# Patient Record
Sex: FEMALE | Race: WHITE | Employment: OTHER | URBAN - METROPOLITAN AREA
[De-identification: names, ages, dates, MRNs, and addresses within clinical notes are randomized per-mention and may not be internally consistent; named-entity substitution may affect disease eponyms.]

---

## 2018-01-17 PROBLEM — K57.20 COLONIC DIVERTICULAR ABSCESS: Status: ACTIVE | Noted: 2018-01-17

## 2018-01-17 PROBLEM — K57.80 DIVERTICULITIS OF INTESTINE WITH ABSCESS WITHOUT BLEEDING: Status: ACTIVE | Noted: 2018-01-17

## 2018-01-31 PROBLEM — O22.30 DVT (DEEP VEIN THROMBOSIS) IN PREGNANCY: Status: ACTIVE | Noted: 2018-01-31

## 2018-03-08 PROBLEM — O22.30 DVT (DEEP VEIN THROMBOSIS) IN PREGNANCY: Status: RESOLVED | Noted: 2018-01-31 | Resolved: 2018-03-08

## 2018-03-08 PROBLEM — I82.622 ACUTE DEEP VEIN THROMBOSIS (DVT) OF LEFT UPPER EXTREMITY (HCC): Status: ACTIVE | Noted: 2018-03-08

## 2018-05-11 ENCOUNTER — TELEPHONE (OUTPATIENT)
Dept: SURGERY | Age: 58
End: 2018-05-11

## 2019-04-17 ENCOUNTER — HOSPITAL ENCOUNTER (INPATIENT)
Age: 59
LOS: 1 days | Discharge: HOME | DRG: 190 | End: 2019-04-18
Attending: EMERGENCY MEDICINE | Admitting: HOSPITALIST
Payer: COMMERCIAL

## 2019-04-17 ENCOUNTER — APPOINTMENT (OUTPATIENT)
Dept: CT IMAGING | Age: 59
DRG: 190 | End: 2019-04-17
Payer: COMMERCIAL

## 2019-04-17 DIAGNOSIS — R06.02 SHORTNESS OF BREATH: ICD-10-CM

## 2019-04-17 DIAGNOSIS — J42 CHRONIC BRONCHITIS, UNSPECIFIED CHRONIC BRONCHITIS TYPE (HCC): ICD-10-CM

## 2019-04-17 DIAGNOSIS — R05.9 COUGH: ICD-10-CM

## 2019-04-17 PROBLEM — J44.1 ACUTE EXACERBATION OF CHRONIC OBSTRUCTIVE PULMONARY DISEASE (COPD) (HCC): Status: ACTIVE | Noted: 2019-04-17

## 2019-04-17 PROBLEM — J44.1 COPD EXACERBATION (HCC): Status: ACTIVE | Noted: 2019-04-17

## 2019-04-17 LAB
ALBUMIN SERPL-MCNC: 3.9 G/DL (ref 3.5–5.2)
ALP BLD-CCNC: 93 U/L (ref 35–104)
ALT SERPL-CCNC: 13 U/L (ref 0–32)
ANION GAP SERPL CALCULATED.3IONS-SCNC: 12 MMOL/L (ref 7–16)
AST SERPL-CCNC: 16 U/L (ref 0–31)
BILIRUB SERPL-MCNC: 0.4 MG/DL (ref 0–1.2)
BUN BLDV-MCNC: 8 MG/DL (ref 6–20)
CALCIUM SERPL-MCNC: 9.5 MG/DL (ref 8.6–10.2)
CHLORIDE BLD-SCNC: 100 MMOL/L (ref 98–107)
CO2: 31 MMOL/L (ref 22–29)
CREAT SERPL-MCNC: 0.6 MG/DL (ref 0.5–1)
GFR AFRICAN AMERICAN: >60
GFR NON-AFRICAN AMERICAN: >60 ML/MIN/1.73
GLUCOSE BLD-MCNC: 99 MG/DL (ref 74–99)
HCT VFR BLD CALC: 45.5 % (ref 34–48)
HEMOGLOBIN: 15.4 G/DL (ref 11.5–15.5)
LACTIC ACID: 1.6 MMOL/L (ref 0.5–2.2)
MCH RBC QN AUTO: 31.4 PG (ref 26–35)
MCHC RBC AUTO-ENTMCNC: 33.8 % (ref 32–34.5)
MCV RBC AUTO: 92.7 FL (ref 80–99.9)
PDW BLD-RTO: 12.9 FL (ref 11.5–15)
PLATELET # BLD: 258 E9/L (ref 130–450)
PMV BLD AUTO: 9.2 FL (ref 7–12)
POTASSIUM SERPL-SCNC: 3.4 MMOL/L (ref 3.5–5)
PRO-BNP: 157 PG/ML (ref 0–125)
RBC # BLD: 4.91 E12/L (ref 3.5–5.5)
SODIUM BLD-SCNC: 143 MMOL/L (ref 132–146)
TOTAL PROTEIN: 7 G/DL (ref 6.4–8.3)
TROPONIN: <0.01 NG/ML (ref 0–0.03)
WBC # BLD: 8.9 E9/L (ref 4.5–11.5)

## 2019-04-17 PROCEDURE — 36415 COLL VENOUS BLD VENIPUNCTURE: CPT

## 2019-04-17 PROCEDURE — 85027 COMPLETE CBC AUTOMATED: CPT

## 2019-04-17 PROCEDURE — 80053 COMPREHEN METABOLIC PANEL: CPT

## 2019-04-17 PROCEDURE — 84145 PROCALCITONIN (PCT): CPT

## 2019-04-17 PROCEDURE — 83880 ASSAY OF NATRIURETIC PEPTIDE: CPT

## 2019-04-17 PROCEDURE — 6360000004 HC RX CONTRAST MEDICATION: Performed by: RADIOLOGY

## 2019-04-17 PROCEDURE — 83605 ASSAY OF LACTIC ACID: CPT

## 2019-04-17 PROCEDURE — 1200000000 HC SEMI PRIVATE

## 2019-04-17 PROCEDURE — 6370000000 HC RX 637 (ALT 250 FOR IP): Performed by: EMERGENCY MEDICINE

## 2019-04-17 PROCEDURE — 99285 EMERGENCY DEPT VISIT HI MDM: CPT

## 2019-04-17 PROCEDURE — 93005 ELECTROCARDIOGRAM TRACING: CPT | Performed by: EMERGENCY MEDICINE

## 2019-04-17 PROCEDURE — 84484 ASSAY OF TROPONIN QUANT: CPT

## 2019-04-17 PROCEDURE — 71275 CT ANGIOGRAPHY CHEST: CPT

## 2019-04-17 PROCEDURE — 94640 AIRWAY INHALATION TREATMENT: CPT

## 2019-04-17 RX ORDER — SODIUM CHLORIDE 0.9 % (FLUSH) 0.9 %
10 SYRINGE (ML) INJECTION PRN
Status: CANCELLED | OUTPATIENT
Start: 2019-04-17

## 2019-04-17 RX ORDER — IPRATROPIUM BROMIDE AND ALBUTEROL SULFATE 2.5; .5 MG/3ML; MG/3ML
3 SOLUTION RESPIRATORY (INHALATION) ONCE
Status: COMPLETED | OUTPATIENT
Start: 2019-04-17 | End: 2019-04-17

## 2019-04-17 RX ORDER — PSEUDOEPHEDRINE HCL 30 MG
100 TABLET ORAL DAILY
Status: CANCELLED | OUTPATIENT
Start: 2019-04-18

## 2019-04-17 RX ORDER — IPRATROPIUM BROMIDE AND ALBUTEROL SULFATE 2.5; .5 MG/3ML; MG/3ML
1 SOLUTION RESPIRATORY (INHALATION)
Status: CANCELLED | OUTPATIENT
Start: 2019-04-18

## 2019-04-17 RX ORDER — METHYLPREDNISOLONE SODIUM SUCCINATE 125 MG/2ML
80 INJECTION, POWDER, LYOPHILIZED, FOR SOLUTION INTRAMUSCULAR; INTRAVENOUS EVERY 8 HOURS
Status: CANCELLED | OUTPATIENT
Start: 2019-04-17

## 2019-04-17 RX ORDER — ONDANSETRON 2 MG/ML
4 INJECTION INTRAMUSCULAR; INTRAVENOUS EVERY 6 HOURS PRN
Status: CANCELLED | OUTPATIENT
Start: 2019-04-17

## 2019-04-17 RX ORDER — SODIUM CHLORIDE 0.9 % (FLUSH) 0.9 %
10 SYRINGE (ML) INJECTION EVERY 12 HOURS SCHEDULED
Status: CANCELLED | OUTPATIENT
Start: 2019-04-17

## 2019-04-17 RX ORDER — NICOTINE 21 MG/24HR
1 PATCH, TRANSDERMAL 24 HOURS TRANSDERMAL DAILY
Status: CANCELLED | OUTPATIENT
Start: 2019-04-18

## 2019-04-17 RX ORDER — ACETAMINOPHEN 325 MG/1
650 TABLET ORAL EVERY 4 HOURS PRN
Status: CANCELLED | OUTPATIENT
Start: 2019-04-17

## 2019-04-17 RX ADMIN — IPRATROPIUM BROMIDE AND ALBUTEROL SULFATE 3 AMPULE: .5; 3 SOLUTION RESPIRATORY (INHALATION) at 20:18

## 2019-04-17 RX ADMIN — IOPAMIDOL 80 ML: 755 INJECTION, SOLUTION INTRAVENOUS at 22:24

## 2019-04-17 ASSESSMENT — ENCOUNTER SYMPTOMS
ABDOMINAL PAIN: 0
CHEST TIGHTNESS: 1
SHORTNESS OF BREATH: 1

## 2019-04-17 NOTE — ED NOTES
Bed: H4  Expected date:   Expected time:   Means of arrival:   Comments:     Lucy Sarmiento RN  04/17/19 1958

## 2019-04-18 VITALS
OXYGEN SATURATION: 95 % | WEIGHT: 146 LBS | HEIGHT: 64 IN | RESPIRATION RATE: 18 BRPM | HEART RATE: 90 BPM | DIASTOLIC BLOOD PRESSURE: 80 MMHG | BODY MASS INDEX: 24.92 KG/M2 | SYSTOLIC BLOOD PRESSURE: 138 MMHG | TEMPERATURE: 97.9 F

## 2019-04-18 PROBLEM — E87.6 HYPOKALEMIA: Status: ACTIVE | Noted: 2019-04-18

## 2019-04-18 PROBLEM — J96.01 ACUTE RESPIRATORY FAILURE WITH HYPOXIA (HCC): Status: ACTIVE | Noted: 2019-04-18

## 2019-04-18 LAB
ANION GAP SERPL CALCULATED.3IONS-SCNC: 12 MMOL/L (ref 7–16)
BASOPHILS ABSOLUTE: 0.03 E9/L (ref 0–0.2)
BASOPHILS RELATIVE PERCENT: 0.3 % (ref 0–2)
BUN BLDV-MCNC: 8 MG/DL (ref 6–20)
CALCIUM SERPL-MCNC: 9.2 MG/DL (ref 8.6–10.2)
CHLORIDE BLD-SCNC: 102 MMOL/L (ref 98–107)
CO2: 27 MMOL/L (ref 22–29)
CREAT SERPL-MCNC: 0.5 MG/DL (ref 0.5–1)
EKG ATRIAL RATE: 80 BPM
EKG P AXIS: 81 DEGREES
EKG P-R INTERVAL: 156 MS
EKG Q-T INTERVAL: 416 MS
EKG QRS DURATION: 80 MS
EKG QTC CALCULATION (BAZETT): 479 MS
EKG R AXIS: 82 DEGREES
EKG T AXIS: 70 DEGREES
EKG VENTRICULAR RATE: 80 BPM
EOSINOPHILS ABSOLUTE: 0.01 E9/L (ref 0.05–0.5)
EOSINOPHILS RELATIVE PERCENT: 0.1 % (ref 0–6)
FILM ARRAY ADENOVIRUS: NORMAL
FILM ARRAY BORDETELLA PERTUSSIS: NORMAL
FILM ARRAY CHLAMYDOPHILIA PNEUMONIAE: NORMAL
FILM ARRAY CORONAVIRUS 229E: NORMAL
FILM ARRAY CORONAVIRUS HKU1: NORMAL
FILM ARRAY CORONAVIRUS NL63: NORMAL
FILM ARRAY CORONAVIRUS OC43: NORMAL
FILM ARRAY INFLUENZA A VIRUS 09H1: NORMAL
FILM ARRAY INFLUENZA A VIRUS H1: NORMAL
FILM ARRAY INFLUENZA A VIRUS H3: NORMAL
FILM ARRAY INFLUENZA A VIRUS: NORMAL
FILM ARRAY INFLUENZA B: NORMAL
FILM ARRAY METAPNEUMOVIRUS: NORMAL
FILM ARRAY MYCOPLASMA PNEUMONIAE: NORMAL
FILM ARRAY PARAINFLUENZA VIRUS 1: NORMAL
FILM ARRAY PARAINFLUENZA VIRUS 2: NORMAL
FILM ARRAY PARAINFLUENZA VIRUS 3: NORMAL
FILM ARRAY PARAINFLUENZA VIRUS 4: NORMAL
FILM ARRAY RESPIRATORY SYNCITIAL VIRUS: NORMAL
FILM ARRAY RHINOVIRUS/ENTEROVIRUS: NORMAL
GFR AFRICAN AMERICAN: >60
GFR NON-AFRICAN AMERICAN: >60 ML/MIN/1.73
GLUCOSE BLD-MCNC: 122 MG/DL (ref 74–99)
HCT VFR BLD CALC: 45.1 % (ref 34–48)
HEMOGLOBIN: 15.3 G/DL (ref 11.5–15.5)
IMMATURE GRANULOCYTES #: 0.03 E9/L
IMMATURE GRANULOCYTES %: 0.3 % (ref 0–5)
LYMPHOCYTES ABSOLUTE: 0.91 E9/L (ref 1.5–4)
LYMPHOCYTES RELATIVE PERCENT: 9.1 % (ref 20–42)
MCH RBC QN AUTO: 31.7 PG (ref 26–35)
MCHC RBC AUTO-ENTMCNC: 33.9 % (ref 32–34.5)
MCV RBC AUTO: 93.6 FL (ref 80–99.9)
MONOCYTES ABSOLUTE: 0.08 E9/L (ref 0.1–0.95)
MONOCYTES RELATIVE PERCENT: 0.8 % (ref 2–12)
NEUTROPHILS ABSOLUTE: 8.99 E9/L (ref 1.8–7.3)
NEUTROPHILS RELATIVE PERCENT: 89.4 % (ref 43–80)
PDW BLD-RTO: 12.9 FL (ref 11.5–15)
PLATELET # BLD: 248 E9/L (ref 130–450)
PMV BLD AUTO: 9 FL (ref 7–12)
POTASSIUM REFLEX MAGNESIUM: 4.4 MMOL/L (ref 3.5–5)
PROCALCITONIN: 0.02 NG/ML (ref 0–0.08)
RBC # BLD: 4.82 E12/L (ref 3.5–5.5)
SODIUM BLD-SCNC: 141 MMOL/L (ref 132–146)
TROPONIN: <0.01 NG/ML (ref 0–0.03)
TROPONIN: <0.01 NG/ML (ref 0–0.03)
WBC # BLD: 10.1 E9/L (ref 4.5–11.5)

## 2019-04-18 PROCEDURE — 87633 RESP VIRUS 12-25 TARGETS: CPT

## 2019-04-18 PROCEDURE — 94667 MNPJ CHEST WALL 1ST: CPT

## 2019-04-18 PROCEDURE — 84484 ASSAY OF TROPONIN QUANT: CPT

## 2019-04-18 PROCEDURE — 2580000003 HC RX 258: Performed by: NURSE PRACTITIONER

## 2019-04-18 PROCEDURE — 99239 HOSP IP/OBS DSCHRG MGMT >30: CPT | Performed by: HOSPITALIST

## 2019-04-18 PROCEDURE — 6370000000 HC RX 637 (ALT 250 FOR IP): Performed by: NURSE PRACTITIONER

## 2019-04-18 PROCEDURE — 87486 CHLMYD PNEUM DNA AMP PROBE: CPT

## 2019-04-18 PROCEDURE — 6360000002 HC RX W HCPCS: Performed by: NURSE PRACTITIONER

## 2019-04-18 PROCEDURE — 80048 BASIC METABOLIC PNL TOTAL CA: CPT

## 2019-04-18 PROCEDURE — 94668 MNPJ CHEST WALL SBSQ: CPT

## 2019-04-18 PROCEDURE — 85025 COMPLETE CBC W/AUTO DIFF WBC: CPT

## 2019-04-18 PROCEDURE — 87798 DETECT AGENT NOS DNA AMP: CPT

## 2019-04-18 PROCEDURE — 36415 COLL VENOUS BLD VENIPUNCTURE: CPT

## 2019-04-18 PROCEDURE — 87581 M.PNEUMON DNA AMP PROBE: CPT

## 2019-04-18 PROCEDURE — 94640 AIRWAY INHALATION TREATMENT: CPT

## 2019-04-18 PROCEDURE — 99223 1ST HOSP IP/OBS HIGH 75: CPT | Performed by: NURSE PRACTITIONER

## 2019-04-18 PROCEDURE — 2700000000 HC OXYGEN THERAPY PER DAY

## 2019-04-18 RX ORDER — ALBUTEROL SULFATE 2.5 MG/3ML
2.5 SOLUTION RESPIRATORY (INHALATION) EVERY 4 HOURS PRN
Qty: 120 EACH | Refills: 1 | Status: ON HOLD | OUTPATIENT
Start: 2019-04-18 | End: 2019-12-03 | Stop reason: SDUPTHER

## 2019-04-18 RX ORDER — SODIUM CHLORIDE 0.9 % (FLUSH) 0.9 %
10 SYRINGE (ML) INJECTION PRN
Status: DISCONTINUED | OUTPATIENT
Start: 2019-04-18 | End: 2019-04-18 | Stop reason: HOSPADM

## 2019-04-18 RX ORDER — ALBUTEROL SULFATE 2.5 MG/3ML
2.5 SOLUTION RESPIRATORY (INHALATION)
Status: DISCONTINUED | OUTPATIENT
Start: 2019-04-18 | End: 2019-04-18 | Stop reason: HOSPADM

## 2019-04-18 RX ORDER — METHYLPREDNISOLONE SODIUM SUCCINATE 40 MG/ML
40 INJECTION, POWDER, LYOPHILIZED, FOR SOLUTION INTRAMUSCULAR; INTRAVENOUS EVERY 8 HOURS
Status: DISCONTINUED | OUTPATIENT
Start: 2019-04-18 | End: 2019-04-18 | Stop reason: HOSPADM

## 2019-04-18 RX ORDER — IPRATROPIUM BROMIDE AND ALBUTEROL SULFATE 2.5; .5 MG/3ML; MG/3ML
1 SOLUTION RESPIRATORY (INHALATION)
Status: DISCONTINUED | OUTPATIENT
Start: 2019-04-18 | End: 2019-04-18 | Stop reason: HOSPADM

## 2019-04-18 RX ORDER — PREDNISONE 10 MG/1
40 TABLET ORAL DAILY
Qty: 28 TABLET | Refills: 0 | Status: SHIPPED | OUTPATIENT
Start: 2019-04-18 | End: 2019-04-25

## 2019-04-18 RX ORDER — SODIUM CHLORIDE 0.9 % (FLUSH) 0.9 %
10 SYRINGE (ML) INJECTION EVERY 12 HOURS SCHEDULED
Status: DISCONTINUED | OUTPATIENT
Start: 2019-04-18 | End: 2019-04-18 | Stop reason: HOSPADM

## 2019-04-18 RX ORDER — POTASSIUM CHLORIDE 7.45 MG/ML
10 INJECTION INTRAVENOUS PRN
Status: DISCONTINUED | OUTPATIENT
Start: 2019-04-18 | End: 2019-04-18 | Stop reason: HOSPADM

## 2019-04-18 RX ORDER — NICOTINE 21 MG/24HR
1 PATCH, TRANSDERMAL 24 HOURS TRANSDERMAL DAILY
Status: DISCONTINUED | OUTPATIENT
Start: 2019-04-18 | End: 2019-04-18 | Stop reason: HOSPADM

## 2019-04-18 RX ORDER — ONDANSETRON 2 MG/ML
4 INJECTION INTRAMUSCULAR; INTRAVENOUS EVERY 8 HOURS PRN
Status: DISCONTINUED | OUTPATIENT
Start: 2019-04-18 | End: 2019-04-18 | Stop reason: HOSPADM

## 2019-04-18 RX ORDER — NICOTINE 21 MG/24HR
1 PATCH, TRANSDERMAL 24 HOURS TRANSDERMAL DAILY
Qty: 30 PATCH | Refills: 0 | Status: SHIPPED | OUTPATIENT
Start: 2019-04-18 | End: 2021-09-21 | Stop reason: ALTCHOICE

## 2019-04-18 RX ORDER — POTASSIUM CHLORIDE 20 MEQ/1
40 TABLET, EXTENDED RELEASE ORAL PRN
Status: DISCONTINUED | OUTPATIENT
Start: 2019-04-18 | End: 2019-04-18 | Stop reason: HOSPADM

## 2019-04-18 RX ORDER — MAGNESIUM SULFATE 1 G/100ML
1 INJECTION INTRAVENOUS PRN
Status: DISCONTINUED | OUTPATIENT
Start: 2019-04-18 | End: 2019-04-18 | Stop reason: HOSPADM

## 2019-04-18 RX ADMIN — POTASSIUM CHLORIDE 40 MEQ: 20 TABLET, EXTENDED RELEASE ORAL at 01:49

## 2019-04-18 RX ADMIN — IPRATROPIUM BROMIDE AND ALBUTEROL SULFATE 1 AMPULE: .5; 3 SOLUTION RESPIRATORY (INHALATION) at 05:00

## 2019-04-18 RX ADMIN — Medication 10 ML: at 09:47

## 2019-04-18 RX ADMIN — METHYLPREDNISOLONE SODIUM SUCCINATE 40 MG: 40 INJECTION, POWDER, FOR SOLUTION INTRAMUSCULAR; INTRAVENOUS at 09:45

## 2019-04-18 RX ADMIN — METHYLPREDNISOLONE SODIUM SUCCINATE 40 MG: 40 INJECTION, POWDER, FOR SOLUTION INTRAMUSCULAR; INTRAVENOUS at 17:16

## 2019-04-18 RX ADMIN — IPRATROPIUM BROMIDE AND ALBUTEROL SULFATE 1 AMPULE: .5; 3 SOLUTION RESPIRATORY (INHALATION) at 10:45

## 2019-04-18 RX ADMIN — IPRATROPIUM BROMIDE AND ALBUTEROL SULFATE 1 AMPULE: .5; 3 SOLUTION RESPIRATORY (INHALATION) at 16:31

## 2019-04-18 RX ADMIN — METHYLPREDNISOLONE SODIUM SUCCINATE 40 MG: 40 INJECTION, POWDER, FOR SOLUTION INTRAMUSCULAR; INTRAVENOUS at 01:49

## 2019-04-18 ASSESSMENT — PAIN SCALES - GENERAL
PAINLEVEL_OUTOF10: 0
PAINLEVEL_OUTOF10: 0

## 2019-04-18 NOTE — H&P
[facility] Hospitalist Group History and Physical      CHIEF COMPLAINT:  Shortness of breath    History of Present Illness:  62-year-old female initially presented to the emergency department for evaluation of shortness of breath for the last 2 or 3 days over and above the mild chronic daily shortness of breath she has from her COPD. She noted a change in her activity tolerance as well as she is now dyspneic on exertion which is not typical for her. His associated cough with some sputum production but not significantly different from her baseline cough and sputum production. Patient states that the last 24 hours she is noted that she could not even do simple things that walk across the house without giving herself breathing treatments due to the hypoxia but she admits she was not taking complete treatments when should do this and only taking puffs off to alleviate some of her symptoms. Admits to some subjective fevers but no measured fevers, chills, mild decrease in appetite. She was drinking fluids normally. No sinus pain or pressure, admits to sinus and nasal congestion and postnasal drip. No sore throat. No headache, neck pain or stiffness. Has some mild pleuritic chest discomfort that is worsened with cough and deep breathing but denies hemoptysis. No recent surgeries or hospitalizations. No prolonged immobilization or limb immobilization prior history of DVT times one provoked by surgery in March 2018 treated with 3 months of anticoagulation and then discontinued with no further blood clotting disorders. No history of hypercoagulable state. No weight gain, fluid retention or edema in the lower extremity, orthopnea or paroxysmal nocturnal dyspnea. Admits to dyspnea on exertion as mentioned. Patient denies any abdominal pains, nausea vomiting, bowel pattern changes or bloody stools. Denies any urinary or vaginal complaints. Patient was evaluated in the ER initially noted to be hypoxic at rest at 90%.  She is not typically oxygen dependent. She was treated with nebulizers and had diagnostic testing that showed some exertional dyspnea and hypoxia with saturations dropping into the mid to high 80% range on room air. She had a benign laboratory evaluation showing no leukocytosis, renal insufficiency or significant electrolyte derangement. She was noted to be mildly hypokalemic. EKG showed no findings of ischemia, infarction, or right-sided heart strain. Given the patient's prior history of provoked DVT, the patient underwent CT angiography which showed no PE but did show some emphysematous findings. Patient was still requiring supplemental nasal cannula oxygen despite treatment. Decision was made to admit the patient for further care and management to the hospital service. Blood gases were not obtained with the patient's serum CO2 was noted to be mildly elevated as well suggesting mild hypercapnia. Informant(s) for H&P:patient, ER physician, EMR    REVIEW OF SYSTEMS:  Pertinent items are noted in HPI. A 12 point review of systems was conducted and aside from that mentioned within HPI, all systems were reviewed and unremarkable. PMH:  Past Medical History:   Diagnosis Date    Abscess     colon    COPD (chronic obstructive pulmonary disease) ([facility] [address]     Diverticulitis     Provoked DVT 3/2018     3 months Eliquis for DVT due to PICC line    Seasonal allergic rhinitis     Tobacco use disorder      : 1 ppd since age 25       Surgical History:  History reviewed. No pertinent surgical history. Medications Prior to Admission:    Prior to Admission medications    Medication Sig Start Date End Date Taking?  Authorizing Provider   apixaban (ELIQUIS) 2.5 MG TABS tablet Take 1 tablet by mouth 2 times daily Lot #: [id number]  Exp: 12/2018 3/5/18   Austin Mays MD   apixaban (ELIQUIS) 5 MG TABS tablet Take 1 tablet by mouth 2 times daily Lot #: [id number]  Exp: 4/2020 3/5/18   Austin Mays MD   azithromycin (ZITHROMAX) 250 MG tablet 500 mg on day 1 then 250 mg po daily for 4 more days 2/21/18   Austin Mays MD   Umeclidinium Bromide 62.5 MCG/INH AEPB Inhale 1 Inhaler into the lungs daily 2/21/18   Austin Mays MD   albuterol sulfate HFA (PROAIR HFA) 108 (90 Base) MCG/ACT inhaler Inhale 2 puffs into the lungs every 6 hours as needed for Wheezing 2/21/18   Austin Mays MD   docusate sodium (COLACE, DULCOLAX) 100 MG CAPS Take 100 mg by mouth 2 times daily as needed for Constipation  Patient taking differently: Take 100 mg by mouth daily  1/22/18   Gerald Harry MD   nicotine (NICODERM CQ) 14 MG/24HR Place 1 patch onto the skin daily 1/22/18   Gerald Harry MD       Allergies:    Penicillin v    Social History:    reports that she has been smoking cigarettes. She has a 20.50 pack-year smoking history. She has never used smokeless tobacco. She reports that she drinks alcohol. She reports that she has current or past drug history. Drug: Marijuana. Family History:   family history includes Breast Cancer in her none; Colon Cancer in her none; Coronary Art Dis in her father; Diabetes in her unknown relative; Hypertension in her father; Other in her daughter and father; Stroke in her mother. PHYSICAL EXAM:  Vitals:  BP (!) 151/92   Pulse 83   Temp 97.8 °F (36.6 °C) (Oral)   Resp 25   Ht 5' 4\" (1.626 m)   Wt 145 lb (65.8 kg)   SpO2 97%   BMI 24.89 kg/m²   General Appearance: alert and oriented to person, place and time, non-toxic, in no acute distress  Skin: warm and dry, no rash or erythema  Head: normocephalic and atraumatic  Eyes: pupils equal, round, and reactive to light, extraocular eye movements intact, conjunctivae normal. Sclera without icterus.   ENT: External ear structures unremarkable in appearance, nose without deformity, nasal mucosa and turbinates normal without polyps  Neck: supple and non-tender without mass, no thyromegaly or thyroid nodules, no cervical lymphadenopathy  Pulmonary/Chest: Air entry is diminished throughout. Expiratory wheezing is noted without rales or rhonchi. Chest nontender and without rash  Cardiovascular: normal rate, regular rhythm, normal S1 and S2, no murmurs, rubs, clicks, or gallops, distal pulses intact, no carotid bruits  Abdomen: soft, non-tender, non-distended, normal bowel sounds, no masses or organomegaly  Extremities: No calf tenderness or edema, no foot or ankle edema. No cording. No tenderness along the deep venous tract. No clinical findings to suggest DVT. Musculoskeletal: normal range of motion, no joint swelling, deformity or tenderness  Neurologic: reflexes normal and symmetric, no cranial nerve deficit, gait, coordination and speech normal      LABS:  Recent Labs     04/17/19 2110      K 3.4*      CO2 31*   BUN 8   CREATININE 0.6   GLUCOSE 99   CALCIUM 9.5       Recent Labs     04/17/19 2110   WBC 8.9   RBC 4.91   HGB 15.4   HCT 45.5   MCV 92.7   MCH 31.4   MCHC 33.8   RDW 12.9      MPV 9.2       No results for input(s): POCGLU in the last 72 hours.     CBC with Differential:    Lab Results   Component Value Date    WBC 8.9 04/17/2019    RBC 4.91 04/17/2019    HGB 15.4 04/17/2019    HCT 45.5 04/17/2019     04/17/2019    MCV 92.7 04/17/2019    MCH 31.4 04/17/2019    MCHC 33.8 04/17/2019    RDW 12.9 04/17/2019    LYMPHOPCT 26.7 01/31/2018    MONOPCT 5.7 01/31/2018    BASOPCT 0.5 01/31/2018    MONOSABS 0.59 01/31/2018    LYMPHSABS 2.77 01/31/2018    EOSABS 0.13 01/31/2018    BASOSABS 0.05 01/31/2018     BMP:    Lab Results   Component Value Date     04/17/2019    K 3.4 04/17/2019     04/17/2019    CO2 31 04/17/2019    BUN 8 04/17/2019    LABALBU 3.9 04/17/2019    CREATININE 0.6 04/17/2019    CALCIUM 9.5 04/17/2019    GFRAA >60 04/17/2019    LABGLOM >60 04/17/2019    GLUCOSE 99 04/17/2019     Magnesium:    Lab Results   Component Value Date    MG 2.3 01/18/2018     Troponin:    Lab Results   Component Value Date    TROPONINI <0.01 04/17/2019       Radiology: Cta Chest W Contrast    Result Date: 4/17/2019  Reading Location: 200 Indication: Shortness of breath. Comparison: None available. Technique: Multidetector CT angiography of the chest was preformed after the administration of intravenous contrast (80 of Isovue-370). Coronal, sagittal, and MIP reformatted images were obtained. Automated dose exposure control was used for this exam. FINDINGS: Pulmonary Arteries: There is adequate opacification of the pulmonary arteries to the subsegmental  level. No pulmonary artery filling defect is identified. The main pulmonary artery is normal in caliber. Airways: The central airways are patent. There is no bronchiectasis. Lungs: There are moderate changes of centrilobular emphysema. There is no focal consolidation or mass. Pleura: There is no pleural effusion or pneumothorax. There is no pleural thickening. Heart and Pericardium: The heart is normal in size. There is no large pericardial effusion. Vessels: The thoracic aorta is normal in caliber and enhancement. The great vessel origins are patent. Mediastinum and Noa: There is no mediastinal or hilar lymphadenopathy. Chest Wall/Bones: Unremarkable. Upper Abdomen: Visualized portion of the upper abdomen demonstrates hypodense lesions within the liver, most compatible with cysts. .     1. No evidence of pulmonary embolism to the subsegmental level. 2. Moderate changes of centrilobular emphysema. EKG: Interpreted by myself with attending physician unless otherwise noted. Time:  21:21:23    Interpretation:  Rhythm: NSR  Rate: 80  Intervals: QTC is mildly prolonged otherwise within defined limits  Axis: normal  ST/T waves: No acute ST or T-wave abnormalities to suggest infarction or ischemia  R wave progression: normal  Comparison: stable as compared to patient's most recent EKG.     ASSESSMENT:      Principal Problem:    Acute exacerbation of chronic obstructive pulmonary disease (COPD) [facility])  Active Problems:    Tobacco use disorder    Acute respiratory failure with hypoxia (HCC)    Hypokalemia  Resolved Problems:    * No resolved hospital problems. *      PLAN:    1. Acute exacerbation of COPD with hypoxic respiratory failure-admit to telemetry. CT interim showed COPD with no pulmonary emboli or acute infectious process. Scheduled DuoNeb with as needed albuterol. Respiratory film array and a pro-calcitonin pending. We will withhold antibiotic therapy as the patient is without significant leukocytosis, fever, change in sputum production or viscosity. Scheduled Solu-Medrol 40 mg every 8 hours ×3 doses, then defer taper to the daytime rounding hospitalist. Supplemental nasal cannula oxygen with plan to taper to room air as the patient is not chronically oxygen dependent. Flutter valve for mobilization secretions. Continue to monitor with telemetry and cycle troponins every 6 hours until 3 sets have been completed and negative. 2. Hypokalemia-replaced, continue potassium and magnesium replacement protocols. Continued telemetry monitoring. 3. Tobacco use disorder- tobacco cessation is advised, education should be provided during inpatient stay. Nicotine patch commensurate with patient's half-pack per day smoking. 4. Code Status: FULL   5.  DVT prophylaxis: Lovenox 40mg subcutaneous injection daily      Electronically signed by SKYE Caballero CNP on 4/18/2019 at 12:11 AM

## 2019-04-18 NOTE — PLAN OF CARE
Problem: Falls - Risk of:  Goal: Will remain free from falls  Description  Will remain free from falls  Outcome: Met This Shift  Goal: Absence of physical injury  Description  Absence of physical injury  Outcome: Met This Shift     Problem:  Activity Intolerance:  Goal: Ability to tolerate increased activity will improve  Description  Ability to tolerate increased activity will improve  Outcome: Met This Shift     Problem: Airway Clearance - Ineffective:  Goal: Ability to maintain a clear airway will improve  Description  Ability to maintain a clear airway will improve  Outcome: Met This Shift     Problem: Breathing Pattern - Ineffective:  Goal: Ability to achieve and maintain a regular respiratory rate will improve  Description  Ability to achieve and maintain a regular respiratory rate will improve  Outcome: Met This Shift     Problem: Gas Exchange - Impaired:  Goal: Levels of oxygenation will improve  Description  Levels of oxygenation will improve  Outcome: Not Met This Shift

## 2019-04-18 NOTE — CARE COORDINATION
Ss note:4/18/2019. 3:29 PM Met with pt she resides with her dtr. Pt does not have home oxygen. Pt qualifies, script obtained for oxygen and nebulizer, initial referral made to [facility] [phone], orders faxed to [phone]. Will need PHYSICIAN NOTE stating pt requires oxygen and nebulizer at home to complete the referral. Pt relays she will have a ride home when 02 is arranged.  EPI Still

## 2019-04-18 NOTE — PROGRESS NOTES
[facility] Quality Flow/Interdisciplinary Rounds Progress Note        Quality Flow Rounds held on April 18, 2019    Disciplines Attending:  Bedside Nurse, ,  and Nursing Unit Leadership    Shaila Peralta was admitted on 4/17/2019  7:58 PM    Anticipated Discharge Date:  Expected Discharge Date: 04/20/19    Disposition:    Ernie Score:  Ernie Scale Score: 21    Readmission Risk              Risk of Unplanned Readmission:        10           Discussed patient goal for the day, patient clinical progression, and barriers to discharge.   The following Goal(s) of the Day/Commitment(s) have been identified:  Activity progression, IV SM, 4L NC, will try to qualify,       Genevieve Jacobsen  April 18, 2019

## 2019-04-18 NOTE — DISCHARGE SUMMARY
as: PROAIR HFA  Replaced by:  albuterol (2.5 MG/3ML) 0.083% nebulizer solution     apixaban 2.5 MG Tabs tablet  Commonly known as:  ELIQUIS     apixaban 5 MG Tabs tablet  Commonly known as:  ELIQUIS     azithromycin 250 MG tablet  Commonly known as:  ZITHROMAX     docusate 100 MG Caps  Commonly known as:  Pearla Bail           Where to Get Your Medications      These medications were sent to [facility] [place], [place] - [address] [place] [address] [phone] [facility] [phone]  [address] [place] [id number]    Phone:  [phone]   · albuterol (2.5 MG/3ML) 0.083% nebulizer solution  · mometasone-formoterol 200-5 MCG/ACT inhaler  · nicotine 14 MG/24HR  · predniSONE 10 MG tablet  · Umeclidinium Bromide 62.5 MCG/INH Aepb           Discharge Exam:  General Appearance: alert and oriented to person, place and time  Skin: warm and dry, no rash or erythema  Head: normocephalic and atraumatic  Eyes: pupils equal, round, and reactive to light,   Neck: neck supple and non tender   Pulmonary/Chest: clear to auscultation bilaterally  Cardiovascular: normal rate, normal S1 and S2,   Abdomen: soft, non-tender, non-distended, normal bowel sounds,   Extremities: no edema  Neurologic: Non focal     Vitals:    Vitals:    04/17/19 2350 04/18/19 0025 04/18/19 0100 04/18/19 0745   BP: (!) 151/92 138/86 (!) 152/85 138/80   Pulse: 83 85 76 90   Resp: 25 21 20 18   Temp: 97.8 °F (36.6 °C) 98 °F (36.7 °C) 97.9 °F (36.6 °C) 97.9 °F (36.6 °C)   TempSrc: Oral Oral Oral Oral   SpO2: 97% 96% 93% 95%   Weight:   146 lb (66.2 kg)    Height:   5' 4\" (1.626 m)        I/O last 3 completed shifts: In: 310 [P.O.:310]  Out: 0   No intake/output data recorded.       LABS:  Recent Labs     04/17/19 2110 04/18/19  0649    141   K 3.4* 4.4    102   CO2 31* 27   BUN 8 8   CREATININE 0.6 0.5   GLUCOSE 99 122*   CALCIUM 9.5 9.2       Recent Labs     04/17/19 2110 04/18/19  0649   WBC 8.9 10.1   RBC 4.91 4.82   HGB 15.4 15.3   HCT 45.5 45.1   MCV 92.7 93.6   MCH 31.4 31.7   MCHC 33.8 33.9   RDW 12.9 12.9    248   MPV 9.2 9.0       No results for input(s): GLUMET in the last 72 hours. Imaging:    CTA CHEST W CONTRAST   Final Result   1. No evidence of pulmonary embolism to the subsegmental level. 2. Moderate changes of centrilobular emphysema. Follow up:  Jag Mendez MD  [address] [facility] [id number]            Patient Instructions: Activity level: as tolerated  Diet: regular diet      Continue supplemental oxygen via nasal canula @ 2 LPM round-the-clock.     Dispo: home     Condition at discharge stable    Note that more than 30 minutes was spent in preparing discharge papers, discussing discharge with patient, medication review, etc.    Signed:  Electronically signed by Esther Pineda MD on 4/18/2019 at 4:19 PM

## 2019-04-18 NOTE — PROGRESS NOTES
Pulse ox was 94% on room air at rest.  Ambulated patient on room air. Oxygen saturation was 86% on room air while ambulating. Oxygen applied. Recovery pulse ox was 91% on 2 liters of oxygen while ambulating.

## 2019-04-18 NOTE — ED PROVIDER NOTES
54-year-old female presented with shortness of breath the last couple of days. She's been taking albuterol at home with some improvement. However, after any exertion she continues to be very short of breath. History of blood clots that was felt to be related to a PICC line that caused it. She was on eliquis for couple months and then has not been on eliquis since then. Not currently taking blood thinning medication. Does not use [facility] but upon arrival she was 90% on room air. She is tachypneic in the 25 range of breath per minute. Able speak in long phrases but is using accessory muscles. Review of Systems   Constitutional: Negative for chills and fever. Respiratory: Positive for chest tightness and shortness of breath. Cardiovascular: Negative for chest pain. Gastrointestinal: Negative for abdominal pain. All other systems reviewed and are negative. Physical Exam   Constitutional: She is oriented to person, place, and time. She appears well-developed and well-nourished. No distress. HENT:   Head: Normocephalic and atraumatic. Eyes: Pupils are equal, round, and reactive to light. Conjunctivae are normal.   Neck: Normal range of motion. No thyromegaly present. Cardiovascular: Normal rate and regular rhythm. Pulmonary/Chest: Effort normal and breath sounds normal. No respiratory distress. 90% on room air, using accessory oxygen now, accessory muscle usage, speaking in long phrases   Abdominal: Soft. She exhibits no distension. There is no tenderness. There is no rebound and no guarding. Musculoskeletal: Normal range of motion. She exhibits no edema or tenderness. Neurological: She is alert and oriented to person, place, and time. No cranial nerve deficit. Coordination normal.   Skin: Skin is warm and dry. No erythema. Psychiatric: She has a normal mood and affect.        Procedures    [facility]              --------------------------------------------- PAST HISTORY Virus H3       Result: Not Detected  *  *  Normal Range: Not Detected      Film Array Influenza B       Result: Not Detected  *  *  Normal Range: Not Detected      Film Array Metapneumovirus       Result: Not Detected  *  *  Normal Range: Not Detected      Film Array Parainfluenza Virus 1       Result: Not Detected  *  *  Normal Range: Not Detected      Film Array Parainfluenza Virus 2       Result: Not Detected  *  *  Normal Range: Not Detected      Film Array Parainfluenza Virus 3       Result: Not Detected  *  *  Normal Range: Not Detected      Film Array Parainfluenza Virus 4       Result: Not Detected  *  *  Normal Range: Not Detected      Film Array Respiratory Syncitial Virus       Result: Not Detected  *  *  Normal Range: Not Detected      Film Array Rhinovirus/Enterovirus       Result: Not Detected  *  *  Normal Range: Not Detected      Film Array Bordetella Pertusis       Result: Not Detected  *  *  Normal Range: Not Detected      Film Array Chlamydophilia Pneumoniae       Result: Not Detected  *  *  Normal Range: Not Detected      Film Array Mycoplasma Pneumoniae       Result: Not Detected  *  *  Normal Range: Not Detected     CBC   Result Value Ref Range    WBC 8.9 4.5 - 11.5 E9/L    RBC 4.91 3.50 - 5.50 E12/L    Hemoglobin 15.4 11.5 - 15.5 g/dL    Hematocrit 45.5 34.0 - 48.0 %    MCV 92.7 80.0 - 99.9 fL    MCH 31.4 26.0 - 35.0 pg    MCHC 33.8 32.0 - 34.5 %    RDW 12.9 11.5 - 15.0 fL    Platelets 854 058 - 334 E9/L    MPV 9.2 7.0 - 12.0 fL   Comprehensive Metabolic Panel   Result Value Ref Range    Sodium 143 132 - 146 mmol/L    Potassium 3.4 (L) 3.5 - 5.0 mmol/L    Chloride 100 98 - 107 mmol/L    CO2 31 (H) 22 - 29 mmol/L    Anion Gap 12 7 - 16 mmol/L    Glucose 99 74 - 99 mg/dL    BUN 8 6 - 20 mg/dL    CREATININE 0.6 0.5 - 1.0 mg/dL    GFR Non-African American >60 >=60 mL/min/1.73    GFR African American >60     Calcium 9.5 8.6 - 10.2 mg/dL    Total Protein 7.0 6.4 - 8.3 g/dL    Alb 3.9 3.5 - 5.2 g/dL Total Bilirubin 0.4 0.0 - 1.2 mg/dL    Alkaline Phosphatase 93 35 - 104 U/L    ALT 13 0 - 32 U/L    AST 16 0 - 31 U/L   Lactic Acid, Plasma   Result Value Ref Range    Lactic Acid 1.6 0.5 - 2.2 mmol/L   Troponin   Result Value Ref Range    Troponin <0.01 0.00 - 0.03 ng/mL   Brain Natriuretic Peptide   Result Value Ref Range    Pro- (H) 0 - 125 pg/mL   Basic Metabolic Panel w/ Reflex to MG   Result Value Ref Range    Sodium 141 132 - 146 mmol/L    Potassium reflex Magnesium 4.4 3.5 - 5.0 mmol/L    Chloride 102 98 - 107 mmol/L    CO2 27 22 - 29 mmol/L    Anion Gap 12 7 - 16 mmol/L    Glucose 122 (H) 74 - 99 mg/dL    BUN 8 6 - 20 mg/dL    CREATININE 0.5 0.5 - 1.0 mg/dL    GFR Non-African American >60 >=60 mL/min/1.73    GFR African American >60     Calcium 9.2 8.6 - 10.2 mg/dL   Procalcitonin   Result Value Ref Range    Procalcitonin 0.02 0.00 - 0.08 ng/mL   CBC auto differential   Result Value Ref Range    WBC 10.1 4.5 - 11.5 E9/L    RBC 4.82 3.50 - 5.50 E12/L    Hemoglobin 15.3 11.5 - 15.5 g/dL    Hematocrit 45.1 34.0 - 48.0 %    MCV 93.6 80.0 - 99.9 fL    MCH 31.7 26.0 - 35.0 pg    MCHC 33.9 32.0 - 34.5 %    RDW 12.9 11.5 - 15.0 fL    Platelets 230 190 - 631 E9/L    MPV 9.0 7.0 - 12.0 fL    Neutrophils % 89.4 (H) 43.0 - 80.0 %    Immature Granulocytes % 0.3 0.0 - 5.0 %    Lymphocytes % 9.1 (L) 20.0 - 42.0 %    Monocytes % 0.8 (L) 2.0 - 12.0 %    Eosinophils % 0.1 0.0 - 6.0 %    Basophils % 0.3 0.0 - 2.0 %    Neutrophils # 8.99 (H) 1.80 - 7.30 E9/L    Immature Granulocytes # 0.03 E9/L    Lymphocytes # 0.91 (L) 1.50 - 4.00 E9/L    Monocytes # 0.08 (L) 0.10 - 0.95 E9/L    Eosinophils # 0.01 (L) 0.05 - 0.50 E9/L    Basophils # 0.03 0.00 - 0.20 E9/L   Troponin   Result Value Ref Range    Troponin <0.01 0.00 - 0.03 ng/mL   Troponin   Result Value Ref Range    Troponin <0.01 0.00 - 0.03 ng/mL   EKG 12 Lead   Result Value Ref Range    Ventricular Rate 80 BPM    Atrial Rate 80 BPM    P-R Interval 156 ms    QRS Duration 80 ms    Q-T Interval 416 ms    QTc Calculation (Bazett) 479 ms    P Axis 81 degrees    R Axis 82 degrees    T Axis 70 degrees       RADIOLOGY:  CTA CHEST W CONTRAST   Final Result   1. No evidence of pulmonary embolism to the subsegmental level. 2. Moderate changes of centrilobular emphysema. ------------------------- NURSING NOTES AND VITALS REVIEWED ---------------------------  Date / Time Roomed:  4/17/2019  7:58 PM  ED Bed Assignment:  [id number]    The nursing notes within the ED encounter and vital signs as below have been reviewed. No data found. Oxygen Saturation Interpretation: Normal    ------------------------------------------ PROGRESS NOTES ------------------------------------------  Re-evaluation(s):  Patients symptoms are improving  Repeat physical examination is improved    Counseling:  I have spoken with the patient and discussed todays results, in addition to providing specific details for the plan of care and counseling regarding the diagnosis and prognosis. Their questions are answered at this time and they are agreeable with the plan of admission.    --------------------------------- ADDITIONAL PROVIDER NOTES ---------------------------------  Consultations:  Spoke with Admitting physician. Discussed case. They will admit the patient. This patient's ED course included: a personal history and physicial examination and re-evaluation prior to disposition    This patient has remained hemodynamically stable during their ED course. Diagnosis:  1. Cough    2. Shortness of breath    3. Chronic bronchitis, unspecified chronic bronchitis type ([facility]        Disposition:  Patient's disposition: Admit to telemetry  Patient's condition is stable.               Sarina Young DO  04/23/19 [id number]

## 2019-10-28 ENCOUNTER — HOSPITAL ENCOUNTER (INPATIENT)
Age: 59
LOS: 2 days | Discharge: HOME | DRG: 189 | End: 2019-10-30
Attending: EMERGENCY MEDICINE | Admitting: INTERNAL MEDICINE
Payer: COMMERCIAL

## 2019-10-28 ENCOUNTER — APPOINTMENT (OUTPATIENT)
Dept: GENERAL RADIOLOGY | Age: 59
DRG: 189 | End: 2019-10-28
Payer: COMMERCIAL

## 2019-10-28 DIAGNOSIS — J96.21 ACUTE ON CHRONIC RESPIRATORY FAILURE WITH HYPOXIA AND HYPERCAPNIA (HCC): ICD-10-CM

## 2019-10-28 DIAGNOSIS — R06.02 SHORTNESS OF BREATH: ICD-10-CM

## 2019-10-28 DIAGNOSIS — Z99.81 SUPPLEMENTAL OXYGEN DEPENDENT: ICD-10-CM

## 2019-10-28 DIAGNOSIS — J44.1 COPD WITH ACUTE EXACERBATION (HCC): Primary | ICD-10-CM

## 2019-10-28 DIAGNOSIS — J96.22 ACUTE ON CHRONIC RESPIRATORY FAILURE WITH HYPOXIA AND HYPERCAPNIA (HCC): ICD-10-CM

## 2019-10-28 PROBLEM — J96.02 ACUTE RESPIRATORY FAILURE WITH HYPERCAPNIA (HCC): Status: ACTIVE | Noted: 2019-10-28

## 2019-10-28 LAB
ALBUMIN SERPL-MCNC: 3.9 G/DL (ref 3.5–5.2)
ALP BLD-CCNC: 115 U/L (ref 35–104)
ALT SERPL-CCNC: 14 U/L (ref 0–32)
ANION GAP SERPL CALCULATED.3IONS-SCNC: 10 MMOL/L (ref 7–16)
APTT: 31.9 SEC (ref 24.5–35.1)
AST SERPL-CCNC: 15 U/L (ref 0–31)
B.E.: 10.6 MMOL/L (ref -3–3)
B.E.: 11.6 MMOL/L (ref -3–3)
B.E.: 7.7 MMOL/L (ref -3–3)
BASOPHILS ABSOLUTE: 0.06 E9/L (ref 0–0.2)
BASOPHILS RELATIVE PERCENT: 0.4 % (ref 0–2)
BILIRUB SERPL-MCNC: 0.3 MG/DL (ref 0–1.2)
BUN BLDV-MCNC: 10 MG/DL (ref 6–20)
CALCIUM SERPL-MCNC: 9.1 MG/DL (ref 8.6–10.2)
CHLORIDE BLD-SCNC: 93 MMOL/L (ref 98–107)
CO2: 38 MMOL/L (ref 22–29)
CREAT SERPL-MCNC: 0.5 MG/DL (ref 0.5–1)
CRITICAL NOTIFICATION: YES
CRITICAL NOTIFICATION: YES
DELIVERY SYSTEMS: ABNORMAL
DEVICE: ABNORMAL
EKG ATRIAL RATE: 94 BPM
EKG P AXIS: 79 DEGREES
EKG P-R INTERVAL: 142 MS
EKG Q-T INTERVAL: 352 MS
EKG QRS DURATION: 72 MS
EKG QTC CALCULATION (BAZETT): 440 MS
EKG R AXIS: 83 DEGREES
EKG T AXIS: 68 DEGREES
EKG VENTRICULAR RATE: 94 BPM
EOSINOPHILS ABSOLUTE: 0.22 E9/L (ref 0.05–0.5)
EOSINOPHILS RELATIVE PERCENT: 1.5 % (ref 0–6)
FILM ARRAY ADENOVIRUS: NORMAL
FILM ARRAY BORDETELLA PERTUSSIS: NORMAL
FILM ARRAY CHLAMYDOPHILIA PNEUMONIAE: NORMAL
FILM ARRAY CORONAVIRUS 229E: NORMAL
FILM ARRAY CORONAVIRUS HKU1: NORMAL
FILM ARRAY CORONAVIRUS NL63: NORMAL
FILM ARRAY CORONAVIRUS OC43: NORMAL
FILM ARRAY INFLUENZA A VIRUS 09H1: NORMAL
FILM ARRAY INFLUENZA A VIRUS H1: NORMAL
FILM ARRAY INFLUENZA A VIRUS H3: NORMAL
FILM ARRAY INFLUENZA A VIRUS: NORMAL
FILM ARRAY INFLUENZA B: NORMAL
FILM ARRAY METAPNEUMOVIRUS: NORMAL
FILM ARRAY MYCOPLASMA PNEUMONIAE: NORMAL
FILM ARRAY PARAINFLUENZA VIRUS 1: NORMAL
FILM ARRAY PARAINFLUENZA VIRUS 2: NORMAL
FILM ARRAY PARAINFLUENZA VIRUS 3: NORMAL
FILM ARRAY PARAINFLUENZA VIRUS 4: NORMAL
FILM ARRAY RESPIRATORY SYNCITIAL VIRUS: NORMAL
FILM ARRAY RHINOVIRUS/ENTEROVIRUS: NORMAL
FIO2 ARTERIAL: 3
FIO2 ARTERIAL: 3
FIO2 ARTERIAL: 40
GFR AFRICAN AMERICAN: >60
GFR NON-AFRICAN AMERICAN: >60 ML/MIN/1.73
GLUCOSE BLD-MCNC: 131 MG/DL (ref 74–99)
HCO3 ARTERIAL: 37.8 MMOL/L (ref 22–26)
HCO3 ARTERIAL: 39.7 MMOL/L (ref 22–26)
HCO3 ARTERIAL: 42.9 MMOL/L (ref 22–26)
HCT VFR BLD CALC: 42.7 % (ref 34–48)
HEMOGLOBIN: 14 G/DL (ref 11.5–15.5)
IMMATURE GRANULOCYTES #: 0.06 E9/L
IMMATURE GRANULOCYTES %: 0.4 % (ref 0–5)
INR BLD: 1
LACTIC ACID: 1.4 MMOL/L (ref 0.5–2.2)
LYMPHOCYTES ABSOLUTE: 2.24 E9/L (ref 1.5–4)
LYMPHOCYTES RELATIVE PERCENT: 15.1 % (ref 20–42)
MAGNESIUM: 2.1 MG/DL (ref 1.6–2.6)
MCH RBC QN AUTO: 31.4 PG (ref 26–35)
MCHC RBC AUTO-ENTMCNC: 32.8 % (ref 32–34.5)
MCV RBC AUTO: 95.7 FL (ref 80–99.9)
MONOCYTES ABSOLUTE: 1.26 E9/L (ref 0.1–0.95)
MONOCYTES RELATIVE PERCENT: 8.5 % (ref 2–12)
NEUTROPHILS ABSOLUTE: 10.95 E9/L (ref 1.8–7.3)
NEUTROPHILS RELATIVE PERCENT: 74.1 % (ref 43–80)
O2 SATURATION: 42 % (ref 92–98.5)
O2 SATURATION: 94.6 % (ref 92–98.5)
O2 SATURATION: 96.8 % (ref 92–98.5)
OPERATOR ID: 208
PCO2 ARTERIAL: 73.5 MMHG (ref 35–45)
PCO2 ARTERIAL: 79.6 MMHG (ref 35–45)
PCO2 ARTERIAL: 86.3 MMHG (ref 35–45)
PDW BLD-RTO: 12.2 FL (ref 11.5–15)
PH BLOOD GAS: 7.29 (ref 7.35–7.45)
PH BLOOD GAS: 7.3 (ref 7.35–7.45)
PH BLOOD GAS: 7.34 (ref 7.35–7.45)
PLATELET # BLD: 330 E9/L (ref 130–450)
PMV BLD AUTO: 9 FL (ref 7–12)
PO2 ARTERIAL: 27.9 MMHG (ref 80–100)
PO2 ARTERIAL: 86.9 MMHG (ref 80–100)
PO2 ARTERIAL: 98.5 MMHG (ref 80–100)
POSITIVE END EXP PRESS: 6 CMH2O
POTASSIUM SERPL-SCNC: 3.5 MMOL/L (ref 3.5–5)
PRESSURE SUPPORT: 12 CMH2O
PRO-BNP: 119 PG/ML (ref 0–125)
PROTHROMBIN TIME: 11.3 SEC (ref 9.3–12.4)
RBC # BLD: 4.46 E12/L (ref 3.5–5.5)
SODIUM BLD-SCNC: 141 MMOL/L (ref 132–146)
SOURCE, BLOOD GAS: ABNORMAL
T4 FREE: 1.26 NG/DL (ref 0.93–1.7)
TOTAL PROTEIN: 7.2 G/DL (ref 6.4–8.3)
TROPONIN: <0.01 NG/ML (ref 0–0.03)
TSH SERPL DL<=0.05 MIU/L-ACNC: 0.18 UIU/ML (ref 0.27–4.2)
WBC # BLD: 14.8 E9/L (ref 4.5–11.5)

## 2019-10-28 PROCEDURE — 96375 TX/PRO/DX INJ NEW DRUG ADDON: CPT

## 2019-10-28 PROCEDURE — 94640 AIRWAY INHALATION TREATMENT: CPT

## 2019-10-28 PROCEDURE — 2060000000 HC ICU INTERMEDIATE R&B

## 2019-10-28 PROCEDURE — 6360000002 HC RX W HCPCS: Performed by: NURSE PRACTITIONER

## 2019-10-28 PROCEDURE — 2580000003 HC RX 258: Performed by: NURSE PRACTITIONER

## 2019-10-28 PROCEDURE — 80053 COMPREHEN METABOLIC PANEL: CPT

## 2019-10-28 PROCEDURE — 85730 THROMBOPLASTIN TIME PARTIAL: CPT

## 2019-10-28 PROCEDURE — 93010 ELECTROCARDIOGRAM REPORT: CPT | Performed by: INTERNAL MEDICINE

## 2019-10-28 PROCEDURE — 96365 THER/PROPH/DIAG IV INF INIT: CPT

## 2019-10-28 PROCEDURE — 96366 THER/PROPH/DIAG IV INF ADDON: CPT

## 2019-10-28 PROCEDURE — 84443 ASSAY THYROID STIM HORMONE: CPT

## 2019-10-28 PROCEDURE — 83735 ASSAY OF MAGNESIUM: CPT

## 2019-10-28 PROCEDURE — 6370000000 HC RX 637 (ALT 250 FOR IP): Performed by: NURSE PRACTITIONER

## 2019-10-28 PROCEDURE — 99285 EMERGENCY DEPT VISIT HI MDM: CPT

## 2019-10-28 PROCEDURE — 71045 X-RAY EXAM CHEST 1 VIEW: CPT

## 2019-10-28 PROCEDURE — 82803 BLOOD GASES ANY COMBINATION: CPT

## 2019-10-28 PROCEDURE — 2500000003 HC RX 250 WO HCPCS: Performed by: EMERGENCY MEDICINE

## 2019-10-28 PROCEDURE — 87798 DETECT AGENT NOS DNA AMP: CPT

## 2019-10-28 PROCEDURE — 87581 M.PNEUMON DNA AMP PROBE: CPT

## 2019-10-28 PROCEDURE — 87040 BLOOD CULTURE FOR BACTERIA: CPT

## 2019-10-28 PROCEDURE — 84439 ASSAY OF FREE THYROXINE: CPT

## 2019-10-28 PROCEDURE — 96367 TX/PROPH/DG ADDL SEQ IV INF: CPT

## 2019-10-28 PROCEDURE — 94660 CPAP INITIATION&MGMT: CPT

## 2019-10-28 PROCEDURE — 87633 RESP VIRUS 12-25 TARGETS: CPT

## 2019-10-28 PROCEDURE — 2700000000 HC OXYGEN THERAPY PER DAY

## 2019-10-28 PROCEDURE — 83605 ASSAY OF LACTIC ACID: CPT

## 2019-10-28 PROCEDURE — 93005 ELECTROCARDIOGRAM TRACING: CPT | Performed by: EMERGENCY MEDICINE

## 2019-10-28 PROCEDURE — 84145 PROCALCITONIN (PCT): CPT

## 2019-10-28 PROCEDURE — 85025 COMPLETE CBC W/AUTO DIFF WBC: CPT

## 2019-10-28 PROCEDURE — 36600 WITHDRAWAL OF ARTERIAL BLOOD: CPT

## 2019-10-28 PROCEDURE — 85610 PROTHROMBIN TIME: CPT

## 2019-10-28 PROCEDURE — 83880 ASSAY OF NATRIURETIC PEPTIDE: CPT

## 2019-10-28 PROCEDURE — 6360000002 HC RX W HCPCS: Performed by: EMERGENCY MEDICINE

## 2019-10-28 PROCEDURE — 2580000003 HC RX 258: Performed by: EMERGENCY MEDICINE

## 2019-10-28 PROCEDURE — 94644 CONT INHLJ TX 1ST HOUR: CPT

## 2019-10-28 PROCEDURE — 6370000000 HC RX 637 (ALT 250 FOR IP): Performed by: INTERNAL MEDICINE

## 2019-10-28 PROCEDURE — 6370000000 HC RX 637 (ALT 250 FOR IP): Performed by: EMERGENCY MEDICINE

## 2019-10-28 PROCEDURE — 87486 CHLMYD PNEUM DNA AMP PROBE: CPT

## 2019-10-28 PROCEDURE — 36415 COLL VENOUS BLD VENIPUNCTURE: CPT

## 2019-10-28 PROCEDURE — 84484 ASSAY OF TROPONIN QUANT: CPT

## 2019-10-28 RX ORDER — IPRATROPIUM BROMIDE AND ALBUTEROL SULFATE 2.5; .5 MG/3ML; MG/3ML
1 SOLUTION RESPIRATORY (INHALATION) EVERY 4 HOURS PRN
Status: DISCONTINUED | OUTPATIENT
Start: 2019-10-28 | End: 2019-10-30 | Stop reason: HOSPADM

## 2019-10-28 RX ORDER — SODIUM CHLORIDE 0.9 % (FLUSH) 0.9 %
10 SYRINGE (ML) INJECTION PRN
Status: DISCONTINUED | OUTPATIENT
Start: 2019-10-28 | End: 2019-10-30 | Stop reason: HOSPADM

## 2019-10-28 RX ORDER — BUDESONIDE 0.5 MG/2ML
0.5 INHALANT ORAL 2 TIMES DAILY
Status: DISCONTINUED | OUTPATIENT
Start: 2019-10-28 | End: 2019-10-30 | Stop reason: HOSPADM

## 2019-10-28 RX ORDER — 0.9 % SODIUM CHLORIDE 0.9 %
1000 INTRAVENOUS SOLUTION INTRAVENOUS ONCE
Status: COMPLETED | OUTPATIENT
Start: 2019-10-28 | End: 2019-10-28

## 2019-10-28 RX ORDER — DOXYCYCLINE HYCLATE 100 MG/1
100 CAPSULE ORAL EVERY 12 HOURS SCHEDULED
Status: DISCONTINUED | OUTPATIENT
Start: 2019-10-28 | End: 2019-10-30 | Stop reason: HOSPADM

## 2019-10-28 RX ORDER — LORAZEPAM 2 MG/ML
0.5 INJECTION INTRAMUSCULAR ONCE
Status: COMPLETED | OUTPATIENT
Start: 2019-10-28 | End: 2019-10-28

## 2019-10-28 RX ORDER — MAGNESIUM SULFATE IN WATER 40 MG/ML
2 INJECTION, SOLUTION INTRAVENOUS ONCE
Status: COMPLETED | OUTPATIENT
Start: 2019-10-28 | End: 2019-10-28

## 2019-10-28 RX ORDER — SODIUM CHLORIDE 0.9 % (FLUSH) 0.9 %
10 SYRINGE (ML) INJECTION EVERY 12 HOURS SCHEDULED
Status: DISCONTINUED | OUTPATIENT
Start: 2019-10-28 | End: 2019-10-30 | Stop reason: HOSPADM

## 2019-10-28 RX ORDER — POTASSIUM CHLORIDE 20 MEQ/1
40 TABLET, EXTENDED RELEASE ORAL PRN
Status: DISCONTINUED | OUTPATIENT
Start: 2019-10-28 | End: 2019-10-30 | Stop reason: HOSPADM

## 2019-10-28 RX ORDER — METHYLPREDNISOLONE SODIUM SUCCINATE 125 MG/2ML
60 INJECTION, POWDER, LYOPHILIZED, FOR SOLUTION INTRAMUSCULAR; INTRAVENOUS EVERY 8 HOURS
Status: DISCONTINUED | OUTPATIENT
Start: 2019-10-28 | End: 2019-10-30 | Stop reason: HOSPADM

## 2019-10-28 RX ORDER — ACETAMINOPHEN 325 MG/1
650 TABLET ORAL EVERY 4 HOURS PRN
Status: DISCONTINUED | OUTPATIENT
Start: 2019-10-28 | End: 2019-10-30 | Stop reason: HOSPADM

## 2019-10-28 RX ORDER — METHYLPREDNISOLONE SODIUM SUCCINATE 125 MG/2ML
125 INJECTION, POWDER, LYOPHILIZED, FOR SOLUTION INTRAMUSCULAR; INTRAVENOUS ONCE
Status: COMPLETED | OUTPATIENT
Start: 2019-10-28 | End: 2019-10-28

## 2019-10-28 RX ORDER — FORMOTEROL FUMARATE 20 UG/2ML
20 SOLUTION RESPIRATORY (INHALATION) 2 TIMES DAILY
Status: DISCONTINUED | OUTPATIENT
Start: 2019-10-28 | End: 2019-10-30 | Stop reason: HOSPADM

## 2019-10-28 RX ORDER — SODIUM CHLORIDE 9 MG/ML
INJECTION, SOLUTION INTRAVENOUS CONTINUOUS
Status: DISCONTINUED | OUTPATIENT
Start: 2019-10-28 | End: 2019-10-30 | Stop reason: HOSPADM

## 2019-10-28 RX ORDER — LIDOCAINE HYDROCHLORIDE 10 MG/ML
5 INJECTION, SOLUTION EPIDURAL; INFILTRATION; INTRACAUDAL; PERINEURAL ONCE
Status: COMPLETED | OUTPATIENT
Start: 2019-10-28 | End: 2019-10-28

## 2019-10-28 RX ORDER — POTASSIUM CHLORIDE 7.45 MG/ML
10 INJECTION INTRAVENOUS PRN
Status: DISCONTINUED | OUTPATIENT
Start: 2019-10-28 | End: 2019-10-30 | Stop reason: HOSPADM

## 2019-10-28 RX ORDER — IPRATROPIUM BROMIDE AND ALBUTEROL SULFATE 2.5; .5 MG/3ML; MG/3ML
3 SOLUTION RESPIRATORY (INHALATION) ONCE
Status: COMPLETED | OUTPATIENT
Start: 2019-10-28 | End: 2019-10-28

## 2019-10-28 RX ORDER — NICOTINE 21 MG/24HR
1 PATCH, TRANSDERMAL 24 HOURS TRANSDERMAL DAILY
Status: DISCONTINUED | OUTPATIENT
Start: 2019-10-28 | End: 2019-10-30 | Stop reason: HOSPADM

## 2019-10-28 RX ORDER — ONDANSETRON 2 MG/ML
4 INJECTION INTRAMUSCULAR; INTRAVENOUS EVERY 6 HOURS PRN
Status: DISCONTINUED | OUTPATIENT
Start: 2019-10-28 | End: 2019-10-30 | Stop reason: HOSPADM

## 2019-10-28 RX ORDER — MAGNESIUM SULFATE 1 G/100ML
1 INJECTION INTRAVENOUS PRN
Status: DISCONTINUED | OUTPATIENT
Start: 2019-10-28 | End: 2019-10-30 | Stop reason: HOSPADM

## 2019-10-28 RX ADMIN — FORMOTEROL FUMARATE DIHYDRATE 20 MCG: 20 SOLUTION RESPIRATORY (INHALATION) at 17:08

## 2019-10-28 RX ADMIN — IPRATROPIUM BROMIDE AND ALBUTEROL SULFATE 3 AMPULE: .5; 3 SOLUTION RESPIRATORY (INHALATION) at 06:05

## 2019-10-28 RX ADMIN — Medication 10 ML: at 21:14

## 2019-10-28 RX ADMIN — LIDOCAINE HYDROCHLORIDE 5 ML: 10 INJECTION, SOLUTION EPIDURAL; INFILTRATION; INTRACAUDAL; PERINEURAL at 07:17

## 2019-10-28 RX ADMIN — BUDESONIDE 500 MCG: 0.5 INHALANT RESPIRATORY (INHALATION) at 17:08

## 2019-10-28 RX ADMIN — SODIUM CHLORIDE: 9 INJECTION, SOLUTION INTRAVENOUS at 13:12

## 2019-10-28 RX ADMIN — METHYLPREDNISOLONE SODIUM SUCCINATE 60 MG: 125 INJECTION, POWDER, FOR SOLUTION INTRAMUSCULAR; INTRAVENOUS at 21:13

## 2019-10-28 RX ADMIN — METHYLPREDNISOLONE SODIUM SUCCINATE 125 MG: 125 INJECTION, POWDER, FOR SOLUTION INTRAMUSCULAR; INTRAVENOUS at 06:15

## 2019-10-28 RX ADMIN — DOXYCYCLINE HYCLATE 100 MG: 100 CAPSULE ORAL at 21:14

## 2019-10-28 RX ADMIN — SODIUM CHLORIDE 1000 ML: 900 INJECTION, SOLUTION INTRAVENOUS at 06:23

## 2019-10-28 RX ADMIN — MAGNESIUM SULFATE HEPTAHYDRATE 2 G: 40 INJECTION, SOLUTION INTRAVENOUS at 06:15

## 2019-10-28 RX ADMIN — ENOXAPARIN SODIUM 40 MG: 40 INJECTION SUBCUTANEOUS at 13:12

## 2019-10-28 RX ADMIN — LORAZEPAM 0.5 MG: 2 INJECTION, SOLUTION INTRAMUSCULAR; INTRAVENOUS at 07:36

## 2019-10-28 RX ADMIN — METHYLPREDNISOLONE SODIUM SUCCINATE 60 MG: 125 INJECTION, POWDER, FOR SOLUTION INTRAMUSCULAR; INTRAVENOUS at 13:12

## 2019-10-28 RX ADMIN — DOXYCYCLINE 100 MG: 100 INJECTION, POWDER, LYOPHILIZED, FOR SOLUTION INTRAVENOUS at 08:11

## 2019-10-29 LAB
ANION GAP SERPL CALCULATED.3IONS-SCNC: 7 MMOL/L (ref 7–16)
B.E.: 7.6 MMOL/L (ref -3–3)
BASOPHILS ABSOLUTE: 0.01 E9/L (ref 0–0.2)
BASOPHILS RELATIVE PERCENT: 0.1 % (ref 0–2)
BUN BLDV-MCNC: 10 MG/DL (ref 6–20)
CALCIUM SERPL-MCNC: 8.8 MG/DL (ref 8.6–10.2)
CHLORIDE BLD-SCNC: 101 MMOL/L (ref 98–107)
CHOLESTEROL, TOTAL: 199 MG/DL (ref 0–199)
CO2: 35 MMOL/L (ref 22–29)
COHB: 0 % (ref 0–1.5)
CREAT SERPL-MCNC: 0.4 MG/DL (ref 0.5–1)
CRITICAL: ABNORMAL
DATE ANALYZED: ABNORMAL
DATE OF COLLECTION: ABNORMAL
EOSINOPHILS ABSOLUTE: 0 E9/L (ref 0.05–0.5)
EOSINOPHILS RELATIVE PERCENT: 0 % (ref 0–6)
GFR AFRICAN AMERICAN: >60
GFR NON-AFRICAN AMERICAN: >60 ML/MIN/1.73
GLUCOSE BLD-MCNC: 168 MG/DL (ref 74–99)
HCO3: 34 MMOL/L (ref 22–26)
HCT VFR BLD CALC: 36.9 % (ref 34–48)
HDLC SERPL-MCNC: 45 MG/DL
HEMOGLOBIN: 12 G/DL (ref 11.5–15.5)
HHB: 3.7 % (ref 0–5)
IMMATURE GRANULOCYTES #: 0.08 E9/L
IMMATURE GRANULOCYTES %: 0.6 % (ref 0–5)
LAB: ABNORMAL
LDL CHOLESTEROL CALCULATED: 138 MG/DL (ref 0–99)
LYMPHOCYTES ABSOLUTE: 1.43 E9/L (ref 1.5–4)
LYMPHOCYTES RELATIVE PERCENT: 11.4 % (ref 20–42)
Lab: ABNORMAL
MAGNESIUM: 2.2 MG/DL (ref 1.6–2.6)
MCH RBC QN AUTO: 30.9 PG (ref 26–35)
MCHC RBC AUTO-ENTMCNC: 32.5 % (ref 32–34.5)
MCV RBC AUTO: 95.1 FL (ref 80–99.9)
METHB: 0.3 % (ref 0–1.5)
MODE: ABNORMAL
MONOCYTES ABSOLUTE: 0.21 E9/L (ref 0.1–0.95)
MONOCYTES RELATIVE PERCENT: 1.7 % (ref 2–12)
NEUTROPHILS ABSOLUTE: 10.82 E9/L (ref 1.8–7.3)
NEUTROPHILS RELATIVE PERCENT: 86.2 % (ref 43–80)
O2 CONTENT: 18.3 ML/DL
O2 SATURATION: 96.3 % (ref 92–98.5)
O2HB: 96 % (ref 94–97)
OPERATOR ID: ABNORMAL
PATIENT TEMP: 37 C
PCO2: 55.7 MMHG (ref 35–45)
PDW BLD-RTO: 12.2 FL (ref 11.5–15)
PH BLOOD GAS: 7.4 (ref 7.35–7.45)
PLATELET # BLD: 304 E9/L (ref 130–450)
PMV BLD AUTO: 9.1 FL (ref 7–12)
PO2: 83.5 MMHG (ref 60–100)
POTASSIUM SERPL-SCNC: 3.8 MMOL/L (ref 3.5–5)
PROCALCITONIN: 0.02 NG/ML (ref 0–0.08)
RBC # BLD: 3.88 E12/L (ref 3.5–5.5)
SODIUM BLD-SCNC: 143 MMOL/L (ref 132–146)
SOURCE, BLOOD GAS: ABNORMAL
THB: 13.5 G/DL (ref 11.5–16.5)
TIME ANALYZED: 957
TRIGL SERPL-MCNC: 79 MG/DL (ref 0–149)
TROPONIN: <0.01 NG/ML (ref 0–0.03)
VLDLC SERPL CALC-MCNC: 16 MG/DL
WBC # BLD: 12.6 E9/L (ref 4.5–11.5)

## 2019-10-29 PROCEDURE — 94667 MNPJ CHEST WALL 1ST: CPT

## 2019-10-29 PROCEDURE — 94640 AIRWAY INHALATION TREATMENT: CPT

## 2019-10-29 PROCEDURE — 6360000002 HC RX W HCPCS: Performed by: NURSE PRACTITIONER

## 2019-10-29 PROCEDURE — 6370000000 HC RX 637 (ALT 250 FOR IP): Performed by: NURSE PRACTITIONER

## 2019-10-29 PROCEDURE — 2580000003 HC RX 258: Performed by: NURSE PRACTITIONER

## 2019-10-29 PROCEDURE — 82805 BLOOD GASES W/O2 SATURATION: CPT

## 2019-10-29 PROCEDURE — 97530 THERAPEUTIC ACTIVITIES: CPT | Performed by: PHYSICAL THERAPIST

## 2019-10-29 PROCEDURE — 97161 PT EVAL LOW COMPLEX 20 MIN: CPT | Performed by: PHYSICAL THERAPIST

## 2019-10-29 PROCEDURE — 80061 LIPID PANEL: CPT

## 2019-10-29 PROCEDURE — 80048 BASIC METABOLIC PNL TOTAL CA: CPT

## 2019-10-29 PROCEDURE — 6370000000 HC RX 637 (ALT 250 FOR IP): Performed by: INTERNAL MEDICINE

## 2019-10-29 PROCEDURE — 36600 WITHDRAWAL OF ARTERIAL BLOOD: CPT

## 2019-10-29 PROCEDURE — 97530 THERAPEUTIC ACTIVITIES: CPT

## 2019-10-29 PROCEDURE — 83735 ASSAY OF MAGNESIUM: CPT

## 2019-10-29 PROCEDURE — 36415 COLL VENOUS BLD VENIPUNCTURE: CPT

## 2019-10-29 PROCEDURE — 85025 COMPLETE CBC W/AUTO DIFF WBC: CPT

## 2019-10-29 PROCEDURE — 94660 CPAP INITIATION&MGMT: CPT

## 2019-10-29 PROCEDURE — 97165 OT EVAL LOW COMPLEX 30 MIN: CPT

## 2019-10-29 PROCEDURE — 2060000000 HC ICU INTERMEDIATE R&B

## 2019-10-29 PROCEDURE — 2700000000 HC OXYGEN THERAPY PER DAY

## 2019-10-29 RX ADMIN — SODIUM CHLORIDE: 9 INJECTION, SOLUTION INTRAVENOUS at 14:21

## 2019-10-29 RX ADMIN — BUDESONIDE 500 MCG: 0.5 INHALANT RESPIRATORY (INHALATION) at 06:41

## 2019-10-29 RX ADMIN — DOXYCYCLINE HYCLATE 100 MG: 100 CAPSULE ORAL at 09:36

## 2019-10-29 RX ADMIN — Medication 10 ML: at 09:36

## 2019-10-29 RX ADMIN — ENOXAPARIN SODIUM 40 MG: 40 INJECTION SUBCUTANEOUS at 09:36

## 2019-10-29 RX ADMIN — METHYLPREDNISOLONE SODIUM SUCCINATE 60 MG: 125 INJECTION, POWDER, FOR SOLUTION INTRAMUSCULAR; INTRAVENOUS at 20:45

## 2019-10-29 RX ADMIN — FORMOTEROL FUMARATE DIHYDRATE 20 MCG: 20 SOLUTION RESPIRATORY (INHALATION) at 06:41

## 2019-10-29 RX ADMIN — DOXYCYCLINE HYCLATE 100 MG: 100 CAPSULE ORAL at 20:45

## 2019-10-29 RX ADMIN — BUDESONIDE 500 MCG: 0.5 INHALANT RESPIRATORY (INHALATION) at 17:01

## 2019-10-29 RX ADMIN — METHYLPREDNISOLONE SODIUM SUCCINATE 60 MG: 125 INJECTION, POWDER, FOR SOLUTION INTRAMUSCULAR; INTRAVENOUS at 12:33

## 2019-10-29 RX ADMIN — METHYLPREDNISOLONE SODIUM SUCCINATE 60 MG: 125 INJECTION, POWDER, FOR SOLUTION INTRAMUSCULAR; INTRAVENOUS at 04:21

## 2019-10-29 RX ADMIN — Medication 10 ML: at 20:45

## 2019-10-29 RX ADMIN — FORMOTEROL FUMARATE DIHYDRATE 20 MCG: 20 SOLUTION RESPIRATORY (INHALATION) at 17:01

## 2019-10-29 ASSESSMENT — PAIN SCALES - GENERAL: PAINLEVEL_OUTOF10: 0

## 2019-10-30 VITALS
HEART RATE: 72 BPM | BODY MASS INDEX: 25.66 KG/M2 | OXYGEN SATURATION: 97 % | RESPIRATION RATE: 16 BRPM | WEIGHT: 150.3 LBS | TEMPERATURE: 98 F | SYSTOLIC BLOOD PRESSURE: 136 MMHG | HEIGHT: 64 IN | DIASTOLIC BLOOD PRESSURE: 69 MMHG

## 2019-10-30 LAB
ANION GAP SERPL CALCULATED.3IONS-SCNC: 8 MMOL/L (ref 7–16)
BASOPHILS ABSOLUTE: 0.02 E9/L (ref 0–0.2)
BASOPHILS RELATIVE PERCENT: 0.1 % (ref 0–2)
BUN BLDV-MCNC: 15 MG/DL (ref 6–20)
CALCIUM SERPL-MCNC: 8.6 MG/DL (ref 8.6–10.2)
CHLORIDE BLD-SCNC: 104 MMOL/L (ref 98–107)
CO2: 33 MMOL/L (ref 22–29)
CREAT SERPL-MCNC: 0.4 MG/DL (ref 0.5–1)
EOSINOPHILS ABSOLUTE: 0 E9/L (ref 0.05–0.5)
EOSINOPHILS RELATIVE PERCENT: 0 % (ref 0–6)
GFR AFRICAN AMERICAN: >60
GFR NON-AFRICAN AMERICAN: >60 ML/MIN/1.73
GLUCOSE BLD-MCNC: 138 MG/DL (ref 74–99)
HCT VFR BLD CALC: 35.3 % (ref 34–48)
HEMOGLOBIN: 11.6 G/DL (ref 11.5–15.5)
IMMATURE GRANULOCYTES #: 0.25 E9/L
IMMATURE GRANULOCYTES %: 1.5 % (ref 0–5)
LYMPHOCYTES ABSOLUTE: 1.44 E9/L (ref 1.5–4)
LYMPHOCYTES RELATIVE PERCENT: 8.6 % (ref 20–42)
MAGNESIUM: 2.2 MG/DL (ref 1.6–2.6)
MCH RBC QN AUTO: 31.2 PG (ref 26–35)
MCHC RBC AUTO-ENTMCNC: 32.9 % (ref 32–34.5)
MCV RBC AUTO: 94.9 FL (ref 80–99.9)
MONOCYTES ABSOLUTE: 0.29 E9/L (ref 0.1–0.95)
MONOCYTES RELATIVE PERCENT: 1.7 % (ref 2–12)
NEUTROPHILS ABSOLUTE: 14.71 E9/L (ref 1.8–7.3)
NEUTROPHILS RELATIVE PERCENT: 88.1 % (ref 43–80)
PDW BLD-RTO: 12.3 FL (ref 11.5–15)
PLATELET # BLD: 304 E9/L (ref 130–450)
PMV BLD AUTO: 9 FL (ref 7–12)
POTASSIUM SERPL-SCNC: 3.8 MMOL/L (ref 3.5–5)
RBC # BLD: 3.72 E12/L (ref 3.5–5.5)
SODIUM BLD-SCNC: 145 MMOL/L (ref 132–146)
WBC # BLD: 16.7 E9/L (ref 4.5–11.5)

## 2019-10-30 PROCEDURE — 94660 CPAP INITIATION&MGMT: CPT

## 2019-10-30 PROCEDURE — 6360000002 HC RX W HCPCS: Performed by: NURSE PRACTITIONER

## 2019-10-30 PROCEDURE — 6370000000 HC RX 637 (ALT 250 FOR IP): Performed by: INTERNAL MEDICINE

## 2019-10-30 PROCEDURE — 83735 ASSAY OF MAGNESIUM: CPT

## 2019-10-30 PROCEDURE — 94669 MECHANICAL CHEST WALL OSCILL: CPT

## 2019-10-30 PROCEDURE — 80048 BASIC METABOLIC PNL TOTAL CA: CPT

## 2019-10-30 PROCEDURE — 36415 COLL VENOUS BLD VENIPUNCTURE: CPT

## 2019-10-30 PROCEDURE — 94640 AIRWAY INHALATION TREATMENT: CPT

## 2019-10-30 PROCEDURE — 85025 COMPLETE CBC W/AUTO DIFF WBC: CPT

## 2019-10-30 PROCEDURE — 6370000000 HC RX 637 (ALT 250 FOR IP): Performed by: NURSE PRACTITIONER

## 2019-10-30 PROCEDURE — 90686 IIV4 VACC NO PRSV 0.5 ML IM: CPT | Performed by: INTERNAL MEDICINE

## 2019-10-30 PROCEDURE — 2700000000 HC OXYGEN THERAPY PER DAY

## 2019-10-30 PROCEDURE — 6360000002 HC RX W HCPCS: Performed by: INTERNAL MEDICINE

## 2019-10-30 PROCEDURE — 2580000003 HC RX 258: Performed by: NURSE PRACTITIONER

## 2019-10-30 PROCEDURE — G0008 ADMIN INFLUENZA VIRUS VAC: HCPCS | Performed by: INTERNAL MEDICINE

## 2019-10-30 RX ORDER — PREDNISONE 10 MG/1
TABLET ORAL
Qty: 50 TABLET | Refills: 0 | Status: ON HOLD | OUTPATIENT
Start: 2019-10-30 | End: 2019-12-03 | Stop reason: HOSPADM

## 2019-10-30 RX ORDER — BUDESONIDE AND FORMOTEROL FUMARATE DIHYDRATE 160; 4.5 UG/1; UG/1
2 AEROSOL RESPIRATORY (INHALATION) 2 TIMES DAILY
Qty: 3 INHALER | Refills: 1 | Status: SHIPPED | OUTPATIENT
Start: 2019-10-30 | End: 2020-06-22 | Stop reason: SDUPTHER

## 2019-10-30 RX ORDER — DOXYCYCLINE HYCLATE 100 MG/1
100 CAPSULE ORAL EVERY 12 HOURS SCHEDULED
Qty: 10 CAPSULE | Refills: 0 | Status: SHIPPED | OUTPATIENT
Start: 2019-10-30 | End: 2019-11-04

## 2019-10-30 RX ADMIN — BUDESONIDE 500 MCG: 0.5 INHALANT RESPIRATORY (INHALATION) at 16:30

## 2019-10-30 RX ADMIN — SODIUM CHLORIDE: 9 INJECTION, SOLUTION INTRAVENOUS at 01:04

## 2019-10-30 RX ADMIN — DOXYCYCLINE HYCLATE 100 MG: 100 CAPSULE ORAL at 09:07

## 2019-10-30 RX ADMIN — INFLUENZA A VIRUS A/BRISBANE/02/2018 IVR-190 (H1N1) ANTIGEN (PROPIOLACTONE INACTIVATED), INFLUENZA A VIRUS A/KANSAS/14/2017 X-327 (H3N2) ANTIGEN (PROPIOLACTONE INACTIVATED), INFLUENZA B VIRUS B/MARYLAND/15/2016 ANTIGEN (PROPIOLACTONE INACTIVATED), INFLUENZA B VIRUS B/PHUKET/3073/2013 BVR-1B ANTIGEN (PROPIOLACTONE INACTIVATED) 0.5 ML: 15; 15; 15; 15 INJECTION, SUSPENSION INTRAMUSCULAR at 17:54

## 2019-10-30 RX ADMIN — METHYLPREDNISOLONE SODIUM SUCCINATE 60 MG: 125 INJECTION, POWDER, FOR SOLUTION INTRAMUSCULAR; INTRAVENOUS at 13:15

## 2019-10-30 RX ADMIN — BUDESONIDE 500 MCG: 0.5 INHALANT RESPIRATORY (INHALATION) at 07:07

## 2019-10-30 RX ADMIN — FORMOTEROL FUMARATE DIHYDRATE 20 MCG: 20 SOLUTION RESPIRATORY (INHALATION) at 16:30

## 2019-10-30 RX ADMIN — METHYLPREDNISOLONE SODIUM SUCCINATE 60 MG: 125 INJECTION, POWDER, FOR SOLUTION INTRAMUSCULAR; INTRAVENOUS at 04:21

## 2019-10-30 RX ADMIN — ENOXAPARIN SODIUM 40 MG: 40 INJECTION SUBCUTANEOUS at 09:07

## 2019-10-30 RX ADMIN — FORMOTEROL FUMARATE DIHYDRATE 20 MCG: 20 SOLUTION RESPIRATORY (INHALATION) at 07:07

## 2019-10-30 RX ADMIN — Medication 10 ML: at 09:08

## 2019-10-30 ASSESSMENT — PAIN SCALES - GENERAL
PAINLEVEL_OUTOF10: 0

## 2019-11-02 LAB — BLOOD CULTURE, ROUTINE: NORMAL

## 2019-11-29 ENCOUNTER — APPOINTMENT (OUTPATIENT)
Dept: CT IMAGING | Age: 59
DRG: 189 | End: 2019-11-29
Payer: COMMERCIAL

## 2019-11-29 ENCOUNTER — HOSPITAL ENCOUNTER (INPATIENT)
Age: 59
LOS: 3 days | Discharge: HOME | DRG: 189 | End: 2019-12-03
Attending: EMERGENCY MEDICINE | Admitting: INTERNAL MEDICINE
Payer: COMMERCIAL

## 2019-11-29 ENCOUNTER — APPOINTMENT (OUTPATIENT)
Dept: GENERAL RADIOLOGY | Age: 59
DRG: 189 | End: 2019-11-29
Payer: COMMERCIAL

## 2019-11-29 DIAGNOSIS — R06.03 RESPIRATORY DISTRESS: Primary | ICD-10-CM

## 2019-11-29 DIAGNOSIS — J44.1 COPD EXACERBATION (HCC): ICD-10-CM

## 2019-11-29 DIAGNOSIS — R09.02 HYPOXIA: ICD-10-CM

## 2019-11-29 LAB
ADENOVIRUS BY PCR: NOT DETECTED
ALBUMIN SERPL-MCNC: 3.7 G/DL (ref 3.5–5.2)
ALP BLD-CCNC: 98 U/L (ref 35–104)
ALT SERPL-CCNC: 16 U/L (ref 0–32)
ANION GAP SERPL CALCULATED.3IONS-SCNC: 18 MMOL/L (ref 7–16)
AST SERPL-CCNC: 17 U/L (ref 0–31)
B.E.: 9 MMOL/L (ref -3–3)
BASOPHILS ABSOLUTE: 0.01 E9/L (ref 0–0.2)
BASOPHILS RELATIVE PERCENT: 0.1 % (ref 0–2)
BILIRUB SERPL-MCNC: 0.3 MG/DL (ref 0–1.2)
BORDETELLA PARAPERTUSSIS BY PCR: NOT DETECTED
BORDETELLA PERTUSSIS BY PCR: NOT DETECTED
BUN BLDV-MCNC: 11 MG/DL (ref 6–20)
CALCIUM SERPL-MCNC: 9.2 MG/DL (ref 8.6–10.2)
CHLAMYDOPHILIA PNEUMONIAE BY PCR: NOT DETECTED
CHLORIDE BLD-SCNC: 91 MMOL/L (ref 98–107)
CO2: 32 MMOL/L (ref 22–29)
CO2: 40 MMOL/L (ref 22–29)
COHB: 1.7 % (ref 0–1.5)
COMMENT: ABNORMAL
CORONAVIRUS 229E BY PCR: NOT DETECTED
CORONAVIRUS HKU1 BY PCR: NOT DETECTED
CORONAVIRUS NL63 BY PCR: NOT DETECTED
CORONAVIRUS OC43 BY PCR: NOT DETECTED
CREAT SERPL-MCNC: 0.4 MG/DL (ref 0.5–1)
CRITICAL: ABNORMAL
DATE ANALYZED: ABNORMAL
DATE OF COLLECTION: ABNORMAL
EOSINOPHILS ABSOLUTE: 0.04 E9/L (ref 0.05–0.5)
EOSINOPHILS RELATIVE PERCENT: 0.5 % (ref 0–6)
FIO2: 40 %
GFR AFRICAN AMERICAN: >60
GFR AFRICAN AMERICAN: >60
GFR NON-AFRICAN AMERICAN: >60 ML/MIN/1.73
GFR NON-AFRICAN AMERICAN: >60 ML/MIN/1.73
GLUCOSE BLD-MCNC: 120 MG/DL (ref 74–99)
GLUCOSE BLD-MCNC: 121 MG/DL (ref 74–99)
HCO3: 36.8 MMOL/L (ref 22–26)
HCT VFR BLD CALC: 41.4 % (ref 34–48)
HEMOGLOBIN: 13.7 G/DL (ref 11.5–15.5)
HHB: 3.6 % (ref 0–5)
HUMAN METAPNEUMOVIRUS BY PCR: NOT DETECTED
HUMAN RHINOVIRUS/ENTEROVIRUS BY PCR: NOT DETECTED
IMMATURE GRANULOCYTES #: 0.02 E9/L
IMMATURE GRANULOCYTES %: 0.2 % (ref 0–5)
INFLUENZA A BY PCR: NOT DETECTED
INFLUENZA A BY PCR: NOT DETECTED
INFLUENZA B BY PCR: NOT DETECTED
INFLUENZA B BY PCR: NOT DETECTED
LAB: ABNORMAL
LACTIC ACID: 1.5 MMOL/L (ref 0.5–2.2)
LYMPHOCYTES ABSOLUTE: 1.42 E9/L (ref 1.5–4)
LYMPHOCYTES RELATIVE PERCENT: 16.3 % (ref 20–42)
Lab: ABNORMAL
MCH RBC QN AUTO: 31.4 PG (ref 26–35)
MCHC RBC AUTO-ENTMCNC: 33.1 % (ref 32–34.5)
MCV RBC AUTO: 95 FL (ref 80–99.9)
METHB: 0.5 % (ref 0–1.5)
MODE: ABNORMAL
MONOCYTES ABSOLUTE: 0.96 E9/L (ref 0.1–0.95)
MONOCYTES RELATIVE PERCENT: 11 % (ref 2–12)
MYCOPLASMA PNEUMONIAE BY PCR: NOT DETECTED
NEUTROPHILS ABSOLUTE: 6.24 E9/L (ref 1.8–7.3)
NEUTROPHILS RELATIVE PERCENT: 71.9 % (ref 43–80)
O2 CONTENT: 19.4 ML/DL
O2 SATURATION: 96.3 % (ref 92–98.5)
O2HB: 94.2 % (ref 94–97)
OPERATOR ID: 30
PARAINFLUENZA VIRUS 1 BY PCR: NOT DETECTED
PARAINFLUENZA VIRUS 2 BY PCR: NOT DETECTED
PARAINFLUENZA VIRUS 3 BY PCR: NOT DETECTED
PARAINFLUENZA VIRUS 4 BY PCR: NOT DETECTED
PATIENT TEMP: 37 C
PCO2: 63.7 MMHG (ref 35–45)
PDW BLD-RTO: 12.3 FL (ref 11.5–15)
PEEP/CPAP: 6 CMH2O
PFO2: 2.07 MMHG/%
PH BLOOD GAS: 7.38 (ref 7.35–7.45)
PLATELET # BLD: 245 E9/L (ref 130–450)
PMV BLD AUTO: 9 FL (ref 7–12)
PO2: 82.9 MMHG (ref 60–100)
POC ANION GAP: 4 MMOL/L (ref 7–16)
POC BUN: 13 MG/DL (ref 8–23)
POC CHLORIDE: 92 MMOL/L (ref 100–108)
POC CREATININE: 0.4 MG/DL (ref 0.5–1)
POC POTASSIUM: 3.6 MMOL/L (ref 3.5–5)
POC SODIUM: 136 MMOL/L (ref 132–146)
POTASSIUM SERPL-SCNC: 3.8 MMOL/L (ref 3.5–5)
PRO-BNP: 100 PG/ML (ref 0–125)
PROCALCITONIN: 0.05 NG/ML (ref 0–0.08)
PS: 10 CMH20
RBC # BLD: 4.36 E12/L (ref 3.5–5.5)
RESPIRATORY SYNCYTIAL VIRUS BY PCR: NOT DETECTED
RI(T): 1.44
SODIUM BLD-SCNC: 141 MMOL/L (ref 132–146)
SOURCE, BLOOD GAS: ABNORMAL
THB: 14.6 G/DL (ref 11.5–16.5)
TIME ANALYZED: 914
TOTAL PROTEIN: 6.8 G/DL (ref 6.4–8.3)
TROPONIN: <0.01 NG/ML (ref 0–0.03)
WBC # BLD: 8.7 E9/L (ref 4.5–11.5)

## 2019-11-29 PROCEDURE — 6370000000 HC RX 637 (ALT 250 FOR IP): Performed by: INTERNAL MEDICINE

## 2019-11-29 PROCEDURE — 82805 BLOOD GASES W/O2 SATURATION: CPT

## 2019-11-29 PROCEDURE — 80053 COMPREHEN METABOLIC PANEL: CPT

## 2019-11-29 PROCEDURE — 96376 TX/PRO/DX INJ SAME DRUG ADON: CPT

## 2019-11-29 PROCEDURE — 83605 ASSAY OF LACTIC ACID: CPT

## 2019-11-29 PROCEDURE — 82947 ASSAY GLUCOSE BLOOD QUANT: CPT

## 2019-11-29 PROCEDURE — 85025 COMPLETE CBC W/AUTO DIFF WBC: CPT

## 2019-11-29 PROCEDURE — 96375 TX/PRO/DX INJ NEW DRUG ADDON: CPT

## 2019-11-29 PROCEDURE — 6360000002 HC RX W HCPCS: Performed by: PHYSICIAN ASSISTANT

## 2019-11-29 PROCEDURE — 94640 AIRWAY INHALATION TREATMENT: CPT

## 2019-11-29 PROCEDURE — 94644 CONT INHLJ TX 1ST HOUR: CPT

## 2019-11-29 PROCEDURE — 36415 COLL VENOUS BLD VENIPUNCTURE: CPT

## 2019-11-29 PROCEDURE — 96365 THER/PROPH/DIAG IV INF INIT: CPT

## 2019-11-29 PROCEDURE — 84484 ASSAY OF TROPONIN QUANT: CPT

## 2019-11-29 PROCEDURE — 2580000003 HC RX 258: Performed by: INTERNAL MEDICINE

## 2019-11-29 PROCEDURE — 82565 ASSAY OF CREATININE: CPT

## 2019-11-29 PROCEDURE — 87040 BLOOD CULTURE FOR BACTERIA: CPT

## 2019-11-29 PROCEDURE — 36600 WITHDRAWAL OF ARTERIAL BLOOD: CPT

## 2019-11-29 PROCEDURE — 96374 THER/PROPH/DIAG INJ IV PUSH: CPT

## 2019-11-29 PROCEDURE — 2700000000 HC OXYGEN THERAPY PER DAY

## 2019-11-29 PROCEDURE — G0378 HOSPITAL OBSERVATION PER HR: HCPCS

## 2019-11-29 PROCEDURE — 84145 PROCALCITONIN (PCT): CPT

## 2019-11-29 PROCEDURE — 0100U HC RESPIRPTHGN MULT REV TRANS & AMP PRB TECH 21 TRGT: CPT

## 2019-11-29 PROCEDURE — 94660 CPAP INITIATION&MGMT: CPT

## 2019-11-29 PROCEDURE — 71045 X-RAY EXAM CHEST 1 VIEW: CPT

## 2019-11-29 PROCEDURE — 6360000002 HC RX W HCPCS: Performed by: INTERNAL MEDICINE

## 2019-11-29 PROCEDURE — 87077 CULTURE AEROBIC IDENTIFY: CPT

## 2019-11-29 PROCEDURE — 87150 DNA/RNA AMPLIFIED PROBE: CPT

## 2019-11-29 PROCEDURE — 84520 ASSAY OF UREA NITROGEN: CPT

## 2019-11-29 PROCEDURE — 87502 INFLUENZA DNA AMP PROBE: CPT

## 2019-11-29 PROCEDURE — 93005 ELECTROCARDIOGRAM TRACING: CPT | Performed by: PHYSICIAN ASSISTANT

## 2019-11-29 PROCEDURE — 80051 ELECTROLYTE PANEL: CPT

## 2019-11-29 PROCEDURE — 6370000000 HC RX 637 (ALT 250 FOR IP): Performed by: PHYSICIAN ASSISTANT

## 2019-11-29 PROCEDURE — 83880 ASSAY OF NATRIURETIC PEPTIDE: CPT

## 2019-11-29 PROCEDURE — 71275 CT ANGIOGRAPHY CHEST: CPT

## 2019-11-29 PROCEDURE — 99285 EMERGENCY DEPT VISIT HI MDM: CPT

## 2019-11-29 PROCEDURE — 6360000004 HC RX CONTRAST MEDICATION: Performed by: RADIOLOGY

## 2019-11-29 RX ORDER — SODIUM CHLORIDE 0.9 % (FLUSH) 0.9 %
10 SYRINGE (ML) INJECTION EVERY 12 HOURS SCHEDULED
Status: DISCONTINUED | OUTPATIENT
Start: 2019-11-29 | End: 2019-12-03 | Stop reason: HOSPADM

## 2019-11-29 RX ORDER — METHYLPREDNISOLONE SODIUM SUCCINATE 125 MG/2ML
80 INJECTION, POWDER, LYOPHILIZED, FOR SOLUTION INTRAMUSCULAR; INTRAVENOUS EVERY 8 HOURS
Status: DISCONTINUED | OUTPATIENT
Start: 2019-11-29 | End: 2019-11-30

## 2019-11-29 RX ORDER — SODIUM CHLORIDE 0.9 % (FLUSH) 0.9 %
10 SYRINGE (ML) INJECTION PRN
Status: DISCONTINUED | OUTPATIENT
Start: 2019-11-29 | End: 2019-12-03 | Stop reason: HOSPADM

## 2019-11-29 RX ORDER — IPRATROPIUM BROMIDE AND ALBUTEROL SULFATE 2.5; .5 MG/3ML; MG/3ML
1 SOLUTION RESPIRATORY (INHALATION)
Status: DISCONTINUED | OUTPATIENT
Start: 2019-11-29 | End: 2019-12-03 | Stop reason: HOSPADM

## 2019-11-29 RX ORDER — METHYLPREDNISOLONE SODIUM SUCCINATE 125 MG/2ML
125 INJECTION, POWDER, LYOPHILIZED, FOR SOLUTION INTRAMUSCULAR; INTRAVENOUS ONCE
Status: COMPLETED | OUTPATIENT
Start: 2019-11-29 | End: 2019-11-29

## 2019-11-29 RX ORDER — IPRATROPIUM BROMIDE AND ALBUTEROL SULFATE 2.5; .5 MG/3ML; MG/3ML
1 SOLUTION RESPIRATORY (INHALATION)
Status: COMPLETED | OUTPATIENT
Start: 2019-11-29 | End: 2019-11-29

## 2019-11-29 RX ORDER — ACETAMINOPHEN 325 MG/1
650 TABLET ORAL EVERY 4 HOURS PRN
Status: DISCONTINUED | OUTPATIENT
Start: 2019-11-29 | End: 2019-12-03 | Stop reason: HOSPADM

## 2019-11-29 RX ADMIN — Medication 10 ML: at 20:50

## 2019-11-29 RX ADMIN — AZITHROMYCIN 500 MG: 500 INJECTION, POWDER, LYOPHILIZED, FOR SOLUTION INTRAVENOUS at 17:32

## 2019-11-29 RX ADMIN — IPRATROPIUM BROMIDE AND ALBUTEROL SULFATE 1 AMPULE: .5; 3 SOLUTION RESPIRATORY (INHALATION) at 16:22

## 2019-11-29 RX ADMIN — IPRATROPIUM BROMIDE AND ALBUTEROL SULFATE 1 AMPULE: .5; 3 SOLUTION RESPIRATORY (INHALATION) at 08:55

## 2019-11-29 RX ADMIN — METHYLPREDNISOLONE SODIUM SUCCINATE 80 MG: 125 INJECTION, POWDER, FOR SOLUTION INTRAMUSCULAR; INTRAVENOUS at 17:32

## 2019-11-29 RX ADMIN — WATER 1 G: 1 INJECTION INTRAMUSCULAR; INTRAVENOUS; SUBCUTANEOUS at 16:33

## 2019-11-29 RX ADMIN — IPRATROPIUM BROMIDE AND ALBUTEROL SULFATE 1 AMPULE: .5; 3 SOLUTION RESPIRATORY (INHALATION) at 08:45

## 2019-11-29 RX ADMIN — METHYLPREDNISOLONE SODIUM SUCCINATE 125 MG: 125 INJECTION, POWDER, FOR SOLUTION INTRAMUSCULAR; INTRAVENOUS at 09:42

## 2019-11-29 RX ADMIN — IPRATROPIUM BROMIDE AND ALBUTEROL SULFATE 1 AMPULE: .5; 3 SOLUTION RESPIRATORY (INHALATION) at 09:02

## 2019-11-29 RX ADMIN — IOPAMIDOL 80 ML: 755 INJECTION, SOLUTION INTRAVENOUS at 10:21

## 2019-11-29 RX ADMIN — IPRATROPIUM BROMIDE AND ALBUTEROL SULFATE 1 AMPULE: .5; 3 SOLUTION RESPIRATORY (INHALATION) at 21:11

## 2019-11-29 ASSESSMENT — PAIN SCALES - GENERAL
PAINLEVEL_OUTOF10: 0

## 2019-11-30 PROBLEM — J96.20 ACUTE AND CHRONIC RESPIRATORY FAILURE, UNSPECIFIED WHETHER WITH HYPOXIA OR HYPERCAPNIA (HCC): Status: ACTIVE | Noted: 2019-11-30

## 2019-11-30 LAB
ANION GAP SERPL CALCULATED.3IONS-SCNC: 12 MMOL/L (ref 7–16)
BUN BLDV-MCNC: 11 MG/DL (ref 6–20)
CALCIUM SERPL-MCNC: 9.3 MG/DL (ref 8.6–10.2)
CHLORIDE BLD-SCNC: 93 MMOL/L (ref 98–107)
CHOLESTEROL, TOTAL: 272 MG/DL (ref 0–199)
CO2: 36 MMOL/L (ref 22–29)
CREAT SERPL-MCNC: 0.4 MG/DL (ref 0.5–1)
EKG ATRIAL RATE: 103 BPM
EKG P AXIS: 83 DEGREES
EKG P-R INTERVAL: 150 MS
EKG Q-T INTERVAL: 334 MS
EKG QRS DURATION: 76 MS
EKG QTC CALCULATION (BAZETT): 437 MS
EKG R AXIS: 84 DEGREES
EKG T AXIS: 76 DEGREES
EKG VENTRICULAR RATE: 103 BPM
GFR AFRICAN AMERICAN: >60
GFR NON-AFRICAN AMERICAN: >60 ML/MIN/1.73
GLUCOSE BLD-MCNC: 125 MG/DL (ref 74–99)
HBA1C MFR BLD: 4.9 % (ref 4–5.6)
HCT VFR BLD CALC: 42.4 % (ref 34–48)
HDLC SERPL-MCNC: 61 MG/DL
HEMOGLOBIN: 13.7 G/DL (ref 11.5–15.5)
LDL CHOLESTEROL CALCULATED: 195 MG/DL (ref 0–99)
MAGNESIUM: 2.2 MG/DL (ref 1.6–2.6)
MCH RBC QN AUTO: 30.9 PG (ref 26–35)
MCHC RBC AUTO-ENTMCNC: 32.3 % (ref 32–34.5)
MCV RBC AUTO: 95.7 FL (ref 80–99.9)
PDW BLD-RTO: 12.2 FL (ref 11.5–15)
PHOSPHORUS: 3.5 MG/DL (ref 2.5–4.5)
PLATELET # BLD: 304 E9/L (ref 130–450)
PMV BLD AUTO: 9 FL (ref 7–12)
POTASSIUM SERPL-SCNC: 4 MMOL/L (ref 3.5–5)
RBC # BLD: 4.43 E12/L (ref 3.5–5.5)
SODIUM BLD-SCNC: 141 MMOL/L (ref 132–146)
TRIGL SERPL-MCNC: 82 MG/DL (ref 0–149)
TSH SERPL DL<=0.05 MIU/L-ACNC: 0.34 UIU/ML (ref 0.27–4.2)
VLDLC SERPL CALC-MCNC: 16 MG/DL
WBC # BLD: 8.8 E9/L (ref 4.5–11.5)

## 2019-11-30 PROCEDURE — 94640 AIRWAY INHALATION TREATMENT: CPT

## 2019-11-30 PROCEDURE — 93010 ELECTROCARDIOGRAM REPORT: CPT | Performed by: INTERNAL MEDICINE

## 2019-11-30 PROCEDURE — 94660 CPAP INITIATION&MGMT: CPT

## 2019-11-30 PROCEDURE — 90715 TDAP VACCINE 7 YRS/> IM: CPT | Performed by: INTERNAL MEDICINE

## 2019-11-30 PROCEDURE — 83735 ASSAY OF MAGNESIUM: CPT

## 2019-11-30 PROCEDURE — 2700000000 HC OXYGEN THERAPY PER DAY

## 2019-11-30 PROCEDURE — 85027 COMPLETE CBC AUTOMATED: CPT

## 2019-11-30 PROCEDURE — 87070 CULTURE OTHR SPECIMN AEROBIC: CPT

## 2019-11-30 PROCEDURE — 6370000000 HC RX 637 (ALT 250 FOR IP): Performed by: INTERNAL MEDICINE

## 2019-11-30 PROCEDURE — 6360000002 HC RX W HCPCS: Performed by: INTERNAL MEDICINE

## 2019-11-30 PROCEDURE — 83036 HEMOGLOBIN GLYCOSYLATED A1C: CPT

## 2019-11-30 PROCEDURE — 87206 SMEAR FLUORESCENT/ACID STAI: CPT

## 2019-11-30 PROCEDURE — 84443 ASSAY THYROID STIM HORMONE: CPT

## 2019-11-30 PROCEDURE — 80061 LIPID PANEL: CPT

## 2019-11-30 PROCEDURE — 94669 MECHANICAL CHEST WALL OSCILL: CPT

## 2019-11-30 PROCEDURE — 84100 ASSAY OF PHOSPHORUS: CPT

## 2019-11-30 PROCEDURE — 96376 TX/PRO/DX INJ SAME DRUG ADON: CPT

## 2019-11-30 PROCEDURE — 90471 IMMUNIZATION ADMIN: CPT | Performed by: INTERNAL MEDICINE

## 2019-11-30 PROCEDURE — 2580000003 HC RX 258: Performed by: INTERNAL MEDICINE

## 2019-11-30 PROCEDURE — 80048 BASIC METABOLIC PNL TOTAL CA: CPT

## 2019-11-30 PROCEDURE — 36415 COLL VENOUS BLD VENIPUNCTURE: CPT

## 2019-11-30 PROCEDURE — 2060000000 HC ICU INTERMEDIATE R&B

## 2019-11-30 RX ORDER — METHYLPREDNISOLONE SODIUM SUCCINATE 125 MG/2ML
60 INJECTION, POWDER, LYOPHILIZED, FOR SOLUTION INTRAMUSCULAR; INTRAVENOUS EVERY 8 HOURS
Status: DISCONTINUED | OUTPATIENT
Start: 2019-11-30 | End: 2019-12-01

## 2019-11-30 RX ADMIN — MOMETASONE FUROATE AND FORMOTEROL FUMARATE DIHYDRATE 2 PUFF: 200; 5 AEROSOL RESPIRATORY (INHALATION) at 06:16

## 2019-11-30 RX ADMIN — METHYLPREDNISOLONE SODIUM SUCCINATE 80 MG: 125 INJECTION, POWDER, FOR SOLUTION INTRAMUSCULAR; INTRAVENOUS at 08:08

## 2019-11-30 RX ADMIN — MOMETASONE FUROATE AND FORMOTEROL FUMARATE DIHYDRATE 2 PUFF: 200; 5 AEROSOL RESPIRATORY (INHALATION) at 17:10

## 2019-11-30 RX ADMIN — WATER 1 G: 1 INJECTION INTRAMUSCULAR; INTRAVENOUS; SUBCUTANEOUS at 16:11

## 2019-11-30 RX ADMIN — TETANUS TOXOID, REDUCED DIPHTHERIA TOXOID AND ACELLULAR PERTUSSIS VACCINE, ADSORBED 0.5 ML: 5; 2.5; 8; 8; 2.5 SUSPENSION INTRAMUSCULAR at 16:23

## 2019-11-30 RX ADMIN — Medication 10 ML: at 22:10

## 2019-11-30 RX ADMIN — METHYLPREDNISOLONE SODIUM SUCCINATE 80 MG: 125 INJECTION, POWDER, FOR SOLUTION INTRAMUSCULAR; INTRAVENOUS at 04:45

## 2019-11-30 RX ADMIN — IPRATROPIUM BROMIDE AND ALBUTEROL SULFATE 1 AMPULE: .5; 3 SOLUTION RESPIRATORY (INHALATION) at 06:09

## 2019-11-30 RX ADMIN — METHYLPREDNISOLONE SODIUM SUCCINATE 60 MG: 125 INJECTION, POWDER, FOR SOLUTION INTRAMUSCULAR; INTRAVENOUS at 17:44

## 2019-11-30 RX ADMIN — IPRATROPIUM BROMIDE AND ALBUTEROL SULFATE 1 AMPULE: .5; 3 SOLUTION RESPIRATORY (INHALATION) at 17:10

## 2019-11-30 RX ADMIN — IPRATROPIUM BROMIDE AND ALBUTEROL SULFATE 1 AMPULE: .5; 3 SOLUTION RESPIRATORY (INHALATION) at 13:14

## 2019-11-30 RX ADMIN — Medication 10 ML: at 08:08

## 2019-11-30 RX ADMIN — IPRATROPIUM BROMIDE AND ALBUTEROL SULFATE 1 AMPULE: .5; 3 SOLUTION RESPIRATORY (INHALATION) at 09:31

## 2019-11-30 RX ADMIN — ACETAMINOPHEN 650 MG: 325 TABLET, FILM COATED ORAL at 08:08

## 2019-11-30 RX ADMIN — AZITHROMYCIN 500 MG: 500 INJECTION, POWDER, LYOPHILIZED, FOR SOLUTION INTRAVENOUS at 16:11

## 2019-11-30 RX ADMIN — ROFLUMILAST 500 MCG: 500 TABLET ORAL at 16:14

## 2019-11-30 RX ADMIN — IPRATROPIUM BROMIDE AND ALBUTEROL SULFATE 1 AMPULE: .5; 3 SOLUTION RESPIRATORY (INHALATION) at 20:33

## 2019-11-30 ASSESSMENT — PAIN DESCRIPTION - PAIN TYPE: TYPE: ACUTE PAIN

## 2019-11-30 ASSESSMENT — PAIN SCALES - GENERAL
PAINLEVEL_OUTOF10: 1
PAINLEVEL_OUTOF10: 0
PAINLEVEL_OUTOF10: 4

## 2019-11-30 ASSESSMENT — PAIN DESCRIPTION - PROGRESSION: CLINICAL_PROGRESSION: GRADUALLY WORSENING

## 2019-11-30 ASSESSMENT — PAIN DESCRIPTION - DESCRIPTORS: DESCRIPTORS: ACHING;DISCOMFORT;DULL

## 2019-11-30 ASSESSMENT — PAIN DESCRIPTION - ORIENTATION: ORIENTATION: RIGHT;LEFT

## 2019-11-30 ASSESSMENT — PAIN DESCRIPTION - ONSET: ONSET: ON-GOING

## 2019-11-30 ASSESSMENT — PAIN DESCRIPTION - LOCATION: LOCATION: HEAD

## 2019-11-30 ASSESSMENT — PAIN - FUNCTIONAL ASSESSMENT: PAIN_FUNCTIONAL_ASSESSMENT: ACTIVITIES ARE NOT PREVENTED

## 2019-11-30 ASSESSMENT — PAIN DESCRIPTION - FREQUENCY: FREQUENCY: CONTINUOUS

## 2019-12-01 LAB
ACINETOBACTER BAUMANNII BY PCR: NOT DETECTED
ANION GAP SERPL CALCULATED.3IONS-SCNC: 10 MMOL/L (ref 7–16)
BOTTLE TYPE: NORMAL
BUN BLDV-MCNC: 16 MG/DL (ref 6–20)
CALCIUM SERPL-MCNC: 9.1 MG/DL (ref 8.6–10.2)
CANDIDA ALBICANS BY PCR: NOT DETECTED
CANDIDA GLABRATA BY PCR: NOT DETECTED
CANDIDA KRUSEI BY PCR: NOT DETECTED
CANDIDA PARAPSILOSIS BY PCR: NOT DETECTED
CANDIDA TROPICALIS BY PCR: NOT DETECTED
CHLORIDE BLD-SCNC: 95 MMOL/L (ref 98–107)
CO2: 39 MMOL/L (ref 22–29)
CREAT SERPL-MCNC: 0.4 MG/DL (ref 0.5–1)
ENTEROBACTER CLOACAE COMPLEX BY PCR: NOT DETECTED
ENTEROBACTERALES BY PCR: NOT DETECTED
ENTEROCOCCUS BY PCR: NOT DETECTED
ESCHERICHIA COLI BY PCR: NOT DETECTED
GFR AFRICAN AMERICAN: >60
GFR NON-AFRICAN AMERICAN: >60 ML/MIN/1.73
GLUCOSE BLD-MCNC: 143 MG/DL (ref 74–99)
HAEMOPHILUS INFLUENZAE BY PCR: NOT DETECTED
HCT VFR BLD CALC: 39.6 % (ref 34–48)
HEMOGLOBIN: 12.9 G/DL (ref 11.5–15.5)
KLEBSIELLA OXYTOCA BY PCR: NOT DETECTED
KLEBSIELLA PNEUMONIAE GROUP BY PCR: NOT DETECTED
LISTERIA MONOCYTOGENES BY PCR: NOT DETECTED
MCH RBC QN AUTO: 30.9 PG (ref 26–35)
MCHC RBC AUTO-ENTMCNC: 32.6 % (ref 32–34.5)
MCV RBC AUTO: 95 FL (ref 80–99.9)
NEISSERIA MENINGITIDIS BY PCR: NOT DETECTED
ORDER NUMBER: NORMAL
PDW BLD-RTO: 12.4 FL (ref 11.5–15)
PLATELET # BLD: 286 E9/L (ref 130–450)
PMV BLD AUTO: 9.3 FL (ref 7–12)
POTASSIUM SERPL-SCNC: 3.7 MMOL/L (ref 3.5–5)
PROTEUS BY PCR: NOT DETECTED
PSEUDOMONAS AERUGINOSA BY PCR: NOT DETECTED
RBC # BLD: 4.17 E12/L (ref 3.5–5.5)
SERRATIA MARCESCENS BY PCR: NOT DETECTED
SODIUM BLD-SCNC: 144 MMOL/L (ref 132–146)
SOURCE OF BLOOD CULTURE: NORMAL
STAPHYLOCOCCUS AUREUS BY PCR: NOT DETECTED
STAPHYLOCOCCUS SPECIES BY PCR: NOT DETECTED
STREPTOCOCCUS AGALACTIAE BY PCR: NOT DETECTED
STREPTOCOCCUS PNEUMONIAE BY PCR: NOT DETECTED
STREPTOCOCCUS PYOGENES  BY PCR: NOT DETECTED
STREPTOCOCCUS SPECIES BY PCR: NOT DETECTED
WBC # BLD: 9.1 E9/L (ref 4.5–11.5)

## 2019-12-01 PROCEDURE — 94640 AIRWAY INHALATION TREATMENT: CPT

## 2019-12-01 PROCEDURE — 2060000000 HC ICU INTERMEDIATE R&B

## 2019-12-01 PROCEDURE — 6360000002 HC RX W HCPCS: Performed by: INTERNAL MEDICINE

## 2019-12-01 PROCEDURE — 2580000003 HC RX 258: Performed by: INTERNAL MEDICINE

## 2019-12-01 PROCEDURE — 94660 CPAP INITIATION&MGMT: CPT

## 2019-12-01 PROCEDURE — 2700000000 HC OXYGEN THERAPY PER DAY

## 2019-12-01 PROCEDURE — 80048 BASIC METABOLIC PNL TOTAL CA: CPT

## 2019-12-01 PROCEDURE — 6370000000 HC RX 637 (ALT 250 FOR IP): Performed by: INTERNAL MEDICINE

## 2019-12-01 PROCEDURE — 36415 COLL VENOUS BLD VENIPUNCTURE: CPT

## 2019-12-01 PROCEDURE — 85027 COMPLETE CBC AUTOMATED: CPT

## 2019-12-01 PROCEDURE — 87450 HC DIRECT STREP B ANTIGEN: CPT

## 2019-12-01 PROCEDURE — 82105 ALPHA-FETOPROTEIN SERUM: CPT

## 2019-12-01 PROCEDURE — 94669 MECHANICAL CHEST WALL OSCILL: CPT

## 2019-12-01 RX ORDER — PREDNISONE 20 MG/1
40 TABLET ORAL DAILY
Status: DISCONTINUED | OUTPATIENT
Start: 2019-12-01 | End: 2019-12-03 | Stop reason: HOSPADM

## 2019-12-01 RX ADMIN — IPRATROPIUM BROMIDE AND ALBUTEROL SULFATE 1 AMPULE: .5; 3 SOLUTION RESPIRATORY (INHALATION) at 09:36

## 2019-12-01 RX ADMIN — MOMETASONE FUROATE AND FORMOTEROL FUMARATE DIHYDRATE 2 PUFF: 200; 5 AEROSOL RESPIRATORY (INHALATION) at 06:22

## 2019-12-01 RX ADMIN — METHYLPREDNISOLONE SODIUM SUCCINATE 60 MG: 125 INJECTION, POWDER, FOR SOLUTION INTRAMUSCULAR; INTRAVENOUS at 01:17

## 2019-12-01 RX ADMIN — METHYLPREDNISOLONE SODIUM SUCCINATE 60 MG: 125 INJECTION, POWDER, FOR SOLUTION INTRAMUSCULAR; INTRAVENOUS at 08:36

## 2019-12-01 RX ADMIN — IPRATROPIUM BROMIDE AND ALBUTEROL SULFATE 1 AMPULE: .5; 3 SOLUTION RESPIRATORY (INHALATION) at 06:09

## 2019-12-01 RX ADMIN — PREDNISONE 40 MG: 20 TABLET ORAL at 15:03

## 2019-12-01 RX ADMIN — Medication 10 ML: at 20:18

## 2019-12-01 RX ADMIN — Medication 10 ML: at 08:36

## 2019-12-01 RX ADMIN — Medication 10 ML: at 17:57

## 2019-12-01 RX ADMIN — IPRATROPIUM BROMIDE AND ALBUTEROL SULFATE 1 AMPULE: .5; 3 SOLUTION RESPIRATORY (INHALATION) at 23:23

## 2019-12-01 RX ADMIN — WATER 1 G: 1 INJECTION INTRAMUSCULAR; INTRAVENOUS; SUBCUTANEOUS at 17:57

## 2019-12-01 RX ADMIN — ROFLUMILAST 500 MCG: 500 TABLET ORAL at 08:36

## 2019-12-01 RX ADMIN — MOMETASONE FUROATE AND FORMOTEROL FUMARATE DIHYDRATE 2 PUFF: 200; 5 AEROSOL RESPIRATORY (INHALATION) at 17:58

## 2019-12-01 RX ADMIN — IPRATROPIUM BROMIDE AND ALBUTEROL SULFATE 1 AMPULE: .5; 3 SOLUTION RESPIRATORY (INHALATION) at 14:04

## 2019-12-01 RX ADMIN — AZITHROMYCIN 500 MG: 500 INJECTION, POWDER, LYOPHILIZED, FOR SOLUTION INTRAVENOUS at 17:57

## 2019-12-01 RX ADMIN — IPRATROPIUM BROMIDE AND ALBUTEROL SULFATE 1 AMPULE: .5; 3 SOLUTION RESPIRATORY (INHALATION) at 17:59

## 2019-12-01 ASSESSMENT — PAIN SCALES - GENERAL
PAINLEVEL_OUTOF10: 0

## 2019-12-02 LAB
ANION GAP SERPL CALCULATED.3IONS-SCNC: 8 MMOL/L (ref 7–16)
BUN BLDV-MCNC: 20 MG/DL (ref 6–20)
CALCIUM SERPL-MCNC: 9.3 MG/DL (ref 8.6–10.2)
CHLORIDE BLD-SCNC: 93 MMOL/L (ref 98–107)
CO2: 39 MMOL/L (ref 22–29)
CREAT SERPL-MCNC: 0.5 MG/DL (ref 0.5–1)
CULTURE, RESPIRATORY: NORMAL
GFR AFRICAN AMERICAN: >60
GFR NON-AFRICAN AMERICAN: >60 ML/MIN/1.73
GLUCOSE BLD-MCNC: 105 MG/DL (ref 74–99)
HCT VFR BLD CALC: 37.2 % (ref 34–48)
HEMOGLOBIN: 12.4 G/DL (ref 11.5–15.5)
L. PNEUMOPHILA SEROGP 1 UR AG: NORMAL
MCH RBC QN AUTO: 31.6 PG (ref 26–35)
MCHC RBC AUTO-ENTMCNC: 33.3 % (ref 32–34.5)
MCV RBC AUTO: 94.7 FL (ref 80–99.9)
PDW BLD-RTO: 12.4 FL (ref 11.5–15)
PLATELET # BLD: 277 E9/L (ref 130–450)
PMV BLD AUTO: 9.2 FL (ref 7–12)
POTASSIUM SERPL-SCNC: 3.3 MMOL/L (ref 3.5–5)
RBC # BLD: 3.93 E12/L (ref 3.5–5.5)
SMEAR, RESPIRATORY: NORMAL
SODIUM BLD-SCNC: 140 MMOL/L (ref 132–146)
STREP PNEUMONIAE ANTIGEN, URINE: NORMAL
WBC # BLD: 13.4 E9/L (ref 4.5–11.5)

## 2019-12-02 PROCEDURE — 2580000003 HC RX 258: Performed by: INTERNAL MEDICINE

## 2019-12-02 PROCEDURE — 2060000000 HC ICU INTERMEDIATE R&B

## 2019-12-02 PROCEDURE — 94640 AIRWAY INHALATION TREATMENT: CPT

## 2019-12-02 PROCEDURE — 36415 COLL VENOUS BLD VENIPUNCTURE: CPT

## 2019-12-02 PROCEDURE — 2700000000 HC OXYGEN THERAPY PER DAY

## 2019-12-02 PROCEDURE — 6360000002 HC RX W HCPCS: Performed by: INTERNAL MEDICINE

## 2019-12-02 PROCEDURE — 6370000000 HC RX 637 (ALT 250 FOR IP): Performed by: INTERNAL MEDICINE

## 2019-12-02 PROCEDURE — 94667 MNPJ CHEST WALL 1ST: CPT

## 2019-12-02 PROCEDURE — 94669 MECHANICAL CHEST WALL OSCILL: CPT

## 2019-12-02 PROCEDURE — 80048 BASIC METABOLIC PNL TOTAL CA: CPT

## 2019-12-02 PROCEDURE — 94660 CPAP INITIATION&MGMT: CPT

## 2019-12-02 PROCEDURE — 85027 COMPLETE CBC AUTOMATED: CPT

## 2019-12-02 PROCEDURE — 94668 MNPJ CHEST WALL SBSQ: CPT

## 2019-12-02 RX ORDER — ALBUTEROL SULFATE 90 UG/1
2 AEROSOL, METERED RESPIRATORY (INHALATION) EVERY 6 HOURS PRN
Status: DISCONTINUED | OUTPATIENT
Start: 2019-12-02 | End: 2019-12-03 | Stop reason: HOSPADM

## 2019-12-02 RX ADMIN — MOMETASONE FUROATE AND FORMOTEROL FUMARATE DIHYDRATE 2 PUFF: 200; 5 AEROSOL RESPIRATORY (INHALATION) at 17:28

## 2019-12-02 RX ADMIN — WATER 1 G: 1 INJECTION INTRAMUSCULAR; INTRAVENOUS; SUBCUTANEOUS at 17:42

## 2019-12-02 RX ADMIN — AZITHROMYCIN 500 MG: 500 INJECTION, POWDER, LYOPHILIZED, FOR SOLUTION INTRAVENOUS at 17:42

## 2019-12-02 RX ADMIN — ROFLUMILAST 500 MCG: 500 TABLET ORAL at 09:01

## 2019-12-02 RX ADMIN — IPRATROPIUM BROMIDE AND ALBUTEROL SULFATE 1 AMPULE: .5; 3 SOLUTION RESPIRATORY (INHALATION) at 09:40

## 2019-12-02 RX ADMIN — MOMETASONE FUROATE AND FORMOTEROL FUMARATE DIHYDRATE 2 PUFF: 200; 5 AEROSOL RESPIRATORY (INHALATION) at 05:06

## 2019-12-02 RX ADMIN — PREDNISONE 40 MG: 20 TABLET ORAL at 09:01

## 2019-12-02 RX ADMIN — IPRATROPIUM BROMIDE AND ALBUTEROL SULFATE 1 AMPULE: .5; 3 SOLUTION RESPIRATORY (INHALATION) at 05:06

## 2019-12-02 RX ADMIN — IPRATROPIUM BROMIDE AND ALBUTEROL SULFATE 1 AMPULE: .5; 3 SOLUTION RESPIRATORY (INHALATION) at 17:27

## 2019-12-02 RX ADMIN — Medication 10 ML: at 09:01

## 2019-12-02 RX ADMIN — IPRATROPIUM BROMIDE AND ALBUTEROL SULFATE 1 AMPULE: .5; 3 SOLUTION RESPIRATORY (INHALATION) at 12:30

## 2019-12-02 ASSESSMENT — PAIN SCALES - GENERAL
PAINLEVEL_OUTOF10: 0

## 2019-12-03 VITALS
WEIGHT: 147 LBS | HEART RATE: 85 BPM | RESPIRATION RATE: 16 BRPM | TEMPERATURE: 98.3 F | OXYGEN SATURATION: 96 % | SYSTOLIC BLOOD PRESSURE: 117 MMHG | BODY MASS INDEX: 25.1 KG/M2 | DIASTOLIC BLOOD PRESSURE: 87 MMHG | HEIGHT: 64 IN

## 2019-12-03 LAB
AFP-TUMOR MARKER: 3 NG/ML (ref 0–9)
ANION GAP SERPL CALCULATED.3IONS-SCNC: 7 MMOL/L (ref 7–16)
BLOOD CULTURE, ROUTINE: ABNORMAL
BUN BLDV-MCNC: 15 MG/DL (ref 6–20)
CALCIUM SERPL-MCNC: 8.8 MG/DL (ref 8.6–10.2)
CHLORIDE BLD-SCNC: 97 MMOL/L (ref 98–107)
CO2: 38 MMOL/L (ref 22–29)
CREAT SERPL-MCNC: 0.6 MG/DL (ref 0.5–1)
GFR AFRICAN AMERICAN: >60
GFR NON-AFRICAN AMERICAN: >60 ML/MIN/1.73
GLUCOSE BLD-MCNC: 91 MG/DL (ref 74–99)
HCT VFR BLD CALC: 38.6 % (ref 34–48)
HEMOGLOBIN: 12.6 G/DL (ref 11.5–15.5)
MCH RBC QN AUTO: 31.2 PG (ref 26–35)
MCHC RBC AUTO-ENTMCNC: 32.6 % (ref 32–34.5)
MCV RBC AUTO: 95.5 FL (ref 80–99.9)
ORGANISM: ABNORMAL
PDW BLD-RTO: 12.6 FL (ref 11.5–15)
PLATELET # BLD: 262 E9/L (ref 130–450)
PMV BLD AUTO: 8.8 FL (ref 7–12)
POTASSIUM SERPL-SCNC: 3.9 MMOL/L (ref 3.5–5)
RBC # BLD: 4.04 E12/L (ref 3.5–5.5)
SODIUM BLD-SCNC: 142 MMOL/L (ref 132–146)
WBC # BLD: 12.4 E9/L (ref 4.5–11.5)

## 2019-12-03 PROCEDURE — 85027 COMPLETE CBC AUTOMATED: CPT

## 2019-12-03 PROCEDURE — 94640 AIRWAY INHALATION TREATMENT: CPT

## 2019-12-03 PROCEDURE — 94660 CPAP INITIATION&MGMT: CPT

## 2019-12-03 PROCEDURE — 6370000000 HC RX 637 (ALT 250 FOR IP): Performed by: INTERNAL MEDICINE

## 2019-12-03 PROCEDURE — 94669 MECHANICAL CHEST WALL OSCILL: CPT

## 2019-12-03 PROCEDURE — 2700000000 HC OXYGEN THERAPY PER DAY

## 2019-12-03 PROCEDURE — 80048 BASIC METABOLIC PNL TOTAL CA: CPT

## 2019-12-03 PROCEDURE — 2580000003 HC RX 258: Performed by: INTERNAL MEDICINE

## 2019-12-03 PROCEDURE — 36415 COLL VENOUS BLD VENIPUNCTURE: CPT

## 2019-12-03 RX ORDER — PREDNISONE 20 MG/1
40 TABLET ORAL DAILY
Qty: 14 TABLET | Refills: 0 | Status: SHIPPED | OUTPATIENT
Start: 2019-12-03 | End: 2019-12-03 | Stop reason: SDUPTHER

## 2019-12-03 RX ORDER — AZITHROMYCIN 500 MG/1
500 TABLET, FILM COATED ORAL DAILY
Qty: 6 TABLET | Refills: 0 | Status: SHIPPED | OUTPATIENT
Start: 2019-12-03 | End: 2019-12-09

## 2019-12-03 RX ORDER — AZITHROMYCIN 500 MG/1
500 TABLET, FILM COATED ORAL DAILY
Qty: 6 TABLET | Refills: 0 | Status: SHIPPED | OUTPATIENT
Start: 2019-12-03 | End: 2019-12-03 | Stop reason: SDUPTHER

## 2019-12-03 RX ORDER — PREDNISONE 20 MG/1
40 TABLET ORAL DAILY
Qty: 20 TABLET | Refills: 0 | Status: SHIPPED | OUTPATIENT
Start: 2019-12-03 | End: 2019-12-13

## 2019-12-03 RX ORDER — ALBUTEROL SULFATE 2.5 MG/3ML
2.5 SOLUTION RESPIRATORY (INHALATION) EVERY 4 HOURS PRN
Qty: 120 EACH | Refills: 1 | Status: SHIPPED | OUTPATIENT
Start: 2019-12-03 | End: 2020-06-22 | Stop reason: SDUPTHER

## 2019-12-03 RX ADMIN — IPRATROPIUM BROMIDE AND ALBUTEROL SULFATE 1 AMPULE: .5; 3 SOLUTION RESPIRATORY (INHALATION) at 09:47

## 2019-12-03 RX ADMIN — IPRATROPIUM BROMIDE AND ALBUTEROL SULFATE 1 AMPULE: .5; 3 SOLUTION RESPIRATORY (INHALATION) at 06:33

## 2019-12-03 RX ADMIN — Medication 10 ML: at 09:23

## 2019-12-03 RX ADMIN — MOMETASONE FUROATE AND FORMOTEROL FUMARATE DIHYDRATE 2 PUFF: 200; 5 AEROSOL RESPIRATORY (INHALATION) at 06:38

## 2019-12-03 RX ADMIN — PREDNISONE 40 MG: 20 TABLET ORAL at 09:23

## 2019-12-03 RX ADMIN — ROFLUMILAST 500 MCG: 500 TABLET ORAL at 09:23

## 2019-12-03 ASSESSMENT — PAIN SCALES - GENERAL
PAINLEVEL_OUTOF10: 0
PAINLEVEL_OUTOF10: 0

## 2019-12-04 LAB — CULTURE, BLOOD 2: NORMAL

## 2020-06-19 ENCOUNTER — TELEPHONE (OUTPATIENT)
Dept: ADMINISTRATIVE | Age: 60
End: 2020-06-19

## 2020-06-22 ENCOUNTER — HOSPITAL ENCOUNTER (OUTPATIENT)
Age: 60
Discharge: HOME | End: 2020-06-24
Payer: COMMERCIAL

## 2020-06-22 ENCOUNTER — OFFICE VISIT (OUTPATIENT)
Dept: FAMILY MEDICINE CLINIC | Age: 60
End: 2020-06-22
Payer: COMMERCIAL

## 2020-06-22 VITALS
BODY MASS INDEX: 26.43 KG/M2 | RESPIRATION RATE: 20 BRPM | HEART RATE: 83 BPM | OXYGEN SATURATION: 98 % | SYSTOLIC BLOOD PRESSURE: 124 MMHG | HEIGHT: 61 IN | DIASTOLIC BLOOD PRESSURE: 82 MMHG | WEIGHT: 140 LBS

## 2020-06-22 LAB
ALBUMIN SERPL-MCNC: 4.2 G/DL (ref 3.5–5.2)
ALP BLD-CCNC: 72 U/L (ref 35–104)
ALT SERPL-CCNC: 14 U/L (ref 0–32)
ANION GAP SERPL CALCULATED.3IONS-SCNC: 12 MMOL/L (ref 7–16)
AST SERPL-CCNC: 18 U/L (ref 0–31)
BILIRUB SERPL-MCNC: 0.2 MG/DL (ref 0–1.2)
BUN BLDV-MCNC: 11 MG/DL (ref 8–23)
CALCIUM SERPL-MCNC: 9.7 MG/DL (ref 8.6–10.2)
CHLORIDE BLD-SCNC: 95 MMOL/L (ref 98–107)
CO2: 37 MMOL/L (ref 22–29)
CREAT SERPL-MCNC: 0.5 MG/DL (ref 0.5–1)
GFR AFRICAN AMERICAN: >60
GFR NON-AFRICAN AMERICAN: >60 ML/MIN/1.73
GLUCOSE BLD-MCNC: 102 MG/DL (ref 74–99)
HCT VFR BLD CALC: 43.3 % (ref 34–48)
HEMOGLOBIN: 13.4 G/DL (ref 11.5–15.5)
MCH RBC QN AUTO: 30.5 PG (ref 26–35)
MCHC RBC AUTO-ENTMCNC: 30.9 % (ref 32–34.5)
MCV RBC AUTO: 98.4 FL (ref 80–99.9)
PDW BLD-RTO: 12.2 FL (ref 11.5–15)
PLATELET # BLD: 260 E9/L (ref 130–450)
PMV BLD AUTO: 9.6 FL (ref 7–12)
POTASSIUM SERPL-SCNC: 4.1 MMOL/L (ref 3.5–5)
RBC # BLD: 4.4 E12/L (ref 3.5–5.5)
SODIUM BLD-SCNC: 144 MMOL/L (ref 132–146)
TOTAL PROTEIN: 7.4 G/DL (ref 6.4–8.3)
TSH SERPL DL<=0.05 MIU/L-ACNC: 0.92 UIU/ML (ref 0.27–4.2)
WBC # BLD: 8.2 E9/L (ref 4.5–11.5)

## 2020-06-22 PROCEDURE — 80053 COMPREHEN METABOLIC PANEL: CPT

## 2020-06-22 PROCEDURE — 99214 OFFICE O/P EST MOD 30 MIN: CPT | Performed by: FAMILY MEDICINE

## 2020-06-22 PROCEDURE — 85027 COMPLETE CBC AUTOMATED: CPT

## 2020-06-22 PROCEDURE — 84443 ASSAY THYROID STIM HORMONE: CPT

## 2020-06-22 PROCEDURE — 36415 COLL VENOUS BLD VENIPUNCTURE: CPT

## 2020-06-22 RX ORDER — TIOTROPIUM BROMIDE 18 UG/1
18 CAPSULE ORAL; RESPIRATORY (INHALATION) DAILY
Qty: 90 CAPSULE | Refills: 1 | Status: SHIPPED
Start: 2020-06-22 | End: 2020-07-20 | Stop reason: SDUPTHER

## 2020-06-22 RX ORDER — BUDESONIDE AND FORMOTEROL FUMARATE DIHYDRATE 160; 4.5 UG/1; UG/1
2 AEROSOL RESPIRATORY (INHALATION) 2 TIMES DAILY
Qty: 3 INHALER | Refills: 1 | Status: SHIPPED
Start: 2020-06-22 | End: 2020-07-20 | Stop reason: SDUPTHER

## 2020-06-22 RX ORDER — ALBUTEROL SULFATE 2.5 MG/3ML
2.5 SOLUTION RESPIRATORY (INHALATION) EVERY 4 HOURS PRN
Qty: 120 EACH | Refills: 1 | Status: SHIPPED
Start: 2020-06-22 | End: 2020-09-02 | Stop reason: SDUPTHER

## 2020-06-22 ASSESSMENT — ENCOUNTER SYMPTOMS
WHEEZING: 1
DIARRHEA: 0
COUGH: 0
ABDOMINAL PAIN: 0
SHORTNESS OF BREATH: 1
NAUSEA: 0

## 2020-06-22 ASSESSMENT — PATIENT HEALTH QUESTIONNAIRE - PHQ9
2. FEELING DOWN, DEPRESSED OR HOPELESS: 0
SUM OF ALL RESPONSES TO PHQ QUESTIONS 1-9: 0
1. LITTLE INTEREST OR PLEASURE IN DOING THINGS: 0
SUM OF ALL RESPONSES TO PHQ QUESTIONS 1-9: 0
SUM OF ALL RESPONSES TO PHQ9 QUESTIONS 1 & 2: 0

## 2020-06-22 NOTE — PATIENT INSTRUCTIONS
Get blood work done today   Restart the inhalers   The lung doctor will call you to schedule   Follow up in 2 weeks or sooner as needed.

## 2020-06-22 NOTE — PROGRESS NOTES
 Penicillin V Hives and Other (See Comments)     11/07/2005 UNKNOWN, PLEASE VERIFY, 11/07/2005 UNKNOWN, PLEASE VERIFY       Past medical, surgical, socialand/or family history reviewed, updated as needed, and are non-contributory (unless otherwise stated). Medications, allergies, and problem list also reviewed and updated as needed in patient's record. Wt Readings from Last 3 Encounters:   06/22/20 140 lb (63.5 kg)   11/29/19 147 lb (66.7 kg)   10/30/19 150 lb 4.8 oz (68.2 kg)                   /82   Pulse 83   Resp 20   Ht 5' 1\" (1.549 m)   Wt 140 lb (63.5 kg)   SpO2 98%   BMI 26.45 kg/m²        Physical Exam  Vitals signs and nursing note reviewed. Constitutional:       Appearance: Normal appearance. Comments: In wheelchair   HENT:      Head: Normocephalic and atraumatic. Cardiovascular:      Rate and Rhythm: Normal rate and regular rhythm. Heart sounds: No murmur. Pulmonary:      Effort: Pulmonary effort is normal. No respiratory distress. Breath sounds: Normal breath sounds. No wheezing. Abdominal:      General: Abdomen is flat. Bowel sounds are normal. There is no distension. Musculoskeletal:      Right lower leg: No edema. Left lower leg: No edema. Neurological:      Mental Status: She is alert. ASSESSMENT/PLAN  Florencio Coleman was seen today for check-up. Diagnoses and all orders for this visit:    Hyperlipidemia, unspecified hyperlipidemia type  -     COMPREHENSIVE METABOLIC PANEL; Future  -     LIPID PANEL; Future    Chronic fatigue  -     CBC; Future  -     TSH without Reflex; Future    Chronic obstructive pulmonary disease, unspecified COPD type ([facility]  -     Florentino Perez MD, Pulmonary, [place] ([facility])  -     budesonide-formoterol (SYMBICORT) 160-4.5 MCG/ACT AERO; Inhale 2 puffs into the lungs 2 times daily  -     tiotropium (SPIRIVA HANDIHALER) 18 MCG inhalation capsule;  Inhale 1 capsule into the lungs daily    Colon cancer screening  -     [facility] Bridget Zhong MD, General Surgery, [place]    Chronic respiratory failure with hypoxia [facility])  -     Raman Marte MD, Pulmonary, [place] ([facility])  -     budesonide-formoterol (SYMBICORT) 160-4.5 MCG/ACT AERO; Inhale 2 puffs into the lungs 2 times daily  -     tiotropium (SPIRIVA HANDIHALER) 18 MCG inhalation capsule; Inhale 1 capsule into the lungs daily    COPD exacerbation ([facility])  -     albuterol (PROVENTIL) (2.5 MG/3ML) 0.083% nebulizer solution; Take 3 mLs by nebulization every 4 hours as needed for Wheezing or Shortness of Breath    Hypoxia  -     albuterol (PROVENTIL) (2.5 MG/3ML) 0.083% nebulizer solution; Take 3 mLs by nebulization every 4 hours as needed for Wheezing or Shortness of Breath      Referral placed to pulmonology   Restarted inhalers       Phone/MyChart follow up if tests abnormal.    Return in about 2 weeks (around 7/6/2020) for copd . or sooner if necessary. I have reviewed myfindings and recommendations with Emma Franco. Jory Jones.  Aleshia Pisano M.D

## 2020-06-26 ENCOUNTER — TELEPHONE (OUTPATIENT)
Dept: FAMILY MEDICINE CLINIC | Age: 60
End: 2020-06-26

## 2020-06-26 NOTE — TELEPHONE ENCOUNTER
Robert Bacon called she said that the cost of Symbicort cost $85 and she cant afford it . Also she did get the Spiriva and it cost $35 .   She got the scripts for this month but she will not be able to afford

## 2020-07-20 ENCOUNTER — OFFICE VISIT (OUTPATIENT)
Dept: FAMILY MEDICINE CLINIC | Age: 60
End: 2020-07-20
Payer: COMMERCIAL

## 2020-07-20 VITALS
OXYGEN SATURATION: 95 % | WEIGHT: 146.3 LBS | RESPIRATION RATE: 18 BRPM | SYSTOLIC BLOOD PRESSURE: 112 MMHG | TEMPERATURE: 97.8 F | HEART RATE: 88 BPM | HEIGHT: 64 IN | DIASTOLIC BLOOD PRESSURE: 72 MMHG | BODY MASS INDEX: 24.98 KG/M2

## 2020-07-20 PROCEDURE — 99213 OFFICE O/P EST LOW 20 MIN: CPT | Performed by: FAMILY MEDICINE

## 2020-07-20 RX ORDER — BUDESONIDE AND FORMOTEROL FUMARATE DIHYDRATE 160; 4.5 UG/1; UG/1
2 AEROSOL RESPIRATORY (INHALATION) 2 TIMES DAILY
Qty: 1 INHALER | Refills: 1 | Status: SHIPPED
Start: 2020-07-20 | End: 2020-08-31 | Stop reason: SDUPTHER

## 2020-07-20 RX ORDER — TRIAMCINOLONE ACETONIDE 0.25 MG/G
OINTMENT TOPICAL
Qty: 80 G | Refills: 1 | Status: SHIPPED | OUTPATIENT
Start: 2020-07-20 | End: 2020-07-27

## 2020-07-20 RX ORDER — TIOTROPIUM BROMIDE 18 UG/1
18 CAPSULE ORAL; RESPIRATORY (INHALATION) DAILY
Qty: 30 CAPSULE | Refills: 1 | Status: SHIPPED
Start: 2020-07-20 | End: 2020-08-31 | Stop reason: SDUPTHER

## 2020-07-20 ASSESSMENT — ENCOUNTER SYMPTOMS
SHORTNESS OF BREATH: 1
COUGH: 0
NAUSEA: 0
DIARRHEA: 0
ABDOMINAL PAIN: 0
WHEEZING: 1

## 2020-07-20 NOTE — PROGRESS NOTES
[address] [place] [address]  [phone]   Cherie Bermudez MD     Patient: Robyn Melchor  YOB: 1960  Visit Date: 7/20/20    Griselda Castro is a 61y.o. year old female here today for   Chief Complaint   Patient presents with    COPD       HPI  Patient is a 61year old female here for follow up of copd. Restarted her inhalers and feels significantly better. Went down from 3.5L to 2L. Overall feeling better. Ambulating better. Contact dermatitis on back of neck. Is unsure of detergent changes. Recently had a lot of deaths in her family and thinks this may be from stress. Needs to go to [facility] for prescriptions. Has pulm appointment with Dr. Annabella Reyes on Aug 19. Does not need albuterol every day. Does not want mammogram right now. Review of Systems   Constitutional: Negative for chills and fever. Respiratory: Positive for shortness of breath (improved) and wheezing (intermittent). Negative for cough. Cardiovascular: Negative for chest pain, palpitations and leg swelling. Gastrointestinal: Negative for abdominal pain, diarrhea and nausea. Genitourinary: Negative for dysuria. Neurological: Negative for dizziness and light-headedness. Current Outpatient Medications on File Prior to Visit   Medication Sig Dispense Refill    albuterol (PROVENTIL) (2.5 MG/3ML) 0.083% nebulizer solution Take 3 mLs by nebulization every 4 hours as needed for Wheezing or Shortness of Breath 120 each 1    nicotine (NICODERM CQ) 14 MG/24HR Place 1 patch onto the skin daily 30 patch 0    Roflumilast (DALIRESP) 500 MCG tablet Take 1 tablet by mouth daily (Patient not taking: Reported on 7/20/2020) 30 tablet 2     No current facility-administered medications on file prior to visit.       Allergies   Allergen Reactions    Penicillin V Hives and Other (See Comments)     11/07/2005 UNKNOWN, PLEASE VERIFY, 11/07/2005 UNKNOWN, PLEASE VERIFY       Past medical, surgical, socialand/or family history reviewed, updated as needed, and are non-contributory (unless otherwise stated). Medications, allergies, and problem list also reviewed and updated as needed in patient's record. Wt Readings from Last 3 Encounters:   07/20/20 146 lb 4.8 oz (66.4 kg)   06/22/20 140 lb (63.5 kg)   11/29/19 147 lb (66.7 kg)                   /72   Pulse 88   Temp 97.8 °F (36.6 °C) (Oral)   Resp 18   Ht 5' 4\" (1.626 m)   Wt 146 lb 4.8 oz (66.4 kg)   SpO2 95%   Breastfeeding No   BMI 25.11 kg/m²        Physical Exam  Vitals signs and nursing note reviewed. Constitutional:       Appearance: Normal appearance. Comments: Walking    HENT:      Head: Normocephalic and atraumatic. Cardiovascular:      Rate and Rhythm: Normal rate and regular rhythm. Heart sounds: No murmur. Pulmonary:      Effort: Pulmonary effort is normal. No respiratory distress. Breath sounds: Normal breath sounds. No wheezing. Abdominal:      General: Abdomen is flat. Bowel sounds are normal. There is no distension. Musculoskeletal:      Right lower leg: No edema. Left lower leg: No edema. Skin:     Comments: Raised erythematous, excoriations. On back of neck and in her hair. Neurological:      Mental Status: She is alert.        Results for orders placed or performed during the hospital encounter of 06/22/20   TSH without Reflex   Result Value Ref Range    TSH 0.921 0.270 - 4.200 uIU/mL   CBC   Result Value Ref Range    WBC 8.2 4.5 - 11.5 E9/L    RBC 4.40 3.50 - 5.50 E12/L    Hemoglobin 13.4 11.5 - 15.5 g/dL    Hematocrit 43.3 34.0 - 48.0 %    MCV 98.4 80.0 - 99.9 fL    MCH 30.5 26.0 - 35.0 pg    MCHC 30.9 (L) 32.0 - 34.5 %    RDW 12.2 11.5 - 15.0 fL    Platelets 674 061 - 265 E9/L    MPV 9.6 7.0 - 12.0 fL   COMPREHENSIVE METABOLIC PANEL   Result Value Ref Range    Sodium 144 132 - 146 mmol/L    Potassium 4.1 3.5 - 5.0 mmol/L    Chloride 95 (L) 98 - 107 mmol/L    CO2 37 (H) 22 - 29

## 2020-07-20 NOTE — PATIENT INSTRUCTIONS
Keep up the good work ! Call me if you have issues affording the inhalers  Let me know when you want to get your mammogram   Keep appointment with pulmonologist   Follow up in 1 month or sooner as needed. Apply steroid ointment twice a day for two weeks.

## 2020-09-02 RX ORDER — ALBUTEROL SULFATE 2.5 MG/3ML
2.5 SOLUTION RESPIRATORY (INHALATION) EVERY 4 HOURS PRN
Qty: 120 EACH | Refills: 1 | Status: SHIPPED
Start: 2020-09-02 | End: 2020-10-23 | Stop reason: SDUPTHER

## 2020-09-02 RX ORDER — BUDESONIDE AND FORMOTEROL FUMARATE DIHYDRATE 160; 4.5 UG/1; UG/1
2 AEROSOL RESPIRATORY (INHALATION) 2 TIMES DAILY
Qty: 3 INHALER | Refills: 1 | Status: SHIPPED
Start: 2020-09-02 | End: 2020-09-08 | Stop reason: SDUPTHER

## 2020-09-02 RX ORDER — TIOTROPIUM BROMIDE 18 UG/1
18 CAPSULE ORAL; RESPIRATORY (INHALATION) DAILY
Qty: 90 CAPSULE | Refills: 1 | Status: SHIPPED
Start: 2020-09-02 | End: 2020-10-26

## 2020-09-04 ENCOUNTER — TELEPHONE (OUTPATIENT)
Dept: ADMINISTRATIVE | Age: 60
End: 2020-09-04

## 2020-09-04 NOTE — TELEPHONE ENCOUNTER
Pt's daughter called w/ pt on the line, pt has run out of her Rx for Spiriva, ins will no longer cover this medication, pt  starting to have breathing issues w/o meds. Ins company sent a list of covered meds that would replace the Spiriva, Tudtudorza,Incruse or Ellipta. Pt is requesting a refill of any of the covered meds to be sent to [address] [place] [address], also stated she had a bout with head lice, she did shave her head and had rid herself of the lice, but does have sores that may be infected on her head, she would like an Rx for an antibiotic cream for that. Please contact pt concerning this matter at [phone]'s number. Pt also requesting a refill of Symbicort to be sent.

## 2020-09-08 ENCOUNTER — TELEPHONE (OUTPATIENT)
Dept: FAMILY MEDICINE CLINIC | Age: 60
End: 2020-09-08

## 2020-09-08 RX ORDER — BUDESONIDE AND FORMOTEROL FUMARATE DIHYDRATE 160; 4.5 UG/1; UG/1
2 AEROSOL RESPIRATORY (INHALATION) 2 TIMES DAILY
Qty: 1 INHALER | Refills: 1 | Status: SHIPPED
Start: 2020-09-08 | End: 2020-10-26 | Stop reason: CLARIF

## 2020-09-08 RX ORDER — PREDNISONE 20 MG/1
40 TABLET ORAL DAILY
Qty: 10 TABLET | Refills: 0 | Status: SHIPPED | OUTPATIENT
Start: 2020-09-08 | End: 2020-09-13

## 2020-09-08 NOTE — TELEPHONE ENCOUNTER
I called and spoke to patient. Skin is healing. Having issues with getting inhalers. will work on prior [place]. spiriva changed to incrus     Patient is feeling more short of breath. Denies increased cough but has been using albuterol 6 times a day . Feels short of breath with walking since she has been out of her inhalers. Advised to go to er if worsening but in the meantime will send prednisone. Please change patient's appointment to Monday Sept 14 at 4:45.

## 2020-09-08 NOTE — TELEPHONE ENCOUNTER
I have sent incrus and symbicort. Please let patient know. Please also call pharmacy and see if symbicort needs a prior auth as I had sent that medication on Sept 2.

## 2020-09-14 ENCOUNTER — OFFICE VISIT (OUTPATIENT)
Dept: FAMILY MEDICINE CLINIC | Age: 60
End: 2020-09-14
Payer: COMMERCIAL

## 2020-09-14 VITALS
DIASTOLIC BLOOD PRESSURE: 80 MMHG | SYSTOLIC BLOOD PRESSURE: 118 MMHG | WEIGHT: 148 LBS | BODY MASS INDEX: 25.27 KG/M2 | OXYGEN SATURATION: 97 % | HEART RATE: 79 BPM | RESPIRATION RATE: 18 BRPM | HEIGHT: 64 IN

## 2020-09-14 PROCEDURE — 99213 OFFICE O/P EST LOW 20 MIN: CPT | Performed by: FAMILY MEDICINE

## 2020-09-14 ASSESSMENT — ENCOUNTER SYMPTOMS
WHEEZING: 1
NAUSEA: 0
DIARRHEA: 0
SHORTNESS OF BREATH: 1
ABDOMINAL PAIN: 0
COUGH: 0
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
## 2020-09-15 NOTE — TELEPHONE ENCOUNTER
Patient called me and stating she was given the daliresp in hospital and it worked for her but never picked up script because it was too expensive, wants to know if there is something similar. She also got umeclidinium bromide inhaler since spiriva wasn't covered but now they sent 90 day spiriva, wants to know what she should be taking?

## 2020-10-01 ENCOUNTER — TELEPHONE (OUTPATIENT)
Dept: FAMILY MEDICINE CLINIC | Age: 60
End: 2020-10-01

## 2020-10-01 NOTE — TELEPHONE ENCOUNTER
----- Message from Valentino Manson, MD sent at 9/27/2020 10:47 PM EDT -----  Regarding: inhalers  Can you call and see if patient got her inhalers

## 2020-10-01 NOTE — TELEPHONE ENCOUNTER
Patient called back, states she picked up inhalers but doesn't feel the advair is not helping as much, She still has not heard from pulmonologist. Did give her the number to call.

## 2020-10-13 NOTE — TELEPHONE ENCOUNTER
Called [facility] and scheduled echo on 10.23.20 at 10:00 am.  Called to advise pt and left vm msg requesting return call.

## 2020-10-15 NOTE — TELEPHONE ENCOUNTER
Called pt jostin and informed her of echo appt date/time. Pt was agreeable and verbalized understanding.

## 2020-10-22 NOTE — TELEPHONE ENCOUNTER
Pt called for refills    Last seen 9/14/2020  Next appt 10/26/2020    Pt asking for referal to dr. Femi Fernandez

## 2020-10-23 ENCOUNTER — HOSPITAL ENCOUNTER (OUTPATIENT)
Dept: NON INVASIVE DIAGNOSTICS | Age: 60
Discharge: HOME | End: 2020-10-23
Payer: COMMERCIAL

## 2020-10-23 LAB
LV EF: 60 %
LVEF MODALITY: NORMAL

## 2020-10-23 PROCEDURE — 93306 TTE W/DOPPLER COMPLETE: CPT

## 2020-10-23 PROCEDURE — 6360000004 HC RX CONTRAST MEDICATION: Performed by: FAMILY MEDICINE

## 2020-10-23 RX ORDER — ALBUTEROL SULFATE 2.5 MG/3ML
2.5 SOLUTION RESPIRATORY (INHALATION) EVERY 4 HOURS PRN
Qty: 120 EACH | Refills: 1 | Status: SHIPPED
Start: 2020-10-23 | End: 2020-10-28 | Stop reason: SDUPTHER

## 2020-10-23 RX ADMIN — PERFLUTREN 1.65 MG: 6.52 INJECTION, SUSPENSION INTRAVENOUS at 10:53

## 2020-10-26 ENCOUNTER — OFFICE VISIT (OUTPATIENT)
Dept: FAMILY MEDICINE CLINIC | Age: 60
End: 2020-10-26
Payer: COMMERCIAL

## 2020-10-26 VITALS
OXYGEN SATURATION: 99 % | DIASTOLIC BLOOD PRESSURE: 70 MMHG | BODY MASS INDEX: 24.79 KG/M2 | HEIGHT: 64 IN | TEMPERATURE: 97.5 F | HEART RATE: 98 BPM | WEIGHT: 145.2 LBS | SYSTOLIC BLOOD PRESSURE: 124 MMHG | RESPIRATION RATE: 18 BRPM

## 2020-10-26 PROBLEM — J96.01 ACUTE RESPIRATORY FAILURE WITH HYPOXIA (HCC): Status: RESOLVED | Noted: 2019-04-18 | Resolved: 2020-10-26

## 2020-10-26 PROBLEM — K57.80 DIVERTICULITIS OF INTESTINE WITH ABSCESS WITHOUT BLEEDING: Status: RESOLVED | Noted: 2018-01-17 | Resolved: 2020-10-26

## 2020-10-26 PROBLEM — J96.20 ACUTE AND CHRONIC RESPIRATORY FAILURE, UNSPECIFIED WHETHER WITH HYPOXIA OR HYPERCAPNIA (HCC): Status: RESOLVED | Noted: 2019-11-30 | Resolved: 2020-10-26

## 2020-10-26 PROBLEM — J96.02 ACUTE RESPIRATORY FAILURE WITH HYPERCAPNIA (HCC): Status: RESOLVED | Noted: 2019-10-28 | Resolved: 2020-10-26

## 2020-10-26 PROBLEM — K57.20 COLONIC DIVERTICULAR ABSCESS: Status: RESOLVED | Noted: 2018-01-17 | Resolved: 2020-10-26

## 2020-10-26 PROBLEM — I82.622 ACUTE DEEP VEIN THROMBOSIS (DVT) OF LEFT UPPER EXTREMITY (HCC): Status: RESOLVED | Noted: 2018-03-08 | Resolved: 2020-10-26

## 2020-10-26 PROBLEM — E87.6 HYPOKALEMIA: Status: RESOLVED | Noted: 2019-04-18 | Resolved: 2020-10-26

## 2020-10-26 PROBLEM — J44.1 ACUTE EXACERBATION OF CHRONIC OBSTRUCTIVE PULMONARY DISEASE (COPD) (HCC): Status: RESOLVED | Noted: 2019-04-17 | Resolved: 2020-10-26

## 2020-10-26 PROBLEM — R06.03 RESPIRATORY DISTRESS: Status: RESOLVED | Noted: 2019-11-29 | Resolved: 2020-10-26

## 2020-10-26 PROBLEM — J44.9 CHRONIC OBSTRUCTIVE PULMONARY DISEASE (HCC): Status: ACTIVE | Noted: 2020-10-26

## 2020-10-26 PROBLEM — J96.11 CHRONIC RESPIRATORY FAILURE WITH HYPOXIA (HCC): Status: ACTIVE | Noted: 2020-10-26

## 2020-10-26 PROBLEM — J44.1 COPD EXACERBATION (HCC): Status: RESOLVED | Noted: 2019-04-17 | Resolved: 2020-10-26

## 2020-10-26 PROCEDURE — 99213 OFFICE O/P EST LOW 20 MIN: CPT | Performed by: FAMILY MEDICINE

## 2020-10-26 RX ORDER — SERTRALINE HYDROCHLORIDE 25 MG/1
25 TABLET, FILM COATED ORAL DAILY
Qty: 30 TABLET | Refills: 5 | Status: SHIPPED
Start: 2020-10-26 | End: 2021-03-08 | Stop reason: SDUPTHER

## 2020-10-26 RX ORDER — TIOTROPIUM BROMIDE 18 UG/1
18 CAPSULE ORAL; RESPIRATORY (INHALATION) DAILY
Qty: 90 CAPSULE | Refills: 1 | Status: SHIPPED
Start: 2020-10-26 | End: 2021-03-08 | Stop reason: SDUPTHER

## 2020-10-26 ASSESSMENT — ENCOUNTER SYMPTOMS
ABDOMINAL PAIN: 0
NAUSEA: 0
SHORTNESS OF BREATH: 1
WHEEZING: 1
DIARRHEA: 0
COUGH: 0

## 2020-10-26 NOTE — PROGRESS NOTES
11/07/2005 UNKNOWN, PLEASE VERIFY       Past medical, surgical, socialand/or family history reviewed, updated as needed, and are non-contributory (unless otherwise stated). Medications, allergies, and problem list also reviewed and updated as needed in patient's record. Wt Readings from Last 3 Encounters:   10/26/20 145 lb 3.2 oz (65.9 kg)   09/14/20 148 lb (67.1 kg)   07/20/20 146 lb 4.8 oz (66.4 kg)                   /70   Pulse 98   Temp 97.5 °F (36.4 °C) (Temporal)   Resp 18   Ht 5' 4\" (1.626 m)   Wt 145 lb 3.2 oz (65.9 kg)   SpO2 99%   BMI 24.92 kg/m²        Physical Exam  Vitals signs and nursing note reviewed. Constitutional:       Appearance: Normal appearance. Comments: Walking    HENT:      Head: Normocephalic and atraumatic. Cardiovascular:      Rate and Rhythm: Normal rate and regular rhythm. Heart sounds: No murmur. Pulmonary:      Effort: Pulmonary effort is normal. No respiratory distress. Breath sounds: Normal breath sounds. No wheezing. Abdominal:      General: Abdomen is flat. Bowel sounds are normal. There is no distension. Musculoskeletal:      Right lower leg: No edema. Left lower leg: No edema. Neurological:      Mental Status: She is alert. Results for orders placed or performed during the hospital encounter of 10/23/20   Echo Complete   Result Value Ref Range    Left Ventricular Ejection Fraction 60     LVEF MODALITY ECHO        ASSESSMENT/PLAN  Todd Torres was seen today for follow-up. Diagnoses and all orders for this visit:    Chronic obstructive pulmonary disease, unspecified COPD type ([facility] [address]    Chronic respiratory failure with hypoxia (HCC)  -     tiotropium (SPIRIVA HANDIHALER) 18 MCG inhalation capsule; Inhale 1 capsule into the lungs daily    Current moderate episode of major depressive disorder without prior episode (HCC)  -     sertraline (ZOLOFT) 25 MG tablet;  Take 1 tablet by mouth daily    Encounter for screening mammogram for malignant neoplasm of breast  -     TRISTEN DIGITAL SCREEN W OR WO CAD BILATERAL; Future      Start zoloft for depression. Needs follow up with pulm       Phone/MyChart follow up if tests abnormal.    Return in about 1 month (around 11/26/2020) for pap smear . or sooner if necessary. I have reviewed myfindings and recommendations with Betsy Davis. Olayinka Velasco.  KIMI CatalanD

## 2020-10-28 RX ORDER — ALBUTEROL SULFATE 2.5 MG/3ML
SOLUTION RESPIRATORY (INHALATION)
Qty: 120 EACH | Refills: 1 | Status: SHIPPED
Start: 2020-10-28 | End: 2021-03-08 | Stop reason: SDUPTHER

## 2020-10-28 NOTE — TELEPHONE ENCOUNTER
Patient called requesting a 90 day supply to be sent to mail order.      Last seen 10/26/2020  Next appt 11/25/2020  [facility]

## 2020-11-11 ENCOUNTER — TELEPHONE (OUTPATIENT)
Dept: FAMILY MEDICINE CLINIC | Age: 60
End: 2020-11-11

## 2020-11-11 NOTE — TELEPHONE ENCOUNTER
Called pt and reminded her of appt with Dr Kenna Turk on 11.30.20. Pt said she is aware and will keep the appt. Advised pt of importance of keeping it and advised her if she can't to please let us know.

## 2020-12-09 ENCOUNTER — HOSPITAL ENCOUNTER (OUTPATIENT)
Dept: PSYCHIATRY | Age: 60
Discharge: HOME | End: 2020-12-09
Payer: COMMERCIAL

## 2020-12-09 NOTE — FLOWSHEET NOTE
Assessment completed. Client provided verbal consent to participate in the program via telehealth.   Electronically signed by Meghna Ashraf on 12/9/2020 at 1:32 PM

## 2020-12-23 ENCOUNTER — HOSPITAL ENCOUNTER (OUTPATIENT)
Age: 60
Discharge: HOME | End: 2020-12-25
Payer: COMMERCIAL

## 2020-12-23 ENCOUNTER — HOSPITAL ENCOUNTER (OUTPATIENT)
Dept: GENERAL RADIOLOGY | Age: 60
Discharge: HOME | End: 2020-12-25
Payer: COMMERCIAL

## 2020-12-23 PROCEDURE — 71046 X-RAY EXAM CHEST 2 VIEWS: CPT

## 2021-03-03 ENCOUNTER — HOSPITAL ENCOUNTER (OUTPATIENT)
Age: 61
Discharge: HOME | End: 2021-03-03
Payer: COMMERCIAL

## 2021-03-03 PROCEDURE — 82103 ALPHA-1-ANTITRYPSIN TOTAL: CPT

## 2021-03-06 LAB — ALPHA-1 ANTITRYPSIN: 76 MG/DL (ref 90–200)

## 2021-03-08 ENCOUNTER — HOSPITAL ENCOUNTER (OUTPATIENT)
Age: 61
Discharge: HOME | End: 2021-03-08
Payer: COMMERCIAL

## 2021-03-08 ENCOUNTER — OFFICE VISIT (OUTPATIENT)
Dept: FAMILY MEDICINE CLINIC | Age: 61
End: 2021-03-08
Payer: COMMERCIAL

## 2021-03-08 VITALS
DIASTOLIC BLOOD PRESSURE: 68 MMHG | TEMPERATURE: 97.4 F | HEART RATE: 78 BPM | SYSTOLIC BLOOD PRESSURE: 118 MMHG | BODY MASS INDEX: 25.61 KG/M2 | WEIGHT: 150 LBS | OXYGEN SATURATION: 100 % | HEIGHT: 64 IN | RESPIRATION RATE: 20 BRPM

## 2021-03-08 DIAGNOSIS — J96.11 CHRONIC RESPIRATORY FAILURE WITH HYPOXIA (HCC): ICD-10-CM

## 2021-03-08 DIAGNOSIS — R09.02 HYPOXIA: ICD-10-CM

## 2021-03-08 DIAGNOSIS — J44.1 COPD EXACERBATION (HCC): ICD-10-CM

## 2021-03-08 DIAGNOSIS — R73.9 HYPERGLYCEMIA: ICD-10-CM

## 2021-03-08 DIAGNOSIS — F32.1 CURRENT MODERATE EPISODE OF MAJOR DEPRESSIVE DISORDER WITHOUT PRIOR EPISODE (HCC): ICD-10-CM

## 2021-03-08 DIAGNOSIS — M79.89 LEG SWELLING: ICD-10-CM

## 2021-03-08 DIAGNOSIS — M79.89 LEG SWELLING: Primary | ICD-10-CM

## 2021-03-08 LAB
BACTERIA: ABNORMAL /HPF
BILIRUBIN URINE: NEGATIVE
BLOOD, URINE: ABNORMAL
CLARITY: CLEAR
COLOR: YELLOW
EPITHELIAL CELLS, UA: ABNORMAL /HPF
GLUCOSE URINE: NEGATIVE MG/DL
HCT VFR BLD CALC: 41.5 % (ref 34–48)
HEMOGLOBIN: 13.5 G/DL (ref 11.5–15.5)
KETONES, URINE: NEGATIVE MG/DL
LEUKOCYTE ESTERASE, URINE: ABNORMAL
MCH RBC QN AUTO: 31.1 PG (ref 26–35)
MCHC RBC AUTO-ENTMCNC: 32.5 % (ref 32–34.5)
MCV RBC AUTO: 95.6 FL (ref 80–99.9)
NITRITE, URINE: NEGATIVE
PDW BLD-RTO: 12.1 FL (ref 11.5–15)
PH UA: 6.5 (ref 5–9)
PLATELET # BLD: 239 E9/L (ref 130–450)
PMV BLD AUTO: 8.6 FL (ref 7–12)
PROTEIN UA: NEGATIVE MG/DL
RBC # BLD: 4.34 E12/L (ref 3.5–5.5)
RBC UA: ABNORMAL /HPF (ref 0–2)
SPECIFIC GRAVITY UA: 1.02 (ref 1–1.03)
UROBILINOGEN, URINE: 0.2 E.U./DL
WBC # BLD: 7.2 E9/L (ref 4.5–11.5)
WBC UA: ABNORMAL /HPF (ref 0–5)

## 2021-03-08 PROCEDURE — 85027 COMPLETE CBC AUTOMATED: CPT

## 2021-03-08 PROCEDURE — 83036 HEMOGLOBIN GLYCOSYLATED A1C: CPT

## 2021-03-08 PROCEDURE — 36415 COLL VENOUS BLD VENIPUNCTURE: CPT

## 2021-03-08 PROCEDURE — 80053 COMPREHEN METABOLIC PANEL: CPT

## 2021-03-08 PROCEDURE — 84443 ASSAY THYROID STIM HORMONE: CPT

## 2021-03-08 PROCEDURE — 81001 URINALYSIS AUTO W/SCOPE: CPT

## 2021-03-08 PROCEDURE — 99213 OFFICE O/P EST LOW 20 MIN: CPT | Performed by: FAMILY MEDICINE

## 2021-03-08 RX ORDER — SERTRALINE HYDROCHLORIDE 25 MG/1
25 TABLET, FILM COATED ORAL DAILY
Qty: 30 TABLET | Refills: 5 | Status: SHIPPED | OUTPATIENT
Start: 2021-03-08

## 2021-03-08 RX ORDER — ALBUTEROL SULFATE 2.5 MG/3ML
SOLUTION RESPIRATORY (INHALATION)
Qty: 120 EACH | Refills: 1 | Status: SHIPPED
Start: 2021-03-08 | End: 2021-04-05 | Stop reason: SDUPTHER

## 2021-03-08 RX ORDER — TIOTROPIUM BROMIDE 18 UG/1
18 CAPSULE ORAL; RESPIRATORY (INHALATION) DAILY
Qty: 90 CAPSULE | Refills: 1 | Status: ON HOLD
Start: 2021-03-08 | End: 2021-05-24 | Stop reason: SDUPTHER

## 2021-03-08 ASSESSMENT — ENCOUNTER SYMPTOMS
SHORTNESS OF BREATH: 1
WHEEZING: 1
NAUSEA: 0
COUGH: 0
ABDOMINAL PAIN: 0
DIARRHEA: 0

## 2021-03-08 NOTE — PROGRESS NOTES
Encounters:   03/08/21 150 lb (68 kg)   10/26/20 145 lb 3.2 oz (65.9 kg)   09/14/20 148 lb (67.1 kg)                   /68   Pulse 78   Temp 97.4 °F (36.3 °C)   Resp 20   Ht 5' 4\" (1.626 m)   Wt 150 lb (68 kg)   SpO2 100%   BMI 25.75 kg/m²        Physical Exam  Vitals signs and nursing note reviewed. Constitutional:       Appearance: Normal appearance. Comments: Walking    HENT:      Head: Normocephalic and atraumatic. Cardiovascular:      Rate and Rhythm: Normal rate and regular rhythm. Heart sounds: No murmur. Pulmonary:      Effort: Pulmonary effort is normal. No respiratory distress. Breath sounds: Normal breath sounds. No wheezing. Abdominal:      General: Abdomen is flat. Bowel sounds are normal. There is no distension. Musculoskeletal:      Right lower leg: Edema present. Left lower leg: Edema present. Neurological:      Mental Status: She is alert. Results for orders placed or performed during the hospital encounter of 03/03/21   Alpha-1-Antitrypsin   Result Value Ref Range    A-1 Antitrypsin 76 (L) 90 - 200 mg/dL       ASSESSMENT/PLAN  Emely Seay was seen today for foot swelling. Diagnoses and all orders for this visit:    Leg swelling  -     URINALYSIS; Future  -     COMPREHENSIVE METABOLIC PANEL; Future  -     CBC; Future  -     TSH; Future  -     ECHO 2D WO Color Doppler Complete; Future    COPD exacerbation (HCC)  -     albuterol (PROVENTIL) (2.5 MG/3ML) 0.083% nebulizer solution; PLEASE PROVIDE FOR 90 DAY SUPPLY    Hypoxia  -     albuterol (PROVENTIL) (2.5 MG/3ML) 0.083% nebulizer solution; PLEASE PROVIDE FOR 90 DAY SUPPLY    Current moderate episode of major depressive disorder without prior episode (HCC)  -     sertraline (ZOLOFT) 25 MG tablet; Take 1 tablet by mouth daily  -     ECHO 2D WO Color Doppler Complete; Future    Chronic respiratory failure with hypoxia (HCC)  -     tiotropium (SPIRIVA HANDIHALER) 18 MCG inhalation capsule;  Inhale 1 capsule into the lungs daily  -     fluticasone-salmeterol (ADVAIR) 250-50 MCG/DOSE AEPB; Inhale 1 puff into the lungs every 12 hours  -     ECHO 2D WO Color Doppler Complete; Future    Hyperglycemia  -     Hemoglobin A1C; Future            Phone/MyChart follow up if tests abnormal.    Return in about 1 month (around 4/8/2021). or sooner if necessary. I have reviewed myfindings and recommendations with Wong Perez. Ely Puente.  Taz Mayo M.D

## 2021-03-09 LAB
ALBUMIN SERPL-MCNC: 4.2 G/DL (ref 3.5–5.2)
ALP BLD-CCNC: 71 U/L (ref 35–104)
ALT SERPL-CCNC: 13 U/L (ref 0–32)
ANION GAP SERPL CALCULATED.3IONS-SCNC: 5 MMOL/L (ref 7–16)
AST SERPL-CCNC: 17 U/L (ref 0–31)
BILIRUB SERPL-MCNC: 0.2 MG/DL (ref 0–1.2)
BUN BLDV-MCNC: 11 MG/DL (ref 8–23)
CALCIUM SERPL-MCNC: 9.4 MG/DL (ref 8.6–10.2)
CHLORIDE BLD-SCNC: 98 MMOL/L (ref 98–107)
CO2: 38 MMOL/L (ref 22–29)
CREAT SERPL-MCNC: 0.5 MG/DL (ref 0.5–1)
GFR AFRICAN AMERICAN: >60
GFR NON-AFRICAN AMERICAN: >60 ML/MIN/1.73
GLUCOSE BLD-MCNC: 89 MG/DL (ref 74–99)
HBA1C MFR BLD: 4.8 % (ref 4–5.6)
POTASSIUM SERPL-SCNC: 4.4 MMOL/L (ref 3.5–5)
SODIUM BLD-SCNC: 141 MMOL/L (ref 132–146)
TOTAL PROTEIN: 6.6 G/DL (ref 6.4–8.3)
TSH SERPL DL<=0.05 MIU/L-ACNC: 0.69 UIU/ML (ref 0.27–4.2)

## 2021-04-05 DIAGNOSIS — R09.02 HYPOXIA: ICD-10-CM

## 2021-04-05 DIAGNOSIS — J44.1 COPD EXACERBATION (HCC): ICD-10-CM

## 2021-04-05 NOTE — TELEPHONE ENCOUNTER
Patient called requesting a short term RX to her local pharmacy til her mail order arrives. Original RX electronic transmission failed.     Last seen 3/8/2021  Next appt 4/7/2021  [facility]

## 2021-04-05 NOTE — TELEPHONE ENCOUNTER
Patient called requesting nebulizer solution to be sent to her mail order pharmacy. Patient is also in need of a short term supply. Original RX did not go through electronically.     Last seen 3/8/2021  Next appt 4/7/2021  [facility]

## 2021-04-06 RX ORDER — ALBUTEROL SULFATE 2.5 MG/3ML
SOLUTION RESPIRATORY (INHALATION)
Refills: 0 | OUTPATIENT
Start: 2021-04-06

## 2021-04-06 RX ORDER — ALBUTEROL SULFATE 2.5 MG/3ML
SOLUTION RESPIRATORY (INHALATION)
Qty: 120 EACH | Refills: 1 | Status: SHIPPED
Start: 2021-04-06 | End: 2021-04-07 | Stop reason: SDUPTHER

## 2021-04-07 ENCOUNTER — OFFICE VISIT (OUTPATIENT)
Dept: FAMILY MEDICINE CLINIC | Age: 61
End: 2021-04-07
Payer: COMMERCIAL

## 2021-04-07 VITALS
DIASTOLIC BLOOD PRESSURE: 70 MMHG | HEIGHT: 64 IN | BODY MASS INDEX: 25.61 KG/M2 | WEIGHT: 150 LBS | SYSTOLIC BLOOD PRESSURE: 130 MMHG | TEMPERATURE: 98.3 F | OXYGEN SATURATION: 98 % | HEART RATE: 85 BPM | RESPIRATION RATE: 20 BRPM

## 2021-04-07 DIAGNOSIS — J44.9 CHRONIC OBSTRUCTIVE PULMONARY DISEASE, UNSPECIFIED COPD TYPE (HCC): ICD-10-CM

## 2021-04-07 DIAGNOSIS — J30.1 SEASONAL ALLERGIC RHINITIS DUE TO POLLEN: ICD-10-CM

## 2021-04-07 DIAGNOSIS — Z12.31 ENCOUNTER FOR SCREENING MAMMOGRAM FOR MALIGNANT NEOPLASM OF BREAST: Primary | ICD-10-CM

## 2021-04-07 DIAGNOSIS — F41.9 ANXIETY: ICD-10-CM

## 2021-04-07 DIAGNOSIS — M79.89 LEG SWELLING: ICD-10-CM

## 2021-04-07 PROCEDURE — 99214 OFFICE O/P EST MOD 30 MIN: CPT | Performed by: FAMILY MEDICINE

## 2021-04-07 RX ORDER — FLUTICASONE PROPIONATE 50 MCG
2 SPRAY, SUSPENSION (ML) NASAL DAILY
Qty: 1 BOTTLE | Refills: 5 | Status: SHIPPED | OUTPATIENT
Start: 2021-04-07

## 2021-04-07 RX ORDER — PREDNISONE 1 MG/1
TABLET ORAL
Status: ON HOLD | COMMUNITY
Start: 2021-03-10 | End: 2021-05-24 | Stop reason: HOSPADM

## 2021-04-07 ASSESSMENT — ENCOUNTER SYMPTOMS
ABDOMINAL PAIN: 0
SHORTNESS OF BREATH: 1
COUGH: 0
WHEEZING: 1
DIARRHEA: 0
NAUSEA: 0

## 2021-04-07 NOTE — TELEPHONE ENCOUNTER
Please advise if RX can be sent for short term refill while patient is waiting for mail order delivery.     Last seen 3/8/2021  Next appt 4/7/2021  [facility]

## 2021-04-07 NOTE — PATIENT INSTRUCTIONS
Blood work looked good. Get the echo done   See Dr. Agustin Zhang  Follow up in 1 month or sooner as needed. Think about getting the COVID shot .

## 2021-04-07 NOTE — PROGRESS NOTES
[address] [place] [address]  [phone]   Hernando Webber MD     Patient: Fariha Pinzon  YOB: 1960  Visit Date: 4/7/21    Mitzi Walls is a 61y.o. year old female here today for   Chief Complaint   Patient presents with    1 Month Follow-Up       HPI  Patient is a 61year old female here for follow up of copd. Breathing has been stable. 3-4L of oxygen at home. Has to increase it if exerting herself. Stable  . No cough, increased sputum production. Dr. Emily Willis will be referring her to [facility] for lung transplant. About a week ago had an acute increase in shortness of breath that has now resolved. Denies any palpitations, n/v/diaphoresis. Thinks it was a panic attack. Was random. Used a nicotine patch the day that happened. Is not smoking currently. Last time she smoked was a 1 month ago. Denies any recent illnesses , fevers, chills, chest pain. Swelling fluctuates. Tries to decrease her salt intake. Review of Systems   Constitutional: Negative for chills and fever. Respiratory: Positive for shortness of breath (improved) and wheezing (intermittent). Negative for cough. Cardiovascular: Negative for chest pain, palpitations and leg swelling. Gastrointestinal: Negative for abdominal pain, diarrhea and nausea. Genitourinary: Negative for dysuria. Neurological: Negative for dizziness and light-headedness.        Current Outpatient Medications on File Prior to Visit   Medication Sig Dispense Refill    predniSONE (DELTASONE) 5 MG tablet 30MG DAILY FOR 2 DAYS, 20MG DAILY FOR 2 DAYS, 10MG DAILY FOR 2 DAYS, 5MG DAILY FOR 2 DAYS      sertraline (ZOLOFT) 25 MG tablet Take 1 tablet by mouth daily 30 tablet 5    tiotropium (SPIRIVA HANDIHALER) 18 MCG inhalation capsule Inhale 1 capsule into the lungs daily 90 capsule 1    fluticasone-salmeterol (ADVAIR) 250-50 MCG/DOSE AEPB Inhale 1 puff into the lungs every 12 hours 60 each 3    nicotine (NICODERM CQ) 14 MG/24HR Place 1 patch onto the skin daily 30 patch 0    albuterol (PROVENTIL) (2.5 MG/3ML) 0.083% nebulizer solution PLEASE PROVIDE FOR 30 DAY SUPPLY 30 vial 2     No current facility-administered medications on file prior to visit. Allergies   Allergen Reactions    Penicillin V Hives and Other (See Comments)     11/07/2005 UNKNOWN, PLEASE VERIFY, 11/07/2005 UNKNOWN, PLEASE VERIFY       Past medical, surgical, socialand/or family history reviewed, updated as needed, and are non-contributory (unless otherwise stated). Medications, allergies, and problem list also reviewed and updated as needed in patient's record. Wt Readings from Last 3 Encounters:   04/07/21 150 lb (68 kg)   03/08/21 150 lb (68 kg)   10/26/20 145 lb 3.2 oz (65.9 kg)                   /70   Pulse 85   Temp 98.3 °F (36.8 °C)   Resp 20   Ht 5' 4\" (1.626 m)   Wt 150 lb (68 kg)   SpO2 98%   BMI 25.75 kg/m²        Physical Exam  Vitals signs and nursing note reviewed. Constitutional:       Appearance: Normal appearance. Comments: Walking    HENT:      Head: Normocephalic and atraumatic. Cardiovascular:      Rate and Rhythm: Normal rate and regular rhythm. Heart sounds: No murmur. Pulmonary:      Effort: Pulmonary effort is normal. No respiratory distress. Breath sounds: Normal breath sounds. No wheezing. Abdominal:      General: Abdomen is flat. Bowel sounds are normal. There is no distension. Musculoskeletal:      Right lower leg: Edema present. Left lower leg: Edema present. Neurological:      Mental Status: She is alert.        Results for orders placed or performed during the hospital encounter of 03/08/21   COMPREHENSIVE METABOLIC PANEL   Result Value Ref Range    Sodium 141 132 - 146 mmol/L    Potassium 4.4 3.5 - 5.0 mmol/L    Chloride 98 98 - 107 mmol/L    CO2 38 (H) 22 - 29 mmol/L    Anion Gap 5 (L) 7 - 16 mmol/L    Glucose 89 74 - 99 mg/dL    BUN 11 8 - 23 mg/dL CREATININE 0.5 0.5 - 1.0 mg/dL    GFR Non-African American >60 >=60 mL/min/1.73    GFR African American >60     Calcium 9.4 8.6 - 10.2 mg/dL    Total Protein 6.6 6.4 - 8.3 g/dL    Albumin 4.2 3.5 - 5.2 g/dL    Total Bilirubin 0.2 0.0 - 1.2 mg/dL    Alkaline Phosphatase 71 35 - 104 U/L    ALT 13 0 - 32 U/L    AST 17 0 - 31 U/L   CBC   Result Value Ref Range    WBC 7.2 4.5 - 11.5 E9/L    RBC 4.34 3.50 - 5.50 E12/L    Hemoglobin 13.5 11.5 - 15.5 g/dL    Hematocrit 41.5 34.0 - 48.0 %    MCV 95.6 80.0 - 99.9 fL    MCH 31.1 26.0 - 35.0 pg    MCHC 32.5 32.0 - 34.5 %    RDW 12.1 11.5 - 15.0 fL    Platelets 004 998 - 713 E9/L    MPV 8.6 7.0 - 12.0 fL   Hemoglobin A1C   Result Value Ref Range    Hemoglobin A1C 4.8 4.0 - 5.6 %   URINALYSIS   Result Value Ref Range    Color, UA Yellow Straw/Yellow    Clarity, UA Clear Clear    Glucose, Ur Negative Negative mg/dL    Bilirubin Urine Negative Negative    Ketones, Urine Negative Negative mg/dL    Specific Gravity, UA 1.020 1.005 - 1.030    Blood, Urine TRACE (A) Negative    pH, UA 6.5 5.0 - 9.0    Protein, UA Negative Negative mg/dL    Urobilinogen, Urine 0.2 <2.0 E.U./dL    Nitrite, Urine Negative Negative    Leukocyte Esterase, Urine SMALL (A) Negative   TSH   Result Value Ref Range    TSH 0.693 0.270 - 4.200 uIU/mL   Microscopic Urinalysis   Result Value Ref Range    WBC, UA 0-1 0 - 5 /HPF    RBC, UA 0-1 0 - 2 /HPF    Epithelial Cells, UA RARE /HPF    Bacteria, UA RARE (A) None Seen /HPF       ASSESSMENT/PLAN  Ramin Coombs was seen today for 1 month follow-up. Diagnoses and all orders for this visit:    Encounter for screening mammogram for malignant neoplasm of breast  -     TRISTEN DIGITAL SCREEN W OR WO CAD BILATERAL;  Future    Seasonal allergic rhinitis due to pollen  -     fluticasone (FLONASE) 50 MCG/ACT nasal spray; 2 sprays by Each Nostril route daily    Chronic obstructive pulmonary disease, unspecified COPD type ([facility]    - Seeing Dr. Allie Cherry    - On spiriva and advair - Encouraged to get COVID vaccine     Anxiety   - On sertraline    Leg swelling   - Concern for pulm htn vs chf   Repeat echo           Phone/MyChart follow up if tests abnormal.    No follow-ups on file. or sooner if necessary. I have reviewed myfindings and recommendations with Glenn Pack. Sarah Beth Salas.  KIMI CopeD

## 2021-04-09 RX ORDER — ALBUTEROL SULFATE 2.5 MG/3ML
SOLUTION RESPIRATORY (INHALATION)
Qty: 30 VIAL | Refills: 2 | Status: ON HOLD
Start: 2021-04-09 | End: 2021-09-24 | Stop reason: HOSPADM

## 2021-04-21 ENCOUNTER — TELEPHONE (OUTPATIENT)
Dept: FAMILY MEDICINE CLINIC | Age: 61
End: 2021-04-21

## 2021-04-21 NOTE — TELEPHONE ENCOUNTER
----- Message from Beatrice Savage MD sent at 4/20/2021  9:50 PM EDT -----  Please help patient schedule echo

## 2021-05-06 ENCOUNTER — HOSPITAL ENCOUNTER (OUTPATIENT)
Dept: MAMMOGRAPHY | Age: 61
Discharge: HOME | End: 2021-05-08
Payer: COMMERCIAL

## 2021-05-06 VITALS — WEIGHT: 150 LBS | BODY MASS INDEX: 25.61 KG/M2 | HEIGHT: 64 IN

## 2021-05-06 DIAGNOSIS — Z12.31 ENCOUNTER FOR SCREENING MAMMOGRAM FOR MALIGNANT NEOPLASM OF BREAST: ICD-10-CM

## 2021-05-06 PROCEDURE — 77067 SCR MAMMO BI INCL CAD: CPT

## 2021-05-17 ENCOUNTER — APPOINTMENT (OUTPATIENT)
Dept: GENERAL RADIOLOGY | Age: 61
DRG: 189 | End: 2021-05-17
Payer: COMMERCIAL

## 2021-05-17 ENCOUNTER — HOSPITAL ENCOUNTER (EMERGENCY)
Age: 61
Discharge: HOME | DRG: 189 | End: 2021-05-17
Attending: EMERGENCY MEDICINE
Payer: COMMERCIAL

## 2021-05-17 VITALS
WEIGHT: 150.5 LBS | SYSTOLIC BLOOD PRESSURE: 121 MMHG | HEIGHT: 64 IN | DIASTOLIC BLOOD PRESSURE: 78 MMHG | BODY MASS INDEX: 25.7 KG/M2 | HEART RATE: 82 BPM | OXYGEN SATURATION: 100 % | TEMPERATURE: 97.7 F | RESPIRATION RATE: 20 BRPM

## 2021-05-17 DIAGNOSIS — J44.1 COPD EXACERBATION (HCC): Primary | ICD-10-CM

## 2021-05-17 LAB
ALBUMIN SERPL-MCNC: 3.9 G/DL (ref 3.5–5.2)
ALP BLD-CCNC: 95 U/L (ref 35–104)
ALT SERPL-CCNC: 9 U/L (ref 0–32)
ANION GAP SERPL CALCULATED.3IONS-SCNC: 8 MMOL/L (ref 7–16)
AST SERPL-CCNC: 13 U/L (ref 0–31)
BASOPHILS ABSOLUTE: 0.03 E9/L (ref 0–0.2)
BASOPHILS RELATIVE PERCENT: 0.3 % (ref 0–2)
BILIRUB SERPL-MCNC: 0.3 MG/DL (ref 0–1.2)
BUN BLDV-MCNC: 12 MG/DL (ref 6–23)
CALCIUM SERPL-MCNC: 9.2 MG/DL (ref 8.6–10.2)
CHLORIDE BLD-SCNC: 97 MMOL/L (ref 98–107)
CO2: 37 MMOL/L (ref 22–29)
CREAT SERPL-MCNC: 0.5 MG/DL (ref 0.5–1)
EKG ATRIAL RATE: 94 BPM
EKG P AXIS: 82 DEGREES
EKG P-R INTERVAL: 162 MS
EKG Q-T INTERVAL: 356 MS
EKG QRS DURATION: 78 MS
EKG QTC CALCULATION (BAZETT): 445 MS
EKG R AXIS: 79 DEGREES
EKG T AXIS: 77 DEGREES
EKG VENTRICULAR RATE: 94 BPM
EOSINOPHILS ABSOLUTE: 0.14 E9/L (ref 0.05–0.5)
EOSINOPHILS RELATIVE PERCENT: 1.4 % (ref 0–6)
GFR AFRICAN AMERICAN: >60
GFR NON-AFRICAN AMERICAN: >60 ML/MIN/1.73
GLUCOSE BLD-MCNC: 120 MG/DL (ref 74–99)
HCT VFR BLD CALC: 38.4 % (ref 34–48)
HEMOGLOBIN: 12.4 G/DL (ref 11.5–15.5)
IMMATURE GRANULOCYTES #: 0.04 E9/L
IMMATURE GRANULOCYTES %: 0.4 % (ref 0–5)
LYMPHOCYTES ABSOLUTE: 1.05 E9/L (ref 1.5–4)
LYMPHOCYTES RELATIVE PERCENT: 10.2 % (ref 20–42)
MCH RBC QN AUTO: 31.2 PG (ref 26–35)
MCHC RBC AUTO-ENTMCNC: 32.3 % (ref 32–34.5)
MCV RBC AUTO: 96.7 FL (ref 80–99.9)
MONOCYTES ABSOLUTE: 0.98 E9/L (ref 0.1–0.95)
MONOCYTES RELATIVE PERCENT: 9.5 % (ref 2–12)
NEUTROPHILS ABSOLUTE: 8.05 E9/L (ref 1.8–7.3)
NEUTROPHILS RELATIVE PERCENT: 78.2 % (ref 43–80)
PDW BLD-RTO: 12.3 FL (ref 11.5–15)
PLATELET # BLD: 234 E9/L (ref 130–450)
PMV BLD AUTO: 9.4 FL (ref 7–12)
POTASSIUM REFLEX MAGNESIUM: 3.9 MMOL/L (ref 3.5–5)
PRO-BNP: 130 PG/ML (ref 0–125)
RBC # BLD: 3.97 E12/L (ref 3.5–5.5)
SARS-COV-2, NAAT: NOT DETECTED
SODIUM BLD-SCNC: 142 MMOL/L (ref 132–146)
TOTAL PROTEIN: 6.5 G/DL (ref 6.4–8.3)
TROPONIN: <0.01 NG/ML (ref 0–0.03)
WBC # BLD: 10.3 E9/L (ref 4.5–11.5)

## 2021-05-17 PROCEDURE — 71045 X-RAY EXAM CHEST 1 VIEW: CPT

## 2021-05-17 PROCEDURE — 6370000000 HC RX 637 (ALT 250 FOR IP): Performed by: STUDENT IN AN ORGANIZED HEALTH CARE EDUCATION/TRAINING PROGRAM

## 2021-05-17 PROCEDURE — 6360000002 HC RX W HCPCS: Performed by: STUDENT IN AN ORGANIZED HEALTH CARE EDUCATION/TRAINING PROGRAM

## 2021-05-17 PROCEDURE — 84484 ASSAY OF TROPONIN QUANT: CPT

## 2021-05-17 PROCEDURE — 83880 ASSAY OF NATRIURETIC PEPTIDE: CPT

## 2021-05-17 PROCEDURE — 80053 COMPREHEN METABOLIC PANEL: CPT

## 2021-05-17 PROCEDURE — 87635 SARS-COV-2 COVID-19 AMP PRB: CPT

## 2021-05-17 PROCEDURE — 85025 COMPLETE CBC W/AUTO DIFF WBC: CPT

## 2021-05-17 PROCEDURE — 93005 ELECTROCARDIOGRAM TRACING: CPT | Performed by: STUDENT IN AN ORGANIZED HEALTH CARE EDUCATION/TRAINING PROGRAM

## 2021-05-17 PROCEDURE — 94640 AIRWAY INHALATION TREATMENT: CPT

## 2021-05-17 RX ORDER — METHYLPREDNISOLONE SODIUM SUCCINATE 125 MG/2ML
125 INJECTION, POWDER, LYOPHILIZED, FOR SOLUTION INTRAMUSCULAR; INTRAVENOUS ONCE
Status: COMPLETED | OUTPATIENT
Start: 2021-05-17 | End: 2021-05-17

## 2021-05-17 RX ORDER — PREDNISONE 50 MG/1
50 TABLET ORAL DAILY
Qty: 5 TABLET | Refills: 0 | Status: ON HOLD | OUTPATIENT
Start: 2021-05-18 | End: 2021-05-24 | Stop reason: HOSPADM

## 2021-05-17 RX ORDER — AZITHROMYCIN 250 MG/1
250 TABLET, FILM COATED ORAL DAILY
Qty: 7 TABLET | Refills: 0 | Status: ON HOLD | OUTPATIENT
Start: 2021-05-17 | End: 2021-05-24 | Stop reason: HOSPADM

## 2021-05-17 RX ORDER — IPRATROPIUM BROMIDE AND ALBUTEROL SULFATE 2.5; .5 MG/3ML; MG/3ML
3 SOLUTION RESPIRATORY (INHALATION) ONCE
Status: COMPLETED | OUTPATIENT
Start: 2021-05-17 | End: 2021-05-17

## 2021-05-17 RX ADMIN — METHYLPREDNISOLONE SODIUM SUCCINATE 125 MG: 125 INJECTION, POWDER, FOR SOLUTION INTRAMUSCULAR; INTRAVENOUS at 11:00

## 2021-05-17 RX ADMIN — IPRATROPIUM BROMIDE AND ALBUTEROL SULFATE 3 AMPULE: .5; 3 SOLUTION RESPIRATORY (INHALATION) at 11:07

## 2021-05-17 ASSESSMENT — ENCOUNTER SYMPTOMS
NAUSEA: 0
ABDOMINAL PAIN: 0
CHEST TIGHTNESS: 0
COUGH: 1
ABDOMINAL DISTENTION: 0
BACK PAIN: 0
SORE THROAT: 0
VOMITING: 0
DIARRHEA: 0
SHORTNESS OF BREATH: 1

## 2021-05-17 NOTE — ED PROVIDER NOTES
dizziness, facial asymmetry, light-headedness and headaches. Psychiatric/Behavioral: Negative for agitation, confusion and decreased concentration. Physical Exam  Vitals and nursing note reviewed. Constitutional:       Appearance: She is well-developed. HENT:      Head: Normocephalic and atraumatic. Nose: Nose normal. No congestion or rhinorrhea. Mouth/Throat:      Mouth: Mucous membranes are moist.      Pharynx: No oropharyngeal exudate or posterior oropharyngeal erythema. Eyes:      Conjunctiva/sclera: Conjunctivae normal.   Cardiovascular:      Rate and Rhythm: Normal rate and regular rhythm. Heart sounds: Normal heart sounds. No murmur heard. Pulmonary:      Effort: Pulmonary effort is normal. No respiratory distress. Breath sounds: Normal breath sounds. No wheezing or rales. Comments: Diffusely diminished. Abdominal:      General: Bowel sounds are normal.      Palpations: Abdomen is soft. Tenderness: There is no abdominal tenderness. There is no guarding or rebound. Musculoskeletal:      Cervical back: Normal range of motion and neck supple. Skin:     General: Skin is warm and dry. Neurological:      Mental Status: She is alert and oriented to person, place, and time. Cranial Nerves: No cranial nerve deficit. Coordination: Coordination normal.          Procedures     MDM     ED Course as of May 17 1247   Mon May 17, 2021   1036 Patient reevaluated and is still satting 100%. [JM]   1047 EKG read and interpreted by me. Rate 94 bpm.  Normal axis. EKG read and interpreted by me. Rate 94 bpm.  Normal axis. Normal sinus rhythm. Normal QRS. Elevated P waves in leads II and leads III. No ST elevations or depressions. No significant changes from prior EKG.     [JM]   Gabrielle Harley 96:     I have personally performed and/or participated in the history, exam, medical decision making, and procedures and agree with all pertinent clinical information unless otherwise noted. I have also reviewed and agree with the past medical, family and social history unless otherwise noted. I have discussed this patient in detail with the resident and provided the instruction and education regarding the evidence-based evaluation and treatment of [unfilled]  History: patient presents with complaint of SOB and cough. She states she became more anxious when her breathing didn't improve. She states no fevers or chills. She does admit to increasing her oxygen to 5L and \"abusing\" her albuterol. She's had a cough productive of thick mucus. My findings: Sylvester Bruno is a 64 y.o. female whom is in mild distress. Physical exam reveals tachycardia. Lungs diminished with conversational dyspnea. She has no edema or tenderness of the legs. My plan: Symptomatic and supportive care. Patient is to have labs, EKG and imaging. I have been able to reduce her oxygen need back to her typical 4 L. Electronically signed by Merlene Calvert DO on 5/17/21 at 10:51 AM EDT          [JS]   5446 Patient reevaluated and states that she feels much better and feels that her baseline. Patient is now on her home oxygen satting 100%. Patient notes that she had a change in her mucus production but feels good enough to follow-up at home. His lung sounds are improved. [JM]      ED Course User Index  [JM] Ander Naik MD  [JS] Merlene Calvert DO      Patient is a 64 y.o. female presenting with shortness of breath. Patient is previously been in the hospital for COPD. Patient states this feels similar to her previous COPD exacerbations. Patient denies any chest pain. Patient is requiring 6 L of oxygen and she is on 2-4 at home. Patient was given 3 duo nebs. Patient had a EKG that was sinus tachycardia but otherwise normal.  Patient had a chest x-ray that was indicative of COPD. Patient was also given a dose Solu-Medrol.   Patient was She states no fevers or chills. She does admit to increasing her oxygen to 5L and \"abusing\" her albuterol. She's had a cough productive of thick mucus. My findings: Belkys Jerez is a 64 y.o. female whom is in mild distress. Physical exam reveals tachycardia. Lungs diminished with conversational dyspnea. She has no edema or tenderness of the legs. My plan: Symptomatic and supportive care. Patient is to have labs, EKG and imaging. I have been able to reduce her oxygen need back to her typical 4 L. Electronically signed by Anish Gaines DO on 5/17/21 at 10:51 AM EDT          [JS]   1460 Patient reevaluated and states that she feels much better and feels that her baseline. Patient is now on her home oxygen satting 100%. Patient notes that she had a change in her mucus production but feels good enough to follow-up at home. His lung sounds are improved. [JM]      ED Course User Index  [JM] Penny Griffin MD  [JS] Anish Gaines DO       --------------------------------------------- PAST HISTORY ---------------------------------------------  Past Medical History:  has a past medical history of Abscess, COPD (chronic obstructive pulmonary disease) ([facility] [address] Diverticulitis, Provoked DVT 3/2018, Seasonal allergic rhinitis, Smoker, and Tobacco use disorder. Past Surgical History:  has no past surgical history on file. Social History:  reports that she has been smoking cigarettes. She has a 20.50 pack-year smoking history. She has never used smokeless tobacco. She reports current alcohol use. She reports current drug use. Drug: Marijuana. Family History: family history includes Breast Cancer in her none; Colon Cancer in her none; Coronary Art Dis in her father; Diabetes in her unknown relative; Hypertension in her father; Other in her daughter and father; Stroke in her mother. The patients home medications have been reviewed.     Allergies: Penicillin v    -------------------------------------------------- RESULTS -------------------------------------------------  Labs:  Results for orders placed or performed during the hospital encounter of 05/17/21   COVID-19, Rapid    Specimen: Nasopharyngeal Swab   Result Value Ref Range    SARS-CoV-2, NAAT Not Detected Not Detected   CBC Auto Differential   Result Value Ref Range    WBC 10.3 4.5 - 11.5 E9/L    RBC 3.97 3.50 - 5.50 E12/L    Hemoglobin 12.4 11.5 - 15.5 g/dL    Hematocrit 38.4 34.0 - 48.0 %    MCV 96.7 80.0 - 99.9 fL    MCH 31.2 26.0 - 35.0 pg    MCHC 32.3 32.0 - 34.5 %    RDW 12.3 11.5 - 15.0 fL    Platelets 226 791 - 538 E9/L    MPV 9.4 7.0 - 12.0 fL    Neutrophils % 78.2 43.0 - 80.0 %    Immature Granulocytes % 0.4 0.0 - 5.0 %    Lymphocytes % 10.2 (L) 20.0 - 42.0 %    Monocytes % 9.5 2.0 - 12.0 %    Eosinophils % 1.4 0.0 - 6.0 %    Basophils % 0.3 0.0 - 2.0 %    Neutrophils Absolute 8.05 (H) 1.80 - 7.30 E9/L    Immature Granulocytes # 0.04 E9/L    Lymphocytes Absolute 1.05 (L) 1.50 - 4.00 E9/L    Monocytes Absolute 0.98 (H) 0.10 - 0.95 E9/L    Eosinophils Absolute 0.14 0.05 - 0.50 E9/L    Basophils Absolute 0.03 0.00 - 0.20 E9/L   Comprehensive Metabolic Panel w/ Reflex to MG   Result Value Ref Range    Sodium 142 132 - 146 mmol/L    Potassium reflex Magnesium 3.9 3.5 - 5.0 mmol/L    Chloride 97 (L) 98 - 107 mmol/L    CO2 37 (H) 22 - 29 mmol/L    Anion Gap 8 7 - 16 mmol/L    Glucose 120 (H) 74 - 99 mg/dL    BUN 12 6 - 23 mg/dL    CREATININE 0.5 0.5 - 1.0 mg/dL    GFR Non-African American >60 >=60 mL/min/1.73    GFR African American >60     Calcium 9.2 8.6 - 10.2 mg/dL    Total Protein 6.5 6.4 - 8.3 g/dL    Albumin 3.9 3.5 - 5.2 g/dL    Total Bilirubin 0.3 0.0 - 1.2 mg/dL    Alkaline Phosphatase 95 35 - 104 U/L    ALT 9 0 - 32 U/L    AST 13 0 - 31 U/L   Troponin   Result Value Ref Range    Troponin <0.01 0.00 - 0.03 ng/mL   Brain Natriuretic Peptide   Result Value Ref Range    Pro- (H) 0 - 125 pg/mL EKG 12 Lead   Result Value Ref Range    Ventricular Rate 94 BPM    Atrial Rate 94 BPM    P-R Interval 162 ms    QRS Duration 78 ms    Q-T Interval 356 ms    QTc Calculation (Bazett) 445 ms    P Axis 82 degrees    R Axis 79 degrees    T Axis 77 degrees       Radiology:  XR CHEST PORTABLE   Final Result   Chronic appearing findings similar to previous. PA and lateral views would   be useful for further assessment, if symptoms persist.             ------------------------- NURSING NOTES AND VITALS REVIEWED ---------------------------  Date / Time Roomed:  5/17/2021 10:01 AM  ED Bed Assignment:  08/08    The nursing notes within the ED encounter and vital signs as below have been reviewed. /78   Pulse 82   Temp 97.7 °F (36.5 °C) (Oral)   Resp 20   Ht 5' 4\" (1.626 m)   Wt 150 lb 8 oz (68.3 kg)   SpO2 100%   BMI 25.83 kg/m²   Oxygen Saturation Interpretation: Abnormal and Improved after treatment      ------------------------------------------ PROGRESS NOTES ------------------------------------------  12:48 PM EDT  I have spoken with the patient and discussed todays results, in addition to providing specific details for the plan of care and counseling regarding the diagnosis and prognosis. Their questions are answered at this time and they are agreeable with the plan. I discussed at length with them reasons for immediate return here for re evaluation. They will followup with their primary care physician by calling their office tomorrow. --------------------------------- ADDITIONAL PROVIDER NOTES ---------------------------------  At this time the patient is without objective evidence of an acute process requiring hospitalization or inpatient management. They have remained hemodynamically stable throughout their entire ED visit and are stable for discharge with outpatient follow-up.      The plan has been discussed in detail and they are aware of the specific conditions for emergent return, as well as the importance of follow-up. New Prescriptions    AZITHROMYCIN (ZITHROMAX) 250 MG TABLET    Take 1 tablet by mouth daily for 7 days    PREDNISONE (DELTASONE) 50 MG TABLET    Take 1 tablet by mouth daily for 5 days       Diagnosis:  1. COPD exacerbation ([facility] [address]        Disposition:  Patient's disposition: Discharge to home  Patient's condition is stable.        Amadeo Temple MD  Resident  05/17/21 4124

## 2021-05-17 NOTE — ED NOTES
Results of testing explained to patient. Patient verbalizes understanding of instructions regarding testing and need to follow up with PCP and/or specialist provider.         Shawna Alejandra RN  05/17/21 3048

## 2021-05-20 ENCOUNTER — APPOINTMENT (OUTPATIENT)
Dept: GENERAL RADIOLOGY | Age: 61
DRG: 189 | End: 2021-05-20
Payer: COMMERCIAL

## 2021-05-20 ENCOUNTER — HOSPITAL ENCOUNTER (INPATIENT)
Age: 61
LOS: 4 days | Discharge: HOME | DRG: 189 | End: 2021-05-24
Attending: EMERGENCY MEDICINE | Admitting: INTERNAL MEDICINE
Payer: COMMERCIAL

## 2021-05-20 DIAGNOSIS — J96.02 ACUTE RESPIRATORY FAILURE WITH HYPERCAPNIA (HCC): ICD-10-CM

## 2021-05-20 DIAGNOSIS — J44.1 COPD EXACERBATION (HCC): Primary | ICD-10-CM

## 2021-05-20 DIAGNOSIS — J96.11 CHRONIC RESPIRATORY FAILURE WITH HYPOXIA (HCC): ICD-10-CM

## 2021-05-20 LAB
ADENOVIRUS BY PCR: NOT DETECTED
ANION GAP SERPL CALCULATED.3IONS-SCNC: 9 MMOL/L (ref 7–16)
B.E.: 12.1 MMOL/L (ref -3–3)
BORDETELLA PARAPERTUSSIS BY PCR: NOT DETECTED
BORDETELLA PERTUSSIS BY PCR: NOT DETECTED
BUN BLDV-MCNC: 19 MG/DL (ref 6–23)
CALCIUM SERPL-MCNC: 9.4 MG/DL (ref 8.6–10.2)
CHLAMYDOPHILIA PNEUMONIAE BY PCR: NOT DETECTED
CHLORIDE BLD-SCNC: 94 MMOL/L (ref 98–107)
CO2: 38 MMOL/L (ref 22–29)
CORONAVIRUS 229E BY PCR: NOT DETECTED
CORONAVIRUS HKU1 BY PCR: NOT DETECTED
CORONAVIRUS NL63 BY PCR: NOT DETECTED
CORONAVIRUS OC43 BY PCR: NOT DETECTED
CREAT SERPL-MCNC: 0.5 MG/DL (ref 0.5–1)
CRITICAL NOTIFICATION: YES
DELIVERY SYSTEMS: ABNORMAL
DEVICE: ABNORMAL
EKG ATRIAL RATE: 97 BPM
EKG P AXIS: 84 DEGREES
EKG P-R INTERVAL: 142 MS
EKG Q-T INTERVAL: 344 MS
EKG QRS DURATION: 72 MS
EKG QTC CALCULATION (BAZETT): 436 MS
EKG R AXIS: 73 DEGREES
EKG T AXIS: 68 DEGREES
EKG VENTRICULAR RATE: 97 BPM
FIO2 ARTERIAL: 40
GFR AFRICAN AMERICAN: >60
GFR NON-AFRICAN AMERICAN: >60 ML/MIN/1.73
GLUCOSE BLD-MCNC: 102 MG/DL (ref 74–99)
HCO3 ARTERIAL: 43.6 MMOL/L (ref 22–26)
HCT VFR BLD CALC: 41.2 % (ref 34–48)
HEMOGLOBIN: 13 G/DL (ref 11.5–15.5)
HUMAN METAPNEUMOVIRUS BY PCR: NOT DETECTED
HUMAN RHINOVIRUS/ENTEROVIRUS BY PCR: DETECTED
INFLUENZA A BY PCR: NOT DETECTED
INFLUENZA B BY PCR: NOT DETECTED
MCH RBC QN AUTO: 31 PG (ref 26–35)
MCHC RBC AUTO-ENTMCNC: 31.6 % (ref 32–34.5)
MCV RBC AUTO: 98.1 FL (ref 80–99.9)
MYCOPLASMA PNEUMONIAE BY PCR: NOT DETECTED
O2 SATURATION: 99 % (ref 92–98.5)
OPERATOR ID: 1102
PARAINFLUENZA VIRUS 1 BY PCR: NOT DETECTED
PARAINFLUENZA VIRUS 2 BY PCR: NOT DETECTED
PARAINFLUENZA VIRUS 3 BY PCR: NOT DETECTED
PARAINFLUENZA VIRUS 4 BY PCR: NOT DETECTED
PCO2 ARTERIAL: 94.5 MMHG (ref 35–45)
PDW BLD-RTO: 12.5 FL (ref 11.5–15)
PH BLOOD GAS: 7.27 (ref 7.35–7.45)
PLATELET # BLD: 281 E9/L (ref 130–450)
PMV BLD AUTO: 9.9 FL (ref 7–12)
PO2 ARTERIAL: 162.1 MMHG (ref 60–80)
POTASSIUM SERPL-SCNC: 3.9 MMOL/L (ref 3.5–5)
PRO-BNP: 283 PG/ML (ref 0–125)
RBC # BLD: 4.2 E12/L (ref 3.5–5.5)
RESPIRATORY SYNCYTIAL VIRUS BY PCR: NOT DETECTED
SARS-COV-2, PCR: NOT DETECTED
SODIUM BLD-SCNC: 141 MMOL/L (ref 132–146)
SOURCE, BLOOD GAS: ABNORMAL
TROPONIN: <0.01 NG/ML (ref 0–0.03)
WBC # BLD: 11.1 E9/L (ref 4.5–11.5)

## 2021-05-20 PROCEDURE — 82803 BLOOD GASES ANY COMBINATION: CPT

## 2021-05-20 PROCEDURE — 6370000000 HC RX 637 (ALT 250 FOR IP): Performed by: EMERGENCY MEDICINE

## 2021-05-20 PROCEDURE — 86738 MYCOPLASMA ANTIBODY: CPT

## 2021-05-20 PROCEDURE — 87081 CULTURE SCREEN ONLY: CPT

## 2021-05-20 PROCEDURE — 87206 SMEAR FLUORESCENT/ACID STAI: CPT

## 2021-05-20 PROCEDURE — 6360000002 HC RX W HCPCS: Performed by: INTERNAL MEDICINE

## 2021-05-20 PROCEDURE — 6370000000 HC RX 637 (ALT 250 FOR IP): Performed by: INTERNAL MEDICINE

## 2021-05-20 PROCEDURE — 2580000003 HC RX 258: Performed by: INTERNAL MEDICINE

## 2021-05-20 PROCEDURE — 2060000000 HC ICU INTERMEDIATE R&B

## 2021-05-20 PROCEDURE — 85027 COMPLETE CBC AUTOMATED: CPT

## 2021-05-20 PROCEDURE — 94664 DEMO&/EVAL PT USE INHALER: CPT

## 2021-05-20 PROCEDURE — 71045 X-RAY EXAM CHEST 1 VIEW: CPT

## 2021-05-20 PROCEDURE — 94640 AIRWAY INHALATION TREATMENT: CPT

## 2021-05-20 PROCEDURE — 93005 ELECTROCARDIOGRAM TRACING: CPT | Performed by: EMERGENCY MEDICINE

## 2021-05-20 PROCEDURE — 99285 EMERGENCY DEPT VISIT HI MDM: CPT

## 2021-05-20 PROCEDURE — 36415 COLL VENOUS BLD VENIPUNCTURE: CPT

## 2021-05-20 PROCEDURE — 80048 BASIC METABOLIC PNL TOTAL CA: CPT

## 2021-05-20 PROCEDURE — 83880 ASSAY OF NATRIURETIC PEPTIDE: CPT

## 2021-05-20 PROCEDURE — 0202U NFCT DS 22 TRGT SARS-COV-2: CPT

## 2021-05-20 PROCEDURE — 84484 ASSAY OF TROPONIN QUANT: CPT

## 2021-05-20 PROCEDURE — 87070 CULTURE OTHR SPECIMN AEROBIC: CPT

## 2021-05-20 PROCEDURE — 94660 CPAP INITIATION&MGMT: CPT

## 2021-05-20 RX ORDER — METHYLPREDNISOLONE SODIUM SUCCINATE 40 MG/ML
40 INJECTION, POWDER, LYOPHILIZED, FOR SOLUTION INTRAMUSCULAR; INTRAVENOUS EVERY 6 HOURS
Status: DISCONTINUED | OUTPATIENT
Start: 2021-05-20 | End: 2021-05-21

## 2021-05-20 RX ORDER — IPRATROPIUM BROMIDE AND ALBUTEROL SULFATE 2.5; .5 MG/3ML; MG/3ML
3 SOLUTION RESPIRATORY (INHALATION) CONTINUOUS
Status: DISCONTINUED | OUTPATIENT
Start: 2021-05-20 | End: 2021-05-20

## 2021-05-20 RX ORDER — ARFORMOTEROL TARTRATE 15 UG/2ML
15 SOLUTION RESPIRATORY (INHALATION) 2 TIMES DAILY
Status: DISCONTINUED | OUTPATIENT
Start: 2021-05-20 | End: 2021-05-24 | Stop reason: HOSPADM

## 2021-05-20 RX ORDER — ACETAMINOPHEN 325 MG/1
650 TABLET ORAL EVERY 4 HOURS PRN
Status: DISCONTINUED | OUTPATIENT
Start: 2021-05-20 | End: 2021-05-24 | Stop reason: HOSPADM

## 2021-05-20 RX ORDER — IPRATROPIUM BROMIDE AND ALBUTEROL SULFATE 2.5; .5 MG/3ML; MG/3ML
1 SOLUTION RESPIRATORY (INHALATION)
Status: DISCONTINUED | OUTPATIENT
Start: 2021-05-20 | End: 2021-05-20

## 2021-05-20 RX ORDER — SODIUM CHLORIDE 0.9 % (FLUSH) 0.9 %
5-40 SYRINGE (ML) INJECTION EVERY 12 HOURS SCHEDULED
Status: DISCONTINUED | OUTPATIENT
Start: 2021-05-20 | End: 2021-05-24 | Stop reason: HOSPADM

## 2021-05-20 RX ORDER — SODIUM CHLORIDE 0.9 % (FLUSH) 0.9 %
5-40 SYRINGE (ML) INJECTION PRN
Status: DISCONTINUED | OUTPATIENT
Start: 2021-05-20 | End: 2021-05-24 | Stop reason: HOSPADM

## 2021-05-20 RX ORDER — BUDESONIDE 0.5 MG/2ML
0.5 INHALANT ORAL 2 TIMES DAILY
Status: DISCONTINUED | OUTPATIENT
Start: 2021-05-20 | End: 2021-05-24 | Stop reason: HOSPADM

## 2021-05-20 RX ORDER — SODIUM CHLORIDE 9 MG/ML
25 INJECTION, SOLUTION INTRAVENOUS PRN
Status: DISCONTINUED | OUTPATIENT
Start: 2021-05-20 | End: 2021-05-24 | Stop reason: HOSPADM

## 2021-05-20 RX ORDER — IPRATROPIUM BROMIDE AND ALBUTEROL SULFATE 2.5; .5 MG/3ML; MG/3ML
1 SOLUTION RESPIRATORY (INHALATION) EVERY 4 HOURS
Status: DISCONTINUED | OUTPATIENT
Start: 2021-05-20 | End: 2021-05-24 | Stop reason: HOSPADM

## 2021-05-20 RX ADMIN — Medication 10 ML: at 09:23

## 2021-05-20 RX ADMIN — ENOXAPARIN SODIUM 40 MG: 40 INJECTION SUBCUTANEOUS at 09:25

## 2021-05-20 RX ADMIN — AZITHROMYCIN DIHYDRATE 500 MG: 500 INJECTION, POWDER, LYOPHILIZED, FOR SOLUTION INTRAVENOUS at 19:38

## 2021-05-20 RX ADMIN — BUDESONIDE 500 MCG: 0.5 INHALANT RESPIRATORY (INHALATION) at 18:22

## 2021-05-20 RX ADMIN — ARFORMOTEROL TARTRATE 15 MCG: 15 SOLUTION RESPIRATORY (INHALATION) at 13:16

## 2021-05-20 RX ADMIN — SODIUM CHLORIDE, PRESERVATIVE FREE 10 ML: 5 INJECTION INTRAVENOUS at 19:38

## 2021-05-20 RX ADMIN — METHYLPREDNISOLONE SODIUM SUCCINATE 40 MG: 40 INJECTION, POWDER, FOR SOLUTION INTRAMUSCULAR; INTRAVENOUS at 14:26

## 2021-05-20 RX ADMIN — ROFLUMILAST 500 MCG: 500 TABLET ORAL at 14:25

## 2021-05-20 RX ADMIN — IPRATROPIUM BROMIDE AND ALBUTEROL SULFATE 3 AMPULE: .5; 3 SOLUTION RESPIRATORY (INHALATION) at 06:54

## 2021-05-20 RX ADMIN — ARFORMOTEROL TARTRATE 15 MCG: 15 SOLUTION RESPIRATORY (INHALATION) at 18:21

## 2021-05-20 RX ADMIN — SERTRALINE 25 MG: 50 TABLET, FILM COATED ORAL at 20:00

## 2021-05-20 RX ADMIN — IPRATROPIUM BROMIDE AND ALBUTEROL SULFATE 1 AMPULE: .5; 3 SOLUTION RESPIRATORY (INHALATION) at 17:23

## 2021-05-20 RX ADMIN — METHYLPREDNISOLONE SODIUM SUCCINATE 40 MG: 40 INJECTION, POWDER, FOR SOLUTION INTRAMUSCULAR; INTRAVENOUS at 19:38

## 2021-05-20 RX ADMIN — IPRATROPIUM BROMIDE AND ALBUTEROL SULFATE 1 AMPULE: .5; 3 SOLUTION RESPIRATORY (INHALATION) at 13:10

## 2021-05-20 RX ADMIN — IPRATROPIUM BROMIDE AND ALBUTEROL SULFATE 1 AMPULE: .5; 3 SOLUTION RESPIRATORY (INHALATION) at 21:43

## 2021-05-20 RX ADMIN — BUDESONIDE 500 MCG: 0.5 INHALANT RESPIRATORY (INHALATION) at 13:16

## 2021-05-20 RX ADMIN — SODIUM CHLORIDE, PRESERVATIVE FREE 10 ML: 5 INJECTION INTRAVENOUS at 20:52

## 2021-05-20 ASSESSMENT — ENCOUNTER SYMPTOMS
NAUSEA: 0
SHORTNESS OF BREATH: 1
WHEEZING: 0
SORE THROAT: 0
COUGH: 1
DIARRHEA: 0
SINUS PRESSURE: 0
VOMITING: 0
EYE REDNESS: 0
BACK PAIN: 0
ABDOMINAL DISTENTION: 0
EYE PAIN: 0
EYE DISCHARGE: 0

## 2021-05-20 NOTE — PROGRESS NOTES
Pulmonary/Critical Care Consult Note    CHIEF COMPLAINT: Shortness of breath x 3 days with recent diagnosis of bronchitis. HISTORY OF PRESENT ILLNESS: Pt is a 64year old  female with PMH as described below who was admitted for acute hypoxic respiratory failure after presenting to the ED with 3 days complaint or shortness of breath. She stated she was recently diagnosed with bronchitis and was treated with Zithromax and Prednisone as an outpatient. She was given Solumedrol 125mg IV en route to the ED per EMS. She denies fever, chills, chest pain, palpitations. EMS placed also placed patient on CPAP prior to arrival.     We are consulted for management of acute hypoxemic and hypercapnic respiratory failure and COPD exacerbation. SUBJECTIVE:    Initial ABG in the ED at 9897 showed respiratory acidosis with Ph 7.27, pCO2 94.5, pO2, 162, sPO2 99%. EKG showed no acute changes, troponin negative, chest X-ray showed hyperinflation with flattening of diaphragms consistent with COPD. No infiltrates, mass, nodules, or pleural effusions with normal cardiac silhouette. Pt states she has had increased dyspnea over the past 3 days after having family visit her. She is a 20.5 pk/yr smoker but states she stopped smoking a month ago. However, her family has been smoking heavily in her house over the past several days. She also states her grandson had been sick with URI sx before she became ill and he is currently staying with her. She has also not received her COVID vaccine. Pt also states she used her inhaler multiple times before calling EMS and increased up her oxygen PNC before EMS arrived due to SOB. She states she felt like she was having a \"panic attack\" before she called EMS but has never had a panic attack in the past.    She was scheduled to start pulmonary rehab today but became to sick and decided to call EMS due to anxiety and increased dyspnea.      MEDICATIONS:   sodium chloride flush  5-40 mL Intravenous 2 times per day    enoxaparin  40 mg Subcutaneous Daily      ipratropium-albuterol Stopped (05/20/21 0925)     sodium chloride flush, acetaminophen      REVIEW OF SYSTEMS:  Constitutional: Denies fever, weight loss, night sweats, and fatigue  Skin: Denies pigmentation, dark lesions, and rashes   HEENT: Denies hearing loss, tinnitus, ear drainage, epistaxis, sore throat, and hoarseness. Cardiovascular: Denies palpitations, chest pain, and chest pressure. Respiratory: Reports clear productive cough which she states is baseline, reports dyspnea at rest. Denies hemoptysis, apnea, and choking. Gastrointestinal: Denies nausea, vomiting, poor appetite, diarrhea, heartburn or reflux  Genitourinary: Denies dysuria, frequency, urgency or hematuria  Musculoskeletal: Denies myalgias, muscle weakness, and bone pain  Neurological: Denies dizziness, vertigo, headache, and focal weakness  Psychological: Reports anxiety and denies depression. Endocrine: Denies heat intolerance and cold intolerance  Hematopoietic/Lymphatic: Denies bleeding problems and blood transfusions    OBJECTIVE:  Vitals:    05/20/21 1100   BP: 130/79   Pulse: 75   Resp: 20   Temp:    SpO2: 96%     FiO2 : 30 %     O2 Device: PAP (positive airway pressure) (16/6)    PHYSICAL EXAM:  General: Awake. Oriented to place, time and person, appears acutely ill and very thin   HEENT: No head lesions, PERRL, EOMI, mouth without lesions, no nasal lesions, no cervical adenopathy palpated   Respiratory: Pt on BIPAP. Lungs with equal breath sounds and diminished bilaterally. Coarse expiratory wheezes also noted bilaterally.   CV: Regular rate, no murmurs, no JVD, no leg edema   Abdomen: Soft, non tender, + bowel sounds, no lesions   Skin: Hydrated, adequate turgor, no rash, capillary refill <2 seconds   Extremities: Muscular strength 4/4 in 4 limbs, moves 4 limbs spontaneously, distal pulses present   Neurology: Awake and alert, follows commands, moves 4 limbs on command and spontaneously, equal sensation, no dysmetria, neck is supple, no meningitic signs present.      LABS:  WBC   Date Value Ref Range Status   05/20/2021 11.1 4.5 - 11.5 E9/L Final   05/17/2021 10.3 4.5 - 11.5 E9/L Final   03/08/2021 7.2 4.5 - 11.5 E9/L Final     Hemoglobin   Date Value Ref Range Status   05/20/2021 13.0 11.5 - 15.5 g/dL Final   05/17/2021 12.4 11.5 - 15.5 g/dL Final   03/08/2021 13.5 11.5 - 15.5 g/dL Final     Hematocrit   Date Value Ref Range Status   05/20/2021 41.2 34.0 - 48.0 % Final   05/17/2021 38.4 34.0 - 48.0 % Final   03/08/2021 41.5 34.0 - 48.0 % Final     MCV   Date Value Ref Range Status   05/20/2021 98.1 80.0 - 99.9 fL Final   05/17/2021 96.7 80.0 - 99.9 fL Final   03/08/2021 95.6 80.0 - 99.9 fL Final     Platelets   Date Value Ref Range Status   05/20/2021 281 130 - 450 E9/L Final   05/17/2021 234 130 - 450 E9/L Final   03/08/2021 239 130 - 450 E9/L Final     Sodium   Date Value Ref Range Status   05/20/2021 141 132 - 146 mmol/L Final   05/17/2021 142 132 - 146 mmol/L Final   03/08/2021 141 132 - 146 mmol/L Final     Potassium   Date Value Ref Range Status   05/20/2021 3.9 3.5 - 5.0 mmol/L Final   03/08/2021 4.4 3.5 - 5.0 mmol/L Final   06/22/2020 4.1 3.5 - 5.0 mmol/L Final     Potassium reflex Magnesium   Date Value Ref Range Status   05/17/2021 3.9 3.5 - 5.0 mmol/L Final   04/18/2019 4.4 3.5 - 5.0 mmol/L Final     Chloride   Date Value Ref Range Status   05/20/2021 94 (L) 98 - 107 mmol/L Final   05/17/2021 97 (L) 98 - 107 mmol/L Final   03/08/2021 98 98 - 107 mmol/L Final     CO2   Date Value Ref Range Status   05/20/2021 38 (H) 22 - 29 mmol/L Final   05/17/2021 37 (H) 22 - 29 mmol/L Final   03/08/2021 38 (H) 22 - 29 mmol/L Final     BUN   Date Value Ref Range Status   05/20/2021 19 6 - 23 mg/dL Final   05/17/2021 12 6 - 23 mg/dL Final   03/08/2021 11 8 - 23 mg/dL Final     CREATININE   Date Value Ref Range Status   05/20/2021 0.5 0.5 - 1.0 mg/dL Final   05/17/2021 0.5 0.5 - 1.0 mg/dL Final   03/08/2021 0.5 0.5 - 1.0 mg/dL Final     Glucose   Date Value Ref Range Status   05/20/2021 102 (H) 74 - 99 mg/dL Final   05/17/2021 120 (H) 74 - 99 mg/dL Final   03/08/2021 89 74 - 99 mg/dL Final     Calcium   Date Value Ref Range Status   05/20/2021 9.4 8.6 - 10.2 mg/dL Final   05/17/2021 9.2 8.6 - 10.2 mg/dL Final   03/08/2021 9.4 8.6 - 10.2 mg/dL Final     Total Protein   Date Value Ref Range Status   05/17/2021 6.5 6.4 - 8.3 g/dL Final   03/08/2021 6.6 6.4 - 8.3 g/dL Final   06/22/2020 7.4 6.4 - 8.3 g/dL Final     Albumin   Date Value Ref Range Status   05/17/2021 3.9 3.5 - 5.2 g/dL Final   03/08/2021 4.2 3.5 - 5.2 g/dL Final   06/22/2020 4.2 3.5 - 5.2 g/dL Final     Total Bilirubin   Date Value Ref Range Status   05/17/2021 0.3 0.0 - 1.2 mg/dL Final   03/08/2021 0.2 0.0 - 1.2 mg/dL Final   06/22/2020 0.2 0.0 - 1.2 mg/dL Final     Alkaline Phosphatase   Date Value Ref Range Status   05/17/2021 95 35 - 104 U/L Final   03/08/2021 71 35 - 104 U/L Final   06/22/2020 72 35 - 104 U/L Final     AST   Date Value Ref Range Status   05/17/2021 13 0 - 31 U/L Final   03/08/2021 17 0 - 31 U/L Final   06/22/2020 18 0 - 31 U/L Final     ALT   Date Value Ref Range Status   05/17/2021 9 0 - 32 U/L Final   03/08/2021 13 0 - 32 U/L Final   06/22/2020 14 0 - 32 U/L Final     GFR Non-   Date Value Ref Range Status   05/20/2021 >60 >=60 mL/min/1.73 Final     Comment:     Chronic Kidney Disease: less than 60 ml/min/1.73 sq.m. Kidney Failure: less than 15 ml/min/1.73 sq.m. Results valid for patients 18 years and older. 05/17/2021 >60 >=60 mL/min/1.73 Final     Comment:     Chronic Kidney Disease: less than 60 ml/min/1.73 sq.m. Kidney Failure: less than 15 ml/min/1.73 sq.m. Results valid for patients 18 years and older. 03/08/2021 >60 >=60 mL/min/1.73 Final     Comment:     Chronic Kidney Disease: less than 60 ml/min/1.73 sq.m.           Kidney Failure: less than 15 ml/min/1.73 sq.m. Results valid for patients 18 years and older. GFR    Date Value Ref Range Status   05/20/2021 >60  Final   05/17/2021 >60  Final   03/08/2021 >60  Final     Magnesium   Date Value Ref Range Status   11/30/2019 2.2 1.6 - 2.6 mg/dL Final   10/30/2019 2.2 1.6 - 2.6 mg/dL Final   10/29/2019 2.2 1.6 - 2.6 mg/dL Final     Phosphorus   Date Value Ref Range Status   11/30/2019 3.5 2.5 - 4.5 mg/dL Final     Recent Labs     05/20/21  0638   PH 7.272*   BE 12.1*   O2SAT 99.0*       RADIOLOGY:  XR CHEST PORTABLE   Final Result   COPD. There is no evidence of failure or pneumonia. PROBLEM LIST:  Active Problems:    Acute respiratory failure with hypercapnia (HCC)  Resolved Problems:    * No resolved hospital problems. *      IMPRESSION:  1. Acute hypoxemic and hyperkapnic respiratory failure   - Wean off of BIPAP as tolerated, repeat ABG on NC per home concentration (3.5l)  - Minimize oxygen administration to keep SpO2 > 92        2. Severe COPD with exacerbation due to infectious bronchitis. Diagnostic considerations: COVID-19, influenza, RSV, metapneumovirus vs atypical bacteria  - Continue inhaled corticosteroids   - Bronchodilators: Albuterol/iparatropium q 4 hrs + albuterol PRN  - Steroids: Solumedrol 40 mg IV q 6 hrs   - Incentive spirometry 10 times/hour while awake   - Viral respiratory panel pending  - Sputum culture and MRSA screening  - Antimicrobial,antiinflammatory management with azithromycin     3.  Nicotine dependence  --Offered nicotine patch    PLAN:  - Continue BIPAP, breaks on 3.5l PNC (which she wears at home  - Would check ABG while on NC in 30 minutes if Spo2 remains > 92%   - Bronchodilators and inhaled corticosteroids  - IV steroids  - DVT prophylaxis with Lovenox  - continuous pulse oximetry    Rest of supportive care as per primary team    ATTESTATION:     This patient has a high probability of sudden, clinically significant deterioration, which requires the highest level of physician preparedness to intervene urgently. I managed/supervised life or organ supporting interventions that required frequent physician assessment. I devoted my full attention to the direct care of this patient for the amount of time indicated below. Time I spent with the family or surrogate(s) is included only if the patient was incapable of providing the necessary information or participating in medical decisions - Time devoted to teaching and to any procedures I billed separately is not included.       Electronically signed by SKYE Alcaraz CNP on 5/20/2021 at 11:55 AM    ATTESTATION:  ICU Staff Physician note of personal involvement in Care  As the attending physician, I certify that I personally reviewed the patients history and personnally examined the patient to confirm the physical findings described above,  And that I reviewed the relevant imaging studies and available reports.  I also discussed the differential diagnosis and all of the proposed management plans with the patient and individuals accompanying the patient to this visit.  They had the opportunity to ask questions about the proposed management plans and to have those questions answered.     This patient has a high probability of sudden, clinically significant deterioration, which requires the highest level of physician preparedness to intervene urgently.  I managed/supervised life or organ supporting interventions that required frequent physician assessment.   I devoted my full attention to the direct care of this patient for the amount of time indicated below.  Time I spent with the family or surrogate(s) is included only if the patient was incapable of providing the necessary information or participating in medical decisions - Time devoted to teaching and to any procedures I billed separately is not included.     CRITICAL CARE TIME:  30 minutes    Humberto King MD  Pulmonary and Critical Care Medicine

## 2021-05-20 NOTE — ED TRIAGE NOTES
Pt arrive to ED via EMS. C/O SOB x 2 days. Per EMS 95% on home O2/3L. 125 solumedrol given by EMS.  BGL 99. Pt states she did 6-8 breathing treatments at home

## 2021-05-20 NOTE — PROGRESS NOTES
Patient arrives to [facility] via monitored bed accompanied by RN and RT. Patient scooted herself to our bed without incident. Please see flowsheets for vitals.

## 2021-05-20 NOTE — PROGRESS NOTES
Pt refused BIPAP at this time. She is alert and in no distress. Normal respiratory effort. Spo2 95% on 3.5L PNC. Will hold off on BIPAP while she is awake unless respiratory status changes. She is on continuous pulse oximetry at this time. Will continue to monitor.

## 2021-05-20 NOTE — ED PROVIDER NOTES
right-middle field reveals decreased breath sounds. Examination of the left-middle field reveals decreased breath sounds. Examination of the right-lower field reveals decreased breath sounds. Examination of the left-lower field reveals decreased breath sounds. Decreased breath sounds present. No wheezing, rhonchi or rales. Abdominal:      General: Bowel sounds are normal.      Palpations: Abdomen is soft. Tenderness: There is no abdominal tenderness. There is no guarding. Musculoskeletal:         General: Normal range of motion. Cervical back: Normal range of motion and neck supple. Right lower leg: No edema. Left lower leg: No edema. Skin:     General: Skin is warm and dry. Findings: No rash. Neurological:      Mental Status: She is alert and oriented to person, place, and time. Procedures     MDM           EKG:  Normal sinus rhythm with ventricular rate of 97. ID interval, QRS duration and QT interval within normal range. Normal axis. No ST segment abnormalities to suggest acute ischemia.    7:00 AM EDT  I have signed this patient out to the oncoming physician, Dr. Rodriguez. I have discussed the patient's initial exam, treatment and plan of care with the on coming physician. I have notified the patient / family of the change in treating physician and answered their questions to this point. HPI:  5/20/21, Time: 8:28 AM EDT      Patient care turned over to me from Dr Alda Stewart, pending labs, CXR and admission. Currently comfortable on BiPaP.     --------------------------------------------- PAST HISTORY ---------------------------------------------  Past Medical History:  has a past medical history of Abscess, COPD (chronic obstructive pulmonary disease) ([facility] [address] Diverticulitis, Provoked DVT 3/2018, Seasonal allergic rhinitis, Smoker, and Tobacco use disorder. Past Surgical History:  has no past surgical history on file.     Social History:  reports that she has been smoking cigarettes. She has a 20.50 pack-year smoking history. She has never used smokeless tobacco. She reports current alcohol use. She reports current drug use. Drug: Marijuana. Family History: family history includes Breast Cancer in her none; Colon Cancer in her none; Coronary Art Dis in her father; Diabetes in her unknown relative; Hypertension in her father; Other in her daughter and father; Stroke in her mother. The patients home medications have been reviewed. Allergies: Penicillin v    -------------------------------------------------- RESULTS -------------------------------------------------  I have personally reviewed all laboratory and imaging results for this patient. Results are listed below.      LABS:  Results for orders placed or performed during the hospital encounter of 97/95/74   Basic metabolic panel   Result Value Ref Range    Sodium 141 132 - 146 mmol/L    Potassium 3.9 3.5 - 5.0 mmol/L    Chloride 94 (L) 98 - 107 mmol/L    CO2 38 (H) 22 - 29 mmol/L    Anion Gap 9 7 - 16 mmol/L    Glucose 102 (H) 74 - 99 mg/dL    BUN 19 6 - 23 mg/dL    CREATININE 0.5 0.5 - 1.0 mg/dL    GFR Non-African American >60 >=60 mL/min/1.73    GFR African American >60     Calcium 9.4 8.6 - 10.2 mg/dL   CBC   Result Value Ref Range    WBC 11.1 4.5 - 11.5 E9/L    RBC 4.20 3.50 - 5.50 E12/L    Hemoglobin 13.0 11.5 - 15.5 g/dL    Hematocrit 41.2 34.0 - 48.0 %    MCV 98.1 80.0 - 99.9 fL    MCH 31.0 26.0 - 35.0 pg    MCHC 31.6 (L) 32.0 - 34.5 %    RDW 12.5 11.5 - 15.0 fL    Platelets 497 443 - 739 E9/L    MPV 9.9 7.0 - 12.0 fL   Troponin   Result Value Ref Range    Troponin <0.01 0.00 - 0.03 ng/mL   Brain Natriuretic Peptide   Result Value Ref Range    Pro- (H) 0 - 125 pg/mL   Arterial Blood Gas, Respiratory Only   Result Value Ref Range    Source: Arterial     FIO2 Arterial 40.0     pH, Blood Gas 7.272 (L) 7.350 - 7.450    pCO2, Arterial 94.5 (HH) 35.0 - 45.0 mmHg    pO2, Arterial 162.1 (H) 60.0 - 80.0 mmHg

## 2021-05-20 NOTE — PROGRESS NOTES
Unable to obtain ABG after multiple attempts. Pt is alert and appears somnulent at this time on NC at 3.5L. LS diminished bilaterally with normal respiratory effort. Spo2 93% at this time. Will place patient back on BIPAP at 14/8 with 30% FIO2 at this time and continue to monitor.

## 2021-05-20 NOTE — PROGRESS NOTES
Unable to obtain arterial blood gases by nursing and respiratory therapy after multiple attempts. Dr. Ladonna Freeman made aware. No new orders.

## 2021-05-20 NOTE — FLOWSHEET NOTE
I had a productive visit with Hector Addison today. She states she is a person of rubina and trusts in [facility] [address]. She then proceeded to talk about significant losses in her life, her parents and a neighbor friend. She claims that these losses don't affect her daily functioning. She also talked about her \"caregiver nature\" and shared some family situations that she worries about. She admits that she takes care of everyone but herself.  listed attentively, empathically, prayed for her healing, anxiety and being able to let go of her worries, so as to focus on her healing while here. 05/20/21 1346   Encounter Summary   Services provided to: Patient   Referral/Consult From: [facility] Visiting   (BS5/20)   Complexity of Encounter Moderate   Routine   Type Initial   Spiritual/Uatsdin   Type Spiritual support   Assessment Approachable; Anxious;Grieving   Intervention Active listening;Explored feelings, thoughts, concerns;Nurtured hope;Prayer;Discussed illness/injury and it's impact; Discussed relationship with God   Outcome Comfort;Expressed gratitude Plan a mammogram after June 18.           There is no ear wax on either side.

## 2021-05-20 NOTE — ED NOTES
ABG drawn. Labs drawn. Pt on monitor with Cont Pulse Ox.       Mirian Hollins, DOMINGO  05/20/21 6429

## 2021-05-21 LAB
ANION GAP SERPL CALCULATED.3IONS-SCNC: 4 MMOL/L (ref 7–16)
BASOPHILS ABSOLUTE: 0.01 E9/L (ref 0–0.2)
BASOPHILS RELATIVE PERCENT: 0.1 % (ref 0–2)
BUN BLDV-MCNC: 21 MG/DL (ref 6–23)
CALCIUM SERPL-MCNC: 8.9 MG/DL (ref 8.6–10.2)
CHLORIDE BLD-SCNC: 97 MMOL/L (ref 98–107)
CO2: 41 MMOL/L (ref 22–29)
CREAT SERPL-MCNC: 0.4 MG/DL (ref 0.5–1)
EOSINOPHILS ABSOLUTE: 0 E9/L (ref 0.05–0.5)
EOSINOPHILS RELATIVE PERCENT: 0 % (ref 0–6)
GFR AFRICAN AMERICAN: >60
GFR NON-AFRICAN AMERICAN: >60 ML/MIN/1.73
GLUCOSE BLD-MCNC: 127 MG/DL (ref 74–99)
HCT VFR BLD CALC: 38.5 % (ref 34–48)
HEMOGLOBIN: 12.5 G/DL (ref 11.5–15.5)
IMMATURE GRANULOCYTES #: 0.09 E9/L
IMMATURE GRANULOCYTES %: 1 % (ref 0–5)
LV EF: 52 %
LVEF MODALITY: NORMAL
LYMPHOCYTES ABSOLUTE: 0.93 E9/L (ref 1.5–4)
LYMPHOCYTES RELATIVE PERCENT: 10.5 % (ref 20–42)
MAGNESIUM: 2.3 MG/DL (ref 1.6–2.6)
MCH RBC QN AUTO: 31.3 PG (ref 26–35)
MCHC RBC AUTO-ENTMCNC: 32.5 % (ref 32–34.5)
MCV RBC AUTO: 96.3 FL (ref 80–99.9)
MONOCYTES ABSOLUTE: 0.4 E9/L (ref 0.1–0.95)
MONOCYTES RELATIVE PERCENT: 4.5 % (ref 2–12)
NEUTROPHILS ABSOLUTE: 7.42 E9/L (ref 1.8–7.3)
NEUTROPHILS RELATIVE PERCENT: 83.9 % (ref 43–80)
PDW BLD-RTO: 12 FL (ref 11.5–15)
PHOSPHORUS: 2.7 MG/DL (ref 2.5–4.5)
PLATELET # BLD: 286 E9/L (ref 130–450)
PMV BLD AUTO: 9.3 FL (ref 7–12)
POTASSIUM SERPL-SCNC: 4.1 MMOL/L (ref 3.5–5)
RBC # BLD: 4 E12/L (ref 3.5–5.5)
SODIUM BLD-SCNC: 142 MMOL/L (ref 132–146)
WBC # BLD: 8.9 E9/L (ref 4.5–11.5)

## 2021-05-21 PROCEDURE — 2060000000 HC ICU INTERMEDIATE R&B

## 2021-05-21 PROCEDURE — 6360000002 HC RX W HCPCS: Performed by: INTERNAL MEDICINE

## 2021-05-21 PROCEDURE — 93306 TTE W/DOPPLER COMPLETE: CPT

## 2021-05-21 PROCEDURE — 36415 COLL VENOUS BLD VENIPUNCTURE: CPT

## 2021-05-21 PROCEDURE — 82104 ALPHA-1-ANTITRYPSIN PHENO: CPT

## 2021-05-21 PROCEDURE — 6370000000 HC RX 637 (ALT 250 FOR IP): Performed by: INTERNAL MEDICINE

## 2021-05-21 PROCEDURE — 84100 ASSAY OF PHOSPHORUS: CPT

## 2021-05-21 PROCEDURE — 80048 BASIC METABOLIC PNL TOTAL CA: CPT

## 2021-05-21 PROCEDURE — 85025 COMPLETE CBC W/AUTO DIFF WBC: CPT

## 2021-05-21 PROCEDURE — 83735 ASSAY OF MAGNESIUM: CPT

## 2021-05-21 PROCEDURE — 82103 ALPHA-1-ANTITRYPSIN TOTAL: CPT

## 2021-05-21 PROCEDURE — 2580000003 HC RX 258: Performed by: INTERNAL MEDICINE

## 2021-05-21 PROCEDURE — 94660 CPAP INITIATION&MGMT: CPT

## 2021-05-21 PROCEDURE — 94640 AIRWAY INHALATION TREATMENT: CPT

## 2021-05-21 PROCEDURE — 2700000000 HC OXYGEN THERAPY PER DAY

## 2021-05-21 RX ORDER — METHYLPREDNISOLONE SODIUM SUCCINATE 40 MG/ML
40 INJECTION, POWDER, LYOPHILIZED, FOR SOLUTION INTRAMUSCULAR; INTRAVENOUS EVERY 8 HOURS
Status: COMPLETED | OUTPATIENT
Start: 2021-05-21 | End: 2021-05-22

## 2021-05-21 RX ADMIN — IPRATROPIUM BROMIDE AND ALBUTEROL SULFATE 1 AMPULE: .5; 3 SOLUTION RESPIRATORY (INHALATION) at 13:18

## 2021-05-21 RX ADMIN — ROFLUMILAST 500 MCG: 500 TABLET ORAL at 08:52

## 2021-05-21 RX ADMIN — IPRATROPIUM BROMIDE AND ALBUTEROL SULFATE 1 AMPULE: .5; 3 SOLUTION RESPIRATORY (INHALATION) at 06:37

## 2021-05-21 RX ADMIN — IPRATROPIUM BROMIDE AND ALBUTEROL SULFATE 1 AMPULE: .5; 3 SOLUTION RESPIRATORY (INHALATION) at 18:42

## 2021-05-21 RX ADMIN — AZITHROMYCIN DIHYDRATE 500 MG: 500 INJECTION, POWDER, LYOPHILIZED, FOR SOLUTION INTRAVENOUS at 20:01

## 2021-05-21 RX ADMIN — METHYLPREDNISOLONE SODIUM SUCCINATE 40 MG: 40 INJECTION, POWDER, FOR SOLUTION INTRAMUSCULAR; INTRAVENOUS at 01:40

## 2021-05-21 RX ADMIN — SERTRALINE 25 MG: 50 TABLET, FILM COATED ORAL at 08:52

## 2021-05-21 RX ADMIN — ARFORMOTEROL TARTRATE 15 MCG: 15 SOLUTION RESPIRATORY (INHALATION) at 06:43

## 2021-05-21 RX ADMIN — Medication 5 ML: at 10:02

## 2021-05-21 RX ADMIN — METHYLPREDNISOLONE SODIUM SUCCINATE 40 MG: 40 INJECTION, POWDER, FOR SOLUTION INTRAMUSCULAR; INTRAVENOUS at 14:01

## 2021-05-21 RX ADMIN — IPRATROPIUM BROMIDE AND ALBUTEROL SULFATE 1 AMPULE: .5; 3 SOLUTION RESPIRATORY (INHALATION) at 01:58

## 2021-05-21 RX ADMIN — BUDESONIDE 500 MCG: 0.5 INHALANT RESPIRATORY (INHALATION) at 18:42

## 2021-05-21 RX ADMIN — ACETAMINOPHEN 650 MG: 325 TABLET ORAL at 14:17

## 2021-05-21 RX ADMIN — IPRATROPIUM BROMIDE AND ALBUTEROL SULFATE 1 AMPULE: .5; 3 SOLUTION RESPIRATORY (INHALATION) at 09:21

## 2021-05-21 RX ADMIN — Medication 10 ML: at 21:51

## 2021-05-21 RX ADMIN — BUDESONIDE 500 MCG: 0.5 INHALANT RESPIRATORY (INHALATION) at 06:38

## 2021-05-21 RX ADMIN — METHYLPREDNISOLONE SODIUM SUCCINATE 40 MG: 40 INJECTION, POWDER, FOR SOLUTION INTRAMUSCULAR; INTRAVENOUS at 23:05

## 2021-05-21 RX ADMIN — THEOPHYLLINE ANHYDROUS 200 MG: 200 CAPSULE, EXTENDED RELEASE ORAL at 21:36

## 2021-05-21 RX ADMIN — METHYLPREDNISOLONE SODIUM SUCCINATE 40 MG: 40 INJECTION, POWDER, FOR SOLUTION INTRAMUSCULAR; INTRAVENOUS at 06:41

## 2021-05-21 RX ADMIN — IPRATROPIUM BROMIDE AND ALBUTEROL SULFATE 1 AMPULE: .5; 3 SOLUTION RESPIRATORY (INHALATION) at 22:27

## 2021-05-21 RX ADMIN — ARFORMOTEROL TARTRATE 15 MCG: 15 SOLUTION RESPIRATORY (INHALATION) at 18:42

## 2021-05-21 RX ADMIN — ACETAMINOPHEN 650 MG: 325 TABLET ORAL at 05:02

## 2021-05-21 RX ADMIN — ENOXAPARIN SODIUM 40 MG: 40 INJECTION SUBCUTANEOUS at 08:52

## 2021-05-21 ASSESSMENT — PAIN SCALES - GENERAL
PAINLEVEL_OUTOF10: 0
PAINLEVEL_OUTOF10: 1
PAINLEVEL_OUTOF10: 4

## 2021-05-21 ASSESSMENT — PAIN DESCRIPTION - LOCATION: LOCATION: HEAD

## 2021-05-21 ASSESSMENT — PAIN DESCRIPTION - PAIN TYPE: TYPE: ACUTE PAIN

## 2021-05-21 NOTE — CONSULTS
05/17/2021 96.7 80.0 - 99.9 fL Final   03/08/2021 95.6 80.0 - 99.9 fL Final     Platelets   Date Value Ref Range Status   05/20/2021 281 130 - 450 E9/L Final   05/17/2021 234 130 - 450 E9/L Final   03/08/2021 239 130 - 450 E9/L Final     Sodium   Date Value Ref Range Status   05/21/2021 142 132 - 146 mmol/L Final   05/20/2021 141 132 - 146 mmol/L Final   05/17/2021 142 132 - 146 mmol/L Final     Potassium   Date Value Ref Range Status   05/21/2021 4.1 3.5 - 5.0 mmol/L Final   05/20/2021 3.9 3.5 - 5.0 mmol/L Final   03/08/2021 4.4 3.5 - 5.0 mmol/L Final     Potassium reflex Magnesium   Date Value Ref Range Status   05/17/2021 3.9 3.5 - 5.0 mmol/L Final   04/18/2019 4.4 3.5 - 5.0 mmol/L Final     Chloride   Date Value Ref Range Status   05/21/2021 97 (L) 98 - 107 mmol/L Final   05/20/2021 94 (L) 98 - 107 mmol/L Final   05/17/2021 97 (L) 98 - 107 mmol/L Final     CO2   Date Value Ref Range Status   05/21/2021 41 (HH) 22 - 29 mmol/L Final   05/20/2021 38 (H) 22 - 29 mmol/L Final   05/17/2021 37 (H) 22 - 29 mmol/L Final     BUN   Date Value Ref Range Status   05/21/2021 21 6 - 23 mg/dL Final   05/20/2021 19 6 - 23 mg/dL Final   05/17/2021 12 6 - 23 mg/dL Final     CREATININE   Date Value Ref Range Status   05/21/2021 0.4 (L) 0.5 - 1.0 mg/dL Final   05/20/2021 0.5 0.5 - 1.0 mg/dL Final   05/17/2021 0.5 0.5 - 1.0 mg/dL Final     Glucose   Date Value Ref Range Status   05/21/2021 127 (H) 74 - 99 mg/dL Final   05/20/2021 102 (H) 74 - 99 mg/dL Final   05/17/2021 120 (H) 74 - 99 mg/dL Final     Calcium   Date Value Ref Range Status   05/21/2021 8.9 8.6 - 10.2 mg/dL Final   05/20/2021 9.4 8.6 - 10.2 mg/dL Final   05/17/2021 9.2 8.6 - 10.2 mg/dL Final     Total Protein   Date Value Ref Range Status   05/17/2021 6.5 6.4 - 8.3 g/dL Final   03/08/2021 6.6 6.4 - 8.3 g/dL Final   06/22/2020 7.4 6.4 - 8.3 g/dL Final     Albumin   Date Value Ref Range Status   05/17/2021 3.9 3.5 - 5.2 g/dL Final   03/08/2021 4.2 3.5 - 5.2 g/dL Final 06/22/2020 4.2 3.5 - 5.2 g/dL Final     Total Bilirubin   Date Value Ref Range Status   05/17/2021 0.3 0.0 - 1.2 mg/dL Final   03/08/2021 0.2 0.0 - 1.2 mg/dL Final   06/22/2020 0.2 0.0 - 1.2 mg/dL Final     Alkaline Phosphatase   Date Value Ref Range Status   05/17/2021 95 35 - 104 U/L Final   03/08/2021 71 35 - 104 U/L Final   06/22/2020 72 35 - 104 U/L Final     AST   Date Value Ref Range Status   05/17/2021 13 0 - 31 U/L Final   03/08/2021 17 0 - 31 U/L Final   06/22/2020 18 0 - 31 U/L Final     ALT   Date Value Ref Range Status   05/17/2021 9 0 - 32 U/L Final   03/08/2021 13 0 - 32 U/L Final   06/22/2020 14 0 - 32 U/L Final     GFR Non-   Date Value Ref Range Status   05/21/2021 >60 >=60 mL/min/1.73 Final     Comment:     Chronic Kidney Disease: less than 60 ml/min/1.73 sq.m. Kidney Failure: less than 15 ml/min/1.73 sq.m. Results valid for patients 18 years and older. 05/20/2021 >60 >=60 mL/min/1.73 Final     Comment:     Chronic Kidney Disease: less than 60 ml/min/1.73 sq.m. Kidney Failure: less than 15 ml/min/1.73 sq.m. Results valid for patients 18 years and older. 05/17/2021 >60 >=60 mL/min/1.73 Final     Comment:     Chronic Kidney Disease: less than 60 ml/min/1.73 sq.m. Kidney Failure: less than 15 ml/min/1.73 sq.m. Results valid for patients 18 years and older. GFR    Date Value Ref Range Status   05/21/2021 >60  Final   05/20/2021 >60  Final   05/17/2021 >60  Final     Magnesium   Date Value Ref Range Status   05/21/2021 2.3 1.6 - 2.6 mg/dL Final   11/30/2019 2.2 1.6 - 2.6 mg/dL Final   10/30/2019 2.2 1.6 - 2.6 mg/dL Final     Phosphorus   Date Value Ref Range Status   05/21/2021 2.7 2.5 - 4.5 mg/dL Final   11/30/2019 3.5 2.5 - 4.5 mg/dL Final     Recent Labs     05/20/21  0638   PH 7.272*   BE 12.1*   O2SAT 99.0*       RADIOLOGY:  XR CHEST PORTABLE   Final Result   COPD. There is no evidence of failure or pneumonia. PROBLEM LIST:  Active Problems:    Acute respiratory failure with hypercapnia (HCC)  Resolved Problems:    * No resolved hospital problems. *      IMPRESSION:  1. COPD exacerbation secondary to infectious bronchitis (Rhinoviris)    2. Nicotine dependence    PLAN:  - continue Oxygen at 3.5L PNC, pt is refusing BIPAP  - Continue IV steroids, inhaled corticosteroids, bronchodilators, and phosphodiesterase inhibitor.   Please stop steroids after 5 days  - DVT prophylaxis  - monitor pulse oximetry and applied BIPAP as ordered for spo2 > 92% on 3.5L or mental status changes    Recommendations for discharge:   --The patient will need exercise oximetry prior to discharge  --Bronchodilators: LABA/ICS + LAMA (Advair + Spiriva or Trelegy Ellipta)  --Please refer to outpatient pulmonology as she will need follow up and pulmonary rehabilitation     Rest of supportive care as per primary team          Electronically signed by SKYE Smith CNP on 5/21/2021 at 12:30 PM      ATTESTATION:  ICU Staff Physician note of personal involvement in Care  As the attending physician, I certify that I personally reviewed the patients history and personnally examined the patient to confirm the physical findings described above,  And that I reviewed the relevant imaging studies and available reports.  I also discussed the differential diagnosis and all of the proposed management plans with the patient and individuals accompanying the patient to this visit.  They had the opportunity to ask questions about the proposed management plans and to have those questions answered.       Windy Rose MD  Pulmonary and Critical Care Medicine

## 2021-05-21 NOTE — PROGRESS NOTES
Internal Medicine Progress Note    [facility]    Toya Carroll, MARIELOSOChenteIChente Segundo D.O., ASHLI George Cap, MSN, APRN, NP-C  Maria Isabel Krause. Robert Robison, MSN, APRN-CNP     Primary Care Physician: Maye Ceja. MD Farida   Admitting Physician:  Karolyn Campos DO  Admission date and time: 5/20/2021  6:09 AM    Room:  [address] [place] [address]  Admitting diagnosis: Acute respiratory failure with hypercapnia [facility]) [J96.02]    Patient Name: Lluvia Henson  MRN: [id number]    Date of Service: 5/21/2021     Subjective:  Carolann Malhotra is a 64 y.o. female who was seen and examined today,5/21/2021, at the bedside. Patient continued to well. She is breathing much easier than yesterday. She is concerned over the cost of Daliresp. O2 sat remained stable. Cough appear to be semiproductive. Film array positive for rhinovirus    No family present during my examination. Review of System:   Constitutional:   Denies fever or chills, weight loss or gain, fatigue or malaise. HEENT:   Denies ear pain, sore throat, sinus or eye problems. Cardiovascular:   Denies any chest pain, irregular heartbeats, or palpitations. Respiratory:   Improved shortness of breath  Gastrointestinal:   Denies nausea, vomiting, diarrhea, or constipation. Denies any abdominal pain. Genitourinary:    Denies any urgency, frequency, hematuria. Voiding  without difficulty. Extremities:   Denies lower extremity swelling, edema or cyanosis. Neurology:    Denies any headache or focal neurological deficits, Denies generalized weakness or memory difficulty. Psch:   Denies being anxious or depressed. Musculoskeletal:    Denies  myalgias, joint complaints or back pain. Integumentary:   Denies any rashes, ulcers, or excoriations. Denies bruising. Hematologic/Lymphatic:  Denies bruising or bleeding. Physical Exam:  No intake/output data recorded.     Intake/Output Summary (Last 24 hours) at 5/21/2021 1402  Last data filed at 5/21/2021 0525  Gross per 24 hour   Intake 450 ml   Output 500 ml   Net -50 ml   I/O last 3 completed shifts: In: 450 [P.O.:200; IV Piggyback:250]  Out: 500 [Urine:500]  Patient Vitals for the past 96 hrs (Last 3 readings):   Weight   05/20/21 1045 156 lb 8.4 oz (71 kg)   05/20/21 0611 151 lb (68.5 kg)     Vital Signs:   Blood pressure 121/73, pulse 72, temperature 98.4 °F (36.9 °C), temperature source Oral, resp. rate 20, height 5' 3\" (1.6 m), weight 156 lb 8.4 oz (71 kg), SpO2 97 %, not currently breastfeeding. General appearance:  Alert, responsive, oriented to person, place, and time. Well preserved, alert, no distress. Head:  Normocephalic. No masses, lesions or tenderness. Eyes:  PERRLA. EOMI. Sclera clear. Buccal mucosa moist.  ENT:  Ears normal. Mucosa normal.  Nasal cannula  Neck:    Supple. Trachea midline. No thyromegaly. No JVD. No bruits. Heart:    Rhythm regular. Rate controlled. No murmurs. Lungs:    Wheezing throughout. Better air exchange today. Abdomen:   Soft. Non-tender. Non-distended. Bowel sounds positive. No organomegaly or masses. No pain on palpation. Extremities:    Peripheral pulses present. No peripheral edema. No ulcers. No cyanosis. No clubbing. Neurologic:    Alert x 3. No focal deficit. Cranial nerves grossly intact. No focal weakness. Psych:   Behavior is normal. Mood appears normal. Speech is not rapid and/or pressured. Musculoskeletal:   Spine ROM normal. Muscular strength intact. Gait not assessed. Integumentary:  No rashes  Skin normal color and texture.   Genitalia/Breast:  Deferred    Medication:  Scheduled Meds:   sodium chloride flush  5-40 mL Intravenous 2 times per day    enoxaparin  40 mg Subcutaneous Daily    methylPREDNISolone  40 mg Intravenous Q6H    Roflumilast  500 mcg Oral Daily    budesonide  0.5 mg Nebulization BID    Arformoterol Tartrate  15 mcg Nebulization BID    azithromycin  500 mg Intravenous Q24H    ipratropium-albuterol  1 ampule Inhalation Q4H    sertraline  25 mg Oral Daily     Continuous Infusions:   sodium chloride         Objective Data:  CBC with Differential:    Lab Results   Component Value Date    WBC 11.1 05/20/2021    RBC 4.20 05/20/2021    HGB 13.0 05/20/2021    HCT 41.2 05/20/2021     05/20/2021    MCV 98.1 05/20/2021    MCH 31.0 05/20/2021    MCHC 31.6 05/20/2021    RDW 12.5 05/20/2021    LYMPHOPCT 10.2 05/17/2021    MONOPCT 9.5 05/17/2021    BASOPCT 0.3 05/17/2021    MONOSABS 0.98 05/17/2021    LYMPHSABS 1.05 05/17/2021    EOSABS 0.14 05/17/2021    BASOSABS 0.03 05/17/2021     CMP:    Lab Results   Component Value Date     05/21/2021    K 4.1 05/21/2021    K 3.9 05/17/2021    CL 97 05/21/2021    CO2 41 05/21/2021    BUN 21 05/21/2021    CREATININE 0.4 05/21/2021    GFRAA >60 05/21/2021    LABGLOM >60 05/21/2021    GLUCOSE 127 05/21/2021    PROT 6.5 05/17/2021    LABALBU 3.9 05/17/2021    CALCIUM 8.9 05/21/2021    BILITOT 0.3 05/17/2021    ALKPHOS 95 05/17/2021    AST 13 05/17/2021    ALT 9 05/17/2021              Assessment:    · Acute exacerbation of hypercapnia, hypoxemic respiratory failure secondary to underlying COPD. · Acute rhinovirus  · Advanced COPD. · History of nicotine use. · Depression, on Zoloft      Plan:     · Patient continue improved pulmonary wise. · Change Daliresp to theophylline due to cost  · Continue bronchodilators  · Switch to oral steroids  · Increase activity  · Check arterial blood gases due to hypercapnia. Encourage the use of BiPAP    More than 50% of my  time was spent at the bedside counseling/coordinating care with the patient and/or family with face to face contact. This time was spent reviewing notes and laboratory data as well as instructing and counseling the patient.  Time I spent with the family or surrogate(s) is included only if the patient was incapable of providing the necessary information or participating in medical

## 2021-05-21 NOTE — PLAN OF CARE
Problem: Breathing Pattern - Ineffective:  Goal: Ability to achieve and maintain a regular respiratory rate will improve  Description: Ability to achieve and maintain a regular respiratory rate will improve  5/21/2021 1620 by Brodie Avalos RN  Outcome: Met This Shift     Problem: Discharge Planning:  Goal: Discharged to appropriate level of care  Description: Discharged to appropriate level of care  5/21/2021 1620 by Brodie Avalos RN  Outcome: Met This Shift     Problem:  Activity Intolerance:  Goal: Ability to tolerate increased activity will improve  Description: Ability to tolerate increased activity will improve  5/21/2021 1620 by Brodie Avalos RN  Outcome: Met This Shift     Problem: Airway Clearance - Ineffective:  Goal: Ability to maintain a clear airway will improve  Description: Ability to maintain a clear airway will improve  5/21/2021 1620 by Brodie Avalos RN  Outcome: Met This Shift     Problem: Breathing Pattern - Ineffective:  Goal: Ability to achieve and maintain a regular respiratory rate will improve  Description: Ability to achieve and maintain a regular respiratory rate will improve  5/21/2021 1620 by Brodie Avalos RN  Outcome: Met This Shift

## 2021-05-21 NOTE — H&P
[address]                              HISTORY AND PHYSICAL    PATIENT NAME: Jimbo Murillo                       :        1960  MED REC NO:   [id number]                            ROOM:       04  ACCOUNT NO:   [de-identified]                           ADMIT DATE: 2021  PROVIDER:     Germain Toth DO    CHIEF COMPLAINT AND HISTORY OF CHIEF COMPLAINT:  This is a pleasant  66-year-old white female who was admitted to [address] [place] [address]. The  patient presented to the emergency room here with increasing amount of  shortness of breath and difficulty breathing. The patient presented to  the hospital here approximately three days ago with shortness of breath. The patient at that time was seen and evaluated, treated with outpatient  steroids and antibiotics and subsequently discharged home. She, at this  time, presented to the hospital early in the morning on . It was  noted at that time that she did have hypocapnia, hypoxemic respiratory  failure with placement of BiPAP and admitted to the Immediate Care Unit. The patient at that time seemed to progress satisfactorily. She was at  this time seen in the intensive care unit on BiPAP and was much more  alert. BiPAP was discontinued. She was placed on nasal cannula with  maintenance of satisfactory O2 saturations. Exam at this time revealed  a pleasant 66-year-old white female. She does have a longstanding  history of shortness of breath with underlying COPD. Unfortunately, she  does continue to smoke. She would not quantitate how much but prior to  months ago, she did smoke approximately a pack a day. She states that  over the past several days, she has only been smoking seven cigarettes  per day. She does wear chronic oxygen therapy at approximately 3 to 4  liters per minute, has recently been placed on prednisone and antibiotic  therapy.   She does have a productive cough of yellow mucus. She does  admit to have an exertional dyspnea along with some resting dyspnea. She does appear to be improved pulmonary wise at the present time. From  a cardiac standpoint view, she denies any prior cardiac history. She  does have exertional dyspnea more on a pulmonary basis. She denies any  history of myocardial infarction, hypertension, or significant  peripheral edema. Gastrointestinal wise, she denies any recent changes  in bowel habits, hematochezia, or rectal bleeding. She denies any  weight loss or weight gain. Genitourinary wise, she denies any dysuria,  hematuria, or pyuria. Neurologically, she denies any history of stroke,  TIAs, etc.  She has been under a lot of stress lately. ALLERGIES:  She is allergic to PENICILLIN. MEDICATIONS:  Prior to admission include the following:  She has been on  prednisone 50 mg daily, Zithromax 250 daily, Spiriva, Advair, Flonase,  Zoloft 25 daily, and Nicoderm patch. PAST MEDICAL HISTORY:  Positive with the usual childhood diseases. She  does have a history of colon abscess in the past, chronic obstructive  pulmonary disease, diverticulitis, provoked DVT in 2018, seasonal  allergic rhinitis, and smoking history approximately 25 pack years or  more. PAST SURGICAL HISTORY:  Include colon surgery, otherwise no surgery. FAMILY HISTORY:  Parents are . SOCIAL HISTORY:  The patient is currently . She is a mother of  two children. Smoked half a pack of cigarettes per day. Social use of  alcohol. Denies any recreational drugs. REVIEW OF CHART:  EKG does show a normal sinus rhythm. ProBNP is  normal.  BUN and creatinine are 19 and 0.5 respectively. Electrolytes  are satisfactory. Troponin levels are negative. Chest x-ray show  underlying history of COPD. Arterial blood gas did show a pH of 7.272,  pCO2 of 94, pO2 of 162.     PHYSICAL EXAMINATION:  VITAL SIGNS:  Show height to be 5 feet 3 inches, weight 156 pounds, BMI  27.73. Her blood pressure at this time was 125/78, pulse 88,  respirations 18, afebrile. GENERAL APPEARANCE:  At this time, this is a pleasant 51-year-old white  female who is alert, responsive, oriented to person, place, and time. HEENT:  Normal to gross visual inspection. BiPAP was discontinued at  this time and the patient was placed on nasal cannula. NECK:  Revealed adequate carotid upstrokes without bruits. LUNGS:  Diminished with end-expiratory wheezes, prolonged expiratory  phase with diminished breath sounds posteriorly. HEART:  Fairly regular, mildly tachycardic. No S3, no S4. ABDOMEN:  Soft. No guarding, rebound, or rigidity. EXTREMITIES:  Without any cyanosis, clubbing, or edema. BREASTS:  Not performed. RECTAL:  Not performed. GENITAL:  Not performed. NEUROLOGIC:  Grossly intact. SKIN:  Revealed no significant abnormal area. IMPRESSION:  At this time include:  1. Acute exacerbation of hypercapnia, hypoxemic respiratory failure  secondary to underlying COPD. 2.  Advanced COPD. 3.  History of nicotine use. 4.  Depression, on Zoloft. PLAN AND RECOMMENDATIONS:  At this time, currently the patient does  appear to be stable. She has been admitted to the hospital at this time  to the Immediate Care Unit. The patient on BiPAP has been improved. She will be discontinued with BiPAP, nasal cannula has been applied. We  will monitor her O2 sat. Pulmonary consult has been requested. We will  place the patient on steroids. Sputum for C and S and gram stains will  be obtained along with film array checking for COVID. The patient will  be placed on antibiotics in the form of broad spectrum, in the form of  Zithromax. We will continue steroids, bronchodilator, and long-acting  pulmonary treatment. The patient does have a history of possible  alpha-1 antitrypsin per family. We will obtain levels at this time. DVT prophylaxis will be employed. Otherwise, the patient appeared to be  stable at this time. We will make further recommendations including  routine lab work. Echocardiogram will also be obtained at this time. Pending results of further lab, we will dictate further care and  treatment.         Yaneth Rainey DO    D: 05/20/2021 17:22:57       T: 05/20/2021 19:38:04     [email or web address]  Job#: [id number]     Doc#: [id number]    CC:

## 2021-05-21 NOTE — CARE COORDINATION
SOCIAL WORK / DISCHARGE PLANNING:  COVID neg 5/17, resp panel neg 5/20. Sw met with pt at bedside. Resides with her daughter and 2 young grandchildren in 2 story home, pt's bed and bath on first floor. Has home O2 from [employer] and nebulizer. PCP Dr Khris De La Torre. Pt states she can not pay $600 for a med that is not covered by her insurance, wants something different. She could not recall the name, chart indicates Joon Raymond concerned with cost changed to theophylline. Dc plan remains to return home with daughter as previous denies current needs but previous Sw questioned about possible need for a bipap?? Or Trilogy? ?  Dtr will provide home going transport.                  Electronically signed by EPI Mcknight on 5/21/2021 at 2:56 PM

## 2021-05-22 LAB
ANION GAP SERPL CALCULATED.3IONS-SCNC: 5 MMOL/L (ref 7–16)
B.E.: 11.5 MMOL/L (ref -3–3)
BUN BLDV-MCNC: 19 MG/DL (ref 6–23)
CALCIUM SERPL-MCNC: 9.1 MG/DL (ref 8.6–10.2)
CHLORIDE BLD-SCNC: 95 MMOL/L (ref 98–107)
CO2: 39 MMOL/L (ref 22–29)
COHB: 0.2 % (ref 0–1.5)
CREAT SERPL-MCNC: 0.5 MG/DL (ref 0.5–1)
CRITICAL: ABNORMAL
CULTURE, RESPIRATORY: NORMAL
DATE ANALYZED: ABNORMAL
DATE OF COLLECTION: ABNORMAL
GFR AFRICAN AMERICAN: >60
GFR NON-AFRICAN AMERICAN: >60 ML/MIN/1.73
GLUCOSE BLD-MCNC: 108 MG/DL (ref 74–99)
HCO3: 37.6 MMOL/L (ref 22–26)
HHB: 4.1 % (ref 0–5)
LAB: ABNORMAL
Lab: ABNORMAL
MAGNESIUM: 2.4 MG/DL (ref 1.6–2.6)
METHB: 0.3 % (ref 0–1.5)
MODE: ABNORMAL
MRSA CULTURE ONLY: NORMAL
O2 CONTENT: 18.3 ML/DL
O2 SATURATION: 95.9 % (ref 92–98.5)
O2HB: 95.4 % (ref 94–97)
OPERATOR ID: 786
PATIENT TEMP: 37 C
PCO2: 55.2 MMHG (ref 35–45)
PH BLOOD GAS: 7.45 (ref 7.35–7.45)
PHOSPHORUS: 2.7 MG/DL (ref 2.5–4.5)
PO2: 77.4 MMHG (ref 75–100)
POTASSIUM SERPL-SCNC: 4 MMOL/L (ref 3.5–5)
SMEAR, RESPIRATORY: NORMAL
SODIUM BLD-SCNC: 139 MMOL/L (ref 132–146)
SOURCE, BLOOD GAS: ABNORMAL
THB: 13.6 G/DL (ref 11.5–16.5)
TIME ANALYZED: 526

## 2021-05-22 PROCEDURE — 2580000003 HC RX 258: Performed by: INTERNAL MEDICINE

## 2021-05-22 PROCEDURE — 6360000002 HC RX W HCPCS: Performed by: INTERNAL MEDICINE

## 2021-05-22 PROCEDURE — 6370000000 HC RX 637 (ALT 250 FOR IP): Performed by: INTERNAL MEDICINE

## 2021-05-22 PROCEDURE — 82805 BLOOD GASES W/O2 SATURATION: CPT

## 2021-05-22 PROCEDURE — 94669 MECHANICAL CHEST WALL OSCILL: CPT

## 2021-05-22 PROCEDURE — 36600 WITHDRAWAL OF ARTERIAL BLOOD: CPT

## 2021-05-22 PROCEDURE — 6360000002 HC RX W HCPCS: Performed by: NURSE PRACTITIONER

## 2021-05-22 PROCEDURE — 36415 COLL VENOUS BLD VENIPUNCTURE: CPT

## 2021-05-22 PROCEDURE — 87449 NOS EACH ORGANISM AG IA: CPT

## 2021-05-22 PROCEDURE — 84100 ASSAY OF PHOSPHORUS: CPT

## 2021-05-22 PROCEDURE — 83735 ASSAY OF MAGNESIUM: CPT

## 2021-05-22 PROCEDURE — 2060000000 HC ICU INTERMEDIATE R&B

## 2021-05-22 PROCEDURE — 94640 AIRWAY INHALATION TREATMENT: CPT

## 2021-05-22 PROCEDURE — 94660 CPAP INITIATION&MGMT: CPT

## 2021-05-22 PROCEDURE — 80048 BASIC METABOLIC PNL TOTAL CA: CPT

## 2021-05-22 RX ADMIN — IPRATROPIUM BROMIDE AND ALBUTEROL SULFATE 1 AMPULE: .5; 3 SOLUTION RESPIRATORY (INHALATION) at 10:34

## 2021-05-22 RX ADMIN — METHYLPREDNISOLONE SODIUM SUCCINATE 40 MG: 40 INJECTION, POWDER, FOR SOLUTION INTRAMUSCULAR; INTRAVENOUS at 14:44

## 2021-05-22 RX ADMIN — ARFORMOTEROL TARTRATE 15 MCG: 15 SOLUTION RESPIRATORY (INHALATION) at 18:41

## 2021-05-22 RX ADMIN — SERTRALINE 25 MG: 50 TABLET, FILM COATED ORAL at 10:06

## 2021-05-22 RX ADMIN — ARFORMOTEROL TARTRATE 15 MCG: 15 SOLUTION RESPIRATORY (INHALATION) at 06:24

## 2021-05-22 RX ADMIN — Medication 10 ML: at 10:05

## 2021-05-22 RX ADMIN — IPRATROPIUM BROMIDE AND ALBUTEROL SULFATE 1 AMPULE: .5; 3 SOLUTION RESPIRATORY (INHALATION) at 06:24

## 2021-05-22 RX ADMIN — IPRATROPIUM BROMIDE AND ALBUTEROL SULFATE 1 AMPULE: .5; 3 SOLUTION RESPIRATORY (INHALATION) at 18:41

## 2021-05-22 RX ADMIN — BUDESONIDE 500 MCG: 0.5 INHALANT RESPIRATORY (INHALATION) at 06:24

## 2021-05-22 RX ADMIN — Medication 10 ML: at 20:54

## 2021-05-22 RX ADMIN — BUDESONIDE 500 MCG: 0.5 INHALANT RESPIRATORY (INHALATION) at 18:41

## 2021-05-22 RX ADMIN — AZITHROMYCIN DIHYDRATE 500 MG: 500 INJECTION, POWDER, LYOPHILIZED, FOR SOLUTION INTRAVENOUS at 19:42

## 2021-05-22 RX ADMIN — THEOPHYLLINE ANHYDROUS 200 MG: 200 CAPSULE, EXTENDED RELEASE ORAL at 20:54

## 2021-05-22 RX ADMIN — IPRATROPIUM BROMIDE AND ALBUTEROL SULFATE 1 AMPULE: .5; 3 SOLUTION RESPIRATORY (INHALATION) at 02:05

## 2021-05-22 RX ADMIN — METHYLPREDNISOLONE SODIUM SUCCINATE 40 MG: 40 INJECTION, POWDER, FOR SOLUTION INTRAMUSCULAR; INTRAVENOUS at 06:30

## 2021-05-22 RX ADMIN — IPRATROPIUM BROMIDE AND ALBUTEROL SULFATE 1 AMPULE: .5; 3 SOLUTION RESPIRATORY (INHALATION) at 15:09

## 2021-05-22 RX ADMIN — ACETAMINOPHEN 650 MG: 325 TABLET ORAL at 05:48

## 2021-05-22 RX ADMIN — IPRATROPIUM BROMIDE AND ALBUTEROL SULFATE 1 AMPULE: .5; 3 SOLUTION RESPIRATORY (INHALATION) at 22:30

## 2021-05-22 RX ADMIN — ENOXAPARIN SODIUM 40 MG: 40 INJECTION SUBCUTANEOUS at 10:06

## 2021-05-22 RX ADMIN — THEOPHYLLINE ANHYDROUS 200 MG: 200 CAPSULE, EXTENDED RELEASE ORAL at 06:30

## 2021-05-22 ASSESSMENT — PAIN DESCRIPTION - DESCRIPTORS: DESCRIPTORS: HEADACHE

## 2021-05-22 ASSESSMENT — PAIN DESCRIPTION - PAIN TYPE: TYPE: ACUTE PAIN

## 2021-05-22 NOTE — PROGRESS NOTES
Denies  myalgias, joint complaints or back pain. Integumentary:   Denies any rashes, ulcers, or excoriations. Denies bruising. Hematologic/Lymphatic:  Denies bruising or bleeding. Physical Exam:  No intake/output data recorded. Intake/Output Summary (Last 24 hours) at 5/22/2021 1207  Last data filed at 5/21/2021 2151  Gross per 24 hour   Intake 310 ml   Output --   Net 310 ml   I/O last 3 completed shifts: In: 310 [P.O.:300; I.V.:10]  Out: -   Patient Vitals for the past 96 hrs (Last 3 readings):   Weight   05/20/21 1045 156 lb 8.4 oz (71 kg)   05/20/21 0611 151 lb (68.5 kg)     Vital Signs:   Blood pressure (!) 142/66, pulse 75, temperature 98.9 °F (37.2 °C), temperature source Oral, resp. rate 19, height 5' 3\" (1.6 m), weight 156 lb 8.4 oz (71 kg), SpO2 96 %, not currently breastfeeding. General appearance:  Alert, responsive, oriented to person, place, and time. Comfortable, mildly ill-appearing, no distress. Head:  Normocephalic. No masses, lesions or tenderness. Eyes:  PERRLA. EOMI. Sclera clear. Buccal mucosa moist.  ENT:  Ears normal. Mucosa normal.  Nasal cannula  Neck:    Supple. Trachea midline. No thyromegaly. No JVD. No bruits. Heart:    Rhythm regular. Rate controlled. S1 and S2.  Lungs:    Improved air exchange throughout. Mildly diminished. No further wheezing. No rales or rhonchi. Pattern of respirations regular, even and nonlabored. Abdomen:   Soft. Non-tender. Non-distended. Bowel sounds positive. No organomegaly or masses. No pain on palpation. Extremities:    Peripheral pulses present. No peripheral edema. No ulcers. No cyanosis. No clubbing. Neurologic:    Alert x 3. No focal deficit. Cranial nerves grossly intact. No focal weakness. Psych:   Behavior is normal. Mood appears normal. Speech is not rapid and/or pressured. Musculoskeletal:   Spine ROM normal. Muscular strength intact. Gait not assessed.   Integumentary:  No rashes  Skin normal color and texture. Genitalia/Breast:  Deferred    Medication:  Scheduled Meds:   [START ON 5/23/2021] predniSONE  50 mg Oral Daily with breakfast    theophylline  200 mg Oral Q12H    methylPREDNISolone  40 mg Intravenous Q8H    sodium chloride flush  5-40 mL Intravenous 2 times per day    enoxaparin  40 mg Subcutaneous Daily    budesonide  0.5 mg Nebulization BID    Arformoterol Tartrate  15 mcg Nebulization BID    azithromycin  500 mg Intravenous Q24H    ipratropium-albuterol  1 ampule Inhalation Q4H    sertraline  25 mg Oral Daily     Continuous Infusions:   sodium chloride         Objective Data:  CBC with Differential:    Lab Results   Component Value Date    WBC 8.9 05/21/2021    RBC 4.00 05/21/2021    HGB 12.5 05/21/2021    HCT 38.5 05/21/2021     05/21/2021    MCV 96.3 05/21/2021    MCH 31.3 05/21/2021    MCHC 32.5 05/21/2021    RDW 12.0 05/21/2021    LYMPHOPCT 10.5 05/21/2021    MONOPCT 4.5 05/21/2021    BASOPCT 0.1 05/21/2021    MONOSABS 0.40 05/21/2021    LYMPHSABS 0.93 05/21/2021    EOSABS 0.00 05/21/2021    BASOSABS 0.01 05/21/2021     CMP:    Lab Results   Component Value Date     05/22/2021    K 4.0 05/22/2021    K 3.9 05/17/2021    CL 95 05/22/2021    CO2 39 05/22/2021    BUN 19 05/22/2021    CREATININE 0.5 05/22/2021    GFRAA >60 05/22/2021    LABGLOM >60 05/22/2021    GLUCOSE 108 05/22/2021    PROT 6.5 05/17/2021    LABALBU 3.9 05/17/2021    CALCIUM 9.1 05/22/2021    BILITOT 0.3 05/17/2021    ALKPHOS 95 05/17/2021    AST 13 05/17/2021    ALT 9 05/17/2021              Assessment:    · Acute exacerbation of hypercapnia, hypoxemic respiratory failure secondary to underlying COPD. · Acute rhinovirus  · Advanced COPD. · History of nicotine use. · Depression, on Zoloft      Plan:   Augustina Hood is doing much better overall. Her respiratory symptoms have improved and her examination shows less evidence of bronchospasm.   Pulmonary team is provided consultation and medication adjustments have been made for more comprehensive regimen for home. We will give 1 additional dose of IV steroids today and transition to high-dose oral prednisone tomorrow. Empiric antimicrobial therapy is being employed while infectious evaluation is finalizing. She understands the need to become more active, ambulatory and use spirometry as we will wean oxygen. Chronic comorbidities labs vital signs are being monitored address accordingly. Anticipate discharge in the next 24 to 40 hours depending upon oxygen weaning and continued improvement. More than 50% of my  time was spent at the bedside counseling/coordinating care with the patient and/or family with face to face contact. This time was spent reviewing notes and laboratory data as well as instructing and counseling the patient. Time I spent with the family or surrogate(s) is included only if the patient was incapable of providing the necessary information or participating in medical decisions. I also discussed the differential diagnosis and all of the proposed management plans with the patient and individuals accompanying the patient. Suzanne Ramon requires this high level of physician care and nursing on the [facility] unit due the complexity of decision management and chance of rapid decline or death. Continued cardiac monitoring and higher level of nursing are required. I am readily available for any further decision-making and intervention.      Clay Carranza, APRN - CNP  5/22/2021  12:07 PM

## 2021-05-22 NOTE — PROGRESS NOTES
Pt encouraged to wear bipap throughout the night. Pt did use bipap for approx 3 hours during the shift.

## 2021-05-22 NOTE — PROGRESS NOTES
Pulse ox was 92% on room air at rest.  Ambulated patient on room air. Oxygen saturation was 87% on room air while ambulating. Oxygen applied. Recovery pulse ox was 91% on 3.5 iters of oxygen while ambulating.

## 2021-05-22 NOTE — PLAN OF CARE
Problem: Breathing Pattern - Ineffective:  Goal: Ability to achieve and maintain a regular respiratory rate will improve  Description: Ability to achieve and maintain a regular respiratory rate will improve  5/22/2021 0024 by Chandana Burgess RN  Outcome: Met This Shift     Problem: Discharge Planning:  Goal: Discharged to appropriate level of care  Description: Discharged to appropriate level of care  5/22/2021 0024 by Chandana Burgess RN  Outcome: Met This Shift     Problem:  Activity Intolerance:  Goal: Ability to tolerate increased activity will improve  Description: Ability to tolerate increased activity will improve  5/22/2021 0024 by Chandana Burgess RN  Outcome: Met This Shift     Problem: Airway Clearance - Ineffective:  Goal: Ability to maintain a clear airway will improve  Description: Ability to maintain a clear airway will improve  5/22/2021 0024 by Chandana Burgess RN  Outcome: Met This Shift     Problem: Breathing Pattern - Ineffective:  Goal: Ability to achieve and maintain a regular respiratory rate will improve  Description: Ability to achieve and maintain a regular respiratory rate will improve  5/22/2021 0024 by Chandana Burgess RN  Outcome: Met This Shift     Problem: Gas Exchange - Impaired:  Goal: Levels of oxygenation will improve  Description: Levels of oxygenation will improve  5/22/2021 0024 by Chandana Burgess RN  Outcome: Met This Shift     Problem: Pain:  Goal: Pain level will decrease  Description: Pain level will decrease  5/22/2021 0024 by Chandana Burgess RN  Outcome: Met This Shift     Problem: Pain:  Goal: Control of acute pain  Description: Control of acute pain  5/22/2021 0024 by Chandana Burgess RN  Outcome: Met This Shift     Problem: Pain:  Goal: Control of chronic pain  Description: Control of chronic pain  5/22/2021 0024 by Chandana Burgess RN  Outcome: Met This Shift

## 2021-05-23 LAB
ANION GAP SERPL CALCULATED.3IONS-SCNC: 6 MMOL/L (ref 7–16)
BASOPHILS ABSOLUTE: 0.03 E9/L (ref 0–0.2)
BASOPHILS RELATIVE PERCENT: 0.2 % (ref 0–2)
BUN BLDV-MCNC: 21 MG/DL (ref 6–23)
CALCIUM SERPL-MCNC: 9.2 MG/DL (ref 8.6–10.2)
CHLORIDE BLD-SCNC: 95 MMOL/L (ref 98–107)
CO2: 38 MMOL/L (ref 22–29)
CREAT SERPL-MCNC: 0.5 MG/DL (ref 0.5–1)
EOSINOPHILS ABSOLUTE: 0.01 E9/L (ref 0.05–0.5)
EOSINOPHILS RELATIVE PERCENT: 0.1 % (ref 0–6)
GFR AFRICAN AMERICAN: >60
GFR NON-AFRICAN AMERICAN: >60 ML/MIN/1.73
GLUCOSE BLD-MCNC: 89 MG/DL (ref 74–99)
HCT VFR BLD CALC: 38.4 % (ref 34–48)
HEMOGLOBIN: 12.7 G/DL (ref 11.5–15.5)
IMMATURE GRANULOCYTES #: 0.28 E9/L
IMMATURE GRANULOCYTES %: 2.2 % (ref 0–5)
L. PNEUMOPHILA SEROGP 1 UR AG: NORMAL
LYMPHOCYTES ABSOLUTE: 2.84 E9/L (ref 1.5–4)
LYMPHOCYTES RELATIVE PERCENT: 22.8 % (ref 20–42)
MAGNESIUM: 2.3 MG/DL (ref 1.6–2.6)
MCH RBC QN AUTO: 30.8 PG (ref 26–35)
MCHC RBC AUTO-ENTMCNC: 33.1 % (ref 32–34.5)
MCV RBC AUTO: 93 FL (ref 80–99.9)
MONOCYTES ABSOLUTE: 1.36 E9/L (ref 0.1–0.95)
MONOCYTES RELATIVE PERCENT: 10.9 % (ref 2–12)
NEUTROPHILS ABSOLUTE: 7.93 E9/L (ref 1.8–7.3)
NEUTROPHILS RELATIVE PERCENT: 63.8 % (ref 43–80)
PDW BLD-RTO: 12 FL (ref 11.5–15)
PHOSPHORUS: 3.3 MG/DL (ref 2.5–4.5)
PLATELET # BLD: 301 E9/L (ref 130–450)
PMV BLD AUTO: 9.3 FL (ref 7–12)
POTASSIUM SERPL-SCNC: 3.7 MMOL/L (ref 3.5–5)
PROCALCITONIN: 0.06 NG/ML (ref 0–0.08)
RBC # BLD: 4.13 E12/L (ref 3.5–5.5)
SODIUM BLD-SCNC: 139 MMOL/L (ref 132–146)
STREP PNEUMONIAE ANTIGEN, URINE: NORMAL
WBC # BLD: 12.5 E9/L (ref 4.5–11.5)

## 2021-05-23 PROCEDURE — 2700000000 HC OXYGEN THERAPY PER DAY

## 2021-05-23 PROCEDURE — 6370000000 HC RX 637 (ALT 250 FOR IP): Performed by: INTERNAL MEDICINE

## 2021-05-23 PROCEDURE — 6360000002 HC RX W HCPCS: Performed by: INTERNAL MEDICINE

## 2021-05-23 PROCEDURE — 84145 PROCALCITONIN (PCT): CPT

## 2021-05-23 PROCEDURE — 2580000003 HC RX 258: Performed by: INTERNAL MEDICINE

## 2021-05-23 PROCEDURE — 36415 COLL VENOUS BLD VENIPUNCTURE: CPT

## 2021-05-23 PROCEDURE — 85025 COMPLETE CBC W/AUTO DIFF WBC: CPT

## 2021-05-23 PROCEDURE — 80048 BASIC METABOLIC PNL TOTAL CA: CPT

## 2021-05-23 PROCEDURE — 94660 CPAP INITIATION&MGMT: CPT

## 2021-05-23 PROCEDURE — 83735 ASSAY OF MAGNESIUM: CPT

## 2021-05-23 PROCEDURE — 1200000000 HC SEMI PRIVATE

## 2021-05-23 PROCEDURE — 94640 AIRWAY INHALATION TREATMENT: CPT

## 2021-05-23 PROCEDURE — 6370000000 HC RX 637 (ALT 250 FOR IP): Performed by: NURSE PRACTITIONER

## 2021-05-23 PROCEDURE — 84100 ASSAY OF PHOSPHORUS: CPT

## 2021-05-23 RX ORDER — LANOLIN ALCOHOL/MO/W.PET/CERES
3 CREAM (GRAM) TOPICAL NIGHTLY PRN
Status: DISCONTINUED | OUTPATIENT
Start: 2021-05-23 | End: 2021-05-24 | Stop reason: HOSPADM

## 2021-05-23 RX ORDER — AZITHROMYCIN 250 MG/1
500 TABLET, FILM COATED ORAL DAILY
Status: DISCONTINUED | OUTPATIENT
Start: 2021-05-24 | End: 2021-05-24 | Stop reason: HOSPADM

## 2021-05-23 RX ADMIN — IPRATROPIUM BROMIDE AND ALBUTEROL SULFATE 1 AMPULE: .5; 3 SOLUTION RESPIRATORY (INHALATION) at 18:56

## 2021-05-23 RX ADMIN — ENOXAPARIN SODIUM 40 MG: 40 INJECTION SUBCUTANEOUS at 08:14

## 2021-05-23 RX ADMIN — IPRATROPIUM BROMIDE AND ALBUTEROL SULFATE 1 AMPULE: .5; 3 SOLUTION RESPIRATORY (INHALATION) at 02:07

## 2021-05-23 RX ADMIN — SERTRALINE 25 MG: 50 TABLET, FILM COATED ORAL at 08:14

## 2021-05-23 RX ADMIN — BUDESONIDE 500 MCG: 0.5 INHALANT RESPIRATORY (INHALATION) at 18:56

## 2021-05-23 RX ADMIN — Medication 10 ML: at 08:15

## 2021-05-23 RX ADMIN — THEOPHYLLINE ANHYDROUS 200 MG: 200 CAPSULE, EXTENDED RELEASE ORAL at 23:06

## 2021-05-23 RX ADMIN — ARFORMOTEROL TARTRATE 15 MCG: 15 SOLUTION RESPIRATORY (INHALATION) at 18:56

## 2021-05-23 RX ADMIN — THEOPHYLLINE ANHYDROUS 200 MG: 200 CAPSULE, EXTENDED RELEASE ORAL at 10:56

## 2021-05-23 RX ADMIN — IPRATROPIUM BROMIDE AND ALBUTEROL SULFATE 1 AMPULE: .5; 3 SOLUTION RESPIRATORY (INHALATION) at 06:07

## 2021-05-23 RX ADMIN — BUDESONIDE 500 MCG: 0.5 INHALANT RESPIRATORY (INHALATION) at 06:07

## 2021-05-23 RX ADMIN — IPRATROPIUM BROMIDE AND ALBUTEROL SULFATE 1 AMPULE: .5; 3 SOLUTION RESPIRATORY (INHALATION) at 10:28

## 2021-05-23 RX ADMIN — PREDNISONE 50 MG: 10 TABLET ORAL at 08:14

## 2021-05-23 RX ADMIN — IPRATROPIUM BROMIDE AND ALBUTEROL SULFATE 1 AMPULE: .5; 3 SOLUTION RESPIRATORY (INHALATION) at 21:22

## 2021-05-23 RX ADMIN — ACETAMINOPHEN 650 MG: 325 TABLET ORAL at 08:14

## 2021-05-23 RX ADMIN — Medication 3 MG: at 23:06

## 2021-05-23 RX ADMIN — Medication 10 ML: at 23:10

## 2021-05-23 RX ADMIN — IPRATROPIUM BROMIDE AND ALBUTEROL SULFATE 1 AMPULE: .5; 3 SOLUTION RESPIRATORY (INHALATION) at 14:53

## 2021-05-23 RX ADMIN — ARFORMOTEROL TARTRATE 15 MCG: 15 SOLUTION RESPIRATORY (INHALATION) at 06:08

## 2021-05-23 ASSESSMENT — PAIN SCALES - GENERAL: PAINLEVEL_OUTOF10: 0

## 2021-05-23 NOTE — PROGRESS NOTES
Spoke to supervisor let her know I received the order to move pt to Tele. Requested for her to let me know when a room becomes available.

## 2021-05-23 NOTE — PLAN OF CARE
Problem: Breathing Pattern - Ineffective:  Goal: Ability to achieve and maintain a regular respiratory rate will improve  Description: Ability to achieve and maintain a regular respiratory rate will improve  Outcome: Met This Shift     Problem: Discharge Planning:  Goal: Discharged to appropriate level of care  Description: Discharged to appropriate level of care  Outcome: Met This Shift     Problem:  Activity Intolerance:  Goal: Ability to tolerate increased activity will improve  Description: Ability to tolerate increased activity will improve  Outcome: Met This Shift     Problem: Airway Clearance - Ineffective:  Goal: Ability to maintain a clear airway will improve  Description: Ability to maintain a clear airway will improve  Outcome: Met This Shift     Problem: Breathing Pattern - Ineffective:  Goal: Ability to achieve and maintain a regular respiratory rate will improve  Description: Ability to achieve and maintain a regular respiratory rate will improve  Outcome: Met This Shift     Problem: Gas Exchange - Impaired:  Goal: Levels of oxygenation will improve  Description: Levels of oxygenation will improve  Outcome: Met This Shift     Problem: Pain:  Goal: Pain level will decrease  Description: Pain level will decrease  Outcome: Met This Shift  Goal: Control of acute pain  Description: Control of acute pain  5/23/2021 0058 by Mary Carmen Quiroz RN  Outcome: Met This Shift  5/22/2021 1520 by Maxi Pacheco RN  Outcome: Met This Shift  Goal: Control of chronic pain  Description: Control of chronic pain  5/23/2021 0058 by Mary Carmen Quiroz RN  Outcome: Met This Shift  5/22/2021 1520 by Maxi Pacheco RN  Outcome: Met This Shift

## 2021-05-23 NOTE — PROGRESS NOTES
Internal Medicine Progress Note    [facility]    Florencia Benavidez. Yan Cleveland., F.A.C.O.I. Alphonsa Olszewski, D.O., MARIELOSORAFAL Shoemaker D.O. Gilmer Fleischer, MSN, APRN, NP-C  Cassidy Easley. Rikki Siegel, MSN, APRN-CNP     Primary Care Physician: Tuan Boyce. MD Farida   Admitting Physician:  Reginaldo Robins DO  Admission date and time: 5/20/2021  6:09 AM    Room:  [facility]6672-89  Admitting diagnosis: Acute respiratory failure with hypercapnia [facility]) [J96.02]    Patient Name: Belkys Jerez  MRN: [id number]    Date of Service: 5/23/2021     Subjective:  Isabell Edgar is a 64 y.o. female who was seen and examined today,5/23/2021, at the bedside. She is feeling better overall. She states improvement on a daily basis. We discussed the absolute need for smoking cessation and she is agreeable. Theophylline was added instead of Daliresp due to cost.  She has been transitioned to oral steroid and oral antibiotic in anticipation of discharge tomorrow. She understands the need to be more active, use spirometry more aggressively. No family present during my examination. Review of System:   Constitutional:   Denies fever or chills, weight loss or gain, improving fatigue/malaise. HEENT:   Denies ear pain, sore throat, sinus or eye problems. Cardiovascular:   Denies any chest pain, irregular heartbeats, or palpitations. Respiratory:   Improved shortness of breath. No resting dyspnea. Mild exertional dyspnea. Coughing without sputum. Gastrointestinal:   Denies nausea, vomiting, diarrhea, or constipation. Denies any abdominal pain. Genitourinary:    Denies any urgency, frequency, hematuria. Voiding  without difficulty. Extremities:   Denies lower extremity swelling, edema or cyanosis. Neurology:    Denies any headache or focal neurological deficits, Denies generalized weakness or memory difficulty. Psch:   Denies being anxious or depressed.   Musculoskeletal:    Denies  myalgias, joint complaints or back pain. Integumentary:   Denies any rashes, ulcers, or excoriations. Denies bruising. Hematologic/Lymphatic:  Denies bruising or bleeding. Physical Exam:  No intake/output data recorded. Intake/Output Summary (Last 24 hours) at 5/23/2021 1014  Last data filed at 5/22/2021 2241  Gross per 24 hour   Intake 250 ml   Output 500 ml   Net -250 ml   I/O last 3 completed shifts: In: 250 [P.O.:240; I.V.:10]  Out: 800 [Urine:800]  Patient Vitals for the past 96 hrs (Last 3 readings):   Weight   05/20/21 1045 156 lb 8.4 oz (71 kg)   05/20/21 0611 151 lb (68.5 kg)     Vital Signs:   Blood pressure 123/61, pulse 73, temperature 98.2 °F (36.8 °C), temperature source Oral, resp. rate 13, height 5' 3\" (1.6 m), weight 156 lb 8.4 oz (71 kg), SpO2 98 %, not currently breastfeeding. General appearance:  Alert, responsive, oriented to person, place, and time. Comfortable, mildly ill-appearing, no distress. Head:  Normocephalic. No masses, lesions or tenderness. Eyes:  PERRLA. EOMI. Sclera clear. Buccal mucosa moist.  ENT:  Ears normal. Mucosa normal.  Nasal cannula  Neck:    Supple. Trachea midline. No thyromegaly. No JVD. No bruits. Heart:    Rhythm regular. Rate controlled. S1 and S2.  Lungs:    Improved air exchange throughout. Mildly diminished. No further wheezing. No rales or rhonchi. Pattern of respirations regular, even and nonlabored. Abdomen:   Soft. Non-tender. Non-distended. Bowel sounds positive. No organomegaly or masses. No pain on palpation. Extremities:    Peripheral pulses present. No peripheral edema. No ulcers. No cyanosis. No clubbing. Neurologic:    Alert x 3. No focal deficit. Cranial nerves grossly intact. No focal weakness. Psych:   Behavior is normal. Mood appears normal. Speech is not rapid and/or pressured. Musculoskeletal:   Spine ROM normal. Muscular strength intact. Gait not assessed.   Integumentary:  No rashes  Skin normal color and texture. Genitalia/Breast:  Deferred    Medication:  Scheduled Meds:   [START ON 5/24/2021] azithromycin  500 mg Oral Daily    predniSONE  50 mg Oral Daily with breakfast    theophylline  200 mg Oral Q12H    sodium chloride flush  5-40 mL Intravenous 2 times per day    enoxaparin  40 mg Subcutaneous Daily    budesonide  0.5 mg Nebulization BID    Arformoterol Tartrate  15 mcg Nebulization BID    ipratropium-albuterol  1 ampule Inhalation Q4H    sertraline  25 mg Oral Daily     Continuous Infusions:   sodium chloride         Objective Data:  CBC with Differential:    Lab Results   Component Value Date    WBC 12.5 05/23/2021    RBC 4.13 05/23/2021    HGB 12.7 05/23/2021    HCT 38.4 05/23/2021     05/23/2021    MCV 93.0 05/23/2021    MCH 30.8 05/23/2021    MCHC 33.1 05/23/2021    RDW 12.0 05/23/2021    LYMPHOPCT 22.8 05/23/2021    MONOPCT 10.9 05/23/2021    BASOPCT 0.2 05/23/2021    MONOSABS 1.36 05/23/2021    LYMPHSABS 2.84 05/23/2021    EOSABS 0.01 05/23/2021    BASOSABS 0.03 05/23/2021     CMP:    Lab Results   Component Value Date     05/23/2021    K 3.7 05/23/2021    K 3.9 05/17/2021    CL 95 05/23/2021    CO2 38 05/23/2021    BUN 21 05/23/2021    CREATININE 0.5 05/23/2021    GFRAA >60 05/23/2021    LABGLOM >60 05/23/2021    GLUCOSE 89 05/23/2021    PROT 6.5 05/17/2021    LABALBU 3.9 05/17/2021    CALCIUM 9.2 05/23/2021    BILITOT 0.3 05/17/2021    ALKPHOS 95 05/17/2021    AST 13 05/17/2021    ALT 9 05/17/2021              Assessment:    · Acute exacerbation of hypercapnia, hypoxemic respiratory failure secondary to underlying COPD. · Acute rhinovirus  · Advanced COPD. · History of nicotine use. · Depression, on Zoloft      Plan:   Augustina Hood has improved to the degree that she is approaching baseline. Her respiratory symptoms have improved and her examination shows less evidence of bronchospasm.   Pulmonary team is provided consultation and medication adjustments have been made for more comprehensive regimen for home. Will need to prescribe comprehensive regimen at home. She has been transitioned to high-dose oral prednisone with plans for taper. Azithromycin has been transitioned to oral with plan to complete 7 total days, infectious work-up is returned negative. She understands the need to become more active, ambulatory and use spirometry as we will wean oxygen. Chronic comorbidities labs vital signs are being monitored address accordingly. She understands the absolute need for smoking cessation. She will be for discharge tomorrow pending continued clinical improvement. More than 50% of my  time was spent at the bedside counseling/coordinating care with the patient and/or family with face to face contact. This time was spent reviewing notes and laboratory data as well as instructing and counseling the patient. Time I spent with the family or surrogate(s) is included only if the patient was incapable of providing the necessary information or participating in medical decisions. I also discussed the differential diagnosis and all of the proposed management plans with the patient and individuals accompanying the patient. Lilly Hill requires this high level of physician care and nursing on the [facility] unit due the complexity of decision management and chance of rapid decline or death. Continued cardiac monitoring and higher level of nursing are required. I am readily available for any further decision-making and intervention.      Zina Strong, APRN - CNP  5/23/2021  10:14 AM

## 2021-05-23 NOTE — PROGRESS NOTES
1500 Gathered all pt belongings and explained to pt she would be going to the 4th floor.   [facility] 9 to Cat on 4th

## 2021-05-23 NOTE — PLAN OF CARE
Problem: Breathing Pattern - Ineffective:  Goal: Ability to achieve and maintain a regular respiratory rate will improve  Description: Ability to achieve and maintain a regular respiratory rate will improve  5/23/2021 1312 by Florine Primrose, RN  Outcome: Met This Shift

## 2021-05-24 VITALS
HEIGHT: 63 IN | OXYGEN SATURATION: 97 % | RESPIRATION RATE: 20 BRPM | DIASTOLIC BLOOD PRESSURE: 68 MMHG | SYSTOLIC BLOOD PRESSURE: 134 MMHG | TEMPERATURE: 99 F | HEART RATE: 74 BPM | BODY MASS INDEX: 27.73 KG/M2 | WEIGHT: 156.53 LBS

## 2021-05-24 DIAGNOSIS — J96.11 CHRONIC RESPIRATORY FAILURE WITH HYPOXIA (HCC): ICD-10-CM

## 2021-05-24 LAB
ALPHA-1 ANTITRYPSIN PHENOTYPE: ABNORMAL
ALPHA-1 ANTITRYPSIN: 66 MG/DL (ref 90–200)
ANION GAP SERPL CALCULATED.3IONS-SCNC: 7 MMOL/L (ref 7–16)
BASOPHILS ABSOLUTE: 0.02 E9/L (ref 0–0.2)
BASOPHILS RELATIVE PERCENT: 0.1 % (ref 0–2)
BUN BLDV-MCNC: 19 MG/DL (ref 6–23)
CALCIUM SERPL-MCNC: 8.7 MG/DL (ref 8.6–10.2)
CHLORIDE BLD-SCNC: 96 MMOL/L (ref 98–107)
CO2: 36 MMOL/L (ref 22–29)
CREAT SERPL-MCNC: 0.5 MG/DL (ref 0.5–1)
EOSINOPHILS ABSOLUTE: 0.11 E9/L (ref 0.05–0.5)
EOSINOPHILS RELATIVE PERCENT: 0.8 % (ref 0–6)
GFR AFRICAN AMERICAN: >60
GFR NON-AFRICAN AMERICAN: >60 ML/MIN/1.73
GLUCOSE BLD-MCNC: 93 MG/DL (ref 74–99)
HCT VFR BLD CALC: 38 % (ref 34–48)
HEMOGLOBIN: 12.7 G/DL (ref 11.5–15.5)
IMMATURE GRANULOCYTES #: 0.3 E9/L
IMMATURE GRANULOCYTES %: 2.1 % (ref 0–5)
LYMPHOCYTES ABSOLUTE: 4.43 E9/L (ref 1.5–4)
LYMPHOCYTES RELATIVE PERCENT: 30.6 % (ref 20–42)
MAGNESIUM: 2.2 MG/DL (ref 1.6–2.6)
MCH RBC QN AUTO: 31.2 PG (ref 26–35)
MCHC RBC AUTO-ENTMCNC: 33.4 % (ref 32–34.5)
MCV RBC AUTO: 93.4 FL (ref 80–99.9)
MONOCYTES ABSOLUTE: 1.12 E9/L (ref 0.1–0.95)
MONOCYTES RELATIVE PERCENT: 7.7 % (ref 2–12)
NEUTROPHILS ABSOLUTE: 8.48 E9/L (ref 1.8–7.3)
NEUTROPHILS RELATIVE PERCENT: 58.7 % (ref 43–80)
PDW BLD-RTO: 12.2 FL (ref 11.5–15)
PHOSPHORUS: 3.7 MG/DL (ref 2.5–4.5)
PLATELET # BLD: 290 E9/L (ref 130–450)
PMV BLD AUTO: 9.1 FL (ref 7–12)
POTASSIUM SERPL-SCNC: 3.5 MMOL/L (ref 3.5–5)
RBC # BLD: 4.07 E12/L (ref 3.5–5.5)
SODIUM BLD-SCNC: 139 MMOL/L (ref 132–146)
WBC # BLD: 14.5 E9/L (ref 4.5–11.5)

## 2021-05-24 PROCEDURE — 97165 OT EVAL LOW COMPLEX 30 MIN: CPT

## 2021-05-24 PROCEDURE — 85025 COMPLETE CBC W/AUTO DIFF WBC: CPT

## 2021-05-24 PROCEDURE — 36415 COLL VENOUS BLD VENIPUNCTURE: CPT

## 2021-05-24 PROCEDURE — 6370000000 HC RX 637 (ALT 250 FOR IP): Performed by: INTERNAL MEDICINE

## 2021-05-24 PROCEDURE — 84100 ASSAY OF PHOSPHORUS: CPT

## 2021-05-24 PROCEDURE — 94640 AIRWAY INHALATION TREATMENT: CPT

## 2021-05-24 PROCEDURE — 6360000002 HC RX W HCPCS: Performed by: INTERNAL MEDICINE

## 2021-05-24 PROCEDURE — 80048 BASIC METABOLIC PNL TOTAL CA: CPT

## 2021-05-24 PROCEDURE — 83735 ASSAY OF MAGNESIUM: CPT

## 2021-05-24 PROCEDURE — 94660 CPAP INITIATION&MGMT: CPT

## 2021-05-24 PROCEDURE — 6370000000 HC RX 637 (ALT 250 FOR IP): Performed by: NURSE PRACTITIONER

## 2021-05-24 RX ORDER — AZITHROMYCIN 500 MG/1
500 TABLET, FILM COATED ORAL DAILY
Qty: 2 TABLET | Refills: 0 | Status: SHIPPED | OUTPATIENT
Start: 2021-05-25 | End: 2021-05-27

## 2021-05-24 RX ORDER — ALBUTEROL SULFATE 90 UG/1
2 AEROSOL, METERED RESPIRATORY (INHALATION) 4 TIMES DAILY PRN
Qty: 1 INHALER | Refills: 1 | Status: ON HOLD | OUTPATIENT
Start: 2021-05-24 | End: 2021-09-24 | Stop reason: SDUPTHER

## 2021-05-24 RX ORDER — PREDNISONE 10 MG/1
TABLET ORAL
Qty: 30 TABLET | Refills: 0 | Status: SHIPPED | OUTPATIENT
Start: 2021-05-24 | End: 2021-09-21 | Stop reason: ALTCHOICE

## 2021-05-24 RX ORDER — UMECLIDINIUM 62.5 UG/1
1 AEROSOL, POWDER ORAL DAILY
Qty: 30 EACH | Refills: 1 | Status: SHIPPED | OUTPATIENT
Start: 2021-05-24

## 2021-05-24 RX ORDER — TIOTROPIUM BROMIDE 18 UG/1
CAPSULE ORAL; RESPIRATORY (INHALATION)
Qty: 90 CAPSULE | Refills: 3 | OUTPATIENT
Start: 2021-05-24

## 2021-05-24 RX ORDER — LANOLIN ALCOHOL/MO/W.PET/CERES
3 CREAM (GRAM) TOPICAL NIGHTLY PRN
Qty: 30 TABLET | Refills: 0 | COMMUNITY
Start: 2021-05-24

## 2021-05-24 RX ADMIN — PREDNISONE 50 MG: 10 TABLET ORAL at 08:32

## 2021-05-24 RX ADMIN — SERTRALINE 25 MG: 50 TABLET, FILM COATED ORAL at 08:33

## 2021-05-24 RX ADMIN — AZITHROMYCIN MONOHYDRATE 500 MG: 250 TABLET ORAL at 08:32

## 2021-05-24 RX ADMIN — BUDESONIDE 500 MCG: 0.5 INHALANT RESPIRATORY (INHALATION) at 05:38

## 2021-05-24 RX ADMIN — IPRATROPIUM BROMIDE AND ALBUTEROL SULFATE 1 AMPULE: .5; 3 SOLUTION RESPIRATORY (INHALATION) at 02:34

## 2021-05-24 RX ADMIN — ENOXAPARIN SODIUM 40 MG: 40 INJECTION SUBCUTANEOUS at 08:32

## 2021-05-24 RX ADMIN — IPRATROPIUM BROMIDE AND ALBUTEROL SULFATE 1 AMPULE: .5; 3 SOLUTION RESPIRATORY (INHALATION) at 12:58

## 2021-05-24 RX ADMIN — THEOPHYLLINE ANHYDROUS 200 MG: 200 CAPSULE, EXTENDED RELEASE ORAL at 10:59

## 2021-05-24 RX ADMIN — ARFORMOTEROL TARTRATE 15 MCG: 15 SOLUTION RESPIRATORY (INHALATION) at 05:38

## 2021-05-24 RX ADMIN — IPRATROPIUM BROMIDE AND ALBUTEROL SULFATE 1 AMPULE: .5; 3 SOLUTION RESPIRATORY (INHALATION) at 05:38

## 2021-05-24 RX ADMIN — IPRATROPIUM BROMIDE AND ALBUTEROL SULFATE 1 AMPULE: .5; 3 SOLUTION RESPIRATORY (INHALATION) at 09:03

## 2021-05-24 ASSESSMENT — PAIN SCALES - GENERAL: PAINLEVEL_OUTOF10: 0

## 2021-05-24 NOTE — CARE COORDINATION
5/24/2021 1059 CM note: NEGATIVE COVID 5/20/21. CM spoke with [facility] Pharmacists regarding inhaler copay. Pt informed of albuterol inhaler copay of $7.50-pt agreeable. Ellipta inhaler copay is $85-pt states she cannot afford. Incruse information/phone# given to pt  for possible financial assistance. Pt states she could not afford copay in past and her Dr changed Rx to spiriva and another inhaler-?name. Pt states she will follow up with her PCP and Dr Dominick Vanegas regarding inhalers. Pt informed of referral to [facility]. Pt reports she was referred to [facility] for pulmonary rehab by Dr Dominick Vanegas and prefers to attend there. She had scheduled appt last Thursday that she will reschedule. She has home O2 from [place] and nebulizer. Plan is home and her dtr will provide ride.   [address]

## 2021-05-24 NOTE — PROGRESS NOTES
Occupational Therapy  OCCUPATIONAL THERAPY INITIAL EVALUATION      Date:2021  Patient Name: Carola Rust  MRN: [id number]  : 1960  Room: [facility]    [address] [place] [facility]    [address] [place]. [place]        Date:2021                                                  Patient Name: Carola Rust    MRN: [id number]    : 1960    Room: 2624341-92      Evaluating OT: Nasim Messina OTR/L   [id number]      Referring Provider:Daniel Rodriguez DO    Specific Provider Orders/Date: EVal and treat 2021    Tenative Discharge Recommendation:   Home with no OT needs       Diagnosis: Acute resp failure, hypercapnia     Pertinent Medical History: COPD    Precautions:  Fall Risk, O2      Home Living: Pt lives with alone (daughter coming from [place] to stay with patient for awhile)   2 story with 1 step to enter.   Bed/bath on 1st    Bathroom setup: tub/shower with seat and grab bars, raised commode    Equipment owned: Home O2    Prior Level of Function: Independent  with ADLs , Independent  with IADLs; ambulated with no device   Driving: yes     Pain Level: no pain this session ;   Cognition: A&O: x4/4;    Memory:  Good    Sequencing:  Good    Problem solving:  Fair    Judgement/safety:  Fair      Functional Assessment:  AM-PAC Daily Activity Raw Score: 23/24   Initial Eval Status  Date: 21   Feeding Independent   Grooming Independent   Standing at sink washing hands and fixing hair    UB Dressing Independent    LB Dressing Independent  Donned slippers seated EOB    Bathing Mod I    Toileting Independent    Bed Mobility  Independent   Supine <> sit     Functional Transfers Independent   Sit-stand from bed, commode    Functional Mobility Supervision, no device, O2   Ambulated to/from bathroom and in room   No LOB or SOB   Balance Sitting:     Static:  Independent    Dynamic:Independent  Standing: Independent   Activity Tolerance Fair+ with light activity    Visual/  Perceptual Glasses: reading             Hand Dominance    AROM (PROM) Strength Additional Info:    RUE  WFL WFL good  and wfl FMC/dexterity noted during ADL tasks       LUE WFL WFL good  and wfl FMC/dexterity noted during ADL tasks       Hearing: WFL   Sensation:  No c/o numbness or tingling   Tone: WFL     Comments: Upon arrival patient lying in bed . At end of session, patient sitting EOB  with call light and phone within reach, all lines and tubes intact. Patient / Family Goal: return home       Eval Complexity: Low    Time In: 0922  Time Out: 0945      Min Units   OT Eval Low 97165 x  1   OT Eval Medium 72759      OT Eval High 84767      OT Re-Eval [id number]       Therapeutic Ex [phone]       Therapeutic Activities 63785       ADL/Self Care [id number]       Orthotic Management [id number]       Manual [id number]     Neuro Re-Ed [id number]       Non-Billable Time          Evaluation Time additionally includes thorough review of current medical information, gathering information on past medical history/social history and prior level of function, interpretation of standardized testing/informal observation of tasks, assessment of data and development of plan of care and goals.             Tania Huber  OTR/L  OT [id number]

## 2021-05-24 NOTE — DISCHARGE SUMMARY
longstanding  history of shortness of breath with underlying COPD. Unfortunately, she  does continue to smoke. She would not quantitate how much but prior to  months ago, she did smoke approximately a pack a day. She states that  over the past several days, she has only been smoking seven cigarettes  per day. She does wear chronic oxygen therapy at approximately 3 to 4  liters per minute, has recently been placed on prednisone and antibiotic  therapy. She does have a productive cough of yellow mucus. She does  admit to have an exertional dyspnea along with some resting dyspnea. She does appear to be improved pulmonary wise at the present time. From  a cardiac standpoint view, she denies any prior cardiac history. She  does have exertional dyspnea more on a pulmonary basis. She denies any  history of myocardial infarction, hypertension, or significant  peripheral edema. Gastrointestinal wise, she denies any recent changes  in bowel habits, hematochezia, or rectal bleeding. She denies any  weight loss or weight gain. Genitourinary wise, she denies any dysuria,  hematuria, or pyuria. Neurologically, she denies any history of stroke,  TIAs, etc.  She has been under a lot of stress lately.     LABS[de-identified]  Lab Results   Component Value Date    WBC 14.5 (H) 05/24/2021    HGB 12.7 05/24/2021    HCT 38.0 05/24/2021     05/24/2021     05/24/2021    K 3.5 05/24/2021    CL 96 (L) 05/24/2021    CREATININE 0.5 05/24/2021    BUN 19 05/24/2021    CO2 36 (H) 05/24/2021    GLUCOSE 93 05/24/2021    ALT 9 05/17/2021    AST 13 05/17/2021    INR 1.0 10/28/2019     Lab Results   Component Value Date    INR 1.0 10/28/2019    INR 0.9 01/31/2018    INR 1.0 01/22/2018    PROTIME 11.3 10/28/2019    PROTIME 9.8 01/31/2018    PROTIME 11.5 01/22/2018      Lab Results   Component Value Date    TSH 0.693 03/08/2021     Lab Results   Component Value Date    TRIG 82 11/30/2019    TRIG 79 10/29/2019     Lab Results   Component Value Date    HDL 61 11/30/2019    HDL 45 10/29/2019     Lab Results   Component Value Date    LDLCALC 195 (H) 11/30/2019    LDLCALC 138 (H) 10/29/2019     Lab Results   Component Value Date    LABA1C 4.8 03/08/2021       IMAGING:  Echo Complete    Result Date: 5/21/2021  Transthoracic Echocardiography Report (TTE)  Demographics   Patient Name       [address] Gender              Female   Medical Record     [id number]     Room Number         0156  Number   Account #          [de-identified]    Procedure Date      05/21/2021   Corporate ID                    Ordering Physician  Elda Boone DO   Accession Number   [id number]   Referring Physician   Date of Birth      1960   [address]                                                      [facility]   Age                64 year(s)   Interpreting        Elda Boone DO                                  Physician                                   Any Other  Procedure Type of Study   TTE procedure  Procedure Date Date: 05/21/2021 Start: 09:54 AM Study Location: Echo Lab Technical Quality: Adequate visualization Indications:LV function. Patient Status: Routine Height: 63 inches Weight: 156 pounds BSA: 1.74 m^2 BMI: 27.63 kg/m^2 Rhythm: Within normal limits HR: 81 bpm BP: 170/77 mmHg  Findings   Left Ventricle  Left ventricular size is grossly normal.  No evidence of left ventricular mass or thrombus noted. Normal left ventricular wall thickness. Ejection fraction is measured at 52%. No regional wall motion abnormalities seen. Right Ventricle  Borderline dilated right ventricle. No evidence of a thrombus in the right ventricle. Left Atrium  Normal sized left atrium. Interatrial septum appears intact. No evidence of thrombus within left atrium. Right Atrium  Normal right atrium size. Mitral Valve  Physiologic and/or trace mitral regurgitation is present. No evidence of mitral valve stenosis.    Tricuspid Valve  Physiologic and/or trace tricuspid regurgitation. Aortic Valve  Aortic valve opens well. The aortic valve is trileaflet. No evidence of aortic valve regurgitation. No hemodynamically significant aortic stenosis is present. Pulmonic Valve  Pulmonic valve is structurally normal.   Pericardial Effusion  No evidence of pericardial effusion. Aorta  The aorta is within normal limits. Miscellaneous  Regular rhythm. Conclusions   Summary  Left ventricular size is grossly normal.  No evidence of left ventricular mass or thrombus noted. Normal left ventricular wall thickness. Ejection fraction is measured at 52%. No regional wall motion abnormalities seen. Borderline dilated right ventricle. No evidence of a thrombus in the right ventricle. Physiologic and/or trace mitral regurgitation is present. No evidence of mitral valve stenosis. Physiologic and/or trace tricuspid regurgitation. Regular rhythm.    Signature   ----------------------------------------------------------------  Electronically signed by Lyly Jung DO(Interpreting  physician) on 05/21/2021 04:52 PM  ----------------------------------------------------------------  M-Mode/2D Measurements & Calculations   LV Diastolic    LV Systolic Dimension: 2.9   AV Cusp Separation: 2 cmLA  Dimension: 3.9  cm                           Dimension: 3.5 cmAO Root  cm              LV Volume Diastolic: 11.6 ml Dimension: 3.2 cm  LV FS:25.6 %    LV Volume Systolic: 53.1 ml  LV PW           LV EDV/LV EDV Index: 56.8  Diastolic: 0.7  DL/69 AQ/X^0SB ESV/LV ESV  cm              Index: 31.6 ml/18ml/ m^2     RV Diastolic Dimension: 3.2  Septum          EF Calculated: 52.1 %        cm  Diastolic: 0.8  LV Mass Index: 47 l/min*m^2  cm  CO: 6.08 l/min                               LA volume/Index: 22.1 ml  CI: 3.49        LVOT: 2 cm  l/m*m^2  LV Mass: 82.26  g  Doppler Measurements & Calculations   MV Peak E-Wave:     AV Peak Velocity: 1.21 LVOT Peak Velocity: 1.13 m/s  0.84 m/s            m/s LVOT Mean Velocity: 0.67 m/s  MV Peak A-Wave:     AV Peak Gradient: 5.83 LVOT Peak Gradient: 5.1  0.99 m/s            mmHg                   mmHgLVOT Mean Gradient: 2.3  MV E/A Ratio: 0.85  AV Mean Velocity: 0.9  mmHg  MV Peak Gradient:   m/s  7.4 mmHg            AV Mean Gradient: 3.5  MV Mean Gradient:   mmHg  3.5 mmHg            AV VTI: 27.7 cm        TR Velocity:2.33 m/s  MV Mean Velocity:   AV Area                TR Gradient:21.73 mmHg  0.89 m/s            (Continuity):2.71 cm^2 PV Peak Velocity: 1.11 m/s  MV Deceleration                            PV Peak Gradient: 4.94 mmHg  Time: 252 msec      LVOT VTI: 23.9 cm      PV Mean Velocity: 0.92 m/s  MV P1/2t: 67.9 msec IVRT: 72.7 msec        PV Mean Gradient: 3.6 mmHg  MVA by PHT:3.24  cm^2  MV Area  (continuity): 2.4  cm^2  [email or web address] [id number]    XR CHEST PORTABLE    Result Date: 5/20/2021  EXAMINATION: ONE XRAY VIEW OF THE CHEST 5/20/2021 6:33 am COMPARISON: 05/17/2021 HISTORY: ORDERING SYSTEM PROVIDED HISTORY: shortness of breath TECHNOLOGIST PROVIDED HISTORY: Reason for exam:->shortness of breath FINDINGS: There is hyperinflation of the lungs and flattening of diaphragms consistent with COPD. There is no pulmonary infiltrate, mass or nodule. There is no pleural effusion. The cardiac silhouette is within normal limits. .     COPD. There is no evidence of failure or pneumonia. XR CHEST PORTABLE    Result Date: 5/17/2021  EXAMINATION: ONE XRAY VIEW OF THE CHEST 5/17/2021 10:58 am COMPARISON: 12/23/2020 HISTORY: ORDERING SYSTEM PROVIDED HISTORY: shortness of breath TECHNOLOGIST PROVIDED HISTORY: Reason for exam:->shortness of breath FINDINGS: EKG leads overlie the chest.  Heart size is normal.  Lungs are somewhat hyperinflated. There are areas of presumed scarring bilaterally which appear similar to previous.   No definite new areas of consolidation or effusion. Chronic appearing findings similar to previous. PA and lateral views would be useful for further assessment, if symptoms persist.     TRISTEN DIGITAL SCREEN W OR WO CAD BILATERAL    Result Date: 5/6/2021  EXAMINATION: SCREENING DIGITAL BILATERAL  MAMMOGRAM, 6/30/2020 TECHNIQUE: Screening mammography of the bilateral breasts was performed  in the MLO and CC projection. Computer aided detection was utilized in the interpretation of this exam. COMPARISON: None HISTORY: Screening. FINDINGS: There are scattered areas of fibroglandular density. There is no new dominant mass, suspicious microcalcification, or area of architectural distortion. No mammographic evidence of malignancy. BIRADS: BIRADS - CATEGORY 1 Negative, no evidence of malignancy. Normal interval follow-up is recommended in 12 months. OVERALL ASSESSMENT - NEGATIVE A letter of notification will be sent to the patient regarding the results. The [facility] recommend annual mammograms for women 40 years and older. HOSPITAL COURSE:   Qamar Navarro did well throughout hospitalization. Initially admitted with an acute exacerbation of COPD resulting in acute on chronic respiratory failure she has improved dramatically. Infectious work-up was undertaken revealed rhinoviral infection. She was weaned down to her baseline nasal cannula oxygen. Aggressive respiratory supportive measures were utilized consistent with the home therapy that she had previously been prescribed. Anticholinergic medication was transition to [employer] as this was a preferred tier on insurance. Advair prescription was sent to the pharmacy as well and we will ask them if I asked if the cost is too high for the patient to afford. Azithromycin will be completed. Chronic morbidities are well managed.   She will be discharged home with close outpatient follow-up and with recommendations to #1 stop smoking, #2 use oxygen and medications as prescribed, and #3 enroll in pulmonary rehab. BRIEF PHYSICAL EXAMINATION AND LABORATORIES ON DAY OF DISCHARGE:  VITALS:  /68   Pulse 74   Temp 99 °F (37.2 °C) (Oral)   Resp 20   Ht 5' 3\" (1.6 m)   Wt 156 lb 8.4 oz (71 kg)   SpO2 96%   BMI 27.73 kg/m²     HEENT:  PERRLA. EOMI. Sclera clear. Buccal mucosa moist.    Neck:  Supple. Trachea midline. No thyromegaly. No JVD. No bruits. Heart:  Rhythm regular, rate controlled. S1 and S2.     Lungs:  Symmetrical.  Improved aeration throughout. Clear to auscultation bilaterally. No wheezes. No rhonchi. No rales. Abdomen: Soft. Non-tender. Non-distended. Bowel sounds positive. No organomegaly or masses. No pain on palpation    Extremities:  Peripheral pulses present. No peripheral edema. No ulcers. Neurologic:  Alert x 3. No focal deficit. Cranial nerves grossly intact. Skin:  No petechia. No hemorrhage. No wounds. DISPOSITION:  The patient's condition is good. At this time the patient is without objective evidence of an acute process requiring continuing hospitalization or inpatient management. They are stable for discharge with outpatient follow-up. I have spoken with the patient and discussed the results of the current hospitalization, in addition to providing specific details for the plan of care and counseling regarding the diagnosis and prognosis. The plan has been discussed in detail and they are aware of the specific conditions for emergent return, as well as the importance of follow-up.   Their questions are answered at this time and they are agreeable with the plan for discharge to home    DISCHARGE MEDICATIONS:    [facility], [address] [place] [address] Medication Instructions OYZ:[id number]    Printed on:05/24/21 0982   Medication Information                      albuterol (PROVENTIL) (2.5 MG/3ML) 0.083% nebulizer solution  PLEASE PROVIDE FOR 30 DAY SUPPLY             albuterol sulfate HFA (VENTOLIN HFA) 108 (90 Base) MCG/ACT inhaler  Inhale 2 puffs into the lungs 4 times daily as needed for Wheezing or Shortness of Breath             azithromycin (ZITHROMAX) 500 MG tablet  Take 1 tablet by mouth daily for 2 days             fluticasone (FLONASE) 50 MCG/ACT nasal spray  2 sprays by Each Nostril route daily             fluticasone-salmeterol (ADVAIR) 250-50 MCG/DOSE AEPB  Inhale 1 puff into the lungs every 12 hours . Rinse and spit after each use.             melatonin 3 MG TABS tablet  Take 1 tablet by mouth nightly as needed (insomnia)             nicotine (NICODERM CQ) 14 MG/24HR  Place 1 patch onto the skin daily             predniSONE (DELTASONE) 10 MG tablet  Take by oral route 4 tabs x 3 days, then 3 tabs x 3 days, then 2 tabs x 3 days, then 1 tab x 3 days, then discontinue. Take with food. sertraline (ZOLOFT) 25 MG tablet  Take 1 tablet by mouth daily             theophylline (HU-24) 200 MG extended release capsule  Take 1 capsule by mouth every 12 hours Avoid caffeine or be consistent with caffeine intake. Umeclidinium Bromide (INCRUSE ELLIPTA) 62.5 MCG/INH AEPB  Inhale 1 puff into the lungs daily                 FOLLOW UP/INSTRUCTIONS:  · This patient is instructed to follow-up with her primary care physician. · Patient is instructed to follow-up with the consults listed above as directed by them. · she is instructed to resume home medications and take new medications as indicated in the list above. · If the patient has a recurrence of symptoms, she is instructed to go to the ED. Preparing for this patient's discharge, including paperwork, orders, instructions, and meeting with patient did require > 40 minutes.     SKYE Trujillo CNP     5/24/2021  9:12 AM

## 2021-05-25 ENCOUNTER — TELEPHONE (OUTPATIENT)
Dept: FAMILY MEDICINE CLINIC | Age: 61
End: 2021-05-25

## 2021-05-25 LAB
ALPHA-1 ANTITRYPSIN PHENOTYPE: ABNORMAL
ALPHA-1 ANTITRYPSIN: 65 MG/DL (ref 90–200)

## 2021-05-25 NOTE — TELEPHONE ENCOUNTER
Patient called to ask Dr. Cristian Avila if she would send new RX for Theophylline tablets, which is the generic, for Theophylline capsules. Patient stated RX was prescribed when she was at the hospital and when she went to pharmacy to pick it up, she was informed it was $300.   Patient stated pharmacist suggested patient call her doctor and ask for a script for the generic, which is only $7.    Last seen 4/7/2021  Next appt 6/7/2021  [facility]

## 2021-05-27 LAB — MYCOPLASMA PNEUMONIAE IGM: NORMAL

## 2021-06-16 ENCOUNTER — HOSPITAL ENCOUNTER (OUTPATIENT)
Age: 61
Discharge: HOME | End: 2021-06-16
Payer: COMMERCIAL

## 2021-06-16 LAB — THEOPHYLLINE LEVEL: 9.5 MCG/ML (ref 10–20)

## 2021-06-16 PROCEDURE — 80198 ASSAY OF THEOPHYLLINE: CPT

## 2021-06-16 PROCEDURE — 36415 COLL VENOUS BLD VENIPUNCTURE: CPT

## 2021-07-19 ENCOUNTER — OFFICE VISIT (OUTPATIENT)
Dept: FAMILY MEDICINE CLINIC | Age: 61
End: 2021-07-19
Payer: COMMERCIAL

## 2021-07-19 VITALS
RESPIRATION RATE: 20 BRPM | TEMPERATURE: 97 F | HEART RATE: 93 BPM | WEIGHT: 165 LBS | HEIGHT: 63 IN | OXYGEN SATURATION: 97 % | SYSTOLIC BLOOD PRESSURE: 134 MMHG | DIASTOLIC BLOOD PRESSURE: 80 MMHG | BODY MASS INDEX: 29.23 KG/M2

## 2021-07-19 DIAGNOSIS — J96.11 CHRONIC RESPIRATORY FAILURE WITH HYPOXIA (HCC): ICD-10-CM

## 2021-07-19 DIAGNOSIS — Z12.11 COLON CANCER SCREENING: ICD-10-CM

## 2021-07-19 DIAGNOSIS — J44.9 CHRONIC OBSTRUCTIVE PULMONARY DISEASE, UNSPECIFIED COPD TYPE (HCC): ICD-10-CM

## 2021-07-19 DIAGNOSIS — Z87.891 PERSONAL HISTORY OF TOBACCO USE: Primary | ICD-10-CM

## 2021-07-19 PROCEDURE — G0296 VISIT TO DETERM LDCT ELIG: HCPCS | Performed by: FAMILY MEDICINE

## 2021-07-19 PROCEDURE — 99213 OFFICE O/P EST LOW 20 MIN: CPT | Performed by: FAMILY MEDICINE

## 2021-07-19 RX ORDER — THEOPHYLLINE 400 MG/1
200 TABLET, EXTENDED RELEASE ORAL 2 TIMES DAILY
Status: ON HOLD | COMMUNITY
Start: 2021-06-24 | End: 2022-02-11 | Stop reason: HOSPADM

## 2021-07-19 SDOH — ECONOMIC STABILITY: FOOD INSECURITY: WITHIN THE PAST 12 MONTHS, THE FOOD YOU BOUGHT JUST DIDN'T LAST AND YOU DIDN'T HAVE MONEY TO GET MORE.: NEVER TRUE

## 2021-07-19 SDOH — ECONOMIC STABILITY: FOOD INSECURITY: WITHIN THE PAST 12 MONTHS, YOU WORRIED THAT YOUR FOOD WOULD RUN OUT BEFORE YOU GOT MONEY TO BUY MORE.: NEVER TRUE

## 2021-07-19 ASSESSMENT — SOCIAL DETERMINANTS OF HEALTH (SDOH): HOW HARD IS IT FOR YOU TO PAY FOR THE VERY BASICS LIKE FOOD, HOUSING, MEDICAL CARE, AND HEATING?: NOT HARD AT ALL

## 2021-07-19 NOTE — PROGRESS NOTES
[address] [facility]  [phone]   Dung Martínez MD     Patient: Yehuda Bee  YOB: 1960  Visit Date: 7/19/21    Anisha Parmar is a 64y.o. year old female here today for   Chief Complaint   Patient presents with    1 Month Follow-Up       HPI  Patient is a 64year old female with copd here for follow up of copd . Is on theophylline. incruse ran out. Has advair. Out for three weeks. Got her inhaler in Friday   Had increased shortness of breath. Since restarting inhaler overall is feeling better. She is on 3L. No cough or congestion     Has gotten multiple messaages from [facility] regarding lung cancer screening. Pulmonary rehab. - Dr. Jolie Shrestha. Quit smoking - may 2021. Pulmonary rehab. Review of Systems   Constitutional: Negative for chills and fever. Respiratory: Positive for shortness of breath (improved) and wheezing (intermittent). Negative for cough. Cardiovascular: Negative for chest pain, palpitations and leg swelling. Gastrointestinal: Negative for abdominal pain, diarrhea and nausea. Genitourinary: Negative for dysuria. Neurological: Negative for dizziness and light-headedness. Current Outpatient Medications on File Prior to Visit   Medication Sig Dispense Refill    theophylline (UNIPHYL) 400 MG extended release tablet take 1/2 tablet by mouth twice a day      Umeclidinium Bromide (INCRUSE ELLIPTA) 62.5 MCG/INH AEPB Inhale 1 puff into the lungs daily 30 each 1    fluticasone-salmeterol (ADVAIR) 250-50 MCG/DOSE AEPB Inhale 1 puff into the lungs every 12 hours . Rinse and spit after each use.  60 each 3    albuterol sulfate HFA (VENTOLIN HFA) 108 (90 Base) MCG/ACT inhaler Inhale 2 puffs into the lungs 4 times daily as needed for Wheezing or Shortness of Breath 1 Inhaler 1    predniSONE (DELTASONE) 10 MG tablet Take by oral route 4 tabs x 3 days, then 3 tabs x 3 days, then 2 tabs x 3 days, then 1 tab x 3 days, then discontinue. Take with food. 30 tablet 0    melatonin 3 MG TABS tablet Take 1 tablet by mouth nightly as needed (insomnia) 30 tablet 0    albuterol (PROVENTIL) (2.5 MG/3ML) 0.083% nebulizer solution PLEASE PROVIDE FOR 30 DAY SUPPLY 30 vial 2    fluticasone (FLONASE) 50 MCG/ACT nasal spray 2 sprays by Each Nostril route daily 1 Bottle 5    sertraline (ZOLOFT) 25 MG tablet Take 1 tablet by mouth daily 30 tablet 5    nicotine (NICODERM CQ) 14 MG/24HR Place 1 patch onto the skin daily 30 patch 0    theophylline (HU-24) 200 MG extended release capsule Take 1 capsule by mouth every 12 hours Avoid caffeine or be consistent with caffeine intake. 60 capsule 0     No current facility-administered medications on file prior to visit. Allergies   Allergen Reactions    Penicillin V Hives and Other (See Comments)     11/07/2005 UNKNOWN, PLEASE VERIFY, 11/07/2005 UNKNOWN, PLEASE VERIFY       Past medical, surgical, socialand/or family history reviewed, updated as needed, and are non-contributory (unless otherwise stated). Medications, allergies, and problem list also reviewed and updated as needed in patient's record. Wt Readings from Last 3 Encounters:   07/19/21 165 lb (74.8 kg)   05/20/21 156 lb 8.4 oz (71 kg)   05/17/21 150 lb 8 oz (68.3 kg)                   /80   Pulse 93   Temp 97 °F (36.1 °C)   Resp 20   Ht 5' 3\" (1.6 m)   Wt 165 lb (74.8 kg)   SpO2 97%   BMI 29.23 kg/m²        Physical Exam  Vitals and nursing note reviewed. Constitutional:       Appearance: Normal appearance. Comments: Walking    HENT:      Head: Normocephalic and atraumatic. Cardiovascular:      Rate and Rhythm: Normal rate and regular rhythm. Heart sounds: No murmur heard. Pulmonary:      Effort: Pulmonary effort is normal. No respiratory distress. Breath sounds: Normal breath sounds. No wheezing. Abdominal:      General: Abdomen is flat. Bowel sounds are normal. There is no distension. Musculoskeletal:      Right lower leg: Edema present. Left lower leg: Edema present. Neurological:      Mental Status: She is alert. Results for orders placed or performed during the hospital encounter of 06/16/21   Theophylline   Result Value Ref Range    Theophylline Lvl 9.5 (L) 10.0 - 20.0 mcg/mL       ASSESSMENT/PLAN  Alexia Gibbs was seen today for 1 month follow-up. Diagnoses and all orders for this visit:    Personal history of tobacco use  -     [facility] VISIT TO DISCUSS LUNG CA SCREEN W LDCT  -     CT Lung Screen (Annual); Future    Colon cancer screening  -     Cologuard (For External Results Only); Future    Chronic respiratory failure with hypoxia (HCC)   - On oxygen   Chronic obstructive pulmonary disease, unspecified COPD type ([facility] [address]    - Will discuss pulmonary rehab with Dr. Felipe Zarco    On inhalers and seeing pulmonary . Phone/MyChart follow up if tests abnormal.    Return in about 3 months (around 10/19/2021). or sooner if necessary. I have reviewed myfindings and recommendations with Alexia Gibbs. Jackie Cheng. Reza Farfan MD, M.D    Low Dose CT (LDCT) Lung Screening criteria met   Age 50-69   Pack year smoking >30   Still smoking or less than 15 year since quit   No sign or symptoms of lung cancer   > 11 months since last LDCT     Risks and benefits of lung cancer screening with LDCT scans discussed:    Significance of positive screen - False-positive LDCT results often occur. 95% of all positive results do not lead to a diagnosis of cancer. Usually further imaging can resolve most false-positive results; however, some patients may require invasive procedures. Over diagnosis risk - 10% to 12% of screen-detected lung cancer cases are over diagnosedthat is, the cancer would not have been detected in the patient's lifetime without the screening.     Need for follow up screens annually to continue lung cancer screening effectiveness     Risks associated with radiation from annual LDCT- Radiation exposure is about the same as for a mammogram, which is about 1/3 of the annual background radiation exposure from everyday life. Starting screening at age 54 is not likely to increase cancer risk from radiation exposure. Patients with comorbidities resulting in life expectancy of < 10 years, or that would preclude treatment of an abnormality identified on CT, should not be screened due to lack of benefit.     To obtain maximal benefit from this screening, smoking cessation and long-term abstinence from smoking is critical

## 2021-07-19 NOTE — PATIENT INSTRUCTIONS
I will talk to Dr. Felipa Veras about pulmonary rehab and the lung transplant   Get lung CT   Follow up in 3 months     What is lung cancer screening? Lung cancer screening is a way in which doctors check the lungs for early signs of cancer in people who have no symptoms of lung cancer. A low-dose CT scan uses much less radiation than a normal CT scan and shows a more detailed image of the lungs than a standard X-ray. The goal of lung cancer screening is to find cancer early, before it has a chance to grow, spread, or cause problems. One large study found that smokers who were screened with low-dose CT scans were less likely to die of lung cancer than those who were screened with standard X-ray. Below is a summary of the things you need to know regarding screening for lung cancer with low-dose computed tomography (LDCT). This is a screening program that involves routine annual screening with LDCT studies of the lung. The LDCTs are done using low-dose radiation that is not thought to increase your cancer risk. If you have other serious medical conditions (other cancers, congestive heart failure) that limit your life expectancy to less than 10 years, you should not undergo lung cancer screening with LDCT. The chance is 20%-60% that the LDCT result will show abnormalities. This would require additional testing which could include repeat imaging or even invasive procedures. Most (about 95%) of \"abnormal\" LDCT results are false in the sense that no lung cancer is ultimately found. Additionally, some (about 10%) of the cancers found would not affect your life expectancy, even if undetected and untreated. If you are still smoking, the single most important thing that you can do to reduce your risk of dying of lung cancer is to quit. For this screening to be covered by Medicare and most other insurers, strict criteria must be met.   If you do not meet these criteria, but still wish to undergo LDCT testing, you will be required to sign a waiver indicating your willingness to pay for the scan.

## 2021-07-19 NOTE — Clinical Note
Can you help patient schedule with [facility] for lung trasnplant , has missed call from them in the past. See care everywhere

## 2021-07-26 ASSESSMENT — ENCOUNTER SYMPTOMS
ABDOMINAL PAIN: 0
COUGH: 0
DIARRHEA: 0
NAUSEA: 0
WHEEZING: 1
SHORTNESS OF BREATH: 1

## 2021-07-27 ENCOUNTER — TELEPHONE (OUTPATIENT)
Dept: FAMILY MEDICINE CLINIC | Age: 61
End: 2021-07-27

## 2021-07-27 NOTE — TELEPHONE ENCOUNTER
----- Message from Nam Faith MD sent at 7/26/2021 10:22 PM EDT -----  Can you help patient schedule with [facility] for lung trasnplant , has missed call from them in the past. See care everywhere

## 2021-08-12 ENCOUNTER — TELEPHONE (OUTPATIENT)
Dept: FAMILY MEDICINE CLINIC | Age: 61
End: 2021-08-12

## 2021-08-12 NOTE — TELEPHONE ENCOUNTER
Called pt and left vm msg requesting return call regarding scheduling at [facility].      ----- Message from Olivia Anglin MD sent at 8/12/2021  9:41 AM EDT -----  Can you help patient schedule appointment with lung transplant center in [address] [place] [address] Road. They have tried to reach her as I can see in care everywhere.

## 2021-08-17 NOTE — TELEPHONE ENCOUNTER
Called patient to discuss below  . No answer. LMOM for patient to call back with best number and time to reach them     Please send a letter for patient to call office.

## 2021-09-21 ENCOUNTER — APPOINTMENT (OUTPATIENT)
Dept: GENERAL RADIOLOGY | Age: 61
DRG: 190 | End: 2021-09-21
Payer: COMMERCIAL

## 2021-09-21 ENCOUNTER — HOSPITAL ENCOUNTER (INPATIENT)
Age: 61
LOS: 4 days | Discharge: HOME | DRG: 190 | End: 2021-09-25
Attending: EMERGENCY MEDICINE | Admitting: INTERNAL MEDICINE
Payer: COMMERCIAL

## 2021-09-21 DIAGNOSIS — J96.02 ACUTE RESPIRATORY FAILURE WITH HYPERCAPNIA (HCC): Primary | ICD-10-CM

## 2021-09-21 DIAGNOSIS — R09.02 HYPOXIA: ICD-10-CM

## 2021-09-21 DIAGNOSIS — J44.1 COPD EXACERBATION (HCC): ICD-10-CM

## 2021-09-21 DIAGNOSIS — J44.1 COPD WITH ACUTE EXACERBATION (HCC): ICD-10-CM

## 2021-09-21 LAB
ANION GAP SERPL CALCULATED.3IONS-SCNC: 9 MMOL/L (ref 7–16)
B.E.: 4.8 MMOL/L (ref -3–3)
BASOPHILS ABSOLUTE: 0.04 E9/L (ref 0–0.2)
BASOPHILS RELATIVE PERCENT: 0.4 % (ref 0–2)
BUN BLDV-MCNC: 10 MG/DL (ref 6–23)
CALCIUM SERPL-MCNC: 9.6 MG/DL (ref 8.6–10.2)
CHLORIDE BLD-SCNC: 98 MMOL/L (ref 98–107)
CO2: 35 MMOL/L (ref 22–29)
COHB: 1.3 % (ref 0–1.5)
COMMENT: ABNORMAL
CREAT SERPL-MCNC: 0.5 MG/DL (ref 0.5–1)
CRITICAL: ABNORMAL
DATE ANALYZED: ABNORMAL
DATE OF COLLECTION: ABNORMAL
EOSINOPHILS ABSOLUTE: 0.14 E9/L (ref 0.05–0.5)
EOSINOPHILS RELATIVE PERCENT: 1.3 % (ref 0–6)
GFR AFRICAN AMERICAN: >60
GFR NON-AFRICAN AMERICAN: >60 ML/MIN/1.73
GLUCOSE BLD-MCNC: 123 MG/DL (ref 74–99)
HCO3: 33.7 MMOL/L (ref 22–26)
HCT VFR BLD CALC: 44.9 % (ref 34–48)
HEMOGLOBIN: 14.7 G/DL (ref 11.5–15.5)
HHB: 2 % (ref 0–5)
IMMATURE GRANULOCYTES #: 0.03 E9/L
IMMATURE GRANULOCYTES %: 0.3 % (ref 0–5)
LAB: ABNORMAL
LYMPHOCYTES ABSOLUTE: 1.62 E9/L (ref 1.5–4)
LYMPHOCYTES RELATIVE PERCENT: 14.9 % (ref 20–42)
Lab: ABNORMAL
MCH RBC QN AUTO: 30.1 PG (ref 26–35)
MCHC RBC AUTO-ENTMCNC: 32.7 % (ref 32–34.5)
MCV RBC AUTO: 91.8 FL (ref 80–99.9)
METHB: 0.3 % (ref 0–1.5)
MODE: ABNORMAL
MONOCYTES ABSOLUTE: 0.55 E9/L (ref 0.1–0.95)
MONOCYTES RELATIVE PERCENT: 5.1 % (ref 2–12)
NEUTROPHILS ABSOLUTE: 8.48 E9/L (ref 1.8–7.3)
NEUTROPHILS RELATIVE PERCENT: 78 % (ref 43–80)
O2 CONTENT: 20.6 ML/DL
O2 SATURATION: 98 % (ref 92–98.5)
O2HB: 96.4 % (ref 94–97)
OPERATOR ID: 514
PATIENT TEMP: 37 C
PCO2: 69.4 MMHG (ref 35–45)
PDW BLD-RTO: 12.4 FL (ref 11.5–15)
PH BLOOD GAS: 7.3 (ref 7.35–7.45)
PLATELET # BLD: 347 E9/L (ref 130–450)
PMV BLD AUTO: 9.2 FL (ref 7–12)
PO2: 129.1 MMHG (ref 75–100)
POTASSIUM REFLEX MAGNESIUM: 4.5 MMOL/L (ref 3.5–5)
PRO-BNP: 89 PG/ML (ref 0–125)
RBC # BLD: 4.89 E12/L (ref 3.5–5.5)
SARS-COV-2, NAAT: NOT DETECTED
SODIUM BLD-SCNC: 142 MMOL/L (ref 132–146)
SOURCE, BLOOD GAS: ABNORMAL
THB: 15.1 G/DL (ref 11.5–16.5)
TIME ANALYZED: 1733
TROPONIN, HIGH SENSITIVITY: 9 NG/L (ref 0–9)
WBC # BLD: 10.9 E9/L (ref 4.5–11.5)

## 2021-09-21 PROCEDURE — 83880 ASSAY OF NATRIURETIC PEPTIDE: CPT

## 2021-09-21 PROCEDURE — 99285 EMERGENCY DEPT VISIT HI MDM: CPT

## 2021-09-21 PROCEDURE — 84484 ASSAY OF TROPONIN QUANT: CPT

## 2021-09-21 PROCEDURE — 87635 SARS-COV-2 COVID-19 AMP PRB: CPT

## 2021-09-21 PROCEDURE — 87449 NOS EACH ORGANISM AG IA: CPT

## 2021-09-21 PROCEDURE — 0202U NFCT DS 22 TRGT SARS-COV-2: CPT

## 2021-09-21 PROCEDURE — 85025 COMPLETE CBC W/AUTO DIFF WBC: CPT

## 2021-09-21 PROCEDURE — 96365 THER/PROPH/DIAG IV INF INIT: CPT

## 2021-09-21 PROCEDURE — 2580000003 HC RX 258: Performed by: INTERNAL MEDICINE

## 2021-09-21 PROCEDURE — 6370000000 HC RX 637 (ALT 250 FOR IP): Performed by: STUDENT IN AN ORGANIZED HEALTH CARE EDUCATION/TRAINING PROGRAM

## 2021-09-21 PROCEDURE — 6360000002 HC RX W HCPCS: Performed by: STUDENT IN AN ORGANIZED HEALTH CARE EDUCATION/TRAINING PROGRAM

## 2021-09-21 PROCEDURE — 71045 X-RAY EXAM CHEST 1 VIEW: CPT

## 2021-09-21 PROCEDURE — 80048 BASIC METABOLIC PNL TOTAL CA: CPT

## 2021-09-21 PROCEDURE — 82805 BLOOD GASES W/O2 SATURATION: CPT

## 2021-09-21 PROCEDURE — 1200000000 HC SEMI PRIVATE

## 2021-09-21 PROCEDURE — 93005 ELECTROCARDIOGRAM TRACING: CPT | Performed by: EMERGENCY MEDICINE

## 2021-09-21 PROCEDURE — 94644 CONT INHLJ TX 1ST HOUR: CPT

## 2021-09-21 PROCEDURE — 6360000002 HC RX W HCPCS: Performed by: INTERNAL MEDICINE

## 2021-09-21 RX ORDER — SODIUM CHLORIDE 0.9 % (FLUSH) 0.9 %
5-40 SYRINGE (ML) INJECTION PRN
Status: DISCONTINUED | OUTPATIENT
Start: 2021-09-21 | End: 2021-09-25 | Stop reason: HOSPADM

## 2021-09-21 RX ORDER — ACETAMINOPHEN 650 MG/1
650 SUPPOSITORY RECTAL EVERY 6 HOURS PRN
Status: DISCONTINUED | OUTPATIENT
Start: 2021-09-21 | End: 2021-09-25 | Stop reason: HOSPADM

## 2021-09-21 RX ORDER — IPRATROPIUM BROMIDE AND ALBUTEROL SULFATE 2.5; .5 MG/3ML; MG/3ML
3 SOLUTION RESPIRATORY (INHALATION) ONCE
Status: COMPLETED | OUTPATIENT
Start: 2021-09-21 | End: 2021-09-21

## 2021-09-21 RX ORDER — PROCHLORPERAZINE EDISYLATE 5 MG/ML
10 INJECTION INTRAMUSCULAR; INTRAVENOUS EVERY 6 HOURS PRN
Status: DISCONTINUED | OUTPATIENT
Start: 2021-09-21 | End: 2021-09-25 | Stop reason: HOSPADM

## 2021-09-21 RX ORDER — ACETAMINOPHEN 325 MG/1
650 TABLET ORAL EVERY 6 HOURS PRN
Status: DISCONTINUED | OUTPATIENT
Start: 2021-09-21 | End: 2021-09-25 | Stop reason: HOSPADM

## 2021-09-21 RX ORDER — MAGNESIUM SULFATE IN WATER 40 MG/ML
2000 INJECTION, SOLUTION INTRAVENOUS ONCE
Status: COMPLETED | OUTPATIENT
Start: 2021-09-21 | End: 2021-09-21

## 2021-09-21 RX ORDER — METHYLPREDNISOLONE SODIUM SUCCINATE 125 MG/2ML
60 INJECTION, POWDER, LYOPHILIZED, FOR SOLUTION INTRAMUSCULAR; INTRAVENOUS ONCE
Status: COMPLETED | OUTPATIENT
Start: 2021-09-21 | End: 2021-09-21

## 2021-09-21 RX ORDER — METHYLPREDNISOLONE SODIUM SUCCINATE 125 MG/2ML
80 INJECTION, POWDER, LYOPHILIZED, FOR SOLUTION INTRAMUSCULAR; INTRAVENOUS EVERY 8 HOURS
Status: DISCONTINUED | OUTPATIENT
Start: 2021-09-22 | End: 2021-09-22

## 2021-09-21 RX ORDER — SODIUM CHLORIDE 0.9 % (FLUSH) 0.9 %
5-40 SYRINGE (ML) INJECTION EVERY 12 HOURS SCHEDULED
Status: DISCONTINUED | OUTPATIENT
Start: 2021-09-21 | End: 2021-09-25 | Stop reason: HOSPADM

## 2021-09-21 RX ORDER — IPRATROPIUM BROMIDE AND ALBUTEROL SULFATE 2.5; .5 MG/3ML; MG/3ML
1 SOLUTION RESPIRATORY (INHALATION)
Status: DISCONTINUED | OUTPATIENT
Start: 2021-09-21 | End: 2021-09-25 | Stop reason: HOSPADM

## 2021-09-21 RX ORDER — POLYETHYLENE GLYCOL 3350 17 G/17G
17 POWDER, FOR SOLUTION ORAL DAILY PRN
Status: DISCONTINUED | OUTPATIENT
Start: 2021-09-21 | End: 2021-09-25 | Stop reason: HOSPADM

## 2021-09-21 RX ORDER — BUDESONIDE 0.5 MG/2ML
0.5 INHALANT ORAL 2 TIMES DAILY
Status: DISCONTINUED | OUTPATIENT
Start: 2021-09-21 | End: 2021-09-25 | Stop reason: HOSPADM

## 2021-09-21 RX ORDER — SODIUM CHLORIDE 9 MG/ML
25 INJECTION, SOLUTION INTRAVENOUS PRN
Status: DISCONTINUED | OUTPATIENT
Start: 2021-09-21 | End: 2021-09-25 | Stop reason: HOSPADM

## 2021-09-21 RX ADMIN — SODIUM CHLORIDE, PRESERVATIVE FREE 10 ML: 5 INJECTION INTRAVENOUS at 22:59

## 2021-09-21 RX ADMIN — MAGNESIUM SULFATE HEPTAHYDRATE 2000 MG: 40 INJECTION, SOLUTION INTRAVENOUS at 17:11

## 2021-09-21 RX ADMIN — IPRATROPIUM BROMIDE AND ALBUTEROL SULFATE 3 AMPULE: .5; 3 SOLUTION RESPIRATORY (INHALATION) at 17:05

## 2021-09-21 RX ADMIN — METHYLPREDNISOLONE SODIUM SUCCINATE 60 MG: 125 INJECTION, POWDER, FOR SOLUTION INTRAMUSCULAR; INTRAVENOUS at 21:18

## 2021-09-21 ASSESSMENT — ENCOUNTER SYMPTOMS
RHINORRHEA: 1
DIARRHEA: 0
COUGH: 1
SHORTNESS OF BREATH: 1
VOMITING: 0
ABDOMINAL PAIN: 0
VOICE CHANGE: 0
NAUSEA: 0
PHOTOPHOBIA: 0
TROUBLE SWALLOWING: 0

## 2021-09-21 NOTE — ED PROVIDER NOTES
HPI   Patient is a 79-year-old female with past medical history of COPD brought in by ambulance to the emergency department due to acute respiratory distress. EMS reports that patient was initially short of breath and hypoxic and placed on BiPAP on the way to the emergency department with improvement in her symptoms. EMS also reports that she received 125 mg of IV Solu-Medrol in the ambulance. On arrival, patient was tachypneic and tachycardic with signs of increased work of breathing on BiPAP. Respiratory team present and patient started on DuoNeb treatment and given mag sulfate. Patient reports that she had had worsening shortness of breath over 3 days that started with upper respiratory symptoms like cough, congestion and runny nose. Patient also reports that she has around-the-clock, oxygen at 2 L. She attempted to use her inhalers at home with no relief in her symptoms. Patient otherwise denies any active chest pain, nausea, vomiting, fever, abdominal pain, urinary symptoms, constipation or diarrhea. Review of Systems   Constitutional: Positive for chills. Negative for fever. HENT: Positive for congestion and rhinorrhea. Negative for trouble swallowing and voice change. Eyes: Negative for photophobia and visual disturbance. Respiratory: Positive for cough and shortness of breath. Productive cough. Cardiovascular: Negative for chest pain and palpitations. Gastrointestinal: Negative for abdominal pain, diarrhea, nausea and vomiting. Genitourinary: Negative for dysuria. Musculoskeletal: Negative for arthralgias. Skin: Negative for rash and wound. Neurological: Negative for dizziness and headaches. Psychiatric/Behavioral: Negative for behavioral problems and confusion. Physical Exam  Constitutional:       General: She is not in acute distress. Appearance: Normal appearance. She is not ill-appearing. HENT:      Head: Normocephalic and atraumatic.    Eyes: Pupils: Pupils are equal, round, and reactive to light. Cardiovascular:      Rate and Rhythm: Normal rate and regular rhythm. Pulses: Normal pulses. Heart sounds: Normal heart sounds. Pulmonary:      Effort: Tachypnea and respiratory distress present. Breath sounds: Normal breath sounds. No wheezing or rales. Comments: Increased work of breathing noted. Lungs sound tight with diminished breath sounds throughout. Chest:      Chest wall: No tenderness. Abdominal:      Palpations: Abdomen is soft. Tenderness: There is no abdominal tenderness. There is no guarding or rebound. Musculoskeletal:      Cervical back: Normal range of motion and neck supple. Right lower leg: No tenderness. Left lower leg: No tenderness. Skin:     General: Skin is warm and dry. Capillary Refill: Capillary refill takes less than 2 seconds. Neurological:      General: No focal deficit present. Mental Status: She is alert and oriented to person, place, and time. Cranial Nerves: No cranial nerve deficit. Coordination: Coordination normal.          Procedures   EKG: This EKG is signed and interpreted by me. Normal sinus rhythm with a ventricular rate of 99 bpm.  Normal axis. VT interval normal.  QTc not prolonged. No STEMI. No significant changes compared to previous EKG on 5/20/2021. MDM   Patient is a 70-year-old female with past medical history of COPD brought in by ambulance to the emergency department due to acute respiratory distress. Patient notes that she has worsening shortness of breath over the past 3 days with a severe episode today. She does admit that she has had upper respiratory symptoms including runny nose, cough and congestion that began a few days ago as well. She states that her grandchildren are also sick at home. Patient otherwise denies any active chest pain, nausea, vomiting, diaphoresis, palpitations, dizziness or lightheadedness.   Patient notes that she is on home oxygen around-the-clock at 2 L. Patient received 125 mg of IV Solu-Medrol in the ambulance and started on BiPAP with improvement in her respiratory distress. On arrival in the emergency department patient was tachypneic with respiratory rate of 30 and mildly tachycardic at 105. Respiratory team present and patient started on DuoNeb treatments and given 2g of magnesium. On reevaluation, patient had moderate improvement in her symptoms. Work-up remarkable for hypercapnia with a PCO2 of 69.4 found on ABG. Patient was discussed with Dr. Addy Page who agreed to admit the patient for acute respiratory failure with hypercapnia secondary to COPD exacerbation. Patient agrees with plan for admission. ED Course as of Sep 21 1833   Tue Sep 21, 2021   0765 Patient reevaluated. She states that her shortness of breath is improving and that she is feeling better. [PP]      ED Course User Index  [PP] Nelsy Serna, DO          --------------------------------------------- PAST HISTORY ---------------------------------------------  Past Medical History:  has a past medical history of Abscess, COPD (chronic obstructive pulmonary disease) ([facility] [address] Diverticulitis, Provoked DVT 3/2018, Seasonal allergic rhinitis, Smoker, and Tobacco use disorder. Past Surgical History:  has no past surgical history on file. Social History:  reports that she quit smoking about 4 months ago. Her smoking use included cigarettes. She has a 20.50 pack-year smoking history. She has never used smokeless tobacco. She reports current alcohol use. She reports current drug use. Drug: Marijuana. Family History: family history includes Breast Cancer in her none; Colon Cancer in her none; Coronary Art Dis in her father; Diabetes in her unknown relative; Hypertension in her father; Other in her daughter and father; Stroke in her mother. The patients home medications have been reviewed.     Allergies: Penicillin v    -------------------------------------------------- RESULTS -------------------------------------------------    LABS:  Results for orders placed or performed during the hospital encounter of 09/21/21   COVID-19, Rapid    Specimen: Nasopharyngeal Swab   Result Value Ref Range    SARS-CoV-2, NAAT Not Detected Not Detected   CBC Auto Differential   Result Value Ref Range    WBC 10.9 4.5 - 11.5 E9/L    RBC 4.89 3.50 - 5.50 E12/L    Hemoglobin 14.7 11.5 - 15.5 g/dL    Hematocrit 44.9 34.0 - 48.0 %    MCV 91.8 80.0 - 99.9 fL    MCH 30.1 26.0 - 35.0 pg    MCHC 32.7 32.0 - 34.5 %    RDW 12.4 11.5 - 15.0 fL    Platelets 774 348 - 392 E9/L    MPV 9.2 7.0 - 12.0 fL    Neutrophils % 78.0 43.0 - 80.0 %    Immature Granulocytes % 0.3 0.0 - 5.0 %    Lymphocytes % 14.9 (L) 20.0 - 42.0 %    Monocytes % 5.1 2.0 - 12.0 %    Eosinophils % 1.3 0.0 - 6.0 %    Basophils % 0.4 0.0 - 2.0 %    Neutrophils Absolute 8.48 (H) 1.80 - 7.30 E9/L    Immature Granulocytes # 0.03 E9/L    Lymphocytes Absolute 1.62 1.50 - 4.00 E9/L    Monocytes Absolute 0.55 0.10 - 0.95 E9/L    Eosinophils Absolute 0.14 0.05 - 0.50 E9/L    Basophils Absolute 0.04 0.00 - 0.20 J2/T   Basic Metabolic Panel w/ Reflex to MG   Result Value Ref Range    Sodium 142 132 - 146 mmol/L    Potassium reflex Magnesium 4.5 3.5 - 5.0 mmol/L    Chloride 98 98 - 107 mmol/L    CO2 35 (H) 22 - 29 mmol/L    Anion Gap 9 7 - 16 mmol/L    Glucose 123 (H) 74 - 99 mg/dL    BUN 10 6 - 23 mg/dL    CREATININE 0.5 0.5 - 1.0 mg/dL    GFR Non-African American >60 >=60 mL/min/1.73    GFR African American >60     Calcium 9.6 8.6 - 10.2 mg/dL   Troponin   Result Value Ref Range    Troponin, High Sensitivity 9 0 - 9 ng/L   Brain Natriuretic Peptide   Result Value Ref Range    Pro-BNP 89 0 - 125 pg/mL   Blood Gas, Arterial   Result Value Ref Range    Date Analyzed [id number]     Time Analyzed 1733     Source: Blood Arterial     pH, Blood Gas 7.304 (L) 7.350 - 7.450    PCO2 69.4 (HH) 35.0 - 45.0 mmHg PO2 129.1 (H) 75.0 - 100.0 mmHg    HCO3 33.7 (H) 22.0 - 26.0 mmol/L    B.E. 4.8 (H) -3.0 - 3.0 mmol/L    O2 Sat 98.0 92.0 - 98.5 %    O2Hb 96.4 94.0 - 97.0 %    COHb 1.3 0.0 - 1.5 %    MetHb 0.3 0.0 - 1.5 %    O2 Content 20.6 mL/dL    HHb 2.0 0.0 - 5.0 %    tHb (est) 15.1 11.5 - 16.5 g/dL    Mode SFM  7 L     Comment criticals called to jam vega  readback verified     Date Of Collection      Time Collected      Pt Temp 37.0 C     ID [id number]     Lab [id number]     Critical(s) Notified Called to jam vega rn        RADIOLOGY:  XR CHEST PORTABLE   Final Result   No acute process. ------------------------- NURSING NOTES AND VITALS REVIEWED ---------------------------  Date / Time Roomed:  9/21/2021  4:41 PM  ED Bed Assignment:  [id number]    The nursing notes within the ED encounter and vital signs as below have been reviewed. Patient Vitals for the past 24 hrs:   BP Temp Pulse Resp SpO2 Weight   09/21/21 2007 (!) 148/88 -- 83 23 98 % --   09/21/21 1805 (!) 140/63 -- 87 22 99 % --   09/21/21 1800 -- -- 87 19 99 % --   09/21/21 1705 -- -- -- 21 100 % --   09/21/21 1653 (!) 172/112 97.1 °F (36.2 °C) 105 30 98 % 160 lb (72.6 kg)       Oxygen Saturation Interpretation: Improved after treatment    ------------------------------------------ PROGRESS NOTES ------------------------------------------  Re-evaluation(s):  See ED course. Counseling:  I have spoken with the patient and discussed todays results, in addition to providing specific details for the plan of care and counseling regarding the diagnosis and prognosis. Their questions are answered at this time and they are agreeable with the plan of admission.    --------------------------------- ADDITIONAL PROVIDER NOTES ---------------------------------  Consultations:  Spoke with Dr. Denise Edwards. Discussed case. They will admit the patient.   This patient's ED course included: a personal history and physicial examination, re-evaluation prior to disposition, multiple bedside re-evaluations, IV medications, cardiac monitoring, continuous pulse oximetry and complex medical decision making and emergency management    This patient has remained hemodynamically stable during their ED course. Diagnosis:  1. Acute respiratory failure with hypercapnia (HCC)    2. COPD with acute exacerbation ([facility] [address]        Disposition:  Patient's disposition: Admit to med/surg floor  Patient's condition is stable.          Dawit Hernandez DO  Resident  09/21/21 [id number]

## 2021-09-21 NOTE — H&P
Department of Internal Medicine  History and Physical    PCP: Ronn Hernandez MD  Admitting Physician: Dr. Todd Collins  Consultants:   Date of Service: 9/21/2021    CHIEF COMPLAINT: Shortness of breath    HISTORY OF PRESENT ILLNESS:    Patient is 60-year-old female who presented to the ED due to shortness of breath. Patient states she began feeling more short of breath a few days ago. States that she has sick contacts as she is around her grandchildren who just recently started school. Patient states she had associated wheezing. She denies any increased frequency of cough or sputum production. She has been taking breathing treatments without significant relief. She denies any fever or chills. She denies any chest pain. She denies any nausea or emesis. She denies any issues with diarrhea or constipation. PAST MEDICAL Hx:  Past Medical History:   Diagnosis Date    Abscess     colon    COPD (chronic obstructive pulmonary disease) ([facility] [address]     Diverticulitis     Provoked DVT 3/2018     3 months Eliquis for DVT due to PICC line    Seasonal allergic rhinitis     Smoker 11/30/2019    5-6 per day    Tobacco use disorder      : 1 ppd since age 25       PAST SURGICAL Hx:   No past surgical history on file. FAMILY Hx:  Family History   Problem Relation Age of Onset    Colon Cancer None     Other Father     Coronary Art Dis Father         mother    Hypertension Father     Diabetes Unknown relative         older age; pat grandmother   Karma Arrieta Other Daughter         son; ex-    Breast Cancer None     Stroke Mother        HOME MEDICATIONS:  Prior to Admission medications    Medication Sig Start Date End Date Taking?  Authorizing Provider   theophylline (UNIPHYL) 400 MG extended release tablet take 1/2 tablet by mouth twice a day 6/24/21   Historical Provider, MD   Umeclidinium Bromide (INCRUSE ELLIPTA) 62.5 MCG/INH AEPB Inhale 1 puff into the lungs daily 5/24/21   SKYE Farias - CNP fluticasone-salmeterol (ADVAIR) 250-50 MCG/DOSE AEPB Inhale 1 puff into the lungs every 12 hours . Rinse and spit after each use. 5/24/21   Worthy Foots, APRN - CNP   albuterol sulfate HFA (VENTOLIN HFA) 108 (90 Base) MCG/ACT inhaler Inhale 2 puffs into the lungs 4 times daily as needed for Wheezing or Shortness of Breath 5/24/21   Worthy Foots, APRN - CNP   predniSONE (DELTASONE) 10 MG tablet Take by oral route 4 tabs x 3 days, then 3 tabs x 3 days, then 2 tabs x 3 days, then 1 tab x 3 days, then discontinue. Take with food. 5/24/21   Worthy Foots, APRN - CNP   melatonin 3 MG TABS tablet Take 1 tablet by mouth nightly as needed (insomnia) 5/24/21   Cori Foots, APRN - CNP   theophylline (HU-24) 200 MG extended release capsule Take 1 capsule by mouth every 12 hours Avoid caffeine or be consistent with caffeine intake.  5/24/21 6/23/21  Cori Foots, APRN - CNP   albuterol (PROVENTIL) (2.5 MG/3ML) 0.083% nebulizer solution PLEASE PROVIDE FOR 30 DAY SUPPLY 4/9/21   Ivette Gil MD   fluticasone (FLONASE) 50 MCG/ACT nasal spray 2 sprays by Each Nostril route daily 4/7/21   Ivette Gil MD   sertraline (ZOLOFT) 25 MG tablet Take 1 tablet by mouth daily 3/8/21   Ivette Gil MD   nicotine (Deloria Glimpse) 14 MG/24HR Place 1 patch onto the skin daily 4/18/19   oM Estevez MD       ALLERGIES:  Penicillin v    SOCIAL Hx:  Social History     Socioeconomic History    Marital status:      Spouse name: Not on file    Number of children: Not on file    Years of education: Not on file    Highest education level: Not on file   Occupational History    Not on file   Tobacco Use    Smoking status: Current Every Day Smoker     Packs/day: 0.50     Years: 41.00     Pack years: 20.50     Types: Cigarettes    Smokeless tobacco: Never Used   Substance and Sexual Activity    Alcohol use: Yes     Comment: social    Drug use: Yes     Types: Marijuana     Comment: occasional     Sexual activity: Not on file   Other Topics Concern    Not on file   Social History Narrative    Not on file     Social Determinants of Health     Financial Resource Strain: Low Risk     Difficulty of Paying Living Expenses: Not hard at all   Food Insecurity: No Food Insecurity    Worried About Running Out of Food in the Last Year: Never true    [address] in the Last Year: Never true   Transportation Needs:     Lack of Transportation (Medical):  Lack of Transportation (Non-Medical):    Physical Activity:     Days of Exercise per Week:     Minutes of Exercise per Session:    Stress:     Feeling of Stress :    Social Connections:     Frequency of Communication with Friends and Family:     Frequency of Social Gatherings with Friends and Family:     Attends Scient[employer] Services:     Active Member of Clubs or Organizations:     Attends Club or Organization Meetings:     Marital Status:    Intimate Partner Violence:     Fear of Current or Ex-Partner:     Emotionally Abused:     Physically Abused:     Sexually Abused:        ROS: Positive in bold  General:   Denies chills, fatigue, fever, malaise, night sweats or weight loss    Psychological:   Denies anxiety, disorientation or hallucinations    ENT:    Denies epistaxis, headaches, vertigo or visual changes    Cardiovascular:   Denies any chest pain, irregular heartbeats, or palpitations. No paroxysmal nocturnal dyspnea. Respiratory:   Denies shortness of breath, coughing, sputum production, hemoptysis, or wheezing. No orthopnea. Gastrointestinal:   Denies nausea, vomiting, diarrhea, or constipation. Denies any abdominal pain. Denies change in bowel habits or stools. Genito-Urinary:    Denies any urgency, frequency, hematuria. Voiding without difficulty. Musculoskeletal:   Denies joint pain, joint stiffness, joint swelling or muscle pain    Neurology:    Denies any headache or focal neurological deficits. No weakness or paresthesia.     Derm: Denies any rashes, ulcers, or excoriations. Denies bruising. Extremities:   Denies any lower extremity swelling or edema. PHYSICAL EXAM: Abnormal findings noted  VITALS:  Vitals:    09/21/21 1805   BP: (!) 140/63   Pulse: 87   Resp: 22   Temp:    SpO2: 99%         CONSTITUTIONAL:    Awake, alert, cooperative, no apparent distress, and appears stated age    EYES:     EOMI, sclera clear, conjunctiva normal    ENT:    Normocephalic, atraumatic,  External ears without lesions. NECK:    Supple, symmetrical, trachea midline,  no JVD    HEMATOLOGIC/LYMPHATICS:    No cervical lymphadenopathy and no supraclavicular lymphadenopathy    LUNGS:    Symmetric. No increased work of breathing, good air exchange, clear to auscultation bilaterally, no wheezes, rhonchi, or rales,   Patient has bilateral expiratory wheezes  Patient has severely diminished breath sound bilaterally    CARDIOVASCULAR:    Normal apical impulse, regular rate and rhythm, normal S1 and S2, no S3 or S4, and no murmur noted    ABDOMEN:     soft, non-distended, non-tender    MUSCULOSKELETAL:    There is no redness, warmth, or swelling of the joints. NEUROLOGIC:    Awake, alert, oriented to name, place and time. SKIN:    No bruising or bleeding. No redness, warmth, or swelling    EXTREMITIES:    Peripheral pulses present. No edema, cyanosis, or swelling.     LINES/CATHETERS     LABORATORY DATA:  CBC with Differential:    Lab Results   Component Value Date    WBC 10.9 09/21/2021    RBC 4.89 09/21/2021    HGB 14.7 09/21/2021    HCT 44.9 09/21/2021     09/21/2021    MCV 91.8 09/21/2021    MCH 30.1 09/21/2021    MCHC 32.7 09/21/2021    RDW 12.4 09/21/2021    LYMPHOPCT 14.9 09/21/2021    MONOPCT 5.1 09/21/2021    BASOPCT 0.4 09/21/2021    MONOSABS 0.55 09/21/2021    LYMPHSABS 1.62 09/21/2021    EOSABS 0.14 09/21/2021    BASOSABS 0.04 09/21/2021     CMP:    Lab Results   Component Value Date     09/21/2021    K 4.5 09/21/2021    CL 98 09/21/2021    CO2 35 09/21/2021    BUN 10 09/21/2021    CREATININE 0.5 09/21/2021    GFRAA >60 09/21/2021    LABGLOM >60 09/21/2021    GLUCOSE 123 09/21/2021    PROT 6.5 05/17/2021    LABALBU 3.9 05/17/2021    CALCIUM 9.6 09/21/2021    BILITOT 0.3 05/17/2021    ALKPHOS 95 05/17/2021    AST 13 05/17/2021    ALT 9 05/17/2021       ASSESSMENT/PLAN:  1. Acute on chronic hypercapnic and hypercapnic respiratory failure on 3 L supplemental oxygen at baseline  2. Acute COPD exacerbation  3. Current tobacco abuse  4. History of DVT; provoked    Patient presented to the ED due to shortness of breath. She was found to be hypoxic and hypercapnic. Initially she was placed on BiPAP/CPAP which very much helped with her breathing. She was given nebulized breathing treatment and magnesium in the ED. On exam she is on 4 L supplemental oxygen. Patient will be treated with nebulized breathing treatments as well as IV steroids. Continue to monitor pulse ox. Obtain cultures and viral respiratory panel. Transition to oral steroids.     Chuy Smith  7:19 PM  9/21/2021    Electronically signed by Medardo Perez DO on 9/21/21 at 7:19 PM EDT

## 2021-09-21 NOTE — ED NOTES
Bed: 04  Expected date:   Expected time:   Means of arrival:   Comments:  terminal     Minh Brown RN  09/21/21 [id number]

## 2021-09-21 NOTE — Clinical Note
Patient Class: Inpatient [101]   REQUIRED: Diagnosis: Acute respiratory failure with hypercapnia ([facility] [address] [[id number]]   Estimated Length of Stay: Estimated stay of more than 2 midnights   Admitting Provider: Ok Unger [[id number]]   Telemetry/Cardiac Monitoring Required?: Yes

## 2021-09-22 LAB
ADENOVIRUS BY PCR: NOT DETECTED
ALBUMIN SERPL-MCNC: 4 G/DL (ref 3.5–5.2)
ALP BLD-CCNC: 105 U/L (ref 35–104)
ALT SERPL-CCNC: 15 U/L (ref 0–32)
ANION GAP SERPL CALCULATED.3IONS-SCNC: 7 MMOL/L (ref 7–16)
AST SERPL-CCNC: 17 U/L (ref 0–31)
BASOPHILS ABSOLUTE: 0.01 E9/L (ref 0–0.2)
BASOPHILS RELATIVE PERCENT: 0.2 % (ref 0–2)
BILIRUB SERPL-MCNC: 0.2 MG/DL (ref 0–1.2)
BORDETELLA PARAPERTUSSIS BY PCR: NOT DETECTED
BORDETELLA PERTUSSIS BY PCR: NOT DETECTED
BUN BLDV-MCNC: 13 MG/DL (ref 6–23)
CALCIUM SERPL-MCNC: 9.2 MG/DL (ref 8.6–10.2)
CHLAMYDOPHILIA PNEUMONIAE BY PCR: NOT DETECTED
CHLORIDE BLD-SCNC: 98 MMOL/L (ref 98–107)
CO2: 34 MMOL/L (ref 22–29)
CORONAVIRUS 229E BY PCR: NOT DETECTED
CORONAVIRUS HKU1 BY PCR: NOT DETECTED
CORONAVIRUS NL63 BY PCR: NOT DETECTED
CORONAVIRUS OC43 BY PCR: NOT DETECTED
CREAT SERPL-MCNC: 0.5 MG/DL (ref 0.5–1)
EKG ATRIAL RATE: 99 BPM
EKG P AXIS: 81 DEGREES
EKG P-R INTERVAL: 136 MS
EKG Q-T INTERVAL: 336 MS
EKG QRS DURATION: 68 MS
EKG QTC CALCULATION (BAZETT): 431 MS
EKG R AXIS: 80 DEGREES
EKG T AXIS: 65 DEGREES
EKG VENTRICULAR RATE: 99 BPM
EOSINOPHILS ABSOLUTE: 0 E9/L (ref 0.05–0.5)
EOSINOPHILS RELATIVE PERCENT: 0 % (ref 0–6)
GFR AFRICAN AMERICAN: >60
GFR NON-AFRICAN AMERICAN: >60 ML/MIN/1.73
GLUCOSE BLD-MCNC: 138 MG/DL (ref 74–99)
HCT VFR BLD CALC: 42.7 % (ref 34–48)
HEMOGLOBIN: 13.7 G/DL (ref 11.5–15.5)
HUMAN METAPNEUMOVIRUS BY PCR: NOT DETECTED
HUMAN RHINOVIRUS/ENTEROVIRUS BY PCR: DETECTED
IMMATURE GRANULOCYTES #: 0.02 E9/L
IMMATURE GRANULOCYTES %: 0.3 % (ref 0–5)
INFLUENZA A BY PCR: NOT DETECTED
INFLUENZA B BY PCR: NOT DETECTED
L. PNEUMOPHILA SEROGP 1 UR AG: NORMAL
LYMPHOCYTES ABSOLUTE: 0.61 E9/L (ref 1.5–4)
LYMPHOCYTES RELATIVE PERCENT: 10.5 % (ref 20–42)
MAGNESIUM: 2.5 MG/DL (ref 1.6–2.6)
MCH RBC QN AUTO: 30 PG (ref 26–35)
MCHC RBC AUTO-ENTMCNC: 32.1 % (ref 32–34.5)
MCV RBC AUTO: 93.4 FL (ref 80–99.9)
MONOCYTES ABSOLUTE: 0.03 E9/L (ref 0.1–0.95)
MONOCYTES RELATIVE PERCENT: 0.5 % (ref 2–12)
MYCOPLASMA PNEUMONIAE BY PCR: NOT DETECTED
NEUTROPHILS ABSOLUTE: 5.15 E9/L (ref 1.8–7.3)
NEUTROPHILS RELATIVE PERCENT: 88.5 % (ref 43–80)
PARAINFLUENZA VIRUS 1 BY PCR: NOT DETECTED
PARAINFLUENZA VIRUS 2 BY PCR: NOT DETECTED
PARAINFLUENZA VIRUS 3 BY PCR: NOT DETECTED
PARAINFLUENZA VIRUS 4 BY PCR: NOT DETECTED
PDW BLD-RTO: 12.1 FL (ref 11.5–15)
PHOSPHORUS: 3.9 MG/DL (ref 2.5–4.5)
PLATELET # BLD: 302 E9/L (ref 130–450)
PMV BLD AUTO: 9.2 FL (ref 7–12)
POTASSIUM SERPL-SCNC: 4.6 MMOL/L (ref 3.5–5)
PROCALCITONIN: <0.02 NG/ML (ref 0–0.08)
RBC # BLD: 4.57 E12/L (ref 3.5–5.5)
RESPIRATORY SYNCYTIAL VIRUS BY PCR: NOT DETECTED
SARS-COV-2, PCR: NOT DETECTED
SODIUM BLD-SCNC: 139 MMOL/L (ref 132–146)
STREP PNEUMONIAE ANTIGEN, URINE: NORMAL
T4 FREE: 1.18 NG/DL (ref 0.93–1.7)
TOTAL PROTEIN: 6.8 G/DL (ref 6.4–8.3)
TSH SERPL DL<=0.05 MIU/L-ACNC: 0.24 UIU/ML (ref 0.27–4.2)
WBC # BLD: 5.8 E9/L (ref 4.5–11.5)

## 2021-09-22 PROCEDURE — 85025 COMPLETE CBC W/AUTO DIFF WBC: CPT

## 2021-09-22 PROCEDURE — 6370000000 HC RX 637 (ALT 250 FOR IP): Performed by: INTERNAL MEDICINE

## 2021-09-22 PROCEDURE — 84439 ASSAY OF FREE THYROXINE: CPT

## 2021-09-22 PROCEDURE — 2580000003 HC RX 258: Performed by: INTERNAL MEDICINE

## 2021-09-22 PROCEDURE — 6360000002 HC RX W HCPCS: Performed by: INTERNAL MEDICINE

## 2021-09-22 PROCEDURE — 84145 PROCALCITONIN (PCT): CPT

## 2021-09-22 PROCEDURE — 36415 COLL VENOUS BLD VENIPUNCTURE: CPT

## 2021-09-22 PROCEDURE — 87070 CULTURE OTHR SPECIMN AEROBIC: CPT

## 2021-09-22 PROCEDURE — 94640 AIRWAY INHALATION TREATMENT: CPT

## 2021-09-22 PROCEDURE — 87206 SMEAR FLUORESCENT/ACID STAI: CPT

## 2021-09-22 PROCEDURE — 83735 ASSAY OF MAGNESIUM: CPT

## 2021-09-22 PROCEDURE — 2700000000 HC OXYGEN THERAPY PER DAY

## 2021-09-22 PROCEDURE — 94664 DEMO&/EVAL PT USE INHALER: CPT

## 2021-09-22 PROCEDURE — 84443 ASSAY THYROID STIM HORMONE: CPT

## 2021-09-22 PROCEDURE — 1200000000 HC SEMI PRIVATE

## 2021-09-22 PROCEDURE — 93010 ELECTROCARDIOGRAM REPORT: CPT | Performed by: INTERNAL MEDICINE

## 2021-09-22 PROCEDURE — 80053 COMPREHEN METABOLIC PANEL: CPT

## 2021-09-22 PROCEDURE — 84100 ASSAY OF PHOSPHORUS: CPT

## 2021-09-22 RX ORDER — GUAIFENESIN 600 MG/1
600 TABLET, EXTENDED RELEASE ORAL 2 TIMES DAILY
Status: DISCONTINUED | OUTPATIENT
Start: 2021-09-22 | End: 2021-09-25 | Stop reason: HOSPADM

## 2021-09-22 RX ORDER — METHYLPREDNISOLONE SODIUM SUCCINATE 125 MG/2ML
60 INJECTION, POWDER, LYOPHILIZED, FOR SOLUTION INTRAMUSCULAR; INTRAVENOUS EVERY 8 HOURS
Status: DISCONTINUED | OUTPATIENT
Start: 2021-09-22 | End: 2021-09-23

## 2021-09-22 RX ORDER — METHYLPREDNISOLONE SODIUM SUCCINATE 40 MG/ML
40 INJECTION, POWDER, LYOPHILIZED, FOR SOLUTION INTRAMUSCULAR; INTRAVENOUS EVERY 8 HOURS
Status: DISCONTINUED | OUTPATIENT
Start: 2021-09-22 | End: 2021-09-22

## 2021-09-22 RX ORDER — ARFORMOTEROL TARTRATE 15 UG/2ML
15 SOLUTION RESPIRATORY (INHALATION) 2 TIMES DAILY
Status: DISCONTINUED | OUTPATIENT
Start: 2021-09-22 | End: 2021-09-25 | Stop reason: HOSPADM

## 2021-09-22 RX ORDER — AZITHROMYCIN 250 MG/1
500 TABLET, FILM COATED ORAL DAILY
Status: DISCONTINUED | OUTPATIENT
Start: 2021-09-22 | End: 2021-09-25 | Stop reason: HOSPADM

## 2021-09-22 RX ADMIN — BUDESONIDE 500 MCG: 0.5 INHALANT RESPIRATORY (INHALATION) at 07:18

## 2021-09-22 RX ADMIN — ARFORMOTEROL TARTRATE 15 MCG: 15 SOLUTION RESPIRATORY (INHALATION) at 10:06

## 2021-09-22 RX ADMIN — AZITHROMYCIN MONOHYDRATE 500 MG: 250 TABLET ORAL at 09:36

## 2021-09-22 RX ADMIN — ARFORMOTEROL TARTRATE 15 MCG: 15 SOLUTION RESPIRATORY (INHALATION) at 18:46

## 2021-09-22 RX ADMIN — IPRATROPIUM BROMIDE AND ALBUTEROL SULFATE 1 AMPULE: .5; 3 SOLUTION RESPIRATORY (INHALATION) at 14:05

## 2021-09-22 RX ADMIN — METHYLPREDNISOLONE SODIUM SUCCINATE 60 MG: 125 INJECTION, POWDER, FOR SOLUTION INTRAMUSCULAR; INTRAVENOUS at 20:37

## 2021-09-22 RX ADMIN — GUAIFENESIN 600 MG: 600 TABLET, EXTENDED RELEASE ORAL at 20:37

## 2021-09-22 RX ADMIN — ACETAMINOPHEN 650 MG: 325 TABLET ORAL at 08:33

## 2021-09-22 RX ADMIN — ENOXAPARIN SODIUM 40 MG: 40 INJECTION SUBCUTANEOUS at 08:33

## 2021-09-22 RX ADMIN — BUDESONIDE 500 MCG: 0.5 INHALANT RESPIRATORY (INHALATION) at 18:46

## 2021-09-22 RX ADMIN — GUAIFENESIN 600 MG: 600 TABLET, EXTENDED RELEASE ORAL at 09:36

## 2021-09-22 RX ADMIN — IPRATROPIUM BROMIDE AND ALBUTEROL SULFATE 1 AMPULE: .5; 3 SOLUTION RESPIRATORY (INHALATION) at 07:18

## 2021-09-22 RX ADMIN — SODIUM CHLORIDE, PRESERVATIVE FREE 10 ML: 5 INJECTION INTRAVENOUS at 08:35

## 2021-09-22 RX ADMIN — IPRATROPIUM BROMIDE AND ALBUTEROL SULFATE 1 AMPULE: .5; 3 SOLUTION RESPIRATORY (INHALATION) at 10:04

## 2021-09-22 RX ADMIN — METHYLPREDNISOLONE SODIUM SUCCINATE 60 MG: 125 INJECTION, POWDER, FOR SOLUTION INTRAMUSCULAR; INTRAVENOUS at 15:03

## 2021-09-22 RX ADMIN — SODIUM CHLORIDE, PRESERVATIVE FREE 10 ML: 5 INJECTION INTRAVENOUS at 20:37

## 2021-09-22 RX ADMIN — IPRATROPIUM BROMIDE AND ALBUTEROL SULFATE 1 AMPULE: .5; 3 SOLUTION RESPIRATORY (INHALATION) at 18:46

## 2021-09-22 ASSESSMENT — PAIN SCALES - GENERAL
PAINLEVEL_OUTOF10: 0
PAINLEVEL_OUTOF10: 5

## 2021-09-22 NOTE — PROGRESS NOTES
Internal Medicine Progress Note    [facility]    [facility]. Nam Tilley, F.AELIGIOOChenteIChente Vazquez D.O., NERY Pichardo D.O. Toya Guevara, MSN, APRN, NP-C  Elysia Saenz. Ignacio Zavala, MSN, APRN-CNP     Primary Care Physician: Kristopher Patten. MD Farida   Admitting Physician:  Oscar Galicia DO  Admission date and time: 9/21/2021  4:41 PM    Room:  [address] [place], [place] [address]  Admitting diagnosis: COPD with acute exacerbation ([facility] [J44.1]  Acute respiratory failure with hypercapnia [facility]) [J96.02]    Patient Name: Chin Espinoza  MRN: [id number]    Date of Service: 9/22/2021     Subjective:  Corbin Alvarenga is a 64 y.o. female who was seen and examined today,9/22/2021, at the bedside. Corbin Alvarenga presented to [facility] emergency department yesterday with increasing shortness of breath. She suffers from longstanding oxygen dependent COPD. She cares for her grandchildren who have recently returned to school. She believes she has developed an infection in this setting and respiratory film array confirms rhinovirus/enterovirus. She is feeling dramatically better today when compared to initial presentation. Review of System:   Constitutional:   Denies fever or chills, weight loss or gain, fatigue or malaise. HEENT:   Denies ear pain, sore throat, sinus or eye problems. Cardiovascular:   Denies any chest pain, irregular heartbeats, or palpitations. Respiratory:   Admits to shortness of breath both at rest and with exertion. This has improved when compared to initial presentation. Gastrointestinal:   Denies nausea, vomiting, diarrhea, or constipation. Denies any abdominal pain. Genitourinary:    Denies any urgency, frequency, hematuria. Voiding  without difficulty. Extremities:   Denies lower extremity swelling, edema or cyanosis. Neurology:    Denies any headache or focal neurological deficits, Denies generalized weakness or memory difficulty.    Psch:   Denies being anxious or depressed. Musculoskeletal:    Denies  myalgias, joint complaints or back pain. Integumentary:   Denies any rashes, ulcers, or excoriations. Denies bruising. Hematologic/Lymphatic:  Denies bruising or bleeding. Physical Exam:  No intake/output data recorded. Intake/Output Summary (Last 24 hours) at 9/22/2021 0821  Last data filed at 9/21/2021 1804  Gross per 24 hour   Intake 50 ml   Output --   Net 50 ml   I/O last 3 completed shifts: In: 48 [IV Piggyback:50]  Out: -   Patient Vitals for the past 96 hrs (Last 3 readings):   Weight   09/21/21 1653 160 lb (72.6 kg)     Vital Signs:   Blood pressure (!) 154/73, pulse 97, temperature 98.5 °F (36.9 °C), resp. rate 14, weight 160 lb (72.6 kg), SpO2 98 %, not currently breastfeeding. General appearance:  Alert, responsive, oriented to person, place, and time. Well preserved, alert, no distress. Head:  Normocephalic. No masses, lesions or tenderness. Eyes:  PERRLA. EOMI. Sclera clear. Buccal mucosa moist.  ENT:  Ears normal. Mucosa normal.  Nasal cannula oxygen is in place. Neck:    Supple. Trachea midline. No thyromegaly. No JVD. No bruits. Heart:    Rhythm regular. Rate controlled. No murmurs. Lungs:    Symmetrical. Clear to auscultation bilaterally. No wheezes. No rhonchi. No rales. Abdomen:   Soft. Non-tender. Non-distended. Bowel sounds positive. No organomegaly or masses. No pain on palpation. Extremities:    Peripheral pulses present. No peripheral edema. No ulcers. No cyanosis. No clubbing. Neurologic:    Alert x 3. No focal deficit. Cranial nerves grossly intact. No focal weakness. Psych:   Behavior is normal. Mood appears normal. Speech is not rapid and/or pressured. Musculoskeletal:   Spine ROM normal. Muscular strength intact. Gait not assessed. Integumentary:  No rashes  Skin normal color and texture.   Genitalia/Breast:  Deferred    Medication:  Scheduled Meds:   methylPREDNISolone  40 mg IntraVENous Q8H    Arformoterol Tartrate  15 mcg Nebulization BID    guaiFENesin  600 mg Oral BID    sodium chloride flush  5-40 mL IntraVENous 2 times per day    enoxaparin  40 mg SubCUTAneous Daily    ipratropium-albuterol  1 ampule Inhalation Q4H While awake    budesonide  0.5 mg Nebulization BID     Continuous Infusions:   sodium chloride         Objective Data:  CBC with Differential:    Lab Results   Component Value Date    WBC 5.8 09/22/2021    RBC 4.57 09/22/2021    HGB 13.7 09/22/2021    HCT 42.7 09/22/2021     09/22/2021    MCV 93.4 09/22/2021    MCH 30.0 09/22/2021    MCHC 32.1 09/22/2021    RDW 12.1 09/22/2021    LYMPHOPCT 10.5 09/22/2021    MONOPCT 0.5 09/22/2021    BASOPCT 0.2 09/22/2021    MONOSABS 0.03 09/22/2021    LYMPHSABS 0.61 09/22/2021    EOSABS 0.00 09/22/2021    BASOSABS 0.01 09/22/2021     BMP:    Lab Results   Component Value Date     09/22/2021    K 4.6 09/22/2021    K 4.5 09/21/2021    CL 98 09/22/2021    CO2 34 09/22/2021    BUN 13 09/22/2021    LABALBU 4.0 09/22/2021    CREATININE 0.5 09/22/2021    CALCIUM 9.2 09/22/2021    GFRAA >60 09/22/2021    LABGLOM >60 09/22/2021    GLUCOSE 138 09/22/2021       Assessment:  1. Acute on chronic respiratory failure with hypoxia secondary to exacerbation of COPD complicated by acute viral infection with rhinovirus/enterovirus  2. Ongoing tobacco abuse  3. History of DVT    Plan:   Respiratory film array panel indicates recurrent infection with rhinovirus/enterovirus. Nebulized respiratory medications, antibiotics, and corticosteroids are being employed. We will attempt to wean the nasal cannula oxygen today. I have encouraged her to become increasingly ambulatory today. We again discussed the absolute need for tobacco cessation. She has received 1 dose of the coronavirus vaccination and is scheduled to receive the second dose in the near future. We will attempt to arrange this prior to her discharge. Continue current therapy.   See orders for further plan of care. More than 50% of my  time was spent at the bedside counseling/coordinating care with the patient and/or family with face to face contact. This time was spent reviewing notes and laboratory data as well as instructing and counseling the patient. Time I spent with the family or surrogate(s) is included only if the patient was incapable of providing the necessary information or participating in medical decisions. I also discussed the differential diagnosis and all of the proposed management plans with the patient and individuals accompanying the patient. Anushka West requires this high level of physician care and nursing on the [facility]/Telemetry unit due the complexity of decision management and chance of rapid decline or death. Continued cardiac monitoring and higher level of nursing are required. I am readily available for any further decision-making and intervention.      Joe Mckeon DO, F.A.C.O.I.  9/22/2021  8:21 AM

## 2021-09-22 NOTE — PROGRESS NOTES
Date:2021  Patient Name: Chantelle Wiseman  MRN: [id number]  : 1960  ROOM #: 0421/0421-01    Physical Therapy order received, chart reviewed and evaluation attempted this date. Patient is unavailable for PT evaluation due to pt refusal bc of being SOB. Will attempt PT evaluation at a later time. Thank you.    Maura Luis, PT, DPT

## 2021-09-22 NOTE — PROGRESS NOTES
Pharmacy Documentation  COVID-19 vaccination          Date: 9/22/2021  Physician: Dr. Coty Bolivar consulted for patient's second COVID-19 vaccination. [phone] to confirm initial vaccination. Patient did receive first dose of Moderna vaccine on July 24, 2021. She is due for dose #2.  Will administer today while patient remains in the hospital.     Lot # [id number]    Bibi Carrasco PharmD, BCPS 9/22/2021 10:17 AM   Ext: [phone]

## 2021-09-22 NOTE — CARE COORDINATION
9/22/2021 1156 CM note: Negative covid 9/21/21. Met with patient for transition of care needs. Pt resides with her dtr and 2 grandchildren in 2 story house. Pt uses 1st level only, bed and bath on 1st level. Pt reports she is basically homebound. She has home O2 @3L NC and nebulizer through [address] [place] [address]. PCP is Dr Blayne Quan and she uses [employer] [facility] for mail out. Pt plans to return home upon dc. Will await PT/OT evals to assist with transition of care needs.  [address]elia LANE

## 2021-09-23 LAB
ALBUMIN SERPL-MCNC: 3.8 G/DL (ref 3.5–5.2)
ALP BLD-CCNC: 84 U/L (ref 35–104)
ALT SERPL-CCNC: 16 U/L (ref 0–32)
ANION GAP SERPL CALCULATED.3IONS-SCNC: 7 MMOL/L (ref 7–16)
AST SERPL-CCNC: 22 U/L (ref 0–31)
BASOPHILS ABSOLUTE: 0.01 E9/L (ref 0–0.2)
BASOPHILS RELATIVE PERCENT: 0.1 % (ref 0–2)
BILIRUB SERPL-MCNC: <0.2 MG/DL (ref 0–1.2)
BUN BLDV-MCNC: 21 MG/DL (ref 6–23)
CALCIUM SERPL-MCNC: 9.5 MG/DL (ref 8.6–10.2)
CHLORIDE BLD-SCNC: 102 MMOL/L (ref 98–107)
CO2: 34 MMOL/L (ref 22–29)
CREAT SERPL-MCNC: 0.5 MG/DL (ref 0.5–1)
EOSINOPHILS ABSOLUTE: 0 E9/L (ref 0.05–0.5)
EOSINOPHILS RELATIVE PERCENT: 0 % (ref 0–6)
GFR AFRICAN AMERICAN: >60
GFR NON-AFRICAN AMERICAN: >60 ML/MIN/1.73
GLUCOSE BLD-MCNC: 123 MG/DL (ref 74–99)
HCT VFR BLD CALC: 41.6 % (ref 34–48)
HEMOGLOBIN: 13.3 G/DL (ref 11.5–15.5)
IMMATURE GRANULOCYTES #: 0.07 E9/L
IMMATURE GRANULOCYTES %: 0.5 % (ref 0–5)
LYMPHOCYTES ABSOLUTE: 0.86 E9/L (ref 1.5–4)
LYMPHOCYTES RELATIVE PERCENT: 5.7 % (ref 20–42)
MCH RBC QN AUTO: 30.4 PG (ref 26–35)
MCHC RBC AUTO-ENTMCNC: 32 % (ref 32–34.5)
MCV RBC AUTO: 95 FL (ref 80–99.9)
MONOCYTES ABSOLUTE: 0.63 E9/L (ref 0.1–0.95)
MONOCYTES RELATIVE PERCENT: 4.1 % (ref 2–12)
NEUTROPHILS ABSOLUTE: 13.64 E9/L (ref 1.8–7.3)
NEUTROPHILS RELATIVE PERCENT: 89.6 % (ref 43–80)
PDW BLD-RTO: 12.4 FL (ref 11.5–15)
PLATELET # BLD: 290 E9/L (ref 130–450)
PMV BLD AUTO: 9.4 FL (ref 7–12)
POTASSIUM SERPL-SCNC: 5.2 MMOL/L (ref 3.5–5)
RBC # BLD: 4.38 E12/L (ref 3.5–5.5)
SODIUM BLD-SCNC: 143 MMOL/L (ref 132–146)
TOTAL PROTEIN: 6.6 G/DL (ref 6.4–8.3)
WBC # BLD: 15.2 E9/L (ref 4.5–11.5)

## 2021-09-23 PROCEDURE — 6370000000 HC RX 637 (ALT 250 FOR IP): Performed by: INTERNAL MEDICINE

## 2021-09-23 PROCEDURE — 97530 THERAPEUTIC ACTIVITIES: CPT | Performed by: PHYSICAL THERAPIST

## 2021-09-23 PROCEDURE — 80053 COMPREHEN METABOLIC PANEL: CPT

## 2021-09-23 PROCEDURE — 6360000002 HC RX W HCPCS: Performed by: INTERNAL MEDICINE

## 2021-09-23 PROCEDURE — 2580000003 HC RX 258: Performed by: INTERNAL MEDICINE

## 2021-09-23 PROCEDURE — 2700000000 HC OXYGEN THERAPY PER DAY

## 2021-09-23 PROCEDURE — 94669 MECHANICAL CHEST WALL OSCILL: CPT

## 2021-09-23 PROCEDURE — 97165 OT EVAL LOW COMPLEX 30 MIN: CPT

## 2021-09-23 PROCEDURE — 1200000000 HC SEMI PRIVATE

## 2021-09-23 PROCEDURE — 97161 PT EVAL LOW COMPLEX 20 MIN: CPT | Performed by: PHYSICAL THERAPIST

## 2021-09-23 PROCEDURE — 85025 COMPLETE CBC W/AUTO DIFF WBC: CPT

## 2021-09-23 PROCEDURE — 97535 SELF CARE MNGMENT TRAINING: CPT

## 2021-09-23 PROCEDURE — 94640 AIRWAY INHALATION TREATMENT: CPT

## 2021-09-23 PROCEDURE — 36415 COLL VENOUS BLD VENIPUNCTURE: CPT

## 2021-09-23 RX ORDER — DOCUSATE SODIUM 100 MG/1
100 CAPSULE, LIQUID FILLED ORAL DAILY
Status: DISCONTINUED | OUTPATIENT
Start: 2021-09-23 | End: 2021-09-25 | Stop reason: HOSPADM

## 2021-09-23 RX ORDER — THEOPHYLLINE 400 MG/1
200 TABLET, EXTENDED RELEASE ORAL 2 TIMES DAILY
Status: DISCONTINUED | OUTPATIENT
Start: 2021-09-23 | End: 2021-09-23 | Stop reason: CLARIF

## 2021-09-23 RX ORDER — PREDNISONE 20 MG/1
20 TABLET ORAL DAILY
Status: DISCONTINUED | OUTPATIENT
Start: 2021-09-24 | End: 2021-09-25 | Stop reason: HOSPADM

## 2021-09-23 RX ADMIN — GUAIFENESIN 600 MG: 600 TABLET, EXTENDED RELEASE ORAL at 20:28

## 2021-09-23 RX ADMIN — SODIUM CHLORIDE, PRESERVATIVE FREE 10 ML: 5 INJECTION INTRAVENOUS at 08:40

## 2021-09-23 RX ADMIN — IPRATROPIUM BROMIDE AND ALBUTEROL SULFATE 1 AMPULE: .5; 3 SOLUTION RESPIRATORY (INHALATION) at 11:34

## 2021-09-23 RX ADMIN — DOCUSATE SODIUM 100 MG: 100 CAPSULE, LIQUID FILLED ORAL at 15:52

## 2021-09-23 RX ADMIN — ARFORMOTEROL TARTRATE 15 MCG: 15 SOLUTION RESPIRATORY (INHALATION) at 07:56

## 2021-09-23 RX ADMIN — METHYLPREDNISOLONE SODIUM SUCCINATE 60 MG: 125 INJECTION, POWDER, FOR SOLUTION INTRAMUSCULAR; INTRAVENOUS at 12:12

## 2021-09-23 RX ADMIN — GUAIFENESIN 600 MG: 600 TABLET, EXTENDED RELEASE ORAL at 08:40

## 2021-09-23 RX ADMIN — ARFORMOTEROL TARTRATE 15 MCG: 15 SOLUTION RESPIRATORY (INHALATION) at 18:42

## 2021-09-23 RX ADMIN — METHYLPREDNISOLONE SODIUM SUCCINATE 60 MG: 125 INJECTION, POWDER, FOR SOLUTION INTRAMUSCULAR; INTRAVENOUS at 06:02

## 2021-09-23 RX ADMIN — IPRATROPIUM BROMIDE AND ALBUTEROL SULFATE 1 AMPULE: .5; 3 SOLUTION RESPIRATORY (INHALATION) at 14:41

## 2021-09-23 RX ADMIN — ENOXAPARIN SODIUM 40 MG: 40 INJECTION SUBCUTANEOUS at 08:40

## 2021-09-23 RX ADMIN — IPRATROPIUM BROMIDE AND ALBUTEROL SULFATE 1 AMPULE: .5; 3 SOLUTION RESPIRATORY (INHALATION) at 22:36

## 2021-09-23 RX ADMIN — BUDESONIDE 500 MCG: 0.5 INHALANT RESPIRATORY (INHALATION) at 18:42

## 2021-09-23 RX ADMIN — AZITHROMYCIN MONOHYDRATE 500 MG: 250 TABLET ORAL at 08:40

## 2021-09-23 RX ADMIN — BUDESONIDE 500 MCG: 0.5 INHALANT RESPIRATORY (INHALATION) at 07:55

## 2021-09-23 RX ADMIN — IPRATROPIUM BROMIDE AND ALBUTEROL SULFATE 1 AMPULE: .5; 3 SOLUTION RESPIRATORY (INHALATION) at 18:42

## 2021-09-23 RX ADMIN — IPRATROPIUM BROMIDE AND ALBUTEROL SULFATE 1 AMPULE: .5; 3 SOLUTION RESPIRATORY (INHALATION) at 07:54

## 2021-09-23 RX ADMIN — THEOPHYLLINE ANHYDROUS 200 MG: 200 CAPSULE, EXTENDED RELEASE ORAL at 15:52

## 2021-09-23 NOTE — PROGRESS NOTES
Physical Therapy Initial Evaluation/Plan of Care    Room #:  0421/0421-01  Patient Name: Santos Ferris  YOB: 1960  MRN: [id number]    Date of Service: 9/23/2021     Tentative placement recommendation: Home Health Physical Therapy   Equipment recommendation: None      Evaluating Physical Therapist: Rosanne Ivy PT, DPT #[id number]      Specific Provider Orders/Date/Referring Provider :     09/22/21 0830   PT eval and treat Start: 09/22/21 0830, End: 09/22/21 0830, ONE TIME, Standing Count: 1 Occurrences, R    Bessy Auguste, DO Acknowledge New    Admitting Diagnosis:   COPD with acute exacerbation ([address] [J44.1]  Acute respiratory failure with hypercapnia ([address] [J96.02]      Surgery: none  Visit Diagnoses       Codes    COPD with acute exacerbation ([address]     J44.1          Patient Active Problem List   Diagnosis    Tobacco use disorder    Seasonal allergic rhinitis    Chronic bronchitis ([address]    Chronic respiratory failure with hypoxia ([address]    Chronic obstructive pulmonary disease ([address]    Acute respiratory failure with hypercapnia (HCC)        ASSESSMENT of Current Deficits Patient exhibits decreased strength, balance and endurance impairing functional mobility, transfers, gait , gait distance and tolerance to activity.  Pt performed all function with Supervision        PHYSICAL THERAPY  PLAN OF CARE       Physical therapy plan of care is established based on physician order,  patient diagnosis and clinical assessment    Current Treatment Recommendations:    -Bed Mobility: Lower extremity exercises  and Trunk control activities   -Sitting Balance: Incorporate reaching activities to activate trunk muscles , Hands on support to maintain midline , Facilitate active trunk muscle engagement , Facilitate postural control in all planes  and Engage in core activities to allow for movement within base of support   -Standing Balance: Perform strengthening exercises in standing to promote motor control with or without upper extremity support , Instruct patient on adequate base of support to maintain balance and Challenge balance utilizing reaching  activities beyond center of gravity    -Transfers: Provide instruction on proper hand and foot position for adequate transfer of weight onto lower extremities and use of gait device if needed, Cues for hand placement, technique and safety. Provide stabilization to prevent fall , Facilitate weight shift forward on to lower extremities and provide necessary stabilization of bilateral lower extremities , Support transfer of weight on to lower extremities and Assist with extension of knees trunk and hip to accept weight transfer   -Gait: Gait training, Standing activities to improve: base of support, weight shift, weight bearing , Exercises to improve trunk control, Exercises to improve hip and knee control, Performance of protected weight bearing activities and Activities to increase weight bearing   -Endurance: Utilize Supervised activities to increase level of endurance to allow for safe functional mobility including transfers and gait  and Use graduated activities to promote good breathing techniques and provide support and education to maximize respiratory function  -Stairs: Stair training with instruction on proper technique and hand placement on rail    PT long term treatment goals are located in below grid    Patient and or family understand(s) diagnosis, prognosis, and plan of care. Frequency of treatments: Patient will be seen  3-5 times/week.          Prior Level of Function: Patient ambulated independently  Rehab Potential: good  for baseline    Past medical history:   Past Medical History:   Diagnosis Date    Abscess     colon    COPD (chronic obstructive pulmonary disease) [facility]     Diverticulitis     Provoked DVT 3/2018     3 months Eliquis for DVT due to PICC line    Seasonal allergic rhinitis     Smoker 11/30/2019    5-6 per day    Tobacco use disorder : 1 ppd since age 25     No past surgical history on file. SUBJECTIVE:    Precautions: Activity as tolerated, falls, O2 and droplet isolation   Social history: Patient lives with daughter (works 2-3 days a week) with 2 grandkids in a two story home resides first  with 2 platform steps  to enter with [employer]    Equipment owned: [address] [employer] chair,      [address] [employer]   17/24    [facility]. Mobility Inpatient   How much difficulty turning over in bed?: A Little  How much difficulty sitting down on / standing up from a chair with arms?: A Little  How much difficulty moving from lying on back to sitting on side of bed?: A Little  How much help from another person moving to and from a bed to a chair?: A Little  How much help from another person needed to walk in hospital room?: A Little  How much help from another person for climbing 3-5 steps with a railing?: A Lot  AM-PAC Inpatient Mobility Raw Score : 17  AM-PAC Inpatient T-Scale Score : 42.13  Mobility Inpatient CMS 0-100% Score: 50.57  Mobility Inpatient CMS G-Code Modifier : CK    Nursing cleared patient for PT evaluation. The admitting diagnosis and active problem list as listed above have been reviewed prior to the initiation of this evaluation. OBJECTIVE;   Initial Evaluation  Date: 9/23/2021 Treatment Date:     Short Term/ Long Term   Goals   Was pt agreeable to Eval/treatment? Yes  To be met in 2 days   Pain level   0/10       Bed Mobility    Rolling: Supervision     Supine to sit: Supervision     Sit to supine: Supervision     Scooting: Supervision     Rolling: Independent    Supine to sit:  Independent    Sit to supine: Independent    Scooting: Independent     Transfers Sit to stand: Supervision    Sit to stand: Independent    Ambulation    2 x 25 feet using  no device with Supervision    for balance and safety   > 100 feet using  no device with Independent    Stair negotiation: ascended and descended   Not assessed     4 steps with 1 HR with Mod I   ROM Within functional limits    Increase range of motion 10% of affected joints    Strength BUE:  refer to OT eval  RLE:  4/5  LLE:  4/5  Increase strength in affected mm groups by 1/3 grade   Balance Sitting EOB:  good-   Dynamic Standing:  good-  Sitting EOB:  good   Dynamic Standing: good      Patient is Alert & Oriented x person, place, time and situation and follows directions    Sensation:  Patient  denies numbness/tingling   Edema:  no   Endurance: fair  -    Vitals: 3 liters nasal cannula   Blood Pressure at rest  Blood Pressure during session    Heart Rate at rest  Heart Rate during session    SPO2 at rest 95%  SPO2 during session 86-95%     Patient education  Patient educated on role of Physical Therapy, risks of immobility, safety and plan of care, energy conservation,  importance of mobility while in hospital , purse lip breathing, safety  and O2 line management and safety      Patient response to education:   Pt verbalized understanding Pt demonstrated skill Pt requires further education in this area   Yes Partial Yes      Treatment:  Patient practiced and was instructed/facilitated in the following treatment: Patient performed bed mobility, transfers, ambulation in room, seated exercises and pt Sat edge of bed 15 minutes with Supervision  to increase dynamic sitting balance and activity tolerance. Therapeutic Exercises:  ankle pumps, long arc quad and seated marching  x 10 reps x 2 sets. At end of session, patient in bed with call light and phone within reach,  all lines and tubes intact, nursing notified. Patient would benefit from continued skilled Physical Therapy to improve functional independence and quality of life.          Patient's/ family goals   home    Time in  855  Time out  930    Total Treatment Time  15 minutes    Evaluation time includes thorough review of current medical information, gathering information on past medical history/social history and prior level of function, completion of standardized testing/informal observation of tasks, assessment of data, and development of Plan of care and goals.      CPT codes:  Low Complexity PT evaluation (08719)  Therapeutic activities (40084)   15 minutes  1 unit(s)    Mark Madden, PT

## 2021-09-23 NOTE — PROGRESS NOTES
Internal Medicine Progress Note    [facility]    [facility]. Jazmín Tafoya, NERY Salas D.O., NERY Joyner Asa, D.O. Yair Bernard, MSN, APRN, NP-C  Gary Yañez. Anastacia Gant, MSN, APRN-CNP     Primary Care Physician: Francisca Avila. MD Farida   Admitting Physician:  Pascale Huff DO  Admission date and time: 9/21/2021  4:41 PM    Room:  [address] [place], [place] [address]-  Admitting diagnosis: COPD with acute exacerbation ([facility] [J44.1]  Acute respiratory failure with hypercapnia [facility]) [J96.02]    Patient Name: Taisha Butcher  MRN: [id number]    Date of Service: 9/23/2021     Subjective:  Beth is a 64 y.o. female who was seen and examined today,9/23/2021, at the bedside. Patient pulmonary status appear to be approaching baseline. Does wear chronic oxygen therapy at 4 L. Has requested changing respiratory treatment upon discharge. Will taper steroids and plan for discharge tomorrow      Review of System:   Constitutional:   Denies fever or chills, weight loss or gain, fatigue or malaise. HEENT:   Denies ear pain, sore throat, sinus or eye problems. Cardiovascular:   Denies any chest pain, irregular heartbeats, or palpitations. Respiratory:   Admits to shortness of breath both at rest and with exertion. This has improved when compared to initial presentation. Gastrointestinal:   Denies nausea, vomiting, diarrhea, or constipation. Denies any abdominal pain. Genitourinary:    Denies any urgency, frequency, hematuria. Voiding  without difficulty. Extremities:   Denies lower extremity swelling, edema or cyanosis. Neurology:    Denies any headache or focal neurological deficits, Denies generalized weakness or memory difficulty. Psch:   Denies being anxious or depressed. Musculoskeletal:    Denies  myalgias, joint complaints or back pain. Integumentary:   Denies any rashes, ulcers, or excoriations. Denies bruising.   Hematologic/Lymphatic:  Denies bruising or bleeding. Physical Exam:  No intake/output data recorded. Intake/Output Summary (Last 24 hours) at 9/23/2021 1556  Last data filed at 9/23/2021 0400  Gross per 24 hour   Intake 380 ml   Output --   Net 380 ml   I/O last 3 completed shifts: In: 620 [P.O.:620]  Out: -   Patient Vitals for the past 96 hrs (Last 3 readings):   Weight   09/21/21 1653 160 lb (72.6 kg)     Vital Signs:   Blood pressure 130/61, pulse 84, temperature 98.6 °F (37 °C), temperature source Oral, resp. rate 20, weight 160 lb (72.6 kg), SpO2 93 %, not currently breastfeeding. General appearance:  Alert, responsive, oriented to person, place, and time. Well preserved, alert, no distress. Head:  Normocephalic. No masses, lesions or tenderness. Eyes:  PERRLA. EOMI. Sclera clear. Buccal mucosa moist.  ENT:  Ears normal. Mucosa normal.  Nasal cannula oxygen is in place. Neck:    Supple. Trachea midline. No thyromegaly. No JVD. No bruits. Heart:    Rhythm regular. Rate controlled. No murmurs. Lungs:    Symmetrical. Clear to auscultation bilaterally. No wheezes. No rhonchi. No rales. Abdomen:   Soft. Non-tender. Non-distended. Bowel sounds positive. No organomegaly or masses. No pain on palpation. Extremities:    Peripheral pulses present. No peripheral edema. No ulcers. No cyanosis. No clubbing. Neurologic:    Alert x 3. No focal deficit. Cranial nerves grossly intact. No focal weakness. Psych:   Behavior is normal. Mood appears normal. Speech is not rapid and/or pressured. Musculoskeletal:   Spine ROM normal. Muscular strength intact. Gait not assessed. Integumentary:  No rashes  Skin normal color and texture.   Genitalia/Breast:  Deferred    Medication:  Scheduled Meds:   docusate sodium  100 mg Oral Daily    theophylline  200 mg Oral BID    Arformoterol Tartrate  15 mcg Nebulization BID    guaiFENesin  600 mg Oral BID    azithromycin  500 mg Oral Daily    methylPREDNISolone  60 mg IntraVENous Q8H    sodium chloride flush  5-40 mL IntraVENous 2 times per day    enoxaparin  40 mg SubCUTAneous Daily    ipratropium-albuterol  1 ampule Inhalation Q4H While awake    budesonide  0.5 mg Nebulization BID     Continuous Infusions:   sodium chloride         Objective Data:  CBC with Differential:    Lab Results   Component Value Date    WBC 15.2 09/23/2021    RBC 4.38 09/23/2021    HGB 13.3 09/23/2021    HCT 41.6 09/23/2021     09/23/2021    MCV 95.0 09/23/2021    MCH 30.4 09/23/2021    MCHC 32.0 09/23/2021    RDW 12.4 09/23/2021    LYMPHOPCT 5.7 09/23/2021    MONOPCT 4.1 09/23/2021    BASOPCT 0.1 09/23/2021    MONOSABS 0.63 09/23/2021    LYMPHSABS 0.86 09/23/2021    EOSABS 0.00 09/23/2021    BASOSABS 0.01 09/23/2021     BMP:    Lab Results   Component Value Date     09/23/2021    K 5.2 09/23/2021    K 4.5 09/21/2021     09/23/2021    CO2 34 09/23/2021    BUN 21 09/23/2021    LABALBU 3.8 09/23/2021    CREATININE 0.5 09/23/2021    CALCIUM 9.5 09/23/2021    GFRAA >60 09/23/2021    LABGLOM >60 09/23/2021    GLUCOSE 123 09/23/2021       Assessment:    · Acute on chronic respiratory failure with hypoxia secondary to exacerbation of COPD complicated by acute viral infection with rhinovirus/enterovirus  · Ongoing tobacco abuse  · History of DVT  · Overweight    Plan:     · Patient currently on oxygen therapy from home. Appears to be clinically improving at baseline status  · Will change to oral medication including inhalers adjustment  · Probable discharge tomorrow  · Increase activity    More than 50% of my  time was spent at the bedside counseling/coordinating care with the patient and/or family with face to face contact. This time was spent reviewing notes and laboratory data as well as instructing and counseling the patient. Time I spent with the family or surrogate(s) is included only if the patient was incapable of providing the necessary information or participating in medical decisions.  I also discussed the differential diagnosis and all of the proposed management plans with the patient and individuals accompanying the patient. Sy Martinez requires this high level of physician care and nursing on the Telemetry unit due the complexity of decision management and chance of rapid decline or death. Continued cardiac monitoring and higher level of nursing are required. I am readily available for any further decision-making and intervention.      Natalee Silver DO, F.A.C.O.I.  9/23/2021  3:56 PM

## 2021-09-23 NOTE — PROGRESS NOTES
[address] [facility] CTR  [facility] [place]. [place]        Date:2021                                                  Patient Name: Jomar Umanzor    MRN: [id number]    : 1960    Room: [address] [place] [address]      Evaluating OT: Vicki Plummer OTR/L #[id number]     Referring Provider and Specific Provider Orders/Date:   21  OT eval and treat Start: 21, End: 21, ONE TIME, Standing Count: 1 Occurrences, R      Rylan Sleight, DO      Placement Recommendation: HHOT       Diagnosis:   1. Acute respiratory failure with hypercapnia (HCC)    2. COPD with acute exacerbation [facility])           Surgery: None this admission       Pertinent Medical History:       Past Medical History:   Diagnosis Date    Abscess     colon    COPD (chronic obstructive pulmonary disease) ([facility] [address]     Diverticulitis     Provoked DVT 3/2018     3 months Eliquis for DVT due to PICC line    Seasonal allergic rhinitis     Smoker 2019    5-6 per day    Tobacco use disorder      : 1 ppd since age 25       No past surgical history on file. Precautions:  Fall Risk, 3 L supplemental O2 via NC, droplet precautions.       Assessment of current deficits    [x] Functional mobility  [x]ADLs  [x] Strength               []Cognition    [x] Functional transfers   [x] IADLs         [x] Safety Awareness   [x]Endurance    [] Fine Coordination              [x] Balance      [] Vision/perception   []Sensation     []Gross Motor Coordination  [] ROM  [] Delirium                   [] Motor Control     OT PLAN OF CARE   OT POC based on physician orders, patient diagnosis and results of clinical assessment    Frequency/Duration 1-3 days/wk for 2 weeks PRN     Specific OT Treatment Interventions to include:   * Instruction/training on adapted ADL techniques and AE recommendations to increase functional independence within precautions       * Training on energy conservation strategies, correct breathing pattern and techniques to improve independence/tolerance for self-care routine  * Functional transfer/mobility training/DME recommendations for increased independence, safety, and fall prevention  * Patient/Family education to increase follow through with safety techniques and functional independence  * Recommendation of environmental modifications for increased safety with functional transfers/mobility and ADLs  * Therapeutic exercise to improve motor endurance, ROM, and functional strength for ADLs/functional transfers  * Therapeutic activities to facilitate/challenge dynamic balance, stand tolerance for increased safety and independence with ADLs  * Positioning to improve skin integrity, interaction with environment and functional independence  * Delirium prevention/treatment    Recommended Adaptive Equipment: Pt has needed equipment      Home Living: With daughter and granchildren; single family home, 2 story, 1-2 steps to enter with rail. 1st floor set-up. Bathroom set-up: tub/shower with grab bars        Equipment owned: shower bench, O2 dependent (3-3.5 L)     Prior Level of Function: Independent with ADLs , daughter completes all IADLs; ambulated without AD. Driving: No  Occupation: Retired -    Enjoys: Being with grandchildren      Pain Level: Pt denied pain this date.        Cognition: A&O: 4/4; Follows 2 step directions   Memory: good    Sequencing: fair    Problem solving: fair    Judgement/safety: fair     [facility]   AM-PAC Daily Activity Inpatient   How much help for putting on and taking off regular lower body clothing?: A Little  How much help for Bathing?: A Little  How much help for Toileting?: A Little  How much help for putting on and taking off regular upper body clothing?: A Little  How much help for taking care of personal grooming?: A Little  How much help for eating meals?: None  AM-[facility] Inpatient Daily Activity Raw Score: 19  AM-PAC Inpatient ADL T-Scale Score : 40.22  ADL Inpatient CMS 0-100% Score: 42.8  ADL Inpatient CMS G-Code Modifier : CK     Functional Assessment:    Initial Eval Status  Date: 9/23/21 Treatment Status  Date: STGs = LTGs  Time frame: 10-14 days   Feeding Independent   Independent    Grooming Stand by Assist   Moderate Loman    UB Dressing Stand by Assist   Moderate Loman    LB Dressing Minimal Assist   Moderate Loman    Bathing Minimal Assist   Moderate Loman    Toileting Minimal Assist   Moderate Loman    Bed Mobility  Supine to sit: Stand by Assist   Sit to supine: Stand by Assist   Supine to sit: Independent   Sit to supine: Independent    Functional Transfers Stand by Assist from EOB and sit to stand transfer at commode. Transfer training with verbal cues for hand placement throughout session to improve safety. Independent    Functional Mobility Stand by Assist without AD to/from bathroom. Verbal cues for pacing and safety d/t slight impulsiveness. Independent    Balance Sitting:     Static: fair     Dynamic: fair   Standing: fair    Sitting:     Static: good     Dynamic: good   Standing: good     Activity Tolerance fair      Pt denied SOB with activity. Cues needed for pacing d/t slight impulsiveness.     -at rest: 97%, 96 bpm  -with activity: 93%, 97 bpm  Increase standing tolerance >3 minutes for improved engagement with functional transfers and indep in ADLs   Visual/  Perceptual Glasses: Readers     Reports changes in vision since admission: Pt admits to needing a f/u with eye doctor. NA      Hand Dominance: Right      AROM (PROM) Strength Additional Info:    RUE  WFL 4/5 good  and wfl FMC/dexterity noted during ADL tasks     LUE WFL 4/5 good  and wfl FMC/dexterity noted during ADL tasks       Hearing:  WFL   Sensation:   No c/o numbness or tingling  Tone:  WFL   Edema: None    Comments: RN cleared patient for OT. Upon arrival patient in supine.   Therapist facilitated and instructed pt on adapted  techniques & compensatory strategies to improve safety and independence with basic ADLs, bed mobility, functional transfers and mobility to allow pt to achieve highest level of independence and safely. Pt demonstrated fair understanding of education & follow through. At end of session, patient was returned to supine, despite encouragement for upright sitting in chair, with call light and phone within reach, all lines and tubes intact. Overall, patient demonstrated  decreased independence and safety during completion of ADL tasks. Pt would benefit from continued skilled OT to increase safety and independence with completion of ADL tasks and functional mobility for improved quality of life. Treatment: OT treatment provided this date includes:     Instruction/training on safety and adapted techniques for completion of ADLs    Instruction/training on safe functional mobility/transfer techniques    Instruction/training on energy conservation/work simplification for completion of ADLs    Proper Positioning/Alignment   Skilled monitoring of vitals - see section above    Rehab Potential: Good for established goals. Patient / Family Goal: Return home       Patient and/or family were instructed on functional diagnosis, prognosis/goals and OT plan of care. Demonstrated fair understanding.      Eval Complexity: Low    Time In: 1:30 PM  Time Out: 1:58 PM    Total Treatment Time: 8       Min Units   OT Eval Low 97165  X  1    OT Eval Medium 24951      OT Eval High 84951      OT Re-Eval [id number]            ADL/Self Care 84000 8 1   Therapeutic Activities 37511       Therapeutic Ex 04413       Orthotic Management 05903       Manual 38943     Neuro Re-Ed 49008       Non-Billable Time        Evaluation Time additionally includes thorough review of current medical information, gathering information on past medical history/social history and prior level of function, interpretation of standardized testing/informal observation of tasks, assessment of data and development of plan of care and goals.         Evaluating OT: Ronnie Martin OTR/L #[id number]

## 2021-09-23 NOTE — CARE COORDINATION
9/23/2021 1127 CM note: Negative covid 9/21/21. Pt resides with her dtr and 2 grandchildren. She has home O2 @3L NC and nebulizer through [facility]. Pt reports she did well with PT today and plans to return home upon dc. Pt's family will provide ride home.  [address]

## 2021-09-24 LAB
ADENOVIRUS BY PCR: NOT DETECTED
ALBUMIN SERPL-MCNC: 3.8 G/DL (ref 3.5–5.2)
ALP BLD-CCNC: 76 U/L (ref 35–104)
ALT SERPL-CCNC: 18 U/L (ref 0–32)
ANION GAP SERPL CALCULATED.3IONS-SCNC: 8 MMOL/L (ref 7–16)
AST SERPL-CCNC: 20 U/L (ref 0–31)
BASOPHILS ABSOLUTE: 0.01 E9/L (ref 0–0.2)
BASOPHILS RELATIVE PERCENT: 0.1 % (ref 0–2)
BILIRUB SERPL-MCNC: <0.2 MG/DL (ref 0–1.2)
BORDETELLA PARAPERTUSSIS BY PCR: NOT DETECTED
BORDETELLA PERTUSSIS BY PCR: NOT DETECTED
BUN BLDV-MCNC: 17 MG/DL (ref 6–23)
CALCIUM SERPL-MCNC: 9.3 MG/DL (ref 8.6–10.2)
CHLAMYDOPHILIA PNEUMONIAE BY PCR: NOT DETECTED
CHLORIDE BLD-SCNC: 98 MMOL/L (ref 98–107)
CO2: 35 MMOL/L (ref 22–29)
CORONAVIRUS 229E BY PCR: NOT DETECTED
CORONAVIRUS HKU1 BY PCR: NOT DETECTED
CORONAVIRUS NL63 BY PCR: NOT DETECTED
CORONAVIRUS OC43 BY PCR: NOT DETECTED
CREAT SERPL-MCNC: 0.5 MG/DL (ref 0.5–1)
CULTURE, RESPIRATORY: NORMAL
EOSINOPHILS ABSOLUTE: 0 E9/L (ref 0.05–0.5)
EOSINOPHILS RELATIVE PERCENT: 0 % (ref 0–6)
GFR AFRICAN AMERICAN: >60
GFR NON-AFRICAN AMERICAN: >60 ML/MIN/1.73
GLUCOSE BLD-MCNC: 134 MG/DL (ref 74–99)
HCT VFR BLD CALC: 40.2 % (ref 34–48)
HEMOGLOBIN: 13.3 G/DL (ref 11.5–15.5)
HUMAN METAPNEUMOVIRUS BY PCR: NOT DETECTED
HUMAN RHINOVIRUS/ENTEROVIRUS BY PCR: DETECTED
IMMATURE GRANULOCYTES #: 0.05 E9/L
IMMATURE GRANULOCYTES %: 0.5 % (ref 0–5)
INFLUENZA A BY PCR: NOT DETECTED
INFLUENZA B BY PCR: NOT DETECTED
LYMPHOCYTES ABSOLUTE: 1.07 E9/L (ref 1.5–4)
LYMPHOCYTES RELATIVE PERCENT: 11.2 % (ref 20–42)
MCH RBC QN AUTO: 30.1 PG (ref 26–35)
MCHC RBC AUTO-ENTMCNC: 33.1 % (ref 32–34.5)
MCV RBC AUTO: 91 FL (ref 80–99.9)
MONOCYTES ABSOLUTE: 0.28 E9/L (ref 0.1–0.95)
MONOCYTES RELATIVE PERCENT: 2.9 % (ref 2–12)
MYCOPLASMA PNEUMONIAE BY PCR: NOT DETECTED
NEUTROPHILS ABSOLUTE: 8.15 E9/L (ref 1.8–7.3)
NEUTROPHILS RELATIVE PERCENT: 85.3 % (ref 43–80)
PARAINFLUENZA VIRUS 1 BY PCR: NOT DETECTED
PARAINFLUENZA VIRUS 2 BY PCR: NOT DETECTED
PARAINFLUENZA VIRUS 3 BY PCR: NOT DETECTED
PARAINFLUENZA VIRUS 4 BY PCR: NOT DETECTED
PDW BLD-RTO: 12.2 FL (ref 11.5–15)
PLATELET # BLD: 262 E9/L (ref 130–450)
PMV BLD AUTO: 9.2 FL (ref 7–12)
POTASSIUM SERPL-SCNC: 4.4 MMOL/L (ref 3.5–5)
RBC # BLD: 4.42 E12/L (ref 3.5–5.5)
RESPIRATORY SYNCYTIAL VIRUS BY PCR: NOT DETECTED
SARS-COV-2, PCR: NOT DETECTED
SMEAR, RESPIRATORY: NORMAL
SODIUM BLD-SCNC: 141 MMOL/L (ref 132–146)
TOTAL PROTEIN: 6.1 G/DL (ref 6.4–8.3)
WBC # BLD: 9.6 E9/L (ref 4.5–11.5)

## 2021-09-24 PROCEDURE — 1200000000 HC SEMI PRIVATE

## 2021-09-24 PROCEDURE — 36415 COLL VENOUS BLD VENIPUNCTURE: CPT

## 2021-09-24 PROCEDURE — 6360000002 HC RX W HCPCS: Performed by: INTERNAL MEDICINE

## 2021-09-24 PROCEDURE — 6370000000 HC RX 637 (ALT 250 FOR IP): Performed by: INTERNAL MEDICINE

## 2021-09-24 PROCEDURE — 2580000003 HC RX 258: Performed by: INTERNAL MEDICINE

## 2021-09-24 PROCEDURE — 85025 COMPLETE CBC W/AUTO DIFF WBC: CPT

## 2021-09-24 PROCEDURE — 2700000000 HC OXYGEN THERAPY PER DAY

## 2021-09-24 PROCEDURE — 80053 COMPREHEN METABOLIC PANEL: CPT

## 2021-09-24 PROCEDURE — 94640 AIRWAY INHALATION TREATMENT: CPT

## 2021-09-24 PROCEDURE — 0202U NFCT DS 22 TRGT SARS-COV-2: CPT

## 2021-09-24 RX ORDER — ALBUTEROL SULFATE 90 UG/1
2 AEROSOL, METERED RESPIRATORY (INHALATION) 4 TIMES DAILY PRN
Qty: 1 EACH | Refills: 2 | Status: SHIPPED | OUTPATIENT
Start: 2021-09-24

## 2021-09-24 RX ORDER — ALBUTEROL SULFATE 1.25 MG/3ML
1 SOLUTION RESPIRATORY (INHALATION) EVERY 6 HOURS PRN
Qty: 360 ML | Refills: 3 | Status: SHIPPED | OUTPATIENT
Start: 2021-09-24

## 2021-09-24 RX ORDER — ARFORMOTEROL TARTRATE 15 UG/2ML
15 SOLUTION RESPIRATORY (INHALATION) 2 TIMES DAILY
Qty: 120 ML | Refills: 3 | Status: SHIPPED | OUTPATIENT
Start: 2021-09-24

## 2021-09-24 RX ORDER — BUDESONIDE 0.5 MG/2ML
0.5 INHALANT ORAL 2 TIMES DAILY
Qty: 60 EACH | Refills: 3 | Status: SHIPPED | OUTPATIENT
Start: 2021-09-24

## 2021-09-24 RX ADMIN — IPRATROPIUM BROMIDE AND ALBUTEROL SULFATE 1 AMPULE: .5; 3 SOLUTION RESPIRATORY (INHALATION) at 14:24

## 2021-09-24 RX ADMIN — GUAIFENESIN 600 MG: 600 TABLET, EXTENDED RELEASE ORAL at 09:20

## 2021-09-24 RX ADMIN — AZITHROMYCIN MONOHYDRATE 500 MG: 250 TABLET ORAL at 09:20

## 2021-09-24 RX ADMIN — THEOPHYLLINE ANHYDROUS 200 MG: 200 CAPSULE, EXTENDED RELEASE ORAL at 09:20

## 2021-09-24 RX ADMIN — IPRATROPIUM BROMIDE AND ALBUTEROL SULFATE 1 AMPULE: .5; 3 SOLUTION RESPIRATORY (INHALATION) at 10:38

## 2021-09-24 RX ADMIN — PREDNISONE 20 MG: 20 TABLET ORAL at 09:20

## 2021-09-24 RX ADMIN — IPRATROPIUM BROMIDE AND ALBUTEROL SULFATE 1 AMPULE: .5; 3 SOLUTION RESPIRATORY (INHALATION) at 17:12

## 2021-09-24 RX ADMIN — ARFORMOTEROL TARTRATE 15 MCG: 15 SOLUTION RESPIRATORY (INHALATION) at 17:12

## 2021-09-24 RX ADMIN — ENOXAPARIN SODIUM 40 MG: 40 INJECTION SUBCUTANEOUS at 09:20

## 2021-09-24 RX ADMIN — BUDESONIDE 500 MCG: 0.5 INHALANT RESPIRATORY (INHALATION) at 17:12

## 2021-09-24 RX ADMIN — GUAIFENESIN 600 MG: 600 TABLET, EXTENDED RELEASE ORAL at 20:19

## 2021-09-24 RX ADMIN — THEOPHYLLINE ANHYDROUS 200 MG: 200 CAPSULE, EXTENDED RELEASE ORAL at 20:19

## 2021-09-24 RX ADMIN — BUDESONIDE 500 MCG: 0.5 INHALANT RESPIRATORY (INHALATION) at 07:04

## 2021-09-24 RX ADMIN — IPRATROPIUM BROMIDE AND ALBUTEROL SULFATE 1 AMPULE: .5; 3 SOLUTION RESPIRATORY (INHALATION) at 07:04

## 2021-09-24 RX ADMIN — DOCUSATE SODIUM 100 MG: 100 CAPSULE, LIQUID FILLED ORAL at 09:20

## 2021-09-24 RX ADMIN — ARFORMOTEROL TARTRATE 15 MCG: 15 SOLUTION RESPIRATORY (INHALATION) at 07:04

## 2021-09-24 RX ADMIN — SODIUM CHLORIDE, PRESERVATIVE FREE 10 ML: 5 INJECTION INTRAVENOUS at 09:21

## 2021-09-24 RX ADMIN — SODIUM CHLORIDE, PRESERVATIVE FREE 10 ML: 5 INJECTION INTRAVENOUS at 20:19

## 2021-09-24 ASSESSMENT — PAIN SCALES - GENERAL: PAINLEVEL_OUTOF10: 0

## 2021-09-24 NOTE — PROGRESS NOTES
Internal Medicine Progress Note    [facility]    Ishmael Milian. Maria E Bailon., F.A.C.O.I. Chandan Calderon D.O., MARIELOSOChenteIChente Singh D.O. Dung Pennington, MSN, APRN, NP-C  Antoinette Oviedo. Andrew Scott, MSN, APRN-CNP     Primary Care Physician: Padmini Ashraf. MD Farida   Admitting Physician:  Maisha Keenan DO  Admission date and time: 9/21/2021  4:41 PM    Room:  [address] [place] [address]  Admitting diagnosis: COPD with acute exacerbation ([facility] [J44.1]  Acute respiratory failure with hypercapnia [facility]) [J96.02]    Patient Name: Deedee Lazaro  MRN: [id number]    Date of Service: 9/24/2021     Subjective:  Lior Isaac is a 64 y.o. female who was seen and examined today,9/24/2021, at the bedside. Patient appear to be approaching baseline. Consideration for possible discharge today but the patient states that food does not taste right. We will check her for Covid before discharge    Review of System:   Constitutional:   Denies fever or chills, weight loss or gain, fatigue or malaise. HEENT:   Denies ear pain, sore throat, sinus or eye problems. Cardiovascular:   Denies any chest pain, irregular heartbeats, or palpitations. Respiratory:   Admits to shortness of breath both at rest and with exertion. This has improved when compared to initial presentation. Gastrointestinal:   Denies nausea, vomiting, diarrhea, or constipation. Denies any abdominal pain. Genitourinary:    Denies any urgency, frequency, hematuria. Voiding  without difficulty. Extremities:   Denies lower extremity swelling, edema or cyanosis. Neurology:    Denies any headache or focal neurological deficits, Denies generalized weakness or memory difficulty. Psch:   Denies being anxious or depressed. Musculoskeletal:    Denies  myalgias, joint complaints or back pain. Integumentary:   Denies any rashes, ulcers, or excoriations. Denies bruising. Hematologic/Lymphatic:  Denies bruising or bleeding.     Physical Exam:  No intake/output data recorded. No intake or output data in the 24 hours ending 09/24/21 1342No intake/output data recorded. Patient Vitals for the past 96 hrs (Last 3 readings):   Weight   09/21/21 1653 160 lb (72.6 kg)     Vital Signs:   Blood pressure 124/65, pulse 80, temperature 96.8 °F (36 °C), temperature source Oral, resp. rate 18, weight 160 lb (72.6 kg), SpO2 98 %, not currently breastfeeding. General appearance:  Alert, responsive, oriented to person, place, and time. Well preserved, alert, no distress. Head:  Normocephalic. No masses, lesions or tenderness. Eyes:  PERRLA. EOMI. Sclera clear. Buccal mucosa moist.  ENT:  Ears normal. Mucosa normal.  Nasal cannula oxygen is in place. Neck:    Supple. Trachea midline. No thyromegaly. No JVD. No bruits. Heart:    Rhythm regular. Rate controlled. No murmurs. Lungs:    Symmetrical. Clear to auscultation bilaterally. No wheezes. No rhonchi. No rales. Abdomen:   Soft. Non-tender. Non-distended. Bowel sounds positive. No organomegaly or masses. No pain on palpation. Extremities:    Peripheral pulses present. No peripheral edema. No ulcers. No cyanosis. No clubbing. Neurologic:    Alert x 3. No focal deficit. Cranial nerves grossly intact. No focal weakness. Psych:   Behavior is normal. Mood appears normal. Speech is not rapid and/or pressured. Musculoskeletal:   Spine ROM normal. Muscular strength intact. Gait not assessed. Integumentary:  No rashes  Skin normal color and texture.   Genitalia/Breast:  Deferred    Medication:  Scheduled Meds:   docusate sodium  100 mg Oral Daily    theophylline  200 mg Oral BID    predniSONE  20 mg Oral Daily    Arformoterol Tartrate  15 mcg Nebulization BID    guaiFENesin  600 mg Oral BID    azithromycin  500 mg Oral Daily    sodium chloride flush  5-40 mL IntraVENous 2 times per day    enoxaparin  40 mg SubCUTAneous Daily    ipratropium-albuterol  1 ampule Inhalation Q4H While awake    budesonide 0.5 mg Nebulization BID     Continuous Infusions:   sodium chloride         Objective Data:  CBC with Differential:    Lab Results   Component Value Date    WBC 15.2 09/23/2021    RBC 4.38 09/23/2021    HGB 13.3 09/23/2021    HCT 41.6 09/23/2021     09/23/2021    MCV 95.0 09/23/2021    MCH 30.4 09/23/2021    MCHC 32.0 09/23/2021    RDW 12.4 09/23/2021    LYMPHOPCT 5.7 09/23/2021    MONOPCT 4.1 09/23/2021    BASOPCT 0.1 09/23/2021    MONOSABS 0.63 09/23/2021    LYMPHSABS 0.86 09/23/2021    EOSABS 0.00 09/23/2021    BASOSABS 0.01 09/23/2021     BMP:    Lab Results   Component Value Date     09/23/2021    K 5.2 09/23/2021    K 4.5 09/21/2021     09/23/2021    CO2 34 09/23/2021    BUN 21 09/23/2021    LABALBU 3.8 09/23/2021    CREATININE 0.5 09/23/2021    CALCIUM 9.5 09/23/2021    GFRAA >60 09/23/2021    LABGLOM >60 09/23/2021    GLUCOSE 123 09/23/2021       Assessment:    · Acute on chronic respiratory failure with hypoxia secondary to exacerbation of COPD complicated by acute viral infection with rhinovirus/enterovirus  · Ongoing tobacco abuse  · History of DVT  · Overweight    Plan:     · Appears to be at baseline status for discharge  · Prescription sent to pharmacy  · COVID-19 rapid test pending. If normal discharge today    More than 50% of my  time was spent at the bedside counseling/coordinating care with the patient and/or family with face to face contact. This time was spent reviewing notes and laboratory data as well as instructing and counseling the patient. Time I spent with the family or surrogate(s) is included only if the patient was incapable of providing the necessary information or participating in medical decisions. I also discussed the differential diagnosis and all of the proposed management plans with the patient and individuals accompanying the patient.     Heber Boothe requires this high level of physician care and nursing on the Telemetry unit due the complexity of decision

## 2021-09-25 VITALS
DIASTOLIC BLOOD PRESSURE: 92 MMHG | SYSTOLIC BLOOD PRESSURE: 159 MMHG | HEART RATE: 81 BPM | WEIGHT: 160 LBS | TEMPERATURE: 98.4 F | BODY MASS INDEX: 28.34 KG/M2 | OXYGEN SATURATION: 96 % | RESPIRATION RATE: 18 BRPM

## 2021-09-25 LAB
ALBUMIN SERPL-MCNC: 3.5 G/DL (ref 3.5–5.2)
ALP BLD-CCNC: 75 U/L (ref 35–104)
ALT SERPL-CCNC: 21 U/L (ref 0–32)
ANION GAP SERPL CALCULATED.3IONS-SCNC: 5 MMOL/L (ref 7–16)
AST SERPL-CCNC: 18 U/L (ref 0–31)
BASOPHILS ABSOLUTE: 0.01 E9/L (ref 0–0.2)
BASOPHILS RELATIVE PERCENT: 0.1 % (ref 0–2)
BILIRUB SERPL-MCNC: <0.2 MG/DL (ref 0–1.2)
BUN BLDV-MCNC: 18 MG/DL (ref 6–23)
CALCIUM SERPL-MCNC: 8.7 MG/DL (ref 8.6–10.2)
CHLORIDE BLD-SCNC: 99 MMOL/L (ref 98–107)
CO2: 37 MMOL/L (ref 22–29)
CREAT SERPL-MCNC: 0.5 MG/DL (ref 0.5–1)
EOSINOPHILS ABSOLUTE: 0.05 E9/L (ref 0.05–0.5)
EOSINOPHILS RELATIVE PERCENT: 0.5 % (ref 0–6)
GFR AFRICAN AMERICAN: >60
GFR NON-AFRICAN AMERICAN: >60 ML/MIN/1.73
GLUCOSE BLD-MCNC: 93 MG/DL (ref 74–99)
HCT VFR BLD CALC: 39.2 % (ref 34–48)
HEMOGLOBIN: 12.9 G/DL (ref 11.5–15.5)
IMMATURE GRANULOCYTES #: 0.03 E9/L
IMMATURE GRANULOCYTES %: 0.3 % (ref 0–5)
LYMPHOCYTES ABSOLUTE: 3.68 E9/L (ref 1.5–4)
LYMPHOCYTES RELATIVE PERCENT: 38.3 % (ref 20–42)
MCH RBC QN AUTO: 30.5 PG (ref 26–35)
MCHC RBC AUTO-ENTMCNC: 32.9 % (ref 32–34.5)
MCV RBC AUTO: 92.7 FL (ref 80–99.9)
MONOCYTES ABSOLUTE: 0.97 E9/L (ref 0.1–0.95)
MONOCYTES RELATIVE PERCENT: 10.1 % (ref 2–12)
NEUTROPHILS ABSOLUTE: 4.88 E9/L (ref 1.8–7.3)
NEUTROPHILS RELATIVE PERCENT: 50.7 % (ref 43–80)
PDW BLD-RTO: 12.2 FL (ref 11.5–15)
PLATELET # BLD: 261 E9/L (ref 130–450)
PMV BLD AUTO: 9.4 FL (ref 7–12)
POTASSIUM SERPL-SCNC: 3.9 MMOL/L (ref 3.5–5)
RBC # BLD: 4.23 E12/L (ref 3.5–5.5)
SODIUM BLD-SCNC: 141 MMOL/L (ref 132–146)
TOTAL PROTEIN: 5.7 G/DL (ref 6.4–8.3)
WBC # BLD: 9.6 E9/L (ref 4.5–11.5)

## 2021-09-25 PROCEDURE — 6370000000 HC RX 637 (ALT 250 FOR IP): Performed by: INTERNAL MEDICINE

## 2021-09-25 PROCEDURE — 6360000002 HC RX W HCPCS: Performed by: INTERNAL MEDICINE

## 2021-09-25 PROCEDURE — 2580000003 HC RX 258: Performed by: INTERNAL MEDICINE

## 2021-09-25 PROCEDURE — 2700000000 HC OXYGEN THERAPY PER DAY

## 2021-09-25 PROCEDURE — 36415 COLL VENOUS BLD VENIPUNCTURE: CPT

## 2021-09-25 PROCEDURE — 80053 COMPREHEN METABOLIC PANEL: CPT

## 2021-09-25 PROCEDURE — 85025 COMPLETE CBC W/AUTO DIFF WBC: CPT

## 2021-09-25 PROCEDURE — 94640 AIRWAY INHALATION TREATMENT: CPT

## 2021-09-25 RX ORDER — PREDNISONE 20 MG/1
20 TABLET ORAL DAILY
Qty: 10 TABLET | Refills: 0 | Status: SHIPPED | OUTPATIENT
Start: 2021-09-25 | End: 2021-10-05

## 2021-09-25 RX ORDER — AZITHROMYCIN 500 MG/1
500 TABLET, FILM COATED ORAL DAILY
Qty: 5 TABLET | Refills: 0 | Status: SHIPPED | OUTPATIENT
Start: 2021-09-25 | End: 2021-09-30

## 2021-09-25 RX ADMIN — GUAIFENESIN 600 MG: 600 TABLET, EXTENDED RELEASE ORAL at 09:07

## 2021-09-25 RX ADMIN — ARFORMOTEROL TARTRATE 15 MCG: 15 SOLUTION RESPIRATORY (INHALATION) at 06:02

## 2021-09-25 RX ADMIN — THEOPHYLLINE ANHYDROUS 200 MG: 200 CAPSULE, EXTENDED RELEASE ORAL at 09:07

## 2021-09-25 RX ADMIN — ENOXAPARIN SODIUM 40 MG: 40 INJECTION SUBCUTANEOUS at 09:07

## 2021-09-25 RX ADMIN — AZITHROMYCIN MONOHYDRATE 500 MG: 250 TABLET ORAL at 09:07

## 2021-09-25 RX ADMIN — PREDNISONE 20 MG: 20 TABLET ORAL at 09:07

## 2021-09-25 RX ADMIN — BUDESONIDE 500 MCG: 0.5 INHALANT RESPIRATORY (INHALATION) at 06:02

## 2021-09-25 RX ADMIN — SODIUM CHLORIDE, PRESERVATIVE FREE 5 ML: 5 INJECTION INTRAVENOUS at 09:09

## 2021-09-25 RX ADMIN — IPRATROPIUM BROMIDE AND ALBUTEROL SULFATE 1 AMPULE: .5; 3 SOLUTION RESPIRATORY (INHALATION) at 06:02

## 2021-09-25 RX ADMIN — IPRATROPIUM BROMIDE AND ALBUTEROL SULFATE 1 AMPULE: .5; 3 SOLUTION RESPIRATORY (INHALATION) at 09:35

## 2021-09-25 NOTE — DISCHARGE SUMMARY
Internal Medicine Progress Note     [facility]     Ishmael Milian. Maria E Bailon., MARIELOSOChenteI. Chandan Calderon D.O., MARIELOSOChenteIChente Singh D.O. Dung Pennington, MSN, APRN, NP-C  Antoinette Oviedo. Andrew Scott, MSN, [address] [facility]       Internal Medicine  Discharge Summary    NAME: Deedee Lazaro  :  1960  MRN:  [id number]  PCP:Nikolay Osorio MD  ADMITTED: 2021      DISCHARGED: 21    ADMITTING PHYSICIAN: Ishmael Silver DO    CONSULTANT(S):   IP CONSULT TO PHARMACY     ADMITTING DIAGNOSIS:   COPD with acute exacerbation (HCC) [J44.1]  Acute respiratory failure with hypercapnia (HCC) [J96.02]     DISCHARGE DIAGNOSES:   · Acute on chronic respiratory failure with hypoxia secondary to exacerbation of COPD complicated by acute viral infection with rhinovirus/enterovirus  · Ongoing tobacco abuse  · History of DVT  · Overweight    BRIEF HISTORY OF PRESENT ILLNESS:   Patient is 35-year-old female who presented to the ED due to shortness of breath. Patient states she began feeling more short of breath a few days ago. States that she has sick contacts as she is around her grandchildren who just recently started school. Patient states she had associated wheezing. She denies any increased frequency of cough or sputum production. She has been taking breathing treatments without significant relief. She denies any fever or chills. She denies any chest pain. She denies any nausea or emesis. She denies any issues with diarrhea or constipation.     LABS[de-identified]  Lab Results   Component Value Date    WBC 9.6 2021    HGB 12.9 2021    HCT 39.2 2021     2021     2021    K 3.9 2021    CL 99 2021    CREATININE 0.5 2021    BUN 18 2021    CO2 37 (H) 2021    GLUCOSE 93 2021    ALT 21 2021    AST 18 2021    INR 1.0 10/28/2019     Lab Results   Component Value Date    INR 1.0 10/28/2019    INR 0.9 01/31/2018    INR 1.0 01/22/2018    PROTIME 11.3 10/28/2019    PROTIME 9.8 01/31/2018    PROTIME 11.5 01/22/2018      Lab Results   Component Value Date    TSH 0.236 (L) 09/22/2021     Lab Results   Component Value Date    TRIG 82 11/30/2019    TRIG 79 10/29/2019     Lab Results   Component Value Date    HDL 61 11/30/2019    HDL 45 10/29/2019     Lab Results   Component Value Date    LDLCALC 195 (H) 11/30/2019    LDLCALC 138 (H) 10/29/2019     Lab Results   Component Value Date    LABA1C 4.8 03/08/2021       IMAGING:  XR CHEST PORTABLE    Result Date: 9/21/2021  EXAMINATION: ONE XRAY VIEW OF THE CHEST 9/21/2021 5:11 pm COMPARISON: 05/20/2021. HISTORY: ORDERING SYSTEM PROVIDED HISTORY: sob TECHNOLOGIST PROVIDED HISTORY: Reason for exam:->sob FINDINGS: The lungs are without acute focal process. There is no effusion or pneumothorax. The cardiomediastinal silhouette is without acute process. The osseous structures are without acute process. No acute process. HOSPITAL COURSE:   Betsy Davis did very well throughout the hospitalization. In the setting of her chronic respiratory failure with COPD, she was found to be suffering from rhinovirus/enterovirus infection. She was placed empirically on IV corticosteroids and these have been weaned to oral.  She will complete a course of oral corticosteroids as an outpatient. Otherwise, she will also complete a course of antibiotics. She was weaned back to her at home nasal cannula oxygen requirements. Covid was ruled out. She understands the importance of finishing the vaccination. Otherwise, she became ambulatory and essentially returned to her baseline. As she helps care for her grandchildren, she is constantly around an infectious process and she understands this. She is acceptable for discharge at this point.      BRIEF PHYSICAL EXAMINATION AND LABORATORIES ON DAY OF DISCHARGE:  VITALS:  BP (!) 159/92   Pulse 81   Temp 98.4 °F (36.9 °C) (Oral)   Resp 18   Wt 160 lb (72.6 kg)   SpO2 96%   BMI 28.34 kg/m²     HEENT:  PERRLA. EOMI. Sclera clear. Buccal mucosa moist.  Nasal cannula oxygen is in place. Neck:  Supple. Trachea midline. No thyromegaly. No JVD. No bruits. Heart:  Rhythm regular, rate controlled. No murmurs. Lungs:  Symmetrical. Clear to auscultation bilaterally. No wheezes. No rhonchi. No rales. Abdomen: Soft. Non-tender. Non-distended. Bowel sounds positive. No organomegaly or masses. No pain on palpation    Extremities:  Peripheral pulses present. No peripheral edema. No ulcers. Neurologic:  Alert x 3. No focal deficit. Cranial nerves grossly intact. Skin:  No petechia. No hemorrhage. No wounds. DISPOSITION:  The patient's condition is good. At this time the patient is without objective evidence of an acute process requiring continuing hospitalization or inpatient management. They are stable for discharge with outpatient follow-up. I have spoken with the patient and discussed the results of the current hospitalization, in addition to providing specific details for the plan of care and counseling regarding the diagnosis and prognosis. The plan has been discussed in detail and they are aware of the specific conditions for emergent return, as well as the importance of follow-up.   Their questions are answered at this time and they are agreeable with the plan for discharge to home    DISCHARGE MEDICATIONS:    [place], [address] [place] [address] Medication Instructions VMB:[id number]    Printed on:09/25/21 4628   Medication Information                      albuterol (ACCUNEB) 1.25 MG/3ML nebulizer solution  Inhale 3 mLs into the lungs every 6 hours as needed for Wheezing             albuterol sulfate HFA (VENTOLIN HFA) 108 (90 Base) MCG/ACT inhaler  Inhale 2 puffs into the lungs 4 times daily as needed for Wheezing or Shortness of Breath             Arformoterol Tartrate (BROVANA) 15 MCG/2ML NEBU  Take 2 mLs by nebulization 2 times daily             azithromycin (ZITHROMAX) 500 MG tablet  Take 1 tablet by mouth daily for 5 days             budesonide (PULMICORT) 0.5 MG/2ML nebulizer suspension  Take 2 mLs by nebulization 2 times daily             fluticasone (FLONASE) 50 MCG/ACT nasal spray  2 sprays by Each Nostril route daily             fluticasone-salmeterol (ADVAIR) 250-50 MCG/DOSE AEPB  Inhale 1 puff into the lungs every 12 hours . Rinse and spit after each use.             melatonin 3 MG TABS tablet  Take 1 tablet by mouth nightly as needed (insomnia)             OXYGEN  Inhale 3 L into the lungs continuous             predniSONE (DELTASONE) 20 MG tablet  Take 1 tablet by mouth daily for 10 days             sertraline (ZOLOFT) 25 MG tablet  Take 1 tablet by mouth daily             theophylline (UNIPHYL) 400 MG extended release tablet  Take 200 mg by mouth 2 times daily              Umeclidinium Bromide (INCRUSE ELLIPTA) 62.5 MCG/INH AEPB  Inhale 1 puff into the lungs daily                 FOLLOW UP/INSTRUCTIONS:  · This patient is instructed to follow-up with her primary care physician. · Patient is instructed to follow-up with the consults listed above as directed by them. · she is instructed to resume home medications and take new medications as indicated in the list above. · If the patient has a recurrence of symptoms, she is instructed to go to the ED. Preparing for this patient's discharge, including paperwork, orders, instructions, and meeting with patient did require > 40 minutes.     Maren Menjivar DO     9/25/2021  8:27 AM

## 2021-09-27 ENCOUNTER — TELEPHONE (OUTPATIENT)
Dept: FAMILY MEDICINE CLINIC | Age: 61
End: 2021-09-27

## 2021-09-27 NOTE — TELEPHONE ENCOUNTER
[facility] Initial Follow Up Call    Outreach made within 2 business days of discharge: Yes    Patient: Danish Bermeo Patient : 1960   MRN: <[id number]>  Reason for Admission: Acute Respiratory Failure    Discharge Date: 21       Spoke with: Unable to reach,Left message to return call back.     Discharge department/facility: [facility]      Scheduled appointment with PCP within 7-14 days    Follow Up  Future Appointments   Date Time Provider Lupe Arnett   10/18/2021  2:30 PM Lazara Burgess MD [facility] [address] [facility]

## 2021-09-28 ENCOUNTER — TELEPHONE (OUTPATIENT)
Dept: FAMILY MEDICINE CLINIC | Age: 61
End: 2021-09-28

## 2021-09-28 NOTE — TELEPHONE ENCOUNTER
[facility] Initial Follow Up Call    Outreach made within 2 business days of discharge: Yes    Patient: Chin Espinoza Patient : 1960   MRN: <[id number]>  Reason for Admission: Acute respiratory failure with hypercapnia(HCC)    Discharge Date: 21       Spoke with: 2nd attempt, unable to reach. Left message to return call back.     Discharge department/facility: [facility]    Scheduled appointment with PCP within 7-14 days    Follow Up  Future Appointments   Date Time Provider Lupe Arnett   10/18/2021  2:30 PM Stacey Eddy MD [address]

## 2021-12-29 DIAGNOSIS — R09.02 HYPOXIA: ICD-10-CM

## 2021-12-29 DIAGNOSIS — J44.1 COPD EXACERBATION (HCC): ICD-10-CM

## 2021-12-29 RX ORDER — ALBUTEROL SULFATE 2.5 MG/3ML
SOLUTION RESPIRATORY (INHALATION)
Qty: 360 ML | Refills: 8 | Status: SHIPPED | OUTPATIENT
Start: 2021-12-29

## 2022-01-19 ENCOUNTER — HOSPITAL ENCOUNTER (EMERGENCY)
Age: 62
Discharge: HOME | End: 2022-01-19
Attending: EMERGENCY MEDICINE
Payer: COMMERCIAL

## 2022-01-19 ENCOUNTER — APPOINTMENT (OUTPATIENT)
Dept: GENERAL RADIOLOGY | Age: 62
End: 2022-01-19
Payer: COMMERCIAL

## 2022-01-19 VITALS
HEART RATE: 96 BPM | SYSTOLIC BLOOD PRESSURE: 137 MMHG | DIASTOLIC BLOOD PRESSURE: 85 MMHG | OXYGEN SATURATION: 98 % | RESPIRATION RATE: 22 BRPM | TEMPERATURE: 97.4 F

## 2022-01-19 DIAGNOSIS — J44.1 COPD EXACERBATION (HCC): Primary | ICD-10-CM

## 2022-01-19 LAB
ALBUMIN SERPL-MCNC: 4 G/DL (ref 3.5–5.2)
ALP BLD-CCNC: 103 U/L (ref 35–104)
ALT SERPL-CCNC: 12 U/L (ref 0–32)
ANION GAP SERPL CALCULATED.3IONS-SCNC: 7 MMOL/L (ref 7–16)
AST SERPL-CCNC: 17 U/L (ref 0–31)
BASOPHILS ABSOLUTE: 0.04 E9/L (ref 0–0.2)
BASOPHILS RELATIVE PERCENT: 0.4 % (ref 0–2)
BILIRUB SERPL-MCNC: 0.2 MG/DL (ref 0–1.2)
BUN BLDV-MCNC: 13 MG/DL (ref 6–23)
CALCIUM SERPL-MCNC: 9.3 MG/DL (ref 8.6–10.2)
CHLORIDE BLD-SCNC: 102 MMOL/L (ref 98–107)
CO2: 35 MMOL/L (ref 22–29)
CREAT SERPL-MCNC: 0.5 MG/DL (ref 0.5–1)
EKG ATRIAL RATE: 88 BPM
EKG P AXIS: 83 DEGREES
EKG P-R INTERVAL: 174 MS
EKG Q-T INTERVAL: 374 MS
EKG QRS DURATION: 84 MS
EKG QTC CALCULATION (BAZETT): 452 MS
EKG R AXIS: 89 DEGREES
EKG T AXIS: 76 DEGREES
EKG VENTRICULAR RATE: 88 BPM
EOSINOPHILS ABSOLUTE: 0.29 E9/L (ref 0.05–0.5)
EOSINOPHILS RELATIVE PERCENT: 2.7 % (ref 0–6)
GFR AFRICAN AMERICAN: >60
GFR NON-AFRICAN AMERICAN: >60 ML/MIN/1.73
GLUCOSE BLD-MCNC: 133 MG/DL (ref 74–99)
HCT VFR BLD CALC: 43.9 % (ref 34–48)
HEMOGLOBIN: 14 G/DL (ref 11.5–15.5)
IMMATURE GRANULOCYTES #: 0.05 E9/L
IMMATURE GRANULOCYTES %: 0.5 % (ref 0–5)
LYMPHOCYTES ABSOLUTE: 3.01 E9/L (ref 1.5–4)
LYMPHOCYTES RELATIVE PERCENT: 28.4 % (ref 20–42)
MCH RBC QN AUTO: 30.4 PG (ref 26–35)
MCHC RBC AUTO-ENTMCNC: 31.9 % (ref 32–34.5)
MCV RBC AUTO: 95.4 FL (ref 80–99.9)
MONOCYTES ABSOLUTE: 0.64 E9/L (ref 0.1–0.95)
MONOCYTES RELATIVE PERCENT: 6 % (ref 2–12)
NEUTROPHILS ABSOLUTE: 6.57 E9/L (ref 1.8–7.3)
NEUTROPHILS RELATIVE PERCENT: 62 % (ref 43–80)
PDW BLD-RTO: 12 FL (ref 11.5–15)
PLATELET # BLD: 271 E9/L (ref 130–450)
PMV BLD AUTO: 9.4 FL (ref 7–12)
POTASSIUM REFLEX MAGNESIUM: 4.2 MMOL/L (ref 3.5–5)
PRO-BNP: 72 PG/ML (ref 0–125)
RBC # BLD: 4.6 E12/L (ref 3.5–5.5)
SARS-COV-2, NAAT: NOT DETECTED
SODIUM BLD-SCNC: 144 MMOL/L (ref 132–146)
TOTAL PROTEIN: 6.5 G/DL (ref 6.4–8.3)
TROPONIN, HIGH SENSITIVITY: 8 NG/L (ref 0–9)
WBC # BLD: 10.6 E9/L (ref 4.5–11.5)

## 2022-01-19 PROCEDURE — 87635 SARS-COV-2 COVID-19 AMP PRB: CPT

## 2022-01-19 PROCEDURE — 93010 ELECTROCARDIOGRAM REPORT: CPT | Performed by: INTERNAL MEDICINE

## 2022-01-19 PROCEDURE — 80053 COMPREHEN METABOLIC PANEL: CPT

## 2022-01-19 PROCEDURE — 94640 AIRWAY INHALATION TREATMENT: CPT

## 2022-01-19 PROCEDURE — 36415 COLL VENOUS BLD VENIPUNCTURE: CPT

## 2022-01-19 PROCEDURE — 84484 ASSAY OF TROPONIN QUANT: CPT

## 2022-01-19 PROCEDURE — 93005 ELECTROCARDIOGRAM TRACING: CPT | Performed by: EMERGENCY MEDICINE

## 2022-01-19 PROCEDURE — 83880 ASSAY OF NATRIURETIC PEPTIDE: CPT

## 2022-01-19 PROCEDURE — 85025 COMPLETE CBC W/AUTO DIFF WBC: CPT

## 2022-01-19 PROCEDURE — 71045 X-RAY EXAM CHEST 1 VIEW: CPT

## 2022-01-19 PROCEDURE — 6370000000 HC RX 637 (ALT 250 FOR IP): Performed by: EMERGENCY MEDICINE

## 2022-01-19 PROCEDURE — 99285 EMERGENCY DEPT VISIT HI MDM: CPT

## 2022-01-19 RX ORDER — DOXYCYCLINE HYCLATE 100 MG
100 TABLET ORAL 2 TIMES DAILY
Qty: 20 TABLET | Refills: 0 | Status: SHIPPED | OUTPATIENT
Start: 2022-01-19 | End: 2022-01-29

## 2022-01-19 RX ORDER — IPRATROPIUM BROMIDE AND ALBUTEROL SULFATE 2.5; .5 MG/3ML; MG/3ML
3 SOLUTION RESPIRATORY (INHALATION) ONCE
Status: COMPLETED | OUTPATIENT
Start: 2022-01-19 | End: 2022-01-19

## 2022-01-19 RX ORDER — PREDNISONE 10 MG/1
40 TABLET ORAL DAILY
Qty: 20 TABLET | Refills: 0 | Status: SHIPPED | OUTPATIENT
Start: 2022-01-19 | End: 2022-01-24

## 2022-01-19 RX ADMIN — IPRATROPIUM BROMIDE AND ALBUTEROL SULFATE 3 AMPULE: .5; 3 SOLUTION RESPIRATORY (INHALATION) at 13:19

## 2022-01-19 ASSESSMENT — ENCOUNTER SYMPTOMS
NAUSEA: 0
ABDOMINAL PAIN: 0
RHINORRHEA: 0
VOMITING: 0
COUGH: 1
SHORTNESS OF BREATH: 1
COLOR CHANGE: 0
DIARRHEA: 0

## 2022-01-19 NOTE — ED PROVIDER NOTES
Patient presents to the ED for evaluation of shortness of breath. Symptoms started yesterday and been gradually worsening. She has known history of COPD with frequent similar exacerbations. She states she does have a cough but has no productive sputum. No hemoptysis. Denies any chest pain. Shortness of breath is worse with any type of exertion and improves with rest.  Also improves with breathing treatments. She was given 125 mg of Solu-Medrol IV by EMS as well as a DuoNeb treatment. She denies any fevers or chills. Denies any exposures to known COVID. She states her grandchildren were recently sick. She is vaccinated against COVID. She is a former smoker and has not smoked any cigarettes since May 2021. No associated GI complaints. EMS reports that she was 83% on her 3 L that she wears at home. Review of Systems   Constitutional: Positive for fatigue. Negative for chills, diaphoresis and fever. HENT: Negative for congestion and rhinorrhea. Eyes: Negative for visual disturbance. Respiratory: Positive for cough and shortness of breath. Cardiovascular: Negative for chest pain, palpitations and leg swelling. Gastrointestinal: Negative for abdominal pain, diarrhea, nausea and vomiting. Genitourinary: Negative for decreased urine volume and difficulty urinating. Musculoskeletal: Negative for arthralgias and myalgias. Skin: Negative for color change and pallor. Neurological: Negative for dizziness, syncope, light-headedness and headaches. Hematological: Does not bruise/bleed easily. Psychiatric/Behavioral: Negative for confusion. All other systems reviewed and are negative. Physical Exam  Vitals and nursing note reviewed. Constitutional:       General: She is not in acute distress. Appearance: She is well-developed and normal weight. She is ill-appearing. She is not diaphoretic. HENT:      Head: Normocephalic and atraumatic.       Mouth/Throat:      Mouth: Mucous membranes are moist.   Eyes:      Conjunctiva/sclera: Conjunctivae normal.   Cardiovascular:      Rate and Rhythm: Normal rate and regular rhythm. Heart sounds: Normal heart sounds. No murmur heard. Pulmonary:      Effort: Pulmonary effort is normal. No respiratory distress ( No conversational dyspnea, accessory muscle use, or tachypnea). Breath sounds: Decreased breath sounds present. No wheezing, rhonchi or rales. Abdominal:      General: Bowel sounds are normal.      Palpations: Abdomen is soft. Tenderness: There is no abdominal tenderness. There is no guarding or rebound. Musculoskeletal:      Cervical back: Normal range of motion and neck supple. Comments: Edema of lower extremities. No calf tenderness or erythema. No increased warmth to palpation. Skin:     General: Skin is warm and dry. Coloration: Skin is not cyanotic or pale. Neurological:      Mental Status: She is alert and oriented to person, place, and time. Coordination: Coordination normal.          Procedures     MDM   Presents to the ED for evaluation of shortness of breath. Patient has known history of COPD. After a continuous DuoNeb treatment. She is feeling much better. Labs were assessed in the ED. CBC showed no evidence of leukocytosis or anemia. CMP showed no electrolyte abnormalities or evidence of renal insufficiency. COVID test was negative. Opponent was 8 with a BNP of 72. Chest x-ray showed no acute cardiopulmonary disease. Patient was ambulated on her 3 L which she wears chronically and maintained a pulse ox of 96% she did not feel short of breath. She is comfortably discharged home and follow-up with her PCP.       EKG Interpretation    Interpreted by emergency department physician    Rhythm: normal sinus   Rate: normal  Axis: normal  Ectopy: none  Conduction: Biatrial enlargement  ST Segments: no acute change  T Waves: no acute change  Q Waves: none    Clinical Impression: no acute changes    Yi Trivedi DO    ED Course as of 01/19/22 1409   Wed Jan 19, 2022   1354 Patient resting in bed comfortably. She feels much better after breathing treatment. We will walk her on her 3 L and see how she tolerates this. Discussed results of labs and imaging with her. [MS]   1406 Patient ambulated well. 96% on her 3 L. No conversational dyspnea and looked great. She is comfortably discharged home with follow-up with her PCP. She understands if she has worsening symptoms or new concerns that she can return to the ED for further evaluation. [MS]      ED Course User Index  [MS] Rylaneunice DO Farooq       --------------------------------------------- PAST HISTORY ---------------------------------------------  Past Medical History:  has a past medical history of Abscess, COPD (chronic obstructive pulmonary disease) ([facility] [address] Diverticulitis, Provoked DVT 3/2018, Seasonal allergic rhinitis, Smoker, and Tobacco use disorder. Past Surgical History:  has no past surgical history on file. Social History:  reports that she quit smoking about 8 months ago. Her smoking use included cigarettes. She has a 20.50 pack-year smoking history. She has never used smokeless tobacco. She reports current alcohol use. She reports current drug use. Drug: Marijuana Sujeybud Dorsey). Family History: family history includes Breast Cancer in her none; Colon Cancer in her none; Coronary Art Dis in her father; Diabetes in her unknown relative; Hypertension in her father; Other in her daughter and father; Stroke in her mother. The patients home medications have been reviewed.     Allergies: Penicillin v    -------------------------------------------------- RESULTS -------------------------------------------------  Labs:  Results for orders placed or performed during the hospital encounter of 01/19/22   COVID-19, Rapid    Specimen: Nasopharyngeal Swab   Result Value Ref Range    SARS-CoV-2, NAAT Not Detected Not Detected CBC Auto Differential   Result Value Ref Range    WBC 10.6 4.5 - 11.5 E9/L    RBC 4.60 3.50 - 5.50 E12/L    Hemoglobin 14.0 11.5 - 15.5 g/dL    Hematocrit 43.9 34.0 - 48.0 %    MCV 95.4 80.0 - 99.9 fL    MCH 30.4 26.0 - 35.0 pg    MCHC 31.9 (L) 32.0 - 34.5 %    RDW 12.0 11.5 - 15.0 fL    Platelets 044 473 - 146 E9/L    MPV 9.4 7.0 - 12.0 fL    Neutrophils % 62.0 43.0 - 80.0 %    Immature Granulocytes % 0.5 0.0 - 5.0 %    Lymphocytes % 28.4 20.0 - 42.0 %    Monocytes % 6.0 2.0 - 12.0 %    Eosinophils % 2.7 0.0 - 6.0 %    Basophils % 0.4 0.0 - 2.0 %    Neutrophils Absolute 6.57 1.80 - 7.30 E9/L    Immature Granulocytes # 0.05 E9/L    Lymphocytes Absolute 3.01 1.50 - 4.00 E9/L    Monocytes Absolute 0.64 0.10 - 0.95 E9/L    Eosinophils Absolute 0.29 0.05 - 0.50 E9/L    Basophils Absolute 0.04 0.00 - 0.20 E9/L   Comprehensive Metabolic Panel w/ Reflex to MG   Result Value Ref Range    Sodium 144 132 - 146 mmol/L    Potassium reflex Magnesium 4.2 3.5 - 5.0 mmol/L    Chloride 102 98 - 107 mmol/L    CO2 35 (H) 22 - 29 mmol/L    Anion Gap 7 7 - 16 mmol/L    Glucose 133 (H) 74 - 99 mg/dL    BUN 13 6 - 23 mg/dL    CREATININE 0.5 0.5 - 1.0 mg/dL    GFR Non-African American >60 >=60 mL/min/1.73    GFR African American >60     Calcium 9.3 8.6 - 10.2 mg/dL    Total Protein 6.5 6.4 - 8.3 g/dL    Albumin 4.0 3.5 - 5.2 g/dL    Total Bilirubin 0.2 0.0 - 1.2 mg/dL    Alkaline Phosphatase 103 35 - 104 U/L    ALT 12 0 - 32 U/L    AST 17 0 - 31 U/L   Troponin   Result Value Ref Range    Troponin, High Sensitivity 8 0 - 9 ng/L   Brain Natriuretic Peptide   Result Value Ref Range    Pro-BNP 72 0 - 125 pg/mL   EKG 12 Lead   Result Value Ref Range    Ventricular Rate 88 BPM    Atrial Rate 88 BPM    P-R Interval 174 ms    QRS Duration 84 ms    Q-T Interval 374 ms    QTc Calculation (Bazett) 452 ms    P Axis 83 degrees    R Axis 89 degrees    T Axis 76 degrees       Radiology:  XR CHEST PORTABLE   Final Result   No acute process. ------------------------- NURSING NOTES AND VITALS REVIEWED ---------------------------  Date / Time Roomed:  1/19/2022 12:53 PM  ED Bed Assignment:  03/03    The nursing notes within the ED encounter and vital signs as below have been reviewed. /85   Pulse 96   Temp 97.4 °F (36.3 °C) (Oral)   Resp 22   SpO2 98%   Oxygen Saturation Interpretation: Normal      ------------------------------------------ PROGRESS NOTES ------------------------------------------  I have spoken with the patient and discussed todays results, in addition to providing specific details for the plan of care and counseling regarding the diagnosis and prognosis. Their questions are answered at this time and they are agreeable with the plan. I discussed at length with them reasons for immediate return here for re evaluation. They will followup with primary care by calling their office tomorrow. --------------------------------- ADDITIONAL PROVIDER NOTES ---------------------------------  At this time the patient is without objective evidence of an acute process requiring hospitalization or inpatient management. They have remained hemodynamically stable throughout their entire ED visit and are stable for discharge with outpatient follow-up. The plan has been discussed in detail and they are aware of the specific conditions for emergent return, as well as the importance of follow-up. New Prescriptions    DOXYCYCLINE HYCLATE (VIBRA-TABS) 100 MG TABLET    Take 1 tablet by mouth 2 times daily for 10 days    PREDNISONE (DELTASONE) 10 MG TABLET    Take 4 tablets by mouth daily for 5 days       Diagnosis:  1. COPD exacerbation ([facility] [address]        Disposition:  Patient's disposition: Discharge to home  Patient's condition is stable.          Augie Omer DO  01/19/22 0267

## 2022-01-19 NOTE — ED NOTES
Bed: 03  Expected date:   Expected time:   Means of arrival:   Comments:  Binh Patiño RN  01/19/22 4816

## 2022-01-19 NOTE — ED NOTES
EKG completed. Physician notified. Pt placed on telemetry monitor and pulse oximetry.       Katie eLnny  01/19/22 4862

## 2022-01-19 NOTE — ED NOTES
Patient ambulated on home oxygen of 3L.  Patient's spO2 maintained at 96%     Nikhil Candelario RN  01/19/22 8452

## 2022-02-10 ENCOUNTER — APPOINTMENT (OUTPATIENT)
Dept: GENERAL RADIOLOGY | Age: 62
DRG: 190 | End: 2022-02-10
Payer: COMMERCIAL

## 2022-02-10 ENCOUNTER — HOSPITAL ENCOUNTER (INPATIENT)
Age: 62
LOS: 2 days | Discharge: HOME | DRG: 190 | End: 2022-02-12
Attending: EMERGENCY MEDICINE | Admitting: INTERNAL MEDICINE
Payer: COMMERCIAL

## 2022-02-10 DIAGNOSIS — J96.21 ACUTE ON CHRONIC RESPIRATORY FAILURE WITH HYPOXIA AND HYPERCAPNIA (HCC): Primary | ICD-10-CM

## 2022-02-10 DIAGNOSIS — J96.22 ACUTE ON CHRONIC RESPIRATORY FAILURE WITH HYPOXIA AND HYPERCAPNIA (HCC): Primary | ICD-10-CM

## 2022-02-10 DIAGNOSIS — R77.8 ELEVATED TROPONIN: ICD-10-CM

## 2022-02-10 DIAGNOSIS — J44.1 COPD EXACERBATION (HCC): ICD-10-CM

## 2022-02-10 LAB
ANION GAP SERPL CALCULATED.3IONS-SCNC: 6 MMOL/L (ref 7–16)
B.E.: 7.4 MMOL/L (ref -3–3)
BUN BLDV-MCNC: 14 MG/DL (ref 6–23)
CALCIUM SERPL-MCNC: 10.2 MG/DL (ref 8.6–10.2)
CHLORIDE BLD-SCNC: 98 MMOL/L (ref 98–107)
CHOLESTEROL, TOTAL: 262 MG/DL (ref 0–199)
CO2: 40 MMOL/L (ref 22–29)
COHB: 3.4 % (ref 0–1.5)
CREAT SERPL-MCNC: 0.5 MG/DL (ref 0.5–1)
CRITICAL: ABNORMAL
DATE ANALYZED: ABNORMAL
DATE OF COLLECTION: ABNORMAL
EKG ATRIAL RATE: 95 BPM
EKG P AXIS: 84 DEGREES
EKG P-R INTERVAL: 156 MS
EKG Q-T INTERVAL: 330 MS
EKG QRS DURATION: 66 MS
EKG QTC CALCULATION (BAZETT): 414 MS
EKG R AXIS: 83 DEGREES
EKG T AXIS: 76 DEGREES
EKG VENTRICULAR RATE: 95 BPM
FOLATE: 6.8 NG/ML (ref 4.8–24.2)
GFR AFRICAN AMERICAN: >60
GFR NON-AFRICAN AMERICAN: >60 ML/MIN/1.73
GLUCOSE BLD-MCNC: 119 MG/DL (ref 74–99)
HBA1C MFR BLD: 5 % (ref 4–5.6)
HCO3: 37.9 MMOL/L (ref 22–26)
HCT VFR BLD CALC: 43.7 % (ref 34–48)
HDLC SERPL-MCNC: 69 MG/DL
HEMOGLOBIN: 13.7 G/DL (ref 11.5–15.5)
HHB: 3.7 % (ref 0–5)
LAB: ABNORMAL
LDL CHOLESTEROL CALCULATED: 183 MG/DL (ref 0–99)
Lab: ABNORMAL
MAGNESIUM: 2.1 MG/DL (ref 1.6–2.6)
MCH RBC QN AUTO: 30.4 PG (ref 26–35)
MCHC RBC AUTO-ENTMCNC: 31.4 % (ref 32–34.5)
MCV RBC AUTO: 96.9 FL (ref 80–99.9)
METHB: 0.3 % (ref 0–1.5)
MODE: ABNORMAL
O2 CONTENT: 18.6 ML/DL
O2 SATURATION: 96.2 % (ref 92–98.5)
O2HB: 92.6 % (ref 94–97)
OPERATOR ID: 9689
PATIENT TEMP: 37 C
PCO2: 85.9 MMHG (ref 35–45)
PDW BLD-RTO: 12.4 FL (ref 11.5–15)
PH BLOOD GAS: 7.26 (ref 7.35–7.45)
PHOSPHORUS: 3.2 MG/DL (ref 2.5–4.5)
PLATELET # BLD: 377 E9/L (ref 130–450)
PMV BLD AUTO: 8.9 FL (ref 7–12)
PO2: 90.4 MMHG (ref 75–100)
POTASSIUM SERPL-SCNC: 4.5 MMOL/L (ref 3.5–5)
PRO-BNP: 86 PG/ML (ref 0–125)
RBC # BLD: 4.51 E12/L (ref 3.5–5.5)
REASON FOR REJECTION: NORMAL
REJECTED TEST: NORMAL
SARS-COV-2, NAAT: NOT DETECTED
SODIUM BLD-SCNC: 144 MMOL/L (ref 132–146)
SOURCE, BLOOD GAS: ABNORMAL
THB: 14.2 G/DL (ref 11.5–16.5)
THEOPHYLLINE LEVEL: <0.8 MCG/ML (ref 10–20)
TIME ANALYZED: 314
TRIGL SERPL-MCNC: 51 MG/DL (ref 0–149)
TROPONIN, HIGH SENSITIVITY: 15 NG/L (ref 0–9)
TROPONIN, HIGH SENSITIVITY: 34 NG/L (ref 0–9)
TROPONIN, HIGH SENSITIVITY: 53 NG/L (ref 0–9)
TSH SERPL DL<=0.05 MIU/L-ACNC: 0.26 UIU/ML (ref 0.27–4.2)
VITAMIN B-12: 255 PG/ML (ref 211–946)
VLDLC SERPL CALC-MCNC: 10 MG/DL
WBC # BLD: 10.8 E9/L (ref 4.5–11.5)

## 2022-02-10 PROCEDURE — 93005 ELECTROCARDIOGRAM TRACING: CPT | Performed by: EMERGENCY MEDICINE

## 2022-02-10 PROCEDURE — 2580000003 HC RX 258: Performed by: EMERGENCY MEDICINE

## 2022-02-10 PROCEDURE — 2580000003 HC RX 258: Performed by: INTERNAL MEDICINE

## 2022-02-10 PROCEDURE — 84100 ASSAY OF PHOSPHORUS: CPT

## 2022-02-10 PROCEDURE — 99285 EMERGENCY DEPT VISIT HI MDM: CPT

## 2022-02-10 PROCEDURE — 83880 ASSAY OF NATRIURETIC PEPTIDE: CPT

## 2022-02-10 PROCEDURE — 93010 ELECTROCARDIOGRAM REPORT: CPT | Performed by: INTERNAL MEDICINE

## 2022-02-10 PROCEDURE — 83036 HEMOGLOBIN GLYCOSYLATED A1C: CPT

## 2022-02-10 PROCEDURE — 6370000000 HC RX 637 (ALT 250 FOR IP): Performed by: INTERNAL MEDICINE

## 2022-02-10 PROCEDURE — 82607 VITAMIN B-12: CPT

## 2022-02-10 PROCEDURE — 80048 BASIC METABOLIC PNL TOTAL CA: CPT

## 2022-02-10 PROCEDURE — 84443 ASSAY THYROID STIM HORMONE: CPT

## 2022-02-10 PROCEDURE — 6360000002 HC RX W HCPCS: Performed by: INTERNAL MEDICINE

## 2022-02-10 PROCEDURE — 94640 AIRWAY INHALATION TREATMENT: CPT

## 2022-02-10 PROCEDURE — 96374 THER/PROPH/DIAG INJ IV PUSH: CPT

## 2022-02-10 PROCEDURE — 6360000002 HC RX W HCPCS: Performed by: EMERGENCY MEDICINE

## 2022-02-10 PROCEDURE — 80061 LIPID PANEL: CPT

## 2022-02-10 PROCEDURE — 2700000000 HC OXYGEN THERAPY PER DAY

## 2022-02-10 PROCEDURE — 71045 X-RAY EXAM CHEST 1 VIEW: CPT

## 2022-02-10 PROCEDURE — 6370000000 HC RX 637 (ALT 250 FOR IP): Performed by: EMERGENCY MEDICINE

## 2022-02-10 PROCEDURE — 2060000000 HC ICU INTERMEDIATE R&B

## 2022-02-10 PROCEDURE — 36415 COLL VENOUS BLD VENIPUNCTURE: CPT

## 2022-02-10 PROCEDURE — 82805 BLOOD GASES W/O2 SATURATION: CPT

## 2022-02-10 PROCEDURE — 96375 TX/PRO/DX INJ NEW DRUG ADDON: CPT

## 2022-02-10 PROCEDURE — 87635 SARS-COV-2 COVID-19 AMP PRB: CPT

## 2022-02-10 PROCEDURE — 82746 ASSAY OF FOLIC ACID SERUM: CPT

## 2022-02-10 PROCEDURE — 94660 CPAP INITIATION&MGMT: CPT

## 2022-02-10 PROCEDURE — 83735 ASSAY OF MAGNESIUM: CPT

## 2022-02-10 PROCEDURE — 85027 COMPLETE CBC AUTOMATED: CPT

## 2022-02-10 PROCEDURE — 80198 ASSAY OF THEOPHYLLINE: CPT

## 2022-02-10 PROCEDURE — 84484 ASSAY OF TROPONIN QUANT: CPT

## 2022-02-10 RX ORDER — BUDESONIDE 0.5 MG/2ML
500 INHALANT ORAL 2 TIMES DAILY
Status: DISCONTINUED | OUTPATIENT
Start: 2022-02-10 | End: 2022-02-12 | Stop reason: HOSPADM

## 2022-02-10 RX ORDER — ATORVASTATIN CALCIUM 40 MG/1
40 TABLET, FILM COATED ORAL NIGHTLY
Status: DISCONTINUED | OUTPATIENT
Start: 2022-02-10 | End: 2022-02-12 | Stop reason: HOSPADM

## 2022-02-10 RX ORDER — IPRATROPIUM BROMIDE AND ALBUTEROL SULFATE 2.5; .5 MG/3ML; MG/3ML
3 SOLUTION RESPIRATORY (INHALATION) CONTINUOUS
Status: DISCONTINUED | OUTPATIENT
Start: 2022-02-10 | End: 2022-02-10

## 2022-02-10 RX ORDER — 0.9 % SODIUM CHLORIDE 0.9 %
1000 INTRAVENOUS SOLUTION INTRAVENOUS ONCE
Status: COMPLETED | OUTPATIENT
Start: 2022-02-10 | End: 2022-02-10

## 2022-02-10 RX ORDER — IPRATROPIUM BROMIDE AND ALBUTEROL SULFATE 2.5; .5 MG/3ML; MG/3ML
1 SOLUTION RESPIRATORY (INHALATION) 4 TIMES DAILY
Status: DISCONTINUED | OUTPATIENT
Start: 2022-02-10 | End: 2022-02-12 | Stop reason: HOSPADM

## 2022-02-10 RX ORDER — DOXYCYCLINE HYCLATE 100 MG/1
100 CAPSULE ORAL EVERY 12 HOURS SCHEDULED
Status: DISCONTINUED | OUTPATIENT
Start: 2022-02-10 | End: 2022-02-12 | Stop reason: HOSPADM

## 2022-02-10 RX ORDER — METHYLPREDNISOLONE SODIUM SUCCINATE 125 MG/2ML
125 INJECTION, POWDER, LYOPHILIZED, FOR SOLUTION INTRAMUSCULAR; INTRAVENOUS ONCE
Status: COMPLETED | OUTPATIENT
Start: 2022-02-10 | End: 2022-02-10

## 2022-02-10 RX ORDER — METHYLPREDNISOLONE SODIUM SUCCINATE 40 MG/ML
40 INJECTION, POWDER, LYOPHILIZED, FOR SOLUTION INTRAMUSCULAR; INTRAVENOUS EVERY 8 HOURS
Status: DISCONTINUED | OUTPATIENT
Start: 2022-02-10 | End: 2022-02-11

## 2022-02-10 RX ORDER — LORAZEPAM 2 MG/ML
0.5 INJECTION INTRAMUSCULAR ONCE
Status: COMPLETED | OUTPATIENT
Start: 2022-02-10 | End: 2022-02-10

## 2022-02-10 RX ORDER — ARFORMOTEROL TARTRATE 15 UG/2ML
15 SOLUTION RESPIRATORY (INHALATION) 2 TIMES DAILY
Status: DISCONTINUED | OUTPATIENT
Start: 2022-02-10 | End: 2022-02-12 | Stop reason: HOSPADM

## 2022-02-10 RX ADMIN — SODIUM CHLORIDE 1000 ML: 9 INJECTION, SOLUTION INTRAVENOUS at 03:33

## 2022-02-10 RX ADMIN — IPRATROPIUM BROMIDE AND ALBUTEROL SULFATE 3 AMPULE: 2.5; .5 SOLUTION RESPIRATORY (INHALATION) at 01:01

## 2022-02-10 RX ADMIN — BUDESONIDE 500 MCG: 0.5 INHALANT RESPIRATORY (INHALATION) at 18:01

## 2022-02-10 RX ADMIN — METHYLPREDNISOLONE SODIUM SUCCINATE 40 MG: 40 INJECTION, POWDER, FOR SOLUTION INTRAMUSCULAR; INTRAVENOUS at 18:23

## 2022-02-10 RX ADMIN — THEOPHYLLINE ANHYDROUS 200 MG: 200 CAPSULE, EXTENDED RELEASE ORAL at 21:48

## 2022-02-10 RX ADMIN — METHYLPREDNISOLONE SODIUM SUCCINATE 125 MG: 125 INJECTION, POWDER, FOR SOLUTION INTRAMUSCULAR; INTRAVENOUS at 00:57

## 2022-02-10 RX ADMIN — SERTRALINE 25 MG: 50 TABLET, FILM COATED ORAL at 10:58

## 2022-02-10 RX ADMIN — ARFORMOTEROL TARTRATE 15 MCG: 15 SOLUTION RESPIRATORY (INHALATION) at 18:01

## 2022-02-10 RX ADMIN — LORAZEPAM 0.5 MG: 2 INJECTION INTRAMUSCULAR; INTRAVENOUS at 00:57

## 2022-02-10 RX ADMIN — WATER 1000 MG: 1 INJECTION INTRAMUSCULAR; INTRAVENOUS; SUBCUTANEOUS at 11:00

## 2022-02-10 RX ADMIN — METHYLPREDNISOLONE SODIUM SUCCINATE 40 MG: 40 INJECTION, POWDER, FOR SOLUTION INTRAMUSCULAR; INTRAVENOUS at 11:00

## 2022-02-10 RX ADMIN — ENOXAPARIN SODIUM 40 MG: 100 INJECTION SUBCUTANEOUS at 11:07

## 2022-02-10 RX ADMIN — DOXYCYCLINE HYCLATE 100 MG: 100 CAPSULE ORAL at 21:47

## 2022-02-10 RX ADMIN — IPRATROPIUM BROMIDE AND ALBUTEROL SULFATE 1 AMPULE: 2.5; .5 SOLUTION RESPIRATORY (INHALATION) at 18:01

## 2022-02-10 RX ADMIN — DOXYCYCLINE HYCLATE 100 MG: 100 CAPSULE ORAL at 10:57

## 2022-02-10 RX ADMIN — ATORVASTATIN CALCIUM 40 MG: 40 TABLET, FILM COATED ORAL at 21:48

## 2022-02-10 ASSESSMENT — ENCOUNTER SYMPTOMS
COUGH: 0
WHEEZING: 0
NAUSEA: 0
BACK PAIN: 0
ABDOMINAL DISTENTION: 0
TACHYPNEA: 1
EYE PAIN: 0
SINUS PRESSURE: 0
SORE THROAT: 0
EYE REDNESS: 0
EYE DISCHARGE: 0
DIARRHEA: 0
VOMITING: 0
SHORTNESS OF BREATH: 1

## 2022-02-10 NOTE — ED NOTES
Bed: 04  Expected date:   Expected time:   Means of arrival:   Comments:  Burak Ray RN  02/10/22 2842

## 2022-02-10 NOTE — CARE COORDINATION
SS NOTE: COVID NEGATIVE 2/10. This pt is a readmission from 3 weeks ago. SW will complete the readmission. Pt is on 3 liters of O2 here and 3 liters at home from [place]- she also has a neb from them there. Pt is on IV Rocephin and Solumedrol- and oral Vibramycin. Pt resides with her dtr and 2 grandchildren in a 2 story home where pt resides on the main floor. Pt was I pta with adls dtr assists with IADLS and driving. Pt has no other dme at home. Pt has no hx of hhc or snf/rehab. Her PCP is Dr Louie Amaya and her pharmacy is [facility] on [facility] [address]. Pt plans on returning home at [facility]. SS to continue. EPI Manzano.2/10/2022.2:35 PM.     Readmission Assessment  Number of Days since last admission?: 8-30 days  Previous Disposition: Home with Family  Who is being Interviewed: Patient  What was the patient's/caregiver's perception as to why they think they needed to return back to the hospital?:  (she could not breathe even with the 3 liters of O2.)  Did you visit your Primary Care Physician after you left the hospital, before you returned this time?: Yes  Did you see a specialist, such as Cardiac, Pulmonary, Orthopedic Physician, etc. after you left the hospital?: No  Who advised the patient to return to the hospital?: Self-referral  Does the patient report anything that got in the way of taking their medications?: No  In our efforts to provide the best possible care to you and others like you, can you think of anything that we could have done to help you after you left the hospital the first time, so that you might not have needed to return so soon?: Other (Comment) (none)

## 2022-02-10 NOTE — ED NOTES
This RN has resumed care. Pt.  Sleeping. In no acute distress.      Blossom Mckeon, DOMINGO  02/10/22 5772

## 2022-02-10 NOTE — ED NOTES
Pt resting comfortably In bed at this time. Report given to [employer].       Fly Salguero RN  02/10/22 0949

## 2022-02-10 NOTE — ED PROVIDER NOTES
Patient is a 63 y/o female who presents to the ED via EMS with shortness of breath. Patient states that she believes that she had a panic attack. She states that she suddenly became short of breath tonight despite wearing her normal 3 liters of oxygen at home. She denies any recent fever or cough. She denies any chest pain. Review of Systems   Constitutional: Negative for chills and fever. HENT: Negative for ear pain, sinus pressure and sore throat. Eyes: Negative for pain, discharge and redness. Respiratory: Positive for shortness of breath. Negative for cough and wheezing. Cardiovascular: Negative for chest pain. Gastrointestinal: Negative for abdominal distention, diarrhea, nausea and vomiting. Genitourinary: Negative for dysuria and frequency. Musculoskeletal: Negative for arthralgias and back pain. Skin: Negative for rash and wound. Neurological: Negative for weakness and headaches. Hematological: Negative for adenopathy. All other systems reviewed and are negative. Physical Exam  Vitals and nursing note reviewed. Constitutional:       General: She is not in acute distress. HENT:      Head: Normocephalic and atraumatic. Mouth/Throat:      Mouth: Mucous membranes are moist.   Eyes:      Pupils: Pupils are equal, round, and reactive to light. Cardiovascular:      Rate and Rhythm: Normal rate and regular rhythm. Heart sounds: No murmur heard. Pulmonary:      Effort: Pulmonary effort is normal. No respiratory distress. Breath sounds: No stridor. Examination of the right-upper field reveals decreased breath sounds. Examination of the left-upper field reveals decreased breath sounds. Examination of the right-middle field reveals decreased breath sounds. Examination of the left-middle field reveals decreased breath sounds. Examination of the right-lower field reveals decreased breath sounds.  Examination of the left-lower field reveals decreased breath sounds. Decreased breath sounds present. No wheezing, rhonchi or rales. Abdominal:      General: Bowel sounds are normal.      Palpations: Abdomen is soft. Tenderness: There is no abdominal tenderness. There is no guarding. Musculoskeletal:         General: Normal range of motion. Cervical back: Normal range of motion and neck supple. Right lower leg: No edema. Left lower leg: No edema. Skin:     General: Skin is warm and dry. Findings: No rash. Neurological:      Mental Status: She is alert and oriented to person, place, and time. Procedures     [facility]             --------------------------------------------- PAST HISTORY ---------------------------------------------  Past Medical History:  has a past medical history of Abscess, COPD (chronic obstructive pulmonary disease) ([facility] [address] Diverticulitis, Provoked DVT 3/2018, Seasonal allergic rhinitis, Smoker, and Tobacco use disorder. Past Surgical History:  has no past surgical history on file. Social History:  reports that she quit smoking about 8 months ago. Her smoking use included cigarettes. She has a 20.50 pack-year smoking history. She has never used smokeless tobacco. She reports current alcohol use. She reports current drug use. Drug: Marijuana Venu Longoria). Family History: family history includes Breast Cancer in her none; Colon Cancer in her none; Coronary Art Dis in her father; Diabetes in her unknown relative; Hypertension in her father; Other in her daughter and father; Stroke in her mother. The patients home medications have been reviewed.     Allergies: Penicillin v    -------------------------------------------------- RESULTS -------------------------------------------------    LABS:  Results for orders placed or performed during the hospital encounter of 02/10/22   COVID-19, Rapid    Specimen: Nasopharyngeal Swab   Result Value Ref Range    SARS-CoV-2, NAAT Not Detected Not Detected   Basic metabolic panel Result Value Ref Range    Sodium 144 132 - 146 mmol/L    Potassium 4.5 3.5 - 5.0 mmol/L    Chloride 98 98 - 107 mmol/L    CO2 40 (H) 22 - 29 mmol/L    Anion Gap 6 (L) 7 - 16 mmol/L    Glucose 119 (H) 74 - 99 mg/dL    BUN 14 6 - 23 mg/dL    CREATININE 0.5 0.5 - 1.0 mg/dL    GFR Non-African American >60 >=60 mL/min/1.73    GFR African American >60     Calcium 10.2 8.6 - 10.2 mg/dL   CBC   Result Value Ref Range    WBC 10.8 4.5 - 11.5 E9/L    RBC 4.51 3.50 - 5.50 E12/L    Hemoglobin 13.7 11.5 - 15.5 g/dL    Hematocrit 43.7 34.0 - 48.0 %    MCV 96.9 80.0 - 99.9 fL    MCH 30.4 26.0 - 35.0 pg    MCHC 31.4 (L) 32.0 - 34.5 %    RDW 12.4 11.5 - 15.0 fL    Platelets 093 783 - 553 E9/L    MPV 8.9 7.0 - 12.0 fL   Troponin   Result Value Ref Range    Troponin, High Sensitivity 15 (H) 0 - 9 ng/L   Brain Natriuretic Peptide   Result Value Ref Range    Pro-BNP 86 0 - 125 pg/mL   Troponin   Result Value Ref Range    Troponin, High Sensitivity 34 (H) 0 - 9 ng/L   SPECIMEN REJECTION   Result Value Ref Range    Rejected Test trp5     Reason for Rejection see below    Blood Gas, Arterial   Result Value Ref Range    Date Analyzed [id number]     Time Analyzed 0314     Source: Blood Arterial     pH, Blood Gas 7.262 (L) 7.350 - 7.450    PCO2 85.9 (HH) 35.0 - 45.0 mmHg    PO2 90.4 75.0 - 100.0 mmHg    HCO3 37.9 (H) 22.0 - 26.0 mmol/L    B.E. 7.4 (H) -3.0 - 3.0 mmol/L    O2 Sat 96.2 92.0 - 98.5 %    O2Hb 92.6 (L) 94.0 - 97.0 %    COHb 3.4 (H) 0.0 - 1.5 %    MetHb 0.3 0.0 - 1.5 %    O2 Content 18.6 mL/dL    HHb 3.7 0.0 - 5.0 %    tHb (est) 14.2 11.5 - 16.5 g/dL    Mode NC- 3 L     Date Of Collection      Time Collected      Pt Temp 37.0 C     ID [id number]     Lab [id number]     Critical(s) Notified Handed report to Dr/RN    EKG 12 Lead   Result Value Ref Range    Ventricular Rate 95 BPM    Atrial Rate 95 BPM    P-R Interval 156 ms    QRS Duration 66 ms    Q-T Interval 330 ms    QTc Calculation (Bazett) 414 ms    P Axis 84 degrees    R Axis 83 degrees    T [place] 76 degrees       RADIOLOGY:  XR CHEST PORTABLE   Final Result   COPD. No acute abnormality. EKG:  This EKG is signed and interpreted by me. Rate: 95  Rhythm: Sinus  Interpretation: no acute changes  Comparison: no previous EKG available      ------------------------- NURSING NOTES AND VITALS REVIEWED ---------------------------  Date / Time Roomed:  2/10/2022 12:37 AM  ED Bed Assignment:  04/04    The nursing notes within the ED encounter and vital signs as below have been reviewed. Patient Vitals for the past 24 hrs:   BP Temp Temp src Pulse Resp SpO2   02/10/22 0530 -- -- -- 83 20 97 %   02/10/22 0314 92/83 97.6 °F (36.4 °C) Axillary 100 19 98 %   02/10/22 0233 110/68 -- -- 109 30 99 %   02/10/22 0115 (!) 163/86 -- -- 102 (!) 39 98 %   02/10/22 0039 (!) 162/109 96.4 °F (35.8 °C) Temporal 98 28 98 %       Oxygen Saturation Interpretation: Abnormal    ------------------------------------------ PROGRESS NOTES ------------------------------------------  Re-evaluation(s):  Time: 0600. Patients symptoms are improving  Repeat physical examination is improved    Counseling:  I have spoken with the patient and discussed todays results, in addition to providing specific details for the plan of care and counseling regarding the diagnosis and prognosis. Their questions are answered at this time and they are agreeable with the plan of admission.    --------------------------------- ADDITIONAL PROVIDER NOTES ---------------------------------  Consultations:  Time: 2993. Spoke with Dr. Gaurav Torre. Discussed case. They will admit the patient. This patient's ED course included: a personal history and physicial examination, re-evaluation prior to disposition, multiple bedside re-evaluations, IV medications, cardiac monitoring and continuous pulse oximetry    This patient has remained hemodynamically stable during their ED course. Diagnosis:  1.  Acute on chronic respiratory failure with hypoxia and hypercapnia ([facility] [address]    2. COPD exacerbation (HCC)    3. Elevated troponin        Disposition:  Patient's disposition: Admit to telemetry  Patient's condition is stable.          [address] [facility],   02/10/22 2511

## 2022-02-10 NOTE — ED NOTES
Respiratory therapy notified of admission and will assist with transport     Lobito Khanna RN  02/10/22 1752

## 2022-02-11 ENCOUNTER — APPOINTMENT (OUTPATIENT)
Dept: CT IMAGING | Age: 62
DRG: 190 | End: 2022-02-11
Payer: COMMERCIAL

## 2022-02-11 PROBLEM — J44.1 COPD EXACERBATION (HCC): Status: ACTIVE | Noted: 2022-02-11

## 2022-02-11 LAB
ALBUMIN SERPL-MCNC: 3.5 G/DL (ref 3.5–5.2)
ALP BLD-CCNC: 80 U/L (ref 35–104)
ALT SERPL-CCNC: 15 U/L (ref 0–32)
ANION GAP SERPL CALCULATED.3IONS-SCNC: 8 MMOL/L (ref 7–16)
AST SERPL-CCNC: 16 U/L (ref 0–31)
BASOPHILS ABSOLUTE: 0.01 E9/L (ref 0–0.2)
BASOPHILS RELATIVE PERCENT: 0.1 % (ref 0–2)
BILIRUB SERPL-MCNC: 0.2 MG/DL (ref 0–1.2)
BUN BLDV-MCNC: 14 MG/DL (ref 6–23)
CALCIUM SERPL-MCNC: 9.1 MG/DL (ref 8.6–10.2)
CHLORIDE BLD-SCNC: 101 MMOL/L (ref 98–107)
CO2: 34 MMOL/L (ref 22–29)
CREAT SERPL-MCNC: 0.4 MG/DL (ref 0.5–1)
EOSINOPHILS ABSOLUTE: 0 E9/L (ref 0.05–0.5)
EOSINOPHILS RELATIVE PERCENT: 0 % (ref 0–6)
GFR AFRICAN AMERICAN: >60
GFR NON-AFRICAN AMERICAN: >60 ML/MIN/1.73
GLUCOSE BLD-MCNC: 134 MG/DL (ref 74–99)
HCT VFR BLD CALC: 36.4 % (ref 34–48)
HEMOGLOBIN: 11.6 G/DL (ref 11.5–15.5)
IMMATURE GRANULOCYTES #: 0.07 E9/L
IMMATURE GRANULOCYTES %: 0.6 % (ref 0–5)
LYMPHOCYTES ABSOLUTE: 0.76 E9/L (ref 1.5–4)
LYMPHOCYTES RELATIVE PERCENT: 6.8 % (ref 20–42)
MCH RBC QN AUTO: 29.9 PG (ref 26–35)
MCHC RBC AUTO-ENTMCNC: 31.9 % (ref 32–34.5)
MCV RBC AUTO: 93.8 FL (ref 80–99.9)
MONOCYTES ABSOLUTE: 0.22 E9/L (ref 0.1–0.95)
MONOCYTES RELATIVE PERCENT: 2 % (ref 2–12)
NEUTROPHILS ABSOLUTE: 10.11 E9/L (ref 1.8–7.3)
NEUTROPHILS RELATIVE PERCENT: 90.5 % (ref 43–80)
PDW BLD-RTO: 12.4 FL (ref 11.5–15)
PLATELET # BLD: 307 E9/L (ref 130–450)
PMV BLD AUTO: 9.3 FL (ref 7–12)
POTASSIUM SERPL-SCNC: 4.3 MMOL/L (ref 3.5–5)
RBC # BLD: 3.88 E12/L (ref 3.5–5.5)
SODIUM BLD-SCNC: 143 MMOL/L (ref 132–146)
TOTAL PROTEIN: 6 G/DL (ref 6.4–8.3)
WBC # BLD: 11.2 E9/L (ref 4.5–11.5)

## 2022-02-11 PROCEDURE — 2060000000 HC ICU INTERMEDIATE R&B

## 2022-02-11 PROCEDURE — 94660 CPAP INITIATION&MGMT: CPT

## 2022-02-11 PROCEDURE — 80053 COMPREHEN METABOLIC PANEL: CPT

## 2022-02-11 PROCEDURE — 6360000002 HC RX W HCPCS: Performed by: INTERNAL MEDICINE

## 2022-02-11 PROCEDURE — 6360000004 HC RX CONTRAST MEDICATION: Performed by: RADIOLOGY

## 2022-02-11 PROCEDURE — 85025 COMPLETE CBC W/AUTO DIFF WBC: CPT

## 2022-02-11 PROCEDURE — 71275 CT ANGIOGRAPHY CHEST: CPT

## 2022-02-11 PROCEDURE — 2700000000 HC OXYGEN THERAPY PER DAY

## 2022-02-11 PROCEDURE — 6370000000 HC RX 637 (ALT 250 FOR IP): Performed by: INTERNAL MEDICINE

## 2022-02-11 PROCEDURE — 94640 AIRWAY INHALATION TREATMENT: CPT

## 2022-02-11 PROCEDURE — 36415 COLL VENOUS BLD VENIPUNCTURE: CPT

## 2022-02-11 PROCEDURE — 2580000003 HC RX 258: Performed by: INTERNAL MEDICINE

## 2022-02-11 RX ORDER — THEOPHYLLINE 400 MG/1
400 TABLET, EXTENDED RELEASE ORAL DAILY
Qty: 30 TABLET | Refills: 3 | Status: SHIPPED | OUTPATIENT
Start: 2022-02-11 | End: 2022-02-11 | Stop reason: SDUPTHER

## 2022-02-11 RX ORDER — PREDNISONE 20 MG/1
40 TABLET ORAL DAILY
Status: DISCONTINUED | OUTPATIENT
Start: 2022-02-11 | End: 2022-02-12 | Stop reason: HOSPADM

## 2022-02-11 RX ORDER — THEOPHYLLINE 400 MG/1
400 TABLET, EXTENDED RELEASE ORAL DAILY
Qty: 90 TABLET | Refills: 1 | Status: SHIPPED | OUTPATIENT
Start: 2022-02-11

## 2022-02-11 RX ADMIN — BUDESONIDE 500 MCG: 0.5 INHALANT RESPIRATORY (INHALATION) at 06:42

## 2022-02-11 RX ADMIN — ENOXAPARIN SODIUM 40 MG: 100 INJECTION SUBCUTANEOUS at 09:53

## 2022-02-11 RX ADMIN — ARFORMOTEROL TARTRATE 15 MCG: 15 SOLUTION RESPIRATORY (INHALATION) at 06:42

## 2022-02-11 RX ADMIN — DOXYCYCLINE HYCLATE 100 MG: 100 CAPSULE ORAL at 20:58

## 2022-02-11 RX ADMIN — METHYLPREDNISOLONE SODIUM SUCCINATE 40 MG: 40 INJECTION, POWDER, FOR SOLUTION INTRAMUSCULAR; INTRAVENOUS at 09:52

## 2022-02-11 RX ADMIN — WATER 1000 MG: 1 INJECTION INTRAMUSCULAR; INTRAVENOUS; SUBCUTANEOUS at 06:25

## 2022-02-11 RX ADMIN — METHYLPREDNISOLONE SODIUM SUCCINATE 40 MG: 40 INJECTION, POWDER, FOR SOLUTION INTRAMUSCULAR; INTRAVENOUS at 01:46

## 2022-02-11 RX ADMIN — IOPAMIDOL 75 ML: 755 INJECTION, SOLUTION INTRAVENOUS at 13:47

## 2022-02-11 RX ADMIN — IPRATROPIUM BROMIDE AND ALBUTEROL SULFATE 1 AMPULE: 2.5; .5 SOLUTION RESPIRATORY (INHALATION) at 06:42

## 2022-02-11 RX ADMIN — THEOPHYLLINE ANHYDROUS 200 MG: 200 CAPSULE, EXTENDED RELEASE ORAL at 09:52

## 2022-02-11 RX ADMIN — IPRATROPIUM BROMIDE AND ALBUTEROL SULFATE 1 AMPULE: 2.5; .5 SOLUTION RESPIRATORY (INHALATION) at 09:54

## 2022-02-11 RX ADMIN — SERTRALINE 25 MG: 50 TABLET, FILM COATED ORAL at 09:52

## 2022-02-11 RX ADMIN — PREDNISONE 40 MG: 20 TABLET ORAL at 14:25

## 2022-02-11 RX ADMIN — IPRATROPIUM BROMIDE AND ALBUTEROL SULFATE 1 AMPULE: 2.5; .5 SOLUTION RESPIRATORY (INHALATION) at 14:14

## 2022-02-11 RX ADMIN — BUDESONIDE 500 MCG: 0.5 INHALANT RESPIRATORY (INHALATION) at 18:00

## 2022-02-11 RX ADMIN — THEOPHYLLINE ANHYDROUS 200 MG: 200 CAPSULE, EXTENDED RELEASE ORAL at 20:58

## 2022-02-11 RX ADMIN — ARFORMOTEROL TARTRATE 15 MCG: 15 SOLUTION RESPIRATORY (INHALATION) at 18:00

## 2022-02-11 RX ADMIN — DOXYCYCLINE HYCLATE 100 MG: 100 CAPSULE ORAL at 09:53

## 2022-02-11 RX ADMIN — ATORVASTATIN CALCIUM 40 MG: 40 TABLET, FILM COATED ORAL at 20:58

## 2022-02-11 RX ADMIN — IPRATROPIUM BROMIDE AND ALBUTEROL SULFATE 1 AMPULE: 2.5; .5 SOLUTION RESPIRATORY (INHALATION) at 18:00

## 2022-02-11 NOTE — PROGRESS NOTES
Internal Medicine Progress Note    [facility]    Toya Carroll, MARIELOSORAFAL Segundo D.O., ASHLI George Cap, MSN, APRN, NP-C  Maria Isabel Krause. Robert Robison, MSN, APRN-CNP     Primary Care Physician: Maye Ceja. MD Farida   Admitting Physician:  Karolyn Campos DO  Admission date and time: 2/10/2022 12:37 AM    Room:  [address] [place] [address]  Admitting diagnosis: Elevated troponin [R77.8]  COPD exacerbation ([facility]) [J44.1]  Acute on chronic respiratory failure with hypoxia and hypercapnia ([facility] [address] [I62.22, J96.22]    Patient Name: Lluvia Henson  MRN: [id number]    Date of Service: 2/11/2022     Subjective:  Carolann Malhotra is a 64 y.o. female who was seen and examined today,2/11/2022, at the bedside. Patient seem to be breathing easier today. Currently on O2 at 3 L and she does have oxygen at home. Have suggest behavior modification and risk stratification. Have discussed either taking Daliresp or theophylline preparation at home but financial restriction is a concern. Will obtain CT today. Possible discharge tomorrow    No family present during my examination. Review of System:   Constitutional:   Denies fever or chills, weight loss or gain, fatigue or malaise. HEENT:   Denies ear pain, sore throat, sinus or eye problems. Chronic O2 use  Cardiovascular:   Denies any chest pain, irregular heartbeats, or palpitations. Respiratory:   Seem to be breathing easier today. Less shortness of breath  Gastrointestinal:   Denies nausea, vomiting, diarrhea, or constipation. Denies any abdominal pain. Genitourinary:    Denies any urgency, frequency, hematuria. Voiding  without difficulty. Extremities:   Denies lower extremity swelling, edema or cyanosis. Neurology:    Denies any headache or focal neurological deficits, Denies generalized weakness or memory difficulty. Psch:   Denies being anxious or depressed.   Musculoskeletal:    Denies  myalgias, joint Oral 2 times per day    sertraline  25 mg Oral Daily    theophylline  200 mg Oral BID    enoxaparin  40 mg SubCUTAneous Daily    atorvastatin  40 mg Oral Nightly     Continuous Infusions:    Objective Data:  CBC with Differential:    Lab Results   Component Value Date    WBC 11.2 02/11/2022    RBC 3.88 02/11/2022    HGB 11.6 02/11/2022    HCT 36.4 02/11/2022     02/11/2022    MCV 93.8 02/11/2022    MCH 29.9 02/11/2022    MCHC 31.9 02/11/2022    RDW 12.4 02/11/2022    LYMPHOPCT 6.8 02/11/2022    MONOPCT 2.0 02/11/2022    BASOPCT 0.1 02/11/2022    MONOSABS 0.22 02/11/2022    LYMPHSABS 0.76 02/11/2022    EOSABS 0.00 02/11/2022    BASOSABS 0.01 02/11/2022     CMP:    Lab Results   Component Value Date     02/11/2022    K 4.3 02/11/2022    K 4.2 01/19/2022     02/11/2022    CO2 34 02/11/2022    BUN 14 02/11/2022    CREATININE 0.4 02/11/2022    GFRAA >60 02/11/2022    LABGLOM >60 02/11/2022    GLUCOSE 134 02/11/2022    PROT 6.0 02/11/2022    LABALBU 3.5 02/11/2022    CALCIUM 9.1 02/11/2022    BILITOT 0.2 02/11/2022    ALKPHOS 80 02/11/2022    AST 16 02/11/2022    ALT 15 02/11/2022              Assessment:    · Acute hypercapnic hypoxemic respiratory failure secondary to underlying acute exacerbation of COPD. · Overweight with elevated BMI of 28.34.  · Vaccinated for COVID-19. · History of nicotine use with ongoing use with attempt to cutting down. · Prior history of DVT      Plan:     · Continue current treatment  · Will obtain CT of the lungs  · Switch over to p.o. steroids  · We will obtain arterial blood gases tomorrow on oxygen to monitor baseline  · Continue work with physical therapy  · Pending results of CAT scan further recommendations    More than 50% of my  time was spent at the bedside counseling/coordinating care with the patient and/or family with face to face contact. This time was spent reviewing notes and laboratory data as well as instructing and counseling the patient.  Time I spent with the family or surrogate(s) is included only if the patient was incapable of providing the necessary information or participating in medical decisions. I also discussed the differential diagnosis and all of the proposed management plans with the patient and individuals accompanying the patient. William Chavarria requires this high level of physician care and nursing on the IMC/Telemetry unit due the complexity of decision management and chance of rapid decline or death. Continued cardiac monitoring and higher level of nursing are required. I am readily available for any further decision-making and intervention.      Frankey Gladden, DO, F.A.C.O.I.  2/11/2022  4:19 PM

## 2022-02-11 NOTE — PROGRESS NOTES
CLINICAL PHARMACY NOTE: MEDS TO BEDS    Total # of Prescriptions Filled: 0   The following medications were delivered to the patient:    Theophylline ER 400mg has now gone through the insurance for $7.50, but is out of stock.     Additional Documentation:

## 2022-02-11 NOTE — CARE COORDINATION
New Theophylline script 400 mg tablets-can get Generic per [employer] is $7.50.  [facility] doesn't have this- Luke Bueno is contacting pts outside pharmacy and will speak to patient about this and cost. Electronically signed by Nasrin Josue RN-BC on 2/11/2022 at 12:54 PM

## 2022-02-11 NOTE — CARE COORDINATION
SS NOTE: COVID NEGATIVE 2/10. Pt is on 3 liters of O2 here and 3 liters at home from [employer] [place] [employer] [place]- she also has a neb from them there. Pt is on IV Rocephin and Solumedrol. Pt's pharmacy is [facility] on [address]- she also has to use mail order. Pt plans on returning home at [facility]. SS to continue. EPI Renae.2/11/2022.1:18 PM.

## 2022-02-11 NOTE — H&P
[address]                              HISTORY AND PHYSICAL    PATIENT NAME: John Paul Holbrook                       :        1960  MED REC NO:   [id number]                            ROOM:       0532  ACCOUNT NO:   [de-identified]                           ADMIT DATE: 02/10/2022  PROVIDER:     Shorty García DO    HISTORY OF CHIEF COMPLAINT:  This is a 70-year-old white female who was  admitted to [address] [place] [address]. The patient presented to the hospital  here through the emergency room in the early morning on 02/10/2022. The  patient presented to the hospital here by EMS at which time the patient  did complain of some shortness of breath. The patient states that  questionably might have had a panic attack, but all of a sudden became  progressively short of breath. She was wearing chronic oxygen at 3  liters per minute. She presented to the hospital here and was seen and  evaluated. The patient at that time was a little bit short of breath  at the time did show evidence of what appeared to be hypercapnic  and hypoxemic respiratory failure. The patient had clinical  improvement. She was admitted to the hospital to the general telemetry  floor under the service of Dr. Evelyne Dozier and Dr. Rebekah Ordonez. The patient  normally sees her family doctor who is Dr. Shanae Latham. From a pulmonary  standpoint view, the patient was hospitalized at this institution in  2021. The patient was subsequently discharged home and did follow up  with Dr. Phi Alvarez. The patient does have a history of acute-on-chronic  respiratory failure with hypoxemia secondary to exacerbation of COPD. At that time, she did have the rhinovirus. Currently, she does seem to  be breathing better. The patient had been on theophylline, but this has  been discontinued by her pulmonologist.  She says that since this has  been stopped, she has got progressively worse. She does wear oxygen at  home. She still smokes, but only does several cigarettes per week. She  does have a slight cough. She does have both resting and exertional  dyspnea. She denies any chest pain. Cardiac-wise, she has no evidence  of any chest pain per se. She denies any peripheral edema. There is no  significant cardiac history. Gastrointestinal wise, she denies any  recent changes in bowel habits, hematochezia, rectal bleeding, nausea or  vomiting. Genitourinary wise, she denies any dysuria, hematuria, or  pyuria. Neurologically, she denies any history of stroke, TIAs, etc.   She does suffer from anxiety. ALLERGIES:  She is allergic to PENICILLIN. CURRENT MEDICATIONS PRIOR TO ADMISSION:  Include Proventil 2.5 mg q.4  hours p.r.n., Brovana 15 q.12, Pulmicort 0.5 q.12, Ventolin p.r.n.,  Uniphyl 200 mg b.i.d.  Melatonin 3 mg at bedtime, Flonase 2 sprays each  nostril, and Zoloft 25 daily. PAST MEDICAL HISTORY:  Positive for the usual childhood diseases;  history of abscess of the colon; history of COPD; diverticulitis;  history of provoked DVT in 2018, had been on Eliquis for three months  from a PICC line; history of allergic rhinitis. The patient smoked from  2019 five to six per day. PAST SURGICAL HISTORY:  Positive for colon surgery. FAMILY HISTORY:  Parents are . SOCIAL HISTORY:  The patient smokes a half of pack of cigarettes per day  since 2021. Social use of alcohol. Does use marijuana. The patient  is currently .   of pancreatic cancer. Mother of two  children. REVIEW OF THE CHART:  Shows the following:  Her EKG shows normal sinus  rhythm. Her TSH is suppressed at 0.264. Troponin level is 53 and  stable. Cholesterol 262. HDL and LDL were 69 and 183, respectively. COVID test was negative. Arterial blood gases show pH of 7.262, pCO2  85, and pO2 90. Chest x-ray shows COPD.     PHYSICAL EXAMINATION:  VITAL SIGNS:  Shows height to be 5 feet 3 inches, weight 160 pounds, BMI  28.34, blood pressure 125/64, pulse 85, respirations 18, afebrile. GENERAL APPEARANCE:  This is a 31-year-old white female, who is alert  and oriented to person, place, and time. HEENT:  Normal grossly to inspection. The patient was wearing  supplemental O2 and was breathing stable at the present time. NECK:  Reveals flat JVD. Trachea midline. Thyroid not palpable. LUNGS:  Diminished, slightly coarse to the bases. Some end-expiratory  wheezes were present. HEART:  Appeared to be fairly regular. No significant ectopy noted. No  S3. No S4.  ABDOMEN:  Soft. No guarding, rebound, or rigidity. EXTREMITIES:  Currently without any cyanosis, clubbing, or edema. IMPRESSION:  At this time includes:  1. Acute hypercapnic hypoxemic respiratory failure secondary to  underlying acute exacerbation of COPD.  2.  Overweight with elevated BMI of 28.34.  3.  Vaccinated for COVID-19.  4.  History of nicotine use with ongoing use with attempt to cutting  down. 5.  Prior history of DVT. PLAN AND RECOMMENDATIONS:  At this time, currently the patient does  appear to be stable. The patient is clinically improving at the present  time. The patient has improved and is currently off BiPAP. She is  currently on supplemental O2. Steroids had been employed, we will place  on antibiotics and proceed with further workup. We will do a [employer]  since the patient has had rhinovirus in the past.  We will resume her on  her previous medications and make further recommendations. We have  discussed behavioral modifications such as cigarette cessation. We will  continue current plan and treatment and make further recommendations as  clinically indicated. The patient otherwise appeared to be stable at  this time. Pending results of further care will dictate further care  and treatment.         [address] [place] [address],     D: 02/10/2022 15:27:01       T: 02/10/2022 18:21:02 [email or web address]  Job#: [id number]     Doc#: [id number]

## 2022-02-11 NOTE — CARE COORDINATION
SS consult for Yannick costs: Need Script for An Matson- please. Primitivo Chen is evaluating the costs as written here Yannick-24 ER- 200 mg po bid. May require a PA. Electronically signed by MARKELL FinneyBC on 2/11/2022 at 8:54 AM        Theophylline is non-formulary- cash price for 100 capsules is $400.00 -per [employer]. will know what to substitute this with. Jarrod Aguilar updated, SS updated.  Electronically signed by MUNIRA Finney on 2/11/2022 at 9:29 AM

## 2022-02-12 VITALS
BODY MASS INDEX: 28.35 KG/M2 | HEIGHT: 63 IN | SYSTOLIC BLOOD PRESSURE: 142 MMHG | RESPIRATION RATE: 14 BRPM | TEMPERATURE: 97.8 F | HEART RATE: 68 BPM | WEIGHT: 160 LBS | OXYGEN SATURATION: 96 % | DIASTOLIC BLOOD PRESSURE: 67 MMHG

## 2022-02-12 LAB
ALBUMIN SERPL-MCNC: 3.3 G/DL (ref 3.5–5.2)
ALP BLD-CCNC: 74 U/L (ref 35–104)
ALT SERPL-CCNC: 15 U/L (ref 0–32)
ANION GAP SERPL CALCULATED.3IONS-SCNC: 8 MMOL/L (ref 7–16)
AST SERPL-CCNC: 14 U/L (ref 0–31)
BASOPHILS ABSOLUTE: 0.01 E9/L (ref 0–0.2)
BASOPHILS RELATIVE PERCENT: 0.1 % (ref 0–2)
BILIRUB SERPL-MCNC: 0.4 MG/DL (ref 0–1.2)
BUN BLDV-MCNC: 18 MG/DL (ref 6–23)
CALCIUM SERPL-MCNC: 9 MG/DL (ref 8.6–10.2)
CHLORIDE BLD-SCNC: 100 MMOL/L (ref 98–107)
CO2: 35 MMOL/L (ref 22–29)
CREAT SERPL-MCNC: 0.5 MG/DL (ref 0.5–1)
EOSINOPHILS ABSOLUTE: 0 E9/L (ref 0.05–0.5)
EOSINOPHILS RELATIVE PERCENT: 0 % (ref 0–6)
GFR AFRICAN AMERICAN: >60
GFR NON-AFRICAN AMERICAN: >60 ML/MIN/1.73
GLUCOSE BLD-MCNC: 88 MG/DL (ref 74–99)
HCT VFR BLD CALC: 36.4 % (ref 34–48)
HEMOGLOBIN: 11.9 G/DL (ref 11.5–15.5)
IMMATURE GRANULOCYTES #: 0.05 E9/L
IMMATURE GRANULOCYTES %: 0.4 % (ref 0–5)
LYMPHOCYTES ABSOLUTE: 1.41 E9/L (ref 1.5–4)
LYMPHOCYTES RELATIVE PERCENT: 9.9 % (ref 20–42)
MCH RBC QN AUTO: 30.6 PG (ref 26–35)
MCHC RBC AUTO-ENTMCNC: 32.7 % (ref 32–34.5)
MCV RBC AUTO: 93.6 FL (ref 80–99.9)
MONOCYTES ABSOLUTE: 0.83 E9/L (ref 0.1–0.95)
MONOCYTES RELATIVE PERCENT: 5.8 % (ref 2–12)
NEUTROPHILS ABSOLUTE: 11.95 E9/L (ref 1.8–7.3)
NEUTROPHILS RELATIVE PERCENT: 83.8 % (ref 43–80)
PDW BLD-RTO: 12.6 FL (ref 11.5–15)
PLATELET # BLD: 295 E9/L (ref 130–450)
PMV BLD AUTO: 9.2 FL (ref 7–12)
POTASSIUM SERPL-SCNC: 3.6 MMOL/L (ref 3.5–5)
RBC # BLD: 3.89 E12/L (ref 3.5–5.5)
SODIUM BLD-SCNC: 143 MMOL/L (ref 132–146)
TOTAL PROTEIN: 5.8 G/DL (ref 6.4–8.3)
WBC # BLD: 14.3 E9/L (ref 4.5–11.5)

## 2022-02-12 PROCEDURE — 2580000003 HC RX 258: Performed by: INTERNAL MEDICINE

## 2022-02-12 PROCEDURE — 94640 AIRWAY INHALATION TREATMENT: CPT

## 2022-02-12 PROCEDURE — 6360000002 HC RX W HCPCS: Performed by: INTERNAL MEDICINE

## 2022-02-12 PROCEDURE — 2700000000 HC OXYGEN THERAPY PER DAY

## 2022-02-12 PROCEDURE — 80053 COMPREHEN METABOLIC PANEL: CPT

## 2022-02-12 PROCEDURE — 89220 SPUTUM SPECIMEN COLLECTION: CPT

## 2022-02-12 PROCEDURE — 36415 COLL VENOUS BLD VENIPUNCTURE: CPT

## 2022-02-12 PROCEDURE — 6370000000 HC RX 637 (ALT 250 FOR IP): Performed by: INTERNAL MEDICINE

## 2022-02-12 PROCEDURE — 85025 COMPLETE CBC W/AUTO DIFF WBC: CPT

## 2022-02-12 RX ORDER — DOXYCYCLINE HYCLATE 100 MG/1
100 CAPSULE ORAL EVERY 12 HOURS SCHEDULED
Qty: 14 CAPSULE | Refills: 0 | Status: SHIPPED | OUTPATIENT
Start: 2022-02-12 | End: 2022-02-19

## 2022-02-12 RX ORDER — PREDNISONE 10 MG/1
TABLET ORAL
Qty: 30 TABLET | Refills: 0 | Status: SHIPPED | OUTPATIENT
Start: 2022-02-12 | End: 2022-04-13 | Stop reason: ALTCHOICE

## 2022-02-12 RX ORDER — ATORVASTATIN CALCIUM 40 MG/1
40 TABLET, FILM COATED ORAL NIGHTLY
Qty: 30 TABLET | Refills: 0 | Status: SHIPPED | OUTPATIENT
Start: 2022-02-12

## 2022-02-12 RX ADMIN — IPRATROPIUM BROMIDE AND ALBUTEROL SULFATE 1 AMPULE: 2.5; .5 SOLUTION RESPIRATORY (INHALATION) at 06:13

## 2022-02-12 RX ADMIN — THEOPHYLLINE ANHYDROUS 200 MG: 200 CAPSULE, EXTENDED RELEASE ORAL at 10:44

## 2022-02-12 RX ADMIN — BUDESONIDE 500 MCG: 0.5 INHALANT RESPIRATORY (INHALATION) at 06:13

## 2022-02-12 RX ADMIN — IPRATROPIUM BROMIDE AND ALBUTEROL SULFATE 1 AMPULE: 2.5; .5 SOLUTION RESPIRATORY (INHALATION) at 09:44

## 2022-02-12 RX ADMIN — SERTRALINE 25 MG: 50 TABLET, FILM COATED ORAL at 10:15

## 2022-02-12 RX ADMIN — WATER 1000 MG: 1 INJECTION INTRAMUSCULAR; INTRAVENOUS; SUBCUTANEOUS at 10:14

## 2022-02-12 RX ADMIN — ARFORMOTEROL TARTRATE 15 MCG: 15 SOLUTION RESPIRATORY (INHALATION) at 06:13

## 2022-02-12 RX ADMIN — DOXYCYCLINE HYCLATE 100 MG: 100 CAPSULE ORAL at 10:15

## 2022-02-12 RX ADMIN — PREDNISONE 40 MG: 20 TABLET ORAL at 10:15

## 2022-02-12 NOTE — DISCHARGE SUMMARY
Internal Medicine Progress Note     [facility]     Lily Pérez. Ar Bills., MARIELOSORAFAL Husain D.O., NERY Leija D.O. Mariella Blackman, MSN, APRN, NP-C  Eevlin Becker. Jing Raymond, MSN, [address] [facility]       Internal Medicine  Discharge Summary    NAME: Jovanny Manuel  :  1960  MRN:  [id number]  PCP:Nikolay Canales MD  ADMITTED: 2/10/2022      DISCHARGED: 22    ADMITTING PHYSICIAN: Lily Silver DO    CONSULTANT(S):   IP CONSULT TO SOCIAL WORK     ADMITTING DIAGNOSIS:   Elevated troponin [R77.8]  COPD exacerbation (HCC) [J44.1]  Acute on chronic respiratory failure with hypoxia and hypercapnia (HCC) [J96.21, J96.22]     DISCHARGE DIAGNOSES:   · Acute on chronic respiratory failure with hypoxia and hypercapnia secondary to COPD exacerbation complicated by failure of home oxygen equipment   · Overweight with elevated BMI of 28.34.  · Vaccinated for COVID-19. · History of nicotine use with ongoing use with attempt to cutting down. · Prior history of DVT      BRIEF HISTORY OF PRESENT ILLNESS:   This is a 66-year-old white female who was  admitted to [address] [place] [address]. The patient presented to the hospital  here through the emergency room in the early morning on 02/10/2022. The  patient presented to the hospital here by EMS at which time the patient  did complain of some shortness of breath. The patient states that  questionably might have had a panic attack, but all of a sudden became  progressively short of breath. She was wearing chronic oxygen at 3  liters per minute. She presented to the hospital here and was seen and  evaluated. The patient at that time was a little bit short of breath  at the time did show evidence of what appeared to be hypercapnic  and hypoxemic respiratory failure. The patient had clinical  improvement.   She was admitted to the hospital to the general telemetry  floor under the service of Dr. Yue Weeks and  Michael.  The patient  normally sees her family doctor who is Dr. Chuck Ballard. From a pulmonary  standpoint view, the patient was hospitalized at this institution in  09/2021. The patient was subsequently discharged home and did follow up  with Dr. Rosa Sinha. The patient does have a history of acute-on-chronic  respiratory failure with hypoxemia secondary to exacerbation of COPD. At that time, she did have the rhinovirus. Currently, she does seem to  be breathing better. The patient had been on theophylline, but this has  been discontinued by her pulmonologist.  She says that since this has  been stopped, she has got progressively worse. She does wear oxygen at  home. She still smokes, but only does several cigarettes per week. She  does have a slight cough. She does have both resting and exertional  dyspnea. She denies any chest pain. Cardiac-wise, she has no evidence  of any chest pain per se. She denies any peripheral edema. There is no  significant cardiac history. Gastrointestinal wise, she denies any  recent changes in bowel habits, hematochezia, rectal bleeding, nausea or  vomiting. Genitourinary wise, she denies any dysuria, hematuria, or  pyuria. Neurologically, she denies any history of stroke, TIAs, etc.   She does suffer from anxiety.     LABS[de-identified]  Lab Results   Component Value Date    WBC 14.3 (H) 02/12/2022    HGB 11.9 02/12/2022    HCT 36.4 02/12/2022     02/12/2022     02/12/2022    K 3.6 02/12/2022     02/12/2022    CREATININE 0.5 02/12/2022    BUN 18 02/12/2022    CO2 35 (H) 02/12/2022    GLUCOSE 88 02/12/2022    ALT 15 02/12/2022    AST 14 02/12/2022    INR 1.0 10/28/2019     Lab Results   Component Value Date    INR 1.0 10/28/2019    INR 0.9 01/31/2018    INR 1.0 01/22/2018    PROTIME 11.3 10/28/2019    PROTIME 9.8 01/31/2018    PROTIME 11.5 01/22/2018      Lab Results   Component Value Date    TSH 0.264 (L) 02/10/2022     Lab Results   Component Value Date    TRIG 51 02/10/2022    TRIG 82 11/30/2019    TRIG 79 10/29/2019     Lab Results   Component Value Date    HDL 69 02/10/2022    HDL 61 11/30/2019    HDL 45 10/29/2019     Lab Results   Component Value Date    LDLCALC 183 (H) 02/10/2022    LDLCALC 195 (H) 11/30/2019    LDLCALC 138 (H) 10/29/2019     Lab Results   Component Value Date    LABA1C 5.0 02/10/2022       IMAGING:  XR CHEST PORTABLE    Result Date: 2/10/2022  EXAMINATION: ONE XRAY VIEW OF THE CHEST 2/10/2022 2:06 am COMPARISON: 01/19/2022 HISTORY: ORDERING SYSTEM PROVIDED HISTORY: shortness of breath TECHNOLOGIST PROVIDED HISTORY: Reason for exam:->shortness of breath FINDINGS: The lungs are hyperinflated which is consistent with COPD. There is no acute infiltrate or pleural effusion. Lungs are clear. There is no cardiomegaly or vascular congestion. COPD. No acute abnormality. XR CHEST PORTABLE    Result Date: 1/19/2022  EXAMINATION: ONE XRAY VIEW OF THE CHEST 1/19/2022 12:58 pm COMPARISON: 09/21/2021 HISTORY: ORDERING SYSTEM PROVIDED HISTORY: sob TECHNOLOGIST PROVIDED HISTORY: Reason for exam:->sob FINDINGS: The lungs are without acute focal process. There is no effusion or pneumothorax. The cardiomediastinal silhouette is without acute process. The osseous structures are without acute process. No acute process. CTA CHEST W CONTRAST    Result Date: 2/11/2022  EXAMINATION: CTA OF THE CHEST 2/11/2022 1:28 pm TECHNIQUE: CTA of the chest was performed after the administration of intravenous contrast.  Multiplanar reformatted images are provided for review. MIP images are provided for review. Dose modulation, iterative reconstruction, and/or weight based adjustment of the mA/kV was utilized to reduce the radiation dose to as low as reasonably achievable. COMPARISON: None.  HISTORY: ORDERING SYSTEM PROVIDED HISTORY: dyspnea, acute resp failure TECHNOLOGIST PROVIDED HISTORY: Reason for exam:->dyspnea, acute resp failure FINDINGS: Pulmonary Arteries: Pulmonary arteries are adequately opacified for evaluation. No evidence of intraluminal filling defect to suggest pulmonary embolism. Main pulmonary artery is normal in caliber. Mediastinum: No evidence of mediastinal lymphadenopathy. The heart and pericardium demonstrate no acute abnormality. There is no acute abnormality of the thoracic aorta. Lungs/pleura: The lungs are without acute process. No focal consolidation or pulmonary edema. No evidence of pleural effusion or pneumothorax. Upper lobe predominant centrilobular emphysema. Upper Abdomen: Limited images of the upper abdomen reveal benign-appearing hepatic cysts greatest in the left hemiliver. Soft Tissues/Bones: No acute bone or soft tissue abnormality. No evidence of pulmonary embolism or acute pulmonary abnormality. Moderate to severely advanced emphysematous changes the lungs         HOSPITAL COURSE:   Bob Thompson did well throughout hospitalization. She presented with acute on chronic respiratory failure with hypoxia and hypercapnia secondary to COPD exacerbation. Extensive work-up was undertaken and infectious work-up returned negative. Given the degree of her underlying severe emphysema doxycycline will be continued x7 days total.  Prednisone taper is also been prescribed. Theophylline prescription was clarified and sent with coverage obtained as well as she has done well on this in the past.  On further discussion Bob Thompson admits that oxygen equipment is likely been malfunctioning at home for over 1 month and has now been serviced and is working adequately. We have discussed close outpatient follow-up, strict medication adherence and she is acceptable for discharge home once family arrives with her home oxygen tank.      BRIEF PHYSICAL EXAMINATION AND LABORATORIES ON DAY OF DISCHARGE:  VITALS:  BP (!) 142/67   Pulse 68   Temp 97.8 °F (36.6 °C) (Axillary)   Resp 14   Ht 5' 3\" (1.6 m)   Wt 160 lb (72.6 kg)   SpO2 96%   BMI 28.34 kg/m² HEENT:  PERRLA. EOMI. Sclera clear. Buccal mucosa moist.    Neck:  Supple. Trachea midline. No thyromegaly. No JVD. No bruits. Heart:  Rhythm regular, rate controlled. S1 and S2    Lungs:  Symmetrical.  Moderately diminished, otherwise lungs are clear to auscultation bilaterally. No wheezes. No rhonchi. No rales. Abdomen: Soft. Non-tender. Non-distended. Bowel sounds positive. No organomegaly or masses. No pain on palpation    Extremities:  Peripheral pulses present. No peripheral edema. No ulcers. Neurologic:  Alert x 3. No focal deficit. Cranial nerves grossly intact. Skin:  No petechia. No hemorrhage. No wounds. DISPOSITION:  The patient's condition is good. At this time the patient is without objective evidence of an acute process requiring continuing hospitalization or inpatient management. They are stable for discharge with outpatient follow-up. I have spoken with the patient and discussed the results of the current hospitalization, in addition to providing specific details for the plan of care and counseling regarding the diagnosis and prognosis. The plan has been discussed in detail and they are aware of the specific conditions for emergent return, as well as the importance of follow-up. Their questions are answered at this time and they are agreeable with the plan for discharge to home    DISCHARGE MEDICATIONS:   Current Discharge Medication List           Details   atorvastatin (LIPITOR) 40 MG tablet Take 1 tablet by mouth nightly  Qty: 30 tablet, Refills: 0      predniSONE (DELTASONE) 10 MG tablet Take by oral route 4 tabs x 3 days, then 3 tabs x 3 days, then 2 tabs x 3 days, then 1 tab x 3 days, then discontinue. Take with food.   Qty: 30 tablet, Refills: 0      doxycycline hyclate (VIBRAMYCIN) 100 MG capsule Take 1 capsule by mouth every 12 hours for 7 days  Qty: 14 capsule, Refills: 0              Details   theophylline (UNIPHYL) 400 MG extended release tablet Take 1 tablet by mouth daily  Qty: 90 tablet, Refills: 1              Details   albuterol (PROVENTIL) (2.5 MG/3ML) 0.083% nebulizer solution INHALE 1 VIAL (3 ML) EVERY 4 HOURS AS NEEDED FOR WHEEZING  Qty: 360 mL, Refills: 8    Associated Diagnoses: COPD exacerbation ([facility] Hypoxia      Arformoterol Tartrate (BROVANA) 15 MCG/2ML NEBU Take 2 mLs by nebulization 2 times daily  Qty: 120 mL, Refills: 3      budesonide (PULMICORT) 0.5 MG/2ML nebulizer suspension Take 2 mLs by nebulization 2 times daily  Qty: 60 each, Refills: 3      albuterol (ACCUNEB) 1.25 MG/3ML nebulizer solution Inhale 3 mLs into the lungs every 6 hours as needed for Wheezing  Qty: 360 mL, Refills: 3      albuterol sulfate HFA (VENTOLIN HFA) 108 (90 Base) MCG/ACT inhaler Inhale 2 puffs into the lungs 4 times daily as needed for Wheezing or Shortness of Breath  Qty: 1 each, Refills: 2      OXYGEN Inhale 3 L into the lungs continuous      Umeclidinium Bromide (INCRUSE ELLIPTA) 62.5 MCG/INH AEPB Inhale 1 puff into the lungs daily  Qty: 30 each, Refills: 1    Associated Diagnoses: Chronic respiratory failure with hypoxia (HCC)      fluticasone-salmeterol (ADVAIR) 250-50 MCG/DOSE AEPB Inhale 1 puff into the lungs every 12 hours . Rinse and spit after each use. Qty: 60 each, Refills: 3    Associated Diagnoses: Chronic respiratory failure with hypoxia (HCC)      melatonin 3 MG TABS tablet Take 1 tablet by mouth nightly as needed (insomnia)  Qty: 30 tablet, Refills: 0      fluticasone (FLONASE) 50 MCG/ACT nasal spray 2 sprays by Each Nostril route daily  Qty: 1 Bottle, Refills: 5    Associated Diagnoses: Seasonal allergic rhinitis due to pollen      sertraline (ZOLOFT) 25 MG tablet Take 1 tablet by mouth daily  Qty: 30 tablet, Refills: 5    Associated Diagnoses: Current moderate episode of major depressive disorder without prior episode ([facility]             FOLLOW UP/INSTRUCTIONS:  · This patient is instructed to follow-up with her primary care physician.   · Patient is instructed to follow-up with the consults listed above as directed by them. · she is instructed to resume home medications and take new medications as indicated in the list above. · If the patient has a recurrence of symptoms, she is instructed to go to the ED. Preparing for this patient's discharge, including paperwork, orders, instructions, and meeting with patient did require > 40 minutes.     SKYE Badillo CNP     2/12/2022  10:20 AM

## 2022-02-12 NOTE — PROGRESS NOTES
Discharge instructions given. Questions answered and verbalized understanding. Assisted to lobby via wheelchair with family.

## 2022-04-13 ENCOUNTER — APPOINTMENT (OUTPATIENT)
Dept: CT IMAGING | Age: 62
End: 2022-04-13
Payer: COMMERCIAL

## 2022-04-13 ENCOUNTER — APPOINTMENT (OUTPATIENT)
Dept: GENERAL RADIOLOGY | Age: 62
End: 2022-04-13
Payer: COMMERCIAL

## 2022-04-13 ENCOUNTER — HOSPITAL ENCOUNTER (EMERGENCY)
Age: 62
Discharge: HOME | End: 2022-04-13
Attending: EMERGENCY MEDICINE
Payer: COMMERCIAL

## 2022-04-13 VITALS
SYSTOLIC BLOOD PRESSURE: 165 MMHG | HEART RATE: 110 BPM | BODY MASS INDEX: 28.34 KG/M2 | WEIGHT: 160 LBS | DIASTOLIC BLOOD PRESSURE: 98 MMHG | RESPIRATION RATE: 18 BRPM | OXYGEN SATURATION: 97 % | TEMPERATURE: 98.2 F

## 2022-04-13 DIAGNOSIS — J44.1 COPD EXACERBATION (HCC): Primary | ICD-10-CM

## 2022-04-13 DIAGNOSIS — I77.1 SUBCLAVIAN ARTERY STENOSIS (HCC): ICD-10-CM

## 2022-04-13 LAB
ALBUMIN SERPL-MCNC: 3.9 G/DL (ref 3.5–5.2)
ALP BLD-CCNC: 97 U/L (ref 35–104)
ALT SERPL-CCNC: 13 U/L (ref 0–32)
ANION GAP SERPL CALCULATED.3IONS-SCNC: 10 MMOL/L (ref 7–16)
AST SERPL-CCNC: 16 U/L (ref 0–31)
BASOPHILS ABSOLUTE: 0.06 E9/L (ref 0–0.2)
BASOPHILS RELATIVE PERCENT: 0.7 % (ref 0–2)
BILIRUB SERPL-MCNC: 0.3 MG/DL (ref 0–1.2)
BUN BLDV-MCNC: 10 MG/DL (ref 6–23)
CALCIUM SERPL-MCNC: 9.2 MG/DL (ref 8.6–10.2)
CHLORIDE BLD-SCNC: 102 MMOL/L (ref 98–107)
CO2: 31 MMOL/L (ref 22–29)
CREAT SERPL-MCNC: 0.5 MG/DL (ref 0.5–1)
D DIMER: 264 NG/ML DDU
EKG ATRIAL RATE: 92 BPM
EKG P AXIS: 86 DEGREES
EKG P-R INTERVAL: 146 MS
EKG Q-T INTERVAL: 368 MS
EKG QRS DURATION: 84 MS
EKG QTC CALCULATION (BAZETT): 455 MS
EKG R AXIS: 72 DEGREES
EKG T AXIS: 71 DEGREES
EKG VENTRICULAR RATE: 92 BPM
EOSINOPHILS ABSOLUTE: 0.48 E9/L (ref 0.05–0.5)
EOSINOPHILS RELATIVE PERCENT: 5.5 % (ref 0–6)
GFR AFRICAN AMERICAN: >60
GFR NON-AFRICAN AMERICAN: >60 ML/MIN/1.73
GLUCOSE BLD-MCNC: 103 MG/DL (ref 74–99)
HCT VFR BLD CALC: 41.6 % (ref 34–48)
HEMOGLOBIN: 13.4 G/DL (ref 11.5–15.5)
IMMATURE GRANULOCYTES #: 0.03 E9/L
IMMATURE GRANULOCYTES %: 0.3 % (ref 0–5)
INFLUENZA A BY PCR: NOT DETECTED
INFLUENZA B BY PCR: NOT DETECTED
LYMPHOCYTES ABSOLUTE: 2.8 E9/L (ref 1.5–4)
LYMPHOCYTES RELATIVE PERCENT: 32.1 % (ref 20–42)
MCH RBC QN AUTO: 30.7 PG (ref 26–35)
MCHC RBC AUTO-ENTMCNC: 32.2 % (ref 32–34.5)
MCV RBC AUTO: 95.2 FL (ref 80–99.9)
MONOCYTES ABSOLUTE: 0.58 E9/L (ref 0.1–0.95)
MONOCYTES RELATIVE PERCENT: 6.6 % (ref 2–12)
NEUTROPHILS ABSOLUTE: 4.78 E9/L (ref 1.8–7.3)
NEUTROPHILS RELATIVE PERCENT: 54.8 % (ref 43–80)
PDW BLD-RTO: 12.7 FL (ref 11.5–15)
PLATELET # BLD: 269 E9/L (ref 130–450)
PMV BLD AUTO: 9.2 FL (ref 7–12)
POTASSIUM REFLEX MAGNESIUM: 4.1 MMOL/L (ref 3.5–5)
RBC # BLD: 4.37 E12/L (ref 3.5–5.5)
SARS-COV-2, NAAT: NOT DETECTED
SODIUM BLD-SCNC: 143 MMOL/L (ref 132–146)
TOTAL PROTEIN: 6.4 G/DL (ref 6.4–8.3)
TROPONIN, HIGH SENSITIVITY: <6 NG/L (ref 0–9)
WBC # BLD: 8.7 E9/L (ref 4.5–11.5)

## 2022-04-13 PROCEDURE — 71045 X-RAY EXAM CHEST 1 VIEW: CPT

## 2022-04-13 PROCEDURE — 6360000004 HC RX CONTRAST MEDICATION: Performed by: RADIOLOGY

## 2022-04-13 PROCEDURE — 6370000000 HC RX 637 (ALT 250 FOR IP): Performed by: EMERGENCY MEDICINE

## 2022-04-13 PROCEDURE — 71275 CT ANGIOGRAPHY CHEST: CPT

## 2022-04-13 PROCEDURE — 80053 COMPREHEN METABOLIC PANEL: CPT

## 2022-04-13 PROCEDURE — 93005 ELECTROCARDIOGRAM TRACING: CPT | Performed by: EMERGENCY MEDICINE

## 2022-04-13 PROCEDURE — 84484 ASSAY OF TROPONIN QUANT: CPT

## 2022-04-13 PROCEDURE — 87502 INFLUENZA DNA AMP PROBE: CPT

## 2022-04-13 PROCEDURE — 87635 SARS-COV-2 COVID-19 AMP PRB: CPT

## 2022-04-13 PROCEDURE — 99284 EMERGENCY DEPT VISIT MOD MDM: CPT

## 2022-04-13 PROCEDURE — 93010 ELECTROCARDIOGRAM REPORT: CPT | Performed by: INTERNAL MEDICINE

## 2022-04-13 PROCEDURE — 85025 COMPLETE CBC W/AUTO DIFF WBC: CPT

## 2022-04-13 PROCEDURE — 94664 DEMO&/EVAL PT USE INHALER: CPT

## 2022-04-13 PROCEDURE — 85378 FIBRIN DEGRADE SEMIQUANT: CPT

## 2022-04-13 RX ORDER — PREDNISONE 50 MG/1
TABLET ORAL
Qty: 5 TABLET | Refills: 0 | Status: SHIPPED | OUTPATIENT
Start: 2022-04-13

## 2022-04-13 RX ORDER — DOXYCYCLINE HYCLATE 100 MG
100 TABLET ORAL 2 TIMES DAILY
Qty: 20 TABLET | Refills: 0 | Status: SHIPPED | OUTPATIENT
Start: 2022-04-13 | End: 2022-04-23

## 2022-04-13 RX ORDER — METHYLPREDNISOLONE SODIUM SUCCINATE 125 MG/2ML
125 INJECTION, POWDER, LYOPHILIZED, FOR SOLUTION INTRAMUSCULAR; INTRAVENOUS ONCE
Status: DISCONTINUED | OUTPATIENT
Start: 2022-04-13 | End: 2022-04-13 | Stop reason: HOSPADM

## 2022-04-13 RX ORDER — IPRATROPIUM BROMIDE AND ALBUTEROL SULFATE 2.5; .5 MG/3ML; MG/3ML
3 SOLUTION RESPIRATORY (INHALATION) ONCE
Status: COMPLETED | OUTPATIENT
Start: 2022-04-13 | End: 2022-04-13

## 2022-04-13 RX ORDER — ALBUTEROL SULFATE 90 UG/1
2 AEROSOL, METERED RESPIRATORY (INHALATION) 4 TIMES DAILY PRN
Qty: 18 G | Refills: 0 | Status: SHIPPED | OUTPATIENT
Start: 2022-04-13

## 2022-04-13 RX ORDER — DOXYCYCLINE HYCLATE 100 MG/1
100 CAPSULE ORAL ONCE
Status: COMPLETED | OUTPATIENT
Start: 2022-04-13 | End: 2022-04-13

## 2022-04-13 RX ADMIN — DOXYCYCLINE HYCLATE 100 MG: 100 CAPSULE ORAL at 18:06

## 2022-04-13 RX ADMIN — IPRATROPIUM BROMIDE AND ALBUTEROL SULFATE 3 ML: .5; 3 SOLUTION RESPIRATORY (INHALATION) at 14:52

## 2022-04-13 RX ADMIN — IOPAMIDOL 75 ML: 755 INJECTION, SOLUTION INTRAVENOUS at 16:25

## 2022-04-13 ASSESSMENT — ENCOUNTER SYMPTOMS
ABDOMINAL PAIN: 0
EYE REDNESS: 0
SHORTNESS OF BREATH: 1
COUGH: 1
VOMITING: 0
NAUSEA: 0

## 2022-04-13 NOTE — ED NOTES
SPO2 - 99% in bed, 95% after ambulating. oxygen remained above 95% throughout duration of ambulation.      Too Iqbal RN  04/13/22 2941

## 2022-04-13 NOTE — ED PROVIDER NOTES
Chief complaint: Shortness of breath      HPI:  4/13/22, Time: 2:20 PM EDT    HPI               Danish Bermeo is a 64 y.o. female presenting to the ED for shortness of breath. The history is obtained from the patient as well as the patient's medical record. The patient is presenting emergency department the chief plaint of shortness of breath. The patient states that she has been around her grandkids who have URIs. She began a few days ago with URI type symptoms. She began today feeling that she is having a COPD exacerbation. She is chronically on 3 L nasal cannula. She does follow with pulmonology. She states she does have shortness of breath. Moderate in severity. Slightly worse with activity and exertion. She did try albuterol at home with no relief. Denies any cough or chest pain. Denies any nausea, vomiting or abdominal pain. She does have a history of a DVT in her left upper extremity in 2018 states that this was provoked secondary to a PICC line. She is not currently on any blood thinners. ROS:   Review of Systems   Constitutional: Negative for chills and fatigue. HENT: Negative for congestion. Eyes: Negative for redness. Respiratory: Positive for cough and shortness of breath. Cardiovascular: Negative for chest pain. Gastrointestinal: Negative for abdominal pain, nausea and vomiting. Genitourinary: Negative for dysuria. Musculoskeletal: Negative for arthralgias. Skin: Negative for rash. Neurological: Negative for light-headedness. Psychiatric/Behavioral: Negative for confusion. All other systems reviewed and are negative.      --------------------------------------------- PAST HISTORY ---------------------------------------------  Past Medical History:  has a past medical history of Abscess, COPD (chronic obstructive pulmonary disease) ([facility] Diverticulitis, Provoked DVT 3/2018, Seasonal allergic rhinitis, Smoker, and Tobacco use disorder.     Past Surgical History: has no past surgical history on file. Social History:  reports that she quit smoking about 10 months ago. Her smoking use included cigarettes. She has a 20.50 pack-year smoking history. She has never used smokeless tobacco. She reports current alcohol use. She reports current drug use. Drug: Marijuana Meg Ing). Family History: family history includes Breast Cancer in her none; Colon Cancer in her none; Coronary Art Dis in her father; Diabetes in her unknown relative; Hypertension in her father; Other in her daughter and father; Stroke in her mother. The patients home medications have been reviewed. Allergies: Penicillin v    ---------------------------------------------------PHYSICAL EXAM--------------------------------------    Constitutional/General: Alert and oriented x3, well appearing, non toxic in NAD  Head: Normocephalic and atraumatic  Mouth: Oropharynx clear, handling secretions, no trismus  Neck: Supple, full ROM,  Pulmonary: Coarse breath sounds, moderate expiratory wheezing, no rales or rhonchi  Cardiovascular:  Regular rate. Regular rhythm. No murmurs  Chest: no chest wall tenderness  Abdomen: Soft. Non tender. Non distended. No rebound, guarding, or rigidity. No pulsatile masses appreciated. Musculoskeletal: Moves all extremities x 4. Warm and well perfused, no clubbing, cyanosis, or edema. Capillary refill <3 seconds  Skin: warm and dry. No rashes. Neurologic: GCS 15, no gross focal neurologic deficits  Psych: Normal Affect    -------------------------------------------------- RESULTS -------------------------------------------------  I have personally reviewed all laboratory and imaging results for this patient. Results are listed below.      LABS:  Results for orders placed or performed during the hospital encounter of 04/13/22   COVID-19, Rapid    Specimen: Nasopharyngeal Swab   Result Value Ref Range    SARS-CoV-2, NAAT Not Detected Not Detected   Rapid influenza A/B antigens Specimen: Nasopharyngeal   Result Value Ref Range    Influenza A by PCR Not Detected Not Detected    Influenza B by PCR Not Detected Not Detected   CBC with Auto Differential   Result Value Ref Range    WBC 8.7 4.5 - 11.5 E9/L    RBC 4.37 3.50 - 5.50 E12/L    Hemoglobin 13.4 11.5 - 15.5 g/dL    Hematocrit 41.6 34.0 - 48.0 %    MCV 95.2 80.0 - 99.9 fL    MCH 30.7 26.0 - 35.0 pg    MCHC 32.2 32.0 - 34.5 %    RDW 12.7 11.5 - 15.0 fL    Platelets 605 702 - 844 E9/L    MPV 9.2 7.0 - 12.0 fL    Neutrophils % 54.8 43.0 - 80.0 %    Immature Granulocytes % 0.3 0.0 - 5.0 %    Lymphocytes % 32.1 20.0 - 42.0 %    Monocytes % 6.6 2.0 - 12.0 %    Eosinophils % 5.5 0.0 - 6.0 %    Basophils % 0.7 0.0 - 2.0 %    Neutrophils Absolute 4.78 1.80 - 7.30 E9/L    Immature Granulocytes # 0.03 E9/L    Lymphocytes Absolute 2.80 1.50 - 4.00 E9/L    Monocytes Absolute 0.58 0.10 - 0.95 E9/L    Eosinophils Absolute 0.48 0.05 - 0.50 E9/L    Basophils Absolute 0.06 0.00 - 0.20 E9/L   Comprehensive Metabolic Panel w/ Reflex to MG   Result Value Ref Range    Sodium 143 132 - 146 mmol/L    Potassium reflex Magnesium 4.1 3.5 - 5.0 mmol/L    Chloride 102 98 - 107 mmol/L    CO2 31 (H) 22 - 29 mmol/L    Anion Gap 10 7 - 16 mmol/L    Glucose 103 (H) 74 - 99 mg/dL    BUN 10 6 - 23 mg/dL    CREATININE 0.5 0.5 - 1.0 mg/dL    GFR Non-African American >60 >=60 mL/min/1.73    GFR African American >60     Calcium 9.2 8.6 - 10.2 mg/dL    Total Protein 6.4 6.4 - 8.3 g/dL    Albumin 3.9 3.5 - 5.2 g/dL    Total Bilirubin 0.3 0.0 - 1.2 mg/dL    Alkaline Phosphatase 97 35 - 104 U/L    ALT 13 0 - 32 U/L    AST 16 0 - 31 U/L   Troponin   Result Value Ref Range    Troponin, High Sensitivity <6 0 - 9 ng/L   D-Dimer, Quantitative   Result Value Ref Range    D-Dimer, Quant 264 ng/mL DDU   EKG 12 Lead   Result Value Ref Range    Ventricular Rate 92 BPM    Atrial Rate 92 BPM    P-R Interval 146 ms    QRS Duration 84 ms    Q-T Interval 368 ms    QTc Calculation (Bazett) 455 ms P Axis 86 degrees    R Axis 72 degrees    T Axis 71 degrees       RADIOLOGY:  Interpreted by Radiologist.  CTA [address]   Final Result   1. No pulmonary embolism. 2. Prominent emphysematous changes. 3.  No acute cardiopulmonary abnormality. 4. Approximately 50% stenotic noncalcified plaque in the proximal left   subclavian artery. RECOMMENDATIONS:   Unavailable         XR CHEST PORTABLE   Final Result   No pneumonia or pleural effusion. EKG:  This EKG is signed and interpreted by me. Normal sinus rhythm rate of 92, no ST segment elevation or depression, OK interval 146 MS, QRS 84 MS,  MS, stable compared to patient's prior EKG  Interpreted by me      ------------------------- NURSING NOTES AND VITALS REVIEWED ---------------------------   The nursing notes within the ED encounter and vital signs as below have been reviewed by myself. BP (!) 175/106   Pulse 100   Temp 98.2 °F (36.8 °C) (Oral)   Resp 22   Wt 160 lb (72.6 kg)   SpO2 95%   BMI 28.34 kg/m²   Oxygen Saturation Interpretation: Normal    The patients available past medical records and past encounters were reviewed. ------------------------------ ED COURSE/MEDICAL DECISION MAKING----------------------  Medications   methylPREDNISolone sodium (SOLU-MEDROL) injection 125 mg (125 mg IntraVENous Not Given 4/13/22 1455)   doxycycline hyclate (VIBRAMYCIN) capsule 100 mg (has no administration in time range)   ipratropium-albuterol (DUONEB) nebulizer solution 3 mL (3 mLs Inhalation Given 4/13/22 1452)   iopamidol (ISOVUE-370) 76 % injection 75 mL (75 mLs IntraVENous Given 4/13/22 1625)             Medical Decision Making:   I, Dr. Mason Fagan am the primary physician of record. Marlena Euceda is a 64 y.o. female who presents to the ED for shortness of breath. The patient did have labs and imaging which were reviewed.   The patient did CBC, CMP fairly unremarkable, high-sensitivity troponin unremarkable,

## 2022-08-23 NOTE — PATIENT INSTRUCTIONS
I will work on your inhalers   Get the ultrasound of your heart done (echo)  See the skin doctor. 4 = No assist / stand by assistance

## 2022-11-27 ENCOUNTER — HOSPITAL ENCOUNTER (EMERGENCY)
Age: 62
Discharge: HOME | DRG: 190 | End: 2022-11-28
Attending: EMERGENCY MEDICINE
Payer: COMMERCIAL

## 2022-11-27 ENCOUNTER — APPOINTMENT (OUTPATIENT)
Dept: GENERAL RADIOLOGY | Age: 62
DRG: 190 | End: 2022-11-27
Payer: COMMERCIAL

## 2022-11-27 DIAGNOSIS — J44.1 COPD EXACERBATION (HCC): Primary | ICD-10-CM

## 2022-11-27 LAB
ALBUMIN SERPL-MCNC: 3.8 G/DL (ref 3.5–5.2)
ALP BLD-CCNC: 107 U/L (ref 35–104)
ALT SERPL-CCNC: 13 U/L (ref 0–32)
ANION GAP SERPL CALCULATED.3IONS-SCNC: 13 MMOL/L (ref 7–16)
AST SERPL-CCNC: 14 U/L (ref 0–31)
BILIRUB SERPL-MCNC: 1 MG/DL (ref 0–1.2)
BUN BLDV-MCNC: 17 MG/DL (ref 6–23)
CALCIUM SERPL-MCNC: 9.6 MG/DL (ref 8.6–10.2)
CHLORIDE BLD-SCNC: 91 MMOL/L (ref 98–107)
CO2: 31 MMOL/L (ref 22–29)
CREAT SERPL-MCNC: 0.5 MG/DL (ref 0.5–1)
GFR SERPL CREATININE-BSD FRML MDRD: >60 ML/MIN/1.73
GLUCOSE BLD-MCNC: 131 MG/DL (ref 74–99)
POTASSIUM SERPL-SCNC: 3.8 MMOL/L (ref 3.5–5)
SARS-COV-2, NAAT: NOT DETECTED
SODIUM BLD-SCNC: 135 MMOL/L (ref 132–146)
TOTAL PROTEIN: 6.8 G/DL (ref 6.4–8.3)
TROPONIN, HIGH SENSITIVITY: 14 NG/L (ref 0–9)

## 2022-11-27 PROCEDURE — 71045 X-RAY EXAM CHEST 1 VIEW: CPT

## 2022-11-27 PROCEDURE — 85025 COMPLETE CBC W/AUTO DIFF WBC: CPT

## 2022-11-27 PROCEDURE — 80053 COMPREHEN METABOLIC PANEL: CPT

## 2022-11-27 PROCEDURE — 94644 CONT INHLJ TX 1ST HOUR: CPT

## 2022-11-27 PROCEDURE — 2580000003 HC RX 258: Performed by: STUDENT IN AN ORGANIZED HEALTH CARE EDUCATION/TRAINING PROGRAM

## 2022-11-27 PROCEDURE — 87502 INFLUENZA DNA AMP PROBE: CPT

## 2022-11-27 PROCEDURE — 87635 SARS-COV-2 COVID-19 AMP PRB: CPT

## 2022-11-27 PROCEDURE — 93005 ELECTROCARDIOGRAM TRACING: CPT | Performed by: STUDENT IN AN ORGANIZED HEALTH CARE EDUCATION/TRAINING PROGRAM

## 2022-11-27 PROCEDURE — 6370000000 HC RX 637 (ALT 250 FOR IP): Performed by: STUDENT IN AN ORGANIZED HEALTH CARE EDUCATION/TRAINING PROGRAM

## 2022-11-27 PROCEDURE — 99285 EMERGENCY DEPT VISIT HI MDM: CPT

## 2022-11-27 PROCEDURE — 84484 ASSAY OF TROPONIN QUANT: CPT

## 2022-11-27 RX ORDER — IPRATROPIUM BROMIDE AND ALBUTEROL SULFATE 2.5; .5 MG/3ML; MG/3ML
3 SOLUTION RESPIRATORY (INHALATION) ONCE
Status: COMPLETED | OUTPATIENT
Start: 2022-11-27 | End: 2022-11-27

## 2022-11-27 RX ORDER — 0.9 % SODIUM CHLORIDE 0.9 %
1000 INTRAVENOUS SOLUTION INTRAVENOUS ONCE
Status: COMPLETED | OUTPATIENT
Start: 2022-11-27 | End: 2022-11-28

## 2022-11-27 RX ADMIN — SODIUM CHLORIDE 1000 ML: 9 INJECTION, SOLUTION INTRAVENOUS at 23:18

## 2022-11-27 RX ADMIN — IPRATROPIUM BROMIDE AND ALBUTEROL SULFATE 3 AMPULE: .5; 2.5 SOLUTION RESPIRATORY (INHALATION) at 22:57

## 2022-11-27 ASSESSMENT — PAIN - FUNCTIONAL ASSESSMENT: PAIN_FUNCTIONAL_ASSESSMENT: NONE - DENIES PAIN

## 2022-11-28 VITALS
RESPIRATION RATE: 18 BRPM | SYSTOLIC BLOOD PRESSURE: 139 MMHG | DIASTOLIC BLOOD PRESSURE: 64 MMHG | HEART RATE: 85 BPM | OXYGEN SATURATION: 97 % | TEMPERATURE: 98.2 F | BODY MASS INDEX: 24.8 KG/M2 | WEIGHT: 140 LBS

## 2022-11-28 LAB
BACTERIA: NORMAL /HPF
BASOPHILS ABSOLUTE: 0 E9/L (ref 0–0.2)
BASOPHILS RELATIVE PERCENT: 0.2 % (ref 0–2)
BILIRUBIN URINE: ABNORMAL
BLOOD, URINE: ABNORMAL
CLARITY: CLEAR
COLOR: ABNORMAL
EOSINOPHILS ABSOLUTE: 0 E9/L (ref 0.05–0.5)
EOSINOPHILS RELATIVE PERCENT: 0.1 % (ref 0–6)
GLUCOSE URINE: NEGATIVE MG/DL
HCT VFR BLD CALC: 40.2 % (ref 34–48)
HEMOGLOBIN: 13.3 G/DL (ref 11.5–15.5)
INFLUENZA A BY PCR: NOT DETECTED
INFLUENZA B BY PCR: NOT DETECTED
KETONES, URINE: 40 MG/DL
LEUKOCYTE ESTERASE, URINE: NEGATIVE
LYMPHOCYTES ABSOLUTE: 1.46 E9/L (ref 1.5–4)
LYMPHOCYTES RELATIVE PERCENT: 6 % (ref 20–42)
MCH RBC QN AUTO: 30.5 PG (ref 26–35)
MCHC RBC AUTO-ENTMCNC: 33.1 % (ref 32–34.5)
MCV RBC AUTO: 92.2 FL (ref 80–99.9)
MONOCYTES ABSOLUTE: 0.73 E9/L (ref 0.1–0.95)
MONOCYTES RELATIVE PERCENT: 2.6 % (ref 2–12)
NEUTROPHILS ABSOLUTE: 22.36 E9/L (ref 1.8–7.3)
NEUTROPHILS RELATIVE PERCENT: 91.5 % (ref 43–80)
NITRITE, URINE: NEGATIVE
OVALOCYTES: ABNORMAL
PDW BLD-RTO: 12.1 FL (ref 11.5–15)
PH UA: 6 (ref 5–9)
PLATELET # BLD: 318 E9/L (ref 130–450)
PMV BLD AUTO: 9.6 FL (ref 7–12)
POIKILOCYTES: ABNORMAL
POLYCHROMASIA: ABNORMAL
PROTEIN UA: ABNORMAL MG/DL
RBC # BLD: 4.36 E12/L (ref 3.5–5.5)
RBC UA: NORMAL /HPF (ref 0–2)
SPECIFIC GRAVITY UA: >=1.03 (ref 1–1.03)
TROPONIN, HIGH SENSITIVITY: 12 NG/L (ref 0–9)
TROPONIN, HIGH SENSITIVITY: 23 NG/L (ref 0–9)
UROBILINOGEN, URINE: 1 E.U./DL
WBC # BLD: 24.3 E9/L (ref 4.5–11.5)
WBC UA: NORMAL /HPF (ref 0–5)

## 2022-11-28 PROCEDURE — 81001 URINALYSIS AUTO W/SCOPE: CPT

## 2022-11-28 PROCEDURE — 84484 ASSAY OF TROPONIN QUANT: CPT

## 2022-11-28 PROCEDURE — 36415 COLL VENOUS BLD VENIPUNCTURE: CPT

## 2022-11-28 RX ORDER — PREDNISONE 50 MG/1
50 TABLET ORAL DAILY
Qty: 5 TABLET | Refills: 0 | Status: ON HOLD | OUTPATIENT
Start: 2022-11-28 | End: 2022-12-03 | Stop reason: HOSPADM

## 2022-11-28 ASSESSMENT — ENCOUNTER SYMPTOMS
BACK PAIN: 0
SORE THROAT: 0
SHORTNESS OF BREATH: 1
ABDOMINAL PAIN: 0
COUGH: 1
DIARRHEA: 0
CHEST TIGHTNESS: 0
NAUSEA: 0
VOMITING: 0
ABDOMINAL DISTENTION: 0

## 2022-11-28 NOTE — ED PROVIDER NOTES
HPI     Patient is a 58 y.o. female presents with a chief complaint of shortness of breath  This has been occurring for one day. Patient states that it gets better with nothing. Patient states that it gets worse with nothing. Patient states that it is moderate in severity. Patient states it was gradual in onset. Shortness of breath. Patient has a history of COPD. Patient states this feels like her normal COPD exacerbation. Patient is denying any chest pain. Denies any change in sputum production. Patient denies any fevers, chills, nausea, vomiting, abdominal pain. Patient denies any recent surgery, recent travel, leg swelling. Patient was given Solu-Medrol by EMS  Review of Systems   Constitutional:  Positive for fatigue. Negative for activity change, appetite change, chills and fever. HENT:  Negative for congestion, drooling and sore throat. Respiratory:  Positive for cough and shortness of breath. Negative for chest tightness. Cardiovascular:  Negative for chest pain and palpitations. Gastrointestinal:  Negative for abdominal distention, abdominal pain, diarrhea, nausea and vomiting. Genitourinary:  Negative for decreased urine volume, difficulty urinating, enuresis, flank pain, frequency and hematuria. Musculoskeletal:  Negative for arthralgias, back pain and neck stiffness. Skin:  Negative for rash and wound. Neurological:  Negative for dizziness, facial asymmetry, light-headedness and headaches. Psychiatric/Behavioral:  Negative for agitation, confusion and decreased concentration. Physical Exam  Vitals and nursing note reviewed. Constitutional:       Appearance: She is well-developed. HENT:      Head: Normocephalic and atraumatic. Eyes:      Conjunctiva/sclera: Conjunctivae normal.   Cardiovascular:      Rate and Rhythm: Normal rate and regular rhythm. Heart sounds: Normal heart sounds. No murmur heard.   Pulmonary:      Effort: Pulmonary effort is normal. No respiratory distress. Breath sounds: Normal breath sounds. No wheezing or rales. Comments: Significantly diminished lung sounds bilaterally. Abdominal:      General: Bowel sounds are normal.      Palpations: Abdomen is soft. Tenderness: There is no abdominal tenderness. There is no guarding or rebound. Musculoskeletal:      Cervical back: Normal range of motion and neck supple. Skin:     General: Skin is warm and dry. Neurological:      Mental Status: She is alert and oriented to person, place, and time. Cranial Nerves: No cranial nerve deficit. Coordination: Coordination normal.        Procedures     MDM     Amount and/or Complexity of Data Reviewed  Decide to obtain previous medical records or to obtain history from someone other than the patient: yes         ED Course as of 11/28/22 0200   Mon Nov 28, 2022   0055 EKG read interpreted by me. Rate 102 bpm.  Normal axis. Normal SD interval.  Normal QRS. No ST elevations or depressions. Sinus tachycardia otherwise normal EKG [JM]      ED Course User Index  [JM] Rosalina Aguila MD      Patient is a 58 y.o. female presenting with shortness of breath. Patient states this feels like her normal COPD exacerbation. Patient was given duo nebs and Solu-Medrol prior to arrival.  Patient's white count significantly elevated after receiving Solu-Medrol by EMS. Patient is having no fevers or chills. Afebrile here. Patient's chest x-ray is benign. Patient has no signs of infection. Patient was given duo nebs and had complete resolution of symptoms. Patient stated that she felt much better. Patient was ambulated. Patient had 3 troponins. First and the third troponin had a delta troponin of 2. Patient stated that she would like to go home. Patient's symptoms are consistent with a COPD exacerbation. Patient be discharged home. Patient was given extensive return precautions. Patient was given return precautions.  Labs were interpreted by me. Patient will follow up with their primary care provider. Patient is agreeable to this plan. Patient has remained stable throughout their stay in the ED. Patient was seen and evaluated by myself and my attending Denise Riley DO. Assessment and Plan discussed with attending provider, please see attestation for final plan of care. This note was done using dictation software and there may be some grammatical errors associated with this. Romario Ray MD      ED Course as of 11/28/22 0211   Mon Nov 28, 2022   0055 EKG read interpreted by me. Rate 102 bpm.  Normal axis. Normal GA interval.  Normal QRS. No ST elevations or depressions. Sinus tachycardia otherwise normal EKG [JM]      ED Course User Index  [JM] Romario Ray MD       --------------------------------------------- PAST HISTORY ---------------------------------------------  Past Medical History:  has a past medical history of Abscess, COPD (chronic obstructive pulmonary disease) ([facility] [address] Diverticulitis, Provoked DVT 3/2018, Seasonal allergic rhinitis, Smoker, and Tobacco use disorder. Past Surgical History:  has no past surgical history on file. Social History:  reports that she quit smoking about 18 months ago. Her smoking use included cigarettes. She has a 20.50 pack-year smoking history. She has never used smokeless tobacco. She reports current alcohol use. She reports current drug use. Drug: Marijuana Grace Jimenez). Family History: family history includes Breast Cancer in her none; Colon Cancer in her none; Coronary Art Dis in her father; Diabetes in her unknown relative; Hypertension in her father; Other in her daughter and father; Stroke in her mother. The patients home medications have been reviewed.     Allergies: Penicillin v    -------------------------------------------------- RESULTS -------------------------------------------------  Labs:  Results for orders placed or performed during the hospital encounter of 11/27/22   COVID-19, Rapid    Specimen: Nasopharyngeal Swab   Result Value Ref Range    SARS-CoV-2, NAAT Not Detected Not Detected   Rapid influenza A/B antigens    Specimen: Nasopharyngeal   Result Value Ref Range    Influenza A by PCR Not Detected Not Detected    Influenza B by PCR Not Detected Not Detected   CBC with Auto Differential   Result Value Ref Range    WBC 24.3 (H) 4.5 - 11.5 E9/L    RBC 4.36 3.50 - 5.50 E12/L    Hemoglobin 13.3 11.5 - 15.5 g/dL    Hematocrit 40.2 34.0 - 48.0 %    MCV 92.2 80.0 - 99.9 fL    MCH 30.5 26.0 - 35.0 pg    MCHC 33.1 32.0 - 34.5 %    RDW 12.1 11.5 - 15.0 fL    Platelets 907 972 - 517 E9/L    MPV 9.6 7.0 - 12.0 fL    Neutrophils % 91.5 (H) 43.0 - 80.0 %    Lymphocytes % 6.0 (L) 20.0 - 42.0 %    Monocytes % 2.6 2.0 - 12.0 %    Eosinophils % 0.1 0.0 - 6.0 %    Basophils % 0.2 0.0 - 2.0 %    Neutrophils Absolute 22.36 (H) 1.80 - 7.30 E9/L    Lymphocytes Absolute 1.46 (L) 1.50 - 4.00 E9/L    Monocytes Absolute 0.73 0.10 - 0.95 E9/L    Eosinophils Absolute 0.00 (L) 0.05 - 0.50 E9/L    Basophils Absolute 0.00 0.00 - 0.20 E9/L    Polychromasia 1+     Poikilocytes 1+     Ovalocytes 1+    CMP   Result Value Ref Range    Sodium 135 132 - 146 mmol/L    Potassium 3.8 3.5 - 5.0 mmol/L    Chloride 91 (L) 98 - 107 mmol/L    CO2 31 (H) 22 - 29 mmol/L    Anion Gap 13 7 - 16 mmol/L    Glucose 131 (H) 74 - 99 mg/dL    BUN 17 6 - 23 mg/dL    Creatinine 0.5 0.5 - 1.0 mg/dL    Est, Glom Filt Rate >60 >=60 mL/min/1.73    Calcium 9.6 8.6 - 10.2 mg/dL    Total Protein 6.8 6.4 - 8.3 g/dL    Albumin 3.8 3.5 - 5.2 g/dL    Total Bilirubin 1.0 0.0 - 1.2 mg/dL    Alkaline Phosphatase 107 (H) 35 - 104 U/L    ALT 13 0 - 32 U/L    AST 14 0 - 31 U/L   Troponin   Result Value Ref Range    Troponin, High Sensitivity 14 (H) 0 - 9 ng/L   Urinalysis   Result Value Ref Range    Color, UA DKYELLOW Straw/Yellow    Clarity, UA Clear Clear    Glucose, Ur Negative Negative mg/dL    Bilirubin Urine SMALL (A) Negative    Ketones, Urine 40 (A) Negative mg/dL    Specific Gravity, UA >=1.030 1.005 - 1.030    Blood, Urine SMALL (A) Negative    pH, UA 6.0 5.0 - 9.0    Protein, UA TRACE Negative mg/dL    Urobilinogen, Urine 1.0 <2.0 E.U./dL    Nitrite, Urine Negative Negative    Leukocyte Esterase, Urine Negative Negative   Troponin   Result Value Ref Range    Troponin, High Sensitivity 23 (H) 0 - 9 ng/L   Troponin   Result Value Ref Range    Troponin, High Sensitivity 12 (H) 0 - 9 ng/L   Microscopic Urinalysis   Result Value Ref Range    WBC, UA 2-5 0 - 5 /HPF    RBC, UA 1-3 0 - 2 /HPF    Bacteria, UA NONE SEEN None Seen /HPF   EKG 12 Lead   Result Value Ref Range    Ventricular Rate 102 BPM    Atrial Rate 102 BPM    P-R Interval 152 ms    QRS Duration 72 ms    Q-T Interval 334 ms    QTc Calculation (Bazett) 435 ms    P Axis 81 degrees    R Axis 75 degrees    T Axis 72 degrees       Radiology:  XR CHEST PORTABLE   Final Result   No acute process. ------------------------- NURSING NOTES AND VITALS REVIEWED ---------------------------  Date / Time Roomed:  11/27/2022 10:22 PM  ED Bed Assignment:  07/07    The nursing notes within the ED encounter and vital signs as below have been reviewed. /64   Pulse 85   Temp 98.2 °F (36.8 °C) (Oral)   Resp 18   Wt 140 lb (63.5 kg)   SpO2 97%   BMI 24.80 kg/m²   Oxygen Saturation Interpretation: Improved after treatment      ------------------------------------------ PROGRESS NOTES ------------------------------------------  2:11 AM EST  I have spoken with the patient and family if present and discussed todays results, in addition to providing specific details for the plan of care and counseling regarding the diagnosis and prognosis. Their questions are answered at this time and they are agreeable with the plan. I discussed at length with them reasons for immediate return here for re evaluation.  They will followup with their primary care provider by calling their office as soon as possible.      --------------------------------- ADDITIONAL PROVIDER NOTES ---------------------------------  At this time the patient is without objective evidence of an acute process requiring hospitalization or inpatient management. They have remained hemodynamically stable throughout their entire ED visit and are stable for discharge with outpatient follow-up. The plan has been discussed in detail and they are aware of the specific conditions for emergent return, as well as the importance of follow-up. New Prescriptions    PREDNISONE (DELTASONE) 50 MG TABLET    Take 1 tablet by mouth daily for 5 days       Diagnosis:  1. COPD exacerbation ([facility] [address]        Disposition:  Patient's disposition: Discharge to home  Patient's condition is stable.        Janae Castro MD  Resident  11/28/22 [id number]       Janae Castro MD  Resident  11/28/22 [id number]

## 2022-11-28 NOTE — ED NOTES
Patient aware of needing urine sample at this time.  Unable to void at this time     Joy Lemos RN  11/28/22 2810

## 2022-11-28 NOTE — ED NOTES
While walking patient out to car. Patient states she felt short of breath. Patient spo2 95% on 3L.      Arabella Zelaya RN  11/28/22 [id number]

## 2022-11-29 ENCOUNTER — APPOINTMENT (OUTPATIENT)
Dept: GENERAL RADIOLOGY | Age: 62
DRG: 190 | End: 2022-11-29
Payer: COMMERCIAL

## 2022-11-29 ENCOUNTER — APPOINTMENT (OUTPATIENT)
Dept: CT IMAGING | Age: 62
DRG: 190 | End: 2022-11-29
Payer: COMMERCIAL

## 2022-11-29 ENCOUNTER — HOSPITAL ENCOUNTER (INPATIENT)
Age: 62
LOS: 4 days | Discharge: HOME | DRG: 190 | End: 2022-12-03
Attending: STUDENT IN AN ORGANIZED HEALTH CARE EDUCATION/TRAINING PROGRAM | Admitting: INTERNAL MEDICINE
Payer: COMMERCIAL

## 2022-11-29 DIAGNOSIS — E87.20 LACTIC ACIDOSIS: ICD-10-CM

## 2022-11-29 DIAGNOSIS — J44.1 COPD EXACERBATION (HCC): ICD-10-CM

## 2022-11-29 DIAGNOSIS — J96.21 ACUTE ON CHRONIC RESPIRATORY FAILURE WITH HYPOXIA (HCC): Primary | ICD-10-CM

## 2022-11-29 LAB
ADENOVIRUS BY PCR: NOT DETECTED
ALBUMIN SERPL-MCNC: 3.8 G/DL (ref 3.5–5.2)
ALP BLD-CCNC: 139 U/L (ref 35–104)
ALT SERPL-CCNC: 21 U/L (ref 0–32)
ANION GAP SERPL CALCULATED.3IONS-SCNC: 10 MMOL/L (ref 7–16)
AST SERPL-CCNC: 31 U/L (ref 0–31)
B.E.: 10.6 MMOL/L (ref -3–3)
B.E.: 7.9 MMOL/L (ref -3–3)
BASOPHILS ABSOLUTE: 0 E9/L (ref 0–0.2)
BASOPHILS RELATIVE PERCENT: 0.1 % (ref 0–2)
BILIRUB SERPL-MCNC: 0.7 MG/DL (ref 0–1.2)
BORDETELLA PARAPERTUSSIS BY PCR: NOT DETECTED
BORDETELLA PERTUSSIS BY PCR: NOT DETECTED
BUN BLDV-MCNC: 25 MG/DL (ref 6–23)
CALCIUM SERPL-MCNC: 9.5 MG/DL (ref 8.6–10.2)
CHLAMYDOPHILIA PNEUMONIAE BY PCR: NOT DETECTED
CHLORIDE BLD-SCNC: 94 MMOL/L (ref 98–107)
CO2: 34 MMOL/L (ref 22–29)
COHB: 0.1 % (ref 0–1.5)
CORONAVIRUS 229E BY PCR: NOT DETECTED
CORONAVIRUS HKU1 BY PCR: NOT DETECTED
CORONAVIRUS NL63 BY PCR: NOT DETECTED
CORONAVIRUS OC43 BY PCR: NOT DETECTED
CREAT SERPL-MCNC: 0.6 MG/DL (ref 0.5–1)
CRITICAL: ABNORMAL
DATE ANALYZED: ABNORMAL
DATE OF COLLECTION: ABNORMAL
DEVICE: ABNORMAL
EKG ATRIAL RATE: 102 BPM
EKG ATRIAL RATE: 192 BPM
EKG P AXIS: 81 DEGREES
EKG P-R INTERVAL: 152 MS
EKG Q-T INTERVAL: 238 MS
EKG Q-T INTERVAL: 334 MS
EKG QRS DURATION: 164 MS
EKG QRS DURATION: 72 MS
EKG QTC CALCULATION (BAZETT): 425 MS
EKG QTC CALCULATION (BAZETT): 435 MS
EKG R AXIS: 75 DEGREES
EKG R AXIS: 80 DEGREES
EKG T AXIS: 72 DEGREES
EKG T AXIS: 76 DEGREES
EKG VENTRICULAR RATE: 102 BPM
EKG VENTRICULAR RATE: 192 BPM
EOSINOPHILS ABSOLUTE: 0 E9/L (ref 0.05–0.5)
EOSINOPHILS RELATIVE PERCENT: 0 % (ref 0–6)
FIO2: 100
GFR SERPL CREATININE-BSD FRML MDRD: >60 ML/MIN/1.73
GLUCOSE BLD-MCNC: 131 MG/DL (ref 74–99)
HCO3: 34.4 MMOL/L (ref 22–26)
HCO3: 34.9 MMOL/L (ref 22–26)
HCT VFR BLD CALC: 39.2 % (ref 34–48)
HEMOGLOBIN: 12.9 G/DL (ref 11.5–15.5)
HHB: 6.3 % (ref 0–5)
HUMAN METAPNEUMOVIRUS BY PCR: NOT DETECTED
HUMAN RHINOVIRUS/ENTEROVIRUS BY PCR: NOT DETECTED
INFLUENZA A BY PCR: NOT DETECTED
INFLUENZA B BY PCR: NOT DETECTED
LAB: ABNORMAL
LACTIC ACID, SEPSIS: 2.3 MMOL/L (ref 0.5–1.9)
LACTIC ACID, SEPSIS: 3.8 MMOL/L (ref 0.5–1.9)
LYMPHOCYTES ABSOLUTE: 0.21 E9/L (ref 1.5–4)
LYMPHOCYTES RELATIVE PERCENT: 0.9 % (ref 20–42)
Lab: ABNORMAL
MAGNESIUM: 1.8 MG/DL (ref 1.6–2.6)
MCH RBC QN AUTO: 30.6 PG (ref 26–35)
MCHC RBC AUTO-ENTMCNC: 32.9 % (ref 32–34.5)
MCV RBC AUTO: 92.9 FL (ref 80–99.9)
METHB: 0.3 % (ref 0–1.5)
MODE: ABNORMAL
MONOCYTES ABSOLUTE: 0.42 E9/L (ref 0.1–0.95)
MONOCYTES RELATIVE PERCENT: 1.7 % (ref 2–12)
MYCOPLASMA PNEUMONIAE BY PCR: NOT DETECTED
NEUTROPHILS ABSOLUTE: 20.27 E9/L (ref 1.8–7.3)
NEUTROPHILS RELATIVE PERCENT: 97.4 % (ref 43–80)
O2 CONTENT: 16.3 ML/DL
O2 SATURATION: 100 % (ref 92–98.5)
O2 SATURATION: 93.7 % (ref 92–98.5)
O2HB: 93.3 % (ref 94–97)
OPERATOR ID: 1821
OPERATOR ID: ABNORMAL
PARAINFLUENZA VIRUS 1 BY PCR: NOT DETECTED
PARAINFLUENZA VIRUS 2 BY PCR: NOT DETECTED
PARAINFLUENZA VIRUS 3 BY PCR: NOT DETECTED
PARAINFLUENZA VIRUS 4 BY PCR: NOT DETECTED
PATIENT TEMP: 37 C
PCO2 37: 56.1 MMHG (ref 35–45)
PCO2: 45.4 MMHG (ref 35–45)
PDW BLD-RTO: 12.4 FL (ref 11.5–15)
PH 37: 7.4 (ref 7.35–7.45)
PH BLOOD GAS: 7.5 (ref 7.35–7.45)
PLATELET # BLD: 406 E9/L (ref 130–450)
PMV BLD AUTO: 9.8 FL (ref 7–12)
PO2 37: 467.9 MMHG (ref 60–80)
PO2: 64.7 MMHG (ref 75–100)
POC SOURCE: ABNORMAL
POLYCHROMASIA: ABNORMAL
POTASSIUM REFLEX MAGNESIUM: 3.3 MMOL/L (ref 3.5–5)
PRO-BNP: 509 PG/ML (ref 0–125)
PROCALCITONIN: 3.61 NG/ML (ref 0–0.08)
RBC # BLD: 4.22 E12/L (ref 3.5–5.5)
RESPIRATORY SYNCYTIAL VIRUS BY PCR: NOT DETECTED
SARS-COV-2, PCR: NOT DETECTED
SODIUM BLD-SCNC: 138 MMOL/L (ref 132–146)
SOURCE, BLOOD GAS: ABNORMAL
THB: 12.4 G/DL (ref 11.5–16.5)
TIME ANALYZED: 2050
TOTAL PROTEIN: 6.8 G/DL (ref 6.4–8.3)
TROPONIN, HIGH SENSITIVITY: 25 NG/L (ref 0–9)
TROPONIN, HIGH SENSITIVITY: 34 NG/L (ref 0–9)
TROPONIN, HIGH SENSITIVITY: 41 NG/L (ref 0–9)
WBC # BLD: 20.9 E9/L (ref 4.5–11.5)

## 2022-11-29 PROCEDURE — 83605 ASSAY OF LACTIC ACID: CPT

## 2022-11-29 PROCEDURE — 93010 ELECTROCARDIOGRAM REPORT: CPT | Performed by: INTERNAL MEDICINE

## 2022-11-29 PROCEDURE — 84145 PROCALCITONIN (PCT): CPT

## 2022-11-29 PROCEDURE — 84484 ASSAY OF TROPONIN QUANT: CPT

## 2022-11-29 PROCEDURE — 96361 HYDRATE IV INFUSION ADD-ON: CPT

## 2022-11-29 PROCEDURE — 83735 ASSAY OF MAGNESIUM: CPT

## 2022-11-29 PROCEDURE — 96365 THER/PROPH/DIAG IV INF INIT: CPT

## 2022-11-29 PROCEDURE — 83880 ASSAY OF NATRIURETIC PEPTIDE: CPT

## 2022-11-29 PROCEDURE — 2580000003 HC RX 258: Performed by: STUDENT IN AN ORGANIZED HEALTH CARE EDUCATION/TRAINING PROGRAM

## 2022-11-29 PROCEDURE — 6370000000 HC RX 637 (ALT 250 FOR IP): Performed by: INTERNAL MEDICINE

## 2022-11-29 PROCEDURE — 94664 DEMO&/EVAL PT USE INHALER: CPT

## 2022-11-29 PROCEDURE — 87040 BLOOD CULTURE FOR BACTERIA: CPT

## 2022-11-29 PROCEDURE — 82805 BLOOD GASES W/O2 SATURATION: CPT

## 2022-11-29 PROCEDURE — 71045 X-RAY EXAM CHEST 1 VIEW: CPT

## 2022-11-29 PROCEDURE — 96375 TX/PRO/DX INJ NEW DRUG ADDON: CPT

## 2022-11-29 PROCEDURE — 82803 BLOOD GASES ANY COMBINATION: CPT

## 2022-11-29 PROCEDURE — 80053 COMPREHEN METABOLIC PANEL: CPT

## 2022-11-29 PROCEDURE — 71275 CT ANGIOGRAPHY CHEST: CPT

## 2022-11-29 PROCEDURE — 2580000003 HC RX 258: Performed by: INTERNAL MEDICINE

## 2022-11-29 PROCEDURE — 94660 CPAP INITIATION&MGMT: CPT

## 2022-11-29 PROCEDURE — 0202U NFCT DS 22 TRGT SARS-COV-2: CPT

## 2022-11-29 PROCEDURE — 36415 COLL VENOUS BLD VENIPUNCTURE: CPT

## 2022-11-29 PROCEDURE — 6370000000 HC RX 637 (ALT 250 FOR IP): Performed by: STUDENT IN AN ORGANIZED HEALTH CARE EDUCATION/TRAINING PROGRAM

## 2022-11-29 PROCEDURE — 6360000002 HC RX W HCPCS: Performed by: INTERNAL MEDICINE

## 2022-11-29 PROCEDURE — 2060000000 HC ICU INTERMEDIATE R&B

## 2022-11-29 PROCEDURE — 6360000004 HC RX CONTRAST MEDICATION: Performed by: RADIOLOGY

## 2022-11-29 PROCEDURE — 93005 ELECTROCARDIOGRAM TRACING: CPT | Performed by: STUDENT IN AN ORGANIZED HEALTH CARE EDUCATION/TRAINING PROGRAM

## 2022-11-29 PROCEDURE — 85025 COMPLETE CBC W/AUTO DIFF WBC: CPT

## 2022-11-29 PROCEDURE — 36600 WITHDRAWAL OF ARTERIAL BLOOD: CPT

## 2022-11-29 PROCEDURE — 94640 AIRWAY INHALATION TREATMENT: CPT

## 2022-11-29 PROCEDURE — 99285 EMERGENCY DEPT VISIT HI MDM: CPT

## 2022-11-29 PROCEDURE — 6360000002 HC RX W HCPCS: Performed by: STUDENT IN AN ORGANIZED HEALTH CARE EDUCATION/TRAINING PROGRAM

## 2022-11-29 RX ORDER — POTASSIUM CHLORIDE 7.45 MG/ML
10 INJECTION INTRAVENOUS PRN
Status: DISCONTINUED | OUTPATIENT
Start: 2022-11-29 | End: 2022-12-03 | Stop reason: HOSPADM

## 2022-11-29 RX ORDER — IPRATROPIUM BROMIDE AND ALBUTEROL SULFATE 2.5; .5 MG/3ML; MG/3ML
3 SOLUTION RESPIRATORY (INHALATION) ONCE
Status: COMPLETED | OUTPATIENT
Start: 2022-11-29 | End: 2022-11-29

## 2022-11-29 RX ORDER — ENOXAPARIN SODIUM 100 MG/ML
40 INJECTION SUBCUTANEOUS DAILY
Status: DISCONTINUED | OUTPATIENT
Start: 2022-11-29 | End: 2022-12-03 | Stop reason: HOSPADM

## 2022-11-29 RX ORDER — METHYLPREDNISOLONE SODIUM SUCCINATE 125 MG/2ML
125 INJECTION, POWDER, LYOPHILIZED, FOR SOLUTION INTRAMUSCULAR; INTRAVENOUS ONCE
Status: COMPLETED | OUTPATIENT
Start: 2022-11-29 | End: 2022-11-29

## 2022-11-29 RX ORDER — 0.9 % SODIUM CHLORIDE 0.9 %
500 INTRAVENOUS SOLUTION INTRAVENOUS ONCE
Status: COMPLETED | OUTPATIENT
Start: 2022-11-29 | End: 2022-11-29

## 2022-11-29 RX ORDER — ARFORMOTEROL TARTRATE 15 UG/2ML
15 SOLUTION RESPIRATORY (INHALATION) 2 TIMES DAILY
Status: DISCONTINUED | OUTPATIENT
Start: 2022-11-29 | End: 2022-12-03 | Stop reason: HOSPADM

## 2022-11-29 RX ORDER — METHYLPREDNISOLONE SODIUM SUCCINATE 125 MG/2ML
80 INJECTION, POWDER, LYOPHILIZED, FOR SOLUTION INTRAMUSCULAR; INTRAVENOUS EVERY 8 HOURS
Status: DISCONTINUED | OUTPATIENT
Start: 2022-11-29 | End: 2022-12-02

## 2022-11-29 RX ORDER — IPRATROPIUM BROMIDE AND ALBUTEROL SULFATE 2.5; .5 MG/3ML; MG/3ML
3 SOLUTION RESPIRATORY (INHALATION) ONCE
Status: DISCONTINUED | OUTPATIENT
Start: 2022-11-29 | End: 2022-11-29

## 2022-11-29 RX ORDER — ASCORBIC ACID 500 MG
1000 TABLET ORAL DAILY
Status: DISCONTINUED | OUTPATIENT
Start: 2022-11-29 | End: 2022-12-03 | Stop reason: HOSPADM

## 2022-11-29 RX ORDER — ALBUTEROL SULFATE 2.5 MG/3ML
2.5 SOLUTION RESPIRATORY (INHALATION) EVERY 6 HOURS PRN
Status: DISCONTINUED | OUTPATIENT
Start: 2022-11-29 | End: 2022-12-03 | Stop reason: HOSPADM

## 2022-11-29 RX ORDER — THEOPHYLLINE 400 MG/1
400 TABLET, EXTENDED RELEASE ORAL DAILY
Status: DISCONTINUED | OUTPATIENT
Start: 2022-11-29 | End: 2022-11-29

## 2022-11-29 RX ORDER — IPRATROPIUM BROMIDE AND ALBUTEROL SULFATE 2.5; .5 MG/3ML; MG/3ML
1 SOLUTION RESPIRATORY (INHALATION)
Status: DISCONTINUED | OUTPATIENT
Start: 2022-11-29 | End: 2022-12-03 | Stop reason: HOSPADM

## 2022-11-29 RX ORDER — POTASSIUM CHLORIDE 7.45 MG/ML
10 INJECTION INTRAVENOUS
Status: DISPENSED | OUTPATIENT
Start: 2022-11-29 | End: 2022-11-29

## 2022-11-29 RX ORDER — MAGNESIUM SULFATE 1 G/100ML
1000 INJECTION INTRAVENOUS PRN
Status: DISCONTINUED | OUTPATIENT
Start: 2022-11-29 | End: 2022-12-03 | Stop reason: HOSPADM

## 2022-11-29 RX ORDER — BUDESONIDE 0.5 MG/2ML
500 INHALANT ORAL 2 TIMES DAILY
Status: DISCONTINUED | OUTPATIENT
Start: 2022-11-29 | End: 2022-12-03 | Stop reason: HOSPADM

## 2022-11-29 RX ORDER — POTASSIUM CHLORIDE 20 MEQ/1
40 TABLET, EXTENDED RELEASE ORAL PRN
Status: DISCONTINUED | OUTPATIENT
Start: 2022-11-29 | End: 2022-12-03 | Stop reason: HOSPADM

## 2022-11-29 RX ORDER — ATORVASTATIN CALCIUM 40 MG/1
40 TABLET, FILM COATED ORAL NIGHTLY
Status: DISCONTINUED | OUTPATIENT
Start: 2022-11-29 | End: 2022-12-03 | Stop reason: HOSPADM

## 2022-11-29 RX ORDER — ONDANSETRON 2 MG/ML
4 INJECTION INTRAMUSCULAR; INTRAVENOUS EVERY 6 HOURS PRN
Status: DISCONTINUED | OUTPATIENT
Start: 2022-11-29 | End: 2022-12-03 | Stop reason: HOSPADM

## 2022-11-29 RX ORDER — SERTRALINE HYDROCHLORIDE 25 MG/1
25 TABLET, FILM COATED ORAL DAILY
Status: DISCONTINUED | OUTPATIENT
Start: 2022-11-29 | End: 2022-12-03 | Stop reason: HOSPADM

## 2022-11-29 RX ORDER — AZITHROMYCIN 250 MG/1
500 TABLET, FILM COATED ORAL DAILY
Status: DISCONTINUED | OUTPATIENT
Start: 2022-11-29 | End: 2022-12-02

## 2022-11-29 RX ORDER — FLUTICASONE PROPIONATE 50 MCG
2 SPRAY, SUSPENSION (ML) NASAL DAILY
Status: DISCONTINUED | OUTPATIENT
Start: 2022-11-29 | End: 2022-12-03 | Stop reason: HOSPADM

## 2022-11-29 RX ADMIN — METHYLPREDNISOLONE SODIUM SUCCINATE 125 MG: 125 INJECTION, POWDER, FOR SOLUTION INTRAMUSCULAR; INTRAVENOUS at 08:40

## 2022-11-29 RX ADMIN — POTASSIUM CHLORIDE 10 MEQ: 7.46 INJECTION, SOLUTION INTRAVENOUS at 14:35

## 2022-11-29 RX ADMIN — IPRATROPIUM BROMIDE AND ALBUTEROL SULFATE 3 AMPULE: .5; 3 SOLUTION RESPIRATORY (INHALATION) at 08:20

## 2022-11-29 RX ADMIN — ATORVASTATIN CALCIUM 40 MG: 40 TABLET, FILM COATED ORAL at 19:40

## 2022-11-29 RX ADMIN — THEOPHYLLINE ANHYDROUS 400 MG: 200 CAPSULE, EXTENDED RELEASE ORAL at 15:33

## 2022-11-29 RX ADMIN — AZITHROMYCIN MONOHYDRATE 500 MG: 250 TABLET ORAL at 14:30

## 2022-11-29 RX ADMIN — METHYLPREDNISOLONE SODIUM SUCCINATE 80 MG: 125 INJECTION, POWDER, FOR SOLUTION INTRAMUSCULAR; INTRAVENOUS at 16:57

## 2022-11-29 RX ADMIN — IPRATROPIUM BROMIDE AND ALBUTEROL SULFATE 1 AMPULE: .5; 2.5 SOLUTION RESPIRATORY (INHALATION) at 17:50

## 2022-11-29 RX ADMIN — ARFORMOTEROL TARTRATE 15 MCG: 15 SOLUTION RESPIRATORY (INHALATION) at 17:50

## 2022-11-29 RX ADMIN — IOPAMIDOL 75 ML: 755 INJECTION, SOLUTION INTRAVENOUS at 10:36

## 2022-11-29 RX ADMIN — OXYCODONE HYDROCHLORIDE AND ACETAMINOPHEN 1000 MG: 500 TABLET ORAL at 14:30

## 2022-11-29 RX ADMIN — SERTRALINE HYDROCHLORIDE 25 MG: 25 TABLET ORAL at 15:33

## 2022-11-29 RX ADMIN — IPRATROPIUM BROMIDE AND ALBUTEROL SULFATE 1 AMPULE: .5; 2.5 SOLUTION RESPIRATORY (INHALATION) at 13:53

## 2022-11-29 RX ADMIN — SODIUM CHLORIDE 500 ML: 9 INJECTION, SOLUTION INTRAVENOUS at 08:38

## 2022-11-29 RX ADMIN — CEFTRIAXONE 1000 MG: 1 INJECTION, POWDER, FOR SOLUTION INTRAMUSCULAR; INTRAVENOUS at 14:30

## 2022-11-29 RX ADMIN — ENOXAPARIN SODIUM 40 MG: 100 INJECTION SUBCUTANEOUS at 14:30

## 2022-11-29 RX ADMIN — POTASSIUM CHLORIDE 10 MEQ: 7.46 INJECTION, SOLUTION INTRAVENOUS at 11:03

## 2022-11-29 RX ADMIN — BUDESONIDE 500 MCG: 0.5 SUSPENSION RESPIRATORY (INHALATION) at 13:53

## 2022-11-29 RX ADMIN — METHYLPREDNISOLONE SODIUM SUCCINATE 80 MG: 125 INJECTION, POWDER, FOR SOLUTION INTRAMUSCULAR; INTRAVENOUS at 23:06

## 2022-11-29 ASSESSMENT — PAIN SCALES - GENERAL: PAINLEVEL_OUTOF10: 0

## 2022-11-29 ASSESSMENT — ENCOUNTER SYMPTOMS
NAUSEA: 0
ABDOMINAL PAIN: 0
DIARRHEA: 0
SORE THROAT: 0
COUGH: 1
SINUS PAIN: 0
EYE DISCHARGE: 0
COLOR CHANGE: 0
SHORTNESS OF BREATH: 1
CONSTIPATION: 0
VOMITING: 0

## 2022-11-29 ASSESSMENT — LIFESTYLE VARIABLES
HOW OFTEN DO YOU HAVE A DRINK CONTAINING ALCOHOL: NEVER
HOW MANY STANDARD DRINKS CONTAINING ALCOHOL DO YOU HAVE ON A TYPICAL DAY: PATIENT DOES NOT DRINK

## 2022-11-29 NOTE — ED NOTES
Xray at bedside. Resp report 2 attempts at blood gases.  Will attempt with ultrasound brachial     Gene Iverson RN  11/29/22 [id number]

## 2022-11-29 NOTE — ED NOTES
This RN assumed care of pt. All questions answered at this time. Repeat trop and resp panel sent. Pt remains on bipap, cont tele and pulse ox continues.       Idris Duran RN  11/29/22 [id number]

## 2022-11-29 NOTE — ED PROVIDER NOTES
Jasson Key is a 70-year-old female with a history of COPD and provoked DVT secondary to PICC line       Patient is a 58 y.o. female presents with a chief complaint of increasing shortness of breath  This has been occurring for since yesterday, worsening at 0130 today. Patient states that it gets better with nothing. Patient states that it gets worse with nothing. Patient states that it is severe in severity. Patient states it was acute in onset. She notes that yesterday she started having increasing shortness of breath and mild cough. Patient is on 4 L of oxygen at home at baseline, however this was not cutting it. Patient presents via EMS on CPAP. They reported her O2 sats of 80s, with decrease lung sounds bilaterally, she was given 1 DuoNeb treatment without any relief, and sats improved to 93% on CPAP. She does note yesterday she had chills, but no fevers. Patient denying any chest pain, headache, lightheaded or dizziness, numbness or tingling, abdominal pain, nausea, vomiting, diarrhea, dysuria, hematuria, hematochezia, leg swelling, rashes. Review of Systems   Constitutional:  Positive for chills. Negative for fever. HENT:  Negative for congestion, sinus pain and sore throat. Eyes:  Negative for discharge and visual disturbance. Respiratory:  Positive for cough and shortness of breath. Cardiovascular:  Negative for chest pain and leg swelling. Gastrointestinal:  Negative for abdominal pain, constipation, diarrhea, nausea and vomiting. Endocrine: Negative for polyuria. Genitourinary:  Negative for difficulty urinating, dysuria, frequency and hematuria. Musculoskeletal:  Negative for arthralgias and joint swelling. Skin:  Negative for color change and rash. Neurological:  Negative for dizziness, weakness, light-headedness, numbness and headaches. All other systems reviewed and are negative. Physical Exam  Constitutional:       General: She is in acute distress. Appearance: Normal appearance. HENT:      Head: Normocephalic and atraumatic. Mouth/Throat:      Mouth: Mucous membranes are moist.      Pharynx: Oropharynx is clear. Eyes:      Extraocular Movements: Extraocular movements intact. Conjunctiva/sclera: Conjunctivae normal.      Pupils: Pupils are equal, round, and reactive to light. Cardiovascular:      Rate and Rhythm: Regular rhythm. Tachycardia present. Pulses: Normal pulses. Heart sounds: Normal heart sounds. Pulmonary:      Effort: Accessory muscle usage and respiratory distress present. Breath sounds: Decreased air movement present. Examination of the right-lower field reveals wheezing. Examination of the left-lower field reveals wheezing. Wheezing present. Abdominal:      General: Abdomen is flat. Palpations: Abdomen is soft. Musculoskeletal:         General: No swelling. Normal range of motion. Cervical back: Normal range of motion and neck supple. Skin:     General: Skin is warm and dry. Neurological:      General: No focal deficit present. Mental Status: She is alert and oriented to person, place, and time. Psychiatric:         Mood and Affect: Mood normal.         Behavior: Behavior normal.        Procedures     MDM  Number of Diagnoses or Management Options  Acute on chronic respiratory failure with hypoxia (HCC)  COPD exacerbation (HCC)  Lactic acidosis  Diagnosis management comments: Steve Linares is a 71-year-old female presenting to the ED with complaints of shortness of breath. Patient presenting in respiratory distress. Placed on BiPAP in ED with improvement in saturations to 97%. Patient tachycardic on presentation bouncing between 150s and 190s. Patient given 3 DuoNeb treatments and Solu-Medrol and placed on BiPAP with significant improvement in symptoms. Heart rate did improve to the 90s. Respiratory panel all negative. CBC did reveal leukocytosis of 20.9, down from visit 2 days ago.   CMP did reveal a CO2 of 34. Delta troponin from 1st-3rd, down 9. Lactic acid of 3.8 initially, down to 2.3 on repeat. BNP of 509. Magnesium of 1.8. ABG revealing PO2 of 467. Chest x-ray revealing chronic findings with patchy infiltrate in right lung. CTA revealing emphysematous changes in bilateral lungs. Plan to admit patient to medicine for further evaluation and management discussed with patient. She is agreeable to plan. Spoke with Dr. Jersey Raya who agreed to admit patient. ED Course as of 11/29/22 1710 Tue Nov 29, 2022   6921 EKG: This EKG is signed and interpreted by me. Rate: 192  Rhythm: svt  Interpretation: non-specific EKG, nonspecific ST depression no ST elevation normal axis QTc 425  Comparison: changes compared to previous EKG   [SS]   0840 Rate improved to 120's at repeat evaluation without intervention [SS]   1037 On repeat examination, patient resting comfortably with heart rate 101-111. [CP]   3783 Repeat examination, patient resting comfortably still, heart rate in the 90s. [CP]      ED Course User Index  [CP] Maria Teresa Trujillo DO  [SS] Gil Pitt MD      ED Course as of 11/29/22 1710 Tue Nov 29, 2022   1345 EKG: This EKG is signed and interpreted by me. Rate: 192  Rhythm: svt  Interpretation: non-specific EKG, nonspecific ST depression no ST elevation normal axis QTc 425  Comparison: changes compared to previous EKG   [SS]   0840 Rate improved to 120's at repeat evaluation without intervention [SS]   1037 On repeat examination, patient resting comfortably with heart rate 101-111. [CP]   1956 Repeat examination, patient resting comfortably still, heart rate in the 90s.  [CP]      ED Course User Index  [CP] Maria Teresa Trujillo DO  [SS] Gil Pitt MD       --------------------------------------------- PAST HISTORY ---------------------------------------------  Past Medical History:  has a past medical history of Abscess, COPD (chronic obstructive pulmonary disease) ([facility] [address] Diverticulitis, Provoked DVT 3/2018, Seasonal allergic rhinitis, Smoker, and Tobacco use disorder. Past Surgical History:  has no past surgical history on file. Social History:  reports that she quit smoking about 18 months ago. Her smoking use included cigarettes. She has a 20.50 pack-year smoking history. She has never used smokeless tobacco. She reports current alcohol use. She reports current drug use. Drug: Marijuana Jasvir Hail). Family History: family history includes Breast Cancer in her none; Colon Cancer in her none; Coronary Art Dis in her father; Diabetes in her unknown relative; Hypertension in her father; Other in her daughter and father; Stroke in her mother. The patients home medications have been reviewed.     Allergies: Penicillin v    -------------------------------------------------- RESULTS -------------------------------------------------    LABS:  Results for orders placed or performed during the hospital encounter of 11/29/22   Respiratory Panel, Molecular, with COVID-19 (Restricted: peds pts or suitable admitted adults)    Specimen: Nasopharyngeal   Result Value Ref Range    Adenovirus by PCR Not Detected Not Detected    Bordetella parapertussis by PCR Not Detected Not Detected    Bordetella pertussis by PCR Not Detected Not Detected    Chlamydophilia pneumoniae by PCR Not Detected Not Detected    Coronavirus 229E by PCR Not Detected Not Detected    Coronavirus HKU1 by PCR Not Detected Not Detected    Coronavirus NL63 by PCR Not Detected Not Detected    Coronavirus OC43 by PCR Not Detected Not Detected    SARS-CoV-2, PCR Not Detected Not Detected    Human Metapneumovirus by PCR Not Detected Not Detected    Human Rhinovirus/Enterovirus by PCR Not Detected Not Detected    Influenza A by PCR Not Detected Not Detected    Influenza B by PCR Not Detected Not Detected    Mycoplasma pneumoniae by PCR Not Detected Not Detected    Parainfluenza Virus 1 by PCR Not Detected Not Detected Parainfluenza Virus 2 by PCR Not Detected Not Detected    Parainfluenza Virus 3 by PCR Not Detected Not Detected    Parainfluenza Virus 4 by PCR Not Detected Not Detected    Respiratory Syncytial Virus by PCR Not Detected Not Detected   CBC with Auto Differential   Result Value Ref Range    WBC 20.9 (H) 4.5 - 11.5 E9/L    RBC 4.22 3.50 - 5.50 E12/L    Hemoglobin 12.9 11.5 - 15.5 g/dL    Hematocrit 39.2 34.0 - 48.0 %    MCV 92.9 80.0 - 99.9 fL    MCH 30.6 26.0 - 35.0 pg    MCHC 32.9 32.0 - 34.5 %    RDW 12.4 11.5 - 15.0 fL    Platelets 264 541 - 883 E9/L    MPV 9.8 7.0 - 12.0 fL    Neutrophils % 97.4 (H) 43.0 - 80.0 %    Lymphocytes % 0.9 (L) 20.0 - 42.0 %    Monocytes % 1.7 (L) 2.0 - 12.0 %    Eosinophils % 0.0 0.0 - 6.0 %    Basophils % 0.1 0.0 - 2.0 %    Neutrophils Absolute 20.27 (H) 1.80 - 7.30 E9/L    Lymphocytes Absolute 0.21 (L) 1.50 - 4.00 E9/L    Monocytes Absolute 0.42 0.10 - 0.95 E9/L    Eosinophils Absolute 0.00 (L) 0.05 - 0.50 E9/L    Basophils Absolute 0.00 0.00 - 0.20 E9/L    Polychromasia 1+    Comprehensive Metabolic Panel w/ Reflex to MG   Result Value Ref Range    Sodium 138 132 - 146 mmol/L    Potassium reflex Magnesium 3.3 (L) 3.5 - 5.0 mmol/L    Chloride 94 (L) 98 - 107 mmol/L    CO2 34 (H) 22 - 29 mmol/L    Anion Gap 10 7 - 16 mmol/L    Glucose 131 (H) 74 - 99 mg/dL    BUN 25 (H) 6 - 23 mg/dL    Creatinine 0.6 0.5 - 1.0 mg/dL    Est, Glom Filt Rate >60 >=60 mL/min/1.73    Calcium 9.5 8.6 - 10.2 mg/dL    Total Protein 6.8 6.4 - 8.3 g/dL    Albumin 3.8 3.5 - 5.2 g/dL    Total Bilirubin 0.7 0.0 - 1.2 mg/dL    Alkaline Phosphatase 139 (H) 35 - 104 U/L    ALT 21 0 - 32 U/L    AST 31 0 - 31 U/L   Troponin   Result Value Ref Range    Troponin, High Sensitivity 34 (H) 0 - 9 ng/L   Lactate, Sepsis   Result Value Ref Range    Lactic Acid, Sepsis 3.8 (HH) 0.5 - 1.9 mmol/L   Lactate, Sepsis   Result Value Ref Range    Lactic Acid, Sepsis 2.3 (H) 0.5 - 1.9 mmol/L   Brain Natriuretic Peptide   Result Value Ref Range    Pro- (H) 0 - 125 pg/mL   Magnesium   Result Value Ref Range    Magnesium 1.8 1.6 - 2.6 mg/dL   Troponin   Result Value Ref Range    Troponin, High Sensitivity 41 (H) 0 - 9 ng/L   Arterial Blood Gas, Respiratory Only   Result Value Ref Range    POC Source Arterial     FIO2 100.0     PH 37 7.396 7.350 - 7.450    PCO2 37 56.1 (H) 35.0 - 45.0 mmHg    PO2 37 467.9 (H) 60.0 - 80.0 mmHg    HCO3 34.4 (H) 22.0 - 26.0 mmol/L    B.E. 7.9 (H) -3.0 - 3.0 mmol/L    O2 Sat 100.0 (H) 92.0 - 98.5 %     ID 1,821     DEVICE 17,310,[id number],678    Troponin   Result Value Ref Range    Troponin, High Sensitivity 25 (H) 0 - 9 ng/L   EKG 12 Lead   Result Value Ref Range    Ventricular Rate 192 BPM    Atrial Rate 192 BPM    QRS Duration 164 ms    Q-T Interval 238 ms    QTc Calculation (Bazett) 425 ms    R Axis 80 degrees    T Axis 76 degrees       RADIOLOGY:  CTA CHEST W CONTRAST   Final Result   No evidence of pulmonary embolism. Extensive emphysematous changes visualized in bilateral lung fields. RECOMMENDATIONS:   Unavailable         XR CHEST PORTABLE   Final Result   Chronic findings. Patchy infiltrate in the lower right lung.             ------------------------- NURSING NOTES AND VITALS REVIEWED ---------------------------  Date / Time Roomed:  11/29/2022  8:13 AM  ED Bed Assignment:  [id number]    The nursing notes within the ED encounter and vital signs as below have been reviewed.      Patient Vitals for the past 24 hrs:   BP Temp Temp src Pulse Resp SpO2   11/29/22 1430 -- -- -- -- -- 99 %   11/29/22 1405 114/72 97.9 °F (36.6 °C) Axillary 96 30 --   11/29/22 1359 -- -- -- -- 30 --   11/29/22 1315 97/75 -- -- 86 18 99 %   11/29/22 1300 90/66 -- -- 87 19 100 %   11/29/22 1245 97/64 -- -- 91 21 100 %   11/29/22 1230 94/66 -- -- 91 20 99 %   11/29/22 1215 107/69 -- -- 97 20 100 %   11/29/22 1200 90/71 -- -- 92 19 100 %   11/29/22 1145 87/61 -- -- 94 21 100 %   11/29/22 1130 98/71 -- -- 96 21 100 %   11/29/22 1115 108/77 -- -- 97 21 100 %   11/29/22 1102 -- 98.6 °F (37 °C) Axillary -- -- --   11/29/22 1100 134/71 -- -- (!) 101 20 100 %   11/29/22 1013 95/64 -- -- (!) 105 27 99 %   11/29/22 1003 97/65 -- -- (!) 110 24 100 %   11/29/22 0952 103/71 -- -- (!) 107 23 99 %   11/29/22 0947 102/73 -- -- (!) 112 25 99 %   11/29/22 0927 99/71 -- -- (!) 120 30 99 %   11/29/22 0908 115/69 -- -- (!) 131 22 99 %   11/29/22 0848 93/60 -- -- (!) 136 (!) 31 99 %   11/29/22 0847 -- -- -- (!) 137 30 98 %   11/29/22 0846 -- -- -- (!) 141 (!) 41 98 %   11/29/22 0845 -- -- -- (!) 146 26 98 %   11/29/22 0820 -- -- -- (!) 152 -- 97 %   11/29/22 0819 -- -- -- (!) 155 (!) 31 98 %   11/29/22 0818 112/76 -- -- (!) 154 -- 97 %       Oxygen Saturation Interpretation: Normal    ------------------------------------------ PROGRESS NOTES ------------------------------------------  Re-evaluation(s):  Time: 8769  Patients symptoms are improving  Repeat physical examination is improved    Counseling:  I have spoken with the patient and discussed todays results, in addition to providing specific details for the plan of care and counseling regarding the diagnosis and prognosis. Their questions are answered at this time and they are agreeable with the plan of admission.    --------------------------------- ADDITIONAL PROVIDER NOTES ---------------------------------  Consultations:  Time: 1250. Spoke with Dr. Fuentes Godinez. Discussed case. They will admit the patient. This patient's ED course included: a personal history and physicial examination, multiple bedside re-evaluations, IV medications, cardiac monitoring, and continuous pulse oximetry    This patient has remained hemodynamically stable during their ED course. Diagnosis:  1. Acute on chronic respiratory failure with hypoxia (HCC)    2. Lactic acidosis    3.  COPD exacerbation ([facility] [address]        Disposition:  Patient's disposition: Admit to med/surg floor  Patient's condition is stable. Patient was seen and evaluated by myself and my attending Eduarda Durand MD. Assessment and Plan discussed with attending provider, please see attestation for final plan of care. This note was done using dictation software and there may be some grammatical errors associated with this.     Tasneem Vazquez 57, DO  Resident  11/29/22 8158

## 2022-11-29 NOTE — H&P
Department of Internal Medicine  History and Physical    PCP: Dr. Suzi Avila   Admitting Physician: Dr. Perdue Hind:  SOB    HISTORY OF PRESENT ILLNESS:    Carlos Ghotra is a 80-year-old female who presented to [address] [place] [address] emergency department for the evaluation of progressive shortness of breath. She describes a recent exposure to sick individuals. She required BiPAP therapy on initial presentation and maintained on BiPAP therapy during my examination. She suffers from end-stage oxygen dependent COPD. She states that she has been taking her medications regularly and has been wearing her oxygen. She admits to coughing but without fever. She admits to feeling ill overall. She will be admitted to the hospital for complete respiratory support and work-up. PAST MEDICAL Hx:  Past Medical History:   Diagnosis Date    Abscess     colon    COPD (chronic obstructive pulmonary disease) ([facility] [address]     Diverticulitis     Provoked DVT 3/2018     3 months Eliquis for DVT due to PICC line    Seasonal allergic rhinitis     Smoker 11/30/2019    5-6 per day    Tobacco use disorder      : 1 ppd since age 25       PAST SURGICAL Hx:   No past surgical history on file. FAMILY Hx:  Family History   Problem Relation Age of Onset    Colon Cancer None     Other Father     Coronary Art Dis Father         mother    Hypertension Father     Diabetes Unknown relative         older age; pat grandmother    Other Daughter         son; ex-    Breast Cancer None     Stroke Mother        HOME MEDICATIONS:  Prior to Admission medications    Medication Sig Start Date End Date Taking?  Authorizing Provider   predniSONE (DELTASONE) 50 MG tablet Take 1 tablet by mouth daily for 5 days 11/28/22 12/3/22  Shell Lopez MD   albuterol sulfate HFA (VENTOLIN HFA) 108 (90 Base) MCG/ACT inhaler Inhale 2 puffs into the lungs 4 times daily as needed for Wheezing 4/13/22   Concetta Rodriguez DO   atorvastatin (LIPITOR) 40 MG tablet Take 1 tablet by mouth nightly 2/12/22   Jackson Osgood, APRN - CNP   theophylline (UNIPHYL) 400 MG extended release tablet Take 1 tablet by mouth daily 2/11/22   Escobar Silver DO   albuterol (PROVENTIL) (2.5 MG/3ML) 0.083% nebulizer solution INHALE 1 VIAL (3 ML) EVERY 4 HOURS AS NEEDED FOR WHEEZING 12/29/21   Elvira John MD   Arformoterol Tartrate (BROVANA) 15 MCG/2ML NEBU Take 2 mLs by nebulization 2 times daily 9/24/21   Samantha Silver DO   budesonide (PULMICORT) 0.5 MG/2ML nebulizer suspension Take 2 mLs by nebulization 2 times daily 9/24/21   Samantha Silver DO   albuterol (ACCUNEB) 1.25 MG/3ML nebulizer solution Inhale 3 mLs into the lungs every 6 hours as needed for Wheezing 9/24/21   Escobar Silver DO   albuterol sulfate HFA (VENTOLIN HFA) 108 (90 Base) MCG/ACT inhaler Inhale 2 puffs into the lungs 4 times daily as needed for Wheezing or Shortness of Breath 9/24/21   Samantha Silver DO   OXYGEN Inhale 3 L into the lungs continuous    Historical Provider, MD   Umeclidinium Bromide (INCRUSE ELLIPTA) 62.5 MCG/INH AEPB Inhale 1 puff into the lungs daily 5/24/21   Jackson Osgood, APRN - CNP   fluticasone-salmeterol (ADVAIR) 250-50 MCG/DOSE AEPB Inhale 1 puff into the lungs every 12 hours . Rinse and spit after each use. 5/24/21   Jackson Osgood, APRN - CNP   melatonin 3 MG TABS tablet Take 1 tablet by mouth nightly as needed (insomnia) 5/24/21   Jackson Osgood, APRN - CNP   fluticasone (FLONASE) 50 MCG/ACT nasal spray 2 sprays by Each Nostril route daily 4/7/21   Elvira John MD   sertraline (ZOLOFT) 25 MG tablet Take 1 tablet by mouth daily 3/8/21   Elvira John MD       ALLERGIES:  Penicillin v    SOCIAL Hx:  Social History     Socioeconomic History    Marital status:       Spouse name: Not on file    Number of children: 2    Years of education: Not on file    Highest education level: Not on file   Occupational History    Not on file   Tobacco Use    Smoking status: Former     Packs/day: 0.50     Years: 41.00     Pack years: 20.50     Types: Cigarettes     Quit date: 2021     Years since quittin.5    Smokeless tobacco: Never   Vaping Use    Vaping Use: Never used   Substance and Sexual Activity    Alcohol use: Yes     Comment: social    Drug use: Yes     Types: Marijuana Kenard Snooks)     Comment: occasional     Sexual activity: Not on file   Other Topics Concern    Not on file   Social History Narrative    First  is . Social Determinants of Health     Financial Resource Strain: Not on file   Food Insecurity: Not on file   Transportation Needs: Not on file   Physical Activity: Not on file   Stress: Not on file   Social Connections: Not on file   Intimate Partner Violence: Not on file   Housing Stability: Not on file       ROS:  General:   Denies chills, fatigue, fever, malaise, night sweats or weight loss    Psychological:   Denies anxiety, disorientation or hallucinations    ENT:    Denies epistaxis, headaches, vertigo or visual changes. Admits to some irritation associated with BiPAP. Cardiovascular:   Denies any chest pain, irregular heartbeats, or palpitations. No paroxysmal nocturnal dyspnea. Respiratory:   Admits to profound shortness of breath both at rest and with exertion. She denies overt coughing or sputum production. Gastrointestinal:   Denies nausea, vomiting, diarrhea, or constipation. Denies any abdominal pain. Denies change in bowel habits or stools. Genito-Urinary:    Denies any urgency, frequency, hematuria. Voiding without difficulty. Musculoskeletal:   Denies joint pain, joint stiffness, joint swelling or muscle pain    Neurology:    Denies any headache or focal neurological deficits. No weakness or paresthesia. Derm:    Denies any rashes, ulcers, or excoriations. Denies bruising. Extremities:   Denies any lower extremity swelling or edema.       PHYSICAL EXAM:  VITALS:  Vitals:    22 1230   BP: 94/66   Pulse: 91   Resp: 20   Temp: SpO2: 99%         CONSTITUTIONAL:    Awake, alert, cooperative, no apparent distress, and appears stated age    EYES:    PERRL, EOMI, sclera clear, conjunctiva normal    ENT:    Normocephalic, atraumatic, sinuses nontender on palpation. External ears without lesions. Oral pharynx with moist mucus membranes. Tonsils without erythema or exudates. BiPAP is in place. NECK:    Supple, symmetrical, trachea midline, no adenopathy, thyroid symmetric, not enlarged and no tenderness, skin normal, no bruits, no JVD    HEMATOLOGIC/LYMPHATICS:    No cervical lymphadenopathy and no supraclavicular lymphadenopathy    LUNGS:    Tight and diminished with poor aeration overall. End expiratory wheezes are appreciated. CARDIOVASCULAR:    Normal apical impulse, regular rate and rhythm, normal S1 and S2, no S3 or S4, and no murmur noted    ABDOMEN:    No scars, normal bowel sounds, soft, non-distended, non-tender, no masses palpated, no hepatosplenomegaly, no rebound or guarding elicited on palpation     MUSCULOSKELETAL:    There is no redness, warmth, or swelling of the joints. Full range of motion noted. Motor strength is 5 out of 5 all extremities bilaterally. Tone is normal.    NEUROLOGIC:    Awake, alert, oriented to name, place and time. Cranial nerves II-XII are grossly intact. Motor is 5 out of 5 bilaterally. SKIN:    No bruising or bleeding. No redness, warmth, or swelling    EXTREMITIES:    Peripheral pulses present. No edema, cyanosis, or swelling. OSTEOPATHIC:    Examined in seated and supine positions. Normal thoracic kyphosis and lumbar lordosis. No acute somatic dysfunction.     LABORATORY DATA:  CBC with Differential:    Lab Results   Component Value Date/Time    WBC 20.9 11/29/2022 08:29 AM    RBC 4.22 11/29/2022 08:29 AM    HGB 12.9 11/29/2022 08:29 AM    HCT 39.2 11/29/2022 08:29 AM     11/29/2022 08:29 AM    MCV 92.9 11/29/2022 08:29 AM    MCH 30.6 11/29/2022 08:29 AM    MCHC 32.9 11/29/2022 08:29 AM    RDW 12.4 11/29/2022 08:29 AM    LYMPHOPCT 0.9 11/29/2022 08:29 AM    MONOPCT 1.7 11/29/2022 08:29 AM    BASOPCT 0.1 11/29/2022 08:29 AM    MONOSABS 0.42 11/29/2022 08:29 AM    LYMPHSABS 0.21 11/29/2022 08:29 AM    EOSABS 0.00 11/29/2022 08:29 AM    BASOSABS 0.00 11/29/2022 08:29 AM     BMP:    Lab Results   Component Value Date/Time     11/29/2022 08:29 AM    K 3.3 11/29/2022 08:29 AM    CL 94 11/29/2022 08:29 AM    CO2 34 11/29/2022 08:29 AM    BUN 25 11/29/2022 08:29 AM    LABALBU 3.8 11/29/2022 08:29 AM    CREATININE 0.6 11/29/2022 08:29 AM    CALCIUM 9.5 11/29/2022 08:29 AM    GFRAA >60 04/13/2022 02:20 PM    LABGLOM >60 11/29/2022 08:29 AM    GLUCOSE 131 11/29/2022 08:29 AM       ASSESSMENT:  Acute respiratory failure with hypoxia secondary to an exacerbation of COPD with suspected viral illness component  Ongoing tobacco abuse  Prior history of DVT    PLAN:  June Saenz admits to multiple recent sick exposures and viral panel is pending. I suspect a viral component precipitating her COPD exacerbation. She is currently BiPAP dependent. Aggressive corticosteroids will be administered along with nebulized respiratory medications and antibiotics. Complete infectious work-up is being undertaken.     Alejandro King DO, D.O., Hiwot Burton  12:54 PM  11/29/2022    Electronically signed by Alejandro King DO on 11/29/22 at 12:54 PM EST

## 2022-11-30 LAB
ANION GAP SERPL CALCULATED.3IONS-SCNC: 6 MMOL/L (ref 7–16)
BASOPHILS ABSOLUTE: 0 E9/L (ref 0–0.2)
BASOPHILS RELATIVE PERCENT: 0.3 % (ref 0–2)
BUN BLDV-MCNC: 25 MG/DL (ref 6–23)
CALCIUM SERPL-MCNC: 9 MG/DL (ref 8.6–10.2)
CHLORIDE BLD-SCNC: 94 MMOL/L (ref 98–107)
CO2: 35 MMOL/L (ref 22–29)
CREAT SERPL-MCNC: 0.4 MG/DL (ref 0.5–1)
EOSINOPHILS ABSOLUTE: 0 E9/L (ref 0.05–0.5)
EOSINOPHILS RELATIVE PERCENT: 0 % (ref 0–6)
GFR SERPL CREATININE-BSD FRML MDRD: >60 ML/MIN/1.73
GLUCOSE BLD-MCNC: 119 MG/DL (ref 74–99)
HCT VFR BLD CALC: 32.4 % (ref 34–48)
HEMOGLOBIN: 10.7 G/DL (ref 11.5–15.5)
HYPOCHROMIA: ABNORMAL
LYMPHOCYTES ABSOLUTE: 1.1 E9/L (ref 1.5–4)
LYMPHOCYTES RELATIVE PERCENT: 3.5 % (ref 20–42)
MAGNESIUM: 2 MG/DL (ref 1.6–2.6)
MCH RBC QN AUTO: 30.5 PG (ref 26–35)
MCHC RBC AUTO-ENTMCNC: 33 % (ref 32–34.5)
MCV RBC AUTO: 92.3 FL (ref 80–99.9)
MONOCYTES ABSOLUTE: 0.82 E9/L (ref 0.1–0.95)
MONOCYTES RELATIVE PERCENT: 2.6 % (ref 2–12)
NEUTROPHILS ABSOLUTE: 25.76 E9/L (ref 1.8–7.3)
NEUTROPHILS RELATIVE PERCENT: 93.9 % (ref 43–80)
NUCLEATED RED BLOOD CELLS: 0.9 /100 WBC
PDW BLD-RTO: 12.4 FL (ref 11.5–15)
PHOSPHORUS: 1.6 MG/DL (ref 2.5–4.5)
PHOSPHORUS: 2.1 MG/DL (ref 2.5–4.5)
PLATELET # BLD: 279 E9/L (ref 130–450)
PMV BLD AUTO: 10 FL (ref 7–12)
POLYCHROMASIA: ABNORMAL
POTASSIUM SERPL-SCNC: 3.4 MMOL/L (ref 3.5–5)
RBC # BLD: 3.51 E12/L (ref 3.5–5.5)
SODIUM BLD-SCNC: 135 MMOL/L (ref 132–146)
WBC # BLD: 27.4 E9/L (ref 4.5–11.5)

## 2022-11-30 PROCEDURE — 97161 PT EVAL LOW COMPLEX 20 MIN: CPT | Performed by: PHYSICAL THERAPIST

## 2022-11-30 PROCEDURE — 2500000003 HC RX 250 WO HCPCS: Performed by: INTERNAL MEDICINE

## 2022-11-30 PROCEDURE — 6360000002 HC RX W HCPCS: Performed by: INTERNAL MEDICINE

## 2022-11-30 PROCEDURE — 6370000000 HC RX 637 (ALT 250 FOR IP): Performed by: INTERNAL MEDICINE

## 2022-11-30 PROCEDURE — 83735 ASSAY OF MAGNESIUM: CPT

## 2022-11-30 PROCEDURE — 2060000000 HC ICU INTERMEDIATE R&B

## 2022-11-30 PROCEDURE — 2580000003 HC RX 258: Performed by: INTERNAL MEDICINE

## 2022-11-30 PROCEDURE — 94660 CPAP INITIATION&MGMT: CPT

## 2022-11-30 PROCEDURE — 94640 AIRWAY INHALATION TREATMENT: CPT

## 2022-11-30 PROCEDURE — 36415 COLL VENOUS BLD VENIPUNCTURE: CPT

## 2022-11-30 PROCEDURE — 85025 COMPLETE CBC W/AUTO DIFF WBC: CPT

## 2022-11-30 PROCEDURE — 80048 BASIC METABOLIC PNL TOTAL CA: CPT

## 2022-11-30 PROCEDURE — 97110 THERAPEUTIC EXERCISES: CPT | Performed by: PHYSICAL THERAPIST

## 2022-11-30 PROCEDURE — 84100 ASSAY OF PHOSPHORUS: CPT

## 2022-11-30 RX ORDER — ACETAMINOPHEN 325 MG/1
650 TABLET ORAL EVERY 4 HOURS PRN
Status: DISCONTINUED | OUTPATIENT
Start: 2022-11-30 | End: 2022-12-03 | Stop reason: HOSPADM

## 2022-11-30 RX ADMIN — IPRATROPIUM BROMIDE AND ALBUTEROL SULFATE 1 AMPULE: .5; 2.5 SOLUTION RESPIRATORY (INHALATION) at 17:52

## 2022-11-30 RX ADMIN — OXYCODONE HYDROCHLORIDE AND ACETAMINOPHEN 1000 MG: 500 TABLET ORAL at 10:04

## 2022-11-30 RX ADMIN — ACETAMINOPHEN 650 MG: 325 TABLET ORAL at 18:51

## 2022-11-30 RX ADMIN — BUDESONIDE 500 MCG: 0.5 SUSPENSION RESPIRATORY (INHALATION) at 06:13

## 2022-11-30 RX ADMIN — IPRATROPIUM BROMIDE AND ALBUTEROL SULFATE 1 AMPULE: .5; 2.5 SOLUTION RESPIRATORY (INHALATION) at 10:00

## 2022-11-30 RX ADMIN — ATORVASTATIN CALCIUM 40 MG: 40 TABLET, FILM COATED ORAL at 19:35

## 2022-11-30 RX ADMIN — METHYLPREDNISOLONE SODIUM SUCCINATE 80 MG: 125 INJECTION, POWDER, FOR SOLUTION INTRAMUSCULAR; INTRAVENOUS at 10:03

## 2022-11-30 RX ADMIN — IPRATROPIUM BROMIDE AND ALBUTEROL SULFATE 1 AMPULE: .5; 2.5 SOLUTION RESPIRATORY (INHALATION) at 06:13

## 2022-11-30 RX ADMIN — AZITHROMYCIN MONOHYDRATE 500 MG: 250 TABLET ORAL at 10:03

## 2022-11-30 RX ADMIN — BUDESONIDE 500 MCG: 0.5 SUSPENSION RESPIRATORY (INHALATION) at 17:52

## 2022-11-30 RX ADMIN — METHYLPREDNISOLONE SODIUM SUCCINATE 80 MG: 125 INJECTION, POWDER, FOR SOLUTION INTRAMUSCULAR; INTRAVENOUS at 17:46

## 2022-11-30 RX ADMIN — ARFORMOTEROL TARTRATE 15 MCG: 15 SOLUTION RESPIRATORY (INHALATION) at 06:13

## 2022-11-30 RX ADMIN — POTASSIUM BICARBONATE 40 MEQ: 782 TABLET, EFFERVESCENT ORAL at 17:47

## 2022-11-30 RX ADMIN — CEFTRIAXONE 1000 MG: 1 INJECTION, POWDER, FOR SOLUTION INTRAMUSCULAR; INTRAVENOUS at 17:46

## 2022-11-30 RX ADMIN — THEOPHYLLINE ANHYDROUS 400 MG: 200 CAPSULE, EXTENDED RELEASE ORAL at 10:03

## 2022-11-30 RX ADMIN — SERTRALINE HYDROCHLORIDE 25 MG: 25 TABLET ORAL at 10:03

## 2022-11-30 RX ADMIN — IPRATROPIUM BROMIDE AND ALBUTEROL SULFATE 1 AMPULE: .5; 2.5 SOLUTION RESPIRATORY (INHALATION) at 14:23

## 2022-11-30 RX ADMIN — SODIUM PHOSPHATE, MONOBASIC, MONOHYDRATE AND SODIUM PHOSPHATE, DIBASIC, ANHYDROUS 10.17 MMOL: 276; 142 INJECTION, SOLUTION INTRAVENOUS at 17:43

## 2022-11-30 RX ADMIN — FLUTICASONE PROPIONATE 2 SPRAY: 50 SPRAY, METERED NASAL at 10:03

## 2022-11-30 RX ADMIN — ENOXAPARIN SODIUM 40 MG: 100 INJECTION SUBCUTANEOUS at 10:03

## 2022-11-30 RX ADMIN — ARFORMOTEROL TARTRATE 15 MCG: 15 SOLUTION RESPIRATORY (INHALATION) at 17:53

## 2022-11-30 ASSESSMENT — PAIN SCALES - GENERAL
PAINLEVEL_OUTOF10: 0
PAINLEVEL_OUTOF10: 0
PAINLEVEL_OUTOF10: 3
PAINLEVEL_OUTOF10: 6
PAINLEVEL_OUTOF10: 5

## 2022-11-30 ASSESSMENT — PAIN DESCRIPTION - DESCRIPTORS
DESCRIPTORS: ACHING
DESCRIPTORS: ACHING

## 2022-11-30 ASSESSMENT — PAIN DESCRIPTION - LOCATION
LOCATION: HEAD
LOCATION: HEAD

## 2022-11-30 ASSESSMENT — PAIN - FUNCTIONAL ASSESSMENT
PAIN_FUNCTIONAL_ASSESSMENT: ACTIVITIES ARE NOT PREVENTED
PAIN_FUNCTIONAL_ASSESSMENT: ACTIVITIES ARE NOT PREVENTED

## 2022-11-30 ASSESSMENT — PAIN DESCRIPTION - ORIENTATION
ORIENTATION: MID
ORIENTATION: MID

## 2022-11-30 NOTE — CARE COORDINATION
SW met with patient in her room. She is alert, oriented, and reports being independent. She states she lives home with her daughter and 2 grandchildren. They live in a 2 story home but she reports she stays on the first floor. She wears oxygen at home at 3-4L at baseline, and she has a nebulizer all through [address] [place] [address]. Patients PCP is Dr Ashley Bowman, pharmacy is Mail order or [facility]. Patient states no history of SMILEY. She has had HHC in the past, but states she doesn't want any HHC again because she has copays and she can not afford them. She states her daughter will transport home and help if needed.

## 2022-11-30 NOTE — PROGRESS NOTES
ABG drawn x 1 from Left Radial. Patient had NormalAllen's Test.  Patient was on 4 liters/min via nasal cannula  at time of puncture. Pressure held for 5. No bleeding or bruising noted at puncture site.   Patient tolerated procedure well      Performed by Frances Benson RCP

## 2022-11-30 NOTE — PROGRESS NOTES
Physical Therapy  Physical Therapy Initial Evaluation/Plan of Care    Room #:  7937/6880-66  Patient Name: Sera Weems  YOB: 1960  MRN: [id number]    Date of Service: 11/30/2022     Tentative placement recommendation: Subacute rehab  Equipment recommendation: Tomas Rojas if d/lalito home    Evaluating Physical Therapist: Mason Rubi PT, DPT #[id number]      Specific Provider Orders/Date/Referring Provider :     11/29/22 1330    PT eval and treat  Start:  11/29/22 1330,   End:  11/29/22 1330,   ONE TIME,   Standing Count:  1 Occurrences,   R         Alejandro Holiday, DO Acknowledge New     Admitting Diagnosis:   Lactic acidosis [E87.20]  COPD exacerbation ([address] [J44.1]  Acute on chronic respiratory failure with hypoxia ([address] [J96.21]      Surgery: none  Visit Diagnoses         Codes    Acute on chronic respiratory failure with hypoxia (HCC)    -  Primary J96.21    Lactic acidosis     E87.20            Patient Active Problem List   Diagnosis    Tobacco use disorder    Seasonal allergic rhinitis    Chronic bronchitis ([address]    Chronic respiratory failure with hypoxia ([address]    Chronic obstructive pulmonary disease ([address]    Acute respiratory failure with hypercapnia ([address]    Acute on chronic respiratory failure with hypoxia and hypercapnia (HCC)    COPD exacerbation ([address]        ASSESSMENT of Current Deficits Patient exhibits decreased strength, balance, and endurance impairing functional mobility, transfers, gait , gait distance, and tolerance to activity. Pt required max encouragement to participate in session and declined standing but was ultimately agreeable to seated exercises sitting EOB and standing and taking 3-5 side steps up toward [facility] with Min/ModA. Pt required VC for technique during session and stated that she will try walking and sitting up in a chair tomorrow but declined today.          PHYSICAL THERAPY  PLAN OF CARE       Physical therapy plan of care is established based on physician order, patient diagnosis and clinical assessment    Current Treatment Recommendations:    -Bed Mobility: Lower extremity exercises  and Trunk control activities   -Sitting Balance: Incorporate reaching activities to activate trunk muscles , Hands on support to maintain midline , Facilitate active trunk muscle engagement , Facilitate postural control in all planes , and Engage in core activities to allow for movement within base of support   -Standing Balance: Perform strengthening exercises in standing to promote motor control with or without upper extremity support , Instruct patient on adequate base of support to maintain balance, and Challenge balance utilizing reaching  activities beyond center of gravity    -Transfers: Provide instruction on proper hand and foot position for adequate transfer of weight onto lower extremities and use of gait device if needed, Cues for hand placement, technique and safety.  Provide stabilization to prevent fall , Facilitate weight shift forward on to lower extremities and provide necessary stabilization of bilateral lower extremities , Support transfer of weight on to lower extremities, and Assist with extension of knees trunk and hip to accept weight transfer   -Gait: Gait training, Standing activities to improve: base of support, weight shift, weight bearing , Exercises to improve trunk control, Exercises to improve hip and knee control, Performance of protected weight bearing activities, and Activities to increase weight bearing   -Endurance: Utilize Supervised activities to increase level of endurance to allow for safe functional mobility including transfers and gait  and Use graduated activities to promote good breathing techniques and provide support and education to maximize respiratory function  -Stairs: Stair training with instruction on proper technique and hand placement on rail    PT long term treatment goals are located in below grid    Patient and or family understand(s) diagnosis, prognosis, and plan of care. Frequency of treatments: Patient will be seen  dailyyy        Prior Level of Function: Patient ambulated independently for short distances  Rehab Potential: good - for baseline    Past medical history:   Past Medical History:   Diagnosis Date    Abscess     colon    COPD (chronic obstructive pulmonary disease) ([facility]     Diverticulitis     Provoked DVT 3/2018     3 months Eliquis for DVT due to PICC line    Seasonal allergic rhinitis     Smoker 11/30/2019    5-6 per day    Tobacco use disorder      : 1 ppd since age 25     No past surgical history on file. SUBJECTIVE:    Precautions: Up with assistance, falls, O2, and SOB      Social history: Patient lives with daughter in a two story home resides first  with 2 steps  to enter with Rail  Tub shower grab bars    Equipment owned: None    AM-PAC Basic Mobility       AM-[facility] Mobility Inpatient   How much difficulty turning over in bed?: A Little  How much difficulty sitting down on / standing up from a chair with arms?: A Lot  How much difficulty moving from lying on back to sitting on side of bed?: A Little  How much help from another person moving to and from a bed to a chair?: A Lot  How much help from another person needed to walk in hospital room?: Total  How much help from another person for climbing 3-5 steps with a railing?: Total  AM-PAC Inpatient Mobility Raw Score : 12  AM-PAC Inpatient T-Scale Score : 35.33  Mobility Inpatient CMS 0-100% Score: 68.66  Mobility Inpatient CMS G-Code Modifier : CL    Nursing cleared patient for PT evaluation. The admitting diagnosis and active problem list as listed above have been reviewed prior to the initiation of this evaluation. OBJECTIVE;   Initial Evaluation  Date: 11/30/2022 Treatment Date:     Short Term/ Long Term   Goals   Was pt agreeable to Eval/treatment?  Yes  To be met in 3 days   Pain level   0/10       Bed Mobility    Rolling: Supervision     Supine to sit: Supervision     Sit to supine: Supervision     Scooting: Supervision     Rolling: Independent    Supine to sit: Independent    Sit to supine: Independent    Scooting: Independent     Transfers Sit to stand:  Min/ModA    Sit to stand: Independent    Ambulation     3-5 side steps using  no device with Min/ModA   for balance, upright, weight shift, and safety    > 50 feet using  least restrictive device versus no device with Independent    Stair negotiation: ascended and descended   Not assessed     2 steps with 1 HR with Mod I   ROM Within functional limits    Increase range of motion 10% of affected joints    Strength BUE:  refer to OT eval  RLE:  3+/5  LLE:  3+/5  Increase strength in affected mm groups by 1/3 grade   Balance Sitting EOB:  fair +  Dynamic Standing:  fair - with no AD  Sitting EOB:  good   Dynamic Standing: fair      Patient is Alert & Oriented x person, place, time, and situation and follows directions    Sensation:  Patient  denies numbness/tingling   Edema:  no   Endurance: fair  -    Vitals:  4 liters nasal cannula   Blood Pressure at rest  Blood Pressure during session    Heart Rate at rest  Heart Rate during session    SPO2 at rest %  SPO2 during session 90-95%     Patient education  Patient educated on role of Physical Therapy, risks of immobility, safety and plan of care, energy conservation,  importance of mobility while in hospital , purse lip breathing, ankle pumps, quad set and glut set for edema control, blood clot prevention, importance and purpose of adaptive device and adjusted to proper height for the patient. , safety , stair training , and O2 line management and safety      Patient response to education:   Pt verbalized understanding Pt demonstrated skill Pt requires further education in this area   Yes Partial Yes      Treatment:  Patient practiced and was instructed/facilitated in the following treatment: Patient Sat edge of bed 20 minutes with Supervision  to increase dynamic sitting balance and activity tolerance. Pt performed bed mobility, transfers, side stepped along bed, seated exercises sitting EOB. Therapeutic Exercises:  ankle pumps, heel raises, long arc quad, and seated marching  x 10-15 reps. At end of session, patient in bed with alarm call light and phone within reach,  all lines and tubes intact, nursing notified. Patient would benefit from continued skilled Physical Therapy to improve functional independence and quality of life. Patient's/ family goals   get stronger    Time in  1052  Time out  1123    Total Treatment Time  11 minutes    Evaluation time includes thorough review of current medical information, gathering information on past medical history/social history and prior level of function, completion of standardized testing/informal observation of tasks, assessment of data, and development of Plan of care and goals.      CPT codes:  Low Complexity PT evaluation (85579)  Therapeutic exercises (68481)   11 minutes  1 unit(s)    Margarita Aranda PT

## 2022-11-30 NOTE — PROGRESS NOTES
Internal Medicine Progress Note    [facility]    Samantha Luis. Kuldip Connors., MARIELOSOADILIA. Geronimo Cortez D.O., F.A.C.O.I. Corrinne Naval, D.O. Manya Craver, D.O. Ronnie Arrieta, MSN, APRN, NP-C  Michelle Gallo. Julien Terrazas, MSN, APRN-CNP       Primary Care Physician: Braden Armenta. MD Farida   Admitting Physician:  Westley Patterson DO  Admission date and time: 11/29/2022  8:13 AM    Room:  [address] [place] [address]  Admitting diagnosis: Lactic acidosis [E87.20]  COPD exacerbation ([facility] [J44.1]  Acute on chronic respiratory failure with hypoxia [facility]) [J96.21]    Patient Name: Anni Damon  MRN: [id number]    Date of Service: 11/30/2022     Subjective:  Hay Jiang is a 58 y.o. female who was seen and examined today,11/30/2022, at the bedside. Appears to be breathing easier today. Oxygen has been weaned down. Appears chronically debilitated. Discussed discharge planning. Slight cough is present    No family present during my examination. Review of System:   Constitutional:   Denies fever or chills, weight loss or gain, fatigue or malaise. HEENT:   Denies ear pain, sore throat, sinus or eye problems. Cardiovascular:   Denies any chest pain, irregular heartbeats, or palpitations. Respiratory:   Improved shortness of breath  Gastrointestinal:   Denies nausea, vomiting, diarrhea, or constipation. Denies any abdominal pain. Genitourinary:    Denies any urgency, frequency, hematuria. Voiding  without difficulty. Extremities:   Denies lower extremity swelling, edema or cyanosis. Neurology:    Denies any headache or focal neurological deficits, Denies generalized weakness or memory difficulty. Psch:   Denies being anxious or depressed. Musculoskeletal:    Denies  myalgias, joint complaints or back pain. Integumentary:   Denies any rashes, ulcers, or excoriations. Denies bruising. Hematologic/Lymphatic:  Denies bruising or bleeding.     Physical Exam:  I/O this shift:  In: 240 [P.O.:240]  Out: 450 [Urine:450]    Intake/Output Summary (Last 24 hours) at 11/30/2022 1542  Last data filed at 11/30/2022 1340  Gross per 24 hour   Intake 980.45 ml   Output 450 ml   Net 530.45 ml   I/O last 3 completed shifts: In: 740.5 [P.O.:120; IV Piggyback:620.5]  Out: -   Patient Vitals for the past 96 hrs (Last 3 readings):   Weight   11/30/22 0052 149 lb 11.1 oz (67.9 kg)   11/29/22 1715 140 lb (63.5 kg)     Vital Signs:   Blood pressure (!) 107/59, pulse 91, temperature 97.9 °F (36.6 °C), temperature source Oral, resp. rate 19, height 5' 3\" (1.6 m), weight 149 lb 11.1 oz (67.9 kg), SpO2 93 %, not currently breastfeeding. General appearance:  Alert, responsive, oriented to person, place, and time. Well preserved, alert, no distress. Head:  Normocephalic. No masses, lesions or tenderness. Eyes:  PERRLA. EOMI. Sclera clear. Buccal mucosa moist.  ENT:  Ears normal. Mucosa normal.  Neck:    Supple. Trachea midline. No thyromegaly. No JVD. No bruits. Heart:    Rhythm regular. Rate controlled. No murmurs. Lungs:    Diminished posteriorly  Abdomen:   Soft. Non-tender. Non-distended. Bowel sounds positive. No organomegaly or masses. No pain on palpation. Extremities:    Peripheral pulses present. No peripheral edema. No ulcers. No cyanosis. No clubbing. Neurologic:    Alert x 3. No focal deficit. Cranial nerves grossly intact. No focal weakness. Psych:   Behavior is normal. Mood appears normal. Speech is not rapid and/or pressured. Musculoskeletal:   Spine ROM normal. Muscular strength intact. Gait not assessed. Integumentary:  No rashes  Skin normal color and texture.   Genitalia/Breast:  Deferred    Medication:  Scheduled Meds:   atorvastatin  40 mg Oral Nightly    fluticasone  2 spray Each Nostril Daily    sertraline  25 mg Oral Daily    methylPREDNISolone  80 mg IntraVENous Q8H    azithromycin  500 mg Oral Daily    cefTRIAXone (ROCEPHIN) IV  1,000 mg IntraVENous Q24H    Arformoterol Tartrate  15 mcg Nebulization BID    ipratropium-albuterol  1 ampule Inhalation Q4H WA    budesonide  500 mcg Nebulization BID    enoxaparin  40 mg SubCUTAneous Daily    ascorbic acid  1,000 mg Oral Daily    theophylline  400 mg Oral Daily     Continuous Infusions:    Objective Data:  Recent Labs     11/27/22 2252 11/29/22  0829 11/30/22  0518   WBC 24.3* 20.9* 27.4*   RBC 4.36 4.22 3.51   HGB 13.3 12.9 10.7*   HCT 40.2 39.2 32.4*   MCV 92.2 92.9 92.3   MCH 30.5 30.6 30.5   MCHC 33.1 32.9 33.0   RDW 12.1 12.4 12.4    406 279   MPV 9.6 9.8 10.0     Lab Results   Component Value Date/Time     11/30/2022 05:18 AM    K 3.4 11/30/2022 05:18 AM    K 3.3 11/29/2022 08:29 AM    CL 94 11/30/2022 05:18 AM    CO2 35 11/30/2022 05:18 AM    ANIONGAP 6 11/30/2022 05:18 AM    GLUCOSE 119 11/30/2022 05:18 AM    BUN 25 11/30/2022 05:18 AM    CREATININE 0.4 11/30/2022 05:18 AM    GFRAA >60 04/13/2022 02:20 PM    LABGLOM >60 11/30/2022 05:18 AM    CALCIUM 9.0 11/30/2022 05:18 AM    BILITOT 0.7 11/29/2022 08:29 AM    ALT 21 11/29/2022 08:29 AM    AST 31 11/29/2022 08:29 AM    ALKPHOS 139 11/29/2022 08:29 AM    PROT 6.8 11/29/2022 08:29 AM    LABALBU 3.8 11/29/2022 08:29 AM     Recent Labs     11/30/22  0518 11/29/22  0829   MG 2.0 1.8      Lab Results   Component Value Date/Time    PHOS 2.1 11/30/2022 05:18 AM    PHOS 3.2 02/10/2022 08:38 AM    PHOS 3.9 09/22/2021 05:52 AM     No results for input(s): TROPONINI in the last 72 hours.            Assessment:    Acute respiratory failure with hypoxia secondary to an exacerbation of COPD with suspected viral illness component  Ongoing tobacco abuse  Prior history of DVT  Hyperlipidemia on statin agent      Plan:     Continue aggressive monitoring of pulmonary status  Continue steroids and wean accordingly  Monitor blood sugars due to steroids induced hyperglycemia  Continue antibiotics  Discussed behavioral lifestyle changes  Pulmonary walks increase physical therapy  Potassium supplementation      More than 50% of my time was spent at the bedside counseling/coordinating care with the patient and/or family with face to face contact. This time was spent reviewing notes and laboratory data as well as instructing and counseling the patient. Time I spent with the family or surrogate(s) is included only if the patient was incapable of providing the necessary information or participating in medical decisions. I also discussed the differential diagnosis and all of the proposed management plans with the patient and individuals accompanying the patient. I am readily available for any further decision-making and intervention.        Florida Woodson DO, F.A.C.O.I.  11/30/2022  3:42 PM

## 2022-12-01 LAB
ANION GAP SERPL CALCULATED.3IONS-SCNC: 10 MMOL/L (ref 7–16)
BASOPHILS ABSOLUTE: 0 E9/L (ref 0–0.2)
BASOPHILS RELATIVE PERCENT: 0.1 % (ref 0–2)
BUN BLDV-MCNC: 27 MG/DL (ref 6–23)
CALCIUM SERPL-MCNC: 8.9 MG/DL (ref 8.6–10.2)
CHLORIDE BLD-SCNC: 96 MMOL/L (ref 98–107)
CO2: 35 MMOL/L (ref 22–29)
CREAT SERPL-MCNC: 0.4 MG/DL (ref 0.5–1)
EOSINOPHILS ABSOLUTE: 0 E9/L (ref 0.05–0.5)
EOSINOPHILS RELATIVE PERCENT: 0 % (ref 0–6)
GFR SERPL CREATININE-BSD FRML MDRD: >60 ML/MIN/1.73
GLUCOSE BLD-MCNC: 165 MG/DL (ref 74–99)
HCT VFR BLD CALC: 33.4 % (ref 34–48)
HEMOGLOBIN: 11.1 G/DL (ref 11.5–15.5)
LYMPHOCYTES ABSOLUTE: 0.22 E9/L (ref 1.5–4)
LYMPHOCYTES RELATIVE PERCENT: 0.9 % (ref 20–42)
MCH RBC QN AUTO: 31 PG (ref 26–35)
MCHC RBC AUTO-ENTMCNC: 33.2 % (ref 32–34.5)
MCV RBC AUTO: 93.3 FL (ref 80–99.9)
MONOCYTES ABSOLUTE: 0.22 E9/L (ref 0.1–0.95)
MONOCYTES RELATIVE PERCENT: 0.9 % (ref 2–12)
NEUTROPHILS ABSOLUTE: 21.27 E9/L (ref 1.8–7.3)
NEUTROPHILS RELATIVE PERCENT: 98.3 % (ref 43–80)
PDW BLD-RTO: 12.3 FL (ref 11.5–15)
PHOSPHORUS: 2.1 MG/DL (ref 2.5–4.5)
PLATELET # BLD: 298 E9/L (ref 130–450)
PMV BLD AUTO: 10 FL (ref 7–12)
POLYCHROMASIA: ABNORMAL
POTASSIUM SERPL-SCNC: 3.2 MMOL/L (ref 3.5–5)
RBC # BLD: 3.58 E12/L (ref 3.5–5.5)
SODIUM BLD-SCNC: 141 MMOL/L (ref 132–146)
WBC # BLD: 21.7 E9/L (ref 4.5–11.5)

## 2022-12-01 PROCEDURE — 2580000003 HC RX 258: Performed by: INTERNAL MEDICINE

## 2022-12-01 PROCEDURE — 2700000000 HC OXYGEN THERAPY PER DAY

## 2022-12-01 PROCEDURE — 2060000000 HC ICU INTERMEDIATE R&B

## 2022-12-01 PROCEDURE — 2500000003 HC RX 250 WO HCPCS: Performed by: INTERNAL MEDICINE

## 2022-12-01 PROCEDURE — 94660 CPAP INITIATION&MGMT: CPT

## 2022-12-01 PROCEDURE — 85025 COMPLETE CBC W/AUTO DIFF WBC: CPT

## 2022-12-01 PROCEDURE — 6360000002 HC RX W HCPCS: Performed by: INTERNAL MEDICINE

## 2022-12-01 PROCEDURE — 94640 AIRWAY INHALATION TREATMENT: CPT

## 2022-12-01 PROCEDURE — 97535 SELF CARE MNGMENT TRAINING: CPT

## 2022-12-01 PROCEDURE — 80048 BASIC METABOLIC PNL TOTAL CA: CPT

## 2022-12-01 PROCEDURE — 97165 OT EVAL LOW COMPLEX 30 MIN: CPT

## 2022-12-01 PROCEDURE — 6370000000 HC RX 637 (ALT 250 FOR IP): Performed by: INTERNAL MEDICINE

## 2022-12-01 PROCEDURE — 36415 COLL VENOUS BLD VENIPUNCTURE: CPT

## 2022-12-01 PROCEDURE — 84100 ASSAY OF PHOSPHORUS: CPT

## 2022-12-01 RX ADMIN — IPRATROPIUM BROMIDE AND ALBUTEROL SULFATE 1 AMPULE: .5; 2.5 SOLUTION RESPIRATORY (INHALATION) at 09:21

## 2022-12-01 RX ADMIN — IPRATROPIUM BROMIDE AND ALBUTEROL SULFATE 1 AMPULE: .5; 2.5 SOLUTION RESPIRATORY (INHALATION) at 04:39

## 2022-12-01 RX ADMIN — FLUTICASONE PROPIONATE 2 SPRAY: 50 SPRAY, METERED NASAL at 11:10

## 2022-12-01 RX ADMIN — METHYLPREDNISOLONE SODIUM SUCCINATE 80 MG: 125 INJECTION, POWDER, FOR SOLUTION INTRAMUSCULAR; INTRAVENOUS at 00:30

## 2022-12-01 RX ADMIN — ATORVASTATIN CALCIUM 40 MG: 40 TABLET, FILM COATED ORAL at 21:11

## 2022-12-01 RX ADMIN — SODIUM PHOSPHATE, MONOBASIC, MONOHYDRATE AND SODIUM PHOSPHATE, DIBASIC, ANHYDROUS 10.17 MMOL: 276; 142 INJECTION, SOLUTION INTRAVENOUS at 18:48

## 2022-12-01 RX ADMIN — IPRATROPIUM BROMIDE AND ALBUTEROL SULFATE 1 AMPULE: .5; 2.5 SOLUTION RESPIRATORY (INHALATION) at 13:43

## 2022-12-01 RX ADMIN — AZITHROMYCIN MONOHYDRATE 500 MG: 250 TABLET ORAL at 11:11

## 2022-12-01 RX ADMIN — METHYLPREDNISOLONE SODIUM SUCCINATE 80 MG: 125 INJECTION, POWDER, FOR SOLUTION INTRAMUSCULAR; INTRAVENOUS at 23:52

## 2022-12-01 RX ADMIN — SODIUM PHOSPHATE, MONOBASIC, MONOHYDRATE AND SODIUM PHOSPHATE, DIBASIC, ANHYDROUS 20.31 MMOL: 276; 142 INJECTION, SOLUTION INTRAVENOUS at 01:19

## 2022-12-01 RX ADMIN — SODIUM PHOSPHATE, MONOBASIC, MONOHYDRATE AND SODIUM PHOSPHATE, DIBASIC, ANHYDROUS 22 MMOL: 276; 142 INJECTION, SOLUTION INTRAVENOUS at 22:49

## 2022-12-01 RX ADMIN — METHYLPREDNISOLONE SODIUM SUCCINATE 80 MG: 125 INJECTION, POWDER, FOR SOLUTION INTRAMUSCULAR; INTRAVENOUS at 17:12

## 2022-12-01 RX ADMIN — IPRATROPIUM BROMIDE AND ALBUTEROL SULFATE 1 AMPULE: .5; 2.5 SOLUTION RESPIRATORY (INHALATION) at 18:41

## 2022-12-01 RX ADMIN — CEFTRIAXONE 1000 MG: 1 INJECTION, POWDER, FOR SOLUTION INTRAMUSCULAR; INTRAVENOUS at 17:12

## 2022-12-01 RX ADMIN — ARFORMOTEROL TARTRATE 15 MCG: 15 SOLUTION RESPIRATORY (INHALATION) at 04:39

## 2022-12-01 RX ADMIN — SERTRALINE HYDROCHLORIDE 25 MG: 25 TABLET ORAL at 17:12

## 2022-12-01 RX ADMIN — ENOXAPARIN SODIUM 40 MG: 100 INJECTION SUBCUTANEOUS at 11:10

## 2022-12-01 RX ADMIN — OXYCODONE HYDROCHLORIDE AND ACETAMINOPHEN 1000 MG: 500 TABLET ORAL at 11:11

## 2022-12-01 RX ADMIN — ARFORMOTEROL TARTRATE 15 MCG: 15 SOLUTION RESPIRATORY (INHALATION) at 18:41

## 2022-12-01 RX ADMIN — BUDESONIDE 500 MCG: 0.5 SUSPENSION RESPIRATORY (INHALATION) at 04:39

## 2022-12-01 RX ADMIN — THEOPHYLLINE ANHYDROUS 400 MG: 200 CAPSULE, EXTENDED RELEASE ORAL at 11:11

## 2022-12-01 RX ADMIN — BUDESONIDE 500 MCG: 0.5 SUSPENSION RESPIRATORY (INHALATION) at 18:41

## 2022-12-01 RX ADMIN — POTASSIUM CHLORIDE 40 MEQ: 1500 TABLET, EXTENDED RELEASE ORAL at 11:11

## 2022-12-01 ASSESSMENT — PAIN SCALES - GENERAL
PAINLEVEL_OUTOF10: 0

## 2022-12-01 NOTE — CARE COORDINATION
SW spoke with patient in room. We reviewed therapy evals at length. Explained recommendation is for [facility] vs [facility]. She is declining both at this time. She states she doesn't want [facility] because she has a copay with insurance and can not afford it, and she does not want [facility] because she not want to go to a nursing home. Explained she may benefit from some therapy but she doesn't feel that is the case. Patient does not have a walker at home, but states she can have her daughter get her one. Explained we can get a DME order here and order one but she isn't sure she wants to do that. She will discuss with daughter tonight. She states plan remains home with her daughter who she lives with and grandchildren. Her Daughter will transport.

## 2022-12-01 NOTE — PROGRESS NOTES
[address] [facility]  [place]. [place]        Date:2022                                                  Patient Name: Solo David    MRN: [id number]    : 1960    Room: [address] [place] [address]      Evaluating OT: Primitivo Rose OTR/L #[id number]     Referring Provider and Specific Provider Orders/Date:      22  OT eval and treat  Start:  22 1330,   End:  22 1330,   ONE TIME,   Standing Count:  1 Occurrences,   R         Aron Heller,       Placement Recommendation: Subacute Rehab vs Home with Home Health OT if patient is able to meet goals       Diagnosis:   1. Acute on chronic respiratory failure with hypoxia (HCC)    2. Lactic acidosis    3. COPD exacerbation [facility])         Surgery: None       Pertinent Medical History:       Past Medical History:   Diagnosis Date    Abscess     colon    COPD (chronic obstructive pulmonary disease) ([facility]     Diverticulitis     Provoked DVT 3/2018     3 months Eliquis for DVT due to PICC line    Seasonal allergic rhinitis     Smoker 2019    5-6 per day    Tobacco use disorder      : 1 ppd since age 25       No past surgical history on file.      Precautions: Fall Risk, activity as tolerated , shortness of breath      Assessment of current deficits    [x] Functional mobility  [x]ADLs  [x] Strength               []Cognition    [x] Functional transfers   [x] IADLs         [x] Safety Awareness   [x]Endurance    [] Fine Coordination              [x] Balance      [] Vision/perception   []Sensation     []Gross Motor Coordination  [] ROM  [] Delirium                   [] Motor Control     OT PLAN OF CARE   OT POC based on physician orders, patient diagnosis and results of clinical assessment    Frequency/Duration 1-3 days/wk for 2 weeks PRN     Specific OT Treatment Interventions to include:   * Instruction/training on adapted ADL techniques and AE recommendations to increase functional independence within precautions       * Training on energy conservation strategies, correct breathing pattern and techniques to improve independence/tolerance for self-care routine  * Functional transfer/mobility training/DME recommendations for increased independence, safety, and fall prevention  * Patient/Family education to increase follow through with safety techniques and functional independence  * Recommendation of environmental modifications for increased safety with functional transfers/mobility and ADLs  * Therapeutic exercise to improve motor endurance, ROM, and functional strength for ADLs/functional transfers  * Therapeutic activities to facilitate/challenge dynamic balance, stand tolerance for increased safety and independence with ADLs    Recommended Adaptive Equipment: TBD      Home Living: Patient lives with daughter in a two story home resides first  with 2 steps  to enter with Rail  Tub shower grab bars       Equipment owned: None     Prior Level of Function: pt reports being independent with ADLs , has assist with IADLs; ambulated independently. Driving: No     Pain Level: pt denied pain ; Nursing notified.       Cognition: A&O: 4/4; Follows 2-3 step directions   Memory: intact   Sequencing: intact   Problem solving: fair    Judgement/safety: fair     [facility]   AM-PAC Daily Activity Inpatient   How much help for putting on and taking off regular lower body clothing?: A Little  How much help for Bathing?: A Lot  How much help for Toileting?: A Little  How much help for putting on and taking off regular upper body clothing?: A Little  How much help for taking care of personal grooming?: A Little  How much help for eating meals?: A Little  AM-[facility] Inpatient Daily Activity Raw Score: 17  AM-PAC Inpatient ADL T-Scale Score : 37.26  ADL Inpatient CMS 0-100% Score: 50.11  ADL Inpatient CMS G-Code Modifier : CK     Functional Assessment:    Initial Eval Status  Date: 12/1/22 Treatment Status  Date: STGs = LTGs  Time frame: 10-14 days   Feeding Supervision     Independent    Grooming Stand by Assist   Seated     Moderate [place]    UB Dressing Minimal Assist  For gown mgmt at EOB     Moderate [place]    LB Dressing Stand by Assist   To doff/don socks seated at EOB    Moderate [place]    Bathing Moderate Assist     Moderate [place]    Toileting Minimal Assist   For transfer to/from bedside commode, SBA for perineal hygiene while seated on BSC    Moderate [place]    Bed Mobility  Supine to sit: Supervision  Sit to supine: Supervision     Supine to sit: Independent   Sit to supine: Independent    Functional Transfers Sit to stand: Stand by Assist   Stand to sit: Stand by Assist     Transfer training with verbal cues for hand placement throughout session to improve safety. Independent    Functional Mobility Minimal Assist with hand held assist to improve balance to/from [facility], verbal cues for overall safety.      Moderate [place]    Balance Sitting:     Static: fair plus    Dynamic: fair plus  Standing: fair    Sitting:     Static: good    Dynamic: good  Standing: good    Activity Tolerance fair  ; fatigues easily   Increase standing tolerance >3  minutes for improved engagement with functional transfers and indep in ADLs     Visual/  Perceptual Glasses: n/a      NA      Hand Dominance:      AROM (PROM) Strength Additional Info:  Goal:   RUE  WFL 4-/5 good  and wfl FMC/dexterity noted during ADL tasks   Improve overall RUE strength  for participation in functional tasks   LUE WFL 4-/5 good  and wfl FMC/dexterity noted during ADL tasks   Improve overall LUE strength  for participation in functional tasks     Hearing: [facility]   Sensation:   No c/o numbness or tingling  Tone:  WFL   Edema:      Vitals: 4L via nasal canula   HR at rest: 109 bpm HR with activity: 127 bpm HR at end of session:  bpm   SpO2 at rest: 92% SpO2 with activity: 91-92% SpO2 at end of session: 94%   BP at rest:  BP with activity: BP at end of session:      Comments: RN cleared patient for OT. Upon arrival patient in . Therapist facilitated and instructed pt on adapted  techniques & compensatory strategies to improve safety and independence with basic ADLs, bed mobility, functional transfers and mobility to allow pt to achieve highest level of independence and safely. Pt demonstrated fair understanding of education & follow through. At end of session, patient was supine with call light and phone within reach, all lines and tubes intact. Overall, patient demonstrated  decreased independence and safety during completion of ADL tasks. Pt would benefit from continued skilled OT to increase safety and independence with completion of ADL tasks and functional mobility for improved quality of life. Treatment: OT treatment provided this date includes:  Instructions/training on safety, sequencing, and adapted techniques for completion of ADLs. Facilitated bed mobility with cues for proper body mechanics and sequencing to prepare for ADL completion. Instruction/training on safe functional mobility/transfer techniques including hand and feet placement   Instruction/training on energy conservation/work simplification for completion of ADLs  Sitting EOB x 15 minutes to improve sitting balance and activity tolerance during ADLs   Skilled monitoring of O2 sats - see section above     Rehab Potential: Good for established goals. Patient / Family Goal: Not stated       Patient and/or family were instructed on functional diagnosis, prognosis/goals and OT plan of care. Demonstrated fair understanding.      Eval Complexity: Low    Time In: 9:50 AM   Time Out: 10:20 AM    Total Treatment Time: 10       Min Units   OT Eval Low 97165  X  1    OT Eval Medium 97853      OT Eval High 08649      OT Re-Eval 21997            ADL/Self Care 48968 10 1   Therapeutic Activities 88730       Therapeutic Ex 15970 Orthotic Management [id number]       Manual 62880     Neuro Re-Ed 80440       Non-Billable Time        Evaluation Time additionally includes thorough review of current medical information, gathering information on past medical history/social history and prior level of function, interpretation of standardized testing/informal observation of tasks, assessment of data and development of plan of care and goals.         Evaluating OT: Kelly Villarreal OTR/L #[id number]

## 2022-12-01 NOTE — PROGRESS NOTES
Internal Medicine Progress Note    [facility]    Patel Shirley. Mera Wilcox., NERY Almanza D.O., ASHLI Swan D.O. Gaynell Lobo, MSN, APRN, NP-C  Gabbi Boyce. Tana Bella, MSN, APRN-CNP       Primary Care Physician: Saud Lucio. Pascual Peres MD   Admitting Physician:  Houston Castillo DO  Admission date and time: 11/29/2022  8:13 AM    Room:  [address] [place] [address]  Admitting diagnosis: Lactic acidosis [E87.20]  COPD exacerbation ([facility] [J44.1]  Acute on chronic respiratory failure with hypoxia [facility]) [J96.21]    Patient Name: Aubrie Doherty  MRN: [id number]    Date of Service: 12/1/2022     Subjective:  Leonardo Bustos is a 58 y.o. female who was seen and examined today,12/1/2022, at the bedside. Patient seem to be breathing easier today but still short of breath. Currently being treated for underlying COPD. Cough slightly improved. Still appear to be weak and tired    No family present during my examination. Review of System:   Constitutional:   Denies fever or chills, weight loss or gain, fatigue or malaise. HEENT:   Denies ear pain, sore throat, sinus or eye problems. Cardiovascular:   Denies any chest pain, irregular heartbeats, or palpitations. Respiratory:   Improved shortness of breath--occasional cough  Gastrointestinal:   Denies nausea, vomiting, diarrhea, or constipation. Denies any abdominal pain. Genitourinary:    Denies any urgency, frequency, hematuria. Voiding  without difficulty. Extremities:   Denies lower extremity swelling, edema or cyanosis. Neurology:    Denies any headache or focal neurological deficits, Denies generalized weakness or memory difficulty. Psch:   Denies being anxious or depressed. Musculoskeletal:    Denies  myalgias, joint complaints or back pain. Integumentary:   Denies any rashes, ulcers, or excoriations. Denies bruising.   Hematologic/Lymphatic:  Denies bruising or bleeding. Physical Exam:  I/O this shift:  In: 240 [P.O.:240]  Out: -     Intake/Output Summary (Last 24 hours) at 12/1/2022 1550  Last data filed at 12/1/2022 1250  Gross per 24 hour   Intake 240 ml   Output --   Net 240 ml   I/O last 3 completed shifts: In: 240 [P.O.:240]  Out: 450 [Urine:450]  Patient Vitals for the past 96 hrs (Last 3 readings):   Weight   12/01/22 0004 149 lb 7.6 oz (67.8 kg)   11/30/22 0052 149 lb 11.1 oz (67.9 kg)   11/29/22 1715 140 lb (63.5 kg)     Vital Signs:   Blood pressure 127/65, pulse 98, temperature 98.2 °F (36.8 °C), temperature source Oral, resp. rate 20, height 5' 3\" (1.6 m), weight 149 lb 7.6 oz (67.8 kg), SpO2 95 %, not currently breastfeeding. General appearance:  Alert, responsive, oriented to person, place, and time. Well preserved, alert, no distress. Head:  Normocephalic. No masses, lesions or tenderness. Eyes:  PERRLA. EOMI. Sclera clear. Buccal mucosa moist.  ENT:  Ears normal. Mucosa normal.  Neck:    Supple. Trachea midline. No thyromegaly. No JVD. No bruits. Heart:    Rhythm regular. Rate controlled. No murmurs. Lungs:    Diminished posteriorly  Abdomen:   Soft. Non-tender. Non-distended. Bowel sounds positive. No organomegaly or masses. No pain on palpation. Extremities:    Peripheral pulses present. No peripheral edema. No ulcers. No cyanosis. No clubbing. Neurologic:    Alert x 3. No focal deficit. Cranial nerves grossly intact. No focal weakness. Psych:   Behavior is normal. Mood appears normal. Speech is not rapid and/or pressured. Musculoskeletal:   Spine ROM normal. Muscular strength intact. Gait not assessed. Integumentary:  No rashes  Skin normal color and texture.   Genitalia/Breast:  Deferred    Medication:  Scheduled Meds:   atorvastatin  40 mg Oral Nightly    fluticasone  2 spray Each Nostril Daily    sertraline  25 mg Oral Daily    methylPREDNISolone  80 mg IntraVENous Q8H    azithromycin  500 mg Oral Daily cefTRIAXone (ROCEPHIN) IV  1,000 mg IntraVENous Q24H    Arformoterol Tartrate  15 mcg Nebulization BID    ipratropium-albuterol  1 ampule Inhalation Q4H WA    budesonide  500 mcg Nebulization BID    enoxaparin  40 mg SubCUTAneous Daily    ascorbic acid  1,000 mg Oral Daily    theophylline  400 mg Oral Daily     Continuous Infusions:    Objective Data:  Recent Labs     11/29/22  0829 11/30/22  0518 12/01/22  0839   WBC 20.9* 27.4* 21.7*   RBC 4.22 3.51 3.58   HGB 12.9 10.7* 11.1*   HCT 39.2 32.4* 33.4*   MCV 92.9 92.3 93.3   MCH 30.6 30.5 31.0   MCHC 32.9 33.0 33.2   RDW 12.4 12.4 12.3    279 298   MPV 9.8 10.0 10.0     Lab Results   Component Value Date/Time     12/01/2022 08:39 AM    K 3.2 12/01/2022 08:39 AM    K 3.3 11/29/2022 08:29 AM    CL 96 12/01/2022 08:39 AM    CO2 35 12/01/2022 08:39 AM    ANIONGAP 10 12/01/2022 08:39 AM    GLUCOSE 165 12/01/2022 08:39 AM    BUN 27 12/01/2022 08:39 AM    CREATININE 0.4 12/01/2022 08:39 AM    GFRAA >60 04/13/2022 02:20 PM    LABGLOM >60 12/01/2022 08:39 AM    CALCIUM 8.9 12/01/2022 08:39 AM    BILITOT 0.7 11/29/2022 08:29 AM    ALT 21 11/29/2022 08:29 AM    AST 31 11/29/2022 08:29 AM    ALKPHOS 139 11/29/2022 08:29 AM    PROT 6.8 11/29/2022 08:29 AM    LABALBU 3.8 11/29/2022 08:29 AM     Recent Labs     11/30/22  0518 11/29/22  0829   MG 2.0 1.8      Lab Results   Component Value Date/Time    PHOS 2.1 12/01/2022 08:39 AM    PHOS 1.6 11/30/2022 10:52 PM    PHOS 2.1 11/30/2022 05:18 AM     No results for input(s): TROPONINI in the last 72 hours. Assessment:    Acute respiratory failure with hypoxia secondary to an exacerbation of COPD with suspected viral illness component  Ongoing tobacco abuse  Prior history of DVT  Hyperlipidemia on statin agent      Plan:      We will switch over to p.o. steroids  Again discussed about the need for cigarette sensation  Wean oxygen as tolerated and evaluate for home oxygen needs  Continue statin agents  Increase activity  Replace electrolytes      More than 50% of my time was spent at the bedside counseling/coordinating care with the patient and/or family with face to face contact. This time was spent reviewing notes and laboratory data as well as instructing and counseling the patient. Time I spent with the family or surrogate(s) is included only if the patient was incapable of providing the necessary information or participating in medical decisions. I also discussed the differential diagnosis and all of the proposed management plans with the patient and individuals accompanying the patient. I am readily available for any further decision-making and intervention.        Jake Silver DO, F.A.C.O.I.  12/1/2022  3:50 PM

## 2022-12-02 LAB
ANION GAP SERPL CALCULATED.3IONS-SCNC: 8 MMOL/L (ref 7–16)
BASOPHILS ABSOLUTE: 0.04 E9/L (ref 0–0.2)
BASOPHILS RELATIVE PERCENT: 0.2 % (ref 0–2)
BUN BLDV-MCNC: 25 MG/DL (ref 6–23)
CALCIUM SERPL-MCNC: 8.6 MG/DL (ref 8.6–10.2)
CHLORIDE BLD-SCNC: 99 MMOL/L (ref 98–107)
CO2: 36 MMOL/L (ref 22–29)
CREAT SERPL-MCNC: 0.4 MG/DL (ref 0.5–1)
EOSINOPHILS ABSOLUTE: 0 E9/L (ref 0.05–0.5)
EOSINOPHILS RELATIVE PERCENT: 0 % (ref 0–6)
GFR SERPL CREATININE-BSD FRML MDRD: >60 ML/MIN/1.73
GLUCOSE BLD-MCNC: 121 MG/DL (ref 74–99)
HCT VFR BLD CALC: 34.5 % (ref 34–48)
HEMOGLOBIN: 11.4 G/DL (ref 11.5–15.5)
IMMATURE GRANULOCYTES #: 0.44 E9/L
IMMATURE GRANULOCYTES %: 2.5 % (ref 0–5)
LYMPHOCYTES ABSOLUTE: 0.86 E9/L (ref 1.5–4)
LYMPHOCYTES RELATIVE PERCENT: 4.9 % (ref 20–42)
MCH RBC QN AUTO: 31.2 PG (ref 26–35)
MCHC RBC AUTO-ENTMCNC: 33 % (ref 32–34.5)
MCV RBC AUTO: 94.5 FL (ref 80–99.9)
MONOCYTES ABSOLUTE: 0.43 E9/L (ref 0.1–0.95)
MONOCYTES RELATIVE PERCENT: 2.4 % (ref 2–12)
NEUTROPHILS ABSOLUTE: 15.85 E9/L (ref 1.8–7.3)
NEUTROPHILS RELATIVE PERCENT: 90 % (ref 43–80)
PDW BLD-RTO: 12.5 FL (ref 11.5–15)
PLATELET # BLD: 316 E9/L (ref 130–450)
PMV BLD AUTO: 10 FL (ref 7–12)
POTASSIUM SERPL-SCNC: 3.6 MMOL/L (ref 3.5–5)
RBC # BLD: 3.65 E12/L (ref 3.5–5.5)
SODIUM BLD-SCNC: 143 MMOL/L (ref 132–146)
WBC # BLD: 17.6 E9/L (ref 4.5–11.5)

## 2022-12-02 PROCEDURE — 85025 COMPLETE CBC W/AUTO DIFF WBC: CPT

## 2022-12-02 PROCEDURE — 94640 AIRWAY INHALATION TREATMENT: CPT

## 2022-12-02 PROCEDURE — 6360000002 HC RX W HCPCS: Performed by: NURSE PRACTITIONER

## 2022-12-02 PROCEDURE — 36415 COLL VENOUS BLD VENIPUNCTURE: CPT

## 2022-12-02 PROCEDURE — 97110 THERAPEUTIC EXERCISES: CPT

## 2022-12-02 PROCEDURE — 6370000000 HC RX 637 (ALT 250 FOR IP): Performed by: INTERNAL MEDICINE

## 2022-12-02 PROCEDURE — 6360000002 HC RX W HCPCS: Performed by: INTERNAL MEDICINE

## 2022-12-02 PROCEDURE — 97530 THERAPEUTIC ACTIVITIES: CPT

## 2022-12-02 PROCEDURE — 2700000000 HC OXYGEN THERAPY PER DAY

## 2022-12-02 PROCEDURE — 94660 CPAP INITIATION&MGMT: CPT

## 2022-12-02 PROCEDURE — 2060000000 HC ICU INTERMEDIATE R&B

## 2022-12-02 PROCEDURE — 80048 BASIC METABOLIC PNL TOTAL CA: CPT

## 2022-12-02 RX ORDER — METHYLPREDNISOLONE SODIUM SUCCINATE 40 MG/ML
40 INJECTION, POWDER, LYOPHILIZED, FOR SOLUTION INTRAMUSCULAR; INTRAVENOUS EVERY 8 HOURS
Status: DISCONTINUED | OUTPATIENT
Start: 2022-12-02 | End: 2022-12-03 | Stop reason: HOSPADM

## 2022-12-02 RX ADMIN — IPRATROPIUM BROMIDE AND ALBUTEROL SULFATE 1 AMPULE: .5; 2.5 SOLUTION RESPIRATORY (INHALATION) at 06:15

## 2022-12-02 RX ADMIN — ENOXAPARIN SODIUM 40 MG: 100 INJECTION SUBCUTANEOUS at 08:56

## 2022-12-02 RX ADMIN — METHYLPREDNISOLONE SODIUM SUCCINATE 40 MG: 40 INJECTION, POWDER, FOR SOLUTION INTRAMUSCULAR; INTRAVENOUS at 16:58

## 2022-12-02 RX ADMIN — ARFORMOTEROL TARTRATE 15 MCG: 15 SOLUTION RESPIRATORY (INHALATION) at 17:18

## 2022-12-02 RX ADMIN — SERTRALINE HYDROCHLORIDE 25 MG: 25 TABLET ORAL at 09:40

## 2022-12-02 RX ADMIN — IPRATROPIUM BROMIDE AND ALBUTEROL SULFATE 1 AMPULE: .5; 2.5 SOLUTION RESPIRATORY (INHALATION) at 17:18

## 2022-12-02 RX ADMIN — IPRATROPIUM BROMIDE AND ALBUTEROL SULFATE 1 AMPULE: .5; 2.5 SOLUTION RESPIRATORY (INHALATION) at 09:16

## 2022-12-02 RX ADMIN — AZITHROMYCIN MONOHYDRATE 500 MG: 250 TABLET ORAL at 08:56

## 2022-12-02 RX ADMIN — FLUTICASONE PROPIONATE 2 SPRAY: 50 SPRAY, METERED NASAL at 08:57

## 2022-12-02 RX ADMIN — BUDESONIDE 500 MCG: 0.5 SUSPENSION RESPIRATORY (INHALATION) at 17:18

## 2022-12-02 RX ADMIN — BUDESONIDE 500 MCG: 0.5 SUSPENSION RESPIRATORY (INHALATION) at 06:15

## 2022-12-02 RX ADMIN — OXYCODONE HYDROCHLORIDE AND ACETAMINOPHEN 1000 MG: 500 TABLET ORAL at 08:56

## 2022-12-02 RX ADMIN — METHYLPREDNISOLONE SODIUM SUCCINATE 80 MG: 125 INJECTION, POWDER, FOR SOLUTION INTRAMUSCULAR; INTRAVENOUS at 08:56

## 2022-12-02 RX ADMIN — ARFORMOTEROL TARTRATE 15 MCG: 15 SOLUTION RESPIRATORY (INHALATION) at 06:15

## 2022-12-02 RX ADMIN — ATORVASTATIN CALCIUM 40 MG: 40 TABLET, FILM COATED ORAL at 20:54

## 2022-12-02 RX ADMIN — THEOPHYLLINE ANHYDROUS 400 MG: 200 CAPSULE, EXTENDED RELEASE ORAL at 08:55

## 2022-12-02 NOTE — PROGRESS NOTES
Physical Therapy      Physical Therapy    Room #:   8935/1600-59    Date: 2022       Patient Name: Adeline Freedman  : 1960      MRN: [id number]     Patient unavailable for physical therapy treatment due to off floor at nuclear medicine. Physical therapy will check back at a later time/date.         Dunia Patel, PTA

## 2022-12-02 NOTE — PROGRESS NOTES
8670 Patient got up to chair independently. Denies SOB. Patient looks comfortable and understands importance of moving around and getting back to baseline.

## 2022-12-02 NOTE — CARE COORDINATION
Patient from home, she lives with her daughter and 2 grandchildren. She stays on the first floor of the home, and states she sets everything up in her living room so it is close to her. She has home oxygen at 3-4L and a nebulizer through [facility] at home. PCP is Dr Benja Hyde, pharmacy is Mail order or [facility] on [facility]. She is refusing [facility] at this time, refusing [facility]HC, she is worried about co pays as [facility] has [facility] the past and had copays that she could not afford. Offered to get patient a [employer] as recommended by PT but she states her daughter has access to get her one. Patient plans to return home on DC and states her daughter will transport her.

## 2022-12-02 NOTE — PROGRESS NOTES
Physical Therapy  Physical Therapy Treatment Note/Plan of Care    Room #:  4463/9613-44  Patient Name: Zakia Campbell  YOB: 1960  MRN: [id number]    Date of Service: 12/2/2022     Tentative placement recommendation: Subacute vs Home Health Physical Therapy if patient meets goals  Equipment recommendation: Solo Nelson if d/lalito home    Evaluating Physical Therapist: Idris Kern PT, DPT #[id number]      Specific Provider Orders/Date/Referring Provider :     11/29/22 1330    PT eval and treat  Start:  11/29/22 1330,   End:  11/29/22 1330,   ONE TIME,   Standing Count:  1 Occurrences,   R         Leda Brothers, DO Acknowledge New     Admitting Diagnosis:   Lactic acidosis [E87.20]  COPD exacerbation ([address] [J44.1]  Acute on chronic respiratory failure with hypoxia ([address] [J96.21]      Surgery: none  Visit Diagnoses         Codes    Acute on chronic respiratory failure with hypoxia ([address]    -  Primary J96.21    Lactic acidosis     E87.20            Patient Active Problem List   Diagnosis    Tobacco use disorder    Seasonal allergic rhinitis    Chronic bronchitis ([address]    Chronic respiratory failure with hypoxia ([address]    Chronic obstructive pulmonary disease ([address]    Acute respiratory failure with hypercapnia ([address]    Acute on chronic respiratory failure with hypoxia and hypercapnia (HCC)    COPD exacerbation ([address]        ASSESSMENT of Current Deficits Patient exhibits decreased strength, balance, and endurance impairing functional mobility, transfers, gait , gait distance, and tolerance to activity. Pt needing extra time throughout tx session to recover due to shortness of breath with minimal exertion with activity. Pt ambulated a short distance in the room and needed to sit to rest. Pt also needed time once sitting up due to feeling light-headed and dizzy. Pt with no loss of balance or unsteadiness noted using a wheeled walker during short ambulation.       PHYSICAL THERAPY  PLAN OF CARE       Physical therapy plan of care is established based on physician order,  patient diagnosis and clinical assessment    Current Treatment Recommendations:    -Bed Mobility: Lower extremity exercises  and Trunk control activities   -Sitting Balance: Incorporate reaching activities to activate trunk muscles , Hands on support to maintain midline , Facilitate active trunk muscle engagement , Facilitate postural control in all planes , and Engage in core activities to allow for movement within base of support   -Standing Balance: Perform strengthening exercises in standing to promote motor control with or without upper extremity support , Instruct patient on adequate base of support to maintain balance, and Challenge balance utilizing reaching  activities beyond center of gravity    -Transfers: Provide instruction on proper hand and foot position for adequate transfer of weight onto lower extremities and use of gait device if needed, Cues for hand placement, technique and safety.  Provide stabilization to prevent fall , Facilitate weight shift forward on to lower extremities and provide necessary stabilization of bilateral lower extremities , Support transfer of weight on to lower extremities, and Assist with extension of knees trunk and hip to accept weight transfer   -Gait: Gait training, Standing activities to improve: base of support, weight shift, weight bearing , Exercises to improve trunk control, Exercises to improve hip and knee control, Performance of protected weight bearing activities, and Activities to increase weight bearing   -Endurance: Utilize Supervised activities to increase level of endurance to allow for safe functional mobility including transfers and gait  and Use graduated activities to promote good breathing techniques and provide support and education to maximize respiratory function  -Stairs: Stair training with instruction on proper technique and hand placement on rail    PT long term treatment goals are located Scooting: Supervision    Rolling: Supervision    Supine to sit: Supervision    Sit to supine: Supervision    Scooting: Supervision     Rolling: Independent    Supine to sit: Independent    Sit to supine: Independent    Scooting: Independent     Transfers Sit to stand:  Min/ModA   Sit to stand: Minimal assist of 1 Cues for hand placement and safety     Sit to stand: Independent    Ambulation     3-5 side steps using  no device with Min/ModA   for balance, upright, weight shift, and safety 2 x 12 feet; bed<>BSC 2 x 3  feet using  wheeled walker with Minimal assist of 1   cues for upright posture, safety, pacing, and pursed lip breathing     > 50 feet using  least restrictive device versus no device with Independent    Stair negotiation: ascended and descended   Not assessed     2 steps with 1 HR with Mod I   ROM Within functional limits    Increase range of motion 10% of affected joints    Strength BUE:  refer to OT candyal  RLE:  3+/5  LLE:  3+/5  Increase strength in affected mm groups by 1/3 grade   Balance Sitting EOB:  fair +  Dynamic Standing:  fair - with no AD Sitting EOB: good   Dynamic Standing: fair wheeled walker   Sitting EOB:  good   Dynamic Standing: fair      Patient is Alert & Oriented x person, place, time, and situation and follows directions    Sensation:  Patient  denies numbness/tingling   Edema:  no   Endurance: fair  -    Vitals:  4 liters nasal cannula   Blood Pressure at rest  Blood Pressure during session    Heart Rate at rest  Heart Rate during session    SPO2 at rest 94%  SPO2 during session 94%     Patient education  Patient educated on role of Physical Therapy, risks of immobility, safety and plan of care, energy conservation,  importance of mobility while in hospital , purse lip breathing, ankle pumps, quad set and glut set for edema control, blood clot prevention, importance and purpose of adaptive device and adjusted to proper height for the patient. , safety , stair training , and O2 line management and safety      Patient response to education:   Pt verbalized understanding Pt demonstrated skill Pt requires further education in this area   Yes Partial Yes      Treatment:  Patient practiced and was instructed/facilitated in the following treatment: Patient Sat edge of bed 10 minutes with Supervision  to increase dynamic sitting balance and activity tolerance. Pt stood, ambulated in the room, and back to the edge of the bed. Pt transferred bed[place] using no assistive device with Supervision. Pt performed seated exercises and returned to supine. Therapeutic Exercises:  ankle pumps, heel raises, hip abduction/adduction, long arc quad, and seated marching,  x 15 - 20 reps. AROM    At end of session, patient in bed with  .  call light and phone within reach,  all lines and tubes intact, nursing notified. Patient would benefit from continued skilled Physical Therapy to improve functional independence and quality of life. Patient's/ family goals   get stronger    Time in 13:35  Time out  14:00    Total Treatment Time  25 minutes        CPT codes:    Therapeutic activities (48992)   16 minutes  1 unit(s)  Therapeutic exercises (99997)   9 minutes  1 unit(s)    Dante Heredia  [facility]  LIC # [id number]

## 2022-12-02 NOTE — PROGRESS NOTES
Internal Medicine Progress Note    [facility]    [place]. Perry Bishop, KAUSHAL.DELPHINE.JOSHOADILIA. Kristine Cheney D.O., F.A.C.O.I. Hosey Goltz, D.O. Harshal Moreno D.O. Yosef Klein, MSN, APRN, NP-C  Kv[place]. Jordana Solis, MSN, APRN-CNP       Primary Care Physician: Jay Stone. Tiffany Rolon MD   Admitting Physician:  Ananya Marlow DO  Admission date and time: 11/29/2022  8:13 AM    Room:  [address] [place] [address]  Admitting diagnosis: Lactic acidosis [E87.20]  [facility] [J44.1]  Acute on chronic respiratory failure with hypoxia [facility]) [J96.21]    Patient Name: Reyna Farley  MRN: [id number]    Date of Service: 12/2/2022     Subjective:  Kelli Witt is a 58 y.o. female who was seen and examined today,12/2/2022, at the bedside. Kelli Witt seems to be resting comfortably and has been weaned to 4 L of nasal cannula oxygen. We discussed the absolute need to become increasingly ambulatory. We discussed discharge planning. Review of System:   Constitutional:   Ongoing, but improving shortness of breath. HEENT:   Denies ear pain, sore throat, sinus or eye problems. Nasal cannula oxygen is in place. Cardiovascular:   Denies any chest pain, irregular heartbeats, or palpitations. Respiratory:   Improved shortness of breath--occasional cough  Gastrointestinal:   Denies nausea, vomiting, diarrhea, or constipation. Denies any abdominal pain. Genitourinary:    Denies any urgency, frequency, hematuria. Voiding  without difficulty. Extremities:   Denies lower extremity swelling, edema or cyanosis. Neurology:    Denies any headache or focal neurological deficits, admits to generalized weakness. Psch:   Denies being anxious or depressed. Musculoskeletal:    Denies  myalgias, joint complaints or back pain. Integumentary:   Denies any rashes, ulcers, or excoriations. Denies bruising. Hematologic/Lymphatic:  Denies bruising or bleeding.     Physical Exam:  I/O this shift:  In: 360 [P.O.:360]  Out: -     Intake/Output Summary (Last 24 hours) at 12/2/2022 1414  Last data filed at 12/2/2022 1234  Gross per 24 hour   Intake 1412.97 ml   Output --   Net 1412.97 ml   I/O last 3 completed shifts: In: 9910 [P.O.:780; IV NETMKRFRJ:352]  Out: -   Patient Vitals for the past 96 hrs (Last 3 readings):   Weight   12/01/22 2156 152 lb 5.4 oz (69.1 kg)   12/01/22 0004 149 lb 7.6 oz (67.8 kg)   11/30/22 0052 149 lb 11.1 oz (67.9 kg)     Vital Signs:   Blood pressure 133/69, pulse 94, temperature 97.9 °F (36.6 °C), temperature source Oral, resp. rate 16, height 5' 3\" (1.6 m), weight 152 lb 5.4 oz (69.1 kg), SpO2 96 %, not currently breastfeeding. General appearance:  Alert, responsive, oriented to person, place, and time. Well preserved, alert, no distress. Head:  Normocephalic. No masses, lesions or tenderness. Eyes:  PERRLA. EOMI. Sclera clear. Buccal mucosa moist.  ENT:  Ears normal. Mucosa normal.  Nasal cannula oxygen is in place. Neck:    Supple. Trachea midline. No thyromegaly. No JVD. No bruits. Heart:    Rhythm regular. Rate controlled. No murmurs. Lungs:    Decent aeration throughout. I do not appreciate any overt wheezes. Abdomen:   Soft. Non-tender. Non-distended. Bowel sounds positive. No organomegaly or masses. No pain on palpation. Extremities:    Peripheral pulses present. No peripheral edema. No ulcers. No cyanosis. No clubbing. Neurologic:    Alert x 3. No focal deficit. Cranial nerves grossly intact. No focal weakness. Psych:   Behavior is normal. Mood appears normal. Speech is not rapid and/or pressured. Musculoskeletal:   Spine ROM normal. Muscular strength intact. Gait not assessed. Integumentary:  No rashes  Skin normal color and texture.   Genitalia/Breast:  Deferred    Medication:  Scheduled Meds:   methylPREDNISolone  40 mg IntraVENous Q8H    atorvastatin  40 mg Oral Nightly    fluticasone  2 spray Each Nostril Daily sertraline  25 mg Oral Daily    Arformoterol Tartrate  15 mcg Nebulization BID    ipratropium-albuterol  1 ampule Inhalation Q4H WA    budesonide  500 mcg Nebulization BID    enoxaparin  40 mg SubCUTAneous Daily    ascorbic acid  1,000 mg Oral Daily    theophylline  400 mg Oral Daily     Continuous Infusions:    Objective Data:  Recent Labs     11/30/22  0518 12/01/22  0839 12/02/22  0641   WBC 27.4* 21.7* 17.6*   RBC 3.51 3.58 3.65   HGB 10.7* 11.1* 11.4*   HCT 32.4* 33.4* 34.5   MCV 92.3 93.3 94.5   MCH 30.5 31.0 31.2   MCHC 33.0 33.2 33.0   RDW 12.4 12.3 12.5    298 316   MPV 10.0 10.0 10.0     Lab Results   Component Value Date/Time     12/02/2022 06:41 AM    K 3.6 12/02/2022 06:41 AM    K 3.3 11/29/2022 08:29 AM    CL 99 12/02/2022 06:41 AM    CO2 36 12/02/2022 06:41 AM    ANIONGAP 8 12/02/2022 06:41 AM    GLUCOSE 121 12/02/2022 06:41 AM    BUN 25 12/02/2022 06:41 AM    CREATININE 0.4 12/02/2022 06:41 AM    GFRAA >60 04/13/2022 02:20 PM    LABGLOM >60 12/02/2022 06:41 AM    CALCIUM 8.6 12/02/2022 06:41 AM    BILITOT 0.7 11/29/2022 08:29 AM    ALT 21 11/29/2022 08:29 AM    AST 31 11/29/2022 08:29 AM    ALKPHOS 139 11/29/2022 08:29 AM    PROT 6.8 11/29/2022 08:29 AM    LABALBU 3.8 11/29/2022 08:29 AM     Recent Labs     11/30/22  0518 11/29/22  0829   MG 2.0 1.8      Lab Results   Component Value Date/Time    PHOS 2.1 12/01/2022 08:39 AM    PHOS 1.6 11/30/2022 10:52 PM    PHOS 2.1 11/30/2022 05:18 AM       Assessment:  Acute respiratory failure with hypoxia secondary to an exacerbation of COPD with suspected viral illness component  Ongoing tobacco abuse  Prior history of DVT  Hyperlipidemia on statin agent      Plan: We will de-escalate IV corticosteroids today with plans to transition to oral steroids tomorrow. Antibiotics have been discontinued. Repeat chest x-ray will be obtained tomorrow.   Chronic comorbidities are otherwise well controlled and she has been weaned to her at home nasal cannula oxygen requirement of 4 L. Repeat procalcitonin will be obtained tomorrow. I anticipate discharging the patient home tomorrow. More than 50% of my time was spent at the bedside counseling/coordinating care with the patient and/or family with face to face contact. This time was spent reviewing notes and laboratory data as well as instructing and counseling the patient. Time I spent with the family or surrogate(s) is included only if the patient was incapable of providing the necessary information or participating in medical decisions. I also discussed the differential diagnosis and all of the proposed management plans with the patient and individuals accompanying the patient. I am readily available for any further decision-making and intervention.        Jennifer Solis DO, F.A.C.O.I.  12/2/2022  2:14 PM

## 2022-12-02 NOTE — PROGRESS NOTES
Physical Therapy    0617/0617-01    Patient unavailable for physical therapy treatment due to patient stated she was to tired this am and to come back this pm for therapy. Ca Patterson.  Yan  [facility]  LIC # [id number]

## 2022-12-03 ENCOUNTER — APPOINTMENT (OUTPATIENT)
Dept: GENERAL RADIOLOGY | Age: 62
DRG: 190 | End: 2022-12-03
Payer: COMMERCIAL

## 2022-12-03 VITALS
OXYGEN SATURATION: 93 % | HEART RATE: 78 BPM | WEIGHT: 149.91 LBS | DIASTOLIC BLOOD PRESSURE: 74 MMHG | HEIGHT: 63 IN | SYSTOLIC BLOOD PRESSURE: 151 MMHG | BODY MASS INDEX: 26.56 KG/M2 | RESPIRATION RATE: 18 BRPM | TEMPERATURE: 98.3 F

## 2022-12-03 LAB
ANION GAP SERPL CALCULATED.3IONS-SCNC: 6 MMOL/L (ref 7–16)
BASOPHILS ABSOLUTE: 0 E9/L (ref 0–0.2)
BASOPHILS RELATIVE PERCENT: 0.7 % (ref 0–2)
BUN BLDV-MCNC: 26 MG/DL (ref 6–23)
CALCIUM SERPL-MCNC: 8.8 MG/DL (ref 8.6–10.2)
CHLORIDE BLD-SCNC: 100 MMOL/L (ref 98–107)
CO2: 38 MMOL/L (ref 22–29)
CREAT SERPL-MCNC: 0.4 MG/DL (ref 0.5–1)
EOSINOPHILS ABSOLUTE: 0 E9/L (ref 0.05–0.5)
EOSINOPHILS RELATIVE PERCENT: 0 % (ref 0–6)
GFR SERPL CREATININE-BSD FRML MDRD: >60 ML/MIN/1.73
GLUCOSE BLD-MCNC: 108 MG/DL (ref 74–99)
HCT VFR BLD CALC: 35.2 % (ref 34–48)
HEMOGLOBIN: 11.2 G/DL (ref 11.5–15.5)
LYMPHOCYTES ABSOLUTE: 0.7 E9/L (ref 1.5–4)
LYMPHOCYTES RELATIVE PERCENT: 6.1 % (ref 20–42)
MAGNESIUM: 2.3 MG/DL (ref 1.6–2.6)
MCH RBC QN AUTO: 30.1 PG (ref 26–35)
MCHC RBC AUTO-ENTMCNC: 31.8 % (ref 32–34.5)
MCV RBC AUTO: 94.6 FL (ref 80–99.9)
MONOCYTES ABSOLUTE: 0.23 E9/L (ref 0.1–0.95)
MONOCYTES RELATIVE PERCENT: 1.7 % (ref 2–12)
MYELOCYTE PERCENT: 0.9 % (ref 0–0)
NEUTROPHILS ABSOLUTE: 10.76 E9/L (ref 1.8–7.3)
NEUTROPHILS RELATIVE PERCENT: 91.3 % (ref 43–80)
OVALOCYTES: ABNORMAL
PDW BLD-RTO: 12.4 FL (ref 11.5–15)
PHOSPHORUS: 2.8 MG/DL (ref 2.5–4.5)
PLATELET # BLD: 327 E9/L (ref 130–450)
PMV BLD AUTO: 10.2 FL (ref 7–12)
POIKILOCYTES: ABNORMAL
POLYCHROMASIA: ABNORMAL
POTASSIUM SERPL-SCNC: 4 MMOL/L (ref 3.5–5)
PROCALCITONIN: 0.28 NG/ML (ref 0–0.08)
RBC # BLD: 3.72 E12/L (ref 3.5–5.5)
SODIUM BLD-SCNC: 144 MMOL/L (ref 132–146)
WBC # BLD: 11.7 E9/L (ref 4.5–11.5)

## 2022-12-03 PROCEDURE — 84100 ASSAY OF PHOSPHORUS: CPT

## 2022-12-03 PROCEDURE — 94660 CPAP INITIATION&MGMT: CPT

## 2022-12-03 PROCEDURE — 85025 COMPLETE CBC W/AUTO DIFF WBC: CPT

## 2022-12-03 PROCEDURE — 80048 BASIC METABOLIC PNL TOTAL CA: CPT

## 2022-12-03 PROCEDURE — 83735 ASSAY OF MAGNESIUM: CPT

## 2022-12-03 PROCEDURE — 6370000000 HC RX 637 (ALT 250 FOR IP): Performed by: INTERNAL MEDICINE

## 2022-12-03 PROCEDURE — 71045 X-RAY EXAM CHEST 1 VIEW: CPT

## 2022-12-03 PROCEDURE — 84145 PROCALCITONIN (PCT): CPT

## 2022-12-03 PROCEDURE — 6360000002 HC RX W HCPCS: Performed by: NURSE PRACTITIONER

## 2022-12-03 PROCEDURE — 6360000002 HC RX W HCPCS: Performed by: INTERNAL MEDICINE

## 2022-12-03 PROCEDURE — 36415 COLL VENOUS BLD VENIPUNCTURE: CPT

## 2022-12-03 PROCEDURE — 94640 AIRWAY INHALATION TREATMENT: CPT

## 2022-12-03 RX ORDER — BUDESONIDE 0.5 MG/2ML
0.5 INHALANT ORAL 2 TIMES DAILY
Qty: 60 EACH | Refills: 1 | Status: SHIPPED | OUTPATIENT
Start: 2022-12-03

## 2022-12-03 RX ORDER — PREDNISONE 10 MG/1
TABLET ORAL
Qty: 30 TABLET | Refills: 0 | Status: SHIPPED | OUTPATIENT
Start: 2022-12-03

## 2022-12-03 RX ORDER — IPRATROPIUM BROMIDE AND ALBUTEROL SULFATE 2.5; .5 MG/3ML; MG/3ML
3 SOLUTION RESPIRATORY (INHALATION) EVERY 6 HOURS
Qty: 360 ML | Refills: 0 | Status: SHIPPED | OUTPATIENT
Start: 2022-12-03

## 2022-12-03 RX ORDER — ARFORMOTEROL TARTRATE 15 UG/2ML
15 SOLUTION RESPIRATORY (INHALATION) 2 TIMES DAILY
Qty: 120 ML | Refills: 0 | Status: SHIPPED | OUTPATIENT
Start: 2022-12-03

## 2022-12-03 RX ADMIN — FLUTICASONE PROPIONATE 2 SPRAY: 50 SPRAY, METERED NASAL at 08:25

## 2022-12-03 RX ADMIN — IPRATROPIUM BROMIDE AND ALBUTEROL SULFATE 1 AMPULE: .5; 2.5 SOLUTION RESPIRATORY (INHALATION) at 09:45

## 2022-12-03 RX ADMIN — IPRATROPIUM BROMIDE AND ALBUTEROL SULFATE 1 AMPULE: .5; 2.5 SOLUTION RESPIRATORY (INHALATION) at 06:13

## 2022-12-03 RX ADMIN — SERTRALINE HYDROCHLORIDE 25 MG: 25 TABLET ORAL at 08:26

## 2022-12-03 RX ADMIN — OXYCODONE HYDROCHLORIDE AND ACETAMINOPHEN 1000 MG: 500 TABLET ORAL at 08:26

## 2022-12-03 RX ADMIN — METHYLPREDNISOLONE SODIUM SUCCINATE 40 MG: 40 INJECTION, POWDER, FOR SOLUTION INTRAMUSCULAR; INTRAVENOUS at 08:26

## 2022-12-03 RX ADMIN — ENOXAPARIN SODIUM 40 MG: 100 INJECTION SUBCUTANEOUS at 08:26

## 2022-12-03 RX ADMIN — BUDESONIDE 500 MCG: 0.5 SUSPENSION RESPIRATORY (INHALATION) at 06:14

## 2022-12-03 RX ADMIN — ARFORMOTEROL TARTRATE 15 MCG: 15 SOLUTION RESPIRATORY (INHALATION) at 06:13

## 2022-12-03 RX ADMIN — METHYLPREDNISOLONE SODIUM SUCCINATE 40 MG: 40 INJECTION, POWDER, FOR SOLUTION INTRAMUSCULAR; INTRAVENOUS at 01:00

## 2022-12-03 RX ADMIN — THEOPHYLLINE ANHYDROUS 400 MG: 200 CAPSULE, EXTENDED RELEASE ORAL at 08:25

## 2022-12-03 ASSESSMENT — PAIN SCALES - GENERAL: PAINLEVEL_OUTOF10: 0

## 2022-12-03 NOTE — PLAN OF CARE
Problem: Discharge Planning  Goal: Discharge to home or other facility with appropriate resources  Outcome: Completed     Problem: Safety - Adult  Goal: Free from fall injury  12/3/2022 1027 by Dominga Swift RN  Outcome: Completed  12/3/2022 0613 by Urmila Kat RN  Outcome: Progressing     Problem: Pain  Goal: Verbalizes/displays adequate comfort level or baseline comfort level  12/3/2022 1027 by Dominga Swift RN  Outcome: Completed  12/3/2022 0613 by Urmila Kat RN  Outcome: Progressing     Problem: ABCDS Injury Assessment  Goal: Absence of physical injury  12/3/2022 1027 by Dominga Swift RN  Outcome: Completed  12/3/2022 0613 by Urmila Kat RN  Outcome: Progressing

## 2022-12-03 NOTE — DISCHARGE SUMMARY
Internal Medicine Progress Note     [facility]     MARIELOS ChaconORAFAL Ohara D.O., ASHLI Wong, MSN, APRN, NP-C  Carlo Ivy. Samantha Vega, MSN, [id number]       Internal Medicine  Discharge Summary    NAME: Nena Castillo  :  1960  MRN:  [id number]  PCP:Nikolay Murphy MD  ADMITTED: 2022      DISCHARGED: 12/3/22    ADMITTING PHYSICIAN: Jennifer Solis DO    CONSULTANT(S):   None     ADMITTING DIAGNOSIS:   Lactic acidosis [E87.20]  COPD exacerbation ([facility]) [J44.1]  Acute on chronic respiratory failure with hypoxia ([facility]) [J96.21]     DISCHARGE DIAGNOSES:   Acute respiratory failure with hypoxia secondary to an exacerbation of COPD with suspected viral illness component  Ongoing tobacco abuse  Prior history of DVT  Hyperlipidemia on statin agent    BRIEF HISTORY OF PRESENT ILLNESS:   Aj Casanova is a 79-year-old female who presented to [address] [place] [address] emergency department for the evaluation of progressive shortness of breath. She describes a recent exposure to sick individuals. She required BiPAP therapy on initial presentation and maintained on BiPAP therapy during my examination. She suffers from end-stage oxygen dependent COPD. She states that she has been taking her medications regularly and has been wearing her oxygen. She admits to coughing but without fever. She admits to feeling ill overall. She will be admitted to the hospital for complete respiratory support and work-up.     LABS[de-identified]  Lab Results   Component Value Date    WBC 11.7 (H) 2022    HGB 11.2 (L) 2022    HCT 35.2 2022     2022     2022    K 4.0 2022     2022    CREATININE 0.4 (L) 2022    BUN 26 (H) 2022    CO2 38 (H) 2022    GLUCOSE 108 (H) 2022    ALT 21 2022    AST 31 2022    INR 1.0 10/28/2019     Lab Results   Component Value Date    INR 1.0 10/28/2019    INR 0.9 01/31/2018    INR 1.0 01/22/2018    PROTIME 11.3 10/28/2019    PROTIME 9.8 01/31/2018    PROTIME 11.5 01/22/2018      Lab Results   Component Value Date    TSH 0.264 (L) 02/10/2022     Lab Results   Component Value Date    TRIG 51 02/10/2022    TRIG 82 11/30/2019    TRIG 79 10/29/2019     Lab Results   Component Value Date    HDL 69 02/10/2022    HDL 61 11/30/2019    HDL 45 10/29/2019     Lab Results   Component Value Date    LDLCALC 183 (H) 02/10/2022    LDLCALC 195 (H) 11/30/2019    LDLCALC 138 (H) 10/29/2019     Lab Results   Component Value Date    LABA1C 5.0 02/10/2022       IMAGING:  XR CHEST PORTABLE    Result Date: 12/3/2022  EXAMINATION: ONE XRAY VIEW OF THE CHEST 12/3/2022 7:05 am COMPARISON: None. HISTORY: ORDERING SYSTEM PROVIDED HISTORY: SOB TECHNOLOGIST PROVIDED HISTORY: Reason for exam:->SOB FINDINGS: There are advanced emphysematous changes. There is right upper lobe airspace disease. In comparison with the patient's previous CT of the chest of 11/29/2022 it appears that the right middle lobe is now Reaerated. There is no right or left pneumothorax. 1. Patchy right upper lobe airspace disease 2. The previously identified collapsed right middle lobe appears to be Ree aerated however true lateral view was not obtained to confirm Ree aeration of the right middle lobe. XR CHEST PORTABLE    Result Date: 11/29/2022  EXAMINATION: ONE XRAY VIEW OF THE CHEST 11/29/2022 9:02 am COMPARISON: 11/27/2022. HISTORY: ORDERING SYSTEM PROVIDED HISTORY: pna? TECHNOLOGIST PROVIDED HISTORY: Reason for exam:->pna? FINDINGS: The cardiac silhouette is normal in size. The pulmonary vasculature is within normal limits. There are chronic findings in both lungs. There is a patchy infiltrate in the lower right lung. No pneumothorax or pleural effusion is seen. Chronic findings. Patchy infiltrate in the lower right lung.      XR CHEST PORTABLE    Result Date: 11/27/2022  EXAMINATION: ONE XRAY VIEW OF THE CHEST 11/27/2022 10:56 pm COMPARISON: 04/13/2022 HISTORY: ORDERING SYSTEM PROVIDED HISTORY: shortness of breath TECHNOLOGIST PROVIDED HISTORY: Reason for exam:->shortness of breath FINDINGS: The lungs are without acute focal process. There is no effusion or pneumothorax. The cardiomediastinal silhouette is without acute process. The osseous structures are without acute process. No acute process. CTA CHEST W CONTRAST    Result Date: 11/29/2022  EXAMINATION: CTA OF THE CHEST 11/29/2022 10:34 am TECHNIQUE: CTA of the chest was performed after the administration of intravenous contrast.  Multiplanar reformatted images are provided for review. MIP images are provided for review. Automated exposure control, iterative reconstruction, and/or weight based adjustment of the mA/kV was utilized to reduce the radiation dose to as low as reasonably achievable. COMPARISON: 11/29/2022 HISTORY: ORDERING SYSTEM PROVIDED HISTORY: evaluate for PE TECHNOLOGIST PROVIDED HISTORY: Reason for exam:->evaluate for PE Decision Support Exception - unselect if not a suspected or confirmed emergency medical condition->Emergency Medical Condition ([place]) FINDINGS: Pulmonary Arteries: Pulmonary arteries are adequately opacified for evaluation. No evidence of intraluminal filling defect to suggest pulmonary embolism. Main pulmonary artery is normal in caliber. Mediastinum: No evidence of mediastinal lymphadenopathy. The heart and pericardium demonstrate no acute abnormality. There is no acute abnormality of the thoracic aorta. Lungs/pleura: Extensive emphysematous changes visualized in bilateral lung fields but most prominent in the lower lung field with there are consolidations and atelectatic changes visualized in the right middle lobe and in the right posteroinferior lower lobe. No evidence of pneumothorax or parenchymal lung mass is seen. Bronchial wall thickening is a interstitial prominence is seen.  Upper Abdomen: Soft. Non-tender. Non-distended. Bowel sounds positive. No organomegaly or masses. No pain on palpation    Extremities:  Peripheral pulses present. No significant pitting peripheral edema. No ulcers. Neurologic:  Alert x 3. No focal deficit. Cranial nerves grossly intact. Skin:  No petechia. No hemorrhage. No wounds. DISPOSITION:  The patient's condition is stable. At this time the patient is without objective evidence of an acute process requiring continuing hospitalization or inpatient management. They are stable for discharge with outpatient follow-up. I have spoken with the patient and discussed the results of the current hospitalization, in addition to providing specific details for the plan of care and counseling regarding the diagnosis and prognosis. The plan has been discussed in detail and they are aware of the specific conditions for emergent return, as well as the importance of follow-up. Their questions are answered at this time and they are agreeable with the plan for discharge to home    DISCHARGE MEDICATIONS:   Current Discharge Medication List             Details   ascorbic acid (VITAMIN C) 1000 MG tablet Take 1 tablet by mouth daily  Qty: 30 tablet, Refills: 0      ipratropium-albuterol (DUONEB) 0.5-2.5 (3) MG/3ML SOLN nebulizer solution Inhale 3 mLs into the lungs in the morning and 3 mLs at noon and 3 mLs in the evening and 3 mLs before bedtime. Qty: 360 mL, Refills: 0                Details   predniSONE (DELTASONE) 10 MG tablet Take by oral route 4 tabs x 3 days, then 3 tabs x 3 days, then 2 tabs x 3 days, then 1 tab x 3 days, then discontinue. Take with food. Qty: 30 tablet, Refills: 0      Arformoterol Tartrate (BROVANA) 15 MCG/2ML NEBU Take 2 mLs by nebulization in the morning and 2 mLs in the evening. Qty: 120 mL, Refills: 0      budesonide (PULMICORT) 0.5 MG/2ML nebulizer suspension Take 2 mLs by nebulization in the morning and 2 mLs in the evening.   Qty: 60 each, Refills: 1                Details   !! albuterol sulfate HFA (VENTOLIN HFA) 108 (90 Base) MCG/ACT inhaler Inhale 2 puffs into the lungs 4 times daily as needed for Wheezing  Qty: 18 g, Refills: 0      atorvastatin (LIPITOR) 40 MG tablet Take 1 tablet by mouth nightly  Qty: 30 tablet, Refills: 0      theophylline (UNIPHYL) 400 MG extended release tablet Take 1 tablet by mouth daily  Qty: 90 tablet, Refills: 1      !! albuterol sulfate HFA (VENTOLIN HFA) 108 (90 Base) MCG/ACT inhaler Inhale 2 puffs into the lungs 4 times daily as needed for Wheezing or Shortness of Breath  Qty: 1 each, Refills: 2      OXYGEN Inhale 3 L into the lungs continuous      Umeclidinium Bromide (INCRUSE ELLIPTA) 62.5 MCG/INH AEPB Inhale 1 puff into the lungs daily  Qty: 30 each, Refills: 1    Associated Diagnoses: Chronic respiratory failure with hypoxia (HCC)      fluticasone-salmeterol (ADVAIR) 250-50 MCG/DOSE AEPB Inhale 1 puff into the lungs every 12 hours . Rinse and spit after each use. Qty: 60 each, Refills: 3    Associated Diagnoses: Chronic respiratory failure with hypoxia (HCC)      melatonin 3 MG TABS tablet Take 1 tablet by mouth nightly as needed (insomnia)  Qty: 30 tablet, Refills: 0      fluticasone (FLONASE) 50 MCG/ACT nasal spray 2 sprays by Each Nostril route daily  Qty: 1 Bottle, Refills: 5    Associated Diagnoses: Seasonal allergic rhinitis due to pollen      sertraline (ZOLOFT) 25 MG tablet Take 1 tablet by mouth daily  Qty: 30 tablet, Refills: 5    Associated Diagnoses: Current moderate episode of major depressive disorder [facility]       ! ! - Potential duplicate medications found. Please discuss with provider. FOLLOW UP/INSTRUCTIONS:  This patient is instructed to follow-up with her primary care physician. Patient is instructed to follow-up with the consults listed above as directed by them.   she is instructed to resume home medications and take new medications as indicated in the list above.  If the patient has a recurrence of symptoms, she is instructed to go to the ED. Preparing for this patient's discharge, including paperwork, orders, instructions, and meeting with patient did require > 40 minutes.     SKYE Friend CNP     12/3/2022  10:35 AM

## 2022-12-04 LAB
BLOOD CULTURE, ROUTINE: NORMAL
CULTURE, BLOOD 2: NORMAL

## 2022-12-16 ENCOUNTER — HOSPITAL ENCOUNTER (INPATIENT)
Age: 62
LOS: 5 days | Discharge: HOME | End: 2022-12-22
Attending: STUDENT IN AN ORGANIZED HEALTH CARE EDUCATION/TRAINING PROGRAM | Admitting: INTERNAL MEDICINE
Payer: COMMERCIAL

## 2022-12-16 ENCOUNTER — APPOINTMENT (OUTPATIENT)
Dept: CT IMAGING | Age: 62
End: 2022-12-16
Payer: COMMERCIAL

## 2022-12-16 DIAGNOSIS — K57.20 DIVERTICULITIS OF LARGE INTESTINE WITH ABSCESS WITHOUT BLEEDING: Primary | ICD-10-CM

## 2022-12-16 DIAGNOSIS — J96.11 CHRONIC RESPIRATORY FAILURE WITH HYPOXIA (HCC): ICD-10-CM

## 2022-12-16 DIAGNOSIS — A41.9 SEPSIS WITHOUT ACUTE ORGAN DYSFUNCTION, DUE TO UNSPECIFIED ORGANISM (HCC): ICD-10-CM

## 2022-12-16 LAB
ALBUMIN SERPL-MCNC: 3.1 G/DL (ref 3.5–5.2)
ALP BLD-CCNC: 120 U/L (ref 35–104)
ALT SERPL-CCNC: 14 U/L (ref 0–32)
ANION GAP SERPL CALCULATED.3IONS-SCNC: 10 MMOL/L (ref 7–16)
AST SERPL-CCNC: 10 U/L (ref 0–31)
BACTERIA: ABNORMAL /HPF
BASOPHILS ABSOLUTE: 0.02 E9/L (ref 0–0.2)
BASOPHILS RELATIVE PERCENT: 0.1 % (ref 0–2)
BILIRUB SERPL-MCNC: 0.8 MG/DL (ref 0–1.2)
BILIRUBIN URINE: NEGATIVE
BLOOD, URINE: ABNORMAL
BUN BLDV-MCNC: 16 MG/DL (ref 6–23)
CALCIUM SERPL-MCNC: 8.8 MG/DL (ref 8.6–10.2)
CHLORIDE BLD-SCNC: 94 MMOL/L (ref 98–107)
CLARITY: CLEAR
CO2: 33 MMOL/L (ref 22–29)
COLOR: YELLOW
CREAT SERPL-MCNC: 0.5 MG/DL (ref 0.5–1)
EOSINOPHILS ABSOLUTE: 0.01 E9/L (ref 0.05–0.5)
EOSINOPHILS RELATIVE PERCENT: 0 % (ref 0–6)
EPITHELIAL CELLS, UA: ABNORMAL /HPF
GFR SERPL CREATININE-BSD FRML MDRD: >60 ML/MIN/1.73
GLUCOSE BLD-MCNC: 103 MG/DL (ref 74–99)
GLUCOSE URINE: NEGATIVE MG/DL
HCT VFR BLD CALC: 36.6 % (ref 34–48)
HEMOGLOBIN: 12 G/DL (ref 11.5–15.5)
IMMATURE GRANULOCYTES #: 0.22 E9/L
IMMATURE GRANULOCYTES %: 1 % (ref 0–5)
KETONES, URINE: 40 MG/DL
LACTIC ACID, SEPSIS: 1.9 MMOL/L (ref 0.5–1.9)
LEUKOCYTE ESTERASE, URINE: NEGATIVE
LIPASE: 12 U/L (ref 13–60)
LYMPHOCYTES ABSOLUTE: 1.08 E9/L (ref 1.5–4)
LYMPHOCYTES RELATIVE PERCENT: 5 % (ref 20–42)
MCH RBC QN AUTO: 30.6 PG (ref 26–35)
MCHC RBC AUTO-ENTMCNC: 32.8 % (ref 32–34.5)
MCV RBC AUTO: 93.4 FL (ref 80–99.9)
MONOCYTES ABSOLUTE: 1 E9/L (ref 0.1–0.95)
MONOCYTES RELATIVE PERCENT: 4.6 % (ref 2–12)
NEUTROPHILS ABSOLUTE: 19.37 E9/L (ref 1.8–7.3)
NEUTROPHILS RELATIVE PERCENT: 89.3 % (ref 43–80)
NITRITE, URINE: NEGATIVE
PDW BLD-RTO: 12.7 FL (ref 11.5–15)
PH UA: 7.5 (ref 5–9)
PLATELET # BLD: 345 E9/L (ref 130–450)
PMV BLD AUTO: 9.2 FL (ref 7–12)
POTASSIUM REFLEX MAGNESIUM: 3.9 MMOL/L (ref 3.5–5)
PROTEIN UA: NEGATIVE MG/DL
RBC # BLD: 3.92 E12/L (ref 3.5–5.5)
RBC UA: ABNORMAL /HPF (ref 0–2)
SODIUM BLD-SCNC: 137 MMOL/L (ref 132–146)
SPECIFIC GRAVITY UA: 1.01 (ref 1–1.03)
TOTAL PROTEIN: 6.3 G/DL (ref 6.4–8.3)
UROBILINOGEN, URINE: 4 E.U./DL
WBC # BLD: 21.7 E9/L (ref 4.5–11.5)
WBC UA: ABNORMAL /HPF (ref 0–5)

## 2022-12-16 PROCEDURE — 96375 TX/PRO/DX INJ NEW DRUG ADDON: CPT

## 2022-12-16 PROCEDURE — 80053 COMPREHEN METABOLIC PANEL: CPT

## 2022-12-16 PROCEDURE — 2580000003 HC RX 258: Performed by: STUDENT IN AN ORGANIZED HEALTH CARE EDUCATION/TRAINING PROGRAM

## 2022-12-16 PROCEDURE — 85025 COMPLETE CBC W/AUTO DIFF WBC: CPT

## 2022-12-16 PROCEDURE — 6360000004 HC RX CONTRAST MEDICATION: Performed by: RADIOLOGY

## 2022-12-16 PROCEDURE — 6360000002 HC RX W HCPCS: Performed by: STUDENT IN AN ORGANIZED HEALTH CARE EDUCATION/TRAINING PROGRAM

## 2022-12-16 PROCEDURE — 87040 BLOOD CULTURE FOR BACTERIA: CPT

## 2022-12-16 PROCEDURE — 81001 URINALYSIS AUTO W/SCOPE: CPT

## 2022-12-16 PROCEDURE — 96366 THER/PROPH/DIAG IV INF ADDON: CPT

## 2022-12-16 PROCEDURE — 83690 ASSAY OF LIPASE: CPT

## 2022-12-16 PROCEDURE — 96365 THER/PROPH/DIAG IV INF INIT: CPT

## 2022-12-16 PROCEDURE — 2700000000 HC OXYGEN THERAPY PER DAY

## 2022-12-16 PROCEDURE — 83605 ASSAY OF LACTIC ACID: CPT

## 2022-12-16 PROCEDURE — 99285 EMERGENCY DEPT VISIT HI MDM: CPT

## 2022-12-16 PROCEDURE — 74177 CT ABD & PELVIS W/CONTRAST: CPT

## 2022-12-16 RX ORDER — 0.9 % SODIUM CHLORIDE 0.9 %
1000 INTRAVENOUS SOLUTION INTRAVENOUS ONCE
Status: COMPLETED | OUTPATIENT
Start: 2022-12-16 | End: 2022-12-17

## 2022-12-16 RX ORDER — FENTANYL CITRATE 0.05 MG/ML
50 INJECTION, SOLUTION INTRAMUSCULAR; INTRAVENOUS ONCE
Status: COMPLETED | OUTPATIENT
Start: 2022-12-16 | End: 2022-12-16

## 2022-12-16 RX ADMIN — SODIUM CHLORIDE 1000 ML: 9 INJECTION, SOLUTION INTRAVENOUS at 22:03

## 2022-12-16 RX ADMIN — SODIUM CHLORIDE 1000 ML: 9 INJECTION, SOLUTION INTRAVENOUS at 22:22

## 2022-12-16 RX ADMIN — FENTANYL CITRATE 50 MCG: 50 INJECTION INTRAMUSCULAR; INTRAVENOUS at 22:04

## 2022-12-16 RX ADMIN — IOPAMIDOL 70 ML: 755 INJECTION, SOLUTION INTRAVENOUS at 22:53

## 2022-12-16 RX ADMIN — PIPERACILLIN AND TAZOBACTAM 4500 MG: 4; .5 INJECTION, POWDER, FOR SOLUTION INTRAVENOUS at 22:33

## 2022-12-16 ASSESSMENT — PAIN SCALES - GENERAL
PAINLEVEL_OUTOF10: 5
PAINLEVEL_OUTOF10: 3
PAINLEVEL_OUTOF10: 6

## 2022-12-16 ASSESSMENT — PAIN DESCRIPTION - FREQUENCY
FREQUENCY: INTERMITTENT

## 2022-12-16 ASSESSMENT — PAIN - FUNCTIONAL ASSESSMENT
PAIN_FUNCTIONAL_ASSESSMENT: 0-10

## 2022-12-16 ASSESSMENT — PAIN DESCRIPTION - LOCATION
LOCATION: ABDOMEN

## 2022-12-16 ASSESSMENT — LIFESTYLE VARIABLES: HOW OFTEN DO YOU HAVE A DRINK CONTAINING ALCOHOL: NEVER

## 2022-12-16 ASSESSMENT — PAIN DESCRIPTION - ORIENTATION
ORIENTATION: LOWER

## 2022-12-16 ASSESSMENT — PAIN DESCRIPTION - PAIN TYPE: TYPE: ACUTE PAIN

## 2022-12-17 PROBLEM — K57.20 DIVERTICULITIS OF LARGE INTESTINE WITH ABSCESS WITHOUT BLEEDING: Status: ACTIVE | Noted: 2022-12-17

## 2022-12-17 LAB
LACTIC ACID, SEPSIS: 0.8 MMOL/L (ref 0.5–1.9)
PROCALCITONIN: 0.06 NG/ML (ref 0–0.08)
T4 FREE: 1.45 NG/DL (ref 0.93–1.7)
TROPONIN, HIGH SENSITIVITY: 16 NG/L (ref 0–9)
TSH SERPL DL<=0.05 MIU/L-ACNC: 0.65 UIU/ML (ref 0.27–4.2)

## 2022-12-17 PROCEDURE — 84439 ASSAY OF FREE THYROXINE: CPT

## 2022-12-17 PROCEDURE — C9113 INJ PANTOPRAZOLE SODIUM, VIA: HCPCS | Performed by: INTERNAL MEDICINE

## 2022-12-17 PROCEDURE — 6360000002 HC RX W HCPCS: Performed by: INTERNAL MEDICINE

## 2022-12-17 PROCEDURE — 83605 ASSAY OF LACTIC ACID: CPT

## 2022-12-17 PROCEDURE — 84145 PROCALCITONIN (PCT): CPT

## 2022-12-17 PROCEDURE — 2700000000 HC OXYGEN THERAPY PER DAY

## 2022-12-17 PROCEDURE — 99024 POSTOP FOLLOW-UP VISIT: CPT | Performed by: SURGERY

## 2022-12-17 PROCEDURE — 2580000003 HC RX 258: Performed by: INTERNAL MEDICINE

## 2022-12-17 PROCEDURE — 93005 ELECTROCARDIOGRAM TRACING: CPT | Performed by: INTERNAL MEDICINE

## 2022-12-17 PROCEDURE — 2500000003 HC RX 250 WO HCPCS: Performed by: INTERNAL MEDICINE

## 2022-12-17 PROCEDURE — 84484 ASSAY OF TROPONIN QUANT: CPT

## 2022-12-17 PROCEDURE — 2060000000 HC ICU INTERMEDIATE R&B

## 2022-12-17 PROCEDURE — 84443 ASSAY THYROID STIM HORMONE: CPT

## 2022-12-17 PROCEDURE — 6370000000 HC RX 637 (ALT 250 FOR IP): Performed by: INTERNAL MEDICINE

## 2022-12-17 PROCEDURE — 94640 AIRWAY INHALATION TREATMENT: CPT

## 2022-12-17 PROCEDURE — 2580000003 HC RX 258: Performed by: STUDENT IN AN ORGANIZED HEALTH CARE EDUCATION/TRAINING PROGRAM

## 2022-12-17 PROCEDURE — 36415 COLL VENOUS BLD VENIPUNCTURE: CPT

## 2022-12-17 PROCEDURE — 2580000003 HC RX 258

## 2022-12-17 PROCEDURE — 6360000002 HC RX W HCPCS: Performed by: STUDENT IN AN ORGANIZED HEALTH CARE EDUCATION/TRAINING PROGRAM

## 2022-12-17 PROCEDURE — 87040 BLOOD CULTURE FOR BACTERIA: CPT

## 2022-12-17 RX ORDER — ENOXAPARIN SODIUM 100 MG/ML
40 INJECTION SUBCUTANEOUS DAILY
Status: DISCONTINUED | OUTPATIENT
Start: 2022-12-17 | End: 2022-12-17

## 2022-12-17 RX ORDER — FLUTICASONE PROPIONATE 50 MCG
2 SPRAY, SUSPENSION (ML) NASAL DAILY
Status: DISCONTINUED | OUTPATIENT
Start: 2022-12-17 | End: 2022-12-22 | Stop reason: HOSPADM

## 2022-12-17 RX ORDER — BUDESONIDE 0.5 MG/2ML
0.5 INHALANT ORAL 2 TIMES DAILY
Status: DISCONTINUED | OUTPATIENT
Start: 2022-12-17 | End: 2022-12-22 | Stop reason: HOSPADM

## 2022-12-17 RX ORDER — METRONIDAZOLE 500 MG/100ML
500 INJECTION, SOLUTION INTRAVENOUS EVERY 8 HOURS
Status: DISCONTINUED | OUTPATIENT
Start: 2022-12-17 | End: 2022-12-22 | Stop reason: HOSPADM

## 2022-12-17 RX ORDER — SODIUM CHLORIDE, SODIUM LACTATE, POTASSIUM CHLORIDE, CALCIUM CHLORIDE 600; 310; 30; 20 MG/100ML; MG/100ML; MG/100ML; MG/100ML
INJECTION, SOLUTION INTRAVENOUS CONTINUOUS
Status: DISCONTINUED | OUTPATIENT
Start: 2022-12-17 | End: 2022-12-18

## 2022-12-17 RX ORDER — ONDANSETRON 2 MG/ML
4 INJECTION INTRAMUSCULAR; INTRAVENOUS EVERY 6 HOURS PRN
Status: DISCONTINUED | OUTPATIENT
Start: 2022-12-17 | End: 2022-12-22 | Stop reason: HOSPADM

## 2022-12-17 RX ORDER — MORPHINE SULFATE 2 MG/ML
2 INJECTION, SOLUTION INTRAMUSCULAR; INTRAVENOUS EVERY 4 HOURS PRN
Status: DISCONTINUED | OUTPATIENT
Start: 2022-12-17 | End: 2022-12-18 | Stop reason: SDUPTHER

## 2022-12-17 RX ORDER — 0.9 % SODIUM CHLORIDE 0.9 %
1000 INTRAVENOUS SOLUTION INTRAVENOUS ONCE
Status: COMPLETED | OUTPATIENT
Start: 2022-12-17 | End: 2022-12-17

## 2022-12-17 RX ORDER — ARFORMOTEROL TARTRATE 15 UG/2ML
15 SOLUTION RESPIRATORY (INHALATION) 2 TIMES DAILY
Status: DISCONTINUED | OUTPATIENT
Start: 2022-12-17 | End: 2022-12-22 | Stop reason: HOSPADM

## 2022-12-17 RX ORDER — IPRATROPIUM BROMIDE AND ALBUTEROL SULFATE 2.5; .5 MG/3ML; MG/3ML
3 SOLUTION RESPIRATORY (INHALATION) EVERY 6 HOURS
Status: DISCONTINUED | OUTPATIENT
Start: 2022-12-17 | End: 2022-12-22 | Stop reason: HOSPADM

## 2022-12-17 RX ORDER — ATORVASTATIN CALCIUM 40 MG/1
40 TABLET, FILM COATED ORAL NIGHTLY
Status: DISCONTINUED | OUTPATIENT
Start: 2022-12-17 | End: 2022-12-22 | Stop reason: HOSPADM

## 2022-12-17 RX ORDER — PANTOPRAZOLE SODIUM 40 MG/10ML
40 INJECTION, POWDER, LYOPHILIZED, FOR SOLUTION INTRAVENOUS DAILY
Status: DISCONTINUED | OUTPATIENT
Start: 2022-12-17 | End: 2022-12-22 | Stop reason: HOSPADM

## 2022-12-17 RX ORDER — SODIUM CHLORIDE, SODIUM LACTATE, POTASSIUM CHLORIDE, CALCIUM CHLORIDE 600; 310; 30; 20 MG/100ML; MG/100ML; MG/100ML; MG/100ML
INJECTION, SOLUTION INTRAVENOUS
Status: COMPLETED
Start: 2022-12-17 | End: 2022-12-17

## 2022-12-17 RX ADMIN — MORPHINE SULFATE 2 MG: 2 INJECTION, SOLUTION INTRAMUSCULAR; INTRAVENOUS at 14:37

## 2022-12-17 RX ADMIN — SODIUM CHLORIDE, POTASSIUM CHLORIDE, SODIUM LACTATE AND CALCIUM CHLORIDE: 600; 310; 30; 20 INJECTION, SOLUTION INTRAVENOUS at 00:53

## 2022-12-17 RX ADMIN — BUDESONIDE 500 MCG: 0.5 SUSPENSION RESPIRATORY (INHALATION) at 09:46

## 2022-12-17 RX ADMIN — ATORVASTATIN CALCIUM 40 MG: 40 TABLET, FILM COATED ORAL at 20:43

## 2022-12-17 RX ADMIN — ARFORMOTEROL TARTRATE 15 MCG: 15 SOLUTION RESPIRATORY (INHALATION) at 17:57

## 2022-12-17 RX ADMIN — SODIUM CHLORIDE, POTASSIUM CHLORIDE, SODIUM LACTATE AND CALCIUM CHLORIDE: 600; 310; 30; 20 INJECTION, SOLUTION INTRAVENOUS at 14:39

## 2022-12-17 RX ADMIN — BUDESONIDE 500 MCG: 0.5 SUSPENSION RESPIRATORY (INHALATION) at 17:58

## 2022-12-17 RX ADMIN — PIPERACILLIN AND TAZOBACTAM 4500 MG: 4; .5 INJECTION, POWDER, FOR SOLUTION INTRAVENOUS at 04:44

## 2022-12-17 RX ADMIN — METRONIDAZOLE 500 MG: 500 INJECTION, SOLUTION INTRAVENOUS at 14:40

## 2022-12-17 RX ADMIN — IPRATROPIUM BROMIDE AND ALBUTEROL SULFATE 3 ML: .5; 2.5 SOLUTION RESPIRATORY (INHALATION) at 22:34

## 2022-12-17 RX ADMIN — MORPHINE SULFATE 2 MG: 2 INJECTION, SOLUTION INTRAMUSCULAR; INTRAVENOUS at 10:28

## 2022-12-17 RX ADMIN — METRONIDAZOLE 500 MG: 500 INJECTION, SOLUTION INTRAVENOUS at 07:04

## 2022-12-17 RX ADMIN — ONDANSETRON 4 MG: 2 INJECTION INTRAMUSCULAR; INTRAVENOUS at 20:43

## 2022-12-17 RX ADMIN — PANTOPRAZOLE SODIUM 40 MG: 40 INJECTION, POWDER, FOR SOLUTION INTRAVENOUS at 10:28

## 2022-12-17 RX ADMIN — WATER 1000 MG: 1 INJECTION INTRAMUSCULAR; INTRAVENOUS; SUBCUTANEOUS at 07:04

## 2022-12-17 RX ADMIN — SODIUM CHLORIDE 1000 ML: 9 INJECTION, SOLUTION INTRAVENOUS at 00:54

## 2022-12-17 RX ADMIN — IPRATROPIUM BROMIDE AND ALBUTEROL SULFATE 3 ML: .5; 2.5 SOLUTION RESPIRATORY (INHALATION) at 09:46

## 2022-12-17 RX ADMIN — SERTRALINE 25 MG: 50 TABLET, FILM COATED ORAL at 11:11

## 2022-12-17 RX ADMIN — METRONIDAZOLE 500 MG: 500 INJECTION, SOLUTION INTRAVENOUS at 20:42

## 2022-12-17 RX ADMIN — ARFORMOTEROL TARTRATE 15 MCG: 15 SOLUTION RESPIRATORY (INHALATION) at 09:46

## 2022-12-17 RX ADMIN — FLUTICASONE PROPIONATE 2 SPRAY: 50 SPRAY, METERED NASAL at 10:28

## 2022-12-17 RX ADMIN — ENOXAPARIN SODIUM 40 MG: 100 INJECTION SUBCUTANEOUS at 10:27

## 2022-12-17 ASSESSMENT — PAIN SCALES - GENERAL
PAINLEVEL_OUTOF10: 10
PAINLEVEL_OUTOF10: 9

## 2022-12-17 ASSESSMENT — PAIN DESCRIPTION - DESCRIPTORS: DESCRIPTORS: ACHING;NAGGING

## 2022-12-17 ASSESSMENT — PAIN DESCRIPTION - LOCATION: LOCATION: ABDOMEN

## 2022-12-17 NOTE — PROGRESS NOTES
Dr Anahi Box notified of consult- aware that IR is not in building and will need to be called in to do procedure

## 2022-12-17 NOTE — H&P
Department of Internal Medicine  History and Physical Examination     Primary Care Physician: Lisy Iqbal MD   Admitting Physician:  Tracie Livingston DO  Admission date and time: 12/16/2022  8:37 PM    Room:  16/16  Admitting diagnosis: Diverticulitis of large intestine with abscess without bleeding [K57.20]    Patient Name: Arnoldo Crisostomo  MRN: [id number]    Date of Service: 12/17/2022     Chief Complaint: Abdominal pain    HISTORY OF PRESENT ILLNESS:    Arnoldo Crisostomo is a 51-year-old female recently discharged from our service for respiratory issues who presented back to the hospital with abdominal pain. She has had diverticulitis in the past and CT work-up in ER confirmed diverticulitis with perforation and abscess collection. She met criteria for sepsis with tachycardia and leukocytosis. Bolick panel was essentially unremarkable. LFTs were essentially unremarkable as well. CBC revealed a leukocytosis with neutrophilic predominance. Urinalysis essentially unremarkable. Case was discussed with the ER physician with plan for admission, further care and management [facility] Duana Habermann is seen and examined at bedside. She confirms above history. She admits to subjective fevers and chills as well as fatigue and malaise. She denies headache or acute neurologic complaints aside from generalized weakness. No chest pain or palpitation. She is at her baseline degree of shortness of breath, cough and congestion with no acute symptoms. No hemoptysis. Admits to generalized abdominal pain with nausea. Denies vomiting. Denies any significant change in bowel pattern, hematochezia or melena. Denies any acute urinary complaints. She states that during previous bouts of similar episode she has had to have IR drainage and would be agreeable to this if warranted.     PAST MEDICAL Hx:  Past Medical History:   Diagnosis Date    Abscess     colon    COPD (chronic obstructive pulmonary disease) ([facility] [address] Diverticulitis     Provoked DVT 3/2018     3 months Eliquis for DVT due to PICC line    Seasonal allergic rhinitis     Smoker 11/30/2019    5-6 per day    Tobacco use disorder      : 1 ppd since age 25       PAST SURGICAL Hx:   History reviewed. No pertinent surgical history. FAMILY Hx:  Family History   Problem Relation Age of Onset    Colon Cancer None     Other Father     Coronary Art Dis Father         mother    Hypertension Father     Diabetes Unknown relative         older age; pat grandmother    Other Daughter         son; ex-    Breast Cancer None     Stroke Mother        HOME MEDICATIONS:  Prior to Admission medications    Medication Sig Start Date End Date Taking? Authorizing Provider   ascorbic acid (VITAMIN C) 1000 MG tablet Take 1 tablet by mouth daily 12/4/22   SKYE Ash CNP   Arformoterol Tartrate (BROVANA) 15 MCG/2ML NEBU Take 2 mLs by nebulization in the morning and 2 mLs in the evening. 12/3/22   SKYE Ash CNP   budesonide (PULMICORT) 0.5 MG/2ML nebulizer suspension Take 2 mLs by nebulization in the morning and 2 mLs in the evening. 12/3/22   SKYE Ash CNP   ipratropium-albuterol (DUONEB) 0.5-2.5 (3) MG/3ML SOLN nebulizer solution Inhale 3 mLs into the lungs in the morning and 3 mLs at noon and 3 mLs in the evening and 3 mLs before bedtime.  12/3/22   SKYE Ash CNP   albuterol sulfate HFA (VENTOLIN HFA) 108 (90 Base) MCG/ACT inhaler Inhale 2 puffs into the lungs 4 times daily as needed for Wheezing 4/13/22   Jorge Alberto Alcantar DO   atorvastatin (LIPITOR) 40 MG tablet Take 1 tablet by mouth nightly 2/12/22   SKYE Ash CNP   albuterol sulfate HFA (VENTOLIN HFA) 108 (90 Base) MCG/ACT inhaler Inhale 2 puffs into the lungs 4 times daily as needed for Wheezing or Shortness of Breath 9/24/21   Berto Silver DO   OXYGEN Inhale 3 L into the lungs continuous    Historical Provider, MD   Umeclidinium Bromide (INCRUSE ELLIPTA) 62.5 MCG/INH AEPB Inhale 1 puff into the lungs daily 21   Delwyn Slider, APRN - CNP   fluticasone-salmeterol (ADVAIR) 250-50 MCG/DOSE AEPB Inhale 1 puff into the lungs every 12 hours . Rinse and spit after each use. 21   Delwyn Slider, APRN - CNP   fluticasone (FLONASE) 50 MCG/ACT nasal spray 2 sprays by Each Nostril route daily 21   Cris Barlow MD   sertraline (ZOLOFT) 25 MG tablet Take 1 tablet by mouth daily 3/8/21   Cris Barlow MD       ALLERGIES:  Penicillin v    SOCIAL Hx:  Social History     Socioeconomic History    Marital status:      Spouse name: Not on file    Number of children: 2    Years of education: Not on file    Highest education level: Not on file   Occupational History    Not on file   Tobacco Use    Smoking status: Former     Packs/day: 0.50     Years: 41.00     Pack years: 20.50     Types: Cigarettes     Quit date: 2021     Years since quittin.5    Smokeless tobacco: Never   Vaping Use    Vaping Use: Never used   Substance and Sexual Activity    Alcohol use: Yes     Comment: social    Drug use: Not Currently     Types: Marijuana Symone Coad)     Comment: occasional     Sexual activity: Not Currently   Other Topics Concern    Not on file   Social History Narrative    First  is . Social Determinants of Health     Financial Resource Strain: Not on file   Food Insecurity: Not on file   Transportation Needs: Not on file   Physical Activity: Not on file   Stress: Not on file   Social Connections: Not on file   Intimate Partner Violence: Not on file   Housing Stability: Not on file     ROS: 12 point review of symptoms was conducted, pertinent positives and negative were reviewed, aside from that all 12 systems were reviewed and negative.      PHYSICAL EXAM:  VITALS:  Vitals:    22 0824   BP:    Pulse:    Resp: 18   Temp: 98.3 °F (36.8 °C)   SpO2:          CONSTITUTIONAL:    Awake, alert, cooperative, no apparent distress    EYES:    PERRL, EOMI, sclera clear without icterus, conjunctiva normal    ENT:    Normocephalic, atraumatic, sinuses nontender on palpation. External ears without lesions. Oral pharynx with moist mucus membranes. NECK:    Supple, symmetrical, trachea midline, no adenopathy, thyroid symmetric, not enlarged and no tenderness, skin normal, no bruits, no JVD    HEMATOLOGIC/LYMPHATICS:    No cervical lymphadenopathy and no supraclavicular lymphadenopathy    LUNGS:    Symmetric. No increased work of breathing, generally diminished air exchange, clear to auscultation bilaterally, no wheezes, rhonchi, or rales,     CARDIOVASCULAR:    Normal apical impulse, regular rate and rhythm, normal S1 and S2, systolic murmur noted    ABDOMEN:    Soft, nondistended normal contour, normal bowel sounds, tender to palpation diffusely with some guarding elicited on examination. Rebound tenderness is present. MUSCULOSKELETAL:    There is no redness, warmth, or swelling of the joints. Tone is normal.    NEUROLOGIC:    Awake, alert, oriented to name, place and time. Cranial nerves II-XII are grossly intact. Motor is 5 out of 5 bilaterally. SKIN:    No bruising or bleeding. No redness, warmth, or swelling    EXTREMITIES:    Peripheral pulses present. No significant pitting edema.     LABORATORY DATA:  CBC with Differential:    Lab Results   Component Value Date/Time    WBC 21.7 12/16/2022 09:38 PM    RBC 3.92 12/16/2022 09:38 PM    HGB 12.0 12/16/2022 09:38 PM    HCT 36.6 12/16/2022 09:38 PM     12/16/2022 09:38 PM    MCV 93.4 12/16/2022 09:38 PM    MCH 30.6 12/16/2022 09:38 PM    MCHC 32.8 12/16/2022 09:38 PM    RDW 12.7 12/16/2022 09:38 PM    NRBC 0.9 11/30/2022 05:18 AM    LYMPHOPCT 5.0 12/16/2022 09:38 PM    MONOPCT 4.6 12/16/2022 09:38 PM    MYELOPCT 0.9 12/03/2022 06:04 AM    BASOPCT 0.1 12/16/2022 09:38 PM    MONOSABS 1.00 12/16/2022 09:38 PM    LYMPHSABS 1.08 12/16/2022 09:38 PM    EOSABS 0.01 12/16/2022 09:38 PM    BASOSABS 0.02 12/16/2022 09:38 PM     CMP:    Lab Results   Component Value Date/Time     12/16/2022 09:38 PM    K 3.9 12/16/2022 09:38 PM    CL 94 12/16/2022 09:38 PM    CO2 33 12/16/2022 09:38 PM    BUN 16 12/16/2022 09:38 PM    CREATININE 0.5 12/16/2022 09:38 PM    GFRAA >60 04/13/2022 02:20 PM    LABGLOM >60 12/16/2022 09:38 PM    GLUCOSE 103 12/16/2022 09:38 PM    PROT 6.3 12/16/2022 09:38 PM    LABALBU 3.1 12/16/2022 09:38 PM    CALCIUM 8.8 12/16/2022 09:38 PM    BILITOT 0.8 12/16/2022 09:38 PM    ALKPHOS 120 12/16/2022 09:38 PM    AST 10 12/16/2022 09:38 PM    ALT 14 12/16/2022 09:38 PM     U/A:    Lab Results   Component Value Date/Time    COLORU Yellow 12/16/2022 09:38 PM    PROTEINU Negative 12/16/2022 09:38 PM    PHUR 7.5 12/16/2022 09:38 PM    WBCUA 1-3 12/16/2022 09:38 PM    RBCUA NONE 12/16/2022 09:38 PM    MUCUS OCCASIONAL 04/30/2012 12:14 PM    BACTERIA RARE 12/16/2022 09:38 PM    CLARITYU Clear 12/16/2022 09:38 PM    SPECGRAV 1.015 12/16/2022 09:38 PM    LEUKOCYTESUR Negative 12/16/2022 09:38 PM    UROBILINOGEN 4.0 12/16/2022 09:38 PM    BILIRUBINUR Negative 12/16/2022 09:38 PM    BLOODU TRACE 12/16/2022 09:38 PM    GLUCOSEU Negative 12/16/2022 09:38 PM    AMORPHOUS FEW 04/30/2012 12:14 PM       ASSESSMENT:  Sepsis secondary to acute diverticulitis with perforation and abscess  Chronic respiratory failure with hypoxia secondary to advanced COPD without exacerbation   Ongoing tobacco abuse  Prior history of DVT  Hyperlipidemia on statin agent    PLAN:  Korin Boyd presented with abdominal pain and was identified as having perforated diverticulitis with abscess. Surgery is following. Antimicrobial being employed. We are considering IR drainage and we will clear this with the surgery team.  Fluid analysis obtained. Symptomatic and supportive care. Dietary advancement once cleared from any procedure. Respiratory supportive measures will be maintained in setting of advanced as end-stage COPD.   Underlying co-morbidites will be addressed during hospitalization as well. Labs and vital signs will be monitored closely and addressed accordingly. See additional orders for details.      SKYE Kimble CNP  10:02 AM  12/17/2022    Electronically signed by SKYE Kimble CNP on 12/17/22 at 10:02 AM EST

## 2022-12-17 NOTE — PROGRESS NOTES
Pt had 4 beat run of vtach and then pt went into ST at 155. EKG done but pt had gone back down the the 60-70s - SR with PVCs. Dr. Carmen Barr made aware.

## 2022-12-17 NOTE — ED PROVIDER NOTES
Department of Emergency Medicine   ED Provider Note  Admit Date/RoomTime: 12/16/2022  8:37 PM  ED Room: 16/16          History of Present Illness:  12/16/22, Time: 8:43 PM AKIRA Dowell is a 58 y.o. female with history of COPD, chronic hypoxic respiratory failure, presenting to the ED for left lower quadrant abdominal pain, beginning 3 days ago. The complaint has been intermittent, moderate in severity, and worsened by nothing. Patient states she has had intermittent abdominal pain for the last several days. She states she had similar pain when she had diverticulitis in 2017, states at that time she had to have a temporary ostomy and IV antibiotics at home. Patient states that the pain is crampy, intermittent, worse with nothing and improved by nothing. No nausea or vomiting, no fevers or chills. She feels generally unwell, malaise. She denies any dysuria hematuria urgency or frequency. She has had soft stools, but denies constipation or diarrhea or melena or hematochezia. She is chronically on 4 L of O2 via nasal cannula, no chest pain or shortness of breath, no lightheadedness or syncope. She does not remember who her surgeon was, but states it was here. She has not taken anything at home, denies any current pain. On chart review patient did not have ostomy, but had IR drain placed previously. Review of Systems:      Pertinent positives and negatives are stated within HPI. 10 point ROS otherwise negative.    --------------------------------------------- PAST HISTORY ---------------------------------------------  Past Medical History:  has a past medical history of Abscess, COPD (chronic obstructive pulmonary disease) ([facility] [address] Diverticulitis, Provoked DVT 3/2018, Seasonal allergic rhinitis, Smoker, and Tobacco use disorder. Past Surgical History:  has no past surgical history on file. Social History:  reports that she quit smoking about 18 months ago.  Her smoking use included cigarettes. She has a 20.50 pack-year smoking history. She has never used smokeless tobacco. She reports current alcohol use. She reports that she does not currently use drugs after having used the following drugs: Marijuana Vanessa Cotton). Family History: family history includes Breast Cancer in her none; Colon Cancer in her none; Coronary Art Dis in her father; Diabetes in her unknown relative; Hypertension in her father; Other in her daughter and father; Stroke in her mother. The patients home medications have been reviewed. Allergies: Penicillin v      ---------------------------------------------------PHYSICAL EXAM--------------------------------------    Constitutional/General: Awake and alert, NAD, no labored breathing, nasal cannula oxygen in place  Head: Normocephalic and atraumatic  Eyes: EOMI, conjunctiva normal, sclera non icteric  Ears: Normal external ears  Nose: Normal external nose  Mouth: Moist mucous membranes, uvula midline  Neck: Supple, no stridor, no meningeal signs  Respiratory: Lungs clear to auscultation bilaterally, no wheezes, rales, or rhonchi. Not in respiratory distress. Nasal cannula oxygen in place, on 4 L. Diminished air movement bilaterally. Cardiovascular: Tachycardic .regular rhythm. No murmurs, no gallops, or rubs. 2+ distal pulses. Equal extremity pulses. Chest: No chest wall tenderness or deformity  GI: Tender to palpation left lower quadrant, no rebound tenderness, abdomen soft, no guarding, no McBurney's point tenderness, negative Jiménez sign. No distention. Musculoskeletal: Moves all extremities x 4. Warm and well perfused, no clubbing, cyanosis, or edema. Capillary refill <3 seconds  Integument: skin warm and dry.    Neurologic: GCS 15, no focal deficits, alert and responsive, symmetric strength 5/5 in the major muscle groups of upper and lower extremities bilaterally  Psychiatric: Normal Affect      -------------------------------------------------- RESULTS -------------------------------------------------  I have personally reviewed and interpreted all laboratory and imaging results for this patient. Results are listed below.      LABS:  Results for orders placed or performed during the hospital encounter of 12/16/22   Lactate, Sepsis   Result Value Ref Range    Lactic Acid, Sepsis 1.9 0.5 - 1.9 mmol/L   CBC with Auto Differential   Result Value Ref Range    WBC 21.7 (H) 4.5 - 11.5 E9/L    RBC 3.92 3.50 - 5.50 E12/L    Hemoglobin 12.0 11.5 - 15.5 g/dL    Hematocrit 36.6 34.0 - 48.0 %    MCV 93.4 80.0 - 99.9 fL    MCH 30.6 26.0 - 35.0 pg    MCHC 32.8 32.0 - 34.5 %    RDW 12.7 11.5 - 15.0 fL    Platelets 635 331 - 732 E9/L    MPV 9.2 7.0 - 12.0 fL    Neutrophils % 89.3 (H) 43.0 - 80.0 %    Immature Granulocytes % 1.0 0.0 - 5.0 %    Lymphocytes % 5.0 (L) 20.0 - 42.0 %    Monocytes % 4.6 2.0 - 12.0 %    Eosinophils % 0.0 0.0 - 6.0 %    Basophils % 0.1 0.0 - 2.0 %    Neutrophils Absolute 19.37 (H) 1.80 - 7.30 E9/L    Immature Granulocytes # 0.22 E9/L    Lymphocytes Absolute 1.08 (L) 1.50 - 4.00 E9/L    Monocytes Absolute 1.00 (H) 0.10 - 0.95 E9/L    Eosinophils Absolute 0.01 (L) 0.05 - 0.50 E9/L    Basophils Absolute 0.02 0.00 - 0.20 E9/L   Lipase   Result Value Ref Range    Lipase 12 (L) 13 - 60 U/L   Comprehensive Metabolic Panel w/ Reflex to MG   Result Value Ref Range    Sodium 137 132 - 146 mmol/L    Potassium reflex Magnesium 3.9 3.5 - 5.0 mmol/L    Chloride 94 (L) 98 - 107 mmol/L    CO2 33 (H) 22 - 29 mmol/L    Anion Gap 10 7 - 16 mmol/L    Glucose 103 (H) 74 - 99 mg/dL    BUN 16 6 - 23 mg/dL    Creatinine 0.5 0.5 - 1.0 mg/dL    Est, Glom Filt Rate >60 >=60 mL/min/1.73    Calcium 8.8 8.6 - 10.2 mg/dL    Total Protein 6.3 (L) 6.4 - 8.3 g/dL    Albumin 3.1 (L) 3.5 - 5.2 g/dL    Total Bilirubin 0.8 0.0 - 1.2 mg/dL    Alkaline Phosphatase 120 (H) 35 - 104 U/L    ALT 14 0 - 32 U/L    AST 10 0 - 31 U/L   Urinalysis with Microscopic   Result Value Ref Range    Color, UA Yellow Straw/Yellow    Clarity, UA Clear Clear    Glucose, Ur Negative Negative mg/dL    Bilirubin Urine Negative Negative    Ketones, Urine 40 (A) Negative mg/dL    Specific Gravity, UA 1.015 1.005 - 1.030    Blood, Urine TRACE (A) Negative    pH, UA 7.5 5.0 - 9.0    Protein, UA Negative Negative mg/dL    Urobilinogen, Urine 4.0 (A) <2.0 E.U./dL    Nitrite, Urine Negative Negative    Leukocyte Esterase, Urine Negative Negative    WBC, UA 1-3 0 - 5 /HPF    RBC, UA NONE 0 - 2 /HPF    Epithelial Cells, UA RARE /HPF    Bacteria, UA RARE (A) None Seen /HPF       RADIOLOGY:  Imaging Interpreted by Radiologist, initial wet read of imaging interpreted by myself  CT ABDOMEN PELVIS W IV CONTRAST Additional Contrast? None   Final Result   Extensive thickening of the mid sigmoid colon consistent with diverticulitis. Present within the mid sigmoid colon colonic wall is a 5.5 cm x 3.5 cm fluid   collection consistent with abscess. EKG:    See ED Course for EKG documentation        ------------------------- NURSING NOTES AND VITALS REVIEWED ---------------------------   The nursing notes within the ED encounter and vital signs as below have been reviewed by myself. /67   Pulse 92   Temp 98.3 °F (36.8 °C) (Oral)   Resp 16   SpO2 100%   Oxygen Saturation Interpretation: Normal    The patients available past medical records and past encounters were reviewed, see MDM for details.         ------------------------------ ED COURSE/MEDICAL DECISION MAKING----------------------  Medications   piperacillin-tazobactam (ZOSYN) 4,500 mg in sodium chloride 0.9 % 100 mL IVPB ([id number]) (4,500 mg IntraVENous New Bag 12/16/22 2233)   0.9 % sodium chloride bolus (has no administration in time range)   lactated ringers infusion (has no administration in time range)   0.9 % sodium chloride bolus (1,000 mLs IntraVENous New Bag 12/16/22 2203)   fentaNYL (SUBLIMAZE) injection 50 mcg (50 mcg IntraVENous Given 12/16/22 2204) 0.9 % sodium chloride bolus (1,000 mLs IntraVENous New Bag 12/16/22 2222)   iopamidol (ISOVUE-370) 76 % injection 70 mL (70 mLs IntraVENous Given 12/16/22 2253)            Medical Decision Making:     ED Course as of 12/17/22 0052   Fri Dec 16, 2022   2058 Reviewed patient's chart, patient had diverticulitis with abscess, required IR drain placement. Was seen by Dr. Sherren Razor at that time. Patient was treated with Cipro and Flagyl initially, also received Zosyn and Invanz, infectious disease was consulted. [RH]   2058 Reviewed patient's chart from previous admission several years ago, patient was seen by interventional radiology, general surgery and infectious disease. Reviewed patient's previous medications, patient has tolerated cephalosporins in the past.  Reviewed previous echocardiogram in May 2021, patient with EF of 51% at that time. [RH]   2217 Per chart review patient has tolerated Zosyn in the past. [RH]   1716 Discussed case with general surgery Dr. Loretta Cardoza, agreed to consult on patient. [RH]   Sat Dec 17, 2022   0011 Patient reevaluated, resting bed comfortably. Patient updated on results of labs and imaging and plan for admission. [RH]   0012 Discussed case with Dr. Andrew Haynes, agreed to admit patient. [RH]      ED Course User Index  [RH] [address] DO       This 66-year-old female presents emerged department with left lower quadrant abdominal pain. Patient initially tachycardic, heart rate near 130, resolving after IV fluids. Patient normotensive after IV fluid boluses. Patient was given initial 2 L of IV fluids, given additional 1 liter fluid bolus given blood pressure 100/60. Patient stable on home oxygen. Patient nonperitoneal neck on exam.  Patient with significant leukocytosis, no lactic acidosis, CT imaging of the abdomen demonstrates evidence of sigmoid diverticulitis with adjacent abscess. General surgery was consulted.   Patient with evidence of sepsis with significant leukocytosis and tachycardia, blood cultures were obtained and patient was started on Zosyn. Patient will be admitted for further work-up, treatment, monitoring. See ED COURSE for additional MDM. Labs & imaging reviewed and interpreted, see RESULTS above. Re-Evaluations:           See ED Course above. This patient's ED course included: a personal history and physicial examination, re-evaluation prior to disposition, multiple bedside re-evaluations, IV medications, cardiac monitoring, continuous pulse oximetry, and complex medical decision making and emergency management    This patient has improved and been closely monitored during their ED course. Consultations:  See ED Course    Counseling: The emergency physician has spoken with the patient and discussed todays results, in addition to providing specific details for the plan of care and counseling regarding the diagnosis and prognosis. Questions are answered at this time and they are agreeable with the plan.       --------------------------------- IMPRESSION AND DISPOSITION ---------------------------------    IMPRESSION  1. Diverticulitis of large intestine with abscess without bleeding    2. Chronic respiratory failure with hypoxia (HCC) Stable   3. Sepsis without acute organ dysfunction, due to unspecified organism [facility])        DISPOSITION  Disposition: Admit to telemetry  Patient condition is serious but stable     Please note that the withdrawal or failure to initiate urgent interventions for this patient would likely result in a life threatening deterioration or permanent disability. See attending physician attestation/addendum for specific critical care time. NOTE: This report was transcribed using voice recognition software. Every effort was made to ensure accuracy; however, inadvertent computerized transcription errors may be present. Also please note that patient was seen and examined by attending physician.  Plan of care and disposition discussed with attending physician and they were immediately available or present for all procedures performed.        -- Hartford Skiff, D.O. PGY-3     Emergency Medicine      12/17/2022 12:52 AM         [address] DO  Resident  12/17/22 1701

## 2022-12-17 NOTE — PROGRESS NOTES
I advised nurse that I do not and can not advise other specialities on when or if they do procedures, that is the individual specialities job. IR has been consulted and may or may not do the procedure, It is recommended by me and if cannot be done here would recommend transfer to center where IR can do it.    Haley Santos MD

## 2022-12-18 LAB
ANION GAP SERPL CALCULATED.3IONS-SCNC: 11 MMOL/L (ref 7–16)
BASOPHILS ABSOLUTE: 0.02 E9/L (ref 0–0.2)
BASOPHILS RELATIVE PERCENT: 0.1 % (ref 0–2)
BUN BLDV-MCNC: 10 MG/DL (ref 6–23)
CALCIUM SERPL-MCNC: 7.8 MG/DL (ref 8.6–10.2)
CHLORIDE BLD-SCNC: 98 MMOL/L (ref 98–107)
CO2: 27 MMOL/L (ref 22–29)
CREAT SERPL-MCNC: 0.4 MG/DL (ref 0.5–1)
EKG ATRIAL RATE: 99 BPM
EKG P AXIS: 79 DEGREES
EKG P-R INTERVAL: 142 MS
EKG Q-T INTERVAL: 346 MS
EKG QRS DURATION: 68 MS
EKG QTC CALCULATION (BAZETT): 444 MS
EKG R AXIS: 77 DEGREES
EKG T AXIS: 75 DEGREES
EKG VENTRICULAR RATE: 99 BPM
EOSINOPHILS ABSOLUTE: 0.05 E9/L (ref 0.05–0.5)
EOSINOPHILS RELATIVE PERCENT: 0.3 % (ref 0–6)
GFR SERPL CREATININE-BSD FRML MDRD: >60 ML/MIN/1.73
GLUCOSE BLD-MCNC: 68 MG/DL (ref 74–99)
HCT VFR BLD CALC: 30.3 % (ref 34–48)
HEMOGLOBIN: 9.7 G/DL (ref 11.5–15.5)
IMMATURE GRANULOCYTES #: 0.1 E9/L
IMMATURE GRANULOCYTES %: 0.7 % (ref 0–5)
LYMPHOCYTES ABSOLUTE: 0.96 E9/L (ref 1.5–4)
LYMPHOCYTES RELATIVE PERCENT: 6.4 % (ref 20–42)
MAGNESIUM: 1.7 MG/DL (ref 1.6–2.6)
MAGNESIUM: 1.7 MG/DL (ref 1.6–2.6)
MCH RBC QN AUTO: 29.8 PG (ref 26–35)
MCHC RBC AUTO-ENTMCNC: 32 % (ref 32–34.5)
MCV RBC AUTO: 92.9 FL (ref 80–99.9)
METER GLUCOSE: 147 MG/DL (ref 74–99)
MONOCYTES ABSOLUTE: 0.61 E9/L (ref 0.1–0.95)
MONOCYTES RELATIVE PERCENT: 4.1 % (ref 2–12)
NEUTROPHILS ABSOLUTE: 13.21 E9/L (ref 1.8–7.3)
NEUTROPHILS RELATIVE PERCENT: 88.4 % (ref 43–80)
PDW BLD-RTO: 12.5 FL (ref 11.5–15)
PHOSPHORUS: 2 MG/DL (ref 2.5–4.5)
PLATELET # BLD: 271 E9/L (ref 130–450)
PMV BLD AUTO: 9.8 FL (ref 7–12)
POTASSIUM SERPL-SCNC: 3.4 MMOL/L (ref 3.5–5)
RBC # BLD: 3.26 E12/L (ref 3.5–5.5)
SODIUM BLD-SCNC: 136 MMOL/L (ref 132–146)
WBC # BLD: 15 E9/L (ref 4.5–11.5)

## 2022-12-18 PROCEDURE — 99254 IP/OBS CNSLTJ NEW/EST MOD 60: CPT | Performed by: SURGERY

## 2022-12-18 PROCEDURE — 2580000003 HC RX 258: Performed by: INTERNAL MEDICINE

## 2022-12-18 PROCEDURE — 2060000000 HC ICU INTERMEDIATE R&B

## 2022-12-18 PROCEDURE — 80048 BASIC METABOLIC PNL TOTAL CA: CPT

## 2022-12-18 PROCEDURE — C9113 INJ PANTOPRAZOLE SODIUM, VIA: HCPCS | Performed by: INTERNAL MEDICINE

## 2022-12-18 PROCEDURE — 83735 ASSAY OF MAGNESIUM: CPT

## 2022-12-18 PROCEDURE — 2500000003 HC RX 250 WO HCPCS: Performed by: NURSE PRACTITIONER

## 2022-12-18 PROCEDURE — 6370000000 HC RX 637 (ALT 250 FOR IP): Performed by: INTERNAL MEDICINE

## 2022-12-18 PROCEDURE — 6360000002 HC RX W HCPCS: Performed by: NURSE PRACTITIONER

## 2022-12-18 PROCEDURE — 2700000000 HC OXYGEN THERAPY PER DAY

## 2022-12-18 PROCEDURE — 6360000002 HC RX W HCPCS: Performed by: INTERNAL MEDICINE

## 2022-12-18 PROCEDURE — 93010 ELECTROCARDIOGRAM REPORT: CPT | Performed by: INTERNAL MEDICINE

## 2022-12-18 PROCEDURE — 6370000000 HC RX 637 (ALT 250 FOR IP): Performed by: NURSE PRACTITIONER

## 2022-12-18 PROCEDURE — 93005 ELECTROCARDIOGRAM TRACING: CPT | Performed by: INTERNAL MEDICINE

## 2022-12-18 PROCEDURE — 85025 COMPLETE CBC W/AUTO DIFF WBC: CPT

## 2022-12-18 PROCEDURE — 2580000003 HC RX 258: Performed by: NURSE PRACTITIONER

## 2022-12-18 PROCEDURE — 94640 AIRWAY INHALATION TREATMENT: CPT

## 2022-12-18 PROCEDURE — 97161 PT EVAL LOW COMPLEX 20 MIN: CPT | Performed by: PHYSICAL THERAPIST

## 2022-12-18 PROCEDURE — 82962 GLUCOSE BLOOD TEST: CPT

## 2022-12-18 PROCEDURE — 2500000003 HC RX 250 WO HCPCS: Performed by: INTERNAL MEDICINE

## 2022-12-18 PROCEDURE — 84100 ASSAY OF PHOSPHORUS: CPT

## 2022-12-18 PROCEDURE — 36415 COLL VENOUS BLD VENIPUNCTURE: CPT

## 2022-12-18 RX ORDER — MAGNESIUM SULFATE 1 G/100ML
1000 INJECTION INTRAVENOUS PRN
Status: DISCONTINUED | OUTPATIENT
Start: 2022-12-18 | End: 2022-12-22 | Stop reason: HOSPADM

## 2022-12-18 RX ORDER — SODIUM CHLORIDE AND POTASSIUM CHLORIDE 300; 900 MG/100ML; MG/100ML
INJECTION, SOLUTION INTRAVENOUS CONTINUOUS
Status: DISCONTINUED | OUTPATIENT
Start: 2022-12-18 | End: 2022-12-21

## 2022-12-18 RX ORDER — POTASSIUM CHLORIDE 7.45 MG/ML
10 INJECTION INTRAVENOUS PRN
Status: DISCONTINUED | OUTPATIENT
Start: 2022-12-18 | End: 2022-12-22 | Stop reason: HOSPADM

## 2022-12-18 RX ORDER — FENTANYL CITRATE 0.05 MG/ML
25 INJECTION, SOLUTION INTRAMUSCULAR; INTRAVENOUS ONCE
Status: COMPLETED | OUTPATIENT
Start: 2022-12-18 | End: 2022-12-18

## 2022-12-18 RX ORDER — POTASSIUM CHLORIDE 20 MEQ/1
40 TABLET, EXTENDED RELEASE ORAL PRN
Status: DISCONTINUED | OUTPATIENT
Start: 2022-12-18 | End: 2022-12-22 | Stop reason: HOSPADM

## 2022-12-18 RX ORDER — 0.9 % SODIUM CHLORIDE 0.9 %
500 INTRAVENOUS SOLUTION INTRAVENOUS ONCE
Status: COMPLETED | OUTPATIENT
Start: 2022-12-18 | End: 2022-12-18

## 2022-12-18 RX ORDER — MAGNESIUM SULFATE IN WATER 40 MG/ML
2000 INJECTION, SOLUTION INTRAVENOUS ONCE
Status: COMPLETED | OUTPATIENT
Start: 2022-12-18 | End: 2022-12-18

## 2022-12-18 RX ORDER — MORPHINE SULFATE 2 MG/ML
2 INJECTION, SOLUTION INTRAMUSCULAR; INTRAVENOUS ONCE
Status: COMPLETED | OUTPATIENT
Start: 2022-12-18 | End: 2022-12-18

## 2022-12-18 RX ORDER — MORPHINE SULFATE 2 MG/ML
2 INJECTION, SOLUTION INTRAMUSCULAR; INTRAVENOUS EVERY 4 HOURS PRN
Status: DISCONTINUED | OUTPATIENT
Start: 2022-12-18 | End: 2022-12-22 | Stop reason: HOSPADM

## 2022-12-18 RX ADMIN — ATORVASTATIN CALCIUM 40 MG: 40 TABLET, FILM COATED ORAL at 19:50

## 2022-12-18 RX ADMIN — SODIUM PHOSPHATE, MONOBASIC, MONOHYDRATE AND SODIUM PHOSPHATE, DIBASIC, ANHYDROUS 11.19 MMOL: 142; 276 INJECTION, SOLUTION INTRAVENOUS at 17:45

## 2022-12-18 RX ADMIN — IPRATROPIUM BROMIDE AND ALBUTEROL SULFATE 3 ML: .5; 2.5 SOLUTION RESPIRATORY (INHALATION) at 22:58

## 2022-12-18 RX ADMIN — BUDESONIDE 500 MCG: 0.5 SUSPENSION RESPIRATORY (INHALATION) at 05:03

## 2022-12-18 RX ADMIN — POTASSIUM CHLORIDE AND SODIUM CHLORIDE: 900; 300 INJECTION, SOLUTION INTRAVENOUS at 12:43

## 2022-12-18 RX ADMIN — POTASSIUM CHLORIDE 40 MEQ: 1500 TABLET, EXTENDED RELEASE ORAL at 10:29

## 2022-12-18 RX ADMIN — ARFORMOTEROL TARTRATE 15 MCG: 15 SOLUTION RESPIRATORY (INHALATION) at 17:57

## 2022-12-18 RX ADMIN — SODIUM CHLORIDE, POTASSIUM CHLORIDE, SODIUM LACTATE AND CALCIUM CHLORIDE: 600; 310; 30; 20 INJECTION, SOLUTION INTRAVENOUS at 08:09

## 2022-12-18 RX ADMIN — MORPHINE SULFATE 2 MG: 2 INJECTION, SOLUTION INTRAMUSCULAR; INTRAVENOUS at 23:32

## 2022-12-18 RX ADMIN — IPRATROPIUM BROMIDE AND ALBUTEROL SULFATE 3 ML: .5; 2.5 SOLUTION RESPIRATORY (INHALATION) at 05:03

## 2022-12-18 RX ADMIN — IPRATROPIUM BROMIDE AND ALBUTEROL SULFATE 3 ML: .5; 2.5 SOLUTION RESPIRATORY (INHALATION) at 10:00

## 2022-12-18 RX ADMIN — IPRATROPIUM BROMIDE AND ALBUTEROL SULFATE 3 ML: .5; 2.5 SOLUTION RESPIRATORY (INHALATION) at 17:57

## 2022-12-18 RX ADMIN — PANTOPRAZOLE SODIUM 40 MG: 40 INJECTION, POWDER, FOR SOLUTION INTRAVENOUS at 08:12

## 2022-12-18 RX ADMIN — SODIUM CHLORIDE 500 ML: 9 INJECTION, SOLUTION INTRAVENOUS at 16:29

## 2022-12-18 RX ADMIN — MAGNESIUM SULFATE HEPTAHYDRATE 1000 MG: 1 INJECTION, SOLUTION INTRAVENOUS at 15:36

## 2022-12-18 RX ADMIN — BUDESONIDE 500 MCG: 0.5 SUSPENSION RESPIRATORY (INHALATION) at 17:57

## 2022-12-18 RX ADMIN — FLUTICASONE PROPIONATE 2 SPRAY: 50 SPRAY, METERED NASAL at 16:29

## 2022-12-18 RX ADMIN — METRONIDAZOLE 500 MG: 500 INJECTION, SOLUTION INTRAVENOUS at 23:33

## 2022-12-18 RX ADMIN — MAGNESIUM SULFATE HEPTAHYDRATE 2000 MG: 40 INJECTION, SOLUTION INTRAVENOUS at 10:23

## 2022-12-18 RX ADMIN — METRONIDAZOLE 500 MG: 500 INJECTION, SOLUTION INTRAVENOUS at 14:28

## 2022-12-18 RX ADMIN — ARFORMOTEROL TARTRATE 15 MCG: 15 SOLUTION RESPIRATORY (INHALATION) at 05:03

## 2022-12-18 RX ADMIN — METRONIDAZOLE 500 MG: 500 INJECTION, SOLUTION INTRAVENOUS at 05:09

## 2022-12-18 RX ADMIN — MORPHINE SULFATE 2 MG: 2 INJECTION, SOLUTION INTRAMUSCULAR; INTRAVENOUS at 19:50

## 2022-12-18 RX ADMIN — MORPHINE SULFATE 2 MG: 2 INJECTION, SOLUTION INTRAMUSCULAR; INTRAVENOUS at 11:34

## 2022-12-18 RX ADMIN — WATER 1000 MG: 1 INJECTION INTRAMUSCULAR; INTRAVENOUS; SUBCUTANEOUS at 05:08

## 2022-12-18 RX ADMIN — MORPHINE SULFATE 2 MG: 2 INJECTION, SOLUTION INTRAMUSCULAR; INTRAVENOUS at 01:37

## 2022-12-18 RX ADMIN — MAGNESIUM SULFATE HEPTAHYDRATE 1000 MG: 1 INJECTION, SOLUTION INTRAVENOUS at 16:58

## 2022-12-18 RX ADMIN — SERTRALINE 25 MG: 50 TABLET, FILM COATED ORAL at 08:13

## 2022-12-18 RX ADMIN — FENTANYL CITRATE 25 MCG: 50 INJECTION INTRAMUSCULAR; INTRAVENOUS at 16:24

## 2022-12-18 ASSESSMENT — PAIN DESCRIPTION - ONSET: ONSET: ON-GOING

## 2022-12-18 ASSESSMENT — PAIN DESCRIPTION - ORIENTATION
ORIENTATION: MID
ORIENTATION: MID;LOWER
ORIENTATION: MID
ORIENTATION: MID
ORIENTATION: LOWER

## 2022-12-18 ASSESSMENT — PAIN DESCRIPTION - DESCRIPTORS
DESCRIPTORS: ACHING
DESCRIPTORS: STABBING;SPASM
DESCRIPTORS: STABBING;SPASM;THROBBING
DESCRIPTORS: SHARP;SPASM;STABBING
DESCRIPTORS: STABBING;SPASM;SHARP

## 2022-12-18 ASSESSMENT — PAIN SCALES - GENERAL
PAINLEVEL_OUTOF10: 0
PAINLEVEL_OUTOF10: 10
PAINLEVEL_OUTOF10: 4
PAINLEVEL_OUTOF10: 10
PAINLEVEL_OUTOF10: 8
PAINLEVEL_OUTOF10: 10
PAINLEVEL_OUTOF10: 10
PAINLEVEL_OUTOF10: 7

## 2022-12-18 ASSESSMENT — PAIN DESCRIPTION - LOCATION
LOCATION: ABDOMEN

## 2022-12-18 ASSESSMENT — PAIN - FUNCTIONAL ASSESSMENT
PAIN_FUNCTIONAL_ASSESSMENT: PREVENTS OR INTERFERES WITH MANY ACTIVE NOT PASSIVE ACTIVITIES
PAIN_FUNCTIONAL_ASSESSMENT: PREVENTS OR INTERFERES WITH ALL ACTIVE AND SOME PASSIVE ACTIVITIES
PAIN_FUNCTIONAL_ASSESSMENT: PREVENTS OR INTERFERES WITH ALL ACTIVE AND SOME PASSIVE ACTIVITIES
PAIN_FUNCTIONAL_ASSESSMENT: PREVENTS OR INTERFERES WITH MANY ACTIVE NOT PASSIVE ACTIVITIES

## 2022-12-18 ASSESSMENT — PAIN DESCRIPTION - FREQUENCY: FREQUENCY: CONTINUOUS

## 2022-12-18 ASSESSMENT — PAIN DESCRIPTION - PAIN TYPE: TYPE: ACUTE PAIN

## 2022-12-18 NOTE — PROGRESS NOTES
Patient complaining of 10/10 pain, Dr. Karolina Jain notified, see new order. Dr. Dhruv Arias called to confirm transfer to [facility]. E's was cancelled, spoke with RN supervisor and access center, transfer was cancelled. Dr. Dhruv Arias states Dr. Karolina Jain needs to speak with Dr. Ines Membreno in the morning regarding necessity for IR intervention to have procedure today.

## 2022-12-18 NOTE — CONSULTS
General Surgery Consult    Patient's Name/Date of Birth: Delfina Gong / 1960    Date: December 18, 2022     Consulting Surgeon: Gretel Limon M.D.    PCP: Amirah Leonard. Estelita Bay MD     Chief Complaint: abd pain    HPI:   Delfina Gong is a 58 y.o. female who presents for  evaluation of abd pain and came to ED and found to have diverticulitis and abscess on CT and had previous episode of this that was treated with drain and resolved. Timing is constant, radiation to low left abd, alleviated by nothing and started days ago, severity 2-7/10      Past Medical History:   Diagnosis Date    Abscess     colon    COPD (chronic obstructive pulmonary disease) ([facility] [address]     Diverticulitis     Provoked DVT 3/2018     3 months Eliquis for DVT due to PICC line    Seasonal allergic rhinitis     Smoker 11/30/2019    5-6 per day    Tobacco use disorder      : 1 ppd since age 25       History reviewed. No pertinent surgical history.     Current Facility-Administered Medications   Medication Dose Route Frequency Provider Last Rate Last Admin    magnesium sulfate 2000 mg in 50 mL IVPB premix  2,000 mg IntraVENous Once Judit Jayda, APRN - CNP        sodium phosphate 11.19 mmol in sodium chloride 0.9 % 250 mL IVPB  0.16 mmol/kg IntraVENous PRN Judit Jayda, APRN - CNP        Or    sodium phosphate 22.38 mmol in sodium chloride 0.9 % 500 mL IVPB  0.32 mmol/kg IntraVENous PRN Judit Jayda, APRN - CNP        potassium chloride (KLOR-CON M) extended release tablet 40 mEq  40 mEq Oral PRN Judit Jayda, APRN - CNP        Or    potassium bicarb-citric acid (EFFER-K) effervescent tablet 40 mEq  40 mEq Oral PRN Juidt Jayda, APRN - CNP        Or    potassium chloride 10 mEq/100 mL IVPB (Peripheral Line)  10 mEq IntraVENous PRN Judit Jayda, APRN - CNP        magnesium sulfate 1000 mg in dextrose 5% 100 mL IVPB  1,000 mg IntraVENous PRN Judit Jayda, APRN - CNP        lactated ringers infusion   IntraVENous Continuous Rom Quintanilla  mL/hr at 12/18/22 0809 New Bag at 12/18/22 0809    Arformoterol Tartrate (BROVANA) nebulizer solution 15 mcg  15 mcg Nebulization BID Álvaro , DO   15 mcg at 12/18/22 0503    atorvastatin (LIPITOR) tablet 40 mg  40 mg Oral Nightly Álvaro , DO   40 mg at 12/17/22 2043    budesonide (PULMICORT) nebulizer suspension 500 mcg  0.5 mg Nebulization BID Álvaro , DO   500 mcg at 12/18/22 0503    fluticasone (FLONASE) 50 MCG/ACT nasal spray 2 spray  2 spray Each Nostril Daily Álvaro , DO   2 spray at 12/17/22 1028    ipratropium-albuterol (DUONEB) nebulizer solution 3 mL  3 mL Inhalation Q6H Álvaro , DO   3 mL at 12/18/22 1000    sertraline (ZOLOFT) tablet 25 mg  25 mg Oral Daily Álvaro , DO   25 mg at 12/18/22 0813    metronidazole (FLAGYL) 500 mg in 0.9% NaCl 100 mL IVPB premix  500 mg IntraVENous Q8H Álvaro , DO   Stopped at 12/18/22 3554    cefTRIAXone (ROCEPHIN) 1,000 mg in sterile water 10 mL IV syringe  1,000 mg IntraVENous Q24H Álvaro , DO   1,000 mg at 12/18/22 0508    ondansetron [facility]) injection 4 mg  4 mg IntraVENous Q6H PRN Álvaro , DO   4 mg at 12/17/22 2043    pantoprazole (PROTONIX) injection 40 mg  40 mg IntraVENous Daily Álvaro , DO   40 mg at 12/18/22 6242    [Held by provider] morphine (PF) injection 2 mg  2 mg IntraVENous Q4H PRN Álvaro , DO   2 mg at 12/17/22 1437       Allergies   Allergen Reactions    Penicillin V Hives and Other (See Comments)     11/07/2005 UNKNOWN, PLEASE VERIFY, 11/07/2005 UNKNOWN, PLEASE VERIFY       The patient has a family history that is negative for severe cardiovascular or respiratory issues, negative for reaction to anesthesia. Social History     Socioeconomic History    Marital status:       Spouse name: Not on file    Number of children: 2    Years of education: Not on file    Highest education level: Not on file   Occupational History    Not on file   Tobacco Use    Smoking status: Former     Packs/day: 0.50     Years: 41.00     Pack years: 20.50     Types: Cigarettes     Quit date: 2021     Years since quittin.5    Smokeless tobacco: Never   Vaping Use    Vaping Use: Never used   Substance and Sexual Activity    Alcohol use: Yes     Comment: social    Drug use: Not Currently     Types: Marijuana [facility]vrose Pleas)     Comment: occasional     Sexual activity: Not Currently   Other Topics Concern    Not on file   Social History Narrative    First  is . Social Determinants of Health     Financial Resource Strain: Not on file   Food Insecurity: Not on file   Transportation Needs: Not on file   Physical Activity: Not on file   Stress: Not on file   Social Connections: Not on file   Intimate Partner Violence: Not on file   Housing Stability: Not on file           Review of Systems  General ROS: negative for - chills, fatigue or fever  ENT ROS: negative for - headaches, hearing change or nasal congestion  Endocrine ROS: negative for - breast changes or galactorrhea, no polyuria  Respiratory ROS: negative for - hemoptysis, orthopnea or pleuritic pain  Cardiovascular ROS: negative for - dyspnea on exertion, edema or loss of consciousness  Gastrointestinal ROS: negative for - blood in stools, heartburn, hematemesis or melena  : no polyuria, no dysuria  Musculoskeletal ROS: negative for - gait disturbance  Dermatological ROS: negative for - acne, dry skin or eczema  Neuro ROS: no recent memory loss or mood swings.     Physical exam:   BP (!) 112/59   Pulse 99   Temp 99.6 °F (37.6 °C) (Oral)   Resp 21   Ht 5' 3\" (1.6 m)   Wt 154 lb (69.9 kg)   SpO2 97%   BMI 27.28 kg/m²   General appearance:  NAD  Head: NCAT, PERRLA, EOMI, red conjunctiva  Neck: supple, no masses  Lungs: CTAB, equal chest rise bilateral  Heart: Reg rate  Abdomen: soft, minimally tender LLW, nondistended,  Skin; no lesions  Gu: no cva tenderness  Extremities: extremities normal, atraumatic, no cyanosis or edema      Radiology:  CT abdomen/pelvis: Extensive thickening of the mid sigmoid colon consistent with diverticulitis. Present within the mid sigmoid colon colonic wall is a 5.5 cm x 3.5 cm fluid   collection consistent with abscess. Assessment:  58 y.o. female with perforated diverticulitis and pelvic abscess    Plan:  CBC, CMP, monitor exam for sepsis/peritonitis and will need IR drainage and iv abx.       Adis Badillo MD  12/18/2022  10:20 AM

## 2022-12-18 NOTE — PROGRESS NOTES
Physical Therapy    Physical Therapy Initial Evaluation/Plan of Care    Room #:  3990/6670-31  Patient Name: Aubrie Doherty  YOB: 1960  MRN: [id number]    Date of Service: 12/18/2022     Tentative placement recommendation: Home  Equipment recommendation: Patient has needed equipment       Evaluating Physical Therapist: Terrance Banks, PT  #[id number]      Specific Provider Orders/Date/Referring Provider :  12/17/22 0615    PT eval and treat  Start:  12/17/22 0615,   End:  12/17/22 0615,   ONE TIME,   Standing Count:  1 Occurrences,   R         Houston Castillo,      Admitting Diagnosis:   Chronic respiratory failure with hypoxia (HCC) [J96.11]  Diverticulitis of large intestine with abscess without bleeding [K57.20]  Sepsis without acute organ dysfunction, due to unspecified organism [facility]) [A41.9]    Admitted with    abdominal pain sepsis with tachycardia and leukocytosis  acute diverticulitis with perforation and abscess  Recent d/c from acute care  Surgery: none  Visit Diagnoses         Codes    Sepsis without acute organ dysfunction, due to unspecified organism [facility])     A41.9            Patient Active Problem List   Diagnosis    Tobacco use disorder    Seasonal allergic rhinitis    Chronic bronchitis ([address]    Chronic respiratory failure with hypoxia ([address]    Chronic obstructive pulmonary disease ([address]    Acute respiratory failure with hypercapnia (HCC)    Acute on chronic respiratory failure with hypoxia and hypercapnia (HCC)    COPD exacerbation ([address]    Acute diverticulitis with abscess        ASSESSMENT of Current Deficits Patient exhibits decreased balance and endurance impairing functional mobility, transfers, gait , gait distance, and tolerance to activity are barriers to d/c and require skilled intervention to address concerns listed above to increase safety and independence at discharge. Decreased strength, balance and endurance  increases patient's risk for fall.    Pain in abdomen main limitation to mobility this visit       PHYSICAL THERAPY  PLAN OF CARE       Physical therapy plan of care is established based on physician order,  patient diagnosis and clinical assessment    Current Treatment Recommendations:    -Standing Balance: Perform strengthening exercises in standing to promote motor control with or without upper extremity support   -Gait: Gait training and Standing activities to improve: base of support, weight shift, weight bearing    -Endurance: Utilize Supervised activities to increase level of endurance to allow for safe functional mobility including transfers and gait   -Stairs: Stair training with instruction on proper technique and hand placement on rail    PT long term treatment goals are located in below grid    Patient and or family understand(s) diagnosis, prognosis, and plan of care. Frequency of treatments: Patient will be seen  daily. Prior Level of Function: Patient ambulated independently    Rehab Potential: good   for baseline    Past medical history:   Past Medical History:   Diagnosis Date    Abscess     colon    COPD (chronic obstructive pulmonary disease) ([facility] [address]     Diverticulitis     Provoked DVT 3/2018     3 months Eliquis for DVT due to PICC line    Seasonal allergic rhinitis     Smoker 11/30/2019    5-6 per day    Tobacco use disorder      : 1 ppd since age 25     History reviewed. No pertinent surgical history. SUBJECTIVE:    Precautions:  Activity as tolerated, falls and alarm , 3 Liters of o2 via nasal cannula     Social history: Patient lives with daughter in a two story home resides first  with 2 steps, with rail  to enter Tub shower , grab bars  4 Liters of o2 via nasal cannula     Equipment owned: None,      [address]   How much difficulty turning over in bed?: None  How much difficulty sitting down on / standing up from a chair with arms?: None  How much difficulty moving from lying on back to sitting on side of bed?: None  How much help from another person moving to and from a bed to a chair?: A Little  How much help from another person needed to walk in hospital room?: A Little  How much help from another person for climbing 3-5 steps with a railing?: A Little  AM-[facility] Inpatient Mobility Raw Score : 21  AM-PAC Inpatient T-Scale Score : 50.25  Mobility Inpatient CMS 0-100% Score: 28.97  Mobility Inpatient CMS G-Code Modifier : [address] cleared patient for PT evaluation. The admitting diagnosis and active problem list as listed above have been reviewed prior to the initiation of this evaluation. OBJECTIVE;   Initial Evaluation  Date: 12/18/2022 Treatment Date:     Short Term/ Long Term   Goals   Was pt agreeable to Eval/treatment? Yes  To be met in 3 days   Pain level   10/10  abdomen     Bed Mobility    Rolling: Independent    Supine to sit:  Independent    Sit to supine: Independent    Scooting: Independent        Transfers Sit to stand: Independent        Ambulation     2-3 side steps using  no device with Supervision    Patient with flexed posture and antalgic gait      100 feet using  no device with Independent    ROM Within functional limits        Strength BUE:   4/5  RLE:  4/5  LLE:  4/5  Increase strength in affected mm groups by 1/3 grade   Balance Sitting EOB:  good    Dynamic Standing:  fair    Sitting EOB:  good    Dynamic Standing: good       Patient is Alert & Oriented x person, place, time, and situation and follows directions    Sensation:  Patient  denies numbness/tingling   Edema:  no   Endurance: poor         Patient education  Patient educated on role of Physical Therapy, risks of immobility, safety and plan of care and  importance of mobility while in hospital      Patient response to education:   Pt verbalized understanding Pt demonstrated skill Pt requires further education in this area   Yes Partial Yes      Treatment:  Patient practiced and was instructed/facilitated in the following treatment: Patient   Sat edge of bed < 5  minutes with Independent to increase dynamic sitting balance and activity tolerance. All mobility limited by abdomen pain     Therapeutic Exercises:  not performed       At end of session, patient in bed with  call light and phone within reach,  all lines and tubes intact, nursing notified. Patient would benefit from continued skilled Physical Therapy to improve functional independence and quality of life. Patient's/ family goals   home    Time in  1  Time out  315    Total Treatment Time  0 minutes    Evaluation time includes thorough review of current medical information, gathering information on past medical history/social history and prior level of function, completion of standardized testing/informal observation of tasks, assessment of data, and development of Plan of care and goals.      CPT codes:  Low Complexity PT evaluation (03849)  No treatment    Houston Orozco, PT

## 2022-12-18 NOTE — PROGRESS NOTES
Patient continues to go in and out of sinus tach, heart rate as high as 160. Dr. Raisa Callejas aware. Consent for IR procedure signed.

## 2022-12-18 NOTE — PROGRESS NOTES
Patient came down to special procedures for drainage of intra abdominal fluid collection with possible drain placement. Asked patient if had eaten food and patient stated \"yes, jello and lemonade\". Stated because to she ate we are unable to do procedure today if she wants sedation. She does have the option to do procedure with no sedation just lidocaine and patient stated no she would like done tomorrow with sedation. Called dr. Jay Ortega and he stated he will call   [facility]. After discussion with  [facility] procedure will be done tomorrow. Patient to be NPO at midnight and INR to be drawn in the morning. Orders put in by this nurse. Patient transported back to the floor. Nurse to nurse called spoke with Kailash Bustillos, nurse notified of above information.   Electronically signed by Chio Roberson RN on 12/18/2022 at 1:53 PM

## 2022-12-18 NOTE — PROGRESS NOTES
Internal Medicine Progress Note    [facility]    Hillary Curran. Zuleika Calderon., KAUSHAL.A.JOSHOADILIA. Stephen Alonso D.O., ASHLI Burns, MSN, APRN, NP-C  Liliane Glasgow. Rasta Dorsey, MSN, APRN-CNP     Primary Care Physician: Tyler Cherry MD   Admitting Physician:  Paul Melara DO  Admission date and time: 12/16/2022  8:37 PM    Room:  [address] [place] [address]  Admitting diagnosis: Chronic respiratory failure with hypoxia (HCC) [J96.11]  Diverticulitis of large intestine with abscess without bleeding [K57.20]  Sepsis without acute organ dysfunction, due to unspecified organism [facility]) [A41.9]    Patient Name: Maryjane Tucker  MRN: [id number]    Date of Service: 12/18/2022     Subjective:  Olive Pascual is a 58 y.o. female who was seen and examined today,12/18/2022, at the bedside. Olive Pascual continues to have intermittent periods of sharp, stabbing abdominal pain. Plans are for IR guided drainage of the abscess today and I have personally discussed the case with the IR team.  Antibiotic therapy is being employed. Liquid diet can be employed following the procedure. Review of System:   Constitutional:   Denies fever or chills, weight loss or gain, fatigue or malaise. HEENT:   Denies ear pain, sore throat, sinus or eye problems. Maintained on chronic nasal cannula oxygen. Cardiovascular:   Denies any chest pain, irregular heartbeats, or palpitations. Respiratory:   Chronic shortness of breath. Gastrointestinal:   Ongoing abdominal discomfort. Genitourinary:    Denies any urgency, frequency, hematuria. Voiding  without difficulty. Extremities:   Denies lower extremity swelling, edema or cyanosis. Neurology:    Denies any headache or focal neurological deficits, Denies generalized weakness or memory difficulty. Psch:   Denies being anxious or depressed. Musculoskeletal:    Denies  myalgias, joint complaints or back pain.    Integumentary:   Denies any rashes, ulcers, or excoriations. Denies bruising. Hematologic/Lymphatic:  Denies bruising or bleeding. Physical Exam:  No intake/output data recorded. Intake/Output Summary (Last 24 hours) at 12/18/2022 1131  Last data filed at 12/17/2022 2234  Gross per 24 hour   Intake 25 ml   Output 900 ml   Net -875 ml   I/O last 3 completed shifts: In: 25 [P.O.:25]  Out: 1300 [Urine:1300]  Patient Vitals for the past 96 hrs (Last 3 readings):   Weight   12/18/22 0511 154 lb (69.9 kg)   12/17/22 1352 151 lb (68.5 kg)   12/17/22 1112 151 lb (68.5 kg)     Vital Signs:   Blood pressure (!) 112/59, pulse 99, temperature 99.6 °F (37.6 °C), temperature source Oral, resp. rate 21, height 5' 3\" (1.6 m), weight 154 lb (69.9 kg), SpO2 97 %, not currently breastfeeding. General appearance:  Alert, responsive, oriented to person, place, and time. Well preserved, alert, no distress. Head:  Normocephalic. No masses, lesions or tenderness. Eyes:  PERRLA. EOMI. Sclera clear. Buccal mucosa moist.  ENT:  Ears normal. Mucosa normal.  Nasal cannula oxygen is in place. Neck:    Supple. Trachea midline. No thyromegaly. No JVD. No bruits. Heart:    Rhythm regular. Rate controlled. No murmurs. Lungs:    Symmetrical. Clear to auscultation bilaterally. No wheezes. No rhonchi. No rales. Abdomen:   Mildly tender to palpation diffusely with voluntary guarding elicited. Extremities:    Peripheral pulses present. No peripheral edema. No ulcers. No cyanosis. No clubbing. Neurologic:    Alert x 3. No focal deficit. Cranial nerves grossly intact. No focal weakness. Psych:   Behavior is normal. Mood appears normal. Speech is not rapid and/or pressured. Musculoskeletal:   Spine ROM normal. Muscular strength intact. Gait not assessed. Integumentary:  No rashes  Skin normal color and texture.   Genitalia/Breast:  Deferred    Medication:  Scheduled Meds:   magnesium sulfate  2,000 mg IntraVENous Once    Arformoterol Tartrate  15 mcg Nebulization BID atorvastatin  40 mg Oral Nightly    budesonide  0.5 mg Nebulization BID    fluticasone  2 spray Each Nostril Daily    ipratropium-albuterol  3 mL Inhalation Q6H    sertraline  25 mg Oral Daily    metroNIDAZOLE  500 mg IntraVENous Q8H    cefTRIAXone (ROCEPHIN) IV  1,000 mg IntraVENous Q24H    pantoprazole  40 mg IntraVENous Daily     Continuous Infusions:   lactated ringers 125 mL/hr at 12/18/22 0809       Objective Data:  CBC with Differential:    Lab Results   Component Value Date/Time    WBC 15.0 12/18/2022 06:22 AM    RBC 3.26 12/18/2022 06:22 AM    HGB 9.7 12/18/2022 06:22 AM    HCT 30.3 12/18/2022 06:22 AM     12/18/2022 06:22 AM    MCV 92.9 12/18/2022 06:22 AM    MCH 29.8 12/18/2022 06:22 AM    MCHC 32.0 12/18/2022 06:22 AM    RDW 12.5 12/18/2022 06:22 AM    NRBC 0.9 11/30/2022 05:18 AM    LYMPHOPCT 6.4 12/18/2022 06:22 AM    MONOPCT 4.1 12/18/2022 06:22 AM    MYELOPCT 0.9 12/03/2022 06:04 AM    BASOPCT 0.1 12/18/2022 06:22 AM    MONOSABS 0.61 12/18/2022 06:22 AM    LYMPHSABS 0.96 12/18/2022 06:22 AM    EOSABS 0.05 12/18/2022 06:22 AM    BASOSABS 0.02 12/18/2022 06:22 AM     BMP:    Lab Results   Component Value Date/Time     12/18/2022 06:22 AM    K 3.4 12/18/2022 06:22 AM    K 3.9 12/16/2022 09:38 PM    CL 98 12/18/2022 06:22 AM    CO2 27 12/18/2022 06:22 AM    BUN 10 12/18/2022 06:22 AM    LABALBU 3.1 12/16/2022 09:38 PM    CREATININE 0.4 12/18/2022 06:22 AM    CALCIUM 7.8 12/18/2022 06:22 AM    GFRAA >60 04/13/2022 02:20 PM    LABGLOM >60 12/18/2022 06:22 AM    GLUCOSE 68 12/18/2022 06:22 AM       Assessment:  Sepsis secondary to acute diverticulitis with perforation and abscess  Chronic respiratory failure with hypoxia secondary to advanced COPD without exacerbation   Ongoing tobacco abuse  Prior history of DVT  Hyperlipidemia on statin agent    Plan: We will continue forward with antibiotic therapy and plans are for IR guided drainage of the abdominal abscess.   I have personally discussed the case with the interventional radiology team.  A clear liquid diet can be instituted following the procedure. The patient's respiratory status is otherwise stable as she is maintained on nasal cannula oxygen. We are attempting conservative measures from an abdominal standpoint as she is an overall poor surgical candidate in the setting of her respiratory situation. Chronic comorbidities are being monitored. Adequate pain control is being achieved. Continue current therapy. See orders for further plan of care. More than 50% of my  time was spent at the bedside counseling/coordinating care with the patient and/or family with face to face contact. This time was spent reviewing notes and laboratory data as well as instructing and counseling the patient. Time I spent with the family or surrogate(s) is included only if the patient was incapable of providing the necessary information or participating in medical decisions. I also discussed the differential diagnosis and all of the proposed management plans with the patient and individuals accompanying the patient. Ihsan Roberts requires this high level of physician care and nursing on the IMC/Telemetry unit due the complexity of decision management and chance of rapid decline or death. Continued cardiac monitoring and higher level of nursing are required. I am readily available for any further decision-making and intervention.      Yara Live DO, F.A.C.O.I.  12/18/2022  11:31 AM

## 2022-12-18 NOTE — PROGRESS NOTES
Spoke with patient regarding transfer. She states she was unaware she was supposed to go to [address] [facility] [address] and states she does not want to go there.

## 2022-12-19 ENCOUNTER — APPOINTMENT (OUTPATIENT)
Dept: INTERVENTIONAL RADIOLOGY/VASCULAR | Age: 62
End: 2022-12-19
Payer: COMMERCIAL

## 2022-12-19 LAB
ANION GAP SERPL CALCULATED.3IONS-SCNC: 6 MMOL/L (ref 7–16)
BASOPHILS ABSOLUTE: 0 E9/L (ref 0–0.2)
BASOPHILS RELATIVE PERCENT: 0.2 % (ref 0–2)
BUN BLDV-MCNC: 5 MG/DL (ref 6–23)
CALCIUM SERPL-MCNC: 7.4 MG/DL (ref 8.6–10.2)
CHLORIDE BLD-SCNC: 103 MMOL/L (ref 98–107)
CO2: 29 MMOL/L (ref 22–29)
CREAT SERPL-MCNC: 0.4 MG/DL (ref 0.5–1)
EKG ATRIAL RATE: 133 BPM
EKG Q-T INTERVAL: 286 MS
EKG QRS DURATION: 64 MS
EKG QTC CALCULATION (BAZETT): 433 MS
EKG R AXIS: 79 DEGREES
EKG T AXIS: 71 DEGREES
EKG VENTRICULAR RATE: 138 BPM
EOSINOPHILS ABSOLUTE: 0 E9/L (ref 0.05–0.5)
EOSINOPHILS RELATIVE PERCENT: 0.9 % (ref 0–6)
GFR SERPL CREATININE-BSD FRML MDRD: >60 ML/MIN/1.73
GLUCOSE BLD-MCNC: 98 MG/DL (ref 74–99)
HCT VFR BLD CALC: 29.6 % (ref 34–48)
HEMOGLOBIN: 9.6 G/DL (ref 11.5–15.5)
INR BLD: 1.5
LYMPHOCYTES ABSOLUTE: 0.21 E9/L (ref 1.5–4)
LYMPHOCYTES RELATIVE PERCENT: 1.7 % (ref 20–42)
MAGNESIUM: 2.1 MG/DL (ref 1.6–2.6)
MCH RBC QN AUTO: 30.1 PG (ref 26–35)
MCHC RBC AUTO-ENTMCNC: 32.4 % (ref 32–34.5)
MCV RBC AUTO: 92.8 FL (ref 80–99.9)
MONOCYTES ABSOLUTE: 0.41 E9/L (ref 0.1–0.95)
MONOCYTES RELATIVE PERCENT: 3.5 % (ref 2–12)
NEUTROPHILS ABSOLUTE: 9.79 E9/L (ref 1.8–7.3)
NEUTROPHILS RELATIVE PERCENT: 94.8 % (ref 43–80)
OVALOCYTES: ABNORMAL
PDW BLD-RTO: 12.7 FL (ref 11.5–15)
PHOSPHORUS: 1.6 MG/DL (ref 2.5–4.5)
PLATELET # BLD: 254 E9/L (ref 130–450)
PMV BLD AUTO: 9.8 FL (ref 7–12)
POIKILOCYTES: ABNORMAL
POLYCHROMASIA: ABNORMAL
POTASSIUM SERPL-SCNC: 4.7 MMOL/L (ref 3.5–5)
PROTHROMBIN TIME: 17.1 SEC (ref 9.3–12.4)
RBC # BLD: 3.19 E12/L (ref 3.5–5.5)
SODIUM BLD-SCNC: 138 MMOL/L (ref 132–146)
WBC # BLD: 10.3 E9/L (ref 4.5–11.5)

## 2022-12-19 PROCEDURE — 2500000003 HC RX 250 WO HCPCS: Performed by: NURSE PRACTITIONER

## 2022-12-19 PROCEDURE — 6360000002 HC RX W HCPCS: Performed by: RADIOLOGY

## 2022-12-19 PROCEDURE — 83735 ASSAY OF MAGNESIUM: CPT

## 2022-12-19 PROCEDURE — 87070 CULTURE OTHR SPECIMN AEROBIC: CPT

## 2022-12-19 PROCEDURE — 93010 ELECTROCARDIOGRAM REPORT: CPT | Performed by: INTERNAL MEDICINE

## 2022-12-19 PROCEDURE — C9113 INJ PANTOPRAZOLE SODIUM, VIA: HCPCS | Performed by: INTERNAL MEDICINE

## 2022-12-19 PROCEDURE — 94640 AIRWAY INHALATION TREATMENT: CPT

## 2022-12-19 PROCEDURE — 87077 CULTURE AEROBIC IDENTIFY: CPT

## 2022-12-19 PROCEDURE — 6360000002 HC RX W HCPCS: Performed by: INTERNAL MEDICINE

## 2022-12-19 PROCEDURE — 87205 SMEAR GRAM STAIN: CPT

## 2022-12-19 PROCEDURE — 6370000000 HC RX 637 (ALT 250 FOR IP): Performed by: INTERNAL MEDICINE

## 2022-12-19 PROCEDURE — P9047 ALBUMIN (HUMAN), 25%, 50ML: HCPCS | Performed by: NURSE PRACTITIONER

## 2022-12-19 PROCEDURE — 49406 IMAGE CATH FLUID PERI/RETRO: CPT

## 2022-12-19 PROCEDURE — 2700000000 HC OXYGEN THERAPY PER DAY

## 2022-12-19 PROCEDURE — 2060000000 HC ICU INTERMEDIATE R&B

## 2022-12-19 PROCEDURE — 6360000002 HC RX W HCPCS: Performed by: NURSE PRACTITIONER

## 2022-12-19 PROCEDURE — 85610 PROTHROMBIN TIME: CPT

## 2022-12-19 PROCEDURE — 0W9H30Z DRAINAGE OF RETROPERITONEUM WITH DRAINAGE DEVICE, PERCUTANEOUS APPROACH: ICD-10-PCS | Performed by: RADIOLOGY

## 2022-12-19 PROCEDURE — 2580000003 HC RX 258: Performed by: NURSE PRACTITIONER

## 2022-12-19 PROCEDURE — 36415 COLL VENOUS BLD VENIPUNCTURE: CPT

## 2022-12-19 PROCEDURE — 87186 SC STD MICRODIL/AGAR DIL: CPT

## 2022-12-19 PROCEDURE — 2580000003 HC RX 258: Performed by: INTERNAL MEDICINE

## 2022-12-19 PROCEDURE — 85025 COMPLETE CBC W/AUTO DIFF WBC: CPT

## 2022-12-19 PROCEDURE — 2709999900 IR ABSCESS DRAINAGE PERC

## 2022-12-19 PROCEDURE — 80048 BASIC METABOLIC PNL TOTAL CA: CPT

## 2022-12-19 PROCEDURE — 2500000003 HC RX 250 WO HCPCS: Performed by: INTERNAL MEDICINE

## 2022-12-19 PROCEDURE — 84100 ASSAY OF PHOSPHORUS: CPT

## 2022-12-19 PROCEDURE — 87102 FUNGUS ISOLATION CULTURE: CPT

## 2022-12-19 RX ORDER — ALBUMIN (HUMAN) 12.5 G/50ML
50 SOLUTION INTRAVENOUS EVERY 4 HOURS
Status: COMPLETED | OUTPATIENT
Start: 2022-12-19 | End: 2022-12-19

## 2022-12-19 RX ORDER — FENTANYL CITRATE 0.05 MG/ML
INJECTION, SOLUTION INTRAMUSCULAR; INTRAVENOUS
Status: COMPLETED | OUTPATIENT
Start: 2022-12-19 | End: 2022-12-19

## 2022-12-19 RX ORDER — MIDAZOLAM HYDROCHLORIDE 1 MG/ML
INJECTION INTRAMUSCULAR; INTRAVENOUS
Status: COMPLETED | OUTPATIENT
Start: 2022-12-19 | End: 2022-12-19

## 2022-12-19 RX ADMIN — SERTRALINE 25 MG: 50 TABLET, FILM COATED ORAL at 10:36

## 2022-12-19 RX ADMIN — ARFORMOTEROL TARTRATE 15 MCG: 15 SOLUTION RESPIRATORY (INHALATION) at 17:53

## 2022-12-19 RX ADMIN — MIDAZOLAM 1 MG: 1 INJECTION INTRAMUSCULAR; INTRAVENOUS at 09:26

## 2022-12-19 RX ADMIN — IPRATROPIUM BROMIDE AND ALBUTEROL SULFATE 3 ML: .5; 2.5 SOLUTION RESPIRATORY (INHALATION) at 22:28

## 2022-12-19 RX ADMIN — IPRATROPIUM BROMIDE AND ALBUTEROL SULFATE 3 ML: .5; 2.5 SOLUTION RESPIRATORY (INHALATION) at 13:46

## 2022-12-19 RX ADMIN — FENTANYL CITRATE 50 MCG: 50 INJECTION INTRAMUSCULAR; INTRAVENOUS at 09:26

## 2022-12-19 RX ADMIN — MORPHINE SULFATE 2 MG: 2 INJECTION, SOLUTION INTRAMUSCULAR; INTRAVENOUS at 22:45

## 2022-12-19 RX ADMIN — PANTOPRAZOLE SODIUM 40 MG: 40 INJECTION, POWDER, FOR SOLUTION INTRAVENOUS at 10:36

## 2022-12-19 RX ADMIN — POTASSIUM CHLORIDE AND SODIUM CHLORIDE: 900; 300 INJECTION, SOLUTION INTRAVENOUS at 14:16

## 2022-12-19 RX ADMIN — MORPHINE SULFATE 2 MG: 2 INJECTION, SOLUTION INTRAMUSCULAR; INTRAVENOUS at 18:38

## 2022-12-19 RX ADMIN — BUDESONIDE 500 MCG: 0.5 SUSPENSION RESPIRATORY (INHALATION) at 17:53

## 2022-12-19 RX ADMIN — BUDESONIDE 500 MCG: 0.5 SUSPENSION RESPIRATORY (INHALATION) at 06:45

## 2022-12-19 RX ADMIN — ALBUMIN (HUMAN) 50 G: 0.25 INJECTION, SOLUTION INTRAVENOUS at 10:52

## 2022-12-19 RX ADMIN — MORPHINE SULFATE 2 MG: 2 INJECTION, SOLUTION INTRAMUSCULAR; INTRAVENOUS at 10:37

## 2022-12-19 RX ADMIN — ARFORMOTEROL TARTRATE 15 MCG: 15 SOLUTION RESPIRATORY (INHALATION) at 06:45

## 2022-12-19 RX ADMIN — IPRATROPIUM BROMIDE AND ALBUTEROL SULFATE 3 ML: .5; 2.5 SOLUTION RESPIRATORY (INHALATION) at 17:53

## 2022-12-19 RX ADMIN — METRONIDAZOLE 500 MG: 500 INJECTION, SOLUTION INTRAVENOUS at 10:54

## 2022-12-19 RX ADMIN — ATORVASTATIN CALCIUM 40 MG: 40 TABLET, FILM COATED ORAL at 19:33

## 2022-12-19 RX ADMIN — IPRATROPIUM BROMIDE AND ALBUTEROL SULFATE 3 ML: .5; 2.5 SOLUTION RESPIRATORY (INHALATION) at 06:45

## 2022-12-19 RX ADMIN — MORPHINE SULFATE 2 MG: 2 INJECTION, SOLUTION INTRAMUSCULAR; INTRAVENOUS at 04:24

## 2022-12-19 RX ADMIN — SODIUM PHOSPHATE, MONOBASIC, MONOHYDRATE AND SODIUM PHOSPHATE, DIBASIC, ANHYDROUS 11.19 MMOL: 142; 276 INJECTION, SOLUTION INTRAVENOUS at 12:19

## 2022-12-19 RX ADMIN — WATER 1000 MG: 1 INJECTION INTRAMUSCULAR; INTRAVENOUS; SUBCUTANEOUS at 04:25

## 2022-12-19 RX ADMIN — ALBUMIN (HUMAN) 50 G: 0.25 INJECTION, SOLUTION INTRAVENOUS at 16:20

## 2022-12-19 RX ADMIN — FLUTICASONE PROPIONATE 2 SPRAY: 50 SPRAY, METERED NASAL at 10:52

## 2022-12-19 RX ADMIN — METRONIDAZOLE 500 MG: 500 INJECTION, SOLUTION INTRAVENOUS at 19:34

## 2022-12-19 ASSESSMENT — PAIN - FUNCTIONAL ASSESSMENT
PAIN_FUNCTIONAL_ASSESSMENT: ACTIVITIES ARE NOT PREVENTED
PAIN_FUNCTIONAL_ASSESSMENT: PREVENTS OR INTERFERES WITH ALL ACTIVE AND SOME PASSIVE ACTIVITIES
PAIN_FUNCTIONAL_ASSESSMENT: PREVENTS OR INTERFERES SOME ACTIVE ACTIVITIES AND ADLS
PAIN_FUNCTIONAL_ASSESSMENT: PREVENTS OR INTERFERES SOME ACTIVE ACTIVITIES AND ADLS
PAIN_FUNCTIONAL_ASSESSMENT: PREVENTS OR INTERFERES WITH MANY ACTIVE NOT PASSIVE ACTIVITIES

## 2022-12-19 ASSESSMENT — PAIN DESCRIPTION - DESCRIPTORS
DESCRIPTORS: ACHING;CRAMPING
DESCRIPTORS: DISCOMFORT;PENETRATING
DESCRIPTORS: CRAMPING;SHARP
DESCRIPTORS: SQUEEZING;SHARP;STABBING
DESCRIPTORS: CRAMPING;SHARP

## 2022-12-19 ASSESSMENT — PAIN SCALES - GENERAL
PAINLEVEL_OUTOF10: 4
PAINLEVEL_OUTOF10: 2
PAINLEVEL_OUTOF10: 5
PAINLEVEL_OUTOF10: 0
PAINLEVEL_OUTOF10: 8
PAINLEVEL_OUTOF10: 10
PAINLEVEL_OUTOF10: 0
PAINLEVEL_OUTOF10: 10
PAINLEVEL_OUTOF10: 10
PAINLEVEL_OUTOF10: 2
PAINLEVEL_OUTOF10: 9

## 2022-12-19 ASSESSMENT — PAIN DESCRIPTION - ORIENTATION
ORIENTATION: MID;LOWER
ORIENTATION: RIGHT;LEFT
ORIENTATION: MID;LOWER
ORIENTATION: MID
ORIENTATION: MID;LOWER

## 2022-12-19 ASSESSMENT — PAIN DESCRIPTION - LOCATION
LOCATION: ABDOMEN
LOCATION: ABDOMEN;GENERALIZED
LOCATION: ABDOMEN

## 2022-12-19 ASSESSMENT — PAIN DESCRIPTION - PAIN TYPE: TYPE: ACUTE PAIN

## 2022-12-19 ASSESSMENT — PAIN DESCRIPTION - FREQUENCY: FREQUENCY: INTERMITTENT

## 2022-12-19 ASSESSMENT — PAIN DESCRIPTION - ONSET: ONSET: ON-GOING

## 2022-12-19 NOTE — PROGRESS NOTES
OT SESSION ATTEMPT     Date:2022  Patient Name: Anusha Carballo  MRN: [id number]  : 1960  Room:      Attempted OT session this date:    [] unavailable due to other medical staff currently with pt   [x] unavailable per nursing staff recommendation-IR guided drainage of the abdominal abscess today and then placed on bed rest until 4:30pm    [] Unavailable per nursing staff secondary to lab / radiology results    [] pt declined due to ____. Benefits of participation in therapy reviewed with pt.    [] off unit   [] Other:     Will reattempt OT evaluation and/or treatment at a later time.     Henri Yeung OTR/L; [id number]

## 2022-12-19 NOTE — CARE COORDINATION
SW spoke with patient in her room. She is alert, oriented, and reports being independent. She states she lives home with her daughter and 2 grandchildren. They live in a 2 story home but she reports she stays on the first floor. She wears oxygen at home at 3-4L at baseline, and she has a nebulizer all through [employer]. Patients PCP is Dr Suzi Avila, pharmacy is [facility]. Patient states no history of [facility]. She has had HHC in the past, but states she doesn't want any HHC again because she has copays and she can not afford them, however SW explained we may need to make some tentative referrals in case she needs IV on DC or [facility] on DC. She is agreeable to referrals tentatively but if possible wants to know how much cost she will have with services, explained we may not be able to state this until we know what she will need. [facility] does not take insurance. [address], next start of care is 12/24/22. Spoke with Iraj at [facility] who will check to see if they are in network with patients insurance, referral to Gloria at [facility] who will review and let SW know if able to take. She wants to go home on DC, and states her daughter will transport her.

## 2022-12-19 NOTE — PROGRESS NOTES
Internal Medicine Progress Note    [facility]    Meliza Neri. Ariel Hernandez., F.A.CChenteOChenteI. Ramsey Gross D.O., MARIELOSOASHLI Humphrey, MSN, APRN, NP-C  Orlin Stweart, MSN, APRN-CNP     Primary Care Physician: Lisy Hyde MD   Admitting Physician:  Tracie Livingston DO  Admission date and time: 12/16/2022  8:37 PM    Room:  [facility]0624-  Admitting diagnosis: Chronic respiratory failure with hypoxia (HCC) [J96.11]  Diverticulitis of large intestine with abscess without bleeding [K57.20]  Sepsis without acute organ dysfunction, due to unspecified organism [facility]) [A41.9]    Patient Name: Arnoldo Crisostomo  MRN: [id number]    Date of Service: 12/19/2022     Subjective:  Robert Shetty is a 58 y.o. female who was seen and examined today,12/19/2022, at the bedside. Robert Shetty underwent IR guided drainage of the abdominal abscess today. She tolerated this without complication and describes significantly less abdominal pain. She is maintained on antibiotic therapy and is tolerating this without complication. Diet is being advanced. Review of System:   Constitutional:   Denies fever or chills, weight loss or gain, fatigue or malaise. HEENT:   Denies ear pain, sore throat, sinus or eye problems. Maintained on chronic nasal cannula oxygen. Cardiovascular:   Denies any chest pain, irregular heartbeats, or palpitations. Respiratory:   Chronic shortness of breath. Gastrointestinal:   Far less abdominal pain. Some irritation associated with new abdominal drain. Genitourinary:    Denies any urgency, frequency, hematuria. Voiding  without difficulty. Extremities:   Denies lower extremity swelling, edema or cyanosis. Neurology:    Denies any headache or focal neurological deficits, Denies generalized weakness or memory difficulty. Psch:   Denies being anxious or depressed. Musculoskeletal:    Denies  myalgias, joint complaints or back pain.    Integumentary: Denies any rashes, ulcers, or excoriations. Denies bruising. Hematologic/Lymphatic:  Denies bruising or bleeding. Physical Exam:  No intake/output data recorded. Intake/Output Summary (Last 24 hours) at 12/19/2022 1304  Last data filed at 12/19/2022 0307  Gross per 24 hour   Intake 4374.18 ml   Output --   Net 4374.18 ml   I/O last 3 completed shifts: In: 4399.2 [P.O.:75; I.V.:3195.1; IV Piggyback:1129.1]  Out: 300 [Urine:300]  Patient Vitals for the past 96 hrs (Last 3 readings):   Weight   12/19/22 0033 158 lb 8 oz (71.9 kg)   12/18/22 0511 154 lb (69.9 kg)   12/17/22 1352 151 lb (68.5 kg)     Vital Signs:   Blood pressure (!) 93/57, pulse 93, temperature 98.7 °F (37.1 °C), temperature source Oral, resp. rate 18, height 5' 3\" (1.6 m), weight 158 lb 8 oz (71.9 kg), SpO2 99 %, not currently breastfeeding. General appearance:  Alert, responsive, oriented to person, place, and time. Well preserved, alert, no distress. Head:  Normocephalic. No masses, lesions or tenderness. Eyes:  PERRLA. EOMI. Sclera clear. Buccal mucosa moist.  ENT:  Ears normal. Mucosa normal.  Nasal cannula oxygen is in place. Neck:    Supple. Trachea midline. No thyromegaly. No JVD. No bruits. Heart:    Rhythm regular. Rate controlled. No murmurs. Lungs:    Symmetrical. Clear to auscultation bilaterally. No wheezes. No rhonchi. No rales. Abdomen:   Mildly tender to palpation diffusely with voluntary guarding elicited. Abdominal drain is in place. Extremities:    Peripheral pulses present. No peripheral edema. No ulcers. No cyanosis. No clubbing. Neurologic:    Alert x 3. No focal deficit. Cranial nerves grossly intact. No focal weakness. Psych:   Behavior is normal. Mood appears normal. Speech is not rapid and/or pressured. Musculoskeletal:   Spine ROM normal. Muscular strength intact. Gait not assessed.   Integumentary:  No rashes  Skin normal color and texture.   Genitalia/Breast:  Deferred    Medication:  Scheduled Meds:   albumin human  50 g IntraVENous Q4H    Arformoterol Tartrate  15 mcg Nebulization BID    atorvastatin  40 mg Oral Nightly    budesonide  0.5 mg Nebulization BID    fluticasone  2 spray Each Nostril Daily    ipratropium-albuterol  3 mL Inhalation Q6H    sertraline  25 mg Oral Daily    metroNIDAZOLE  500 mg IntraVENous Q8H    cefTRIAXone (ROCEPHIN) IV  1,000 mg IntraVENous Q24H    pantoprazole  40 mg IntraVENous Daily     Continuous Infusions:   0.9% NaCl with KCl 40 mEq 100 mL/hr at 12/18/22 1243       Objective Data:  CBC with Differential:    Lab Results   Component Value Date/Time    WBC 10.3 12/19/2022 05:41 AM    RBC 3.19 12/19/2022 05:41 AM    HGB 9.6 12/19/2022 05:41 AM    HCT 29.6 12/19/2022 05:41 AM     12/19/2022 05:41 AM    MCV 92.8 12/19/2022 05:41 AM    MCH 30.1 12/19/2022 05:41 AM    MCHC 32.4 12/19/2022 05:41 AM    RDW 12.7 12/19/2022 05:41 AM    NRBC 0.9 11/30/2022 05:18 AM    LYMPHOPCT 1.7 12/19/2022 05:41 AM    MONOPCT 3.5 12/19/2022 05:41 AM    MYELOPCT 0.9 12/03/2022 06:04 AM    BASOPCT 0.2 12/19/2022 05:41 AM    MONOSABS 0.41 12/19/2022 05:41 AM    LYMPHSABS 0.21 12/19/2022 05:41 AM    EOSABS 0.00 12/19/2022 05:41 AM    BASOSABS 0.00 12/19/2022 05:41 AM     BMP:    Lab Results   Component Value Date/Time     12/19/2022 05:41 AM    K 4.7 12/19/2022 05:41 AM    K 3.9 12/16/2022 09:38 PM     12/19/2022 05:41 AM    CO2 29 12/19/2022 05:41 AM    BUN 5 12/19/2022 05:41 AM    LABALBU 3.1 12/16/2022 09:38 PM    CREATININE 0.4 12/19/2022 05:41 AM    CALCIUM 7.4 12/19/2022 05:41 AM    GFRAA >60 04/13/2022 02:20 PM    LABGLOM >60 12/19/2022 05:41 AM    GLUCOSE 98 12/19/2022 05:41 AM       Assessment:  Sepsis secondary to acute diverticulitis with perforation and abscess  Chronic respiratory failure with hypoxia secondary to advanced COPD without exacerbation   Ongoing tobacco abuse  Prior history of DVT  Hyperlipidemia on statin agent    Plan:   Lauren Carrero tolerated IR guided drainage of the abdominal abscess today. She is maintained on antibiotic support. We will await final cultures prior to determining final antibiotics. Her respiratory status is otherwise stable and she is maintained on nasal cannula oxygen. We reinforced the need for increased activity. Diet is being advanced. More than 50% of my  time was spent at the bedside counseling/coordinating care with the patient and/or family with face to face contact. This time was spent reviewing notes and laboratory data as well as instructing and counseling the patient. Time I spent with the family or surrogate(s) is included only if the patient was incapable of providing the necessary information or participating in medical decisions. I also discussed the differential diagnosis and all of the proposed management plans with the patient and individuals accompanying the patient. Lauren Carrero requires this high level of physician care and nursing on the IMC/Telemetry unit due the complexity of decision management and chance of rapid decline or death. Continued cardiac monitoring and higher level of nursing are required. I am readily available for any further decision-making and intervention.      Sujit Collado DO, F.A.C.O.I.  12/19/2022  1:04 PM

## 2022-12-19 NOTE — PLAN OF CARE
Problem: Discharge Planning  Goal: Discharge to home or other facility with appropriate resources  Outcome: Progressing     Problem: Safety - Adult  Goal: Free from fall injury  Outcome: Progressing     Problem: Pain  Goal: Verbalizes/displays adequate comfort level or baseline comfort level  Outcome: Progressing  Flowsheets (Taken 12/18/2022 1700 by Loyd Ulloa RN)  Verbalizes/displays adequate comfort level or baseline comfort level:   Encourage patient to monitor pain and request assistance   Assess pain using appropriate pain scale   Administer analgesics based on type and severity of pain and evaluate response   Implement non-pharmacological measures as appropriate and evaluate response   Consider cultural and social influences on pain and pain management   Notify Licensed Independent Practitioner if interventions unsuccessful or patient reports new pain

## 2022-12-19 NOTE — PROGRESS NOTES
Patient came down to special procedures for drainage of intra abdominal fluid collection with possible drain placement. Patient was educated about the amount of radiation used with today's procedure. Patient taken to Cat Scan, positioned on table supine with O2 and monitoring equipment attached. Procedure was explained and questions answered. Emotional support given. Patient scanned and images reviewed by Dr Harshal Duke    Patient prepped and draped per protocol. 0926 sedation medication given    0927 start procedure VS 70/45 138 15 98% on 3 liters nasal canula     8 Hebrew drainage tube placed with Cat Scan guidance by Dr Harshal Duke    10 ml, total amount drained    specimen obtained and sent to lab for culture&sensitivity and gram stain. Patient re-scanned. Lower abdomen Puncture site cleansed, MFIXX and tegaderm applied to secure tube and tube connected to accordion bag.      Patient tolerated well.     0932  Procedure completed VS 74/44 93 11 94% on 3 liters nasal canula     0947 - 15 minutes post procedure VS 87/54 145 15 98% on 3 liters nasal canula  no complaints of pain    1002 - 30 minutes post procedure VS 90/52 86 17 98% on 3 liters nasal canula  no complaints of pain    Patient able to eat per IR standpoint    Total amount of sedation medication given during procedure: 1 mg of IV Versed and 50 mcg of IV Fentanyl    Nurse to nurse called spoke with Charles Ying RN, nurse notified of above information    Transport in for patient to go back to floor

## 2022-12-19 NOTE — PLAN OF CARE
Problem: Discharge Planning  Goal: Discharge to home or other facility with appropriate resources  12/19/2022 0941 by Kt Ramirez RN  Outcome: Progressing  49/68/9274 [id number] by Razia Michelle RN  Outcome: Progressing     Problem: Safety - Adult  Goal: Free from fall injury  12/19/2022 0941 by Kt Ramirez RN  Outcome: Progressing  62/12/5917 7338 by Razia Michelle RN  Outcome: Progressing     Problem: Pain  Goal: Verbalizes/displays adequate comfort level or baseline comfort level  12/19/2022 0941 by Kt Ramirez RN  Outcome: Progressing  48/26/2031 0294 by Razia Michelle RN  Outcome: Progressing  Flowsheets (Taken 12/18/2022 1700 by Jose Strong RN)  Verbalizes/displays adequate comfort level or baseline comfort level:   Encourage patient to monitor pain and request assistance   Assess pain using appropriate pain scale   Administer analgesics based on type and severity of pain and evaluate response   Implement non-pharmacological measures as appropriate and evaluate response   Consider cultural and social influences on pain and pain management   Notify Licensed Independent Practitioner if interventions unsuccessful or patient reports new pain

## 2022-12-19 NOTE — PROGRESS NOTES
GENERAL SURGERY  DAILY PROGRESS NOTE  12/19/2022    Chief Complaint   Patient presents with    Abdominal Pain     Lower abdominal pain, worsening since tues       Subjective:  Pt states she is having pain in the LLQ. Denies any nausea/vomiting.     Objective:  /88   Pulse (!) 104   Temp 98.2 °F (36.8 °C) (Oral)   Resp 20   Ht 5' 3\" (1.6 m)   Wt 158 lb 8 oz (71.9 kg)   SpO2 98%   BMI 28.08 kg/m²     GENERAL:  Laying in bed, awake, alert, cooperative, no apparent distress  HEAD: Normocephalic, atraumatic  EYES: No sclera icterus, pupils equal  LUNGS:  No increased work of breathing on room air  CARDIOVASCULAR:  Tachycardic and normotensive  ABDOMEN:  Soft, mild TTP LLQ, non-distended  EXTREMITIES: No edema or swelling  SKIN: Warm and dry    Assessment/Plan:  58 y.o. female with perforated diverticulitis and abscess formation    - Keep pt NPO, sips with meds  - IVFs  - Pain control PRN  - Continue abx  - Plan for IR drain today  - Okay for diet after IR drain    Electronically signed by Tasneem Rodas MD on 12/19/2022 at 6:51 AM

## 2022-12-20 ENCOUNTER — APPOINTMENT (OUTPATIENT)
Dept: GENERAL RADIOLOGY | Age: 62
End: 2022-12-20
Payer: COMMERCIAL

## 2022-12-20 LAB
ANION GAP SERPL CALCULATED.3IONS-SCNC: 5 MMOL/L (ref 7–16)
ANISOCYTOSIS: ABNORMAL
BASOPHILS ABSOLUTE: 0 E9/L (ref 0–0.2)
BASOPHILS RELATIVE PERCENT: 0.2 % (ref 0–2)
BUN BLDV-MCNC: 6 MG/DL (ref 6–23)
CALCIUM SERPL-MCNC: 7.9 MG/DL (ref 8.6–10.2)
CHLORIDE BLD-SCNC: 104 MMOL/L (ref 98–107)
CO2: 29 MMOL/L (ref 22–29)
CREAT SERPL-MCNC: 0.3 MG/DL (ref 0.5–1)
EKG ATRIAL RATE: 90 BPM
EKG P AXIS: 83 DEGREES
EKG P-R INTERVAL: 160 MS
EKG Q-T INTERVAL: 354 MS
EKG QRS DURATION: 62 MS
EKG QTC CALCULATION (BAZETT): 433 MS
EKG R AXIS: 72 DEGREES
EKG T AXIS: 74 DEGREES
EKG VENTRICULAR RATE: 90 BPM
EOSINOPHILS ABSOLUTE: 0 E9/L (ref 0.05–0.5)
EOSINOPHILS RELATIVE PERCENT: 2 % (ref 0–6)
GFR SERPL CREATININE-BSD FRML MDRD: >60 ML/MIN/1.73
GLUCOSE BLD-MCNC: 124 MG/DL (ref 74–99)
GRAM STAIN ORDERABLE: NORMAL
HCT VFR BLD CALC: 26.8 % (ref 34–48)
HEMOGLOBIN: 8.7 G/DL (ref 11.5–15.5)
LYMPHOCYTES ABSOLUTE: 0.42 E9/L (ref 1.5–4)
LYMPHOCYTES RELATIVE PERCENT: 5.3 % (ref 20–42)
MAGNESIUM: 1.9 MG/DL (ref 1.6–2.6)
MCH RBC QN AUTO: 31.4 PG (ref 26–35)
MCHC RBC AUTO-ENTMCNC: 32.5 % (ref 32–34.5)
MCV RBC AUTO: 96.8 FL (ref 80–99.9)
MONOCYTES ABSOLUTE: 0.17 E9/L (ref 0.1–0.95)
MONOCYTES RELATIVE PERCENT: 1.8 % (ref 2–12)
NEUTROPHILS ABSOLUTE: 7.81 E9/L (ref 1.8–7.3)
NEUTROPHILS RELATIVE PERCENT: 92.9 % (ref 43–80)
OVALOCYTES: ABNORMAL
PDW BLD-RTO: 13.1 FL (ref 11.5–15)
PHOSPHORUS: 2.1 MG/DL (ref 2.5–4.5)
PLATELET # BLD: 302 E9/L (ref 130–450)
PMV BLD AUTO: 10.4 FL (ref 7–12)
POIKILOCYTES: ABNORMAL
POLYCHROMASIA: ABNORMAL
POTASSIUM SERPL-SCNC: 4.6 MMOL/L (ref 3.5–5)
RBC # BLD: 2.77 E12/L (ref 3.5–5.5)
SODIUM BLD-SCNC: 138 MMOL/L (ref 132–146)
TROPONIN, HIGH SENSITIVITY: 24 NG/L (ref 0–9)
TROPONIN, HIGH SENSITIVITY: 31 NG/L (ref 0–9)
TROPONIN, HIGH SENSITIVITY: 39 NG/L (ref 0–9)
TROPONIN, HIGH SENSITIVITY: 47 NG/L (ref 0–9)
WBC # BLD: 8.4 E9/L (ref 4.5–11.5)

## 2022-12-20 PROCEDURE — 36415 COLL VENOUS BLD VENIPUNCTURE: CPT

## 2022-12-20 PROCEDURE — 2500000003 HC RX 250 WO HCPCS: Performed by: INTERNAL MEDICINE

## 2022-12-20 PROCEDURE — 83735 ASSAY OF MAGNESIUM: CPT

## 2022-12-20 PROCEDURE — 6370000000 HC RX 637 (ALT 250 FOR IP): Performed by: INTERNAL MEDICINE

## 2022-12-20 PROCEDURE — 97535 SELF CARE MNGMENT TRAINING: CPT

## 2022-12-20 PROCEDURE — C9113 INJ PANTOPRAZOLE SODIUM, VIA: HCPCS | Performed by: INTERNAL MEDICINE

## 2022-12-20 PROCEDURE — 2580000003 HC RX 258: Performed by: INTERNAL MEDICINE

## 2022-12-20 PROCEDURE — 97165 OT EVAL LOW COMPLEX 30 MIN: CPT

## 2022-12-20 PROCEDURE — 6360000002 HC RX W HCPCS: Performed by: INTERNAL MEDICINE

## 2022-12-20 PROCEDURE — 84484 ASSAY OF TROPONIN QUANT: CPT

## 2022-12-20 PROCEDURE — 71045 X-RAY EXAM CHEST 1 VIEW: CPT

## 2022-12-20 PROCEDURE — 2580000003 HC RX 258: Performed by: NURSE PRACTITIONER

## 2022-12-20 PROCEDURE — 2700000000 HC OXYGEN THERAPY PER DAY

## 2022-12-20 PROCEDURE — 80048 BASIC METABOLIC PNL TOTAL CA: CPT

## 2022-12-20 PROCEDURE — 94640 AIRWAY INHALATION TREATMENT: CPT

## 2022-12-20 PROCEDURE — 99232 SBSQ HOSP IP/OBS MODERATE 35: CPT | Performed by: SURGERY

## 2022-12-20 PROCEDURE — 6360000002 HC RX W HCPCS: Performed by: NURSE PRACTITIONER

## 2022-12-20 PROCEDURE — 2500000003 HC RX 250 WO HCPCS: Performed by: NURSE PRACTITIONER

## 2022-12-20 PROCEDURE — 2060000000 HC ICU INTERMEDIATE R&B

## 2022-12-20 PROCEDURE — 85025 COMPLETE CBC W/AUTO DIFF WBC: CPT

## 2022-12-20 PROCEDURE — 84100 ASSAY OF PHOSPHORUS: CPT

## 2022-12-20 PROCEDURE — 93005 ELECTROCARDIOGRAM TRACING: CPT | Performed by: NURSE PRACTITIONER

## 2022-12-20 RX ORDER — METOPROLOL TARTRATE 5 MG/5ML
5 INJECTION INTRAVENOUS ONCE
Status: COMPLETED | OUTPATIENT
Start: 2022-12-20 | End: 2022-12-20

## 2022-12-20 RX ORDER — 0.9 % SODIUM CHLORIDE 0.9 %
500 INTRAVENOUS SOLUTION INTRAVENOUS ONCE
Status: COMPLETED | OUTPATIENT
Start: 2022-12-20 | End: 2022-12-20

## 2022-12-20 RX ADMIN — BUDESONIDE 500 MCG: 0.5 SUSPENSION RESPIRATORY (INHALATION) at 18:00

## 2022-12-20 RX ADMIN — ARFORMOTEROL TARTRATE 15 MCG: 15 SOLUTION RESPIRATORY (INHALATION) at 05:19

## 2022-12-20 RX ADMIN — FLUTICASONE PROPIONATE 2 SPRAY: 50 SPRAY, METERED NASAL at 07:52

## 2022-12-20 RX ADMIN — WATER 1000 MG: 1 INJECTION INTRAMUSCULAR; INTRAVENOUS; SUBCUTANEOUS at 04:58

## 2022-12-20 RX ADMIN — SERTRALINE 25 MG: 50 TABLET, FILM COATED ORAL at 07:52

## 2022-12-20 RX ADMIN — METOPROLOL TARTRATE 5 MG: 5 INJECTION INTRAVENOUS at 04:10

## 2022-12-20 RX ADMIN — PANTOPRAZOLE SODIUM 40 MG: 40 INJECTION, POWDER, FOR SOLUTION INTRAVENOUS at 07:52

## 2022-12-20 RX ADMIN — METRONIDAZOLE 500 MG: 500 INJECTION, SOLUTION INTRAVENOUS at 19:22

## 2022-12-20 RX ADMIN — ARFORMOTEROL TARTRATE 15 MCG: 15 SOLUTION RESPIRATORY (INHALATION) at 18:01

## 2022-12-20 RX ADMIN — IPRATROPIUM BROMIDE AND ALBUTEROL SULFATE 3 ML: .5; 2.5 SOLUTION RESPIRATORY (INHALATION) at 22:12

## 2022-12-20 RX ADMIN — MORPHINE SULFATE 2 MG: 2 INJECTION, SOLUTION INTRAMUSCULAR; INTRAVENOUS at 03:45

## 2022-12-20 RX ADMIN — METRONIDAZOLE 500 MG: 500 INJECTION, SOLUTION INTRAVENOUS at 11:23

## 2022-12-20 RX ADMIN — SODIUM CHLORIDE 500 ML: 9 INJECTION, SOLUTION INTRAVENOUS at 04:58

## 2022-12-20 RX ADMIN — IPRATROPIUM BROMIDE AND ALBUTEROL SULFATE 3 ML: .5; 2.5 SOLUTION RESPIRATORY (INHALATION) at 09:57

## 2022-12-20 RX ADMIN — POTASSIUM CHLORIDE AND SODIUM CHLORIDE: 900; 300 INJECTION, SOLUTION INTRAVENOUS at 16:40

## 2022-12-20 RX ADMIN — BUDESONIDE 500 MCG: 0.5 SUSPENSION RESPIRATORY (INHALATION) at 05:19

## 2022-12-20 RX ADMIN — IPRATROPIUM BROMIDE AND ALBUTEROL SULFATE 3 ML: .5; 2.5 SOLUTION RESPIRATORY (INHALATION) at 18:00

## 2022-12-20 RX ADMIN — IPRATROPIUM BROMIDE AND ALBUTEROL SULFATE 3 ML: .5; 2.5 SOLUTION RESPIRATORY (INHALATION) at 05:19

## 2022-12-20 RX ADMIN — METRONIDAZOLE 500 MG: 500 INJECTION, SOLUTION INTRAVENOUS at 04:00

## 2022-12-20 RX ADMIN — ATORVASTATIN CALCIUM 40 MG: 40 TABLET, FILM COATED ORAL at 21:01

## 2022-12-20 RX ADMIN — ONDANSETRON 4 MG: 2 INJECTION INTRAMUSCULAR; INTRAVENOUS at 03:48

## 2022-12-20 ASSESSMENT — PAIN SCALES - GENERAL
PAINLEVEL_OUTOF10: 10
PAINLEVEL_OUTOF10: 9
PAINLEVEL_OUTOF10: 0
PAINLEVEL_OUTOF10: 3

## 2022-12-20 ASSESSMENT — PAIN DESCRIPTION - ORIENTATION: ORIENTATION: MID;LOWER

## 2022-12-20 ASSESSMENT — PAIN DESCRIPTION - LOCATION: LOCATION: ABDOMEN

## 2022-12-20 ASSESSMENT — PAIN - FUNCTIONAL ASSESSMENT: PAIN_FUNCTIONAL_ASSESSMENT: PREVENTS OR INTERFERES WITH ALL ACTIVE AND SOME PASSIVE ACTIVITIES

## 2022-12-20 NOTE — PROGRESS NOTES
Patient complaining of shortness of breath, found to be diaphoretic, heart rate in 140's, patient desaturated into 80's, high flow nasal cannula at 10 lpm applied to maintain SPO2 above 90%. Sincere Reeves notified see new orders.

## 2022-12-20 NOTE — PROGRESS NOTES
[address] [facility]  [facility] [place]. [place]        Date:2022                                                  Patient Name: Saintclair Ip    MRN: [id number]    : 1960    Room: [address] [place] [address]      Evaluating OT: Lyndsey Cordero OTR/L #[id number]     Referring Provider and Specific Provider Orders/Date:      22  OT eval and treat  Start:  22,   End:  22,   ONE TIME,   Standing Count:  1 Occurrences,   R         Kinza Sunshine, DO      Placement Recommendation: Home        Diagnosis:   1. Diverticulitis of large intestine with abscess without bleeding    2. Chronic respiratory failure with hypoxia (HCC) Stable   3. Sepsis without acute organ dysfunction, due to unspecified organism [facility])         Surgery: S/p IR guided drainage of the abdominal abscess 22       Pertinent Medical History:       Past Medical History:   Diagnosis Date    Abscess     colon    COPD (chronic obstructive pulmonary disease) ([facility] [address]     Diverticulitis     Provoked DVT 3/2018     3 months Eliquis for DVT due to PICC line    Seasonal allergic rhinitis     Smoker 2019    5-6 per day    Tobacco use disorder      : 1 ppd since age 25       History reviewed. No pertinent surgical history.      Precautions:  Fall Risk, activity as tolerated, abdominal drain, 3L O2 via nasal canula      Assessment of current deficits    [x] Functional mobility  [x]ADLs  [x] Strength               []Cognition    [x] Functional transfers   [x] IADLs         [] Safety Awareness   [x]Endurance    [] Fine Coordination              [x] Balance      [] Vision/perception   []Sensation     []Gross Motor Coordination  [] ROM  [] Delirium                   [] Motor Control     OT PLAN OF CARE   OT POC based on physician orders, patient diagnosis and results of clinical assessment    Frequency/Duration 1-3 days/wk for 2 weeks PRN     Specific OT Treatment Interventions to include:   * Instruction/training on adapted ADL techniques and AE recommendations to increase functional independence within precautions       * Training on energy conservation strategies, correct breathing pattern and techniques to improve independence/tolerance for self-care routine  * Functional transfer/mobility training/DME recommendations for increased independence, safety, and fall prevention  * Patient/Family education to increase follow through with safety techniques and functional independence  * Recommendation of environmental modifications for increased safety with functional transfers/mobility and ADLs  * Therapeutic exercise to improve motor endurance, ROM, and functional strength for ADLs/functional transfers  * Therapeutic activities to facilitate/challenge dynamic balance, stand tolerance for increased safety and independence with ADLs  * Positioning to improve skin integrity, interaction with environment and functional independence    Recommended Adaptive Equipment: TBD      Home Living: Patient lives with daughter in a two story home resides first  with 2 steps, with rail  to enter Tub shower , grab bars  4 Liters of o2 via nasal cannula      Equipment owned: shower chair , 3-4L O2  dependent     Prior Level of Function: Independent with ADLs , daughter assists with IADLs; ambulated independently. Driving: No     Pain Level: pt denied pain; Nursing notified.       Cognition: A&O: 4/4; Follows 3 step directions ; pt anxious at times    Memory: intact   Sequencing: intact   Problem solving: fair    Judgement/safety: fair     [facility]   AM-PAC Daily Activity Inpatient   How much help for putting on and taking off regular lower body clothing?: A Little  How much help for Bathing?: A Little  How much help for Toileting?: A Little  How much help for putting on and taking off regular upper body clothing?: A Little  How much help for taking care of personal grooming?: A Little  How much help for eating meals?: None  AM-PAC Inpatient Daily Activity Raw Score: 19  AM-PAC Inpatient ADL T-Scale Score : 40.22  ADL Inpatient CMS 0-100% Score: 42.8  ADL Inpatient CMS G-Code Modifier : CK     Functional Assessment:    Initial Eval Status  Date: 12/20/22   Treatment Status  Date: STGs = LTGs  Time frame: 10-14 days   Feeding Independent         Grooming Stand by Assist     Independent    UB Dressing Stand by Assist   To doff overhead shirt; Min A to don gown and tie lace around neck     Independent    LB Dressing Stand by Assist   To doff pants while seated/standing at bedside     Independent    Bathing Minimal Assist   For LB sponge bath seated/standing at bedside     Independent    Toileting Minimal Assist   For thoroughness with hygiene care     Independent    Bed Mobility  Supine to sit: Supervision   Sit to supine:  Not Assessed     Supine to sit: Independent   Sit to supine: Independent    Functional Transfers Sit to stand: Supervision   Stand to sit: Supervision     Transfer training with verbal cues for hand placement throughout session to improve safety. Independent    Functional Mobility Supervision to improve balance short distance from EOB to chair, verbal cues for overall safety awareness.      Independent    Balance Sitting:     Static: fair plus    Dynamic: fair plus  Standing: fair plus     Sitting:     Static: good    Dynamic: good  Standing: good    Activity Tolerance fair  / fair minus   Increase standing tolerance >3  minutes for improved engagement with functional transfers and indep in ADLs     Visual/  Perceptual Glasses: n/a     NA      Hand Dominance:      AROM (PROM) Strength Additional Info:  Goal:   RUE  WFL 4-/5 good  and wfl FMC/dexterity noted during ADL tasks   Improve overall RUE strength  for participation in functional tasks   LUE WFL 4-/5 good  and wfl FMC/dexterity noted during ADL tasks   Improve overall LUE strength  for participation in functional tasks     Hearing:  [facility]   Sensation:   No c/o numbness or tingling  Tone:  WFL   Edema:      Vitals: 3L   HR at rest: 84 bpm HR with activity: bpm HR at end of session: 104 bpm   SpO2 at rest: 95% SpO2 with activity: 97% SpO2 at end of session: 97%   BP at rest:  BP with activity:  BP at end of session:      Comments: RN cleared patient for OT. Upon arrival patient in supine. Therapist facilitated and instructed pt on adapted  techniques & compensatory strategies to improve safety and independence with basic ADLs, bed mobility, functional transfers and mobility to allow pt to achieve highest level of independence and safely. Pt demonstrated fair understanding of education & follow through. At end of session, patient was in chair with call light and phone within reach, all lines and tubes intact. Overall, patient demonstrated  decreased independence and safety during completion of ADL tasks. Pt would benefit from continued skilled OT to increase safety and independence with completion of ADL tasks and functional mobility for improved quality of life. Treatment: OT treatment provided this date includes:   Instructions/training on safety, sequencing, and adapted techniques for completion of ADLs. Facilitated bed mobility with cues for proper body mechanics and sequencing to prepare for ADL completion. Instruction/training on safe functional mobility/transfer techniques including hand and feet placement   Instruction/training on energy conservation/work simplification for completion of ADLs   Skilled monitoring of O2 sats - see section above     Rehab Potential: Good for established goals. Patient / Family Goal: not stated       Patient and/or family were instructed on functional diagnosis, prognosis/goals and OT plan of care. Demonstrated fair understanding.      Eval Complexity: Low    Time In: 9:24 AM   Time Out: 9:56 AM    Total Treatment Time: 12       Min Units   OT Eval Low 75944  X 1    OT Eval Medium 04696      OT Eval High 00840      OT Re-Eval [id number]            ADL/Self Care 61873 12 1   Therapeutic Activities 50390       Therapeutic Ex 24566       Orthotic Management 22137       Manual 95138     Neuro Re-Ed [id number]       Non-Billable Time        Evaluation Time additionally includes thorough review of current medical information, gathering information on past medical history/social history and prior level of function, interpretation of standardized testing/informal observation of tasks, assessment of data and development of plan of care and goals.         Evaluating OT: Edmundo Smith OTR/L #[id number]

## 2022-12-20 NOTE — HOME CARE
[facility] received referral. Will follow after discharge.  Demo's still need verified  Polly Vigil LPN, [facility]

## 2022-12-20 NOTE — PROGRESS NOTES
Physical Therapy    0624/0624-02    Patient unavailable for physical therapy treatment due to patient stated she is to tired this pm for therapy because of working with OT this am and she didn't get much sleep last night. Carri Heredia  [facility]  LIC # [id number]

## 2022-12-20 NOTE — PROGRESS NOTES
GENERAL SURGERY  DAILY PROGRESS NOTE  12/20/2022    Chief Complaint   Patient presents with    Abdominal Pain     Lower abdominal pain, worsening since tues       Subjective:  Overnight, pt tachycardic and hypoxic. Pt states she is feeling better. Less abdominal pain and having flatus/BM.     Objective:  /80   Pulse (!) 140   Temp 99.4 °F (37.4 °C) (Oral)   Resp 28   Ht 5' 3\" (1.6 m)   Wt 160 lb (72.6 kg)   SpO2 95%   BMI 28.34 kg/m²     GENERAL:  Laying in bed, awake, alert, cooperative, no apparent distress  HEAD: Normocephalic, atraumatic  EYES: No sclera icterus, pupils equal  LUNGS:  On 10L NC  CARDIOVASCULAR:  Tachycardic and normotensive  ABDOMEN:  Soft, mild TTP LLQ, non-distended, IR drain with 90cc purulent output  EXTREMITIES: No edema or swelling  SKIN: Warm and dry    Assessment/Plan:  58 y.o. female with perforated diverticulitis and abscess formation s/p IR drain 12/19 with SIRS response    - Okay for diet as tolerated  - Pain control PRN  - Continue abx  - Monitor drain output  - Follow up drain cultures    Electronically signed by Andreina Leija MD on 12/20/2022 at 5:13 AM    Surgery Progress Note            Chief complaint:   Chief Complaint   Patient presents with    Abdominal Pain     Lower abdominal pain, worsening since tues      Patient Active Problem List   Diagnosis    Tobacco use disorder    Seasonal allergic rhinitis    Chronic bronchitis (HCC)    Chronic respiratory failure with hypoxia (HCC)    Chronic obstructive pulmonary disease ([address]    Acute respiratory failure with hypercapnia (HCC)    Acute on chronic respiratory failure with hypoxia and hypercapnia (HCC)    COPD exacerbation ([address]    Acute diverticulitis with abscess       S: as above    O:   Vitals:    12/20/22 0600   BP: 138/79   Pulse: 94   Resp: 24   Temp: 98.9 °F (37.2 °C)   SpO2: 95%       Intake/Output Summary (Last 24 hours) at 12/20/2022 0710  Last data filed at 12/20/2022 0420  Gross per 24 hour   Intake 922.86 ml   Output 90 ml   Net 832.86 ml           Labs:  Lab Results   Component Value Date/Time    WBC 8.4 12/20/2022 05:27 AM    WBC 10.3 12/19/2022 05:41 AM    WBC 15.0 12/18/2022 06:22 AM    HGB 8.7 12/20/2022 05:27 AM    HGB 9.6 12/19/2022 05:41 AM    HGB 9.7 12/18/2022 06:22 AM    HCT 26.8 12/20/2022 05:27 AM    HCT 29.6 12/19/2022 05:41 AM    HCT 30.3 12/18/2022 06:22 AM     Lab Results   Component Value Date    CREATININE 0.3 (L) 12/20/2022    BUN 6 12/20/2022     12/20/2022    K 4.6 12/20/2022     12/20/2022    CO2 29 12/20/2022     Lab Results   Component Value Date/Time    LIPASE 12 12/16/2022 09:38 PM    LIPASE 10 01/17/2018 01:30 PM         Physical exam:   /79   Pulse 94   Temp 98.9 °F (37.2 °C) (Oral)   Resp 24   Ht 5' 3\" (1.6 m)   Wt 160 lb (72.6 kg)   SpO2 95%   BMI 28.34 kg/m²   General appearance: NAD  Head: NCAT  Neck: supple, no masses  Lungs: equal chest rise bilateral  Heart: S1S2 present  Abdomen: soft, minimally tender, nondistended,  drain with pus  Skin; no lesions  Gu: no cva tenderness  Extremities: extremities normal, atraumatic, no cyanosis or edema    A:  acute diverticulitis with abscess s/p IR drain    P: iv abx, diet and monitor exam    Para MD Sharda, MD  12/20/2022

## 2022-12-20 NOTE — PROGRESS NOTES
Internal Medicine Progress Note    [facility]    Anthony Dsouza. Mitesh Bearden, F.A.C.O.I. Virgin Crigler, D.O., ASHLI Valencia, MSN, APRN, NP-C  Junaid Fairchild. Manohar Vanegas, MSN, APRN-CNP     Primary Care Physician: Jane Krause MD   Admitting Physician:  Doug Rai DO  Admission date and time: 12/16/2022  8:37 PM    Room:  24/0624-02  Admitting diagnosis: Chronic respiratory failure with hypoxia (HCC) [J96.11]  Diverticulitis of large intestine with abscess without bleeding [K57.20]  Sepsis without acute organ dysfunction, due to unspecified organism [facility]) [A41.9]    Patient Name: Avila Santiago  MRN: [id number]    Date of Service: 12/20/2022     Subjective:  Oralia Onofre is a 58 y.o. female who was seen and examined today,12/20/2022, at the bedside. Oralia Onofre appears well during my examination today as she is seated at the bedside chair. She had an episode of tachycardia and diaphoresis this morning. This responded accordingly with IV Lopressor and fluid bolus. Imaging studies were reviewed, EKG was obtained, and troponin has been obtained. There has been a mild upward trend in troponin but she denies any overt chest pain. We discussed cycling of cardiac enzymes with possible consideration for cardiac evaluation. She has less pain associated with the abdominal drain and is doing better overall. Review of System:   Constitutional:   Denies fever or chills, weight loss or gain, fatigue or malaise. HEENT:   Denies ear pain, sore throat, sinus or eye problems. Maintained on chronic nasal cannula oxygen. Cardiovascular:   Denies any chest pain, irregular heartbeats, or palpitations. Respiratory:   Chronic shortness of breath. Gastrointestinal:   Far less abdominal pain. Some irritation associated with new abdominal drain. Genitourinary:    Denies any urgency, frequency, hematuria. Voiding  without difficulty.   Extremities:   Denies lower extremity swelling, edema or cyanosis. Neurology:    Denies any headache or focal neurological deficits, Denies generalized weakness or memory difficulty. Psch:   Denies being anxious or depressed. Musculoskeletal:    Denies  myalgias, joint complaints or back pain. Integumentary:   Denies any rashes, ulcers, or excoriations. Denies bruising. Hematologic/Lymphatic:  Denies bruising or bleeding. Physical Exam:  I/O this shift:  In: 240 [P.O.:240]  Out: -     Intake/Output Summary (Last 24 hours) at 12/20/2022 1150  Last data filed at 12/20/2022 0832  Gross per 24 hour   Intake 1162.86 ml   Output 90 ml   Net 1072.86 ml   I/O last 3 completed shifts: In: 4357 [P.O.:290; I.V.:3538.3; IV Piggyback:1468.8]  Out: 90 [Drains:90]  Patient Vitals for the past 96 hrs (Last 3 readings):   Weight   12/20/22 0416 160 lb (72.6 kg)   12/19/22 0033 158 lb 8 oz (71.9 kg)   12/18/22 0511 154 lb (69.9 kg)     Vital Signs:   Blood pressure 106/72, pulse 81, temperature 97.5 °F (36.4 °C), temperature source Oral, resp. rate 22, height 5' 3\" (1.6 m), weight 160 lb (72.6 kg), SpO2 100 %, not currently breastfeeding. General appearance:  Alert, responsive, oriented to person, place, and time. Well preserved, alert, no distress. Head:  Normocephalic. No masses, lesions or tenderness. Eyes:  PERRLA. EOMI. Sclera clear. Buccal mucosa moist.  ENT:  Ears normal. Mucosa normal.  Nasal cannula oxygen is in place. Neck:    Supple. Trachea midline. No thyromegaly. No JVD. No bruits. Heart:    Rhythm regular. Rate controlled. No murmurs. Lungs:    Symmetrical. Clear to auscultation bilaterally. No wheezes. No rhonchi. No rales. Abdomen:   Mildly tender to palpation diffusely with voluntary guarding elicited. Abdominal drain is in place. Extremities:    Peripheral pulses present. No peripheral edema. No ulcers. No cyanosis. No clubbing. Neurologic:    Alert x 3. No focal deficit. Cranial nerves grossly intact.  No focal weakness. Psych:   Behavior is normal. Mood appears normal. Speech is not rapid and/or pressured. Musculoskeletal:   Spine ROM normal. Muscular strength intact. Gait not assessed. Integumentary:  No rashes  Skin normal color and texture.   Genitalia/Breast:  Deferred    Medication:  Scheduled Meds:   Arformoterol Tartrate  15 mcg Nebulization BID    atorvastatin  40 mg Oral Nightly    budesonide  0.5 mg Nebulization BID    fluticasone  2 spray Each Nostril Daily    ipratropium-albuterol  3 mL Inhalation Q6H    sertraline  25 mg Oral Daily    metroNIDAZOLE  500 mg IntraVENous Q8H    cefTRIAXone (ROCEPHIN) IV  1,000 mg IntraVENous Q24H    pantoprazole  40 mg IntraVENous Daily     Continuous Infusions:   0.9% NaCl with KCl 40 mEq 100 mL/hr at 12/19/22 1750       Objective Data:  CBC with Differential:    Lab Results   Component Value Date/Time    WBC 8.4 12/20/2022 05:27 AM    RBC 2.77 12/20/2022 05:27 AM    HGB 8.7 12/20/2022 05:27 AM    HCT 26.8 12/20/2022 05:27 AM     12/20/2022 05:27 AM    MCV 96.8 12/20/2022 05:27 AM    MCH 31.4 12/20/2022 05:27 AM    MCHC 32.5 12/20/2022 05:27 AM    RDW 13.1 12/20/2022 05:27 AM    NRBC 0.9 11/30/2022 05:18 AM    LYMPHOPCT 5.3 12/20/2022 05:27 AM    MONOPCT 1.8 12/20/2022 05:27 AM    MYELOPCT 0.9 12/03/2022 06:04 AM    BASOPCT 0.2 12/20/2022 05:27 AM    MONOSABS 0.17 12/20/2022 05:27 AM    LYMPHSABS 0.42 12/20/2022 05:27 AM    EOSABS 0.00 12/20/2022 05:27 AM    BASOSABS 0.00 12/20/2022 05:27 AM     BMP:    Lab Results   Component Value Date/Time     12/20/2022 05:27 AM    K 4.6 12/20/2022 05:27 AM    K 3.9 12/16/2022 09:38 PM     12/20/2022 05:27 AM    CO2 29 12/20/2022 05:27 AM    BUN 6 12/20/2022 05:27 AM    LABALBU 3.1 12/16/2022 09:38 PM    CREATININE 0.3 12/20/2022 05:27 AM    CALCIUM 7.9 12/20/2022 05:27 AM    GFRAA >60 04/13/2022 02:20 PM    LABGLOM >60 12/20/2022 05:27 AM    GLUCOSE 124 12/20/2022 05:27 AM       Assessment:  Sepsis secondary to acute diverticulitis with perforation and abscess  Chronic respiratory failure with hypoxia secondary to advanced COPD without exacerbation   Ongoing tobacco abuse  Prior history of DVT  Hyperlipidemia on statin agent    Plan:   Tachycardia and diaphoresis have been addressed with a mild upward trending troponin. Troponins will be cycled with low threshold for cardiac consultation. She is maintained on antibiotic support for coverage of the diverticular perforation. Drain remains in place with output noted. We appreciate the input from the surgical team.  Her respiratory status is stable. She continues to require extensive work with the therapy teams. Further recommendations as we monitor laboratory values throughout the day. More than 50% of my  time was spent at the bedside counseling/coordinating care with the patient and/or family with face to face contact. This time was spent reviewing notes and laboratory data as well as instructing and counseling the patient. Time I spent with the family or surrogate(s) is included only if the patient was incapable of providing the necessary information or participating in medical decisions. I also discussed the differential diagnosis and all of the proposed management plans with the patient and individuals accompanying the patient. [facility] requires this high level of physician care and nursing on the IMC/Telemetry unit due the complexity of decision management and chance of rapid decline or death. Continued cardiac monitoring and higher level of nursing are required. I am readily available for any further decision-making and intervention.      Ernie Zamorano DO, F.A.C.O.I.  12/20/2022  11:50 AM

## 2022-12-20 NOTE — CARE COORDINATION
SOCIAL WORK / DISCHARGE PLANNING:   Pt had drain placed, pt on 3-4L O2 at baseline, has home O2 from [address] [place] [address] to return home with dtr and grandchildren as previous. [facility] unable to accept due staffing. [facility] is following, WILL NEED [address]. Insurance has a 20 % copay each visit this is concern for pt. Dtr will provide home going transport.          Electronically signed by EPI Brown on 12/20/2022 at 3:19 PM

## 2022-12-20 NOTE — PLAN OF CARE
Problem: Discharge Planning  Goal: Discharge to home or other facility with appropriate resources  75/14/0968 [id number] by Richardine Holter, RN  Outcome: Progressing     Problem: Safety - Adult  Goal: Free from fall injury  03/43/0873 [id number] by Richardine Holter, RN  Outcome: Progressing     Problem: Pain  Goal: Verbalizes/displays adequate comfort level or baseline comfort level  49/63/6272 [id number] by Richardine Holter, RN  Outcome: Progressing

## 2022-12-21 LAB
ANION GAP SERPL CALCULATED.3IONS-SCNC: 6 MMOL/L (ref 7–16)
BASOPHILS ABSOLUTE: 0.06 E9/L (ref 0–0.2)
BASOPHILS RELATIVE PERCENT: 0.9 % (ref 0–2)
BUN BLDV-MCNC: 7 MG/DL (ref 6–23)
BURR CELLS: ABNORMAL
CALCIUM SERPL-MCNC: 8 MG/DL (ref 8.6–10.2)
CHLORIDE BLD-SCNC: 101 MMOL/L (ref 98–107)
CO2: 30 MMOL/L (ref 22–29)
CREAT SERPL-MCNC: 0.4 MG/DL (ref 0.5–1)
EOSINOPHILS ABSOLUTE: 0.16 E9/L (ref 0.05–0.5)
EOSINOPHILS RELATIVE PERCENT: 2.6 % (ref 0–6)
GFR SERPL CREATININE-BSD FRML MDRD: >60 ML/MIN/1.73
GLUCOSE BLD-MCNC: 98 MG/DL (ref 74–99)
HCT VFR BLD CALC: 28.2 % (ref 34–48)
HEMOGLOBIN: 9 G/DL (ref 11.5–15.5)
HYPOCHROMIA: ABNORMAL
LYMPHOCYTES ABSOLUTE: 0.19 E9/L (ref 1.5–4)
LYMPHOCYTES RELATIVE PERCENT: 2.6 % (ref 20–42)
MAGNESIUM: 1.8 MG/DL (ref 1.6–2.6)
MCH RBC QN AUTO: 30.5 PG (ref 26–35)
MCHC RBC AUTO-ENTMCNC: 31.9 % (ref 32–34.5)
MCV RBC AUTO: 95.6 FL (ref 80–99.9)
MONOCYTES ABSOLUTE: 0.06 E9/L (ref 0.1–0.95)
MONOCYTES RELATIVE PERCENT: 0.9 % (ref 2–12)
NEUTROPHILS ABSOLUTE: 5.77 E9/L (ref 1.8–7.3)
NEUTROPHILS RELATIVE PERCENT: 93 % (ref 43–80)
OVALOCYTES: ABNORMAL
PDW BLD-RTO: 13.4 FL (ref 11.5–15)
PHOSPHORUS: 1.3 MG/DL (ref 2.5–4.5)
PLATELET # BLD: 336 E9/L (ref 130–450)
PMV BLD AUTO: 10.1 FL (ref 7–12)
POIKILOCYTES: ABNORMAL
POLYCHROMASIA: ABNORMAL
POTASSIUM SERPL-SCNC: 4.7 MMOL/L (ref 3.5–5)
RBC # BLD: 2.95 E12/L (ref 3.5–5.5)
SODIUM BLD-SCNC: 137 MMOL/L (ref 132–146)
TARGET CELLS: ABNORMAL
VACUOLATED NEUTROPHILS: ABNORMAL
WBC # BLD: 6.2 E9/L (ref 4.5–11.5)

## 2022-12-21 PROCEDURE — 6360000002 HC RX W HCPCS: Performed by: NURSE PRACTITIONER

## 2022-12-21 PROCEDURE — 2580000003 HC RX 258: Performed by: INTERNAL MEDICINE

## 2022-12-21 PROCEDURE — 2500000003 HC RX 250 WO HCPCS: Performed by: INTERNAL MEDICINE

## 2022-12-21 PROCEDURE — 6360000002 HC RX W HCPCS: Performed by: INTERNAL MEDICINE

## 2022-12-21 PROCEDURE — 85025 COMPLETE CBC W/AUTO DIFF WBC: CPT

## 2022-12-21 PROCEDURE — 99232 SBSQ HOSP IP/OBS MODERATE 35: CPT | Performed by: SURGERY

## 2022-12-21 PROCEDURE — 6370000000 HC RX 637 (ALT 250 FOR IP): Performed by: INTERNAL MEDICINE

## 2022-12-21 PROCEDURE — 2700000000 HC OXYGEN THERAPY PER DAY

## 2022-12-21 PROCEDURE — 36415 COLL VENOUS BLD VENIPUNCTURE: CPT

## 2022-12-21 PROCEDURE — 83735 ASSAY OF MAGNESIUM: CPT

## 2022-12-21 PROCEDURE — 84100 ASSAY OF PHOSPHORUS: CPT

## 2022-12-21 PROCEDURE — 94640 AIRWAY INHALATION TREATMENT: CPT

## 2022-12-21 PROCEDURE — 80048 BASIC METABOLIC PNL TOTAL CA: CPT

## 2022-12-21 PROCEDURE — 2060000000 HC ICU INTERMEDIATE R&B

## 2022-12-21 PROCEDURE — C9113 INJ PANTOPRAZOLE SODIUM, VIA: HCPCS | Performed by: INTERNAL MEDICINE

## 2022-12-21 RX ORDER — METRONIDAZOLE 500 MG/1
500 TABLET ORAL 3 TIMES DAILY
Qty: 15 TABLET | Refills: 0 | Status: SHIPPED | OUTPATIENT
Start: 2022-12-21 | End: 2022-12-26

## 2022-12-21 RX ORDER — CEFDINIR 300 MG/1
300 CAPSULE ORAL 2 TIMES DAILY
Qty: 10 CAPSULE | Refills: 0 | Status: SHIPPED | OUTPATIENT
Start: 2022-12-21 | End: 2022-12-26

## 2022-12-21 RX ORDER — FUROSEMIDE 10 MG/ML
40 INJECTION INTRAMUSCULAR; INTRAVENOUS ONCE
Status: COMPLETED | OUTPATIENT
Start: 2022-12-21 | End: 2022-12-21

## 2022-12-21 RX ADMIN — SERTRALINE 25 MG: 50 TABLET, FILM COATED ORAL at 09:36

## 2022-12-21 RX ADMIN — ONDANSETRON 4 MG: 2 INJECTION INTRAMUSCULAR; INTRAVENOUS at 11:11

## 2022-12-21 RX ADMIN — METRONIDAZOLE 500 MG: 500 INJECTION, SOLUTION INTRAVENOUS at 11:15

## 2022-12-21 RX ADMIN — BUDESONIDE 500 MCG: 0.5 SUSPENSION RESPIRATORY (INHALATION) at 06:34

## 2022-12-21 RX ADMIN — IPRATROPIUM BROMIDE AND ALBUTEROL SULFATE 3 ML: .5; 2.5 SOLUTION RESPIRATORY (INHALATION) at 06:34

## 2022-12-21 RX ADMIN — WATER 1000 MG: 1 INJECTION INTRAMUSCULAR; INTRAVENOUS; SUBCUTANEOUS at 06:04

## 2022-12-21 RX ADMIN — IPRATROPIUM BROMIDE AND ALBUTEROL SULFATE 3 ML: .5; 2.5 SOLUTION RESPIRATORY (INHALATION) at 16:56

## 2022-12-21 RX ADMIN — METRONIDAZOLE 500 MG: 500 INJECTION, SOLUTION INTRAVENOUS at 18:13

## 2022-12-21 RX ADMIN — METRONIDAZOLE 500 MG: 500 INJECTION, SOLUTION INTRAVENOUS at 02:49

## 2022-12-21 RX ADMIN — FUROSEMIDE 40 MG: 10 INJECTION, SOLUTION INTRAMUSCULAR; INTRAVENOUS at 14:02

## 2022-12-21 RX ADMIN — BUDESONIDE 500 MCG: 0.5 SUSPENSION RESPIRATORY (INHALATION) at 16:56

## 2022-12-21 RX ADMIN — ARFORMOTEROL TARTRATE 15 MCG: 15 SOLUTION RESPIRATORY (INHALATION) at 16:56

## 2022-12-21 RX ADMIN — PANTOPRAZOLE SODIUM 40 MG: 40 INJECTION, POWDER, FOR SOLUTION INTRAVENOUS at 09:36

## 2022-12-21 RX ADMIN — ONDANSETRON 4 MG: 2 INJECTION INTRAMUSCULAR; INTRAVENOUS at 04:08

## 2022-12-21 RX ADMIN — ATORVASTATIN CALCIUM 40 MG: 40 TABLET, FILM COATED ORAL at 20:13

## 2022-12-21 RX ADMIN — ARFORMOTEROL TARTRATE 15 MCG: 15 SOLUTION RESPIRATORY (INHALATION) at 06:34

## 2022-12-21 RX ADMIN — POTASSIUM CHLORIDE AND SODIUM CHLORIDE: 900; 300 INJECTION, SOLUTION INTRAVENOUS at 05:26

## 2022-12-21 RX ADMIN — FLUTICASONE PROPIONATE 2 SPRAY: 50 SPRAY, METERED NASAL at 09:37

## 2022-12-21 RX ADMIN — IPRATROPIUM BROMIDE AND ALBUTEROL SULFATE 3 ML: .5; 2.5 SOLUTION RESPIRATORY (INHALATION) at 11:52

## 2022-12-21 ASSESSMENT — PAIN SCALES - GENERAL
PAINLEVEL_OUTOF10: 0

## 2022-12-21 NOTE — PROGRESS NOTES
Internal Medicine Progress Note    [facility]    Garfield Mora. Natalie Gale., F.A.C.OChenteI. Renato Valle D.O., KAREEMCChenteORAFAL Hernández D.O. David Hagan, MSN, APRN, NP-C  Latia Grant. Dian Champion, MSN, APRN-CNP     Primary Care Physician: Maira Aviles. Airam Franco MD   Admitting Physician:  Sujit Collado DO  Admission date and time: 12/16/2022  8:37 PM    Room:  [facility]0624-02  Admitting diagnosis: Chronic respiratory failure with hypoxia (HCC) [J96.11]  Diverticulitis of large intestine with abscess without bleeding [K57.20]  Sepsis without acute organ dysfunction, due to unspecified organism [facility]) [A41.9]    Patient Name: Courtney Loza  MRN: [id number]    Date of Service: 12/21/2022     Subjective:  Lauren Carrero is a 58 y.o. female who was seen and examined today,12/21/2022, at the bedside. Kristine Mason has developed increasing shortness of breath today likely in the setting of volume retention. She has minimal drain output and her abdominal pain is improving. She is maintained on antibiotic therapy as we await final culture results. Review of System:   Constitutional:   Denies fever or chills, weight loss or gain, fatigue or malaise. HEENT:   Denies ear pain, sore throat, sinus or eye problems. Maintained on chronic nasal cannula oxygen. Cardiovascular:   Denies any chest pain, irregular heartbeats, or palpitations. Respiratory:   Acute on chronic shortness of breath. Gastrointestinal:   Abdominal pain has resolved at this point. There is minimal drain output. Genitourinary:    Denies any urgency, frequency, hematuria. Voiding  without difficulty. Extremities:   Denies lower extremity swelling, edema or cyanosis. Neurology:    Denies any headache or focal neurological deficits, admits to generalized weakness and deconditioning. Psch:   Denies being anxious or depressed. Musculoskeletal:    Denies  myalgias, joint complaints or back pain.    Integumentary:   Denies any rashes, ulcers, or excoriations. Denies bruising. Hematologic/Lymphatic:  Denies bruising or bleeding. Physical Exam:  No intake/output data recorded. Intake/Output Summary (Last 24 hours) at 12/21/2022 1254  Last data filed at 12/20/2022 1811  Gross per 24 hour   Intake 240 ml   Output 15 ml   Net 225 ml   I/O last 3 completed shifts: In: 720 [P.O.:720]  Out: 50 [Drains:50]  Patient Vitals for the past 96 hrs (Last 3 readings):   Weight   12/20/22 0416 160 lb (72.6 kg)   12/19/22 0033 158 lb 8 oz (71.9 kg)   12/18/22 0511 154 lb (69.9 kg)     Vital Signs:   Blood pressure (!) 122/56, pulse 98, temperature 98.7 °F (37.1 °C), temperature source Oral, resp. rate 17, height 5' 3\" (1.6 m), weight 160 lb (72.6 kg), SpO2 99 %, not currently breastfeeding. General appearance:  Alert, responsive, oriented to person, place, and time. Well preserved, alert, no distress. Head:  Normocephalic. No masses, lesions or tenderness. Eyes:  PERRLA. EOMI. Sclera clear. Buccal mucosa moist.  ENT:  Ears normal. Mucosa normal.  Nasal cannula oxygen is in place. Neck:    Supple. Trachea midline. No thyromegaly. No JVD. No bruits. Heart:    Rhythm regular. Rate controlled. No murmurs. Lungs:    Symmetrical. Clear to auscultation bilaterally. No wheezes. No rhonchi. No rales. Abdomen:   Less tenderness to palpation diffusely. Abdominal drain is in place with minimal drainage. Extremities:    Peripheral pulses present. No peripheral edema. No ulcers. No cyanosis. No clubbing. Neurologic:    Alert x 3. No focal deficit. Cranial nerves grossly intact. No focal weakness. Psych:   Behavior is normal. Mood appears normal. Speech is not rapid and/or pressured. Musculoskeletal:   Spine ROM normal. Muscular strength intact. Gait not assessed. Integumentary:  No rashes  Skin normal color and texture.   Genitalia/Breast:  Deferred    Medication:  Scheduled Meds:   furosemide  40 mg IntraVENous Once    Arformoterol Tartrate  15 mcg Nebulization BID    atorvastatin  40 mg Oral Nightly    budesonide  0.5 mg Nebulization BID    fluticasone  2 spray Each Nostril Daily    ipratropium-albuterol  3 mL Inhalation Q6H    sertraline  25 mg Oral Daily    metroNIDAZOLE  500 mg IntraVENous Q8H    cefTRIAXone (ROCEPHIN) IV  1,000 mg IntraVENous Q24H    pantoprazole  40 mg IntraVENous Daily       Objective Data:  CBC with Differential:    Lab Results   Component Value Date/Time    WBC 6.2 12/21/2022 05:04 AM    RBC 2.95 12/21/2022 05:04 AM    HGB 9.0 12/21/2022 05:04 AM    HCT 28.2 12/21/2022 05:04 AM     12/21/2022 05:04 AM    MCV 95.6 12/21/2022 05:04 AM    MCH 30.5 12/21/2022 05:04 AM    MCHC 31.9 12/21/2022 05:04 AM    RDW 13.4 12/21/2022 05:04 AM    NRBC 0.9 11/30/2022 05:18 AM    LYMPHOPCT 2.6 12/21/2022 05:04 AM    MONOPCT 0.9 12/21/2022 05:04 AM    MYELOPCT 0.9 12/03/2022 06:04 AM    BASOPCT 0.9 12/21/2022 05:04 AM    MONOSABS 0.06 12/21/2022 05:04 AM    LYMPHSABS 0.19 12/21/2022 05:04 AM    EOSABS 0.16 12/21/2022 05:04 AM    BASOSABS 0.06 12/21/2022 05:04 AM     BMP:    Lab Results   Component Value Date/Time     12/21/2022 05:04 AM    K 4.7 12/21/2022 05:04 AM    K 3.9 12/16/2022 09:38 PM     12/21/2022 05:04 AM    CO2 30 12/21/2022 05:04 AM    BUN 7 12/21/2022 05:04 AM    LABALBU 3.1 12/16/2022 09:38 PM    CREATININE 0.4 12/21/2022 05:04 AM    CALCIUM 8.0 12/21/2022 05:04 AM    GFRAA >60 04/13/2022 02:20 PM    LABGLOM >60 12/21/2022 05:04 AM    GLUCOSE 98 12/21/2022 05:04 AM       Assessment:  Sepsis secondary to acute diverticulitis with perforation and abscess status post IR guided drainage  Chronic respiratory failure with hypoxia secondary to advanced COPD without exacerbation   Ongoing tobacco abuse  Prior history of DVT  Hyperlipidemia on statin agent    Plan:   IV fluids will be discontinued today and a one-time dose of Lasix will be administered in the setting of volume retention.   Respiratory status is being monitored closely in the setting of end-stage emphysema. Abdominal drain is in place with minimal output. I anticipate removal prior to discharge. Body fluid culture has been noted with sensitivities pending. We will maintain IV antibiotic therapy for an additional 24 hours with probable transition to oral antibiotics and discharge home tomorrow. More than 50% of my  time was spent at the bedside counseling/coordinating care with the patient and/or family with face to face contact. This time was spent reviewing notes and laboratory data as well as instructing and counseling the patient. Time I spent with the family or surrogate(s) is included only if the patient was incapable of providing the necessary information or participating in medical decisions. I also discussed the differential diagnosis and all of the proposed management plans with the patient and individuals accompanying the patient. [facility] requires this high level of physician care and nursing on the IMC/Telemetry unit due the complexity of decision management and chance of rapid decline or death. Continued cardiac monitoring and higher level of nursing are required. I am readily available for any further decision-making and intervention.      Sherice Carballo DO, F.A.C.O.I.  12/21/2022  12:54 PM

## 2022-12-21 NOTE — CARE COORDINATION
SOCIAL WORK / DISCHARGE PLANNING:   Sw met with pt at bedside, she is not feeling well. Pt continues with drain and we discussed [facility] is following. ( [address] for drain care if needed) Pt states she has been walking to [place] and that her commode if very near to her at home. She says if she does not have drain that does not feel she needs HHC. She states her dtr is available to assist her. Pt has home O2, currently has 4L. Dtr will be able to provide home going transport.                Electronically signed by EPI Brown on 12/21/2022 at 2:28 PM

## 2022-12-21 NOTE — PROGRESS NOTES
GENERAL SURGERY  DAILY PROGRESS NOTE  12/21/2022    Chief Complaint   Patient presents with    Abdominal Pain     Lower abdominal pain, worsening since tues       Subjective:  Pt states she is feeling okay. Reports having some nausea, but no vomiting. Reports passing flatus and BM. Tolerating a small amoutn of PO intake.     Objective:  /63   Pulse 86   Temp 99.4 °F (37.4 °C) (Oral)   Resp 18   Ht 5' 3\" (1.6 m)   Wt 160 lb (72.6 kg)   SpO2 98%   BMI 28.34 kg/m²     GENERAL:  Laying in bed, awake, alert, cooperative, no apparent distress  HEAD: Normocephalic, atraumatic  EYES: No sclera icterus, pupils equal  LUNGS:  On 4L NC  CARDIOVASCULAR:  RR and normotensive  ABDOMEN:  Soft, mild TTP LLQ, non-distended, IR drain with 30cc serous output  EXTREMITIES: No edema or swelling  SKIN: Warm and dry    Assessment/Plan:  58 y.o. female with perforated diverticulitis and abscess formation s/p IR drain 12/19 with SIRS response    - Okay for diet as tolerated  - Pain control PRN  - Continue abx  - Monitor drain output  - Follow up drain cultures    Electronically signed by Miranda Lamas MD on 12/21/2022 at 5:19 AM    Surgery Progress Note            Chief complaint:   Chief Complaint   Patient presents with    Abdominal Pain     Lower abdominal pain, worsening since tues      Patient Active Problem List   Diagnosis    Tobacco use disorder    Seasonal allergic rhinitis    Chronic bronchitis (HCC)    Chronic respiratory failure with hypoxia (HCC)    Chronic obstructive pulmonary disease (HCC)    Acute respiratory failure with hypercapnia (HCC)    Acute on chronic respiratory failure with hypoxia and hypercapnia (HCC)    COPD exacerbation ([facility]    Acute diverticulitis with abscess       S: as above    O:   Vitals:    12/21/22 0245   BP: 121/63   Pulse: 86   Resp: 18   Temp: 99.4 °F (37.4 °C)   SpO2: 98%       Intake/Output Summary (Last 24 hours) at 12/21/2022 7301  Last data filed at 12/20/2022 1811  Gross per 24 hour   Intake 480 ml   Output 30 ml   Net 450 ml           Labs:  Lab Results   Component Value Date/Time    WBC 6.2 12/21/2022 05:04 AM    WBC 8.4 12/20/2022 05:27 AM    WBC 10.3 12/19/2022 05:41 AM    HGB 9.0 12/21/2022 05:04 AM    HGB 8.7 12/20/2022 05:27 AM    HGB 9.6 12/19/2022 05:41 AM    HCT 28.2 12/21/2022 05:04 AM    HCT 26.8 12/20/2022 05:27 AM    HCT 29.6 12/19/2022 05:41 AM     Lab Results   Component Value Date    CREATININE 0.4 (L) 12/21/2022    BUN 7 12/21/2022     12/21/2022    K 4.7 12/21/2022     12/21/2022    CO2 30 (H) 12/21/2022     Lab Results   Component Value Date/Time    LIPASE 12 12/16/2022 09:38 PM    LIPASE 10 01/17/2018 01:30 PM         Physical exam:   /63   Pulse 86   Temp 99.4 °F (37.4 °C) (Oral)   Resp 18   Ht 5' 3\" (1.6 m)   Wt 160 lb (72.6 kg)   SpO2 98%   BMI 28.34 kg/m²   General appearance: NAD  Head: NCAT  Neck: supple, no masses  Lungs: equal chest rise bilateral  Heart: S1S2 present  Abdomen: soft, nontender, nondistended,  drain in place  Skin; no lesions  Gu: no cva tenderness  Extremities: extremities normal, atraumatic, no cyanosis or edema    A:  acute perforated diverticulitis with abscess  s/p IR drainage    P: ok for diet and abx    Jason Bosch MD, MD  12/21/2022

## 2022-12-21 NOTE — PROGRESS NOTES
Physical Therapy    0624/0624-02    Patient unavailable for physical therapy treatment due to patient stated she just walked to the restroom and back to bed and is exhausted. Trever Arzate.  Yan  [facility]  LIC # [id number]

## 2022-12-22 VITALS
HEART RATE: 85 BPM | SYSTOLIC BLOOD PRESSURE: 111 MMHG | OXYGEN SATURATION: 97 % | TEMPERATURE: 98.7 F | WEIGHT: 157.31 LBS | DIASTOLIC BLOOD PRESSURE: 65 MMHG | RESPIRATION RATE: 18 BRPM | HEIGHT: 63 IN | BODY MASS INDEX: 27.87 KG/M2

## 2022-12-22 LAB
ANION GAP SERPL CALCULATED.3IONS-SCNC: 5 MMOL/L (ref 7–16)
BASOPHILS ABSOLUTE: 0 E9/L (ref 0–0.2)
BASOPHILS RELATIVE PERCENT: 0.3 % (ref 0–2)
BLOOD CULTURE, ROUTINE: NORMAL
BLOOD CULTURE, ROUTINE: NORMAL
BUN BLDV-MCNC: 9 MG/DL (ref 6–23)
CALCIUM SERPL-MCNC: 7.9 MG/DL (ref 8.6–10.2)
CHLORIDE BLD-SCNC: 94 MMOL/L (ref 98–107)
CO2: 33 MMOL/L (ref 22–29)
CREAT SERPL-MCNC: 0.4 MG/DL (ref 0.5–1)
CULTURE, BLOOD 2: NORMAL
CULTURE, BLOOD 2: NORMAL
EOSINOPHILS ABSOLUTE: 0.19 E9/L (ref 0.05–0.5)
EOSINOPHILS RELATIVE PERCENT: 2.7 % (ref 0–6)
FUNGUS STAIN: NORMAL
GFR SERPL CREATININE-BSD FRML MDRD: >60 ML/MIN/1.73
GLUCOSE BLD-MCNC: 93 MG/DL (ref 74–99)
HCT VFR BLD CALC: 30.5 % (ref 34–48)
HEMOGLOBIN: 9.6 G/DL (ref 11.5–15.5)
LYMPHOCYTES ABSOLUTE: 0.28 E9/L (ref 1.5–4)
LYMPHOCYTES RELATIVE PERCENT: 4.4 % (ref 20–42)
MAGNESIUM: 1.8 MG/DL (ref 1.6–2.6)
MCH RBC QN AUTO: 29.4 PG (ref 26–35)
MCHC RBC AUTO-ENTMCNC: 31.5 % (ref 32–34.5)
MCV RBC AUTO: 93.6 FL (ref 80–99.9)
METAMYELOCYTES RELATIVE PERCENT: 0.9 % (ref 0–1)
MONOCYTES ABSOLUTE: 0.21 E9/L (ref 0.1–0.95)
MONOCYTES RELATIVE PERCENT: 2.7 % (ref 2–12)
MYELOCYTE PERCENT: 0.9 % (ref 0–0)
NEUTROPHILS ABSOLUTE: 6.21 E9/L (ref 1.8–7.3)
NEUTROPHILS RELATIVE PERCENT: 88.5 % (ref 43–80)
OVALOCYTES: ABNORMAL
PDW BLD-RTO: 13.7 FL (ref 11.5–15)
PHOSPHORUS: 2 MG/DL (ref 2.5–4.5)
PLATELET # BLD: 329 E9/L (ref 130–450)
PMV BLD AUTO: 9.7 FL (ref 7–12)
POIKILOCYTES: ABNORMAL
POLYCHROMASIA: ABNORMAL
POTASSIUM SERPL-SCNC: 4 MMOL/L (ref 3.5–5)
RBC # BLD: 3.26 E12/L (ref 3.5–5.5)
SODIUM BLD-SCNC: 132 MMOL/L (ref 132–146)
TARGET CELLS: ABNORMAL
WBC # BLD: 6.9 E9/L (ref 4.5–11.5)

## 2022-12-22 PROCEDURE — 84100 ASSAY OF PHOSPHORUS: CPT

## 2022-12-22 PROCEDURE — C9113 INJ PANTOPRAZOLE SODIUM, VIA: HCPCS | Performed by: INTERNAL MEDICINE

## 2022-12-22 PROCEDURE — 80048 BASIC METABOLIC PNL TOTAL CA: CPT

## 2022-12-22 PROCEDURE — 94640 AIRWAY INHALATION TREATMENT: CPT

## 2022-12-22 PROCEDURE — 97530 THERAPEUTIC ACTIVITIES: CPT

## 2022-12-22 PROCEDURE — 6360000002 HC RX W HCPCS: Performed by: INTERNAL MEDICINE

## 2022-12-22 PROCEDURE — 2700000000 HC OXYGEN THERAPY PER DAY

## 2022-12-22 PROCEDURE — 83735 ASSAY OF MAGNESIUM: CPT

## 2022-12-22 PROCEDURE — 36415 COLL VENOUS BLD VENIPUNCTURE: CPT

## 2022-12-22 PROCEDURE — 6360000002 HC RX W HCPCS: Performed by: STUDENT IN AN ORGANIZED HEALTH CARE EDUCATION/TRAINING PROGRAM

## 2022-12-22 PROCEDURE — 97110 THERAPEUTIC EXERCISES: CPT

## 2022-12-22 PROCEDURE — 2580000003 HC RX 258: Performed by: INTERNAL MEDICINE

## 2022-12-22 PROCEDURE — 6370000000 HC RX 637 (ALT 250 FOR IP): Performed by: INTERNAL MEDICINE

## 2022-12-22 PROCEDURE — 99232 SBSQ HOSP IP/OBS MODERATE 35: CPT | Performed by: SURGERY

## 2022-12-22 PROCEDURE — 2500000003 HC RX 250 WO HCPCS: Performed by: INTERNAL MEDICINE

## 2022-12-22 PROCEDURE — 85025 COMPLETE CBC W/AUTO DIFF WBC: CPT

## 2022-12-22 RX ORDER — BUDESONIDE 0.5 MG/2ML
0.5 INHALANT ORAL 2 TIMES DAILY
Qty: 60 EACH | Refills: 1 | Status: ON HOLD | OUTPATIENT
Start: 2022-12-22 | End: 2023-01-01 | Stop reason: SDUPTHER

## 2022-12-22 RX ORDER — ARFORMOTEROL TARTRATE 15 UG/2ML
15 SOLUTION RESPIRATORY (INHALATION) 2 TIMES DAILY
Qty: 120 ML | Refills: 0 | Status: ON HOLD | OUTPATIENT
Start: 2022-12-22 | End: 2023-01-01 | Stop reason: SDUPTHER

## 2022-12-22 RX ORDER — FENTANYL CITRATE 0.05 MG/ML
50 INJECTION, SOLUTION INTRAMUSCULAR; INTRAVENOUS ONCE
Status: COMPLETED | OUTPATIENT
Start: 2022-12-22 | End: 2022-12-22

## 2022-12-22 RX ADMIN — IPRATROPIUM BROMIDE AND ALBUTEROL SULFATE 3 ML: .5; 2.5 SOLUTION RESPIRATORY (INHALATION) at 12:05

## 2022-12-22 RX ADMIN — WATER 1000 MG: 1 INJECTION INTRAMUSCULAR; INTRAVENOUS; SUBCUTANEOUS at 07:00

## 2022-12-22 RX ADMIN — SERTRALINE 25 MG: 50 TABLET, FILM COATED ORAL at 08:30

## 2022-12-22 RX ADMIN — FLUTICASONE PROPIONATE 2 SPRAY: 50 SPRAY, METERED NASAL at 08:30

## 2022-12-22 RX ADMIN — IPRATROPIUM BROMIDE AND ALBUTEROL SULFATE 3 ML: .5; 2.5 SOLUTION RESPIRATORY (INHALATION) at 06:47

## 2022-12-22 RX ADMIN — METRONIDAZOLE 500 MG: 500 INJECTION, SOLUTION INTRAVENOUS at 11:06

## 2022-12-22 RX ADMIN — ONDANSETRON 4 MG: 2 INJECTION INTRAMUSCULAR; INTRAVENOUS at 11:03

## 2022-12-22 RX ADMIN — FENTANYL CITRATE 50 MCG: 50 INJECTION INTRAMUSCULAR; INTRAVENOUS at 13:46

## 2022-12-22 RX ADMIN — ARFORMOTEROL TARTRATE 15 MCG: 15 SOLUTION RESPIRATORY (INHALATION) at 06:47

## 2022-12-22 RX ADMIN — IPRATROPIUM BROMIDE AND ALBUTEROL SULFATE 3 ML: .5; 2.5 SOLUTION RESPIRATORY (INHALATION) at 01:01

## 2022-12-22 RX ADMIN — PANTOPRAZOLE SODIUM 40 MG: 40 INJECTION, POWDER, FOR SOLUTION INTRAVENOUS at 08:30

## 2022-12-22 RX ADMIN — BUDESONIDE 500 MCG: 0.5 SUSPENSION RESPIRATORY (INHALATION) at 06:47

## 2022-12-22 RX ADMIN — METRONIDAZOLE 500 MG: 500 INJECTION, SOLUTION INTRAVENOUS at 03:44

## 2022-12-22 NOTE — PROGRESS NOTES
OT SESSION ATTEMPT     Date:2022  Patient Name: Delfina Gong  MRN: [id number]  : 1960  Room: [facility]     OCCUPATIONAL THERAPY TREATMENT NOTE    [address]      Attempted OT session this date:    [] unavailable due to other medical staff currently with pt   [] on hold per nursing staff   [] on hold per nursing staff secondary to lab / radiology results    [x] declined treatment  this date due to c/o fatigue. Benefits of participation in therapy reviewed with pt.    [] off unit   [] Other:     Continue with current OT P. O.C at a later date/time.       Neo WINN/L [id number]

## 2022-12-22 NOTE — PLAN OF CARE
Problem: Discharge Planning  Goal: Discharge to home or other facility with appropriate resources  12/22/2022 1147 by Valerie Ellisno RN  Outcome: Progressing  12/21/2022 2329 by Gabbi Guillen RN  Outcome: Progressing  Flowsheets (Taken 12/21/2022 2000)  Discharge to home or other facility with appropriate resources:   Identify barriers to discharge with patient and caregiver   Arrange for needed discharge resources and transportation as appropriate   Identify discharge learning needs (meds, wound care, etc)   Refer to discharge planning if patient needs post-hospital services based on physician order or complex needs related to functional status, cognitive ability or social support system     Problem: Safety - Adult  Goal: Free from fall injury  12/22/2022 1147 by Valerie Ellison RN  Outcome: Progressing  12/21/2022 2329 by Gabbi Guillen RN  Outcome: Progressing  Flowsheets (Taken 12/21/2022 2000)  Free From Fall Injury: Instruct family/caregiver on patient safety     Problem: Pain  Goal: Verbalizes/displays adequate comfort level or baseline comfort level  12/22/2022 1147 by Valerie Ellison RN  Outcome: Progressing  12/21/2022 2329 by Gabbi Guillen RN  Outcome: Progressing  Flowsheets (Taken 12/21/2022 0930 by Tushar Rai RN)  Verbalizes/displays adequate comfort level or baseline comfort level:   Encourage patient to monitor pain and request assistance   Assess pain using appropriate pain scale   Administer analgesics based on type and severity of pain and evaluate response   Implement non-pharmacological measures as appropriate and evaluate response

## 2022-12-22 NOTE — CARE COORDINATION
SOCIAL WORK / DISCHARGE PLANNING:   Pt had drain removed today, ok for dc from surgery, removed drain and dc on po atb. Sw spoke with pt who thought she would be here a few more days initially this am. Now she is ok with dc, return home with dtr as previous. Has home O2, dme needed. [facility] had been following but if she needed for drain care. Pt does not wish to have [facility] at dc as she has 20 % copay each visit. PT [facility] today 20. Dtr can assist prn and will provide home going transport. Sw called [facility] notified to remove pt from list as no longer wants home care.              Electronically signed by EPI Travis on 12/22/2022 at 1:53 PM

## 2022-12-22 NOTE — PROGRESS NOTES
GENERAL SURGERY  DAILY PROGRESS NOTE  12/22/2022    Chief Complaint   Patient presents with    Abdominal Pain     Lower abdominal pain, worsening since tues       Subjective:  Pt states she feels better. States she is tolerating a diet.     Objective:  /74   Pulse 95   Temp 99 °F (37.2 °C) (Oral)   Resp 18   Ht 5' 3\" (1.6 m)   Wt 157 lb 5 oz (71.4 kg)   SpO2 99%   BMI 27.87 kg/m²     GENERAL:  Laying in bed, awake, alert, cooperative, no apparent distress  HEAD: Normocephalic, atraumatic  EYES: No sclera icterus, pupils equal  LUNGS:  On 4L NC  CARDIOVASCULAR:  RR and normotensive  ABDOMEN:  Soft, mild TTP LLQ, non-distended, IR drain with minimal serous output  EXTREMITIES: No edema or swelling  SKIN: Warm and dry    Assessment/Plan:  58 y.o. female with perforated diverticulitis and abscess formation s/p IR drain 12/19 with SIRS response    - Okay for diet as tolerated  - Pain control PRN  - Continue abx  - Monitor drain output  - Follow up drain cultures  - Okay to DC from surgical POV  - Remove drain prior to DC    Electronically signed by Helen Bishop MD on 12/22/2022 at 5:54 AM    Surgery Progress Note            Chief complaint:   Chief Complaint   Patient presents with    Abdominal Pain     Lower abdominal pain, worsening since tues      Patient Active Problem List   Diagnosis    Tobacco use disorder    Seasonal allergic rhinitis    Chronic bronchitis (HCC)    Chronic respiratory failure with hypoxia (HCC)    Chronic obstructive pulmonary disease (HCC)    Acute respiratory failure with hypercapnia (HCC)    Acute on chronic respiratory failure with hypoxia and hypercapnia (HCC)    COPD exacerbation ([address]    Acute diverticulitis with abscess       S: as above    O:   Vitals:    12/22/22 0815   BP: 111/65   Pulse: 85   Resp: 18   Temp: 98.7 °F (37.1 °C)   SpO2: 97%       Intake/Output Summary (Last 24 hours) at 12/22/2022 0840  Last data filed at 12/22/2022 0835  Gross per 24 hour   Intake 300 ml Output 1400 ml   Net -1100 ml           Labs:  Lab Results   Component Value Date/Time    WBC 6.9 12/22/2022 06:18 AM    WBC 6.2 12/21/2022 05:04 AM    WBC 8.4 12/20/2022 05:27 AM    HGB 9.6 12/22/2022 06:18 AM    HGB 9.0 12/21/2022 05:04 AM    HGB 8.7 12/20/2022 05:27 AM    HCT 30.5 12/22/2022 06:18 AM    HCT 28.2 12/21/2022 05:04 AM    HCT 26.8 12/20/2022 05:27 AM     Lab Results   Component Value Date    CREATININE 0.4 (L) 12/22/2022    BUN 9 12/22/2022     12/22/2022    K 4.0 12/22/2022    CL 94 (L) 12/22/2022    CO2 33 (H) 12/22/2022     Lab Results   Component Value Date/Time    LIPASE 12 12/16/2022 09:38 PM    LIPASE 10 01/17/2018 01:30 PM         Physical exam:   /65   Pulse 85   Temp 98.7 °F (37.1 °C) (Oral)   Resp 18   Ht 5' 3\" (1.6 m)   Wt 157 lb 5 oz (71.4 kg)   SpO2 97%   BMI 27.87 kg/m²   General appearance: NAD  Head: NCAT  Neck: supple, no masses  Lungs: equal chest rise bilateral  Heart: S1S2 present  Abdomen: soft, nontender, nondistended, drain with minimal serous output  Skin; no lesions  Gu: no cva tenderness  Extremities: extremities normal, atraumatic, no cyanosis or edema    A:  diverticulitis with perforation and abscess s/p IR drainage     P: drain out and d/c today with abx    Shanika Gordon MD, MD  12/22/2022

## 2022-12-22 NOTE — DISCHARGE SUMMARY
Internal Medicine Progress Note    [facility]    Liya Loyola., MARIELOSOChenteIChente Clarke D.O., MARIELOSOASHLI Tellez D.O. Ellene Headings, MSN, APRN, NP-C  Anastacio Bowen. Naseem Gramajo, MSN, APRN-CNP         Name:  Andres Calvillo  :  1960  MRN:  [id number]  Room:  [id number]  DOS:  2022    [address] [place] [address]  Internal Medicine   Discharge Summary    PCP:  Lory Liang. Jitendra Dowell MD  Admitting Physician:  Mae Krueger DO  Consultant(s):   IP CONSULT TO GENERAL SURGERY  IP CONSULT TO INTERNAL MEDICINE  IP CONSULT TO GENERAL SURGERY      Admission Date:  2022   Discharge Date:  2022      Admission Diagnoses     Chronic respiratory failure with hypoxia (HCC) [J96.11]  Diverticulitis of large intestine with abscess without bleeding [K57.20]  Sepsis without acute organ dysfunction, due to unspecified organism [facility]) [A41.9]     Discharge Diagnoses  Sepsis secondary to acute diverticulitis with perforation and abscess status post IR guided drainage  Chronic respiratory failure with hypoxia secondary to advanced COPD without exacerbation   Ongoing tobacco abuse  Prior history of DVT  Hyperlipidemia on statin agent     Patient Active Problem List:     Tobacco use disorder     Seasonal allergic rhinitis     Chronic bronchitis (HCC)     Chronic respiratory failure with hypoxia ([address]     Chronic obstructive pulmonary disease (HCC)     Acute respiratory failure with hypercapnia (HCC)     Acute on chronic respiratory failure with hypoxia and hypercapnia (HCC)     COPD exacerbation ([address]     Acute diverticulitis with abscess      Hospital course in brief:  (Please refer to daily progress notes for a comprehensive review of the hospitalization by requesting medical records)  Andres Calvillo is a 58 y.o. female patient of Lory Liang.  Jitendra Dowell MD who  has a past medical history of Abscess, COPD (chronic obstructive pulmonary disease) [address] [address] Diverticulitis, Provoked DVT 3/2018, Seasonal allergic rhinitis, Smoker, and Tobacco use disorder. who originally had concerns including Abdominal Pain (Lower abdominal pain, worsening since tues). at presentation on 12/16/2022, and was found to have Chronic respiratory failure with hypoxia ([facility] [address] [J96.11]  Diverticulitis of large intestine with abscess without bleeding [K57.20]  Sepsis without acute organ dysfunction, due to unspecified organism ([facility] [address] [A41.9] after workup. The patient was admitted under the service of Aron Heller DO and Toya Pagan with consultation to general surgeon, Dr. Rizwana Forman. Solo David is a 70-year-old female recently discharged for respiratory issues who presented back to the hospital with abdominal pain. She has had diverticulitis in the past and CT work-up in ER confirmed diverticulitis with perforation and abscess collection. She met criteria for sepsis with tachycardia and leukocytosis. CBC revealed a leukocytosis with neutrophilic predominance. Patient admits to subjective fevers and chills as well as fatigue and malaise. Patient was at her baseline degree of shortness of breath, cough and congestion with no acute symptoms. Admitted to generalized abdominal pain with nausea. After evaluation in the emergency room was determined that the patient need to be admitted for further evaluation. Once evaluated by the general surgery team recommendation was for IR drainage and IV antibiotic therapy. On December 19 patient did undergo IR placement of drain with approximately 10 mL of purulent material aspirated. Drain remained in place until day of discharge at which time it was removed. Patient tolerated antibiotic therapy. Patient did have low-grade temperature. Vital signs otherwise stable. Leukocytosis resolved. Hemoglobin trended upward. Culture from aspirate was positive for Streptococcus gordonii.      During admission patient's abdominal pain and appetite improved. Patient did have an episode of tachycardia and diaphoresis. Troponin at that time was noticed to have been mildly elevated. Repeat troponin had trended downward. No chest pain. EKG revealed normal sinus rhythm, possible left atrial enlargement. T wave inversion no longer evident in inferior leads. Rate was 90 bpm.  Patient became more mobile during admission. As above pain improved. Drain was removed on the day of discharge. Patient was discharged home on antibiotics with instructions to follow-up with general surgery and her primary care physician. Patient did verbalize understanding. Brief Physical Examination and Laboratory Data on Day of Discharge  Vitals:  /65   Pulse 85   Temp 98.7 °F (37.1 °C) (Oral)   Resp 18   Ht 5' 3\" (1.6 m)   Wt 157 lb 5 oz (71.4 kg)   SpO2 97%   BMI 27.87 kg/m²   General Appearance:  awake, alert, and oriented to person, place, time, and purpose; appears stated age and cooperative; no apparent distress no labored breathing  HEENT:  NCAT; PERRL; conjunctiva pink, sclera clear  Neck:  no adenopathy, bruit, JVD, tenderness, masses, or nodules; supple, symmetrical, trachea midline, thyroid not enlarged  Lung:  clear to auscultation bilaterally; no use of accessory muscles; no rhonchi, rales, or crackles  Heart:  regular rate and regular rhythm without murmur, rub, or gallop  Abdomen:  soft, nontender, nondistended; normoactive bowel sounds; no organomegaly-patient did have previous midline lower abdominal drain which has been removed. Dressing intact. Minimally tender. Extremities:  extremities normal, atraumatic, no cyanosis or edema  Musculokeletal:  no joint swelling, no muscle tenderness. ROM normal in all joints of extremities.    Neurologic:  mental status A&Ox3, thought content appropriate; CN II-XII grossly intact; sensation intact, motor strength 5/5 globally; no slurred speech    Labs  Lab Results   Component Value Date    WBC 6.9 12/22/2022    HGB 9.6 (L) 12/22/2022    HCT 30.5 (L) 12/22/2022     12/22/2022     12/22/2022    K 4.0 12/22/2022    CL 94 (L) 12/22/2022    CREATININE 0.4 (L) 12/22/2022    BUN 9 12/22/2022    CO2 33 (H) 12/22/2022    GLUCOSE 93 12/22/2022    ALT 14 12/16/2022    AST 10 12/16/2022    INR 1.5 12/19/2022     Lab Results   Component Value Date    INR 1.5 12/19/2022    INR 1.0 10/28/2019    INR 0.9 01/31/2018    PROTIME 17.1 (H) 12/19/2022    PROTIME 11.3 10/28/2019    PROTIME 9.8 01/31/2018      Lab Results   Component Value Date    TSH 0.648 12/17/2022     Lab Results   Component Value Date    TRIG 51 02/10/2022    TRIG 82 11/30/2019    TRIG 79 10/29/2019     Lab Results   Component Value Date    HDL 69 02/10/2022    HDL 61 11/30/2019    HDL 45 10/29/2019     Lab Results   Component Value Date    LDLCALC 183 (H) 02/10/2022    LDLCALC 195 (H) 11/30/2019    LDLCALC 138 (H) 10/29/2019     Lab Results   Component Value Date    LABA1C 5.0 02/10/2022       Pertinent Imaging  CT ABDOMEN PELVIS W IV CONTRAST Additional Contrast? None    Result Date: 12/16/2022  EXAMINATION: CT OF THE ABDOMEN AND PELVIS WITH CONTRAST 12/16/2022 10:53 pm TECHNIQUE: CT of the abdomen and pelvis was performed with the administration of intravenous contrast. Multiplanar reformatted images are provided for review. Automated exposure control, iterative reconstruction, and/or weight based adjustment of the mA/kV was utilized to reduce the radiation dose to as low as reasonably achievable. COMPARISON: 11/29/2022. HISTORY: ORDERING SYSTEM PROVIDED HISTORY: LLQ abd pain, concern for diverticulitis TECHNOLOGIST PROVIDED HISTORY: Reason for exam:->LLQ abd pain, concern for diverticulitis Additional Contrast?->None Decision Support Exception - unselect if not a suspected or confirmed emergency medical condition->Emergency Medical Condition (MA) FINDINGS: Lower Chest:   Emphysematous changes.   Pleural thickening and/or atelectasis right lower lobe. Organs: Multiple liver cysts. The spleen, adrenal glands, left kidney, pancreas and gallbladder are normal.  5 mm and 2 mm nonobstructing right renal calculi. GI/bowel: Extensive thickening of the mid sigmoid colon consistent with diverticulitis. Present within the mid sigmoid colon colonic wall is a 5.5 cm x 3.5 cm fluid collection consistent with abscess. Pelvis: Normal urinary bladder. Peritoneum/Retroperitoneum:   No free fluid or free air. Normal caliber abdominal aorta with scattered atherosclerotic plaque. Bones/Soft Tissues: Unremarkable. Extensive thickening of the mid sigmoid colon consistent with diverticulitis. Present within the mid sigmoid colon colonic wall is a 5.5 cm x 3.5 cm fluid collection consistent with abscess. XR CHEST PORTABLE    Result Date: 12/20/2022  EXAMINATION: ONE XRAY VIEW OF THE CHEST 12/20/2022 5:50 am COMPARISON: December 3, 2022 HISTORY: ORDERING SYSTEM PROVIDED HISTORY: SOB TECHNOLOGIST PROVIDED HISTORY: Reason for exam:->SOB FINDINGS: Normal cardiomediastinal silhouette. Lungs clear. There is hyperinflation of the lungs with flattening of the diaphragms suggesting COPD. No pneumothorax or effusion. Osseous thorax intact. No acute disease. RECOMMENDATION: Careful clinical correlation and follow up recommended. XR CHEST PORTABLE    Result Date: 12/3/2022  EXAMINATION: ONE XRAY VIEW OF THE CHEST 12/3/2022 7:05 am COMPARISON: None. HISTORY: ORDERING SYSTEM PROVIDED HISTORY: SOB TECHNOLOGIST PROVIDED HISTORY: Reason for exam:->SOB FINDINGS: There are advanced emphysematous changes. There is right upper lobe airspace disease. In comparison with the patient's previous CT of the chest of 11/29/2022 it appears that the right middle lobe is now Reaerated. There is no right or left pneumothorax. 1. Patchy right upper lobe airspace disease 2.  The previously identified collapsed right middle lobe appears to be Ree aerated however true lateral view was not obtained to confirm Ree aeration of the right middle lobe. XR CHEST PORTABLE    Result Date: 11/29/2022  EXAMINATION: ONE XRAY VIEW OF THE CHEST 11/29/2022 9:02 am COMPARISON: 11/27/2022. HISTORY: ORDERING SYSTEM PROVIDED HISTORY: pna? TECHNOLOGIST PROVIDED HISTORY: Reason for exam:->pna? FINDINGS: The cardiac silhouette is normal in size. The pulmonary vasculature is within normal limits. There are chronic findings in both lungs. There is a patchy infiltrate in the lower right lung. No pneumothorax or pleural effusion is seen. Chronic findings. Patchy infiltrate in the lower right lung. XR CHEST PORTABLE    Result Date: 11/27/2022  EXAMINATION: ONE XRAY VIEW OF THE CHEST 11/27/2022 10:56 pm COMPARISON: 04/13/2022 HISTORY: ORDERING SYSTEM PROVIDED HISTORY: shortness of breath TECHNOLOGIST PROVIDED HISTORY: Reason for exam:->shortness of breath FINDINGS: The lungs are without acute focal process. There is no effusion or pneumothorax. The cardiomediastinal silhouette is without acute process. The osseous structures are without acute process. No acute process. CTA CHEST W CONTRAST    Result Date: 11/29/2022  EXAMINATION: CTA OF THE CHEST 11/29/2022 10:34 am TECHNIQUE: CTA of the chest was performed after the administration of intravenous contrast.  Multiplanar reformatted images are provided for review. MIP images are provided for review. Automated exposure control, iterative reconstruction, and/or weight based adjustment of the mA/kV was utilized to reduce the radiation dose to as low as reasonably achievable. COMPARISON: 11/29/2022 HISTORY: ORDERING SYSTEM PROVIDED HISTORY: evaluate for PE TECHNOLOGIST PROVIDED HISTORY: Reason for exam:->evaluate for PE Decision Support Exception - unselect if not a suspected or confirmed emergency medical condition->Emergency Medical Condition (MA) FINDINGS: Pulmonary Arteries: Pulmonary arteries are adequately opacified for evaluation.   No evidence of intraluminal filling defect to suggest pulmonary embolism. Main pulmonary artery is normal in caliber. Mediastinum: No evidence of mediastinal lymphadenopathy. The heart and pericardium demonstrate no acute abnormality. There is no acute abnormality of the thoracic aorta. Lungs/pleura: Extensive emphysematous changes visualized in bilateral lung fields but most prominent in the lower lung field with there are consolidations and atelectatic changes visualized in the right middle lobe and in the right posteroinferior lower lobe. No evidence of pneumothorax or parenchymal lung mass is seen. Bronchial wall thickening is a interstitial prominence is seen. Upper Abdomen: Limited images of the upper abdomen are unremarkable. Soft Tissues/Bones: No acute bone or soft tissue abnormality. No evidence of pulmonary embolism. Extensive emphysematous changes visualized in bilateral lung fields. RECOMMENDATIONS: Unavailable     IR ABSCESS DRAINAGE PERC    Result Date: 12/19/2022  PROCEDURE: IR ABSCESS DRAIN PERC MODERATE CONSCIOUS SEDATION 12/19/2022 HISTORY: ORDERING SYSTEM PROVIDED HISTORY: Drainage of intra-abdominal abscess TECHNOLOGIST PROVIDED HISTORY: Reason for exam:->Drainage of intra-abdominal abscess TECHNIQUE: CT-guided CONTRAST: None SEDATION: Moderate sedation was ordered and supervised by the attending with physician face-to-face monitoring. Medications were provided and recorded by Radiology nurses. The administration, documentation and monitoring of IV conscious sedation under my direct supervision for time frame of 30 minutes. 1 mg of IV Versed and 50 mcg of IV fentanyl was administered. FLUOROSCOPY DOSE AND TYPE OR TIME AND EXPOSURES: The procedure was performed under CT guidance. DESCRIPTION OF PROCEDURE: Informed consent was obtained after a detailed explanation of the procedure including risks, benefits, and alternatives. Universal protocol was observed.  Sterile gowns, masks, hats and gloves utilized for maximal sterile barrier. FINDINGS: CT images of the pelvis were obtained. A time-out was performed The patient's anterior pelvic wall was prepped and draped in a sterile fashion using maximum sterile barrier technique. Following the uneventful administration of lidocaine, I introduced a 5 [place] Yueh catheter into the 6.5 cm x 4 cm pericolonic abscess adjacent to the sigmoid colon. Through the Yueh needle an Amplatz wire was advanced into the abscess cavity. Over the Amplatz wire a multi side hole pigtail drainage catheter was placed. 10 mL of purulent material was aspirated and sent to microbiology for further evaluation. Successful placement of an 8 [place] multi side hole pigtail drainage catheter within the pericolonic abscess adjacent to the sigmoid colon. Administration of conscious sedation. Disposition  The patient's condition is stable. At this time the patient is without objective evidence of an acute process requiring continuing hospitalization or inpatient management. They are stable for discharge with outpatient follow-up. I have spoken with the patient and discussed the results of the current hospitalization, in addition to providing specific details for the plan of care and counseling regarding the diagnosis and prognosis. The plan has been discussed in detail and they are aware of the specific conditions for emergent return, as well as the importance of follow-up.   Their questions are answered at this time and they are agreeable with the plan for discharge to home    Discharge Medications     Medication List        START taking these medications      cefdinir 300 MG capsule  Commonly known as: OMNICEF  Take 1 capsule by mouth 2 times daily for 5 days     metroNIDAZOLE 500 MG tablet  Commonly known as: FLAGYL  Take 1 tablet by mouth 3 times daily for 5 days            ASK your doctor about these medications      * albuterol sulfate  (90 Base) MCG/ACT inhaler  Commonly known as: Ventolin HFA  Inhale 2 puffs into the lungs 4 times daily as needed for Wheezing or Shortness of Breath     * albuterol sulfate  (90 Base) MCG/ACT inhaler  Commonly known as: Ventolin HFA  Inhale 2 puffs into the lungs 4 times daily as needed for Wheezing     Arformoterol Tartrate 15 MCG/2ML Nebu  Commonly known as: BROVANA  Take 2 mLs by nebulization in the morning and 2 mLs in the evening. ascorbic acid 1000 MG tablet  Commonly known as: VITAMIN C  Take 1 tablet by mouth daily     atorvastatin 40 MG tablet  Commonly known as: LIPITOR  Take 1 tablet by mouth nightly     budesonide 0.5 MG/2ML nebulizer suspension  Commonly known as: PULMICORT  Take 2 mLs by nebulization in the morning and 2 mLs in the evening. fluticasone 50 MCG/ACT nasal spray  Commonly known as: FLONASE  2 sprays by Each Nostril route daily     fluticasone-salmeterol 250-50 MCG/DOSE Aepb  Commonly known as: ADVAIR  Inhale 1 puff into the lungs every 12 hours . Rinse and spit after each use. Incruse Ellipta 62.5 MCG/ACT Aepb  Generic drug: Umeclidinium Bromide  Inhale 1 puff into the lungs daily     ipratropium-albuterol 0.5-2.5 (3) MG/3ML Soln nebulizer solution  Commonly known as: DUONEB  Inhale 3 mLs into the lungs in the morning and 3 mLs at noon and 3 mLs in the evening and 3 mLs before bedtime. OXYGEN     sertraline 25 MG tablet  Commonly known as: ZOLOFT  Take 1 tablet by mouth daily           * This list has 2 medication(s) that are the same as other medications prescribed for you. Read the directions carefully, and ask your doctor or other care provider to review them with you.                    Where to Get Your Medications        These medications were sent to [facility], [address] [place] [id number]      Phone: [phone]   cefdinir 300 MG capsule  metroNIDAZOLE 500 MG tablet Follow-up/Instructions  This patient is instructed to follow-up with her primary care physician in 3-5 days. Patient is instructed to follow-up with the consults listed above as directed by them. she is instructed to resume home medications and take new medications as indicated in the list above. If the patient has a recurrence of symptoms, she is instructed to go to the ED. Preparing for this patient's discharge, including paperwork, orders, instructions, and meeting with patient required > 35 minutes. The patient was seen, examined and then discussed with Dr. Rojelio Molina.        Zully Fuentes MSN, NP-C  1:46 PM  12/22/2022   3-5

## 2022-12-22 NOTE — PROGRESS NOTES
Physical Therapy    Physical Therapy Treatment Note/Plan of Care    Room #:  0630/1283-93  Patient Name: Korin Boyd  YOB: 1960  MRN: [id number]    Date of Service: 12/22/2022     Tentative placement recommendation: Home  Equipment recommendation: Patient has needed equipment       Evaluating Physical Therapist: Constantine Conley PT  #[id number]      Specific Provider Orders/Date/Referring Provider :  12/17/22 0615    PT eval and treat  Start:  12/17/22 0615,   End:  12/17/22 0615,   ONE TIME,   Standing Count:  1 Occurrences,   R         Angelica Sic, DO     Admitting Diagnosis:   Chronic respiratory failure with hypoxia (HCC) [J96.11]  Diverticulitis of large intestine with abscess without bleeding [K57.20]  Sepsis without acute organ dysfunction, due to unspecified organism [facility]) [A41.9]    Admitted with    abdominal pain sepsis with tachycardia and leukocytosis  acute diverticulitis with perforation and abscess  Recent d/c from acute care  Surgery: none  Visit Diagnoses         Codes    Sepsis without acute organ dysfunction, due to unspecified organism [facility])     A41.9            Patient Active Problem List   Diagnosis    Tobacco use disorder    Seasonal allergic rhinitis    Chronic bronchitis ([address]    Chronic respiratory failure with hypoxia ([address]    Chronic obstructive pulmonary disease ([address]    Acute respiratory failure with hypercapnia (HCC)    Acute on chronic respiratory failure with hypoxia and hypercapnia (HCC)    COPD exacerbation ([address]    Acute diverticulitis with abscess        ASSESSMENT of Current Deficits Patient exhibits decreased balance and endurance impairing functional mobility, transfers, gait , gait distance, and tolerance to activity are barriers to d/c and require skilled intervention to address concerns listed above to increase safety and independence at discharge. Decreased strength, balance and endurance  increases patient's risk for fall.  Patient needing extra time throughout tx session due to shortness of breath, fatigues quickly, and impaired strength/endurance after performing each task and during exercises. PHYSICAL THERAPY  PLAN OF CARE       Physical therapy plan of care is established based on physician order,  patient diagnosis and clinical assessment    Current Treatment Recommendations:    -Standing Balance: Perform strengthening exercises in standing to promote motor control with or without upper extremity support   -Gait: Gait training and Standing activities to improve: base of support, weight shift, weight bearing    -Endurance: Utilize Supervised activities to increase level of endurance to allow for safe functional mobility including transfers and gait   -Stairs: Stair training with instruction on proper technique and hand placement on rail    PT long term treatment goals are located in below grid    Patient and or family understand(s) diagnosis, prognosis, and plan of care. Frequency of treatments: Patient will be seen  daily. Prior Level of Function: Patient ambulated independently    Rehab Potential: good   for baseline    Past medical history:   Past Medical History:   Diagnosis Date    Abscess     colon    COPD (chronic obstructive pulmonary disease) [address]     Diverticulitis     Provoked DVT 3/2018     3 months Eliquis for DVT due to PICC line    Seasonal allergic rhinitis     Smoker 11/30/2019    5-6 per day    Tobacco use disorder      : 1 ppd since age 25     History reviewed. No pertinent surgical history. SUBJECTIVE:    Precautions:  Activity as tolerated, falls and alarm , 4 Liters of o2 via nasal cannula     Social history: Patient lives with daughter in a two story home resides first  with 2 steps, with rail  to enter Tub shower , grab bars  4 Liters of o2 via nasal cannula     Equipment owned: None,      [address] [place] [address],[address] Mobility Inpatient   How much difficulty turning over in bed?: None  How much difficulty sitting down on / standing up from a chair with arms?: None  How much difficulty moving from lying on back to sitting on side of bed?: None  How much help from another person moving to and from a bed to a chair?: A Little  How much help from another person needed to walk in hospital room?: A Little  How much help from another person for climbing 3-5 steps with a railing?: A Little  AM-[facility] Inpatient Mobility Raw Score : 21  AM-PAC Inpatient T-Scale Score : 50.25  Mobility Inpatient CMS 0-100% Score: 28.97  Mobility Inpatient CMS G-Code Modifier : [email or web address] cleared patient for PT treatment. OBJECTIVE;   Initial Evaluation  Date: 12/18/2022 Treatment Date:  12/22/2022       Short Term/ Long Term   Goals   Was pt agreeable to Eval/treatment? Yes yes To be met in 3 days   Pain level   10/10  abdomen 0/10    Bed Mobility    Rolling: Independent    Supine to sit: Independent    Sit to supine: Independent    Scooting: Independent    Rolling: Independent   Supine to sit:  Independent   Sit to supine: Not assessed    Scooting: Independent       Transfers Sit to stand: Independent   Sit to stand: Independent        Ambulation     2-3 side steps using  no device with Supervision    Patient with flexed posture and antalgic gait   2 x 12 feet using  no device with Supervision    cues for safety and pacing     100 feet using  no device with Independent    ROM Within functional limits        Strength BUE:   4/5  RLE:  4/5  LLE:  4/5  Increase strength in affected mm groups by 1/3 grade   Balance Sitting EOB:  good    Dynamic Standing:  fair   Sitting EOB: good   Dynamic Standing: fair +   Sitting EOB:  good    Dynamic Standing: good       Patient is Alert & Oriented x person, place, time, and situation and follows directions    Sensation:  Patient  denies numbness/tingling   Edema:  no   Endurance: poor         Patient education  Patient educated on role of Physical Therapy, risks of immobility, safety and plan of care and importance of mobility while in hospital      Patient response to education:   Pt verbalized understanding Pt demonstrated skill Pt requires further education in this area   Yes Partial Yes      Treatment:  Patient practiced and was instructed/facilitated in the following treatment: Patient to edge of the bed,  Sat edge of bed 10 minutes with Independent to increase dynamic sitting balance and activity tolerance. Pt stood, ambulated to the restroom, and back to the chair. Pt performed seated exercises. Therapeutic Exercises:  ankle pumps, hip abduction/adduction, long arc quad, and seated marching, x 15 reps. At end of session, patient in chair with  call light and phone within reach,  all lines and tubes intact, nursing notified. Patient would benefit from continued skilled Physical Therapy to improve functional independence and quality of life. Patient's/ family goals   home    Time in  10:18  Time out  10:43    Total Treatment Time  25 minutes        CPT codes:    Therapeutic activities (70869)   13 minutes  1 unit(s)  Therapeutic exercises (11466)   12 minutes  1 unit(s)    Dante Heredia  [facility]  LIC # [id number]

## 2022-12-25 LAB
BODY FLUID CULTURE, STERILE: ABNORMAL
GRAM STAIN RESULT: ABNORMAL
ORGANISM: ABNORMAL

## 2022-12-28 ENCOUNTER — HOSPITAL ENCOUNTER (INPATIENT)
Age: 62
LOS: 4 days | Discharge: HOME | DRG: 444 | End: 2023-01-01
Attending: EMERGENCY MEDICINE | Admitting: INTERNAL MEDICINE
Payer: COMMERCIAL

## 2022-12-28 ENCOUNTER — APPOINTMENT (OUTPATIENT)
Dept: CT IMAGING | Age: 62
DRG: 444 | End: 2022-12-28
Payer: COMMERCIAL

## 2022-12-28 ENCOUNTER — TELEPHONE (OUTPATIENT)
Dept: OTHER | Facility: CLINIC | Age: 62
End: 2022-12-28

## 2022-12-28 ENCOUNTER — APPOINTMENT (OUTPATIENT)
Dept: GENERAL RADIOLOGY | Age: 62
DRG: 444 | End: 2022-12-28
Payer: COMMERCIAL

## 2022-12-28 ENCOUNTER — APPOINTMENT (OUTPATIENT)
Dept: ULTRASOUND IMAGING | Age: 62
DRG: 444 | End: 2022-12-28
Payer: COMMERCIAL

## 2022-12-28 DIAGNOSIS — R94.31 PROLONGED Q-T INTERVAL ON ECG: ICD-10-CM

## 2022-12-28 DIAGNOSIS — F32.1 CURRENT MODERATE EPISODE OF MAJOR DEPRESSIVE DISORDER WITHOUT PRIOR EPISODE (HCC): ICD-10-CM

## 2022-12-28 DIAGNOSIS — K81.0 ACUTE CHOLECYSTITIS: Primary | ICD-10-CM

## 2022-12-28 DIAGNOSIS — J96.11 CHRONIC RESPIRATORY FAILURE WITH HYPOXIA (HCC): ICD-10-CM

## 2022-12-28 DIAGNOSIS — R94.31 ACUTE ELECTROCARDIOGRAM CHANGES: ICD-10-CM

## 2022-12-28 DIAGNOSIS — J30.1 SEASONAL ALLERGIC RHINITIS DUE TO POLLEN: ICD-10-CM

## 2022-12-28 LAB
ALBUMIN SERPL-MCNC: 2.8 G/DL (ref 3.5–5.2)
ALP BLD-CCNC: 665 U/L (ref 35–104)
ALT SERPL-CCNC: 431 U/L (ref 0–32)
ANION GAP SERPL CALCULATED.3IONS-SCNC: 9 MMOL/L (ref 7–16)
AST SERPL-CCNC: 980 U/L (ref 0–31)
BASOPHILS ABSOLUTE: 0 E9/L (ref 0–0.2)
BASOPHILS RELATIVE PERCENT: 0 % (ref 0–2)
BILIRUB SERPL-MCNC: 2.4 MG/DL (ref 0–1.2)
BUN BLDV-MCNC: 12 MG/DL (ref 6–23)
CALCIUM SERPL-MCNC: 8.1 MG/DL (ref 8.6–10.2)
CHLORIDE BLD-SCNC: 87 MMOL/L (ref 98–107)
CO2: 37 MMOL/L (ref 22–29)
CREAT SERPL-MCNC: 0.4 MG/DL (ref 0.5–1)
EOSINOPHILS ABSOLUTE: 0 E9/L (ref 0.05–0.5)
EOSINOPHILS RELATIVE PERCENT: 0 % (ref 0–6)
GFR SERPL CREATININE-BSD FRML MDRD: >60 ML/MIN/1.73
GLUCOSE BLD-MCNC: 92 MG/DL (ref 74–99)
HCT VFR BLD CALC: 34.6 % (ref 34–48)
HEMOGLOBIN: 11.6 G/DL (ref 11.5–15.5)
LACTIC ACID: 2.5 MMOL/L (ref 0.5–2.2)
LIPASE: 32 U/L (ref 13–60)
LYMPHOCYTES ABSOLUTE: 1.96 E9/L (ref 1.5–4)
LYMPHOCYTES RELATIVE PERCENT: 12 % (ref 20–42)
MAGNESIUM: 2 MG/DL (ref 1.6–2.6)
MCH RBC QN AUTO: 30 PG (ref 26–35)
MCHC RBC AUTO-ENTMCNC: 33.5 % (ref 32–34.5)
MCV RBC AUTO: 89.4 FL (ref 80–99.9)
MONOCYTES ABSOLUTE: 0.82 E9/L (ref 0.1–0.95)
MONOCYTES RELATIVE PERCENT: 5 % (ref 2–12)
MYELOCYTE PERCENT: 1 % (ref 0–0)
NEUTROPHILS ABSOLUTE: 13.53 E9/L (ref 1.8–7.3)
NEUTROPHILS RELATIVE PERCENT: 82 % (ref 43–80)
PDW BLD-RTO: 15.3 FL (ref 11.5–15)
PLATELET # BLD: 217 E9/L (ref 130–450)
PMV BLD AUTO: 11.5 FL (ref 7–12)
POTASSIUM REFLEX MAGNESIUM: 3.2 MMOL/L (ref 3.5–5)
PRO-BNP: 165 PG/ML (ref 0–125)
RBC # BLD: 2.49 E12/L (ref 3.5–5.5)
REASON FOR REJECTION: NORMAL
REJECTED TEST: NORMAL
SODIUM BLD-SCNC: 133 MMOL/L (ref 132–146)
TOTAL PROTEIN: 6.7 G/DL (ref 6.4–8.3)
TROPONIN, HIGH SENSITIVITY: 9 NG/L (ref 0–9)
WBC # BLD: 16.3 E9/L (ref 4.5–11.5)

## 2022-12-28 PROCEDURE — 96375 TX/PRO/DX INJ NEW DRUG ADDON: CPT

## 2022-12-28 PROCEDURE — 76705 ECHO EXAM OF ABDOMEN: CPT

## 2022-12-28 PROCEDURE — 99285 EMERGENCY DEPT VISIT HI MDM: CPT

## 2022-12-28 PROCEDURE — 71275 CT ANGIOGRAPHY CHEST: CPT

## 2022-12-28 PROCEDURE — 83690 ASSAY OF LIPASE: CPT

## 2022-12-28 PROCEDURE — 6370000000 HC RX 637 (ALT 250 FOR IP): Performed by: STUDENT IN AN ORGANIZED HEALTH CARE EDUCATION/TRAINING PROGRAM

## 2022-12-28 PROCEDURE — 2700000000 HC OXYGEN THERAPY PER DAY

## 2022-12-28 PROCEDURE — 83880 ASSAY OF NATRIURETIC PEPTIDE: CPT

## 2022-12-28 PROCEDURE — 83605 ASSAY OF LACTIC ACID: CPT

## 2022-12-28 PROCEDURE — 94640 AIRWAY INHALATION TREATMENT: CPT

## 2022-12-28 PROCEDURE — 6370000000 HC RX 637 (ALT 250 FOR IP): Performed by: EMERGENCY MEDICINE

## 2022-12-28 PROCEDURE — 74177 CT ABD & PELVIS W/CONTRAST: CPT

## 2022-12-28 PROCEDURE — 94664 DEMO&/EVAL PT USE INHALER: CPT

## 2022-12-28 PROCEDURE — 6360000002 HC RX W HCPCS: Performed by: STUDENT IN AN ORGANIZED HEALTH CARE EDUCATION/TRAINING PROGRAM

## 2022-12-28 PROCEDURE — 71045 X-RAY EXAM CHEST 1 VIEW: CPT

## 2022-12-28 PROCEDURE — 93005 ELECTROCARDIOGRAM TRACING: CPT | Performed by: STUDENT IN AN ORGANIZED HEALTH CARE EDUCATION/TRAINING PROGRAM

## 2022-12-28 PROCEDURE — 1200000000 HC SEMI PRIVATE

## 2022-12-28 PROCEDURE — 6360000004 HC RX CONTRAST MEDICATION: Performed by: RADIOLOGY

## 2022-12-28 PROCEDURE — 2580000003 HC RX 258: Performed by: STUDENT IN AN ORGANIZED HEALTH CARE EDUCATION/TRAINING PROGRAM

## 2022-12-28 PROCEDURE — 80053 COMPREHEN METABOLIC PANEL: CPT

## 2022-12-28 PROCEDURE — 96365 THER/PROPH/DIAG IV INF INIT: CPT

## 2022-12-28 PROCEDURE — 85025 COMPLETE CBC W/AUTO DIFF WBC: CPT

## 2022-12-28 PROCEDURE — 84484 ASSAY OF TROPONIN QUANT: CPT

## 2022-12-28 PROCEDURE — 83735 ASSAY OF MAGNESIUM: CPT

## 2022-12-28 RX ORDER — 0.9 % SODIUM CHLORIDE 0.9 %
500 INTRAVENOUS SOLUTION INTRAVENOUS ONCE
Status: COMPLETED | OUTPATIENT
Start: 2022-12-28 | End: 2022-12-29

## 2022-12-28 RX ORDER — MAGNESIUM SULFATE IN WATER 40 MG/ML
2000 INJECTION, SOLUTION INTRAVENOUS ONCE
Status: COMPLETED | OUTPATIENT
Start: 2022-12-28 | End: 2022-12-28

## 2022-12-28 RX ORDER — METRONIDAZOLE 500 MG/100ML
500 INJECTION, SOLUTION INTRAVENOUS ONCE
Status: COMPLETED | OUTPATIENT
Start: 2022-12-28 | End: 2022-12-29

## 2022-12-28 RX ORDER — IPRATROPIUM BROMIDE AND ALBUTEROL SULFATE 2.5; .5 MG/3ML; MG/3ML
3 SOLUTION RESPIRATORY (INHALATION) ONCE
Status: COMPLETED | OUTPATIENT
Start: 2022-12-28 | End: 2022-12-28

## 2022-12-28 RX ORDER — ASPIRIN 81 MG/1
324 TABLET, CHEWABLE ORAL ONCE
Status: COMPLETED | OUTPATIENT
Start: 2022-12-28 | End: 2022-12-28

## 2022-12-28 RX ORDER — METHYLPREDNISOLONE SODIUM SUCCINATE 125 MG/2ML
125 INJECTION, POWDER, LYOPHILIZED, FOR SOLUTION INTRAMUSCULAR; INTRAVENOUS ONCE
Status: COMPLETED | OUTPATIENT
Start: 2022-12-28 | End: 2022-12-28

## 2022-12-28 RX ADMIN — IPRATROPIUM BROMIDE AND ALBUTEROL SULFATE 3 AMPULE: .5; 2.5 SOLUTION RESPIRATORY (INHALATION) at 17:58

## 2022-12-28 RX ADMIN — IOPAMIDOL 75 ML: 755 INJECTION, SOLUTION INTRAVENOUS at 21:45

## 2022-12-28 RX ADMIN — METHYLPREDNISOLONE SODIUM SUCCINATE 125 MG: 125 INJECTION, POWDER, FOR SOLUTION INTRAMUSCULAR; INTRAVENOUS at 19:19

## 2022-12-28 RX ADMIN — MAGNESIUM SULFATE HEPTAHYDRATE 2000 MG: 40 INJECTION, SOLUTION INTRAVENOUS at 19:23

## 2022-12-28 RX ADMIN — ASPIRIN 324 MG: 81 TABLET, CHEWABLE ORAL at 20:45

## 2022-12-28 RX ADMIN — NITROGLYCERIN 0.5 INCH: 20 OINTMENT TOPICAL at 20:46

## 2022-12-28 RX ADMIN — SODIUM CHLORIDE 500 ML: 9 INJECTION, SOLUTION INTRAVENOUS at 23:47

## 2022-12-28 ASSESSMENT — ENCOUNTER SYMPTOMS
DIARRHEA: 0
ABDOMINAL PAIN: 0
CHEST TIGHTNESS: 1
SHORTNESS OF BREATH: 1
COLOR CHANGE: 0
NAUSEA: 0
BACK PAIN: 0
VOMITING: 0
COUGH: 0

## 2022-12-28 ASSESSMENT — PAIN - FUNCTIONAL ASSESSMENT
PAIN_FUNCTIONAL_ASSESSMENT: NONE - DENIES PAIN
PAIN_FUNCTIONAL_ASSESSMENT: NONE - DENIES PAIN

## 2022-12-28 NOTE — ED PROVIDER NOTES
HPI   70-year-old female patient present to emergency department for evaluation of shortness of breath and chest tightness. She has past history of COPD she is on nasal cannula oxygen 4 L at baseline. She notes today her pulse ox at home on her baseline oxygen was around 86%. Not hypoxic here. She was recently admitted to the hospital she had diverticulitis with abscess and is status post IR drainage. Patient was discharged home on oral antibiotics and has been taking them as prescribed. She denies any new leg swelling or calf tenderness. Review of Systems   Constitutional:  Negative for chills and fever. HENT:  Negative for congestion. Respiratory:  Positive for chest tightness and shortness of breath. Negative for cough. Cardiovascular:  Negative for chest pain. Gastrointestinal:  Negative for abdominal pain, diarrhea, nausea and vomiting. Genitourinary:  Negative for difficulty urinating, dysuria and hematuria. Musculoskeletal:  Negative for back pain. Skin:  Negative for color change. All other systems reviewed and are negative. Physical Exam  Vitals and nursing note reviewed. Constitutional:       Appearance: Normal appearance. HENT:      Head: Normocephalic and atraumatic. Nose: Nose normal. No congestion. Mouth/Throat:      Mouth: Mucous membranes are moist.      Pharynx: Oropharynx is clear. Eyes:      Conjunctiva/sclera: Conjunctivae normal.      Pupils: Pupils are equal, round, and reactive to light. Cardiovascular:      Rate and Rhythm: Normal rate and regular rhythm. Pulses: Normal pulses. Heart sounds: Normal heart sounds. Pulmonary:      Effort: Pulmonary effort is normal.      Comments: Wheezing decreased breath sounds throughout. Abdominal:      General: Bowel sounds are normal. There is no distension. Tenderness: There is no abdominal tenderness.       Comments: Generalized abdominal soreness no guarding or rebound   Musculoskeletal: General: Normal range of motion. Cervical back: Normal range of motion and neck supple. Skin:     General: Skin is warm and dry. Capillary Refill: Capillary refill takes less than 2 seconds. Neurological:      General: No focal deficit present. Mental Status: She is alert. Procedures     MDM  70-year-old female patient present to emergency department for evaluation of chest tightness abdominal soreness. She is found to have leukocytosis of 16, elevated bilirubin, elevated alk phos and elevated ALT and AST. Concern for gallbladder/liver pathology. Ultrasound of the right upper quadrant showing mild gallbladder wall thickening but no evidence for choledocholithiasis at this time. CTA of her chest reveals no pulmonary embolism CT abdomen pelvis actually showing improvement of diverticulitis and inflammatory changes. With patient's history of recent instrumentation I ordered her Flagyl and cefepime IV. Consult placed to general surgery they will see her in the morning. Patient to be admitted to medicine at this time however due to her initial chest tightness prolonged QTC and abnormal EKG changes. Magnesium was given to her IV and repeat EKG obtained showing normalizing QTC still prolonged but improved to 520. ED Course as of 12/29/22 0025   Wed Dec 28, 2022   1857 EKG showing sinus tachycardia 128 bpm.  QTC prolonged 578. No acute STEMI. ST abnormality present QT prolonged as compared to prior. Interpretation performed by ED physician. [FG]   [phone] with Dr. Trent Rockwell will consult on patient [FG]   [phone] with Dr. Girish Davis will admit [FG]   Thu Dec 29, 2022   0017 Repeat EKG showing 89 bpm.  QTC is improved to 520. No acute ST elevation or depression appears improved as compared to prior overall.   Interpretation performed by ED physician. [FG]      ED Course User Index  [FG] Charli Leger DO     ED Course as of 12/29/22 0025   Wed Dec 28, 2022   1857 EKG showing sinus tachycardia 128 bpm.  QTC prolonged 578. No acute STEMI. ST abnormality present QT prolonged as compared to prior. Interpretation performed by ED physician. [FG]   [phone] with Dr. Carlos Torres will consult on patient [FG]   [phone] with Dr. Eran Rojas will admit [FG]   Thu Dec 29, 2022   0017 Repeat EKG showing 89 bpm.  QTC is improved to 520. No acute ST elevation or depression appears improved as compared to prior overall. Interpretation performed by ED physician. [FG]      ED Course User Index  [FG] Archie Rouse, DO       --------------------------------------------- PAST HISTORY ---------------------------------------------  Past Medical History:  has a past medical history of Abscess, COPD (chronic obstructive pulmonary disease) ([facility] Diverticulitis, Provoked DVT 3/2018, Seasonal allergic rhinitis, Smoker, and Tobacco use disorder. Past Surgical History:  has no past surgical history on file. Social History:  reports that she quit smoking about 19 months ago. Her smoking use included cigarettes. She has a 20.50 pack-year smoking history. She has never used smokeless tobacco. She reports current alcohol use. She reports that she does not currently use drugs after having used the following drugs: Marijuana [place]). Family History: family history includes Breast Cancer in her none; Colon Cancer in her none; Coronary Art Dis in her father; Diabetes in her unknown relative; Hypertension in her father; Other in her daughter and father; Stroke in her mother. The patients home medications have been reviewed.     Allergies: Penicillin v    -------------------------------------------------- RESULTS -------------------------------------------------    LABS:  Results for orders placed or performed during the hospital encounter of 12/28/22   CBC with Auto Differential   Result Value Ref Range    WBC 16.3 (H) 4.5 - 11.5 E9/L    RBC 2.49 (L) 3.50 - 5.50 E12/L    Hemoglobin 11.6 11.5 - 15.5 g/dL A-T Advancement Flap Text: The defect edges were debeveled with a #15 scalpel blade.  Given the location of the defect, shape of the defect and the proximity to free margins an A-T advancement flap was deemed most appropriate.  Using a sterile surgical marker, an appropriate advancement flap was drawn incorporating the defect and placing the expected incisions within the relaxed skin tension lines where possible.    The area thus outlined was incised deep to adipose tissue with a #15 scalpel blade.  The skin margins were undermined to an appropriate distance in all directions utilizing iris scissors. Hematocrit 34.6 34.0 - 48.0 %    MCV 89.4 80.0 - 99.9 fL    MCH 30.0 26.0 - 35.0 pg    MCHC 33.5 32.0 - 34.5 %    RDW 15.3 (H) 11.5 - 15.0 fL    Platelets 141 585 - 954 E9/L    MPV 11.5 7.0 - 12.0 fL    Neutrophils % 82.0 (H) 43.0 - 80.0 %    Lymphocytes % 12.0 (L) 20.0 - 42.0 %    Monocytes % 5.0 2.0 - 12.0 %    Eosinophils % 0.0 0.0 - 6.0 %    Basophils % 0.0 0.0 - 2.0 %    Neutrophils Absolute 13.53 (H) 1.80 - 7.30 E9/L    Lymphocytes Absolute 1.96 1.50 - 4.00 E9/L    Monocytes Absolute 0.82 0.10 - 0.95 E9/L    Eosinophils Absolute 0.00 (L) 0.05 - 0.50 E9/L    Basophils Absolute 0.00 0.00 - 0.20 E9/L    Myelocyte Percent 1.0 0 - 0 %   Comprehensive Metabolic Panel w/ Reflex to MG   Result Value Ref Range    Sodium 133 132 - 146 mmol/L    Potassium reflex Magnesium 3.2 (L) 3.5 - 5.0 mmol/L    Chloride 87 (L) 98 - 107 mmol/L    CO2 37 (H) 22 - 29 mmol/L    Anion Gap 9 7 - 16 mmol/L    Glucose 92 74 - 99 mg/dL    BUN 12 6 - 23 mg/dL    Creatinine 0.4 (L) 0.5 - 1.0 mg/dL    Est, Glom Filt Rate >60 >=60 mL/min/1.73    Calcium 8.1 (L) 8.6 - 10.2 mg/dL    Total Protein 6.7 6.4 - 8.3 g/dL    Albumin 2.8 (L) 3.5 - 5.2 g/dL    Total Bilirubin 2.4 (H) 0.0 - 1.2 mg/dL    Alkaline Phosphatase 665 (H) 35 - 104 U/L     (H) 0 - 32 U/L     (H) 0 - 31 U/L   Brain Natriuretic Peptide   Result Value Ref Range    Pro- (H) 0 - 125 pg/mL   Lipase   Result Value Ref Range    Lipase 32 13 - 60 U/L   Lactic Acid   Result Value Ref Range    Lactic Acid 2.5 (H) 0.5 - 2.2 mmol/L   Magnesium   Result Value Ref Range    Magnesium 2.0 1.6 - 2.6 mg/dL   SPECIMEN REJECTION   Result Value Ref Range    Rejected Test trp     Reason for Rejection see below    Troponin   Result Value Ref Range    Troponin, High Sensitivity 9 0 - 9 ng/L   EKG 12 Lead   Result Value Ref Range    Ventricular Rate 128 BPM    Atrial Rate 128 BPM    P-R Interval 112 ms    QRS Duration 70 ms    Q-T Interval 396 ms    QTc Calculation (Bazett) 578 ms    P Axis 94 degrees    R Axis 73 degrees    T Axis 78 degrees       RADIOLOGY:  CT ABDOMEN PELVIS W IV CONTRAST Additional Contrast? None   Final Result   Interval resolution of previously seen fluid collection associated with the   sigmoid colon. There is inflammatory stranding and wall thickening of the   sigmoid colon, which could represent resolving versus recurrent   diverticulitis. Clinical correlation recommended. CTA PULMONARY W CONTRAST   Final Result   No evidence of pulmonary embolism or acute pulmonary abnormality. Advanced emphysematous changes. US GALLBLADDER RUQ   Final Result   Multiple liver cysts measuring up to 6.5 cm. Small mount of fluid. Mild gallbladder wall thickening. No stones, sludge or pericholecystic   fluid. Normal common bile duct. XR CHEST PORTABLE   Final Result   No acute process. COPD. MRI ABDOMEN W WO CONTRAST MRCP    (Results Pending)         ------------------------- NURSING NOTES AND VITALS REVIEWED ---------------------------  Date / Time Roomed:  12/28/2022  5:33 PM  ED Bed Assignment:  15/15    The nursing notes within the ED encounter and vital signs as below have been reviewed. Patient Vitals for the past 24 hrs:   BP Temp Temp src Pulse Resp SpO2 Height Weight   12/29/22 0013 123/63 -- -- 89 17 98 % -- --   12/28/22 2348 (!) 109/58 97.9 °F (36.6 °C) Oral 95 16 97 % -- --   12/28/22 2220 (!) 97/53 -- -- 100 19 98 % -- --   12/28/22 2111 (!) 111/58 -- -- (!) 107 26 99 % -- --   12/28/22 2035 (!) 103/53 98 °F (36.7 °C) Oral (!) 110 20 97 % -- --   12/28/22 1736 122/67 97.9 °F (36.6 °C) Oral 98 18 99 % 5' 3\" (1.6 m) 157 lb (71.2 kg)       Oxygen Saturation Interpretation: Normal      Counseling:  I have spoken with the patient and discussed todays results, in addition to providing specific details for the plan of care and counseling regarding the diagnosis and prognosis.   Their questions are answered at this time and they are agreeable with the plan of admission.    --------------------------------- ADDITIONAL PROVIDER NOTES ---------------------------------  Consultations:  Spoke with Dr. Aruna Hernandez. Discussed case. They will admit the patient. This patient's ED course included: a personal history and physicial examination, re-evaluation prior to disposition, multiple bedside re-evaluations, and IV medications    This patient has remained hemodynamically stable during their ED course. Diagnosis:  1. Acute cholecystitis    2. Acute electrocardiogram changes    3. Prolonged Q-T interval on ECG        Disposition:  Patient's disposition: Admit to telemetry  Patient's condition is stable.          Serina Damon DO  Resident  12/29/22 0722

## 2022-12-28 NOTE — TELEPHONE ENCOUNTER
Writer contacted Dr. Dylan Stanton to inform of 30 day readmission risk. Dr. Dylan Stanton informed writer there is no decision on disposition at this time.       Call Back: If you need to call back to inform of disposition you can contact me at [phone]

## 2022-12-29 ENCOUNTER — APPOINTMENT (OUTPATIENT)
Dept: MRI IMAGING | Age: 62
DRG: 444 | End: 2022-12-29
Payer: COMMERCIAL

## 2022-12-29 LAB
ALBUMIN SERPL-MCNC: 2.4 G/DL (ref 3.5–5.2)
ALP BLD-CCNC: 532 U/L (ref 35–104)
ALT SERPL-CCNC: 335 U/L (ref 0–32)
ANION GAP SERPL CALCULATED.3IONS-SCNC: 8 MMOL/L (ref 7–16)
AST SERPL-CCNC: 632 U/L (ref 0–31)
BASOPHILS ABSOLUTE: 0 E9/L (ref 0–0.2)
BASOPHILS RELATIVE PERCENT: 0 % (ref 0–2)
BILIRUB SERPL-MCNC: 2 MG/DL (ref 0–1.2)
BILIRUBIN DIRECT: 1.6 MG/DL (ref 0–0.3)
BUN BLDV-MCNC: 12 MG/DL (ref 6–23)
CALCIUM SERPL-MCNC: 7.1 MG/DL (ref 8.6–10.2)
CHLORIDE BLD-SCNC: 92 MMOL/L (ref 98–107)
CO2: 34 MMOL/L (ref 22–29)
CREAT SERPL-MCNC: 0.3 MG/DL (ref 0.5–1)
EOSINOPHILS ABSOLUTE: 0 E9/L (ref 0.05–0.5)
EOSINOPHILS RELATIVE PERCENT: 0 % (ref 0–6)
GFR SERPL CREATININE-BSD FRML MDRD: >60 ML/MIN/1.73
GLUCOSE BLD-MCNC: 111 MG/DL (ref 74–99)
HAV IGM SER IA-ACNC: REACTIVE
HCT VFR BLD CALC: 29.7 % (ref 34–48)
HEMOGLOBIN: 9.9 G/DL (ref 11.5–15.5)
HEPATITIS B CORE IGM ANTIBODY: ABNORMAL
HEPATITIS B SURFACE ANTIGEN INTERPRETATION: ABNORMAL
HEPATITIS C ANTIBODY INTERPRETATION: REACTIVE
HYPOCHROMIA: ABNORMAL
LYMPHOCYTES ABSOLUTE: 0.36 E9/L (ref 1.5–4)
LYMPHOCYTES RELATIVE PERCENT: 3 % (ref 20–42)
MAGNESIUM: 2.3 MG/DL (ref 1.6–2.6)
MCH RBC QN AUTO: 29.6 PG (ref 26–35)
MCHC RBC AUTO-ENTMCNC: 33.3 % (ref 32–34.5)
MCV RBC AUTO: 88.9 FL (ref 80–99.9)
MONOCYTES ABSOLUTE: 0.12 E9/L (ref 0.1–0.95)
MONOCYTES RELATIVE PERCENT: 1 % (ref 2–12)
NEUTROPHILS ABSOLUTE: 11.52 E9/L (ref 1.8–7.3)
NEUTROPHILS RELATIVE PERCENT: 96 % (ref 43–80)
OVALOCYTES: ABNORMAL
PDW BLD-RTO: 15.3 FL (ref 11.5–15)
PHOSPHORUS: 2.3 MG/DL (ref 2.5–4.5)
PLATELET # BLD: 250 E9/L (ref 130–450)
PMV BLD AUTO: 11.5 FL (ref 7–12)
POIKILOCYTES: ABNORMAL
POLYCHROMASIA: ABNORMAL
POTASSIUM SERPL-SCNC: 3.1 MMOL/L (ref 3.5–5)
PROCALCITONIN: 0.26 NG/ML (ref 0–0.08)
RBC # BLD: 3.34 E12/L (ref 3.5–5.5)
SODIUM BLD-SCNC: 134 MMOL/L (ref 132–146)
TARGET CELLS: ABNORMAL
TOTAL PROTEIN: 5.8 G/DL (ref 6.4–8.3)
TROPONIN, HIGH SENSITIVITY: 12 NG/L (ref 0–9)
WBC # BLD: 12 E9/L (ref 4.5–11.5)

## 2022-12-29 PROCEDURE — 6360000002 HC RX W HCPCS: Performed by: NURSE PRACTITIONER

## 2022-12-29 PROCEDURE — 6370000000 HC RX 637 (ALT 250 FOR IP): Performed by: INTERNAL MEDICINE

## 2022-12-29 PROCEDURE — 2580000003 HC RX 258: Performed by: STUDENT IN AN ORGANIZED HEALTH CARE EDUCATION/TRAINING PROGRAM

## 2022-12-29 PROCEDURE — 6360000002 HC RX W HCPCS: Performed by: INTERNAL MEDICINE

## 2022-12-29 PROCEDURE — 93005 ELECTROCARDIOGRAM TRACING: CPT | Performed by: STUDENT IN AN ORGANIZED HEALTH CARE EDUCATION/TRAINING PROGRAM

## 2022-12-29 PROCEDURE — 83735 ASSAY OF MAGNESIUM: CPT

## 2022-12-29 PROCEDURE — 84484 ASSAY OF TROPONIN QUANT: CPT

## 2022-12-29 PROCEDURE — 84100 ASSAY OF PHOSPHORUS: CPT

## 2022-12-29 PROCEDURE — 87040 BLOOD CULTURE FOR BACTERIA: CPT

## 2022-12-29 PROCEDURE — 82248 BILIRUBIN DIRECT: CPT

## 2022-12-29 PROCEDURE — 74183 MRI ABD W/O CNTR FLWD CNTR: CPT

## 2022-12-29 PROCEDURE — 94640 AIRWAY INHALATION TREATMENT: CPT

## 2022-12-29 PROCEDURE — 85025 COMPLETE CBC W/AUTO DIFF WBC: CPT

## 2022-12-29 PROCEDURE — 2700000000 HC OXYGEN THERAPY PER DAY

## 2022-12-29 PROCEDURE — 2500000003 HC RX 250 WO HCPCS: Performed by: INTERNAL MEDICINE

## 2022-12-29 PROCEDURE — 80074 ACUTE HEPATITIS PANEL: CPT

## 2022-12-29 PROCEDURE — 6360000002 HC RX W HCPCS: Performed by: STUDENT IN AN ORGANIZED HEALTH CARE EDUCATION/TRAINING PROGRAM

## 2022-12-29 PROCEDURE — 6360000004 HC RX CONTRAST MEDICATION: Performed by: RADIOLOGY

## 2022-12-29 PROCEDURE — 84145 PROCALCITONIN (PCT): CPT

## 2022-12-29 PROCEDURE — 99254 IP/OBS CNSLTJ NEW/EST MOD 60: CPT | Performed by: SURGERY

## 2022-12-29 PROCEDURE — 2580000003 HC RX 258: Performed by: INTERNAL MEDICINE

## 2022-12-29 PROCEDURE — A9577 INJ MULTIHANCE: HCPCS | Performed by: RADIOLOGY

## 2022-12-29 PROCEDURE — 36415 COLL VENOUS BLD VENIPUNCTURE: CPT

## 2022-12-29 PROCEDURE — 2500000003 HC RX 250 WO HCPCS: Performed by: NURSE PRACTITIONER

## 2022-12-29 PROCEDURE — 1200000000 HC SEMI PRIVATE

## 2022-12-29 PROCEDURE — 2500000003 HC RX 250 WO HCPCS: Performed by: STUDENT IN AN ORGANIZED HEALTH CARE EDUCATION/TRAINING PROGRAM

## 2022-12-29 PROCEDURE — 80053 COMPREHEN METABOLIC PANEL: CPT

## 2022-12-29 RX ORDER — IPRATROPIUM BROMIDE AND ALBUTEROL SULFATE 2.5; .5 MG/3ML; MG/3ML
3 SOLUTION RESPIRATORY (INHALATION) EVERY 6 HOURS
Status: DISCONTINUED | OUTPATIENT
Start: 2022-12-29 | End: 2022-12-29

## 2022-12-29 RX ORDER — DEXTROSE MONOHYDRATE 100 MG/ML
INJECTION, SOLUTION INTRAVENOUS CONTINUOUS PRN
Status: DISCONTINUED | OUTPATIENT
Start: 2022-12-29 | End: 2023-01-01 | Stop reason: HOSPADM

## 2022-12-29 RX ORDER — ACETAMINOPHEN 325 MG/1
650 TABLET ORAL EVERY 6 HOURS PRN
Status: DISCONTINUED | OUTPATIENT
Start: 2022-12-29 | End: 2022-12-29

## 2022-12-29 RX ORDER — ALBUTEROL SULFATE 2.5 MG/3ML
2.5 SOLUTION RESPIRATORY (INHALATION) EVERY 6 HOURS PRN
Status: DISCONTINUED | OUTPATIENT
Start: 2022-12-29 | End: 2023-01-01 | Stop reason: HOSPADM

## 2022-12-29 RX ORDER — MORPHINE SULFATE 2 MG/ML
2 INJECTION, SOLUTION INTRAMUSCULAR; INTRAVENOUS EVERY 4 HOURS PRN
Status: DISCONTINUED | OUTPATIENT
Start: 2022-12-29 | End: 2023-01-01 | Stop reason: HOSPADM

## 2022-12-29 RX ORDER — ENOXAPARIN SODIUM 100 MG/ML
40 INJECTION SUBCUTANEOUS DAILY
Status: DISCONTINUED | OUTPATIENT
Start: 2022-12-29 | End: 2023-01-01 | Stop reason: HOSPADM

## 2022-12-29 RX ORDER — ACETAMINOPHEN 650 MG/1
650 SUPPOSITORY RECTAL EVERY 6 HOURS PRN
Status: DISCONTINUED | OUTPATIENT
Start: 2022-12-29 | End: 2022-12-29

## 2022-12-29 RX ORDER — POLYETHYLENE GLYCOL 3350 17 G/17G
17 POWDER, FOR SOLUTION ORAL DAILY PRN
Status: DISCONTINUED | OUTPATIENT
Start: 2022-12-29 | End: 2023-01-01 | Stop reason: HOSPADM

## 2022-12-29 RX ORDER — LORAZEPAM 2 MG/ML
0.5 INJECTION INTRAMUSCULAR ONCE
Status: DISCONTINUED | OUTPATIENT
Start: 2022-12-29 | End: 2022-12-29

## 2022-12-29 RX ORDER — SODIUM CHLORIDE 9 MG/ML
INJECTION, SOLUTION INTRAVENOUS PRN
Status: DISCONTINUED | OUTPATIENT
Start: 2022-12-29 | End: 2023-01-01 | Stop reason: HOSPADM

## 2022-12-29 RX ORDER — METHYLPREDNISOLONE SODIUM SUCCINATE 40 MG/ML
40 INJECTION, POWDER, LYOPHILIZED, FOR SOLUTION INTRAMUSCULAR; INTRAVENOUS EVERY 8 HOURS
Status: DISCONTINUED | OUTPATIENT
Start: 2022-12-29 | End: 2022-12-30

## 2022-12-29 RX ORDER — DEXTROSE, SODIUM CHLORIDE, AND POTASSIUM CHLORIDE 5; .45; .15 G/100ML; G/100ML; G/100ML
INJECTION INTRAVENOUS CONTINUOUS
Status: DISCONTINUED | OUTPATIENT
Start: 2022-12-29 | End: 2022-12-30

## 2022-12-29 RX ORDER — SODIUM CHLORIDE 0.9 % (FLUSH) 0.9 %
5-40 SYRINGE (ML) INJECTION PRN
Status: DISCONTINUED | OUTPATIENT
Start: 2022-12-29 | End: 2023-01-01 | Stop reason: HOSPADM

## 2022-12-29 RX ORDER — ALBUTEROL SULFATE 90 UG/1
2 AEROSOL, METERED RESPIRATORY (INHALATION) 4 TIMES DAILY PRN
Status: DISCONTINUED | OUTPATIENT
Start: 2022-12-29 | End: 2022-12-29 | Stop reason: CLARIF

## 2022-12-29 RX ORDER — BUDESONIDE 0.5 MG/2ML
0.5 INHALANT ORAL 2 TIMES DAILY
Status: DISCONTINUED | OUTPATIENT
Start: 2022-12-29 | End: 2023-01-01 | Stop reason: HOSPADM

## 2022-12-29 RX ORDER — FLUTICASONE PROPIONATE 50 MCG
2 SPRAY, SUSPENSION (ML) NASAL DAILY
Status: DISCONTINUED | OUTPATIENT
Start: 2022-12-29 | End: 2023-01-01 | Stop reason: HOSPADM

## 2022-12-29 RX ORDER — 0.9 % SODIUM CHLORIDE 0.9 %
500 INTRAVENOUS SOLUTION INTRAVENOUS ONCE
Status: COMPLETED | OUTPATIENT
Start: 2022-12-29 | End: 2022-12-29

## 2022-12-29 RX ORDER — ARFORMOTEROL TARTRATE 15 UG/2ML
15 SOLUTION RESPIRATORY (INHALATION) 2 TIMES DAILY
Status: DISCONTINUED | OUTPATIENT
Start: 2022-12-29 | End: 2023-01-01 | Stop reason: HOSPADM

## 2022-12-29 RX ORDER — SODIUM CHLORIDE 0.9 % (FLUSH) 0.9 %
5-40 SYRINGE (ML) INJECTION EVERY 12 HOURS SCHEDULED
Status: DISCONTINUED | OUTPATIENT
Start: 2022-12-29 | End: 2023-01-01 | Stop reason: HOSPADM

## 2022-12-29 RX ORDER — OXYCODONE HYDROCHLORIDE 5 MG/1
5 TABLET ORAL EVERY 4 HOURS PRN
Status: DISCONTINUED | OUTPATIENT
Start: 2022-12-29 | End: 2023-01-01 | Stop reason: HOSPADM

## 2022-12-29 RX ORDER — LORAZEPAM 2 MG/ML
0.5 INJECTION INTRAMUSCULAR ONCE
Status: COMPLETED | OUTPATIENT
Start: 2022-12-29 | End: 2022-12-29

## 2022-12-29 RX ORDER — MECOBALAMIN 5000 MCG
5 TABLET,DISINTEGRATING ORAL NIGHTLY
Status: DISCONTINUED | OUTPATIENT
Start: 2022-12-29 | End: 2023-01-01 | Stop reason: HOSPADM

## 2022-12-29 RX ORDER — METRONIDAZOLE 500 MG/100ML
500 INJECTION, SOLUTION INTRAVENOUS EVERY 8 HOURS
Status: DISCONTINUED | OUTPATIENT
Start: 2022-12-29 | End: 2022-12-31

## 2022-12-29 RX ADMIN — METHYLPREDNISOLONE SODIUM SUCCINATE 40 MG: 40 INJECTION, POWDER, FOR SOLUTION INTRAMUSCULAR; INTRAVENOUS at 08:18

## 2022-12-29 RX ADMIN — ENOXAPARIN SODIUM 40 MG: 100 INJECTION SUBCUTANEOUS at 08:11

## 2022-12-29 RX ADMIN — Medication 10 ML: at 09:32

## 2022-12-29 RX ADMIN — METRONIDAZOLE 500 MG: 500 INJECTION, SOLUTION INTRAVENOUS at 08:19

## 2022-12-29 RX ADMIN — METRONIDAZOLE 500 MG: 500 INJECTION, SOLUTION INTRAVENOUS at 23:24

## 2022-12-29 RX ADMIN — CEFEPIME 2000 MG: 2 INJECTION, POWDER, FOR SOLUTION INTRAVENOUS at 23:24

## 2022-12-29 RX ADMIN — SERTRALINE 25 MG: 50 TABLET, FILM COATED ORAL at 08:11

## 2022-12-29 RX ADMIN — POTASSIUM CHLORIDE, DEXTROSE MONOHYDRATE AND SODIUM CHLORIDE: 150; 5; 450 INJECTION, SOLUTION INTRAVENOUS at 23:21

## 2022-12-29 RX ADMIN — METRONIDAZOLE 500 MG: 500 INJECTION, SOLUTION INTRAVENOUS at 00:07

## 2022-12-29 RX ADMIN — ARFORMOTEROL TARTRATE 15 MCG: 15 SOLUTION RESPIRATORY (INHALATION) at 06:32

## 2022-12-29 RX ADMIN — CEFEPIME 2000 MG: 2 INJECTION, POWDER, FOR SOLUTION INTRAVENOUS at 11:48

## 2022-12-29 RX ADMIN — ARFORMOTEROL TARTRATE 15 MCG: 15 SOLUTION RESPIRATORY (INHALATION) at 18:28

## 2022-12-29 RX ADMIN — POTASSIUM CHLORIDE, DEXTROSE MONOHYDRATE AND SODIUM CHLORIDE: 150; 5; 450 INJECTION, SOLUTION INTRAVENOUS at 08:20

## 2022-12-29 RX ADMIN — METHYLPREDNISOLONE SODIUM SUCCINATE 40 MG: 40 INJECTION, POWDER, FOR SOLUTION INTRAMUSCULAR; INTRAVENOUS at 15:31

## 2022-12-29 RX ADMIN — BUDESONIDE 500 MCG: 0.5 SUSPENSION RESPIRATORY (INHALATION) at 18:28

## 2022-12-29 RX ADMIN — METRONIDAZOLE 500 MG: 500 INJECTION, SOLUTION INTRAVENOUS at 16:37

## 2022-12-29 RX ADMIN — Medication 10 ML: at 23:30

## 2022-12-29 RX ADMIN — FLUTICASONE PROPIONATE 2 SPRAY: 50 SPRAY, METERED NASAL at 08:11

## 2022-12-29 RX ADMIN — METHYLPREDNISOLONE SODIUM SUCCINATE 40 MG: 40 INJECTION, POWDER, FOR SOLUTION INTRAMUSCULAR; INTRAVENOUS at 23:27

## 2022-12-29 RX ADMIN — Medication 5 MG: at 22:22

## 2022-12-29 RX ADMIN — LORAZEPAM 0.5 MG: 2 INJECTION INTRAMUSCULAR; INTRAVENOUS at 09:11

## 2022-12-29 RX ADMIN — GADOBENATE DIMEGLUMINE 14 ML: 529 INJECTION, SOLUTION INTRAVENOUS at 10:24

## 2022-12-29 RX ADMIN — BUDESONIDE 500 MCG: 0.5 SUSPENSION RESPIRATORY (INHALATION) at 06:32

## 2022-12-29 RX ADMIN — CEFEPIME HYDROCHLORIDE 2000 MG: 2 INJECTION, POWDER, FOR SOLUTION INTRAVENOUS at 00:01

## 2022-12-29 RX ADMIN — SODIUM CHLORIDE 500 ML: 9 INJECTION, SOLUTION INTRAVENOUS at 02:04

## 2022-12-29 ASSESSMENT — PAIN SCALES - GENERAL: PAINLEVEL_OUTOF10: 0

## 2022-12-29 ASSESSMENT — PAIN - FUNCTIONAL ASSESSMENT: PAIN_FUNCTIONAL_ASSESSMENT: NONE - DENIES PAIN

## 2022-12-29 NOTE — CONSULTS
GENERAL SURGERY  CONSULT NOTE  12/29/2022    Physician Consulted: Dr. Eduardo Andino  Reason for Consult: \"possible cholecystitis\"  Referring Physician: Dr. Genny MARIE  Nakia Alexander is a 58 y.o. female with history of COPD on 4L home O2, recurrent diverticulitis presented to the ED for evaluation of SOB. While in ED, she had abnormal blood work with elevated LFTs and leukocytosis. CTA chest and CT abdomen as well as RUQ US were performed. No evidence of PE was found. There was slight gallbladder wall thickening but normal CBD, no evidence of choledocholithiasis. CT abdomen with stranding around sigmoid colon with some free fluid. She recently was discharged from the hospital on 12/22 after an episode of complicated diverticulitis requiring drain placement that was removed prior to discharge. She does  not have any RUQ tenderness but does still complain of LLQ tenderness that has not gone away since discharge. She also states she has not had an appetite since discharge and has barely eaten anything except for some yogurt. Past Medical History:   Diagnosis Date    Abscess     colon    COPD (chronic obstructive pulmonary disease) ([address]     Diverticulitis     Provoked DVT 3/2018     3 months Eliquis for DVT due to PICC line    Seasonal allergic rhinitis     Smoker 11/30/2019    5-6 per day    Tobacco use disorder      : 1 ppd since age 25       No past surgical history on file. Medications Prior to Admission:    Prior to Admission medications    Medication Sig Start Date End Date Taking? Authorizing Provider   Arformoterol Tartrate (BROVANA) 15 MCG/2ML NEBU Take 2 mLs by nebulization in the morning and 2 mLs in the evening. 12/22/22   Berry Vega,    budesonide (PULMICORT) 0.5 MG/2ML nebulizer suspension Take 2 mLs by nebulization in the morning and 2 mLs in the evening.  12/22/22   Berry Vega DO   ascorbic acid (VITAMIN C) 1000 MG tablet Take 1 tablet by mouth daily 12/4/22   Kerry Pinon APRN - CNP ipratropium-albuterol (DUONEB) 0.5-2.5 (3) MG/3ML SOLN nebulizer solution Inhale 3 mLs into the lungs in the morning and 3 mLs at noon and 3 mLs in the evening and 3 mLs before bedtime.  12/3/22   SKYE Clark CNP   albuterol sulfate HFA (VENTOLIN HFA) 108 (90 Base) MCG/ACT inhaler Inhale 2 puffs into the lungs 4 times daily as needed for Wheezing 22   Tahmina President, DO   atorvastatin (LIPITOR) 40 MG tablet Take 1 tablet by mouth nightly 22   SKYE Clark - CNP   albuterol sulfate HFA (VENTOLIN HFA) 108 (90 Base) MCG/ACT inhaler Inhale 2 puffs into the lungs 4 times daily as needed for Wheezing or Shortness of Breath 21   Susana Silver,    OXYGEN Inhale 3 L into the lungs continuous    Historical Provider, MD   Umeclidinium Bromide (INCRUSE ELLIPTA) 62.5 MCG/INH AEPB Inhale 1 puff into the lungs daily 21   SKYE Clark CNP   fluticasone (FLONASE) 50 MCG/ACT nasal spray 2 sprays by Each Nostril route daily 21   Hollie Oswald MD   sertraline (ZOLOFT) 25 MG tablet Take 1 tablet by mouth daily 3/8/21   Hollie Oswald MD       Allergies   Allergen Reactions    Penicillin V Hives and Other (See Comments)     2005 UNKNOWN, PLEASE VERIFY, 2005 UNKNOWN, PLEASE VERIFY       Family History   Problem Relation Age of Onset    Colon Cancer None     Other Father     Coronary Art Dis Father         mother    Hypertension Father     Diabetes Unknown relative         older age; pat grandmother    Other Daughter         son; ex-    Breast Cancer None     Stroke Mother        Social History     Tobacco Use    Smoking status: Former     Packs/day: 0.50     Years: 41.00     Pack years: 20.50     Types: Cigarettes     Quit date: 2021     Years since quittin.6    Smokeless tobacco: Never   Vaping Use    Vaping Use: Never used   Substance Use Topics    Alcohol use: Yes     Comment: social    Drug use: Not Currently     Types: Marijuana Raina Bell) Comment: occasional          Review of Systems   General ROS: positive for  - fatigue  Hematological and Lymphatic ROS: negative  Respiratory ROS: positive for - shortness of breath  Cardiovascular ROS: negative  Gastrointestinal ROS: positive for - abdominal pain and appetite loss  Genito-Urinary ROS: negative  Musculoskeletal ROS: negative      PHYSICAL EXAM:    Vitals:    12/29/22 0725   BP: 120/64   Pulse: 99   Resp: 18   Temp: 98.2 °F (36.8 °C)   SpO2: 98%       General Appearance:  awake, alert, oriented, in no acute distress  Skin:  Skin color, texture, turgor normal. No rashes or lesions. Head/face:  NCAT  Eyes:  No gross abnormalities. Lungs:  Normal expansion. On home 4L  Heart:  Heart regular rate   Abdomen:  Soft, non-distended. Tender in LLQ  Extremities: Extremities warm to touch, pink, with no edema. LABS:    CBC  Recent Labs     12/29/22  0507   WBC 12.0*   HGB 9.9*   HCT 29.7*        BMP  Recent Labs     12/29/22  0507      K 3.1*   CL 92*   CO2 34*   BUN 12   CREATININE 0.3*   CALCIUM 7.1*     Liver Function  Recent Labs     12/28/22  1902 12/29/22  0507   LIPASE 32  --    BILITOT 2.4* 2.0*   BILIDIR  --  1.6*   * 632*   * 335*   ALKPHOS 665* 532*   PROT 6.7 5.8*   LABALBU 2.8* 2.4*     No results for input(s): LACTATE in the last 72 hours. No results for input(s): INR, PTT in the last 72 hours. Invalid input(s): PT    RADIOLOGY    CT ABDOMEN PELVIS W IV CONTRAST Additional Contrast? None    Result Date: 12/28/2022  EXAMINATION: CT OF THE ABDOMEN AND PELVIS WITH CONTRAST 12/28/2022 9:45 pm TECHNIQUE: CT of the abdomen and pelvis was performed with the administration of intravenous contrast. Multiplanar reformatted images are provided for review. Automated exposure control, iterative reconstruction, and/or weight based adjustment of the mA/kV was utilized to reduce the radiation dose to as low as reasonably achievable.  COMPARISON: 12/16/2022 HISTORY: ORDERING SYSTEM PROVIDED HISTORY: Diverticulitis and abscess hx TECHNOLOGIST PROVIDED HISTORY: Reason for exam:->Diverticulitis and abscess hx Additional Contrast?->None Decision Support Exception - unselect if not a suspected or confirmed emergency medical condition->Emergency Medical Condition (MA) FINDINGS: Lower Chest: Trace right pleural effusion. Advanced emphysematous change Organs: Multiple cystic lesions noted within the liver. Subcentimeter hypoattenuating lesions are technically indeterminate, but unchanged from prior examination and likely also cysts. The gallbladder is distended. The spleen, pancreas, and adrenals are within limits. There are bilateral nonobstructing renal calculi. Bilateral renal cysts. Lawernce Roughen GI/Bowel: No evidence of obstruction. There is diverticulosis. There is wall thickening and inflammatory stranding of the sigmoid colon. No extraluminal fluid collection. Pelvis: The urinary bladder is partially filled. The uterus is unremarkable. Peritoneum/Retroperitoneum: There is small volume free fluid throughout the abdomen and pelvis. No extraluminal gas. Reactive retroperitoneal lymph nodes. Aorta is normal in caliber. Bones/Soft Tissues:  No acute abnormality of the visualized osseous structures. Interval resolution of previously seen fluid collection associated with the sigmoid colon. There is inflammatory stranding and wall thickening of the sigmoid colon, which could represent resolving versus recurrent diverticulitis. Clinical correlation recommended. US GALLBLADDER RUQ    Result Date: 12/28/2022  EXAMINATION: RIGHT UPPER QUADRANT ULTRASOUND 12/28/2022 9:16 pm COMPARISON: None.  HISTORY: ORDERING SYSTEM PROVIDED HISTORY: eval gb, possible stone TECHNOLOGIST PROVIDED HISTORY: Reason for exam:->eval gb, possible stone What reading provider will be dictating this exam?->CRC FINDINGS: LIVER:  The liver demonstrates normal echogenicity without evidence of intrahepatic biliary ductal dilatation. Multiple hepatic cysts measuring up to 6.5 cm. Small amount of perihepatic fluid. BILIARY SYSTEM: Mild gallbladder wall thickening. No stones, sludge or pericholecystic fluid. .  Negative sonographic Jiménez's sign. Common bile duct is within normal limits measuring 4.2 mm. RIGHT KIDNEY: The right kidney is grossly unremarkable without evidence of hydronephrosis. PANCREAS:  Visualized portions of the pancreas are unremarkable. OTHER: No evidence of right upper quadrant ascites. Multiple liver cysts measuring up to 6.5 cm. Small mount of fluid. Mild gallbladder wall thickening. No stones, sludge or pericholecystic fluid. Normal common bile duct. XR CHEST PORTABLE    Result Date: 12/28/2022  EXAMINATION: ONE XRAY VIEW OF THE CHEST 12/28/2022 6:08 pm COMPARISON: None. HISTORY: ORDERING SYSTEM PROVIDED HISTORY: sob TECHNOLOGIST PROVIDED HISTORY: Reason for exam:->sob FINDINGS: The lungs are without acute focal process. There is no effusion or pneumothorax. The cardiomediastinal silhouette is without acute process. The osseous structures are without acute process. No acute process. COPD. CTA PULMONARY W CONTRAST    Result Date: 12/28/2022  EXAMINATION: CTA OF THE CHEST 12/28/2022 9:45 pm TECHNIQUE: CTA of the chest was performed after the administration of intravenous contrast.  Multiplanar reformatted images are provided for review. MIP images are provided for review. Automated exposure control, iterative reconstruction, and/or weight based adjustment of the mA/kV was utilized to reduce the radiation dose to as low as reasonably achievable.  COMPARISON: 11/29/2022 HISTORY: ORDERING SYSTEM PROVIDED HISTORY: r/o pe TECHNOLOGIST PROVIDED HISTORY: Reason for exam:->r/o pe Decision Support Exception - unselect if not a suspected or confirmed emergency medical condition->Emergency Medical Condition (MA) FINDINGS: Pulmonary Arteries: Pulmonary arteries are adequately opacified for evaluation. No evidence of intraluminal filling defect to suggest pulmonary embolism. Main pulmonary artery is normal in caliber. Mediastinum: No evidence of mediastinal lymphadenopathy. The heart and pericardium demonstrate no acute abnormality. There is no acute abnormality of the thoracic aorta. Lungs/pleura: The lungs are without acute process. No focal consolidation or pulmonary edema. Advanced lower lung predominant emphysematous change. No evidence of pleural effusion or pneumothorax. Upper Abdomen: Please see separately dictated report for findings below the diaphragm. Soft Tissues/Bones: No acute bone or soft tissue abnormality. No evidence of pulmonary embolism or acute pulmonary abnormality. Advanced emphysematous changes. ASSESSMENT:  58 y.o. female with recurrent complicated diverticulitis, presented for SOB found to have elevated LFTs    PLAN:  - MRCP today  - NPO for scan  - IV fluids  - antibiotics  - trend LFTs    Discussed with Dr. Fortunato Manrique    Electronically signed by Derrick Davis MD on 12/29/22 at 7:52 AM [facility]      General Surgery Progress Note  [facility]    Patient's Name/Date of Birth: Anup Lucero / 1960    Date: December 29, 2022     Surgeon: Fortunato Manrique MD    Chief Complaint: Rule out cholecystitis    Patient Active Problem List   Diagnosis    Tobacco use disorder    Seasonal allergic rhinitis    Chronic bronchitis ([address]    Chronic respiratory failure with hypoxia ([address]    Chronic obstructive pulmonary disease ([address]    Acute respiratory failure with hypercapnia ([address]    Acute on chronic respiratory failure with hypoxia and hypercapnia ([address]    COPD exacerbation ([address]    Acute diverticulitis with abscess    Acute cholecystitis       Subjective: HPI as above. No complaints.  Wants to eat after MRCP    Objective:  BP (!) 109/58   Pulse 91   Temp 98.4 °F (36.9 °C) (Oral)   Resp 18   Ht 5' 3\" (1.6 m)   Wt 157 lb (71.2 kg)   SpO2 98%   BMI 27.81 kg/m² Labs:  Recent Labs     12/28/22  1902 12/29/22  0507   WBC 16.3* 12.0*   HGB 11.6 9.9*   HCT 34.6 29.7*     Lab Results   Component Value Date    CREATININE 0.3 (L) 12/29/2022    BUN 12 12/29/2022     12/29/2022    K 3.1 (L) 12/29/2022    CL 92 (L) 12/29/2022    CO2 34 (H) 12/29/2022     Recent Labs     12/28/22  1902   LIPASE 32     CBC with Differential:    Lab Results   Component Value Date/Time    WBC 12.0 12/29/2022 05:07 AM    RBC 3.34 12/29/2022 05:07 AM    HGB 9.9 12/29/2022 05:07 AM    HCT 29.7 12/29/2022 05:07 AM     12/29/2022 05:07 AM    MCV 88.9 12/29/2022 05:07 AM    MCH 29.6 12/29/2022 05:07 AM    MCHC 33.3 12/29/2022 05:07 AM    RDW 15.3 12/29/2022 05:07 AM    NRBC 0.9 11/30/2022 05:18 AM    METASPCT 0.9 12/22/2022 06:18 AM    LYMPHOPCT 3.0 12/29/2022 05:07 AM    MONOPCT 1.0 12/29/2022 05:07 AM    MYELOPCT 1.0 12/28/2022 07:02 PM    BASOPCT 0.0 12/29/2022 05:07 AM    MONOSABS 0.12 12/29/2022 05:07 AM    LYMPHSABS 0.36 12/29/2022 05:07 AM    EOSABS 0.00 12/29/2022 05:07 AM    BASOSABS 0.00 12/29/2022 05:07 AM     CMP:    Lab Results   Component Value Date/Time     12/29/2022 05:07 AM    K 3.1 12/29/2022 05:07 AM    K 3.2 12/28/2022 07:02 PM    CL 92 12/29/2022 05:07 AM    CO2 34 12/29/2022 05:07 AM    BUN 12 12/29/2022 05:07 AM    CREATININE 0.3 12/29/2022 05:07 AM    GFRAA >60 04/13/2022 02:20 PM    LABGLOM >60 12/29/2022 05:07 AM    GLUCOSE 111 12/29/2022 05:07 AM    PROT 5.8 12/29/2022 05:07 AM    LABALBU 2.4 12/29/2022 05:07 AM    CALCIUM 7.1 12/29/2022 05:07 AM    BILITOT 2.0 12/29/2022 05:07 AM    ALKPHOS 755 12/29/2022 05:07 AM     12/29/2022 05:07 AM     12/29/2022 05:07 AM       General appearance:  NAD  Head: NCAT, PERRLA, EOMI, red conjunctiva  Neck: supple, no masses  Lungs: CTAB, equal chest rise bilateral  Heart: Reg rate  Abdomen: soft, nondistended, Mild lower abdomen tenderness without guarding  Skin; no lesions  Gu: no cva tenderness  Extremities: extremities normal, atraumatic, no cyanosis or edema      Assessment/Plan:  Leonardo Montemayor is a 58 y.o. female with recent perforated diverticulitis with abscess now with transaminitis, distended gallbladder, hyperbilirubinemia    MRCP noted - no clear evidence of choledocholithiasis  Check LFTs in AM and trend  Low fat diet  We discussed possible need for hida scan vs ercp vs cholecystectomy pending am LFTs  Will follow    Fortino Tai MD

## 2022-12-29 NOTE — CARE COORDINATION
12/29/2022 1151 CM note: No covid testing. Met with patient for transition of care needs. Pt resides with her dtr Larry Bell in a 1 story home, 2 step entry. She relays she has been weak and her dtr assists her with ADLs. DME includes: home o2 @4L NC(portable and concentrator), unknown DME company. She has access to a ww and bsc if needed. Hx of HHC-unsure of agency, no hx [facility]. PCP is Dr Barb Andrade and uses [facility]. Pt plans to return home with her dtr  at d/c and declines needs including [address]. Will await PT/OT evals to assist with transition of care needs.  [address]      Readmission Assessment  Number of Days since last admission?: 1-7 days  Previous Disposition: Home with Family  Who is being Interviewed: Patient  What was the patient's/caregiver's perception as to why they think they needed to return back to the hospital?: Other (Comment) (short of breath  weak)  Did you visit your Primary Care Physician after you left the hospital, before you returned this time?: No  Why weren't you able to visit your PCP?: Did not have an appointment  Did you see a specialist, such as Cardiac, Pulmonary, Orthopedic Physician, etc. after you left the hospital?: No  Who advised the patient to return to the hospital?: Self-referral  Does the patient report anything that got in the way of taking their medications?: No  In our efforts to provide the best possible care to you and others like you, can you think of anything that we could have done to help you after you left the hospital the first time, so that you might not have needed to return so soon?:  (declining any needs)

## 2022-12-29 NOTE — ACP (ADVANCE CARE PLANNING)
Advance Care Planning   Healthcare Decision Maker:    Primary Decision Maker: Alberta Javed - [phone]    Click here to complete Healthcare Decision Makers including selection of the Healthcare Decision Maker Relationship (ie \"Primary\").

## 2022-12-29 NOTE — ED PROVIDER NOTES
Patient was discussed with the with the  resident. I have personally interviewed the patient and examined the patient. I have anticipated in the care and decision-making  Please refer the resident's note for final disposition and medical decision making. Subjective:      Patient is a 58-year-old woman status post recent diverticulitis with abscess and IR drainage. She reports today for increasing shortness of breath and pressure-like chest pain. The pain lasted several hours this morning and is improving now. She denies any change in medication or diet. She denies any trauma. She has had no fever or chills. She denies any history of coronary disease. She is an occasional smoker. She denies hypertension diabetes or high cholesterol. She does not know about her family history and cardiac disease. Objective:           Vitals:    22 1736   BP: 122/67   Pulse: 98   Resp: 18   Temp: 97.9 °F (36.6 °C)   SpO2: 99%     Alert woman in no acute distress at this time  Head atraumatic normocephalic  Neck supple nontender without lymphadenopathy or stridor  Lung sounds clear and equal  Heart tones normal regular rate and rhythm  Abdomen soft positive bowel sounds with mild epigastric tenderness without guarding mass or rebound  Skin normal turgor without rash  No focal neurodeficit      HEART Score For Major Cardiac Events  (Max Score 10 Points)  HISTORY       []   Slightly Suspicious  0       []   Moderately Suspicious  +1       [x]   Highly Suspicious  +2    EK point: No ST deviation but LBBB, LVH repolarization changes (ex:digoxin);  2 points: ST deviation not due to LBBB, LVH or digoxin         []   Normal  0       []   Nonspecific Repolarization Disturbance  +1       [x]   Significant ST Depression  +2    AGE       []   <45  0       [x]   45-64  +1       []    >65  +2    RISK FACTORS:  1. HTN    2. Hypercholesterolemia    3. DM     4. Cigarette smoking (current or cessation < 3 mos)    5. Positive family history  (parent or sibling with CVD before age 72). 6. Obesity (BMI >30kg/m2)         []   No Risk factors Known  0       [x]   1-2 Risk Factors  +1       []   >3 Risk Factors or History of Atherosclerotic Disease  +2    INITIAL TROPONIN       [x]   < Normal Limit   0       []   1-3 x Normal Limit   +1       []   >3 x Normal Limit   +2     -----------------------------------------------------------------------------------------------------------------  SCORE TOTAL:  6 POINTS     Low Score:         (0-3 Points), risk of MACE of 0.9-1.7% (discuss d/c home with f/u)  Mod Score:         (4-6 Points), risk of MACE of 12-16.6% (discuss admission for further testing)  High Score:        (7-10 Points), risk of MACE of 50-65% (Admit ALL as they are candidates for early invasive measures)     Assessment:     Chest pain    Plan:                    See ED Record    MISA Carroll MD  12/28/22 4220

## 2022-12-29 NOTE — H&P
Department of Internal Medicine  History and Physical    PCP: Alexandre Christine. Ethelda Cranker, MD  Admitting Physician: Dr. Elissa Montes  Consultants:   Date of Service: 12/28/2022    CHIEF COMPLAINT:  chest/abdominal pain     HISTORY OF PRESENT ILLNESS:    Patient is a 66-year-old female presented to ED with abdominal epigastric pain. On exam pain has resolved. Goldie Lagunas However she admits to nausea and diminished appetite for the last few days. She denies any subjective fever or chills. She denies any shortness of breath. Past Medical History:   Diagnosis Date    Abscess     colon    COPD (chronic obstructive pulmonary disease) ([facility] [address]     Diverticulitis     Provoked DVT 3/2018     3 months Eliquis for DVT due to PICC line    Seasonal allergic rhinitis     Smoker 11/30/2019    5-6 per day    Tobacco use disorder      : 1 ppd since age 25       PAST SURGICAL Hx:   No past surgical history on file. FAMILY Hx:  Family History   Problem Relation Age of Onset    Colon Cancer None     Other Father     Coronary Art Dis Father         mother    Hypertension Father     Diabetes Unknown relative         older age; pat grandmother    Other Daughter         son; ex-    Breast Cancer None     Stroke Mother        HOME MEDICATIONS:  Prior to Admission medications    Medication Sig Start Date End Date Taking? Authorizing Provider   Arformoterol Tartrate (BROVANA) 15 MCG/2ML NEBU Take 2 mLs by nebulization in the morning and 2 mLs in the evening. 12/22/22   Bg Estrella DO   budesonide (PULMICORT) 0.5 MG/2ML nebulizer suspension Take 2 mLs by nebulization in the morning and 2 mLs in the evening. 12/22/22   Bg Estrella DO   ascorbic acid (VITAMIN C) 1000 MG tablet Take 1 tablet by mouth daily 12/4/22   SKYE Sawant - MANGO   ipratropium-albuterol (DUONEB) 0.5-2.5 (3) MG/3ML SOLN nebulizer solution Inhale 3 mLs into the lungs in the morning and 3 mLs at noon and 3 mLs in the evening and 3 mLs before bedtime.  12/3/22   Jamarcus Herman APRN - CNP   albuterol sulfate HFA (VENTOLIN HFA) 108 (90 Base) MCG/ACT inhaler Inhale 2 puffs into the lungs 4 times daily as needed for Wheezing 22   Concetta Rodriguez, DO   atorvastatin (LIPITOR) 40 MG tablet Take 1 tablet by mouth nightly 22   SKYE No - CNP   albuterol sulfate HFA (VENTOLIN HFA) 108 (90 Base) MCG/ACT inhaler Inhale 2 puffs into the lungs 4 times daily as needed for Wheezing or Shortness of Breath 21   Jaspal Silver DO   OXYGEN Inhale 3 L into the lungs continuous    Historical Provider, MD   Umeclidinium Bromide (INCRUSE ELLIPTA) 62.5 MCG/INH AEPB Inhale 1 puff into the lungs daily 21   SKYE No CNP   fluticasone (FLONASE) 50 MCG/ACT nasal spray 2 sprays by Each Nostril route daily 21   Jose Younger MD   sertraline (ZOLOFT) 25 MG tablet Take 1 tablet by mouth daily 3/8/21   Jose Younger MD       ALLERGIES:  Penicillin v    SOCIAL Hx:  Social History     Socioeconomic History    Marital status:      Spouse name: Not on file    Number of children: 2    Years of education: Not on file    Highest education level: Not on file   Occupational History    Not on file   Tobacco Use    Smoking status: Former     Packs/day: 0.50     Years: 41.00     Pack years: 20.50     Types: Cigarettes     Quit date: 2021     Years since quittin.6    Smokeless tobacco: Never   Vaping Use    Vaping Use: Never used   Substance and Sexual Activity    Alcohol use: Yes     Comment: social    Drug use: Not Currently     Types: Marijuana Delona Members)     Comment: occasional     Sexual activity: Not Currently   Other Topics Concern    Not on file   Social History Narrative    First  is .       Social Determinants of Health     Financial Resource Strain: Not on file   Food Insecurity: Not on file   Transportation Needs: Not on file   Physical Activity: Not on file   Stress: Not on file   Social Connections: Not on file   Intimate Partner Violence: Not on file   Housing Stability: Not on file       ROS: Positive in bold  General:   Denies chills, fatigue, fever, malaise, night sweats or weight loss    Psychological:   Denies anxiety, disorientation or hallucinations    ENT:    Denies epistaxis, headaches, vertigo or visual changes    Cardiovascular:   Denies any chest pain, irregular heartbeats, or palpitations. No paroxysmal nocturnal dyspnea. Respiratory:   Denies shortness of breath, coughing, sputum production, hemoptysis, or wheezing. No orthopnea. Gastrointestinal:   Denies nausea, vomiting, diarrhea, or constipation. Denies any abdominal pain. Denies change in bowel habits or stools. Genito-Urinary:    Denies any urgency, frequency, hematuria. Voiding without difficulty. Musculoskeletal:   Denies joint pain, joint stiffness, joint swelling or muscle pain    Neurology:    Denies any headache or focal neurological deficits. No weakness or paresthesia. Derm:    Denies any rashes, ulcers, or excoriations. Denies bruising. Extremities:   Denies any lower extremity swelling or edema. PHYSICAL EXAM: Abnormal findings noted  VITALS:  Vitals:    12/28/22 2220   BP: (!) 97/53   Pulse: 100   Resp: 19   Temp:    SpO2: 98%         CONSTITUTIONAL:    Awake, alert, cooperative, no apparent distress, and appears stated age    EYES:     EOMI, sclera clear, conjunctiva normal    ENT:    Normocephalic, atraumatic,  External ears without lesions. NECK:    Supple, symmetrical, trachea midline,  no JVD    HEMATOLOGIC/LYMPHATICS:    No cervical lymphadenopathy and no supraclavicular lymphadenopathy    LUNGS:    Symmetric.  No increased work of breathing, good air exchange, clear to auscultation bilaterally, no wheezes, rhonchi, or rales,     CARDIOVASCULAR:    Normal apical impulse, regular rate and rhythm, normal S1 and S2, no S3 or S4, and no murmur noted    ABDOMEN:    soft, non-distended, non-tender    MUSCULOSKELETAL:    There is no redness, warmth, or swelling of the joints. NEUROLOGIC:    Awake, alert, oriented to name, place and time. SKIN:    No bruising or bleeding. No redness, warmth, or swelling    EXTREMITIES:    Peripheral pulses present. No edema, cyanosis, or swelling.     LINES/CATHETERS     LABORATORY DATA:  CBC with Differential:    Lab Results   Component Value Date/Time    WBC 16.3 12/28/2022 07:02 PM    RBC 2.49 12/28/2022 07:02 PM    HGB 11.6 12/28/2022 07:02 PM    HCT 34.6 12/28/2022 07:02 PM     12/28/2022 07:02 PM    MCV 89.4 12/28/2022 07:02 PM    MCH 30.0 12/28/2022 07:02 PM    MCHC 33.5 12/28/2022 07:02 PM    RDW 15.3 12/28/2022 07:02 PM    NRBC 0.9 11/30/2022 05:18 AM    METASPCT 0.9 12/22/2022 06:18 AM    LYMPHOPCT 12.0 12/28/2022 07:02 PM    MONOPCT 5.0 12/28/2022 07:02 PM    MYELOPCT 1.0 12/28/2022 07:02 PM    BASOPCT 0.0 12/28/2022 07:02 PM    MONOSABS 0.82 12/28/2022 07:02 PM    LYMPHSABS 1.96 12/28/2022 07:02 PM    EOSABS 0.00 12/28/2022 07:02 PM    BASOSABS 0.00 12/28/2022 07:02 PM     CMP:    Lab Results   Component Value Date/Time     12/28/2022 07:02 PM    K 3.2 12/28/2022 07:02 PM    CL 87 12/28/2022 07:02 PM    CO2 37 12/28/2022 07:02 PM    BUN 12 12/28/2022 07:02 PM    CREATININE 0.4 12/28/2022 07:02 PM    GFRAA >60 04/13/2022 02:20 PM    LABGLOM >60 12/28/2022 07:02 PM    GLUCOSE 92 12/28/2022 07:02 PM    PROT 6.7 12/28/2022 07:02 PM    LABALBU 2.8 12/28/2022 07:02 PM    CALCIUM 8.1 12/28/2022 07:02 PM    BILITOT 2.4 12/28/2022 07:02 PM    ALKPHOS 987 12/28/2022 07:02 PM     12/28/2022 07:02 PM     12/28/2022 07:02 PM       ASSESSMENT/PLAN:  Transaminitis with probable choledocholithiasis  Possible cholecystitis with negative Jiménez sign  Recurrent diverticulitis versus resolving diverticulitis  recent sepsis secondary to acute diverticulitis with perforation and abscess status post IR guided drainage  Chronic respiratory failure with hypoxia secondary to advanced COPD without exacerbation   Ongoing tobacco abuse  Prior history of DVT  Hyperlipidemia on statin agent     Patient presented with abdominal pain. Patient found to have elevated LFTs. Was concern for choledocholithiasis as well as a recurrent diverticulitis. patient placed on Flagyl and cefepime. General surgery consulted. MRCP ordered.       Anupam FloresShedd  11:20 PM  12/28/2022    Electronically signed by Buzz Rogers DO on 12/28/22 at 11:20 PM EST

## 2022-12-29 NOTE — PROGRESS NOTES
Internal Medicine Progress Note    [facility]    Abdifatah Hercules. MARIELOS StevensORAFAL Neely D.O., ASHLI Ochoa D.O. La Nena Real, MSN, APRN, NP-C  Christine Waddell. Connie Ashraf, MSN, APRN-CNP     Primary Care Physician: Leanna Cordero. MD Farida   Admitting Physician:  Mehreen Espino DO  Admission date and time: 12/28/2022  5:33 PM    Room:  [address] [place] [address]  Admitting diagnosis: Acute cholecystitis [K81.0]  Prolonged Q-T interval on ECG [R94.31]  Acute electrocardiogram changes [R94.31]    Patient Name: Reno Byrd  MRN: [id number]    Date of Service: 12/29/2022     Subjective:  Maria Pacheco is a 58 y.o. female who was seen and examined today,12/29/2022, at the bedside. The patient has complaint of generalized tenderness. States she does have complaint of dyspnea with rest and with exertion- feels relatively at baseline. On chronic oxygen therapy. States chest tightness has resolved. No family present during my examination. Review of System:   Constitutional:   Denies fever or chills, weight loss or gain,+ fatigue/malaise. HEENT:   Denies ear pain, sore throat, sinus or eye problems. Cardiovascular:   Denies any chest pain, irregular heartbeats, or palpitations. Respiratory:   Denies shortness of breath, coughing, sputum production, hemoptysis, or wheezing. Gastrointestinal:   Denies nausea, vomiting, diarrhea, or constipation. +abdominal pain. Genitourinary:    Denies any urgency, frequency, hematuria. Voiding  without difficulty. Extremities:   Denies lower extremity swelling, edema or cyanosis. Neurology:    Denies any headache or focal neurological deficits, Denies generalized weakness or memory difficulty. Psch:   Denies being anxious or depressed. Musculoskeletal:    Denies  myalgias, joint complaints or back pain. Integumentary:   Denies any rashes, ulcers, or excoriations.   Denies bruising. Hematologic/Lymphatic:  Denies bruising or bleeding. Physical Exam:  No intake/output data recorded. Intake/Output Summary (Last 24 hours) at 12/29/2022 0744  Last data filed at 12/29/2022 0405  Gross per 24 hour   Intake 499.47 ml   Output 400 ml   Net 99.47 ml   I/O last 3 completed shifts: In: 499.5 [IV Piggyback:499.5]  Out: 400 [Urine:400]  Patient Vitals for the past 96 hrs (Last 3 readings):   Weight   12/28/22 1736 157 lb (71.2 kg)     Vital Signs:   Blood pressure 120/64, pulse 99, temperature 98.2 °F (36.8 °C), temperature source Oral, resp. rate 18, height 5' 3\" (1.6 m), weight 157 lb (71.2 kg), SpO2 98 %, not currently breastfeeding. General appearance:  Alert, responsive, oriented to person, place, and time. Ill appearing. Head:  Normocephalic. No masses, lesions or tenderness. Eyes:  PERRLA. EOMI. Sclera clear. Buccal mucosa moist.  ENT:  Ears normal. Mucosa normal. 02 via nasal cannula  Neck:    Supple. Trachea midline. No thyromegaly. No JVD. No bruits. Heart:    Rhythm regular. Rate controlled. No murmurs. Lungs:    Wheezing noted throughout. No rhonchi. No rales. Abdomen:   Soft. Tender. Non-distended. Bowel sounds positive. No organomegaly or masses. Mild pain on palpation. Extremities:    Peripheral pulses present. No peripheral edema. No ulcers. No cyanosis. No clubbing. Neurologic:    Alert x 3. No focal deficit. Cranial nerves grossly intact. No focal weakness. Psych:   Behavior is normal. Mood appears normal. Speech is not rapid and/or pressured. Musculoskeletal:   Spine ROM normal. Muscular strength intact. Gait not assessed. Integumentary:  No rashes  Skin normal color and texture.   Genitalia/Breast:  Deferred    Medication:  Scheduled Meds:   sodium chloride flush  5-40 mL IntraVENous 2 times per day    Arformoterol Tartrate  15 mcg Nebulization BID    budesonide  0.5 mg Nebulization BID    fluticasone  2 spray Each Nostril Daily    sertraline  25 mg Oral Daily    metroNIDAZOLE  500 mg IntraVENous Q8H    cefepime  2,000 mg IntraVENous Q12H    enoxaparin  40 mg SubCUTAneous Daily    LORazepam  0.5 mg IntraVENous Once     Continuous Infusions:   dextrose      sodium chloride         Objective Data:  Recent Labs     12/28/22  1902 12/29/22  0507   WBC 16.3* 12.0*   RBC 2.49* 3.34*   HGB 11.6 9.9*   HCT 34.6 29.7*   MCV 89.4 88.9   MCH 30.0 29.6   MCHC 33.5 33.3   RDW 15.3* 15.3*    250   MPV 11.5 11.5     Recent Labs     12/28/22  1902 12/29/22  0507    134   K 3.2* 3.1*   CL 87* 92*   CO2 37* 34*   BUN 12 12   CREATININE 0.4* 0.3*   GLUCOSE 92 111*   CALCIUM 8.1* 7.1*   PROT 6.7 5.8*   LABALBU 2.8* 2.4*   BILITOT 2.4* 2.0*   ALKPHOS 665* 532*   * 632*   * 335*     Lab Results   Component Value Date    TROPONINI <0.01 05/20/2021    TROPONINI <0.01 05/17/2021    TROPONINI <0.01 11/29/2019        Assessment:  Transaminitis with probable choledocholithiasis  Possible cholecystitis with negative Jiménez sign  Recurrent diverticulitis versus resolving diverticulitis  recent sepsis secondary to acute diverticulitis with perforation and abscess status post IR guided drainage  Chronic respiratory failure with hypoxia secondary to advanced COPD without exacerbation   Leukocytosis  Hypokalemia  Ongoing tobacco abuse  Prior history of DVT  Hyperlipidemia on statin agent    Plan:   Surgical team has been consulted. At this time no recommendation for surgical intervention. Patient is for MRI/MRCP today to rule out biliary obstruction. Main change n.p.o. at this time. IV fluids for gentle hydration. Monitor pain and treat accordingly. Continue antibiotic therapy as prescribed. Continue nebulized respiratory treatments. Potassium supplementation to replete potassium stores. LFTs are trending downward. Leukocytosis is improving. Encouraged the patient to increase activity.  for discharge planning. Continue current therapy. See orders for further plan of care. More than 50% of my  time was spent at the bedside counseling/coordinating care with the patient and/or family with face to face contact. This time was spent reviewing notes and laboratory data as well as instructing and counseling the patient. Time I spent with the family or surrogate(s) is included only if the patient was incapable of providing the necessary information or participating in medical decisions. I also discussed the differential diagnosis and all of the proposed management plans with the patient and individuals accompanying the patient.     SKYE Knowles CNP  12/29/2022  7:44 AM

## 2022-12-30 LAB
ADENOVIRUS BY PCR: NOT DETECTED
ALBUMIN SERPL-MCNC: 2.3 G/DL (ref 3.5–5.2)
ALP BLD-CCNC: 483 U/L (ref 35–104)
ALT SERPL-CCNC: 257 U/L (ref 0–32)
ANION GAP SERPL CALCULATED.3IONS-SCNC: 13 MMOL/L (ref 7–16)
ANISOCYTOSIS: ABNORMAL
AST SERPL-CCNC: 330 U/L (ref 0–31)
BASOPHILS ABSOLUTE: 0.02 E9/L (ref 0–0.2)
BASOPHILS RELATIVE PERCENT: 0.1 % (ref 0–2)
BILIRUB SERPL-MCNC: 1.1 MG/DL (ref 0–1.2)
BILIRUBIN DIRECT: 0.5 MG/DL (ref 0–0.3)
BILIRUBIN, INDIRECT: 0.6 MG/DL (ref 0–1)
BORDETELLA PARAPERTUSSIS BY PCR: NOT DETECTED
BORDETELLA PERTUSSIS BY PCR: NOT DETECTED
BUN BLDV-MCNC: 13 MG/DL (ref 6–23)
CALCIUM SERPL-MCNC: 7.4 MG/DL (ref 8.6–10.2)
CHLAMYDOPHILIA PNEUMONIAE BY PCR: NOT DETECTED
CHLORIDE BLD-SCNC: 97 MMOL/L (ref 98–107)
CO2: 25 MMOL/L (ref 22–29)
CORONAVIRUS 229E BY PCR: NOT DETECTED
CORONAVIRUS HKU1 BY PCR: NOT DETECTED
CORONAVIRUS NL63 BY PCR: NOT DETECTED
CORONAVIRUS OC43 BY PCR: NOT DETECTED
CREAT SERPL-MCNC: 0.4 MG/DL (ref 0.5–1)
EKG ATRIAL RATE: 128 BPM
EKG ATRIAL RATE: 89 BPM
EKG P AXIS: 85 DEGREES
EKG P AXIS: 94 DEGREES
EKG P-R INTERVAL: 112 MS
EKG P-R INTERVAL: 178 MS
EKG Q-T INTERVAL: 396 MS
EKG Q-T INTERVAL: 428 MS
EKG QRS DURATION: 70 MS
EKG QRS DURATION: 76 MS
EKG QTC CALCULATION (BAZETT): 520 MS
EKG QTC CALCULATION (BAZETT): 578 MS
EKG R AXIS: 73 DEGREES
EKG R AXIS: 75 DEGREES
EKG T AXIS: 72 DEGREES
EKG T AXIS: 78 DEGREES
EKG VENTRICULAR RATE: 128 BPM
EKG VENTRICULAR RATE: 89 BPM
EOSINOPHILS ABSOLUTE: 0 E9/L (ref 0.05–0.5)
EOSINOPHILS RELATIVE PERCENT: 0 % (ref 0–6)
GFR SERPL CREATININE-BSD FRML MDRD: >60 ML/MIN/1.73
GLUCOSE BLD-MCNC: 160 MG/DL (ref 74–99)
HCT VFR BLD CALC: 27.9 % (ref 34–48)
HEMOGLOBIN: 10 G/DL (ref 11.5–15.5)
HUMAN METAPNEUMOVIRUS BY PCR: NOT DETECTED
HUMAN RHINOVIRUS/ENTEROVIRUS BY PCR: NOT DETECTED
IMMATURE GRANULOCYTES #: 0.11 E9/L
IMMATURE GRANULOCYTES %: 0.7 % (ref 0–5)
INFLUENZA A BY PCR: NOT DETECTED
INFLUENZA B BY PCR: NOT DETECTED
LYMPHOCYTES ABSOLUTE: 1.29 E9/L (ref 1.5–4)
LYMPHOCYTES RELATIVE PERCENT: 8.2 % (ref 20–42)
MCH RBC QN AUTO: 32.1 PG (ref 26–35)
MCHC RBC AUTO-ENTMCNC: 35.8 % (ref 32–34.5)
MCV RBC AUTO: 89.4 FL (ref 80–99.9)
METER GLUCOSE: 162 MG/DL (ref 74–99)
MONOCYTES ABSOLUTE: 0.57 E9/L (ref 0.1–0.95)
MONOCYTES RELATIVE PERCENT: 3.6 % (ref 2–12)
MYCOPLASMA PNEUMONIAE BY PCR: NOT DETECTED
NEUTROPHILS ABSOLUTE: 13.8 E9/L (ref 1.8–7.3)
NEUTROPHILS RELATIVE PERCENT: 87.4 % (ref 43–80)
PARAINFLUENZA VIRUS 1 BY PCR: NOT DETECTED
PARAINFLUENZA VIRUS 2 BY PCR: NOT DETECTED
PARAINFLUENZA VIRUS 3 BY PCR: NOT DETECTED
PARAINFLUENZA VIRUS 4 BY PCR: NOT DETECTED
PDW BLD-RTO: 21.7 FL (ref 11.5–15)
PLATELET # BLD: 146 E9/L (ref 130–450)
PMV BLD AUTO: 12.1 FL (ref 7–12)
POIKILOCYTES: ABNORMAL
POLYCHROMASIA: ABNORMAL
POTASSIUM SERPL-SCNC: 4.1 MMOL/L (ref 3.5–5)
RBC # BLD: 3.12 E12/L (ref 3.5–5.5)
RESPIRATORY SYNCYTIAL VIRUS BY PCR: NOT DETECTED
SARS-COV-2, PCR: DETECTED
SODIUM BLD-SCNC: 135 MMOL/L (ref 132–146)
TARGET CELLS: ABNORMAL
TOTAL PROTEIN: 6 G/DL (ref 6.4–8.3)
WBC # BLD: 15.8 E9/L (ref 4.5–11.5)

## 2022-12-30 PROCEDURE — 36415 COLL VENOUS BLD VENIPUNCTURE: CPT

## 2022-12-30 PROCEDURE — 2700000000 HC OXYGEN THERAPY PER DAY

## 2022-12-30 PROCEDURE — 0202U NFCT DS 22 TRGT SARS-COV-2: CPT

## 2022-12-30 PROCEDURE — 97530 THERAPEUTIC ACTIVITIES: CPT

## 2022-12-30 PROCEDURE — 6360000002 HC RX W HCPCS: Performed by: INTERNAL MEDICINE

## 2022-12-30 PROCEDURE — 97165 OT EVAL LOW COMPLEX 30 MIN: CPT

## 2022-12-30 PROCEDURE — 94640 AIRWAY INHALATION TREATMENT: CPT

## 2022-12-30 PROCEDURE — 85025 COMPLETE CBC W/AUTO DIFF WBC: CPT

## 2022-12-30 PROCEDURE — 2500000003 HC RX 250 WO HCPCS: Performed by: INTERNAL MEDICINE

## 2022-12-30 PROCEDURE — 99232 SBSQ HOSP IP/OBS MODERATE 35: CPT | Performed by: SURGERY

## 2022-12-30 PROCEDURE — 82962 GLUCOSE BLOOD TEST: CPT

## 2022-12-30 PROCEDURE — 97530 THERAPEUTIC ACTIVITIES: CPT | Performed by: PHYSICAL THERAPIST

## 2022-12-30 PROCEDURE — 97161 PT EVAL LOW COMPLEX 20 MIN: CPT | Performed by: PHYSICAL THERAPIST

## 2022-12-30 PROCEDURE — 1200000000 HC SEMI PRIVATE

## 2022-12-30 PROCEDURE — 6370000000 HC RX 637 (ALT 250 FOR IP): Performed by: INTERNAL MEDICINE

## 2022-12-30 PROCEDURE — 2580000003 HC RX 258: Performed by: INTERNAL MEDICINE

## 2022-12-30 PROCEDURE — 80048 BASIC METABOLIC PNL TOTAL CA: CPT

## 2022-12-30 PROCEDURE — 80076 HEPATIC FUNCTION PANEL: CPT

## 2022-12-30 PROCEDURE — 6360000002 HC RX W HCPCS: Performed by: NURSE PRACTITIONER

## 2022-12-30 PROCEDURE — 93010 ELECTROCARDIOGRAM REPORT: CPT | Performed by: INTERNAL MEDICINE

## 2022-12-30 RX ORDER — METHYLPREDNISOLONE SODIUM SUCCINATE 40 MG/ML
40 INJECTION, POWDER, LYOPHILIZED, FOR SOLUTION INTRAMUSCULAR; INTRAVENOUS EVERY 12 HOURS
Status: DISCONTINUED | OUTPATIENT
Start: 2022-12-30 | End: 2023-01-01 | Stop reason: HOSPADM

## 2022-12-30 RX ORDER — POTASSIUM CHLORIDE 7.45 MG/ML
10 INJECTION INTRAVENOUS PRN
Status: DISCONTINUED | OUTPATIENT
Start: 2022-12-30 | End: 2023-01-01 | Stop reason: HOSPADM

## 2022-12-30 RX ORDER — MAGNESIUM SULFATE 1 G/100ML
1000 INJECTION INTRAVENOUS PRN
Status: DISCONTINUED | OUTPATIENT
Start: 2022-12-30 | End: 2023-01-01 | Stop reason: HOSPADM

## 2022-12-30 RX ORDER — POTASSIUM CHLORIDE 20 MEQ/1
40 TABLET, EXTENDED RELEASE ORAL PRN
Status: DISCONTINUED | OUTPATIENT
Start: 2022-12-30 | End: 2023-01-01 | Stop reason: HOSPADM

## 2022-12-30 RX ADMIN — METHYLPREDNISOLONE SODIUM SUCCINATE 40 MG: 40 INJECTION, POWDER, FOR SOLUTION INTRAMUSCULAR; INTRAVENOUS at 12:04

## 2022-12-30 RX ADMIN — BUDESONIDE 500 MCG: 0.5 SUSPENSION RESPIRATORY (INHALATION) at 05:35

## 2022-12-30 RX ADMIN — POLYETHYLENE GLYCOL 3350 17 G: 17 POWDER, FOR SOLUTION ORAL at 08:59

## 2022-12-30 RX ADMIN — Medication 10 ML: at 20:45

## 2022-12-30 RX ADMIN — METRONIDAZOLE 500 MG: 500 INJECTION, SOLUTION INTRAVENOUS at 08:53

## 2022-12-30 RX ADMIN — ENOXAPARIN SODIUM 40 MG: 100 INJECTION SUBCUTANEOUS at 08:53

## 2022-12-30 RX ADMIN — FLUTICASONE PROPIONATE 2 SPRAY: 50 SPRAY, METERED NASAL at 08:53

## 2022-12-30 RX ADMIN — Medication 5 MG: at 20:40

## 2022-12-30 RX ADMIN — CEFEPIME 2000 MG: 2 INJECTION, POWDER, FOR SOLUTION INTRAVENOUS at 12:06

## 2022-12-30 RX ADMIN — ARFORMOTEROL TARTRATE 15 MCG: 15 SOLUTION RESPIRATORY (INHALATION) at 05:35

## 2022-12-30 RX ADMIN — METRONIDAZOLE 500 MG: 500 INJECTION, SOLUTION INTRAVENOUS at 16:21

## 2022-12-30 RX ADMIN — SERTRALINE 25 MG: 50 TABLET, FILM COATED ORAL at 08:53

## 2022-12-30 RX ADMIN — METHYLPREDNISOLONE SODIUM SUCCINATE 40 MG: 40 INJECTION, POWDER, FOR SOLUTION INTRAMUSCULAR; INTRAVENOUS at 22:43

## 2022-12-30 RX ADMIN — Medication 10 ML: at 08:53

## 2022-12-30 NOTE — PROGRESS NOTES
[address] [facility]  [facility] [place]. [place]        Date:2022                                                  Patient Name: Lilly Tipton    MRN: [id number]    : 1960    Room: [address] [place] [address]      Evaluating OT: Shiraz Pascual OTR/L; [id number]     Referring Provider and Specific Provider Orders/Date:      22 [address] [place] Nanci, DO OT eval and treat  (PT/OT CONSULT PANEL)  ONE TIME              Placement Recommendation: Subacute, pt adamantly declining thought of going to subacute rehab        Diagnosis:   1. Acute cholecystitis    2. Acute electrocardiogram changes    3. Prolonged Q-T interval on ECG         Surgery:  none      Pertinent Medical History:       Past Medical History:   Diagnosis Date    Abscess     colon    COPD (chronic obstructive pulmonary disease) ([facility] [address]     Diverticulitis     Provoked DVT 3/2018     3 months Eliquis for DVT due to PICC line    Seasonal allergic rhinitis     Smoker 2019    5-6 per day    Tobacco use disorder      : 1 ppd since age 25       No past surgical history on file. Precautions:  Fall Risk, Hep A, 2-4L O2   Comment: pt on 2L O2 upon arrival to the room. Pt stated she wears 4L O2 at home and stated she wanted it raised to 4L.      O2 saturation on 2L was 92%  O2 saturation on 4L was 95%     Assessment of current deficits:    [x] Functional mobility  [x]ADLs  [x] Strength               []Cognition    [x] Functional transfers   [x] IADLs         [] Safety Awareness   [x]Endurance    [] Fine Coordination              [x] Balance      [] Vision/perception   []Sensation     []Gross Motor Coordination  [] ROM  [] Delirium                   [] Motor Control     OT PLAN OF CARE   OT POC based on physician orders, patient diagnosis and results of clinical assessment    Frequency/Duration 1-3 days/wk for 2 weeks PRN     Specific OT Treatment Interventions to include:   * Instruction/training on adapted ADL techniques and AE recommendations to increase functional independence within precautions       * Training on energy conservation strategies, correct breathing pattern and techniques to improve independence/tolerance for self-care routine  * Functional transfer/mobility training/DME recommendations for increased independence, safety, and fall prevention  * Patient/Family education to increase follow through with safety techniques and functional independence  * Recommendation of environmental modifications for increased safety with functional transfers/mobility and ADLs  * Therapeutic exercise to improve motor endurance, ROM, and functional strength for ADLs/functional transfers  * Therapeutic activities to facilitate/challenge dynamic balance, stand tolerance for increased safety and independence with ADLs  * Positioning to improve skin integrity, interaction with environment and functional independence    Recommended Adaptive Equipment: TBD at rehab, IF pt returns home she is requesting a price for a wheelchair, pt states she may look into a medical equipment recycling program for a wheelchair     Home Living: with family: daughter; single family home, 2 story, resides on 1st floor, 2 steps to enter with rail, tub shower on 1st and 2nd floor. The living room has been converted into her bedroom with a hospital bed and bedside commode. Equipment owned: bedside commode, grab bars, hospital bed  Comment: pt states she can borrow a wheeled walker    Prior Level of Function: Needs assistance with ADLs and IADLs; ambulated with no device    Driving: yes but hasnt lately    Pain Level: 8/10 pain in R lower quadrant; Nursing notified.       Cognition: A&O: 4/4; Follows 3 step directions   Memory: good    Sequencing: good    Problem solving: good    Judgement/safety: good     [facility]   AM-PAC Daily Activity Inpatient   How much help for putting on and taking off regular lower body clothing?: A Lot  How much help for Bathing?: A Lot  How much help for Toileting?: A Lot  How much help for putting on and taking off regular upper body clothing?: A Little  How much help for taking care of personal grooming?: A Little  How much help for eating meals?: None  AM-PAC Inpatient Daily Activity Raw Score: 16  AM-PAC Inpatient ADL T-Scale Score : 35.96  ADL Inpatient CMS 0-100% Score: 53.32  ADL Inpatient CMS G-Code Modifier : CK     Functional Assessment:    Initial Eval Status  Date: 12/30/22 Treatment Status  Date: STGs = LTGs  Time frame: 10-14 days   Feeding Independent   Independent    Grooming Minimal Assist   Independent    UB Dressing Minimal Assist   Independent    LB Dressing Moderate Assist   Independent    Bathing Moderate Assist  Modified [place]    Toileting Moderate Assist   Independent    Bed Mobility  Supine to sit: N/T as pt was up in chair   Sit to supine: N/T as pt was up in chair   Supine to sit: Independent   Sit to supine: Independent    Functional Transfers Supervision from bedside chair x 2 reps  Supervision for transfer from standing to edge of bed   Transfer training with verbal cues for hand placement throughout session to improve safety. Independent    Functional Mobility Minimal Assist with hand held assist to improve balance within the room and back to bed, verbal cues for sequence and safety. Modified [place]    Balance Sitting:     Static: good     Dynamic: fair   Standing: fair  with hand held assist     Activity Tolerance Fair   good    Visual/  Perceptual Glasses: yes, readers                 Hand Dominance: right      AROM (PROM) Strength Additional Info:    RUE  WFL 4/5 good  and wfl FMC/dexterity noted during ADL tasks     LUE WFL 4/5 good  and wfl FMC/dexterity noted during ADL tasks       Hearing: WFL   Sensation:  No c/o numbness or tingling  Tone: WFL   Edema: no     Comments: Upon arrival the patient was seated in bedside chair.   At end of session, patient was supine in bed with HOB elevated with call light and phone within reach, all lines and tubes intact. Overall patient demonstrated decreased independence and safety during completion of ADL/functional transfer/mobility tasks. Pt would benefit from continued skilled OT to increase safety and independence with completion of ADL/IADL tasks for functional independence and quality of life. Treatment: OT treatment provided this date includes:   Instruction/training on safety and adapted techniques for completion of ADLs   Instruction/training on safe functional mobility/transfer techniques   Instruction/training on energy conservation/work simplification for completion of ADLs   Instruction/training on proper positioning/alignment to prevent contractures     Rehab Potential: Good for established goals. Patient / Family Goal: return home, pt not interested in going to subacute rehab at this time      Patient and/or family were instructed on functional diagnosis, prognosis/goals and OT plan of care. Demonstrated good understanding. Eval Complexity: Low    Time In: 10:15am   Time Out: 10:45am    Total Treatment Time: 10      Min Units   OT Eval Low 97165  X  1    OT Eval Medium 79564      OT Eval High 31056      OT Re-Eval [id number]            ADL/Self Care 74156     Therapeutic Activities 19800  10  1    Therapeutic Ex 39123       Orthotic Management 08425       Manual [id number]     Neuro Re-Ed 11293       Non-Billable Time        Evaluation Time additionally includes thorough review of current medical information, gathering information on past medical history/social history and prior level of function, interpretation of standardized testing/informal observation of tasks, assessment of data and development of plan of care and goals.         Evaluating OT: Helen Diaz OTR/L; [id number]

## 2022-12-30 NOTE — PROGRESS NOTES
Internal Medicine Progress Note    [facility]    Gustavo Dennis. Pedrito Rene, MARIELOSOChenteI. Lore Cuevas D.O., NERY Ovalle D.O. Geovanna Real D.O. [facility], MSN, APRN, NP-C  Jeovany Roth. Ole Langford, MSN, APRN-CNP     Primary Care Physician: Sobeida Edwards. MD Farida   Admitting Physician:  Florencio Gomez DO  Admission date and time: 12/28/2022  5:33 PM    Room:  [address] [place] [address]  Admitting diagnosis: Acute cholecystitis [K81.0]  Prolonged Q-T interval on ECG [R94.31]  Acute electrocardiogram changes [R94.31]    Patient Name: Ann Ayala  MRN: [id number]    Date of Service: 12/30/2022     Subjective:  Tiana Moody is a 58 y.o. female who was seen and examined today,12/30/2022, at the bedside. We have reviewed the results of the work-up and plan of care moving forward. Liver enzymes are pending and this will help delineate further plan as the MRCP returned negative. Abdominal pain is much better controlled. We have discussed COVID testing as patient has multiple family members who have tested positive for COVID in the recent past. She is rather asymptomatic from a respiratory standpoint. No family present during my examination. Review of System:   Constitutional:   Denies fever or chills, weight loss or gain,+ fatigue/malaise. HEENT:   Denies ear pain, sore throat, sinus or eye problems. Cardiovascular:   Denies any chest pain, irregular heartbeats, or palpitations. Respiratory:   +for chronic SOB without acute worsening, denies coughing, sputum production, hemoptysis, or wheezing. Gastrointestinal:   Denies nausea, vomiting, diarrhea, or constipation. positive for improvement of the previously reported abdominal pain. Genitourinary:    Denies any urgency, frequency, hematuria. Voiding  without difficulty. Extremities:   Denies lower extremity swelling, edema or cyanosis.    Neurology:    Denies any headache or focal neurological deficits, positive for mild generalized weakness without focal complaint  Psch:   Denies being anxious or depressed. Musculoskeletal:    Denies  myalgias, joint complaints or back pain. Integumentary:   Denies any rashes, ulcers, or excoriations. Denies bruising. Hematologic/Lymphatic:  Denies bruising or bleeding. Physical Exam:  No intake/output data recorded. Intake/Output Summary (Last 24 hours) at 12/30/2022 0810  Last data filed at 12/29/2022 1327  Gross per 24 hour   Intake 0 ml   Output --   Net 0 ml     I/O last 3 completed shifts: In: 499.5 [IV Piggyback:499.5]  Out: 400 [Urine:400]  Patient Vitals for the past 96 hrs (Last 3 readings):   Weight   12/28/22 1736 157 lb (71.2 kg)       Vital Signs:   Blood pressure 133/69, pulse 61, temperature 99.5 °F (37.5 °C), temperature source Oral, resp. rate 20, height 5' 3\" (1.6 m), weight 157 lb (71.2 kg), SpO2 91 %, not currently breastfeeding. General appearance:  Alert, responsive, oriented to person, place, and time. Positive for acute on chronic ill appearing. Head:  Normocephalic. No masses, lesions or tenderness. Eyes:  PERRLA. EOMI. Sclera clear. Buccal mucosa moist.  ENT:  Ears normal. Mucosa normal. 02 via nasal cannula  Neck:    Supple. Trachea midline. No thyromegaly. No JVD. No bruits. Heart:    Rhythm regular. Rate controlled. S1 and S2  Lungs:    Diminished air exchange with interval improvement in degree of expiratory wheezing. No rales or rhonchi. Pattern is regular and even without labor. Symmetrical expansion. Abdomen:   Soft. No significant tenderness to palpation. Non-distended. Bowel sounds positive. No organomegaly or masses. No rebound, guarding or rigidity. Extremities:    Peripheral pulses present. No peripheral edema. No ulcers. No cyanosis. No clubbing. Neurologic:    Alert x 3. No focal deficit. Cranial nerves grossly intact. No focal weakness.   Psych:   Behavior is normal. Mood appears normal. Speech is not rapid and/or pressured. Musculoskeletal:   No unilateral joint edema, erythema or warmth. . Gait not assessed. Integumentary:  No rashes  Skin normal color and texture.   Genitalia/Breast:  Deferred    Medication:  Scheduled Meds:   sodium chloride flush  5-40 mL IntraVENous 2 times per day    Arformoterol Tartrate  15 mcg Nebulization BID    budesonide  0.5 mg Nebulization BID    fluticasone  2 spray Each Nostril Daily    sertraline  25 mg Oral Daily    metroNIDAZOLE  500 mg IntraVENous Q8H    cefepime  2,000 mg IntraVENous Q12H    enoxaparin  40 mg SubCUTAneous Daily    methylPREDNISolone  40 mg IntraVENous Q8H    melatonin  5 mg Oral Nightly     Continuous Infusions:   dextrose      sodium chloride      dextrose 5% and 0.45% NaCl with KCl 20 mEq 83 mL/hr at 12/29/22 5757       Objective Data:  CBC with Differential:    Lab Results   Component Value Date/Time    WBC 15.8 12/30/2022 06:30 AM    RBC 3.12 12/30/2022 06:30 AM    HGB 10.0 12/30/2022 06:30 AM    HCT 27.9 12/30/2022 06:30 AM     12/30/2022 06:30 AM    MCV 89.4 12/30/2022 06:30 AM    MCH 32.1 12/30/2022 06:30 AM    MCHC 35.8 12/30/2022 06:30 AM    RDW 21.7 12/30/2022 06:30 AM    NRBC 0.9 11/30/2022 05:18 AM    METASPCT 0.9 12/22/2022 06:18 AM    LYMPHOPCT 3.0 12/29/2022 05:07 AM    MONOPCT 1.0 12/29/2022 05:07 AM    MYELOPCT 1.0 12/28/2022 07:02 PM    BASOPCT 0.0 12/29/2022 05:07 AM    MONOSABS 0.12 12/29/2022 05:07 AM    LYMPHSABS 0.36 12/29/2022 05:07 AM    EOSABS 0.00 12/29/2022 05:07 AM    BASOSABS 0.00 12/29/2022 05:07 AM     CMP:    Lab Results   Component Value Date/Time     12/29/2022 05:07 AM    K 3.1 12/29/2022 05:07 AM    K 3.2 12/28/2022 07:02 PM    CL 92 12/29/2022 05:07 AM    CO2 34 12/29/2022 05:07 AM    BUN 12 12/29/2022 05:07 AM    CREATININE 0.3 12/29/2022 05:07 AM    GFRAA >60 04/13/2022 02:20 PM    LABGLOM >60 12/29/2022 05:07 AM    GLUCOSE 111 12/29/2022 05:07 AM    PROT 5.8 12/29/2022 05:07 AM    LABALBU 2.4 12/29/2022 05:07 AM    CALCIUM 7.1 12/29/2022 05:07 AM    BILITOT 2.0 12/29/2022 05:07 AM    ALKPHOS 532 12/29/2022 05:07 AM     12/29/2022 05:07 AM     12/29/2022 05:07 AM     Recent Labs     12/29/22  0003   TROPHS 12*       Assessment:  Transaminitis with hyperbilirubinemia, multiple liver cysts, negative MRCP for biliary obstruction   Possible cholecystitis with negative ultrasound  Recurrent diverticulitis versus resolving diverticulitis  Recent sepsis secondary to acute diverticulitis with perforation and abscess status post IR guided drainage  Chronic respiratory failure with hypoxia secondary to advanced COPD without exacerbation   Leukocytosis  Hypokalemia  Ongoing tobacco abuse  Prior history of DVT  Hyperlipidemia on statin agent    Plan:   Alexsander Guevara is feeling better overall. MRCP in the setting of elevated liver enzymes and bilirubin as well as multiple liver cysts negative for biliary obstruction. Cholecystitis was felt to be unlikely secondary to negative right upper quadrant ultrasound as well. Further determination for work-up as per the surgery team.  Liver enzymes are pending today and will help guide further therapy. Symptomatic and supportive care being employed. As the patient has been resumed on a diet, fluids will be discontinued. COPD is without any significant exacerbation, we will scale steroids back today and plan to transition to oral steroids if needed upon discharge. We will check COVID and respiratory film array has multiple family members at home are sick with upper respiratory infection. Antimicrobials are being implemented until final surgical work-up has been completed, with plans to transition back to oral regimen in the setting of recent perforated diverticulitis. We will monitor and address accordingly labs and vital signs, chronic morbidities. PT, OT and  following for discharge plan purposes. Continue current therapy.   See orders for further plan of care. Jean Smalls requires this high level of physician care and nursing on the IMC/Telemetry unit due the complexity of decision management and chance of rapid decline or death. Continued cardiac monitoring and higher level of nursing are required. I am readily available for any further decision-making and intervention. More than 50% of my  time was spent at the bedside counseling/coordinating care with the patient and/or family with face to face contact. This time was spent reviewing notes and laboratory data as well as instructing and counseling the patient. Time I spent with the family or surrogate(s) is included only if the patient was incapable of providing the necessary information or participating in medical decisions. I also discussed the differential diagnosis and all of the proposed management plans with the patient and individuals accompanying the patient.     SKYE Luevano CNP  12/30/2022  8:10 AM

## 2022-12-30 NOTE — PROGRESS NOTES
Physical Therapy  Physical Therapy Initial Evaluation/Plan of Care    Room #:  0415/0415-01  Patient Name: Lilly Tipton  YOB: 1960  MRN: [id number]    Date of Service: 12/30/2022     Tentative placement recommendation: Subacute vs Home Health Physical Therapy if patient meets goals  Equipment recommendation: Wheelchair if d/lalito home    Evaluating Physical Therapist: Neeraj Fernández, PT, DPT #[id number]      Specific Provider Orders/Date/Referring Provider :     12/29/22 1200    PT eval and treat  (PT/OT CONSULT PANEL)  ONE TIME     Complete      Admitting Diagnosis:   Acute cholecystitis [K81.0]  Prolonged Q-T interval on ECG [R94.31]  Acute electrocardiogram changes [R94.31]      Surgery: none  Visit Diagnoses         Codes    Acute electrocardiogram changes     R94.31    Prolonged Q-T interval on ECG     R94.31            Patient Active Problem List   Diagnosis    Tobacco use disorder    Seasonal allergic rhinitis    Chronic bronchitis ([address]    Chronic respiratory failure with hypoxia ([address]    Chronic obstructive pulmonary disease ([address]    Acute respiratory failure with hypercapnia ([address]    Acute on chronic respiratory failure with hypoxia and hypercapnia (HCC)    COPD exacerbation ([address]    Acute diverticulitis with abscess    Acute cholecystitis        ASSESSMENT of Current Deficits Patient exhibits decreased strength, balance, and endurance impairing functional mobility, transfers, gait , gait distance, and tolerance to activity. Pt mild-moderately unsteady with ambulation with decreased tolerance for function during session. Pt agreeable to seated exercises following ambulation with instruction and VC needed for technique.         PHYSICAL THERAPY  PLAN OF CARE       Physical therapy plan of care is established based on physician order,  patient diagnosis and clinical assessment    Current Treatment Recommendations:    -Bed Mobility: Lower extremity exercises  and Trunk control activities   -Sitting Balance: Incorporate reaching activities to activate trunk muscles , Hands on support to maintain midline , Facilitate active trunk muscle engagement , Facilitate postural control in all planes , and Engage in core activities to allow for movement within base of support   -Standing Balance: Perform strengthening exercises in standing to promote motor control with or without upper extremity support , Instruct patient on adequate base of support to maintain balance, and Challenge balance utilizing reaching  activities beyond center of gravity    -Transfers: Provide instruction on proper hand and foot position for adequate transfer of weight onto lower extremities and use of gait device if needed, Cues for hand placement, technique and safety. Provide stabilization to prevent fall , Facilitate weight shift forward on to lower extremities and provide necessary stabilization of bilateral lower extremities , Support transfer of weight on to lower extremities, and Assist with extension of knees trunk and hip to accept weight transfer   -Gait: Gait training, Standing activities to improve: base of support, weight shift, weight bearing , Exercises to improve trunk control, Exercises to improve hip and knee control, Performance of protected weight bearing activities, and Activities to increase weight bearing   -Endurance: Utilize Supervised activities to increase level of endurance to allow for safe functional mobility including transfers and gait  and Use graduated activities to promote good breathing techniques and provide support and education to maximize respiratory function  -Stairs: Stair training with instruction on proper technique and hand placement on rail    PT long term treatment goals are located in below grid    Patient and or family understand(s) diagnosis, prognosis, and plan of care.     Frequency of treatments: Patient will be seen  dailyyy      Prior Level of Function: Patient ambulated independently   Rehab Potential: fair  for baseline    Past medical history:   Past Medical History:   Diagnosis Date    Abscess     colon    COPD (chronic obstructive pulmonary disease) ([facility]     Diverticulitis     Provoked DVT 3/2018     3 months Eliquis for DVT due to PICC line    Seasonal allergic rhinitis     Smoker 11/30/2019    5-6 per day    Tobacco use disorder      : 1 ppd since age 25     No past surgical history on file. SUBJECTIVE:    Precautions: Up with assistance, falls, O2, and HepA , Covid R/O    Social history: Patient lives with daughter and daughter's bf  in a ranch home  with 2 steps, with rail  to enter [employer] , grab bars      Equipment owned: [employer], Bedside commode, and Shower chair    [facility]   How much difficulty turning over in bed?: A Little  How much difficulty sitting down on / standing up from a chair with arms?: A Lot  How much difficulty moving from lying on back to sitting on side of bed?: A Little  How much help from another person moving to and from a bed to a chair?: A Lot  How much help from another person needed to walk in hospital room?: A Lot  How much help from another person for climbing 3-5 steps with a railing?: A Lot  AM-PAC Inpatient Mobility Raw Score : 14  AM-PAC Inpatient T-Scale Score : 38.1  Mobility Inpatient CMS 0-100% Score: 61.29  Mobility Inpatient CMS G-Code Modifier : CL    Nursing cleared patient for PT evaluation. The admitting diagnosis and active problem list as listed above have been reviewed prior to the initiation of this evaluation. OBJECTIVE;   Initial Evaluation  Date: 12/30/2022 Treatment Date:     Short Term/ Long Term   Goals   Was pt agreeable to Eval/treatment? Yes  To be met in 3 days   Pain level   8/10  R side abdomen     Bed Mobility    Rolling: Supervision     Supine to sit: Supervision     Sit to supine: Supervision     Scooting: Supervision     Rolling: Independent    Supine to sit:  Independent Sit to supine: Independent    Scooting: Independent     Transfers Sit to stand: Minimal assist of 1 from bed, ModA for commode  Sit to stand: Independent    Ambulation     2 x 12 feet using  wheeled walker with Min/ModA   for walker control, walker approximation, balance, upright, weight shift, and safety    > 50 feet using  wheeled walker with Modified Independent    Stair negotiation: ascended and descended   Not assessed       2 stairs with 1 HR with Mod I   ROM Within functional limits    Increase range of motion 10% of affected joints    Strength BUE:  refer to OT eval  RLE:  3+/5  LLE:  3+/5  Increase strength in affected mm groups by 1/3 grade   Balance Sitting EOB:  fair +  Dynamic Standing:  fair - with [employer]  Sitting EOB:  good   Dynamic Standing: fair + with [employer]     Patient is Alert & Oriented x person, place, time, and situation and follows directions    Sensation:  Patient  denies numbness/tingling   Edema:  no   Endurance: fair  -    Vitals:  2 liters nasal cannula   Blood Pressure at rest  Blood Pressure during session    Heart Rate at rest  Heart Rate during session    SPO2 at rest %  SPO2 during session 91-95%     Patient education  Patient educated on role of Physical Therapy, risks of immobility, safety and plan of care, energy conservation,  importance of mobility while in hospital , purse lip breathing, ankle pumps, quad set and glut set for edema control, blood clot prevention, importance and purpose of adaptive device and adjusted to proper height for the patient. , safety , stair training , O2 line management and safety , and proper use/technique of incentive spirometer     Patient response to education:   Pt verbalized understanding Pt demonstrated skill Pt requires further education in this area   Yes Partial Yes      Treatment:  Patient practiced and was instructed/facilitated in the following treatment: Patient Sat edge of bed 10 minutes with Supervision  to increase dynamic sitting balance and activity tolerance. Pt performed bed mobility, transfers, ambulation in room, seated exercises. Therapeutic Exercises:  ankle pumps, heel raises, and long arc quad  x 10-15 reps. At end of session, patient in chair with     call light and phone within reach,  all lines and tubes intact, nursing notified. Patient would benefit from continued skilled Physical Therapy to improve functional independence and quality of life. Patient's/ family goals   get stronger    Time in  928  Time out  1001    Total Treatment Time  13 minutes    Evaluation time includes thorough review of current medical information, gathering information on past medical history/social history and prior level of function, completion of standardized testing/informal observation of tasks, assessment of data, and development of Plan of care and goals.      CPT codes:  Low Complexity PT evaluation (92247)  Therapeutic activities (21060)   13 minutes  1 unit(s)    Meng Salcedo PT

## 2022-12-30 NOTE — CARE COORDINATION
12/30/2022 1304 CM note: No covid testing. Pt resides with her dtr Charmaine Nicolas in a 1 story home, 2 step entry. She relays her dtr assists her with ADLS and she is rarely home alone. DME includes: home o2 @4L NC(portable and concentrator), unknown DME company. She has access to a ww and bsc if needed. Pt plans to return home with her dtr  at d/c and continues to decline needs including HHC/SMILEY. Will await PT/OT evjenny to assist with transition of care needs.  Ofelia LANE

## 2022-12-31 LAB
ALBUMIN SERPL-MCNC: 2.4 G/DL (ref 3.5–5.2)
ALP BLD-CCNC: 431 U/L (ref 35–104)
ALT SERPL-CCNC: 223 U/L (ref 0–32)
ANION GAP SERPL CALCULATED.3IONS-SCNC: 4 MMOL/L (ref 7–16)
AST SERPL-CCNC: 220 U/L (ref 0–31)
BASOPHILS ABSOLUTE: 0.02 E9/L (ref 0–0.2)
BASOPHILS RELATIVE PERCENT: 0.1 % (ref 0–2)
BILIRUB SERPL-MCNC: 1.1 MG/DL (ref 0–1.2)
BILIRUBIN DIRECT: 0.6 MG/DL (ref 0–0.3)
BILIRUBIN, INDIRECT: 0.5 MG/DL (ref 0–1)
BUN BLDV-MCNC: 11 MG/DL (ref 6–23)
CALCIUM SERPL-MCNC: 7.4 MG/DL (ref 8.6–10.2)
CHLORIDE BLD-SCNC: 96 MMOL/L (ref 98–107)
CO2: 40 MMOL/L (ref 22–29)
CREAT SERPL-MCNC: 0.3 MG/DL (ref 0.5–1)
EOSINOPHILS ABSOLUTE: 0 E9/L (ref 0.05–0.5)
EOSINOPHILS RELATIVE PERCENT: 0 % (ref 0–6)
GFR SERPL CREATININE-BSD FRML MDRD: >60 ML/MIN/1.73
GLUCOSE BLD-MCNC: 121 MG/DL (ref 74–99)
HCT VFR BLD CALC: 25.4 % (ref 34–48)
HEMOGLOBIN: 10.3 G/DL (ref 11.5–15.5)
IMMATURE GRANULOCYTES #: 0.09 E9/L
IMMATURE GRANULOCYTES %: 0.6 % (ref 0–5)
LYMPHOCYTES ABSOLUTE: 1.3 E9/L (ref 1.5–4)
LYMPHOCYTES RELATIVE PERCENT: 9 % (ref 20–42)
MAGNESIUM: 1.9 MG/DL (ref 1.6–2.6)
MCH RBC QN AUTO: 38.6 PG (ref 26–35)
MCHC RBC AUTO-ENTMCNC: 40.6 % (ref 32–34.5)
MCV RBC AUTO: 95.1 FL (ref 80–99.9)
MONOCYTES ABSOLUTE: 0.49 E9/L (ref 0.1–0.95)
MONOCYTES RELATIVE PERCENT: 3.4 % (ref 2–12)
NEUTROPHILS ABSOLUTE: 12.55 E9/L (ref 1.8–7.3)
NEUTROPHILS RELATIVE PERCENT: 86.9 % (ref 43–80)
PDW BLD-RTO: 18.3 FL (ref 11.5–15)
PHOSPHORUS: 2.7 MG/DL (ref 2.5–4.5)
PLATELET # BLD: 199 E9/L (ref 130–450)
PMV BLD AUTO: 11.3 FL (ref 7–12)
POTASSIUM SERPL-SCNC: 3.2 MMOL/L (ref 3.5–5)
RBC # BLD: 2.67 E12/L (ref 3.5–5.5)
SODIUM BLD-SCNC: 140 MMOL/L (ref 132–146)
TOTAL PROTEIN: 5.9 G/DL (ref 6.4–8.3)
WBC # BLD: 14.5 E9/L (ref 4.5–11.5)

## 2022-12-31 PROCEDURE — 99231 SBSQ HOSP IP/OBS SF/LOW 25: CPT | Performed by: SURGERY

## 2022-12-31 PROCEDURE — 36415 COLL VENOUS BLD VENIPUNCTURE: CPT

## 2022-12-31 PROCEDURE — 6360000002 HC RX W HCPCS: Performed by: INTERNAL MEDICINE

## 2022-12-31 PROCEDURE — 85025 COMPLETE CBC W/AUTO DIFF WBC: CPT

## 2022-12-31 PROCEDURE — 6360000002 HC RX W HCPCS: Performed by: NURSE PRACTITIONER

## 2022-12-31 PROCEDURE — 94640 AIRWAY INHALATION TREATMENT: CPT

## 2022-12-31 PROCEDURE — 2500000003 HC RX 250 WO HCPCS: Performed by: INTERNAL MEDICINE

## 2022-12-31 PROCEDURE — 6370000000 HC RX 637 (ALT 250 FOR IP): Performed by: INTERNAL MEDICINE

## 2022-12-31 PROCEDURE — 80076 HEPATIC FUNCTION PANEL: CPT

## 2022-12-31 PROCEDURE — 84100 ASSAY OF PHOSPHORUS: CPT

## 2022-12-31 PROCEDURE — 83735 ASSAY OF MAGNESIUM: CPT

## 2022-12-31 PROCEDURE — 80048 BASIC METABOLIC PNL TOTAL CA: CPT

## 2022-12-31 PROCEDURE — 6370000000 HC RX 637 (ALT 250 FOR IP): Performed by: NURSE PRACTITIONER

## 2022-12-31 PROCEDURE — 1200000000 HC SEMI PRIVATE

## 2022-12-31 PROCEDURE — 2700000000 HC OXYGEN THERAPY PER DAY

## 2022-12-31 PROCEDURE — 2580000003 HC RX 258: Performed by: INTERNAL MEDICINE

## 2022-12-31 RX ORDER — CEFDINIR 300 MG/1
300 CAPSULE ORAL EVERY 12 HOURS SCHEDULED
Status: DISCONTINUED | OUTPATIENT
Start: 2022-12-31 | End: 2023-01-01 | Stop reason: HOSPADM

## 2022-12-31 RX ORDER — METRONIDAZOLE 500 MG/1
500 TABLET ORAL EVERY 8 HOURS SCHEDULED
Status: DISCONTINUED | OUTPATIENT
Start: 2022-12-31 | End: 2023-01-01 | Stop reason: HOSPADM

## 2022-12-31 RX ORDER — ASCORBIC ACID 500 MG
500 TABLET ORAL DAILY
Status: DISCONTINUED | OUTPATIENT
Start: 2022-12-31 | End: 2023-01-01 | Stop reason: HOSPADM

## 2022-12-31 RX ORDER — VITAMIN B COMPLEX
2000 TABLET ORAL DAILY
Status: DISCONTINUED | OUTPATIENT
Start: 2022-12-31 | End: 2023-01-01 | Stop reason: HOSPADM

## 2022-12-31 RX ADMIN — ENOXAPARIN SODIUM 40 MG: 100 INJECTION SUBCUTANEOUS at 08:50

## 2022-12-31 RX ADMIN — METHYLPREDNISOLONE SODIUM SUCCINATE 40 MG: 40 INJECTION, POWDER, FOR SOLUTION INTRAMUSCULAR; INTRAVENOUS at 10:38

## 2022-12-31 RX ADMIN — BUDESONIDE 500 MCG: 0.5 SUSPENSION RESPIRATORY (INHALATION) at 18:25

## 2022-12-31 RX ADMIN — Medication 5 MG: at 20:25

## 2022-12-31 RX ADMIN — ARFORMOTEROL TARTRATE 15 MCG: 15 SOLUTION RESPIRATORY (INHALATION) at 18:25

## 2022-12-31 RX ADMIN — Medication 10 ML: at 08:50

## 2022-12-31 RX ADMIN — CEFDINIR 300 MG: 300 CAPSULE ORAL at 08:51

## 2022-12-31 RX ADMIN — OXYCODONE HYDROCHLORIDE AND ACETAMINOPHEN 500 MG: 500 TABLET ORAL at 08:49

## 2022-12-31 RX ADMIN — ARFORMOTEROL TARTRATE 15 MCG: 15 SOLUTION RESPIRATORY (INHALATION) at 06:45

## 2022-12-31 RX ADMIN — FLUTICASONE PROPIONATE 2 SPRAY: 50 SPRAY, METERED NASAL at 08:51

## 2022-12-31 RX ADMIN — CEFEPIME 2000 MG: 2 INJECTION, POWDER, FOR SOLUTION INTRAVENOUS at 02:39

## 2022-12-31 RX ADMIN — METHYLPREDNISOLONE SODIUM SUCCINATE 40 MG: 40 INJECTION, POWDER, FOR SOLUTION INTRAMUSCULAR; INTRAVENOUS at 01:31

## 2022-12-31 RX ADMIN — CEFDINIR 300 MG: 300 CAPSULE ORAL at 20:25

## 2022-12-31 RX ADMIN — SERTRALINE 25 MG: 50 TABLET, FILM COATED ORAL at 08:50

## 2022-12-31 RX ADMIN — METRONIDAZOLE 500 MG: 500 TABLET ORAL at 15:46

## 2022-12-31 RX ADMIN — METHYLPREDNISOLONE SODIUM SUCCINATE 40 MG: 40 INJECTION, POWDER, FOR SOLUTION INTRAMUSCULAR; INTRAVENOUS at 23:23

## 2022-12-31 RX ADMIN — METRONIDAZOLE 500 MG: 500 TABLET ORAL at 08:49

## 2022-12-31 RX ADMIN — BUDESONIDE 500 MCG: 0.5 SUSPENSION RESPIRATORY (INHALATION) at 06:45

## 2022-12-31 RX ADMIN — Medication 10 ML: at 20:26

## 2022-12-31 RX ADMIN — METRONIDAZOLE 500 MG: 500 INJECTION, SOLUTION INTRAVENOUS at 01:35

## 2022-12-31 RX ADMIN — METRONIDAZOLE 500 MG: 500 TABLET ORAL at 20:25

## 2022-12-31 RX ADMIN — POTASSIUM CHLORIDE 40 MEQ: 1500 TABLET, EXTENDED RELEASE ORAL at 15:46

## 2022-12-31 RX ADMIN — VITAMIN D, TAB 1000IU (100/BT) 2000 UNITS: 25 TAB at 08:51

## 2022-12-31 ASSESSMENT — PAIN SCALES - GENERAL: PAINLEVEL_OUTOF10: 0

## 2022-12-31 NOTE — PROGRESS NOTES
General Surgery Progress Note  [facility]    Patient's Name/Date of Birth: Valerio Appiah / 1960    Date: December 30, 2022     Surgeon: Dwain Brooks MD    Chief Complaint: Rule out cholecystitis    Patient Active Problem List   Diagnosis    Tobacco use disorder    Seasonal allergic rhinitis    Chronic bronchitis ([address]    Chronic respiratory failure with hypoxia ([address]    Chronic obstructive pulmonary disease ([address]    Acute respiratory failure with hypercapnia ([address]    Acute on chronic respiratory failure with hypoxia and hypercapnia ([address]    COPD exacerbation ([address]    Acute diverticulitis with abscess    Acute cholecystitis       Subjective: doing well. Tolerating po.  Denies upper abdominal pain    Objective:  /62   Pulse 76   Temp 97.4 °F (36.3 °C) (Oral)   Resp 20   Ht 5' 3\" (1.6 m)   Wt 157 lb (71.2 kg)   SpO2 97%   BMI 27.81 kg/m²   Labs:  Recent Labs     12/28/22  1902 12/29/22  0507 12/30/22  0630   WBC 16.3* 12.0* 15.8*   HGB 11.6 9.9* 10.0*   HCT 34.6 29.7* 27.9*     Lab Results   Component Value Date    CREATININE 0.4 (L) 12/30/2022    BUN 13 12/30/2022     12/30/2022    K 4.1 12/30/2022    CL 97 (L) 12/30/2022    CO2 25 12/30/2022     Recent Labs     12/28/22  1902   LIPASE 32     CBC with Differential:    Lab Results   Component Value Date/Time    WBC 15.8 12/30/2022 06:30 AM    RBC 3.12 12/30/2022 06:30 AM    HGB 10.0 12/30/2022 06:30 AM    HCT 27.9 12/30/2022 06:30 AM     12/30/2022 06:30 AM    MCV 89.4 12/30/2022 06:30 AM    MCH 32.1 12/30/2022 06:30 AM    MCHC 35.8 12/30/2022 06:30 AM    RDW 21.7 12/30/2022 06:30 AM    NRBC 0.9 11/30/2022 05:18 AM    METASPCT 0.9 12/22/2022 06:18 AM    LYMPHOPCT 8.2 12/30/2022 06:30 AM    MONOPCT 3.6 12/30/2022 06:30 AM    MYELOPCT 1.0 12/28/2022 07:02 PM    BASOPCT 0.1 12/30/2022 06:30 AM    MONOSABS 0.57 12/30/2022 06:30 AM    LYMPHSABS 1.29 12/30/2022 06:30 AM    EOSABS 0.00 12/30/2022 06:30 AM    BASOSABS 0.02 12/30/2022 06:30 AM     CMP:    Lab Results   Component Value Date/Time     12/30/2022 06:30 AM    K 4.1 12/30/2022 06:30 AM    K 3.2 12/28/2022 07:02 PM    CL 97 12/30/2022 06:30 AM    CO2 25 12/30/2022 06:30 AM    BUN 13 12/30/2022 06:30 AM    CREATININE 0.4 12/30/2022 06:30 AM    GFRAA >60 04/13/2022 02:20 PM    LABGLOM >60 12/30/2022 06:30 AM    GLUCOSE 160 12/30/2022 06:30 AM    PROT 6.0 12/30/2022 06:30 AM    LABALBU 2.3 12/30/2022 06:30 AM    CALCIUM 7.4 12/30/2022 06:30 AM    BILITOT 1.1 12/30/2022 06:30 AM    ALKPHOS 483 12/30/2022 06:30 AM     12/30/2022 06:30 AM     12/30/2022 06:30 AM       General appearance:  NAD  Head: NCAT, PERRLA, EOMI, red conjunctiva  Neck: supple, no masses  Lungs: CTAB, equal chest rise bilateral  Heart: Reg rate  Abdomen: soft, nondistended, Mild lower abdomen tenderness without guarding  Skin; no lesions  Gu: no cva tenderness  Extremities: extremities normal, atraumatic, no cyanosis or edema      Assessment/Plan:  Nevaeh Lockhart is a 58 y.o. female with recent perforated diverticulitis with abscess now with transaminitis, distended gallbladder, hyperbilirubinemia    MRCP noted - no  of choledocholithiasis  LFTs downtrending with direct bili of 0.5  Low fat diet  Plan for surgical intervention  Will see cassy Tanner MD

## 2022-12-31 NOTE — PROGRESS NOTES
Internal Medicine Progress Note    [facility]    Abdifatah Hercules. MARIELOS StevensORAFAL Neely D.O., ASHLI Ochoa D.O. La Nena Real, MSN, APRN, NP-C  Christine Waddell. Connie Ashraf, MSN, APRN-CNP     Primary Care Physician: Leanna Cordero. MD Farida   Admitting Physician:  Mehreen Espino DO  Admission date and time: 12/28/2022  5:33 PM    Room:  [address] [place] [address]  Admitting diagnosis: Acute cholecystitis [K81.0]  Prolonged Q-T interval on ECG [R94.31]  Acute electrocardiogram changes [R94.31]    Patient Name: Reno Byrd  MRN: [id number]    Date of Service: 12/31/2022     Subjective:  Maria Pacheco is a 58 y.o. female who was seen and examined today,12/31/2022, at the bedside. Liver function studies are trending downward with no plans for surgical intervention. We discussed transitioning to oral antibiotics with advancement in her diet. She described multiple housemates having COVID and she did test positive for COVID yesterday. Fortunately, she does not exhibit profound respiratory distress. She exhibits failure to thrive symptomatology. We discussed increased activity today with discharge home tomorrow. Review of System:   Constitutional:   Profound weakness and deconditioning. HEENT:   Denies ear pain, sore throat, sinus or eye problems. Cardiovascular:   Denies any chest pain, irregular heartbeats, or palpitations. Respiratory:   Chronic shortness of breath that is unchanged from baseline. Gastrointestinal:   Far less abdominal pain. No nausea or vomiting. Currently eating breakfast during my examination. Genitourinary:    Denies any urgency, frequency, hematuria. Voiding  without difficulty. Extremities:   Denies lower extremity swelling, edema or cyanosis.    Neurology:    Denies any headache or focal neurological deficits, positive for mild generalized weakness without focal complaint  Psch:   Denies being anxious or depressed. Musculoskeletal:    Denies  myalgias, joint complaints or back pain. Integumentary:   Denies any rashes, ulcers, or excoriations. Denies bruising. Hematologic/Lymphatic:  Denies bruising or bleeding. Physical Exam:  No intake/output data recorded. Intake/Output Summary (Last 24 hours) at 12/31/2022 0816  Last data filed at 12/30/2022 1812  Gross per 24 hour   Intake 370 ml   Output --   Net 370 ml   I/O last 3 completed shifts: In: 550 [P.O.:540; I.V.:10]  Out: -   Patient Vitals for the past 96 hrs (Last 3 readings):   Weight   12/28/22 1736 157 lb (71.2 kg)     Vital Signs:   Blood pressure 131/78, pulse 71, temperature 97.4 °F (36.3 °C), temperature source Axillary, resp. rate 20, height 5' 3\" (1.6 m), weight 157 lb (71.2 kg), SpO2 98 %, not currently breastfeeding. General appearance:  Alert, responsive, oriented to person, place, and time. Positive for acute on chronic ill appearing. Head:  Normocephalic. No masses, lesions or tenderness. Eyes:  PERRLA. EOMI. Sclera clear. Buccal mucosa moist.  ENT:  Ears normal. Mucosa normal. 02 via nasal cannula  Neck:    Supple. Trachea midline. No thyromegaly. No JVD. No bruits. Heart:    Rhythm regular. Rate controlled. S1 and S2  Lungs:    Diminished air exchange with interval improvement in degree of expiratory wheezing. No rales or rhonchi. Pattern is regular and even without labor. Symmetrical expansion. Abdomen:   Soft. No significant tenderness to palpation. Non-distended. Bowel sounds positive. No organomegaly or masses. No rebound, guarding or rigidity. Extremities:    Peripheral pulses present. No peripheral edema. No ulcers. No cyanosis. No clubbing. Neurologic:    Alert x 3. No focal deficit. Cranial nerves grossly intact. No focal weakness. Psych:   Behavior is normal. Mood appears normal. Speech is not rapid and/or pressured. Musculoskeletal:   No unilateral joint edema, erythema or warmth. [facility] Gait not assessed. Integumentary:  No rashes  Skin normal color and texture.   Genitalia/Breast:  Deferred    Medication:  Scheduled Meds:   cefdinir  300 mg Oral 2 times per day    metroNIDAZOLE  500 mg Oral 3 times per day    vitamin D  2,000 Units Oral Daily    vitamin C  500 mg Oral Daily    methylPREDNISolone  40 mg IntraVENous Q12H    sodium chloride flush  5-40 mL IntraVENous 2 times per day    Arformoterol Tartrate  15 mcg Nebulization BID    budesonide  0.5 mg Nebulization BID    fluticasone  2 spray Each Nostril Daily    sertraline  25 mg Oral Daily    enoxaparin  40 mg SubCUTAneous Daily    melatonin  5 mg Oral Nightly     Continuous Infusions:   dextrose      sodium chloride         Objective Data:  CBC with Differential:    Lab Results   Component Value Date/Time    WBC 15.8 12/30/2022 06:30 AM    RBC 3.12 12/30/2022 06:30 AM    HGB 10.0 12/30/2022 06:30 AM    HCT 27.9 12/30/2022 06:30 AM     12/30/2022 06:30 AM    MCV 89.4 12/30/2022 06:30 AM    MCH 32.1 12/30/2022 06:30 AM    MCHC 35.8 12/30/2022 06:30 AM    RDW 21.7 12/30/2022 06:30 AM    NRBC 0.9 11/30/2022 05:18 AM    METASPCT 0.9 12/22/2022 06:18 AM    LYMPHOPCT 8.2 12/30/2022 06:30 AM    MONOPCT 3.6 12/30/2022 06:30 AM    MYELOPCT 1.0 12/28/2022 07:02 PM    BASOPCT 0.1 12/30/2022 06:30 AM    MONOSABS 0.57 12/30/2022 06:30 AM    LYMPHSABS 1.29 12/30/2022 06:30 AM    EOSABS 0.00 12/30/2022 06:30 AM    BASOSABS 0.02 12/30/2022 06:30 AM     CMP:    Lab Results   Component Value Date/Time     12/30/2022 06:30 AM    K 4.1 12/30/2022 06:30 AM    K 3.2 12/28/2022 07:02 PM    CL 97 12/30/2022 06:30 AM    CO2 25 12/30/2022 06:30 AM    BUN 13 12/30/2022 06:30 AM    CREATININE 0.4 12/30/2022 06:30 AM    GFRAA >60 04/13/2022 02:20 PM    LABGLOM >60 12/30/2022 06:30 AM    GLUCOSE 160 12/30/2022 06:30 AM    PROT 6.0 12/30/2022 06:30 AM    LABALBU 2.3 12/30/2022 06:30 AM    CALCIUM 7.4 12/30/2022 06:30 AM    BILITOT 1.1 12/30/2022 06:30 AM ALKPHOS 483 12/30/2022 06:30 AM     12/30/2022 06:30 AM     12/30/2022 06:30 AM     Recent Labs     12/29/22  0003   TROPHS 12*       Assessment:  Transaminitis with hyperbilirubinemia, multiple liver cysts, negative MRCP for biliary obstruction   COVID-19 infection without overt respiratory symptomatology  Possible cholecystitis with negative ultrasound  Recurrent diverticulitis versus resolving diverticulitis  Recent sepsis secondary to acute diverticulitis with perforation and abscess status post IR guided drainage  Chronic respiratory failure with hypoxia secondary to advanced COPD without exacerbation   Ongoing tobacco abuse  Prior history of DVT  Hyperlipidemia on statin agent    Plan:   There are no further plans for surgical intervention at this point. Liver function studies are trending downward accordingly and imaging studies have been reviewed. Surgical recommendations have been reviewed. We will transition from IV to oral antibiotics. Appropriate vitamin supplementation will be employed for the underlying COVID process. I have encouraged increased activity. Plans will be for discharge home tomorrow. Lauren Jensen requires this high level of physician care and nursing on the IMC/Telemetry unit due the complexity of decision management and chance of rapid decline or death. Continued cardiac monitoring and higher level of nursing are required. I am readily available for any further decision-making and intervention. More than 50% of my  time was spent at the bedside counseling/coordinating care with the patient and/or family with face to face contact. This time was spent reviewing notes and laboratory data as well as instructing and counseling the patient. Time I spent with the family or surrogate(s) is included only if the patient was incapable of providing the necessary information or participating in medical decisions.  I also discussed the differential diagnosis and all of the proposed management plans with the patient and individuals accompanying the patient.     Berry Vega DO  12/31/2022  8:16 AM

## 2023-01-01 VITALS
RESPIRATION RATE: 22 BRPM | DIASTOLIC BLOOD PRESSURE: 67 MMHG | HEIGHT: 63 IN | SYSTOLIC BLOOD PRESSURE: 138 MMHG | BODY MASS INDEX: 27.82 KG/M2 | TEMPERATURE: 98.2 F | HEART RATE: 71 BPM | WEIGHT: 157 LBS | OXYGEN SATURATION: 93 %

## 2023-01-01 LAB
ALBUMIN SERPL-MCNC: 2.6 G/DL (ref 3.5–5.2)
ALP BLD-CCNC: 410 U/L (ref 35–104)
ALT SERPL-CCNC: 172 U/L (ref 0–32)
ANION GAP SERPL CALCULATED.3IONS-SCNC: 4 MMOL/L (ref 7–16)
AST SERPL-CCNC: 147 U/L (ref 0–31)
BASOPHILS ABSOLUTE: 0.01 E9/L (ref 0–0.2)
BASOPHILS RELATIVE PERCENT: 0.1 % (ref 0–2)
BILIRUB SERPL-MCNC: 1 MG/DL (ref 0–1.2)
BILIRUBIN DIRECT: 0.5 MG/DL (ref 0–0.3)
BILIRUBIN, INDIRECT: 0.5 MG/DL (ref 0–1)
BUN BLDV-MCNC: 17 MG/DL (ref 6–23)
CALCIUM SERPL-MCNC: 7.7 MG/DL (ref 8.6–10.2)
CHLORIDE BLD-SCNC: 98 MMOL/L (ref 98–107)
CO2: 40 MMOL/L (ref 22–29)
CREAT SERPL-MCNC: 0.3 MG/DL (ref 0.5–1)
EOSINOPHILS ABSOLUTE: 0 E9/L (ref 0.05–0.5)
EOSINOPHILS RELATIVE PERCENT: 0 % (ref 0–6)
GFR SERPL CREATININE-BSD FRML MDRD: >60 ML/MIN/1.73
GLUCOSE BLD-MCNC: 116 MG/DL (ref 74–99)
HCT VFR BLD CALC: 29.5 % (ref 34–48)
HEMOGLOBIN: 10 G/DL (ref 11.5–15.5)
IMMATURE GRANULOCYTES #: 0.05 E9/L
IMMATURE GRANULOCYTES %: 0.5 % (ref 0–5)
LYMPHOCYTES ABSOLUTE: 1.13 E9/L (ref 1.5–4)
LYMPHOCYTES RELATIVE PERCENT: 10.3 % (ref 20–42)
MAGNESIUM: 2 MG/DL (ref 1.6–2.6)
MCH RBC QN AUTO: 30.5 PG (ref 26–35)
MCHC RBC AUTO-ENTMCNC: 33.9 % (ref 32–34.5)
MCV RBC AUTO: 89.9 FL (ref 80–99.9)
MONOCYTES ABSOLUTE: 0.43 E9/L (ref 0.1–0.95)
MONOCYTES RELATIVE PERCENT: 3.9 % (ref 2–12)
NEUTROPHILS ABSOLUTE: 9.4 E9/L (ref 1.8–7.3)
NEUTROPHILS RELATIVE PERCENT: 85.2 % (ref 43–80)
PDW BLD-RTO: 16.7 FL (ref 11.5–15)
PHOSPHORUS: 2.7 MG/DL (ref 2.5–4.5)
PLATELET # BLD: 164 E9/L (ref 130–450)
PMV BLD AUTO: 12 FL (ref 7–12)
POTASSIUM SERPL-SCNC: 4.1 MMOL/L (ref 3.5–5)
RBC # BLD: 3.28 E12/L (ref 3.5–5.5)
SODIUM BLD-SCNC: 142 MMOL/L (ref 132–146)
TOTAL PROTEIN: 5.7 G/DL (ref 6.4–8.3)
WBC # BLD: 11 E9/L (ref 4.5–11.5)

## 2023-01-01 PROCEDURE — 2580000003 HC RX 258: Performed by: INTERNAL MEDICINE

## 2023-01-01 PROCEDURE — 84100 ASSAY OF PHOSPHORUS: CPT

## 2023-01-01 PROCEDURE — 6370000000 HC RX 637 (ALT 250 FOR IP): Performed by: INTERNAL MEDICINE

## 2023-01-01 PROCEDURE — 94640 AIRWAY INHALATION TREATMENT: CPT

## 2023-01-01 PROCEDURE — 2700000000 HC OXYGEN THERAPY PER DAY

## 2023-01-01 PROCEDURE — 83735 ASSAY OF MAGNESIUM: CPT

## 2023-01-01 PROCEDURE — 6360000002 HC RX W HCPCS: Performed by: INTERNAL MEDICINE

## 2023-01-01 PROCEDURE — 85025 COMPLETE CBC W/AUTO DIFF WBC: CPT

## 2023-01-01 PROCEDURE — 6360000002 HC RX W HCPCS: Performed by: NURSE PRACTITIONER

## 2023-01-01 PROCEDURE — 80048 BASIC METABOLIC PNL TOTAL CA: CPT

## 2023-01-01 PROCEDURE — 80076 HEPATIC FUNCTION PANEL: CPT

## 2023-01-01 PROCEDURE — 36415 COLL VENOUS BLD VENIPUNCTURE: CPT

## 2023-01-01 RX ORDER — FLUTICASONE PROPIONATE 50 MCG
2 SPRAY, SUSPENSION (ML) NASAL DAILY
Qty: 1 EACH | Refills: 5 | Status: ON HOLD | COMMUNITY
Start: 2023-01-01

## 2023-01-01 RX ORDER — SERTRALINE HYDROCHLORIDE 25 MG/1
25 TABLET, FILM COATED ORAL DAILY
Qty: 30 TABLET | Refills: 0 | Status: ON HOLD | OUTPATIENT
Start: 2023-01-01

## 2023-01-01 RX ORDER — ATORVASTATIN CALCIUM 40 MG/1
40 TABLET, FILM COATED ORAL NIGHTLY
Qty: 30 TABLET | Refills: 0 | Status: ON HOLD | OUTPATIENT
Start: 2023-02-01

## 2023-01-01 RX ORDER — MECOBALAMIN 5000 MCG
5 TABLET,DISINTEGRATING ORAL NIGHTLY
Qty: 30 TABLET | Refills: 0 | Status: ON HOLD | OUTPATIENT
Start: 2023-01-01

## 2023-01-01 RX ORDER — CEFDINIR 300 MG/1
300 CAPSULE ORAL EVERY 12 HOURS SCHEDULED
Qty: 20 CAPSULE | Refills: 0 | Status: ON HOLD | OUTPATIENT
Start: 2023-01-01 | End: 2023-01-11

## 2023-01-01 RX ORDER — PANTOPRAZOLE SODIUM 40 MG/1
TABLET, DELAYED RELEASE ORAL
Qty: 58 TABLET | Refills: 0 | Status: ON HOLD | OUTPATIENT
Start: 2023-01-01 | End: 2023-02-14

## 2023-01-01 RX ORDER — METRONIDAZOLE 500 MG/1
500 TABLET ORAL EVERY 8 HOURS SCHEDULED
Qty: 30 TABLET | Refills: 0 | Status: ON HOLD | OUTPATIENT
Start: 2023-01-01 | End: 2023-01-11

## 2023-01-01 RX ORDER — ALBUTEROL SULFATE 90 UG/1
2 AEROSOL, METERED RESPIRATORY (INHALATION) 4 TIMES DAILY PRN
Qty: 1 EACH | Refills: 2 | Status: ON HOLD | OUTPATIENT
Start: 2023-01-01

## 2023-01-01 RX ORDER — BUDESONIDE 0.5 MG/2ML
0.5 INHALANT ORAL 2 TIMES DAILY
Qty: 60 EACH | Refills: 0 | Status: ON HOLD | OUTPATIENT
Start: 2023-01-01

## 2023-01-01 RX ORDER — PREDNISONE 10 MG/1
TABLET ORAL
Qty: 30 TABLET | Refills: 0 | Status: ON HOLD | OUTPATIENT
Start: 2023-01-01

## 2023-01-01 RX ORDER — IPRATROPIUM BROMIDE AND ALBUTEROL SULFATE 2.5; .5 MG/3ML; MG/3ML
3 SOLUTION RESPIRATORY (INHALATION) EVERY 6 HOURS
Qty: 360 ML | Refills: 0 | Status: ON HOLD | OUTPATIENT
Start: 2023-01-01

## 2023-01-01 RX ORDER — ARFORMOTEROL TARTRATE 15 UG/2ML
15 SOLUTION RESPIRATORY (INHALATION) 2 TIMES DAILY
Qty: 120 ML | Refills: 0 | Status: ON HOLD | OUTPATIENT
Start: 2023-01-01

## 2023-01-01 RX ORDER — CHOLECALCIFEROL (VITAMIN D3) 50 MCG
2000 TABLET ORAL DAILY
Qty: 30 TABLET | Refills: 0 | Status: ON HOLD | OUTPATIENT
Start: 2023-01-01

## 2023-01-01 RX ADMIN — METRONIDAZOLE 500 MG: 500 TABLET ORAL at 12:03

## 2023-01-01 RX ADMIN — METHYLPREDNISOLONE SODIUM SUCCINATE 40 MG: 40 INJECTION, POWDER, FOR SOLUTION INTRAMUSCULAR; INTRAVENOUS at 12:31

## 2023-01-01 RX ADMIN — SERTRALINE 25 MG: 50 TABLET, FILM COATED ORAL at 12:03

## 2023-01-01 RX ADMIN — FLUTICASONE PROPIONATE 2 SPRAY: 50 SPRAY, METERED NASAL at 12:05

## 2023-01-01 RX ADMIN — Medication 10 ML: at 12:05

## 2023-01-01 RX ADMIN — BUDESONIDE 500 MCG: 0.5 SUSPENSION RESPIRATORY (INHALATION) at 06:03

## 2023-01-01 RX ADMIN — ARFORMOTEROL TARTRATE 15 MCG: 15 SOLUTION RESPIRATORY (INHALATION) at 06:03

## 2023-01-01 RX ADMIN — OXYCODONE HYDROCHLORIDE AND ACETAMINOPHEN 500 MG: 500 TABLET ORAL at 12:03

## 2023-01-01 RX ADMIN — METRONIDAZOLE 500 MG: 500 TABLET ORAL at 05:12

## 2023-01-01 RX ADMIN — ENOXAPARIN SODIUM 40 MG: 100 INJECTION SUBCUTANEOUS at 12:03

## 2023-01-01 RX ADMIN — ALBUTEROL SULFATE 2.5 MG: 2.5 SOLUTION RESPIRATORY (INHALATION) at 06:03

## 2023-01-01 RX ADMIN — VITAMIN D, TAB 1000IU (100/BT) 2000 UNITS: 25 TAB at 12:05

## 2023-01-01 RX ADMIN — CEFDINIR 300 MG: 300 CAPSULE ORAL at 12:03

## 2023-01-01 NOTE — DISCHARGE SUMMARY
Internal Medicine Progress Note     [facility]     Author Mehran. Jus Lee., KAUSHAL.A.JOSHOChenteI. Milagros Denson D.O., ASHLI Higginbotham, MSN, APRN, NP-C  Tahmina Siegel. Angel Montemayor, MSN, [id number] [facility]       Internal Medicine  Discharge Summary    NAME: Anup Lucero  :  1960  MRN:  [id number]  PCP:Ypapanti N. Ethelda Cranker, MD  ADMITTED: 2022      DISCHARGED: 23    ADMITTING PHYSICIAN: No att. providers found    CONSULTANT(S):   IP CONSULT TO GENERAL SURGERY     ADMITTING DIAGNOSIS:   Acute cholecystitis [K81.0]  Prolonged Q-T interval on ECG [R94.31]  Acute electrocardiogram changes [R94.31]     DISCHARGE DIAGNOSES:   Transaminitis with hyperbilirubinemia, multiple liver cysts, negative MRCP for biliary obstruction   COVID-19 infection without overt respiratory symptomatology  Possible cholecystitis with negative ultrasound  Recurrent diverticulitis versus resolving diverticulitis  Recent sepsis secondary to acute diverticulitis with perforation and abscess status post IR guided drainage  Chronic respiratory failure with hypoxia secondary to advanced COPD without exacerbation   Ongoing tobacco abuse  Prior history of DVT  Hyperlipidemia on statin agent    BRIEF HISTORY OF PRESENT ILLNESS:   Patient is a 42-year-old female presented to ED with abdominal epigastric pain. On exam pain has resolved. Goldie Lagunas However she admits to nausea and diminished appetite for the last few days. She denies any subjective fever or chills. She denies any shortness of breath.     LABS[de-identified]  Lab Results   Component Value Date    WBC 11.0 2023    HGB 10.0 (L) 2023    HCT 29.5 (L) 2023     2023     2023    K 4.1 2023    CL 98 2023    CREATININE 0.3 (L) 2023    BUN 17 2023    CO2 40 (H) 2023    GLUCOSE 116 (H) 2023     (H) 2023     (H) 2023    INR 1.5 2022     Lab Results   Component Value Date    INR 1.5 12/19/2022    INR 1.0 10/28/2019    INR 0.9 01/31/2018    PROTIME 17.1 (H) 12/19/2022    PROTIME 11.3 10/28/2019    PROTIME 9.8 01/31/2018      Lab Results   Component Value Date    TSH 0.648 12/17/2022     Lab Results   Component Value Date    TRIG 51 02/10/2022    TRIG 82 11/30/2019    TRIG 79 10/29/2019     Lab Results   Component Value Date    HDL 69 02/10/2022    HDL 61 11/30/2019    HDL 45 10/29/2019     Lab Results   Component Value Date    LDLCALC 183 (H) 02/10/2022    LDLCALC 195 (H) 11/30/2019    LDLCALC 138 (H) 10/29/2019     Lab Results   Component Value Date    LABA1C 5.0 02/10/2022       IMAGING:  CT ABDOMEN PELVIS W IV CONTRAST Additional Contrast? None    Result Date: 12/28/2022  EXAMINATION: CT OF THE ABDOMEN AND PELVIS WITH CONTRAST 12/28/2022 9:45 pm TECHNIQUE: CT of the abdomen and pelvis was performed with the administration of intravenous contrast. Multiplanar reformatted images are provided for review. Automated exposure control, iterative reconstruction, and/or weight based adjustment of the mA/kV was utilized to reduce the radiation dose to as low as reasonably achievable. COMPARISON: 12/16/2022 HISTORY: ORDERING SYSTEM PROVIDED HISTORY: Diverticulitis and abscess hx TECHNOLOGIST PROVIDED HISTORY: Reason for exam:->Diverticulitis and abscess hx Additional Contrast?->None Decision Support Exception - unselect if not a suspected or confirmed emergency medical condition->Emergency Medical Condition (MA) FINDINGS: Lower Chest: Trace right pleural effusion. Advanced emphysematous change Organs: Multiple cystic lesions noted within the liver. Subcentimeter hypoattenuating lesions are technically indeterminate, but unchanged from prior examination and likely also cysts. The gallbladder is distended. The spleen, pancreas, and adrenals are within limits. There are bilateral nonobstructing renal calculi. Bilateral renal cysts. Barnetta Butler  GI/Bowel: No evidence of obstruction. There is diverticulosis. There is wall thickening and inflammatory stranding of the sigmoid colon. No extraluminal fluid collection. Pelvis: The urinary bladder is partially filled. The uterus is unremarkable. Peritoneum/Retroperitoneum: There is small volume free fluid throughout the abdomen and pelvis. No extraluminal gas. Reactive retroperitoneal lymph nodes. Aorta is normal in caliber. Bones/Soft Tissues:  No acute abnormality of the visualized osseous structures. Interval resolution of previously seen fluid collection associated with the sigmoid colon. There is inflammatory stranding and wall thickening of the sigmoid colon, which could represent resolving versus recurrent diverticulitis. Clinical correlation recommended. CT ABDOMEN PELVIS W IV CONTRAST Additional Contrast? None    Result Date: 12/16/2022  EXAMINATION: CT OF THE ABDOMEN AND PELVIS WITH CONTRAST 12/16/2022 10:53 pm TECHNIQUE: CT of the abdomen and pelvis was performed with the administration of intravenous contrast. Multiplanar reformatted images are provided for review. Automated exposure control, iterative reconstruction, and/or weight based adjustment of the mA/kV was utilized to reduce the radiation dose to as low as reasonably achievable. COMPARISON: 11/29/2022. HISTORY: ORDERING SYSTEM PROVIDED HISTORY: LLQ abd pain, concern for diverticulitis TECHNOLOGIST PROVIDED HISTORY: Reason for exam:->LLQ abd pain, concern for diverticulitis Additional Contrast?->None Decision Support Exception - unselect if not a suspected or confirmed emergency medical condition->Emergency Medical Condition (MA) FINDINGS: Lower Chest:   Emphysematous changes. Pleural thickening and/or atelectasis right lower lobe. Organs: Multiple liver cysts. The spleen, adrenal glands, left kidney, pancreas and gallbladder are normal.  5 mm and 2 mm nonobstructing right renal calculi.  GI/bowel: Extensive thickening of the mid sigmoid colon consistent with diverticulitis. Present within the mid sigmoid colon colonic wall is a 5.5 cm x 3.5 cm fluid collection consistent with abscess. Pelvis: Normal urinary bladder. Peritoneum/Retroperitoneum:   No free fluid or free air. Normal caliber abdominal aorta with scattered atherosclerotic plaque. Bones/Soft Tissues: Unremarkable. Extensive thickening of the mid sigmoid colon consistent with diverticulitis. Present within the mid sigmoid colon colonic wall is a 5.5 cm x 3.5 cm fluid collection consistent with abscess. US GALLBLADDER RUQ    Result Date: 12/28/2022  EXAMINATION: RIGHT UPPER QUADRANT ULTRASOUND 12/28/2022 9:16 pm COMPARISON: None. HISTORY: ORDERING SYSTEM PROVIDED HISTORY: eval gb, possible stone TECHNOLOGIST PROVIDED HISTORY: Reason for exam:->eval gb, possible stone What reading provider will be dictating this exam?->CRC FINDINGS: LIVER:  The liver demonstrates normal echogenicity without evidence of intrahepatic biliary ductal dilatation. Multiple hepatic cysts measuring up to 6.5 cm. Small amount of perihepatic fluid. BILIARY SYSTEM: Mild gallbladder wall thickening. No stones, sludge or pericholecystic fluid. .  Negative sonographic Jiménez's sign. Common bile duct is within normal limits measuring 4.2 mm. RIGHT KIDNEY: The right kidney is grossly unremarkable without evidence of hydronephrosis. PANCREAS:  Visualized portions of the pancreas are unremarkable. OTHER: No evidence of right upper quadrant ascites. Multiple liver cysts measuring up to 6.5 cm. Small mount of fluid. Mild gallbladder wall thickening. No stones, sludge or pericholecystic fluid. Normal common bile duct. XR CHEST PORTABLE    Result Date: 12/28/2022  EXAMINATION: ONE XRAY VIEW OF THE CHEST 12/28/2022 6:08 pm COMPARISON: None. HISTORY: ORDERING SYSTEM PROVIDED HISTORY: sob TECHNOLOGIST PROVIDED HISTORY: Reason for exam:->sob FINDINGS: The lungs are without acute focal process.   There is no effusion or pneumothorax. The cardiomediastinal silhouette is without acute process. The osseous structures are without acute process. No acute process. COPD. XR CHEST PORTABLE    Result Date: 12/20/2022  EXAMINATION: ONE XRAY VIEW OF THE CHEST 12/20/2022 5:50 am COMPARISON: December 3, 2022 HISTORY: ORDERING SYSTEM PROVIDED HISTORY: SOB TECHNOLOGIST PROVIDED HISTORY: Reason for exam:->SOB FINDINGS: Normal cardiomediastinal silhouette. Lungs clear. There is hyperinflation of the lungs with flattening of the diaphragms suggesting COPD. No pneumothorax or effusion. Osseous thorax intact. No acute disease. RECOMMENDATION: Careful clinical correlation and follow up recommended. XR CHEST PORTABLE    Result Date: 12/3/2022  EXAMINATION: ONE XRAY VIEW OF THE CHEST 12/3/2022 7:05 am COMPARISON: None. HISTORY: ORDERING SYSTEM PROVIDED HISTORY: SOB TECHNOLOGIST PROVIDED HISTORY: Reason for exam:->SOB FINDINGS: There are advanced emphysematous changes. There is right upper lobe airspace disease. In comparison with the patient's previous CT of the chest of 11/29/2022 it appears that the right middle lobe is now Reaerated. There is no right or left pneumothorax. 1. Patchy right upper lobe airspace disease 2. The previously identified collapsed right middle lobe appears to be Ree aerated however true lateral view was not obtained to confirm Ree aeration of the right middle lobe. MRI ABDOMEN W WO CONTRAST MRCP    Result Date: 12/29/2022  EXAMINATION: MRI OF THE ABDOMEN WITH AND WITHOUT CONTRAST AND MRCP 12/29/2022 10:16 am TECHNIQUE: Multiplanar multisequence MRI of the abdomen was performed with and without the administration of intravenous contrast.  After initial T2 axial and coronal images, thick slab, thin slab and 3D coronal MRCP sequences were obtained without the administration of intravenous contrast.  3D and MIP images are provided for review.  COMPARISON: CT abdomen and pelvis dated 12/28/2022 HISTORY: ORDERING SYSTEM PROVIDED HISTORY: rule out biliary obstruction TECHNOLOGIST PROVIDED HISTORY: Reason for exam:->rule out biliary obstruction Decision Support Exception - unselect if not a suspected or confirmed emergency medical condition->Emergency Medical Condition (MA) FINDINGS: Lung bases reveal small right and trace left pleural effusions. Liver contains multiple T2 hyperintense lesions scattered throughout none of which demonstrate abnormal enhancing findings on post-contrast sequences consistent of hepatic cysts. Trace perihepatic ascites. Gallbladder: Unremarkable without filling defect. Bile Ducts: No intrahepatic or extrahepatic biliary dilatation. Tapering contour is expected towards the distal portion or near the ampulla without filling defect of choledocholithiasis. Pancreatic Duct: Normal caliber and contour without pancreas divisum anatomy. Other:  Pancreas, spleen, adrenals and kidneys unremarkable. Hyperdense hemorrhagic cyst left kidney without suspicious lesion numerous other small simple appearing renal cortical cysts without hydronephrosis no bulky retroperitoneal adenopathy. Visualized GI tract unremarkable apart from trace fluid in the right pericolic gutter on partial imaging. No aggressive bone marrow signal findings or soft tissue findings     No biliary dilatation or obstructive elements as there is no cholelithiasis or choledocholithiasis evident. Multiple hepatic cystic lesions without abnormal enhancing or contour deforming mass evident. Trace perihepatic ascites along with fluid extending along the inferior margin to the partially visualized right pericolic gutter is indeterminate and may be secondary from inflammation of a recent colitis or enteritis.  Small right and trace left pleural effusions     CTA PULMONARY W CONTRAST    Result Date: 12/28/2022  EXAMINATION: CTA OF THE CHEST 12/28/2022 9:45 pm TECHNIQUE: CTA of the chest was performed after the administration of intravenous contrast.  Multiplanar reformatted images are provided for review. MIP images are provided for review. Automated exposure control, iterative reconstruction, and/or weight based adjustment of the mA/kV was utilized to reduce the radiation dose to as low as reasonably achievable. COMPARISON: 11/29/2022 HISTORY: ORDERING SYSTEM PROVIDED HISTORY: r/o pe TECHNOLOGIST PROVIDED HISTORY: Reason for exam:->r/o pe Decision Support Exception - unselect if not a suspected or confirmed emergency medical condition->Emergency Medical Condition (MA) FINDINGS: Pulmonary Arteries: Pulmonary arteries are adequately opacified for evaluation. No evidence of intraluminal filling defect to suggest pulmonary embolism. Main pulmonary artery is normal in caliber. Mediastinum: No evidence of mediastinal lymphadenopathy. The heart and pericardium demonstrate no acute abnormality. There is no acute abnormality of the thoracic aorta. Lungs/pleura: The lungs are without acute process. No focal consolidation or pulmonary edema. Advanced lower lung predominant emphysematous change. No evidence of pleural effusion or pneumothorax. Upper Abdomen: Please see separately dictated report for findings below the diaphragm. Soft Tissues/Bones: No acute bone or soft tissue abnormality. No evidence of pulmonary embolism or acute pulmonary abnormality. Advanced emphysematous changes. IR ABSCESS DRAINAGE PERC    Result Date: 12/19/2022  PROCEDURE: IR ABSCESS DRAIN PERC MODERATE CONSCIOUS SEDATION 12/19/2022 HISTORY: ORDERING SYSTEM PROVIDED HISTORY: Drainage of intra-abdominal abscess TECHNOLOGIST PROVIDED HISTORY: Reason for exam:->Drainage of intra-abdominal abscess TECHNIQUE: CT-guided CONTRAST: None SEDATION: Moderate sedation was ordered and supervised by the attending with physician face-to-face monitoring. Medications were provided and recorded by Radiology nurses.   The administration, documentation and monitoring of IV conscious sedation under my direct supervision for time frame of 30 minutes. 1 mg of IV Versed and 50 mcg of IV fentanyl was administered. FLUOROSCOPY DOSE AND TYPE OR TIME AND EXPOSURES: The procedure was performed under CT guidance. DESCRIPTION OF PROCEDURE: Informed consent was obtained after a detailed explanation of the procedure including risks, benefits, and alternatives. Universal protocol was observed. Sterile gowns, masks, hats and gloves utilized for maximal sterile barrier. FINDINGS: CT images of the pelvis were obtained. A time-out was performed The patient's anterior pelvic wall was prepped and draped in a sterile fashion using maximum sterile barrier technique. Following the uneventful administration of lidocaine, I introduced a 5 Western Genny Yueh catheter into the 6.5 cm x 4 cm pericolonic abscess adjacent to the sigmoid colon. Through the Yueh needle an Amplatz wire was advanced into the abscess cavity. Over the Amplatz wire a multi side hole pigtail drainage catheter was placed. 10 mL of purulent material was aspirated and sent to microbiology for further evaluation. Successful placement of an 8 Western Genny multi side hole pigtail drainage catheter within the pericolonic abscess adjacent to the sigmoid colon. Administration of conscious sedation. HOSPITAL COURSE:   Lauren Jensen spent an extended period of time hospitalized and this will be a brief synopsis of the events that occurred during the hospitalization. She presented to the hospital with transaminitis and hyperbilirubinemia in the setting of recent sepsis secondary to acute diverticulitis with perforation necessitating temporary IR guided drainage. She had been discharged home with antibiotics but developed recurrent abdominal pain. Secondary to the marked elevated liver function studies, she underwent MRCP and evaluation by the general surgery team.  MRCP was negative and liver function studies have trended downward.   She was found to be suffering from [place] in the hospital and has been weaned to her at home nasal cannula oxygen requirements with no additional respiratory complaints. She will complete a course of corticosteroids and finish a course of antibiotics for the intra-abdominal process. She is otherwise acceptable for discharge home. Patient is medically stable and acceptable for discharge today. BRIEF PHYSICAL EXAMINATION AND LABORATORIES ON DAY OF DISCHARGE:  VITALS:  /67   Pulse 71   Temp 98.2 °F (36.8 °C) (Oral)   Resp 22   Ht 5' 3\" (1.6 m)   Wt 157 lb (71.2 kg)   SpO2 93%   BMI 27.81 kg/m²     HEENT:  PERRLA. EOMI. Sclera clear. Buccal mucosa moist.  Chronic nasal cannula oxygen is in place. Neck:  Supple. Trachea midline. No thyromegaly. No JVD. No bruits. Heart:  Rhythm regular, rate controlled. No murmurs. Lungs:  Symmetrical. Clear to auscultation bilaterally. No wheezes. No rhonchi. No rales. Abdomen: Soft. Resolving abdominal pain on palpation. Bowel sounds are active. Extremities:  Peripheral pulses present. No peripheral edema. No ulcers. Neurologic:  Alert x 3. No focal deficit. Cranial nerves grossly intact. Skin:  No petechia. No hemorrhage. No wounds. DISPOSITION:  The patient's condition is stable. At this time the patient is without objective evidence of an acute process requiring continuing hospitalization or inpatient management. They are stable for discharge with outpatient follow-up. I have spoken with the patient and discussed the results of the current hospitalization, in addition to providing specific details for the plan of care and counseling regarding the diagnosis and prognosis. The plan has been discussed in detail and they are aware of the specific conditions for emergent return, as well as the importance of follow-up.   Their questions are answered at this time and they are agreeable with the plan for discharge to home    DISCHARGE MEDICATIONS: Current Discharge Medication List             Details   cefdinir (OMNICEF) 300 MG capsule Take 1 capsule by mouth every 12 hours for 10 days  Qty: 20 capsule, Refills: 0      predniSONE (DELTASONE) 10 MG tablet Take by oral route 4 tabs x 3 days, then 3 tabs x 3 days, then 2 tabs x 3 days, then 1 tab x 3 days, then discontinue. Take with food. Qty: 30 tablet, Refills: 0      melatonin 5 MG TBDP disintegrating tablet Take 1 tablet by mouth nightly  Qty: 30 tablet, Refills: 0      metroNIDAZOLE (FLAGYL) 500 MG tablet Take 1 tablet by mouth every 8 hours for 10 days  Qty: 30 tablet, Refills: 0      Vitamin D (CHOLECALCIFEROL) 50 MCG (2000 UT) TABS tablet Take 1 tablet by mouth daily  Qty: 30 tablet, Refills: 0    Comments: Labeling may look different. 25 mcg=1000 Units. Please double check dosages. pantoprazole (PROTONIX) 40 MG tablet Take 1 tablet by mouth in the morning and at bedtime for 14 days, THEN 1 tablet daily. Qty: 58 tablet, Refills: 0      zinc 50 MG CAPS Take 50 mg by mouth daily  Qty: 30 capsule, Refills: 0                Details   albuterol sulfate HFA (VENTOLIN HFA) 108 (90 Base) MCG/ACT inhaler Inhale 2 puffs into the lungs 4 times daily as needed for Wheezing or Shortness of Breath  Qty: 1 each, Refills: 2      Arformoterol Tartrate (BROVANA) 15 MCG/2ML NEBU Take 2 mLs by nebulization in the morning and 2 mLs in the evening. Qty: 120 mL, Refills: 0      budesonide (PULMICORT) 0.5 MG/2ML nebulizer suspension Take 2 mLs by nebulization in the morning and 2 mLs in the evening. Qty: 60 each, Refills: 0      ipratropium-albuterol (DUONEB) 0.5-2.5 (3) MG/3ML SOLN nebulizer solution Inhale 3 mLs into the lungs in the morning and 3 mLs at noon and 3 mLs in the evening and 3 mLs before bedtime.   Qty: 360 mL, Refills: 0      Umeclidinium Bromide (INCRUSE ELLIPTA) 62.5 MCG/ACT AEPB Inhale 1 puff into the lungs daily  Qty: 30 each, Refills: 1    Associated Diagnoses: Chronic respiratory failure with hypoxia (HCC)      atorvastatin (LIPITOR) 40 MG tablet Take 1 tablet by mouth nightly  Qty: 30 tablet, Refills: 0      sertraline (ZOLOFT) 25 MG tablet Take 1 tablet by mouth daily  Qty: 30 tablet, Refills: 0    Associated Diagnoses: Current moderate episode of major depressive disorder without prior episode (HCC)      fluticasone (FLONASE) 50 MCG/ACT nasal spray 2 sprays by Each Nostril route daily  Qty: 1 each, Refills: 5    Associated Diagnoses: Seasonal allergic rhinitis due to pollen                Details   ascorbic acid (VITAMIN C) 1000 MG tablet Take 1 tablet by mouth daily  Qty: 30 tablet, Refills: 0      OXYGEN Inhale 3 L into the lungs continuous             FOLLOW UP/INSTRUCTIONS:  This patient is instructed to follow-up with her primary care physician. Patient is instructed to follow-up with the consults listed above as directed by them. she is instructed to resume home medications and take new medications as indicated in the list above. If the patient has a recurrence of symptoms, she is instructed to go to the ED. Preparing for this patient's discharge, including paperwork, orders, instructions, and meeting with patient did require > 40 minutes.     Maty Braden DO   1/1/2023  7:56 AM

## 2023-01-01 NOTE — DISCHARGE INSTRUCTIONS
Your information:  Name: Lilly Tipton  : 1960    Your instructions:    Discharged home. Please make and keep any follow up appointments. If you have increased shortness of breath, increased cough or sputum production, have increased weakness, become dizzy, fall or pass out, have nausea or vomiting, fever or chills, please call Dr. Anna Lara or return to the emergency room. What to do after you leave the hospital:    Recommended diet: low fat, low cholesterol diet    Recommended activity: activity as tolerated        The following personal items were collected during your admission and were returned to you:    Belongings  Dental Appliances: None  Vision - Corrective Lenses: None  Hearing Aid: None  Clothing: Pajamas  Jewelry: None  Body Piercings Removed: No  Electronic Devices: Cell Phone, At bedside,   Weapons (Notify Protective Services/Security): None  Other Valuables: At home  Home Medications: None  Valuables Given To: Patient    Information obtained by:  By signing below, I understand that if any problems occur once I leave the hospital I am to contact Dr. Anna Lara. I understand and acknowledge receipt of the instructions indicated above.

## 2023-01-03 ENCOUNTER — CARE COORDINATION (OUTPATIENT)
Dept: CASE MANAGEMENT | Age: 63
End: 2023-01-03

## 2023-01-03 LAB
BLOOD CULTURE, ROUTINE: NORMAL
CULTURE, BLOOD 2: NORMAL

## 2023-01-03 NOTE — CARE COORDINATION
[facility] Care Transitions Initial Follow Up Call    Call within 2 business days of discharge: Yes    Care Transition Nurse attempted initial CV-19/CT outreach leaving Hippa VM w/ my outreach info and reminder to schedule 7 day hosp fu w/ PCP. Patient: Tawny Leyva Patient : 1960   MRN: <[id number]>  Reason for Admission: 2022 - 2023 [address] Transaminitis with hyperbilirubinemia, multiple liver cysts, negative MRCP for biliary obstruction. COVID-19 infection (pos 22) in COPD pt. Possible cholecystitis with negative ultrasound. Recurrent diverticulitis vs resolving diverticulitis. Recent sepsis secondary to acute diverticulitis w/ perforation and abscess status post IR guided drainage. Discharge Date: 23 RARS: Readmission Risk Score: 28.2  Readmit  CT    [facility]  STAFF routed to schedule soonest 7 day hosp fu for this readmission. START taking:  cefdinir (OMNICEF)  melatonin  metroNIDAZOLE (FLAGYL)  pantoprazole (Protonix)  Start taking on: 2023  predniSONE (DELTASONE)  vitamin D (CHOLECALCIFEROL)  zinc  CHANGE how you take:  albuterol sulfate HFA (Ventolin HFA)  atorvastatin (LIPITOR)  Umeclidinium Bromide (Incruse Ellipta)    Last Discharge [facility] Facility       Date Complaint Diagnosis Description Type Department Provider    22 Fatigue Acute cholecystitis . .. ED to Hosp-Admission (Discharged) (ADMITTED) DO Martha; Mitchell Leventhal. ..             Jessica Cho RN

## 2023-01-04 ENCOUNTER — HOSPITAL ENCOUNTER (INPATIENT)
Age: 63
LOS: 5 days | Discharge: NURSING, REHAB OR LONG-TERM CARE | DRG: 291 | End: 2023-01-09
Attending: EMERGENCY MEDICINE | Admitting: INTERNAL MEDICINE
Payer: COMMERCIAL

## 2023-01-04 ENCOUNTER — APPOINTMENT (OUTPATIENT)
Dept: GENERAL RADIOLOGY | Age: 63
DRG: 291 | End: 2023-01-04
Payer: COMMERCIAL

## 2023-01-04 DIAGNOSIS — I50.33 ACUTE ON CHRONIC DIASTOLIC CHF (CONGESTIVE HEART FAILURE) (HCC): ICD-10-CM

## 2023-01-04 DIAGNOSIS — E87.70 HYPERVOLEMIA, UNSPECIFIED HYPERVOLEMIA TYPE: Primary | ICD-10-CM

## 2023-01-04 LAB
ALBUMIN SERPL-MCNC: 2.9 G/DL (ref 3.5–5.2)
ALP BLD-CCNC: 332 U/L (ref 35–104)
ALT SERPL-CCNC: 134 U/L (ref 0–32)
ANION GAP SERPL CALCULATED.3IONS-SCNC: 9 MMOL/L (ref 7–16)
AST SERPL-CCNC: 160 U/L (ref 0–31)
BASOPHILS ABSOLUTE: 0.02 E9/L (ref 0–0.2)
BASOPHILS RELATIVE PERCENT: 0.2 % (ref 0–2)
BILIRUB SERPL-MCNC: 2 MG/DL (ref 0–1.2)
BUN BLDV-MCNC: 19 MG/DL (ref 6–23)
CALCIUM SERPL-MCNC: 8.2 MG/DL (ref 8.6–10.2)
CHLORIDE BLD-SCNC: 94 MMOL/L (ref 98–107)
CO2: 39 MMOL/L (ref 22–29)
CREAT SERPL-MCNC: 0.4 MG/DL (ref 0.5–1)
EKG ATRIAL RATE: 106 BPM
EKG P AXIS: 79 DEGREES
EKG P-R INTERVAL: 144 MS
EKG Q-T INTERVAL: 398 MS
EKG QRS DURATION: 76 MS
EKG QTC CALCULATION (BAZETT): 528 MS
EKG R AXIS: 70 DEGREES
EKG T AXIS: 103 DEGREES
EKG VENTRICULAR RATE: 106 BPM
EOSINOPHILS ABSOLUTE: 0.06 E9/L (ref 0.05–0.5)
EOSINOPHILS RELATIVE PERCENT: 0.6 % (ref 0–6)
GFR SERPL CREATININE-BSD FRML MDRD: >60 ML/MIN/1.73
GLUCOSE BLD-MCNC: 126 MG/DL (ref 74–99)
HCT VFR BLD CALC: 37.3 % (ref 34–48)
HEMOGLOBIN: 12.6 G/DL (ref 11.5–15.5)
IMMATURE GRANULOCYTES #: 0.13 E9/L
IMMATURE GRANULOCYTES %: 1.2 % (ref 0–5)
LV EF: 43 %
LVEF MODALITY: NORMAL
LYMPHOCYTES ABSOLUTE: 1.21 E9/L (ref 1.5–4)
LYMPHOCYTES RELATIVE PERCENT: 11.4 % (ref 20–42)
MAGNESIUM: 2.1 MG/DL (ref 1.6–2.6)
MCH RBC QN AUTO: 31.7 PG (ref 26–35)
MCHC RBC AUTO-ENTMCNC: 33.8 % (ref 32–34.5)
MCV RBC AUTO: 93.7 FL (ref 80–99.9)
MONOCYTES ABSOLUTE: 0.48 E9/L (ref 0.1–0.95)
MONOCYTES RELATIVE PERCENT: 4.5 % (ref 2–12)
NEUTROPHILS ABSOLUTE: 8.73 E9/L (ref 1.8–7.3)
NEUTROPHILS RELATIVE PERCENT: 82.1 % (ref 43–80)
PDW BLD-RTO: 16.4 FL (ref 11.5–15)
PLATELET # BLD: 206 E9/L (ref 130–450)
PMV BLD AUTO: 11.9 FL (ref 7–12)
POTASSIUM REFLEX MAGNESIUM: 3.1 MMOL/L (ref 3.5–5)
PRO-BNP: 8609 PG/ML (ref 0–125)
RBC # BLD: 3.98 E12/L (ref 3.5–5.5)
SODIUM BLD-SCNC: 142 MMOL/L (ref 132–146)
TOTAL PROTEIN: 6.7 G/DL (ref 6.4–8.3)
TROPONIN, HIGH SENSITIVITY: 18 NG/L (ref 0–9)
TROPONIN, HIGH SENSITIVITY: 23 NG/L (ref 0–9)
TROPONIN, HIGH SENSITIVITY: 32 NG/L (ref 0–9)
WBC # BLD: 10.6 E9/L (ref 4.5–11.5)

## 2023-01-04 PROCEDURE — 6370000000 HC RX 637 (ALT 250 FOR IP): Performed by: STUDENT IN AN ORGANIZED HEALTH CARE EDUCATION/TRAINING PROGRAM

## 2023-01-04 PROCEDURE — 6360000002 HC RX W HCPCS: Performed by: INTERNAL MEDICINE

## 2023-01-04 PROCEDURE — 71045 X-RAY EXAM CHEST 1 VIEW: CPT

## 2023-01-04 PROCEDURE — 93005 ELECTROCARDIOGRAM TRACING: CPT | Performed by: STUDENT IN AN ORGANIZED HEALTH CARE EDUCATION/TRAINING PROGRAM

## 2023-01-04 PROCEDURE — 6360000002 HC RX W HCPCS: Performed by: STUDENT IN AN ORGANIZED HEALTH CARE EDUCATION/TRAINING PROGRAM

## 2023-01-04 PROCEDURE — 80053 COMPREHEN METABOLIC PANEL: CPT

## 2023-01-04 PROCEDURE — 6370000000 HC RX 637 (ALT 250 FOR IP): Performed by: INTERNAL MEDICINE

## 2023-01-04 PROCEDURE — 96366 THER/PROPH/DIAG IV INF ADDON: CPT

## 2023-01-04 PROCEDURE — 99285 EMERGENCY DEPT VISIT HI MDM: CPT

## 2023-01-04 PROCEDURE — 94640 AIRWAY INHALATION TREATMENT: CPT

## 2023-01-04 PROCEDURE — 96365 THER/PROPH/DIAG IV INF INIT: CPT

## 2023-01-04 PROCEDURE — 85025 COMPLETE CBC W/AUTO DIFF WBC: CPT

## 2023-01-04 PROCEDURE — 2060000000 HC ICU INTERMEDIATE R&B

## 2023-01-04 PROCEDURE — 83735 ASSAY OF MAGNESIUM: CPT

## 2023-01-04 PROCEDURE — 83880 ASSAY OF NATRIURETIC PEPTIDE: CPT

## 2023-01-04 PROCEDURE — 93306 TTE W/DOPPLER COMPLETE: CPT

## 2023-01-04 PROCEDURE — 87040 BLOOD CULTURE FOR BACTERIA: CPT

## 2023-01-04 PROCEDURE — 84484 ASSAY OF TROPONIN QUANT: CPT

## 2023-01-04 PROCEDURE — 87449 NOS EACH ORGANISM AG IA: CPT

## 2023-01-04 PROCEDURE — 36415 COLL VENOUS BLD VENIPUNCTURE: CPT

## 2023-01-04 PROCEDURE — 93010 ELECTROCARDIOGRAM REPORT: CPT | Performed by: INTERNAL MEDICINE

## 2023-01-04 RX ORDER — ALBUMIN (HUMAN) 12.5 G/50ML
50 SOLUTION INTRAVENOUS 2 TIMES DAILY
Status: DISPENSED | OUTPATIENT
Start: 2023-01-04 | End: 2023-01-06

## 2023-01-04 RX ORDER — PREDNISONE 10 MG/1
10 TABLET ORAL DAILY
Status: DISCONTINUED | OUTPATIENT
Start: 2023-01-12 | End: 2023-01-09 | Stop reason: HOSPADM

## 2023-01-04 RX ORDER — IPRATROPIUM BROMIDE AND ALBUTEROL SULFATE 2.5; .5 MG/3ML; MG/3ML
3 SOLUTION RESPIRATORY (INHALATION) ONCE
Status: COMPLETED | OUTPATIENT
Start: 2023-01-04 | End: 2023-01-04

## 2023-01-04 RX ORDER — ASCORBIC ACID 500 MG
1000 TABLET ORAL DAILY
Status: DISCONTINUED | OUTPATIENT
Start: 2023-01-04 | End: 2023-01-09 | Stop reason: HOSPADM

## 2023-01-04 RX ORDER — IPRATROPIUM BROMIDE AND ALBUTEROL SULFATE 2.5; .5 MG/3ML; MG/3ML
1 SOLUTION RESPIRATORY (INHALATION) 4 TIMES DAILY
Status: DISCONTINUED | OUTPATIENT
Start: 2023-01-04 | End: 2023-01-09 | Stop reason: HOSPADM

## 2023-01-04 RX ORDER — PREDNISONE 20 MG/1
20 TABLET ORAL DAILY
Status: DISCONTINUED | OUTPATIENT
Start: 2023-01-09 | End: 2023-01-09 | Stop reason: HOSPADM

## 2023-01-04 RX ORDER — VITAMIN B COMPLEX
2000 TABLET ORAL DAILY
Status: DISCONTINUED | OUTPATIENT
Start: 2023-01-04 | End: 2023-01-09 | Stop reason: HOSPADM

## 2023-01-04 RX ORDER — SODIUM CHLORIDE 9 MG/ML
INJECTION, SOLUTION INTRAVENOUS PRN
Status: DISCONTINUED | OUTPATIENT
Start: 2023-01-04 | End: 2023-01-09 | Stop reason: HOSPADM

## 2023-01-04 RX ORDER — ARFORMOTEROL TARTRATE 15 UG/2ML
15 SOLUTION RESPIRATORY (INHALATION) 2 TIMES DAILY
Status: DISCONTINUED | OUTPATIENT
Start: 2023-01-04 | End: 2023-01-09 | Stop reason: HOSPADM

## 2023-01-04 RX ORDER — ONDANSETRON 4 MG/1
4 TABLET, ORALLY DISINTEGRATING ORAL EVERY 8 HOURS PRN
Status: DISCONTINUED | OUTPATIENT
Start: 2023-01-04 | End: 2023-01-04

## 2023-01-04 RX ORDER — PREDNISONE 20 MG/1
40 TABLET ORAL
Status: COMPLETED | OUTPATIENT
Start: 2023-01-05 | End: 2023-01-05

## 2023-01-04 RX ORDER — ACETAMINOPHEN 650 MG/1
650 SUPPOSITORY RECTAL EVERY 6 HOURS PRN
Status: DISCONTINUED | OUTPATIENT
Start: 2023-01-04 | End: 2023-01-09 | Stop reason: HOSPADM

## 2023-01-04 RX ORDER — MAGNESIUM SULFATE IN WATER 40 MG/ML
2000 INJECTION, SOLUTION INTRAVENOUS ONCE
Status: COMPLETED | OUTPATIENT
Start: 2023-01-04 | End: 2023-01-04

## 2023-01-04 RX ORDER — SODIUM CHLORIDE 0.9 % (FLUSH) 0.9 %
5-40 SYRINGE (ML) INJECTION PRN
Status: DISCONTINUED | OUTPATIENT
Start: 2023-01-04 | End: 2023-01-09 | Stop reason: HOSPADM

## 2023-01-04 RX ORDER — POTASSIUM CHLORIDE 20 MEQ/1
40 TABLET, EXTENDED RELEASE ORAL ONCE
Status: COMPLETED | OUTPATIENT
Start: 2023-01-04 | End: 2023-01-04

## 2023-01-04 RX ORDER — THEOPHYLLINE 400 MG/1
400 TABLET, EXTENDED RELEASE ORAL DAILY
Status: ON HOLD | COMMUNITY
End: 2023-01-09 | Stop reason: HOSPADM

## 2023-01-04 RX ORDER — POTASSIUM CHLORIDE 7.45 MG/ML
10 INJECTION INTRAVENOUS PRN
Status: DISCONTINUED | OUTPATIENT
Start: 2023-01-04 | End: 2023-01-09 | Stop reason: HOSPADM

## 2023-01-04 RX ORDER — FUROSEMIDE 10 MG/ML
40 INJECTION INTRAMUSCULAR; INTRAVENOUS 2 TIMES DAILY
Status: DISPENSED | OUTPATIENT
Start: 2023-01-04 | End: 2023-01-06

## 2023-01-04 RX ORDER — POLYETHYLENE GLYCOL 3350 17 G/17G
17 POWDER, FOR SOLUTION ORAL DAILY PRN
Status: DISCONTINUED | OUTPATIENT
Start: 2023-01-04 | End: 2023-01-09 | Stop reason: HOSPADM

## 2023-01-04 RX ORDER — BUDESONIDE 0.5 MG/2ML
0.5 INHALANT ORAL 2 TIMES DAILY
Status: DISCONTINUED | OUTPATIENT
Start: 2023-01-04 | End: 2023-01-09 | Stop reason: HOSPADM

## 2023-01-04 RX ORDER — ENOXAPARIN SODIUM 100 MG/ML
40 INJECTION SUBCUTANEOUS DAILY
Status: DISCONTINUED | OUTPATIENT
Start: 2023-01-04 | End: 2023-01-05

## 2023-01-04 RX ORDER — CEFDINIR 300 MG/1
300 CAPSULE ORAL EVERY 12 HOURS SCHEDULED
Status: DISCONTINUED | OUTPATIENT
Start: 2023-01-04 | End: 2023-01-09 | Stop reason: HOSPADM

## 2023-01-04 RX ORDER — PANTOPRAZOLE SODIUM 40 MG/1
40 TABLET, DELAYED RELEASE ORAL
Status: DISCONTINUED | OUTPATIENT
Start: 2023-01-04 | End: 2023-01-09 | Stop reason: HOSPADM

## 2023-01-04 RX ORDER — SODIUM CHLORIDE 0.9 % (FLUSH) 0.9 %
5-40 SYRINGE (ML) INJECTION EVERY 12 HOURS SCHEDULED
Status: DISCONTINUED | OUTPATIENT
Start: 2023-01-04 | End: 2023-01-09 | Stop reason: HOSPADM

## 2023-01-04 RX ORDER — ASPIRIN 81 MG/1
81 TABLET, CHEWABLE ORAL DAILY
Status: DISCONTINUED | OUTPATIENT
Start: 2023-01-04 | End: 2023-01-09 | Stop reason: HOSPADM

## 2023-01-04 RX ORDER — MAGNESIUM SULFATE 1 G/100ML
1000 INJECTION INTRAVENOUS PRN
Status: DISCONTINUED | OUTPATIENT
Start: 2023-01-04 | End: 2023-01-09 | Stop reason: HOSPADM

## 2023-01-04 RX ORDER — ZINC SULFATE 50(220)MG
50 CAPSULE ORAL DAILY
Status: DISCONTINUED | OUTPATIENT
Start: 2023-01-04 | End: 2023-01-09 | Stop reason: HOSPADM

## 2023-01-04 RX ORDER — POTASSIUM CHLORIDE 20 MEQ/1
40 TABLET, EXTENDED RELEASE ORAL PRN
Status: DISCONTINUED | OUTPATIENT
Start: 2023-01-04 | End: 2023-01-09 | Stop reason: HOSPADM

## 2023-01-04 RX ORDER — ACETAMINOPHEN 325 MG/1
650 TABLET ORAL EVERY 6 HOURS PRN
Status: DISCONTINUED | OUTPATIENT
Start: 2023-01-04 | End: 2023-01-09 | Stop reason: HOSPADM

## 2023-01-04 RX ORDER — POTASSIUM CHLORIDE 20 MEQ/1
40 TABLET, EXTENDED RELEASE ORAL 2 TIMES DAILY
Status: DISPENSED | OUTPATIENT
Start: 2023-01-04 | End: 2023-01-06

## 2023-01-04 RX ORDER — ONDANSETRON 2 MG/ML
4 INJECTION INTRAMUSCULAR; INTRAVENOUS EVERY 6 HOURS PRN
Status: DISCONTINUED | OUTPATIENT
Start: 2023-01-04 | End: 2023-01-04

## 2023-01-04 RX ADMIN — ARFORMOTEROL TARTRATE 15 MCG: 15 SOLUTION RESPIRATORY (INHALATION) at 13:07

## 2023-01-04 RX ADMIN — POTASSIUM CHLORIDE 40 MEQ: 1500 TABLET, EXTENDED RELEASE ORAL at 09:10

## 2023-01-04 RX ADMIN — IPRATROPIUM BROMIDE AND ALBUTEROL SULFATE 1 AMPULE: .5; 2.5 SOLUTION RESPIRATORY (INHALATION) at 13:07

## 2023-01-04 RX ADMIN — BUDESONIDE 500 MCG: 0.5 SUSPENSION RESPIRATORY (INHALATION) at 13:07

## 2023-01-04 RX ADMIN — IPRATROPIUM BROMIDE AND ALBUTEROL SULFATE 3 AMPULE: .5; 2.5 SOLUTION RESPIRATORY (INHALATION) at 06:36

## 2023-01-04 RX ADMIN — MAGNESIUM SULFATE HEPTAHYDRATE 2000 MG: 40 INJECTION, SOLUTION INTRAVENOUS at 08:03

## 2023-01-04 RX ADMIN — IPRATROPIUM BROMIDE AND ALBUTEROL SULFATE 1 AMPULE: .5; 2.5 SOLUTION RESPIRATORY (INHALATION) at 17:47

## 2023-01-04 ASSESSMENT — ENCOUNTER SYMPTOMS
BACK PAIN: 0
COUGH: 1
SHORTNESS OF BREATH: 1
COLOR CHANGE: 0
NAUSEA: 0
VOMITING: 0
DIARRHEA: 0
ABDOMINAL PAIN: 0

## 2023-01-04 ASSESSMENT — PAIN - FUNCTIONAL ASSESSMENT
PAIN_FUNCTIONAL_ASSESSMENT: NONE - DENIES PAIN
PAIN_FUNCTIONAL_ASSESSMENT: NONE - DENIES PAIN

## 2023-01-04 ASSESSMENT — PAIN SCALES - GENERAL: PAINLEVEL_OUTOF10: 0

## 2023-01-04 NOTE — ED PROVIDER NOTES
HPI   51-year-old female patient presenting to emergency department for evaluation of shortness of breath and fatigue. She has past history of COPD on 4 L nasal cannula oxygen at baseline. She was recently admitted for suspected cholecystitis on 12/28, observed here and had MRCP and observed no surgery planned as gallbladder deemed noninfected. She has been staying at home with her daughter and since this morning she notes increasing shortness of breath. While in the hospital she was diagnosed with COVID-19 and hepatitis a. She denies any chest pain, nausea, vomiting. Patient reports that she was given Solu-Medrol prior to arrival by EMS. She had CT angiography of her chest performed on 12/28/2022, no PE at that time. MRI of the abdomen pelvis showing small right and trace left pleural effusions. Review of Systems   Constitutional:  Negative for chills and fever. HENT:  Negative for congestion. Respiratory:  Positive for cough and shortness of breath. Cardiovascular:  Negative for chest pain and leg swelling. Gastrointestinal:  Negative for abdominal pain, diarrhea, nausea and vomiting. Genitourinary:  Negative for difficulty urinating, dysuria and hematuria. Musculoskeletal:  Negative for back pain. Skin:  Negative for color change. All other systems reviewed and are negative. Physical Exam  Vitals and nursing note reviewed. Constitutional:       Appearance: Normal appearance. HENT:      Head: Normocephalic and atraumatic. Nose: Nose normal. No congestion. Mouth/Throat:      Mouth: Mucous membranes are moist.      Pharynx: Oropharynx is clear. Eyes:      Conjunctiva/sclera: Conjunctivae normal.      Pupils: Pupils are equal, round, and reactive to light. Cardiovascular:      Rate and Rhythm: Regular rhythm. Tachycardia present. Pulses: Normal pulses. Radial pulses are 2+ on the right side and 2+ on the left side.       Heart sounds: Normal heart sounds. Pulmonary:      Comments: Patient is mildly tachypneic, diminished breath sounds throughout  Abdominal:      General: Bowel sounds are normal. There is no distension. Tenderness: There is no abdominal tenderness. Musculoskeletal:         General: Normal range of motion. Cervical back: Normal range of motion and neck supple. Skin:     General: Skin is warm and dry. Capillary Refill: Capillary refill takes less than 2 seconds. Comments: Ecchymosis to upper extremities likely secondary to previous IV sites while here. Neurological:      General: No focal deficit present. Mental Status: She is alert. Procedures     MDM  Differential diagnoses include hypoxic respiratory failure, pneumonia, COVID, pleural effusions, PE, COPD exacerbation    45-year-old female patient who presents to emergency department for evaluation of fatigue shortness of breath. Initial labs and imaging obtained here. She is found to be mildly hypokalemic which was repleted here with a dose of oral Klor-Con. LFTs were significantly elevated however appear to be around baseline and slightly improved from previous labs 3 days prior. BNP is newly elevated 8600 today but no evidence of overload on chest x-ray. Previously proBNP was 165 last week. Patient was reassessed by me she is too fatigued to get out of bed she states she is normally able to ambulate. Today her daughter's boyfriend had to carry her out of the house that she was so fatigued. At this time plan to admit patient for observation and diuresis. Portable chest x-ray obtained and interpreted by me showing emphysematous changes. Please refer to radiology interpretation for the rest.    Consideration was given to potentially admitting patient for rehab directly from the emergency department however we decided against this as it appears patient is fluid overloaded and best be served by admission.     ED Course as of 01/04/23 1915   Wed Jan 04, 2023   0628 EKG showing sinus tachycardia 106 bpm, QTC prolonged 528, no acute ST elevations or depressions. Similarly prolonged QTC as compared to prior. Interpretation performed by ED physician. [FG]   [address]:     I have personally performed and/or participated in the history, exam, medical decision making, and procedures and agree with all pertinent clinical information unless otherwise noted. I have also reviewed and agree with the past medical, family and social history unless otherwise noted. I have discussed this patient in detail with the resident and provided the instruction and education regarding the evidence-based evaluation and treatment of SOB. Any EKG that may have been performed has been personally reviewed by me and I agree with the documentation as noted by the resident. History: patient recently hospitalized and diagnosed with COVID 19. She had a (-)CTA 5 days ago. She states she's had to increase her O2 at home. She admits to increased peripheral edema. My findings: Austin Kurtz is a 58 y.o. female whom is in no distress. Physical exam reveals mild tachycardia. Heart regular rhythm. Lungs CTA. Mild edema of b/l lower extremity. No focal neurologic deficits. My plan: Symptomatic and supportive care. Will evaluate with labs and imaging. Electronically signed by Santos Salinas DO on 1/4/23 at 6:34 AM EST       [JS]      ED Course User Index  [FG] Yasir Real DO  [JS] Santos Salinas DO     ED Course as of 01/04/23 1915 Wed Jan 04, 2023   0628 EKG showing sinus tachycardia 106 bpm, QTC prolonged 528, no acute ST elevations or depressions. Similarly prolonged QTC as compared to prior.   Interpretation performed by ED physician. [FG]   [address]:     I have personally performed and/or participated in the history, exam, medical decision making, and procedures and agree with all pertinent clinical information unless otherwise noted. I have also reviewed and agree with the past medical, family and social history unless otherwise noted. I have discussed this patient in detail with the resident and provided the instruction and education regarding the evidence-based evaluation and treatment of SOB. Any EKG that may have been performed has been personally reviewed by me and I agree with the documentation as noted by the resident. History: patient recently hospitalized and diagnosed with COVID 19. She had a (-)CTA 5 days ago. She states she's had to increase her O2 at home. She admits to increased peripheral edema. My findings: Reyna Farley is a 58 y.o. female whom is in no distress. Physical exam reveals mild tachycardia. Heart regular rhythm. Lungs CTA. Mild edema of b/l lower extremity. No focal neurologic deficits. My plan: Symptomatic and supportive care. Will evaluate with labs and imaging. Electronically signed by Shannon Hidalgo DO on 1/4/23 at 6:34 AM EST       [JS]      ED Course User Index  [FG] Margarita Crews DO  [JS] Shannon Hidalgo DO       --------------------------------------------- PAST HISTORY ---------------------------------------------  Past Medical History:  has a past medical history of Abscess, Acute on chronic diastolic CHF (congestive heart failure) ([facility] [address] [facility] Diverticulitis, Provoked DVT 3/2018, Seasonal allergic rhinitis, Smoker, and Tobacco use disorder. Past Surgical History:  has no past surgical history on file. Social History:  reports that she quit smoking about 19 months ago. Her smoking use included cigarettes. She has a 20.50 pack-year smoking history. She has never used smokeless tobacco. She reports current alcohol use. She reports that she does not currently use drugs after having used the following drugs: Marijuana [employer]lma [employer]ikes).     Family History: family history includes Breast Cancer in her none; Colon Cancer in her none; Coronary Art Dis in her father; Diabetes in her unknown relative; Hypertension in her father; Other in her daughter and father; Stroke in her mother. The patients home medications have been reviewed.     Allergies: Penicillin v    -------------------------------------------------- RESULTS -------------------------------------------------    LABS:  Results for orders placed or performed during the hospital encounter of 01/04/23   CBC with Auto Differential   Result Value Ref Range    WBC 10.6 4.5 - 11.5 E9/L    RBC 3.98 3.50 - 5.50 E12/L    Hemoglobin 12.6 11.5 - 15.5 g/dL    Hematocrit 37.3 34.0 - 48.0 %    MCV 93.7 80.0 - 99.9 fL    MCH 31.7 26.0 - 35.0 pg    MCHC 33.8 32.0 - 34.5 %    RDW 16.4 (H) 11.5 - 15.0 fL    Platelets 533 764 - 630 E9/L    MPV 11.9 7.0 - 12.0 fL    Neutrophils % 82.1 (H) 43.0 - 80.0 %    Immature Granulocytes % 1.2 0.0 - 5.0 %    Lymphocytes % 11.4 (L) 20.0 - 42.0 %    Monocytes % 4.5 2.0 - 12.0 %    Eosinophils % 0.6 0.0 - 6.0 %    Basophils % 0.2 0.0 - 2.0 %    Neutrophils Absolute 8.73 (H) 1.80 - 7.30 E9/L    Immature Granulocytes # 0.13 E9/L    Lymphocytes Absolute 1.21 (L) 1.50 - 4.00 E9/L    Monocytes Absolute 0.48 0.10 - 0.95 E9/L    Eosinophils Absolute 0.06 0.05 - 0.50 E9/L    Basophils Absolute 0.02 0.00 - 0.20 E9/L   Comprehensive Metabolic Panel w/ Reflex to MG   Result Value Ref Range    Sodium 142 132 - 146 mmol/L    Potassium reflex Magnesium 3.1 (L) 3.5 - 5.0 mmol/L    Chloride 94 (L) 98 - 107 mmol/L    CO2 39 (H) 22 - 29 mmol/L    Anion Gap 9 7 - 16 mmol/L    Glucose 126 (H) 74 - 99 mg/dL    BUN 19 6 - 23 mg/dL    Creatinine 0.4 (L) 0.5 - 1.0 mg/dL    Est, Glom Filt Rate >60 >=60 mL/min/1.73    Calcium 8.2 (L) 8.6 - 10.2 mg/dL    Total Protein 6.7 6.4 - 8.3 g/dL    Albumin 2.9 (L) 3.5 - 5.2 g/dL    Total Bilirubin 2.0 (H) 0.0 - 1.2 mg/dL    Alkaline Phosphatase 332 (H) 35 - 104 U/L     (H) 0 - 32 U/L  (H) 0 - 31 U/L   Troponin   Result Value Ref Range    Troponin, High Sensitivity 32 (H) 0 - 9 ng/L   Brain Natriuretic Peptide   Result Value Ref Range    Pro-BNP 8,609 (H) 0 - 125 pg/mL   Magnesium   Result Value Ref Range    Magnesium 2.1 1.6 - 2.6 mg/dL   Troponin   Result Value Ref Range    Troponin, High Sensitivity 23 (H) 0 - 9 ng/L   EKG 12 Lead   Result Value Ref Range    Ventricular Rate 106 BPM    Atrial Rate 106 BPM    P-R Interval 144 ms    QRS Duration 76 ms    Q-T Interval 398 ms    QTc Calculation (Bazett) 528 ms    P Axis 79 degrees    R Axis 70 degrees    T Axis 103 degrees       RADIOLOGY:  XR CHEST PORTABLE   Final Result   Pronounced obstructive airways disease with emphysema at the mid to lower   lungs and compression of the upper lobes.            Medications   ascorbic acid (VITAMIN C) tablet 1,000 mg (has no administration in time range)   Arformoterol Tartrate (BROVANA) nebulizer solution 15 mcg (15 mcg Nebulization Given 1/4/23 1307)   budesonide (PULMICORT) nebulizer suspension 500 mcg (500 mcg Nebulization Given 1/4/23 1307)   cefdinir (OMNICEF) capsule 300 mg (has no administration in time range)   pantoprazole (PROTONIX) tablet 40 mg (has no administration in time range)   predniSONE (DELTASONE) tablet 40 mg (has no administration in time range)   sertraline (ZOLOFT) tablet 25 mg (has no administration in time range)   vitamin D (CHOLECALCIFEROL) tablet 2,000 Units (has no administration in time range)   zinc sulfate (ZINCATE) capsule 50 mg (has no administration in time range)   ipratropium-albuterol (DUONEB) nebulizer solution 1 ampule (1 ampule Inhalation Given 1/4/23 9867)   albumin human 25 % IV solution 50 g (has no administration in time range)   magnesium sulfate 1000 mg in dextrose 5% 100 mL IVPB (has no administration in time range)   sodium phosphate 12 mmol in sodium chloride 0.9 % 250 mL IVPB (has no administration in time range)     Or   sodium phosphate 21 mmol in sodium chloride 0.9 % 500 mL IVPB (has no administration in time range)   potassium chloride (KLOR-CON M) extended release tablet 40 mEq (has no administration in time range)     Or   potassium bicarb-citric acid (EFFER-K) effervescent tablet 40 mEq (has no administration in time range)     Or   potassium chloride 10 mEq/100 mL IVPB (Peripheral Line) (has no administration in time range)   furosemide (LASIX) injection 40 mg (has no administration in time range)   potassium chloride (KLOR-CON M) extended release tablet 40 mEq (has no administration in time range)   sodium chloride flush 0.9 % injection 5-40 mL (has no administration in time range)   sodium chloride flush 0.9 % injection 5-40 mL (has no administration in time range)   0.9 % sodium chloride infusion (has no administration in time range)   enoxaparin (LOVENOX) injection 40 mg (has no administration in time range)   acetaminophen (TYLENOL) tablet 650 mg (has no administration in time range)     Or   acetaminophen (TYLENOL) suppository 650 mg (has no administration in time range)   polyethylene glycol (GLYCOLAX) packet 17 g (has no administration in time range)   aspirin chewable tablet 81 mg (has no administration in time range)   perflutren lipid microspheres (DEFINITY) injection 1.5 mL (has no administration in time range)   predniSONE (DELTASONE) tablet 30 mg (has no administration in time range)     Followed by   predniSONE (DELTASONE) tablet 20 mg (has no administration in time range)     Followed by   predniSONE (DELTASONE) tablet 10 mg (has no administration in time range)   magnesium sulfate 2000 mg in 50 mL IVPB premix (0 mg IntraVENous Stopped 1/4/23 1135)   ipratropium-albuterol (DUONEB) nebulizer solution 3 ampule (3 ampules Inhalation Given 1/4/23 0636)   potassium chloride (KLOR-CON M) extended release tablet 40 mEq (40 mEq Oral Given 1/4/23 0910)         ------------------------- NURSING NOTES AND VITALS REVIEWED ---------------------------  Date / Time Roomed:  1/4/2023  6:11 AM  ED Bed Assignment:  02/02    The nursing notes within the ED encounter and vital signs as below have been reviewed. Patient Vitals for the past 24 hrs:   BP Temp Temp src Pulse Resp SpO2 Height Weight   01/04/23 1129 102/67 -- -- 99 20 96 % -- --   01/04/23 0722 -- -- -- (!) 109 19 100 % -- --   01/04/23 0619 (!) 98/54 -- -- -- -- -- -- --   01/04/23 0611 -- 97.8 °F (36.6 °C) Oral (!) 112 24 94 % 5' 3\" (1.6 m) 157 lb (71.2 kg)       Oxygen Saturation Interpretation: Normal    ------------------------------------------ PROGRESS NOTES ------------------------------------------      Counseling:  I have spoken with the patient and discussed todays results, in addition to providing specific details for the plan of care and counseling regarding the diagnosis and prognosis. Their questions are answered at this time and they are agreeable with the plan of admission.    --------------------------------- ADDITIONAL PROVIDER NOTES ---------------------------------  Consultations:   Spoke with Dr. Rosetta Ha. Discussed case. They will admit the patient. This patient's ED course included: a personal history and physicial examination, re-evaluation prior to disposition, multiple bedside re-evaluations, IV medications, cardiac monitoring, continuous pulse oximetry, and complex medical decision making and emergency management    This patient has remained hemodynamically stable during their ED course. Diagnosis:  1. Hypervolemia, unspecified hypervolemia type        Disposition:  Patient's disposition: Admit to telemetry  Patient's condition is stable.          Steffen Castro DO  Resident  01/04/23 6017

## 2023-01-04 NOTE — LETTER
[place] Department Medicaid  CERTIFICATION OF NECESSITY  FOR NON-EMERGENCY TRANSPORTATION   BY GROUND AMBULANCE      Individual Information   1. Name: Arnoldo Crisostomo 2. [place] Medicaid Billing Number:   3. Address: [address] [place] [address]      Transportation Provider Information   4. Provider Name: EVONNE   5. [place] Medicaid Provider Number:  National Provider Identifier (NPI):      Certification  7. Criteria:  During transport, this individual requires:  [] Medical treatment or continuous     supervision by an EMT. [x] The administration or regulation of oxygen by another person. [] Supervised protective restraint. 8. Period Beginning Date: 01/092023   9. Length  [x] Not more than 1 day(s)  [] One Year     Additional Information Relevant to Certification   10. Comments or Explanations, If Necessary or Appropriate   COVID POSITIVE, ACUTE ON CHRONIC CHF, CHRONIC RESPIRATORY FAILURE WITH HYPOXIA, 4 LITERS O2 CONTINUOUS PER NASAL CANNULA, ADVANCED COPD, LETHARGY AND WEAKNESS           Certifying Practitioner Information   11. Name of Practitioner: DR Lara Bejarano DO   12. [place] Medicaid Provider Number, If Applicable:  [address] Provider Identifier (NPI): [id number]     Signature Information   14. Date of Signature: 01/09/2023 15. Name of Person Signing: Tere Sykes 876 Signature and Professional Designation: EPI Sykes.1/9/2023.11:55 AM.     OD [id number]  Rev. 7/2015    [facility] Ruba Medrano Encounter Date/Time: 1/4/2023 [address] [place] [address] Road Account: [de-identified]    MRN: [id number]    Patient: Arnoldo Crisostomo    Contact Serial #: [id number]      ENCOUNTER          Patient Class: I Private Enc?   No Unit [facility] [id number]   Hospital Service: TEL   Encounter DX: Acute on chronic diastol*   ADM Provider: Tracie Livingston DO   Procedure:     ATT Provider: Tracie Livingston DO   REF Provider:        Admission DX: Acute on chronic diastolic CHF (congestive heart failure) ([facility] [address] Hypervolemia, unspecified hypervolemia type and DX codes: I50.33, E87.70      PATIENT                 Name: Anni Damon : 1960 (62 yrs)   Address: [address] [place] [address] Sex: Female   [place] city: [address] [place] [address]         Marital Status:    Employer: RETIRED         Mu-ism: Adventism   Primary Care Provider: Elvira John MD         Primary Phone: [phone] [address]   Contact Name Legal Guardian? Relationship to Patient Home Phone Work Phone   1. Nia Ponce  2. shannan basilio  No Child  Child                   GUARANTOR            Guarantor: Anni Damon     : 1960   Address: [address] [place] [address] Nw Sex: Female     [facility] [address]     Relation to Patient: Self       Home Phone: [phone]   Guarantor ID: [id number]       Work Phone:     Guarantor Employer: RETIRED         Status: RETIRED      COVERAGE        PRIMARY INSURANCE   Payor: Koki Watson Plan: ADELE WADE POS II   Payor Address: [facility] [id number]  [place], [place] [address]       Group Number: [id number] Insurance Type: [address] Name: Rickie Bustamante : 1960   Subscriber ID: [id number] Pat. Rel. to Sub: Self   SECONDARY INSURANCE   Payor:   Plan:     Payor Address:  ,           Group Number:   Insurance Type:     Subscriber Name:   Subscriber :     Subscriber ID:   Pat.  Rel. to Sub:           CSN: [id number]

## 2023-01-04 NOTE — H&P
Department of Internal Medicine  History and Physical Examination     Primary Care Physician: Richardson Olmos. MD Farida   Admitting Physician:  Lucy Allen DO  Admission date and time: 1/4/2023  6:11 AM    Room:  02/02  Admitting diagnosis: Acute on chronic diastolic CHF (congestive heart failure) [facility]) [I50.33]    Patient Name: Austin Kurtz  MRN: [id number]    Date of Service: 1/4/2023     Chief Complaint: Shortness of breath    HISTORY OF PRESENT ILLNESS:    Junaid Campos is a 70-year-old female who was discharged from this hospital under our service again on January 1, 2023. She has had multiple hospital stays in the recent past for various issues. She returned home and states that she was compliant with all medication and treatment regimen however presented back to the hospital grossly volume overloaded. proBNP was elevated on ER work-up. High-sensitivity troponin was mildly elevated with no repeat yet obtained. Chest x-ray suggested pulmonary edema. Metabolic panel revealed mild hypokalemia with otherwise no significant electrolyte or renal function disturbance. LFTs were elevated at 332, 134 and 160 in regards to alk phos, ALT and AST respectively. Patient had recent extensive work-up for liver process as previously documented. Case was discussed with ER physician with plan for admission, further care admitted to service of Dr. Eduardo Thomas. PAST MEDICAL Hx:  Past Medical History:   Diagnosis Date    Abscess     colon    Acute on chronic diastolic CHF (congestive heart failure) ([facility] [address] 1/4/2023    COPD (chronic obstructive pulmonary disease) (HCC)     Diverticulitis     Provoked DVT 3/2018     3 months Eliquis for DVT due to PICC line    Seasonal allergic rhinitis     Smoker 11/30/2019    5-6 per day    Tobacco use disorder      : 1 ppd since age 25       PAST SURGICAL Hx:   History reviewed. No pertinent surgical history.     FAMILY Hx:  Family History   Problem Relation Age of Onset    Colon Cancer None     Other Father     Coronary Art Dis Father         mother    Hypertension Father     Diabetes Unknown relative         older age; pat grandmother    Other Daughter         son; ex-    Breast Cancer None     Stroke Mother        HOME MEDICATIONS:  Prior to Admission medications    Medication Sig Start Date End Date Taking? Authorizing Provider   albuterol sulfate HFA (VENTOLIN HFA) 108 (90 Base) MCG/ACT inhaler Inhale 2 puffs into the lungs 4 times daily as needed for Wheezing or Shortness of Breath 1/1/23   Gladwyne Osgood, APRN - CNP   Arformoterol Tartrate (BROVANA) 15 MCG/2ML NEBU Take 2 mLs by nebulization in the morning and 2 mLs in the evening. 1/1/23   Gladwyne Osgood, APRN - CNP   budesonide (PULMICORT) 0.5 MG/2ML nebulizer suspension Take 2 mLs by nebulization in the morning and 2 mLs in the evening. 1/1/23   Gladwyne Osgood, APRN - CNP   ipratropium-albuterol (DUONEB) 0.5-2.5 (3) MG/3ML SOLN nebulizer solution Inhale 3 mLs into the lungs in the morning and 3 mLs at noon and 3 mLs in the evening and 3 mLs before bedtime. 1/1/23   Gladwyne Osgood, APRN - CNP   Umeclidinium Bromide (INCRUSE ELLIPTA) 62.5 MCG/ACT AEPB Inhale 1 puff into the lungs daily 1/1/23   Gladwyne Osgood, APRN - CNP   atorvastatin (LIPITOR) 40 MG tablet Take 1 tablet by mouth nightly 2/1/23   Gladwyne Osgood, APRN - CNP   sertraline (ZOLOFT) 25 MG tablet Take 1 tablet by mouth daily 1/1/23   Gladwyne Osgood, APRN - CNP   cefdinir (OMNICEF) 300 MG capsule Take 1 capsule by mouth every 12 hours for 10 days 1/1/23 1/11/23  Gladwyne Osgood, APRN - CNP   predniSONE (DELTASONE) 10 MG tablet Take by oral route 4 tabs x 3 days, then 3 tabs x 3 days, then 2 tabs x 3 days, then 1 tab x 3 days, then discontinue. Take with food.  1/1/23   Gladwyne Osgood, APRN - CNP   fluticasone [facility]) 50 MCG/ACT nasal spray 2 sprays by Each Nostril route daily 1/1/23   Aquilla Osgood, APRN - CNP   melatonin 5 MG TBDP disintegrating tablet Take 1 tablet by mouth nightly 1/1/23 SKYE Gates CNP   metroNIDAZOLE (FLAGYL) 500 MG tablet Take 1 tablet by mouth every 8 hours for 10 days 23  SKYE Gates CNP   Vitamin D (CHOLECALCIFEROL) 50 MCG (2000 UT) TABS tablet Take 1 tablet by mouth daily 23   SKYE Gates CNP   pantoprazole (PROTONIX) 40 MG tablet Take 1 tablet by mouth in the morning and at bedtime for 14 days, THEN 1 tablet daily. 23  SKYE Gates CNP   zinc 50 MG CAPS Take 50 mg by mouth daily 23   SKYE Gates CNP   ascorbic acid (VITAMIN C) 1000 MG tablet Take 1 tablet by mouth daily 22   SKYE Gates CNP   OXYGEN Inhale 3 L into the lungs continuous    Historical Provider, MD       ALLERGIES:  Penicillin v    SOCIAL Hx:  Social History     Socioeconomic History    Marital status:      Spouse name: Not on file    Number of children: 2    Years of education: Not on file    Highest education level: Not on file   Occupational History    Not on file   Tobacco Use    Smoking status: Former     Packs/day: 0.50     Years: 41.00     Pack years: 20.50     Types: Cigarettes     Quit date: 2021     Years since quittin.6    Smokeless tobacco: Never   Vaping Use    Vaping Use: Never used   Substance and Sexual Activity    Alcohol use: Yes     Comment: social    Drug use: Not Currently     Types: Marijuana Amy Enrique)     Comment: occasional     Sexual activity: Not Currently   Other Topics Concern    Not on file   Social History Narrative    First  is .       Social Determinants of Health     Financial Resource Strain: Not on file   Food Insecurity: Not on file   Transportation Needs: Not on file   Physical Activity: Not on file   Stress: Not on file   Social Connections: Not on file   Intimate Partner Violence: Not on file   Housing Stability: Not on file       Review of System:   Constitutional:   Positive for significant fatigue and malaise , - fever/chills  HEENT:   Denies ear pain, sore throat, sinus or eye problems. Cardiovascular:   Denies any chest pain, irregular heartbeats, or palpitations. Respiratory:   + dyspnea at rest, + dyspnea on exertion, - coughing, - sputum, - hemoptysis  Gastrointestinal:   Denies nausea, vomiting. + but improving diarrhea, - constipation. + but improving poor appetite and poor intake. Denies any abdominal pain. Genitourinary:    Denies any urgency, frequency, hematuria. Voiding  without difficulty. Extremities:   Positive for acute on chronic lower extremity swelling. Neurology:    Denies any headache or focal neurological deficits, positive for generalized weakness and fatigue without focal component  Psch:   Denies being anxious or depressed. Musculoskeletal:    Denies  myalgias, joint complaints or back pain. Integumentary:   Denies any rashes, ulcers, or excoriations. Denies bruising. Hematologic/Lymphatic:  Denies bruising or bleeding. PHYSICAL EXAM:  VITALS:  Vitals:    01/04/23 1129   BP: 102/67   Pulse: 99   Resp: 20   Temp:    SpO2: 96%         General appearance:  Alert, responsive, oriented to person, place, and time. Acute on chronic ill appearing, no distress. Head:  Normocephalic. No masses, lesions or tenderness. Eyes:  PERRLA. EOMI. Sclera clear. Buccal mucosa moist.  ENT:  Ears normal. Mucosa normal.  Neck:    Supple. Trachea midline. No thyromegaly. No JVD. No bruits. Heart:    Rhythm regular. Rate controlled. S1 and S2. Systolic murmur. Lungs:    Symmetrical.  Shallow and tachypneic pattern of respiration, diminished aeration throughout. Abdomen:   Soft. Non-tender. Non-distended. Bowel sounds positive. No organomegaly or masses. No pain on palpation. Extremities:    Peripheral pulses present. Positive for acute on chronic lower extremity peripheral edema. No ulcers. No cyanosis. No clubbing. Neurologic:    Alert x 3. Generally weak without focal component focal deficit. Cranial nerves grossly intact. No focal weakness. Psych:   Behavior is normal. Mood appears normal. Speech is not rapid and/or pressured. Musculoskeletal:   No unilateral joint edema, erythema or warmth. Gait not assessed. Integumentary:  No rashes  Skin normal color and texture.   Genitalia/Breast:  Deferred    LABORATORY DATA:  CBC with Differential:    Lab Results   Component Value Date/Time    WBC 10.6 01/04/2023 07:38 AM    RBC 3.98 01/04/2023 07:38 AM    HGB 12.6 01/04/2023 07:38 AM    HCT 37.3 01/04/2023 07:38 AM     01/04/2023 07:38 AM    MCV 93.7 01/04/2023 07:38 AM    MCH 31.7 01/04/2023 07:38 AM    MCHC 33.8 01/04/2023 07:38 AM    RDW 16.4 01/04/2023 07:38 AM    NRBC 0.9 11/30/2022 05:18 AM    METASPCT 0.9 12/22/2022 06:18 AM    LYMPHOPCT 11.4 01/04/2023 07:38 AM    MONOPCT 4.5 01/04/2023 07:38 AM    MYELOPCT 1.0 12/28/2022 07:02 PM    BASOPCT 0.2 01/04/2023 07:38 AM    MONOSABS 0.48 01/04/2023 07:38 AM    LYMPHSABS 1.21 01/04/2023 07:38 AM    EOSABS 0.06 01/04/2023 07:38 AM    BASOSABS 0.02 01/04/2023 07:38 AM     BMP:    Lab Results   Component Value Date/Time     01/04/2023 07:38 AM    K 3.1 01/04/2023 07:38 AM    CL 94 01/04/2023 07:38 AM    CO2 39 01/04/2023 07:38 AM    BUN 19 01/04/2023 07:38 AM    LABALBU 2.9 01/04/2023 07:38 AM    CREATININE 0.4 01/04/2023 07:38 AM    CALCIUM 8.2 01/04/2023 07:38 AM    GFRAA >60 04/13/2022 02:20 PM    LABGLOM >60 01/04/2023 07:38 AM    GLUCOSE 126 01/04/2023 07:38 AM     Recent Labs     01/04/23  0738   TROPHS 32*         ASSESSMENT:  Acutely decompensated diastolic congestive heart failure with preserved LVEF 52% onechocardiogram   Recent stay for Transaminitis with hyperbilirubinemia, multiple liver cysts, negative MRCP for biliary obstruction, Abnormal GB appearance with negative ultrasound   COVID-19 infection without overt respiratory symptomatology  Recurrent diverticulitis versus resolving diverticulitis  Recent sepsis secondary to acute diverticulitis with perforation and abscess status post IR guided drainage  Chronic respiratory failure with hypoxia secondary to advanced COPD without exacerbation   Ongoing tobacco abuse  Prior history of DVT  Hyperlipidemia on statin agent    PLAN:  Stacie Ballard was readmitted to the hospital less than 72 hours after discharge for a separate issue. On this particular bout, she is volume overloaded with seemingly decompensated diastolic congestive heart failure. Echo will be updated to assess for any cardiomyopathy developed due to recent COVID infection. Lasix and albumin will be utilized with close monitoring of intake and output, fluid and sodium restricted diet. It has become very apparent during these recurrent hospitalizations Stacie Ballard does not do well when she is at home. We have strongly suggested and reinforced the need for skilled nursing facility placement. I do have reservations as I believe that the patient will insist on going home, do poorly again and again be readmitted to the hospital.  We will retest for COVID as removal precautions may allow her to become more active and more ambulatory. Infectious work-up is being repeated. She will continue the course of antibiotics and steroids she was discharged on from last hospital stay. Electrolyte disturbances are being addressed. PT, OT and social work will follow for discharge planning purposes. Laboratory values and vital signs are being monitored and addressed accordingly. Underlying co-morbidites will be addressed during hospitalization as well. See additional orders for details. Due to extremely high hospital inpatient census and bed limitations, along with staff shortages, we have had a kandice discussion with the patient/family regarding the likelihood of prolonged ER boarding status and potential delays/disruptions in care related to this. They understand and agree to maintain hospitalization at this facility while accepting these risks.   We have made every attempt to coordinate care with the ER nursing staff as well to avoid any disruptions in care.       SKYE Cardenas CNP,   12:43 PM  1/4/2023    Electronically signed by SKYE Cardenas CNP on 1/4/23 at 12:43 PM EST

## 2023-01-05 ENCOUNTER — APPOINTMENT (OUTPATIENT)
Dept: NUCLEAR MEDICINE | Age: 63
DRG: 291 | End: 2023-01-05
Payer: COMMERCIAL

## 2023-01-05 LAB
ADENOVIRUS BY PCR: NOT DETECTED
ALBUMIN SERPL-MCNC: 2.9 G/DL (ref 3.5–5.2)
ALP BLD-CCNC: 281 U/L (ref 35–104)
ALT SERPL-CCNC: 93 U/L (ref 0–32)
ANION GAP SERPL CALCULATED.3IONS-SCNC: 7 MMOL/L (ref 7–16)
AST SERPL-CCNC: 92 U/L (ref 0–31)
BASOPHILS ABSOLUTE: 0.01 E9/L (ref 0–0.2)
BASOPHILS RELATIVE PERCENT: 0.1 % (ref 0–2)
BILIRUB SERPL-MCNC: 1.4 MG/DL (ref 0–1.2)
BILIRUBIN DIRECT: 0.7 MG/DL (ref 0–0.3)
BILIRUBIN, INDIRECT: 0.7 MG/DL (ref 0–1)
BORDETELLA PARAPERTUSSIS BY PCR: NOT DETECTED
BORDETELLA PERTUSSIS BY PCR: NOT DETECTED
BUN BLDV-MCNC: 26 MG/DL (ref 6–23)
CALCIUM SERPL-MCNC: 8.5 MG/DL (ref 8.6–10.2)
CHLAMYDOPHILIA PNEUMONIAE BY PCR: NOT DETECTED
CHLORIDE BLD-SCNC: 98 MMOL/L (ref 98–107)
CO2: 35 MMOL/L (ref 22–29)
CORONAVIRUS 229E BY PCR: NOT DETECTED
CORONAVIRUS HKU1 BY PCR: NOT DETECTED
CORONAVIRUS NL63 BY PCR: NOT DETECTED
CORONAVIRUS OC43 BY PCR: NOT DETECTED
CREAT SERPL-MCNC: 0.4 MG/DL (ref 0.5–1)
EOSINOPHILS ABSOLUTE: 0.01 E9/L (ref 0.05–0.5)
EOSINOPHILS RELATIVE PERCENT: 0.1 % (ref 0–6)
GFR SERPL CREATININE-BSD FRML MDRD: >60 ML/MIN/1.73
GLUCOSE BLD-MCNC: 93 MG/DL (ref 74–99)
HCT VFR BLD CALC: 30.1 % (ref 34–48)
HEMOGLOBIN: 10.7 G/DL (ref 11.5–15.5)
HUMAN METAPNEUMOVIRUS BY PCR: NOT DETECTED
HUMAN RHINOVIRUS/ENTEROVIRUS BY PCR: NOT DETECTED
IMMATURE GRANULOCYTES #: 0.1 E9/L
IMMATURE GRANULOCYTES %: 1 % (ref 0–5)
INFLUENZA A BY PCR: NOT DETECTED
INFLUENZA B BY PCR: NOT DETECTED
L. PNEUMOPHILA SEROGP 1 UR AG: NORMAL
LYMPHOCYTES ABSOLUTE: 1.1 E9/L (ref 1.5–4)
LYMPHOCYTES RELATIVE PERCENT: 10.5 % (ref 20–42)
MAGNESIUM: 2.2 MG/DL (ref 1.6–2.6)
MCH RBC QN AUTO: 33.6 PG (ref 26–35)
MCHC RBC AUTO-ENTMCNC: 35.5 % (ref 32–34.5)
MCV RBC AUTO: 94.7 FL (ref 80–99.9)
METER GLUCOSE: 121 MG/DL (ref 74–99)
METER GLUCOSE: 93 MG/DL (ref 74–99)
MONOCYTES ABSOLUTE: 0.73 E9/L (ref 0.1–0.95)
MONOCYTES RELATIVE PERCENT: 7 % (ref 2–12)
MYCOPLASMA PNEUMONIAE BY PCR: NOT DETECTED
NEUTROPHILS ABSOLUTE: 8.51 E9/L (ref 1.8–7.3)
NEUTROPHILS RELATIVE PERCENT: 81.3 % (ref 43–80)
PARAINFLUENZA VIRUS 1 BY PCR: NOT DETECTED
PARAINFLUENZA VIRUS 2 BY PCR: NOT DETECTED
PARAINFLUENZA VIRUS 3 BY PCR: NOT DETECTED
PARAINFLUENZA VIRUS 4 BY PCR: NOT DETECTED
PDW BLD-RTO: 16.6 FL (ref 11.5–15)
PHOSPHORUS: 2.4 MG/DL (ref 2.5–4.5)
PLATELET # BLD: 212 E9/L (ref 130–450)
PMV BLD AUTO: 11.8 FL (ref 7–12)
POTASSIUM SERPL-SCNC: 4.4 MMOL/L (ref 3.5–5)
RBC # BLD: 3.18 E12/L (ref 3.5–5.5)
RESPIRATORY SYNCYTIAL VIRUS BY PCR: NOT DETECTED
SARS-COV-2, PCR: DETECTED
SODIUM BLD-SCNC: 140 MMOL/L (ref 132–146)
STREP PNEUMONIAE ANTIGEN, URINE: NORMAL
T4 FREE: 1.56 NG/DL (ref 0.93–1.7)
TOTAL PROTEIN: 6.1 G/DL (ref 6.4–8.3)
TROPONIN, HIGH SENSITIVITY: 18 NG/L (ref 0–9)
TSH SERPL DL<=0.05 MIU/L-ACNC: 1.12 UIU/ML (ref 0.27–4.2)
WBC # BLD: 10.5 E9/L (ref 4.5–11.5)

## 2023-01-05 PROCEDURE — 36415 COLL VENOUS BLD VENIPUNCTURE: CPT

## 2023-01-05 PROCEDURE — 6360000002 HC RX W HCPCS: Performed by: INTERNAL MEDICINE

## 2023-01-05 PROCEDURE — 80076 HEPATIC FUNCTION PANEL: CPT

## 2023-01-05 PROCEDURE — 85025 COMPLETE CBC W/AUTO DIFF WBC: CPT

## 2023-01-05 PROCEDURE — 6370000000 HC RX 637 (ALT 250 FOR IP): Performed by: INTERNAL MEDICINE

## 2023-01-05 PROCEDURE — 97530 THERAPEUTIC ACTIVITIES: CPT | Performed by: PHYSICAL THERAPIST

## 2023-01-05 PROCEDURE — 94640 AIRWAY INHALATION TREATMENT: CPT

## 2023-01-05 PROCEDURE — 84100 ASSAY OF PHOSPHORUS: CPT

## 2023-01-05 PROCEDURE — 84484 ASSAY OF TROPONIN QUANT: CPT

## 2023-01-05 PROCEDURE — 80048 BASIC METABOLIC PNL TOTAL CA: CPT

## 2023-01-05 PROCEDURE — 6360000002 HC RX W HCPCS: Performed by: NURSE PRACTITIONER

## 2023-01-05 PROCEDURE — 84443 ASSAY THYROID STIM HORMONE: CPT

## 2023-01-05 PROCEDURE — 94150 VITAL CAPACITY TEST: CPT

## 2023-01-05 PROCEDURE — P9047 ALBUMIN (HUMAN), 25%, 50ML: HCPCS | Performed by: INTERNAL MEDICINE

## 2023-01-05 PROCEDURE — 2060000000 HC ICU INTERMEDIATE R&B

## 2023-01-05 PROCEDURE — 2580000003 HC RX 258: Performed by: INTERNAL MEDICINE

## 2023-01-05 PROCEDURE — 84439 ASSAY OF FREE THYROXINE: CPT

## 2023-01-05 PROCEDURE — 97161 PT EVAL LOW COMPLEX 20 MIN: CPT | Performed by: PHYSICAL THERAPIST

## 2023-01-05 PROCEDURE — 0202U NFCT DS 22 TRGT SARS-COV-2: CPT

## 2023-01-05 PROCEDURE — 82962 GLUCOSE BLOOD TEST: CPT

## 2023-01-05 PROCEDURE — 83735 ASSAY OF MAGNESIUM: CPT

## 2023-01-05 RX ORDER — TECHNETIUM TC-99M SESTAMIBI 1 MG/10ML
10 INJECTION INTRAVENOUS
Status: DISCONTINUED | OUTPATIENT
Start: 2023-01-05 | End: 2023-01-09 | Stop reason: HOSPADM

## 2023-01-05 RX ORDER — ENOXAPARIN SODIUM 100 MG/ML
1 INJECTION SUBCUTANEOUS 2 TIMES DAILY
Status: DISCONTINUED | OUTPATIENT
Start: 2023-01-05 | End: 2023-01-09 | Stop reason: HOSPADM

## 2023-01-05 RX ADMIN — FUROSEMIDE 40 MG: 10 INJECTION, SOLUTION INTRAMUSCULAR; INTRAVENOUS at 16:25

## 2023-01-05 RX ADMIN — IPRATROPIUM BROMIDE AND ALBUTEROL SULFATE 1 AMPULE: .5; 2.5 SOLUTION RESPIRATORY (INHALATION) at 19:38

## 2023-01-05 RX ADMIN — BUDESONIDE 500 MCG: 0.5 SUSPENSION RESPIRATORY (INHALATION) at 19:39

## 2023-01-05 RX ADMIN — ARFORMOTEROL TARTRATE 15 MCG: 15 SOLUTION RESPIRATORY (INHALATION) at 19:38

## 2023-01-05 RX ADMIN — CEFDINIR 300 MG: 300 CAPSULE ORAL at 10:34

## 2023-01-05 RX ADMIN — Medication 10 ML: at 10:34

## 2023-01-05 RX ADMIN — POTASSIUM CHLORIDE 40 MEQ: 1500 TABLET, EXTENDED RELEASE ORAL at 00:06

## 2023-01-05 RX ADMIN — ALBUMIN (HUMAN) 50 G: 0.25 INJECTION, SOLUTION INTRAVENOUS at 08:50

## 2023-01-05 RX ADMIN — CEFDINIR 300 MG: 300 CAPSULE ORAL at 21:22

## 2023-01-05 RX ADMIN — Medication 10 ML: at 00:07

## 2023-01-05 RX ADMIN — FUROSEMIDE 40 MG: 10 INJECTION, SOLUTION INTRAMUSCULAR; INTRAVENOUS at 10:28

## 2023-01-05 RX ADMIN — IPRATROPIUM BROMIDE AND ALBUTEROL SULFATE 1 AMPULE: .5; 2.5 SOLUTION RESPIRATORY (INHALATION) at 04:15

## 2023-01-05 RX ADMIN — PANTOPRAZOLE SODIUM 40 MG: 40 TABLET, DELAYED RELEASE ORAL at 16:25

## 2023-01-05 RX ADMIN — ENOXAPARIN SODIUM 70 MG: 100 INJECTION SUBCUTANEOUS at 10:28

## 2023-01-05 RX ADMIN — ENOXAPARIN SODIUM 70 MG: 100 INJECTION SUBCUTANEOUS at 21:23

## 2023-01-05 RX ADMIN — POTASSIUM CHLORIDE 40 MEQ: 1500 TABLET, EXTENDED RELEASE ORAL at 21:22

## 2023-01-05 RX ADMIN — ALBUMIN (HUMAN) 50 G: 0.25 INJECTION, SOLUTION INTRAVENOUS at 16:36

## 2023-01-05 RX ADMIN — Medication 50 MG: at 10:29

## 2023-01-05 RX ADMIN — ARFORMOTEROL TARTRATE 15 MCG: 15 SOLUTION RESPIRATORY (INHALATION) at 04:15

## 2023-01-05 RX ADMIN — BUDESONIDE 500 MCG: 0.5 SUSPENSION RESPIRATORY (INHALATION) at 04:15

## 2023-01-05 RX ADMIN — PANTOPRAZOLE SODIUM 40 MG: 40 TABLET, DELAYED RELEASE ORAL at 05:24

## 2023-01-05 RX ADMIN — Medication 10 ML: at 21:23

## 2023-01-05 RX ADMIN — SERTRALINE 25 MG: 50 TABLET, FILM COATED ORAL at 10:35

## 2023-01-05 RX ADMIN — PREDNISONE 40 MG: 20 TABLET ORAL at 10:28

## 2023-01-05 RX ADMIN — CEFDINIR 300 MG: 300 CAPSULE ORAL at 00:07

## 2023-01-05 RX ADMIN — ASPIRIN 81 MG: 81 TABLET, CHEWABLE ORAL at 10:28

## 2023-01-05 RX ADMIN — IPRATROPIUM BROMIDE AND ALBUTEROL SULFATE 1 AMPULE: .5; 2.5 SOLUTION RESPIRATORY (INHALATION) at 15:05

## 2023-01-05 RX ADMIN — POTASSIUM CHLORIDE 40 MEQ: 1500 TABLET, EXTENDED RELEASE ORAL at 10:28

## 2023-01-05 RX ADMIN — OXYCODONE HYDROCHLORIDE AND ACETAMINOPHEN 1000 MG: 500 TABLET ORAL at 10:29

## 2023-01-05 RX ADMIN — VITAMIN D, TAB 1000IU (100/BT) 2000 UNITS: 25 TAB at 10:35

## 2023-01-05 ASSESSMENT — PAIN SCALES - GENERAL
PAINLEVEL_OUTOF10: 0
PAINLEVEL_OUTOF10: 0

## 2023-01-05 NOTE — PLAN OF CARE
Problem: Discharge Planning  Goal: Discharge to home or other facility with appropriate resources  1/5/2023 0231 by Mary Lezama RN  Outcome: Progressing  1/4/2023 2308 by Mary Lezama RN  Outcome: Progressing  Flowsheets (Taken 1/4/2023 2200)  Discharge to home or other facility with appropriate resources:   Identify barriers to discharge with patient and caregiver   Arrange for needed discharge resources and transportation as appropriate   Identify discharge learning needs (meds, wound care, etc)     Problem: Skin/Tissue Integrity  Goal: Absence of new skin breakdown  Description: 1. Monitor for areas of redness and/or skin breakdown  2. Assess vascular access sites hourly  3. Every 4-6 hours minimum:  Change oxygen saturation probe site  4. Every 4-6 hours:  If on nasal continuous positive airway pressure, respiratory therapy assess nares and determine need for appliance change or resting period.   1/5/2023 0231 by Mary Lezama RN  Outcome: Progressing  1/4/2023 2308 by Mary Lezama RN  Outcome: Progressing     Problem: Safety - Adult  Goal: Free from fall injury  1/5/2023 0231 by Mary Lezama RN  Outcome: Progressing  1/4/2023 2308 by Mary Lezama RN  Outcome: Progressing  Flowsheets (Taken 1/4/2023 2200)  Free From Fall Injury:   Danielle Delgado family/caregiver on patient safety   Based on caregiver fall risk screen, instruct family/caregiver to ask for assistance with transferring infant if caregiver noted to have fall risk factors     Problem: ABCDS Injury Assessment  Goal: Absence of physical injury  1/5/2023 0231 by Mary Lezama RN  Outcome: Progressing  1/4/2023 2308 by Mary Lezama RN  Outcome: Progressing  Flowsheets  Taken 1/4/2023 2301  Absence of Physical Injury: Implement safety measures based on patient assessment  Taken 1/4/2023 2200  Absence of Physical Injury: Implement safety measures based on patient assessment     Problem: Pain  Goal: Verbalizes/displays adequate comfort level or baseline comfort level  Outcome: Progressing

## 2023-01-05 NOTE — PROGRESS NOTES
Physical Therapy  Physical Therapy Initial Evaluation/Plan of Care    Room #:  7841/1440-32  Patient Name: Sera Weems  YOB: 1960  MRN: [id number]    Date of Service: 1/5/2023     Tentative placement recommendation: Subacute rehab  Equipment recommendation: To be determined      Evaluating Physical Therapist: Mason Rubi PT, DPT #[id number]      Specific Provider Orders/Date/Referring Provider :     01/04/23 1245    PT eval and treat  Start:  01/04/23 1245,   End:  01/04/23 1245,   ONE TIME,   Standing Count:  1 Occurrences,   R         [employer], DO Acknowledge New     Admitting Diagnosis:   Acute on chronic diastolic CHF (congestive heart failure) (HCC) [I50.33]  Hypervolemia, unspecified hypervolemia type [E87.70]      Surgery: none  Visit Diagnoses         Codes    Hypervolemia, unspecified hypervolemia type    -  Primary E87.70            Patient Active Problem List   Diagnosis    Tobacco use disorder    Seasonal allergic rhinitis    Chronic bronchitis (HCC)    Chronic respiratory failure with hypoxia (HCC)    Chronic obstructive pulmonary disease ([address]    Acute respiratory failure with hypercapnia (HCC)    Acute on chronic respiratory failure with hypoxia and hypercapnia (HCC)    COPD exacerbation (HCC)    Acute diverticulitis with abscess    Acute cholecystitis    Acute on chronic diastolic CHF (congestive heart failure) ([address]        ASSESSMENT of Current Deficits Patient exhibits decreased strength, balance, and endurance impairing functional mobility, transfers, gait , gait distance, and tolerance to activity. Pt demonstrated poor tolerance for function with 9.5/10 PHILIPPE during activity with 5 minutes of PLB needed to resolve symptoms. Once in chair, pt incontinent of bowel requiring assistance from nursing for cleanup.         PHYSICAL THERAPY  PLAN OF CARE       Physical therapy plan of care is established based on physician order,  patient diagnosis and clinical assessment    Current Treatment Recommendations:    -Bed Mobility: Lower extremity exercises  and Trunk control activities   -Sitting Balance: Incorporate reaching activities to activate trunk muscles , Hands on support to maintain midline , Facilitate active trunk muscle engagement , Facilitate postural control in all planes , and Engage in core activities to allow for movement within base of support   -Standing Balance: Perform strengthening exercises in standing to promote motor control with or without upper extremity support , Instruct patient on adequate base of support to maintain balance, and Challenge balance utilizing reaching  activities beyond center of gravity    -Transfers: Provide instruction on proper hand and foot position for adequate transfer of weight onto lower extremities and use of gait device if needed, Cues for hand placement, technique and safety. Provide stabilization to prevent fall , Facilitate weight shift forward on to lower extremities and provide necessary stabilization of bilateral lower extremities , Support transfer of weight on to lower extremities, and Assist with extension of knees trunk and hip to accept weight transfer   -Gait: Gait training, Standing activities to improve: base of support, weight shift, weight bearing , Exercises to improve trunk control, Exercises to improve hip and knee control, Performance of protected weight bearing activities, and Activities to increase weight bearing   -Endurance: Utilize Supervised activities to increase level of endurance to allow for safe functional mobility including transfers and gait  and Use graduated activities to promote good breathing techniques and provide support and education to maximize respiratory function  -Stairs: Stair training with instruction on proper technique and hand placement on rail    PT long term treatment goals are located in below grid    Patient and or family understand(s) diagnosis, prognosis, and plan of care.     Frequency of treatments: Patient will be seen  daily. Prior Level of Function: Patient ambulated independently   Rehab Potential: fair + for baseline    Past medical history:   Past Medical History:   Diagnosis Date    Abscess     colon    Acute on chronic diastolic CHF (congestive heart failure) ([facility] [address] 1/4/2023    COPD (chronic obstructive pulmonary disease) ([facility])     Diverticulitis     Provoked DVT 3/2018     3 months Eliquis for DVT due to PICC line    Seasonal allergic rhinitis     Smoker 11/30/2019    5-6 per day    Tobacco use disorder      : 1 ppd since age 25     History reviewed. No pertinent surgical history. SUBJECTIVE:    Precautions: Up with assistance, falls, Droplet plus/COVID-19, and Hep A  , extremely SOB    Social history: Patient lives with daughter and daughter's boyfriend  in a two story home resides first  with 2 steps, with rail  to enter Tub shower , grab bars      Equipment owned: [facility],      [address] [place] [address],[address] Floor Mobility Inpatient   How much difficulty turning over in bed?: A Little  How much difficulty sitting down on / standing up from a chair with arms?: A Lot  How much difficulty moving from lying on back to sitting on side of bed?: A Little  How much help from another person moving to and from a bed to a chair?: A Lot  How much help from another person needed to walk in hospital room?: Total  How much help from another person for climbing 3-5 steps with a railing?: Total  AM-PAC Inpatient Mobility Raw Score : 12  AM-PAC Inpatient T-Scale Score : 35.33  Mobility Inpatient CMS 0-100% Score: 68.66  Mobility Inpatient CMS G-Code Modifier : CL    Nursing cleared patient for PT evaluation. The admitting diagnosis and active problem list as listed above have been reviewed prior to the initiation of this evaluation. OBJECTIVE;   Initial Evaluation  Date: 1/5/2023 Treatment Date:     Short Term/ Long Term   Goals   Was pt agreeable to Eval/treatment?  Yes To be met in 3 days   Pain level   0/10       Bed Mobility    Rolling: Minimal assist of 1    Supine to sit: Minimal assist of 1    Sit to supine: Minimal assist of 1    Scooting: Minimal assist of 1    Rolling: Independent    Supine to sit: Independent    Sit to supine: Independent    Scooting: Independent     Transfers Sit to stand: Moderate assist of 1   Sit to stand: Supervision     Ambulation     3 feet using  wheeled walker with Moderate assist of 1   for walker control, walker approximation, balance, upright, weight shift, multiplane instability, and safety    > 25 feet using  wheeled walker with Supervision     Stair negotiation: ascended and descended   Not assessed       2 steps, 1 rail with Sussy   ROM Within functional limits    Increase range of motion 10% of affected joints    Strength BUE:  refer to OT eval  RLE:  3-/5  LLE:  3-/5  Increase strength in affected mm groups by 1/3 grade   Balance Sitting EOB:  fair   Dynamic Standing:  fair- with [employer]  Sitting EOB:  fair +  Dynamic Standing: fair with [employer]     Patient is Alert & Oriented x person, place, time, and situation and follows directions    Sensation:  Patient  denies numbness/tingling   Edema:  no   Endurance: poor     Vitals:  5 liters nasal cannula   Blood Pressure at rest  Blood Pressure during session    Heart Rate at rest  Heart Rate during session    SPO2 at rest %  SPO2 during session 97-99%     Patient education  Patient educated on role of Physical Therapy, risks of immobility, safety and plan of care, energy conservation,  importance of mobility while in hospital , purse lip breathing, ankle pumps, quad set and glut set for edema control, blood clot prevention, importance and purpose of adaptive device and adjusted to proper height for the patient. , and safety      Patient response to education:   Pt verbalized understanding Pt demonstrated skill Pt requires further education in this area   Yes Partial Yes      Treatment:  Patient practiced and was instructed/facilitated in the following treatment: Patient Sat edge of bed 10 minutes with Supervision  to increase dynamic sitting balance and activity tolerance. Pt performed bed mobility, transfers, short ambulation in room, sitting EOB. Therapeutic Exercises:  not performed       At end of session, patient in bed with     call light and phone within reach,  all lines and tubes intact, nursing notified. Patient would benefit from continued skilled Physical Therapy to improve functional independence and quality of life. Patient's/ family goals   rehab    Time in  1154  Time out  1253    Total Treatment Time  39 minutes    Evaluation time includes thorough review of current medical information, gathering information on past medical history/social history and prior level of function, completion of standardized testing/informal observation of tasks, assessment of data, and development of Plan of care and goals.      CPT codes:  Low Complexity PT evaluation (21680)  Therapeutic activities (06759)   39 minutes  3 unit(s)    Moshe Arriaga PT

## 2023-01-05 NOTE — PROGRESS NOTES
Internal Medicine Progress Note    [facility]    Nasrin Quintero. MARIELOS SloanORAFAL Galindo D.O., ASHLI Stringer, MSN, APRN, NP-C  Obi Golden. Merry Lopez, MSN, APRN-CNP     Primary Care Physician: Alfonso Parikh. Curtis Tamez MD   Admitting Physician:  Cesia Regalado DO  Admission date and time: 1/4/2023  6:11 AM    Room:  [address] [place] [address]  Admitting diagnosis: Acute on chronic diastolic CHF (congestive heart failure) ([facility]) [I50.33]  Hypervolemia, unspecified hypervolemia type [E87.70]    Patient Name: Jocelyne Tucker  MRN: [id number]    Date of Service: 1/5/2023     Subjective:  Nando Hernández is a 58 y.o. female who was seen and examined today,1/5/2023, at the bedside. She is feeling somewhat better overall. We have reviewed the results of the work-up and plan of care moving forward. In particular, we discussed her abnormal echocardiogram.  She has newly depressed systolic functioning and areas of akinesis. We discussed stress test and she is agreeable. I discussed performance of the stress test with . We will not consult cardiology unless stress is abnormal for the time being. No family present during my examination. Review of systems:  Constitutional:   Positive for significant fatigue and malaise , - fever/chills  HEENT:   Denies ear pain, sore throat, sinus or eye problems. Cardiovascular:   Denies any chest pain, irregular heartbeats, or palpitations. Respiratory:   - dyspnea at rest, + but improving dyspnea on exertion, - coughing, - sputum, - hemoptysis  Gastrointestinal:   - nausea, -vomiting. - diarrhea, - constipation. + but improving poor appetite and poor intake. - abdominal pain. Genitourinary:    Denies any urgency, frequency, hematuria. Voiding  without difficulty.   Extremities:   Improving lower extremity swelling  Neurology:    Denies any headache or focal neurological deficits, positive for generalized weakness and fatigue without focal component  Psch:   Denies being anxious or depressed. Musculoskeletal:    Denies  myalgias, joint complaints or back pain. Integumentary:   Denies any rashes, ulcers, or excoriations. Denies bruising. Hematologic/Lymphatic:  Denies bruising or bleeding. Physical Exam:  No intake/output data recorded. Intake/Output Summary (Last 24 hours) at 1/5/2023 0819  Last data filed at 1/5/2023 0110  Gross per 24 hour   Intake --   Output 200 ml   Net -200 ml   I/O last 3 completed shifts:  In: -   Out: 200 [Urine:200]  Patient Vitals for the past 96 hrs (Last 3 readings):   Weight   01/04/23 0611 157 lb (71.2 kg)     Vital Signs:   Blood pressure 116/75, pulse 86, temperature 97.1 °F (36.2 °C), temperature source Infrared, resp. rate 18, height 5' 3\" (1.6 m), weight 157 lb (71.2 kg), SpO2 94 %, not currently breastfeeding. General appearance:  Alert, responsive, oriented to person, place, and time. Acute on chronic ill appearance, no distress. Head:  Normocephalic. No masses, lesions or tenderness. Eyes:  PERRLA. EOMI. Sclera clear. ENT:  Ears normal. Mucosa normal.  Neck:    Supple. Trachea midline. No thyromegaly. No JVD. No bruits. Heart:    Rhythm regular. Rate controlled. S1 and S2. Systolic murmur. Lungs:    Symmetrical.  Air exchange is improved. No further rales. No wheezing or rhonchi. Pattern is regular, even and non-labored. Abdomen:   Soft. Non-tender. Non-distended. Bowel sounds positive. No organomegaly or masses. No pain on palpation. Extremities:    Peripheral pulses present. Interval improvement in the degree of lower extremity peripheral edema. No ulcers. No cyanosis. No clubbing. Neurologic:    Alert x 3. Mild generalized weakness without focal deficit. Cranial nerves grossly intact. No focal weakness. Psych:   Behavior is normal. Mood appears normal. Speech is not rapid and/or pressured.   Musculoskeletal:   No unilateral joint edema, erythema, or warmth. Gait not assessed. Integumentary:  No rashes  Skin normal color and texture.   Genitalia/Breast:  Deferred    Medication:  Scheduled Meds:   ascorbic acid  1,000 mg Oral Daily    Arformoterol Tartrate  15 mcg Nebulization BID    budesonide  0.5 mg Nebulization BID    cefdinir  300 mg Oral 2 times per day    pantoprazole  40 mg Oral BID AC    predniSONE  40 mg Oral Daily with breakfast    sertraline  25 mg Oral Daily    Vitamin D  2,000 Units Oral Daily    zinc sulfate  50 mg Oral Daily    ipratropium-albuterol  1 ampule Inhalation 4x daily    albumin human  50 g IntraVENous BID    furosemide  40 mg IntraVENous BID    potassium chloride  40 mEq Oral BID    sodium chloride flush  5-40 mL IntraVENous 2 times per day    enoxaparin  40 mg SubCUTAneous Daily    aspirin  81 mg Oral Daily    [START ON 1/6/2023] predniSONE  30 mg Oral Daily    Followed by    Markel Olszewski ON 1/9/2023] predniSONE  20 mg Oral Daily    Followed by    Markel Olszewski ON 1/12/2023] predniSONE  10 mg Oral Daily     Continuous Infusions:   sodium chloride         Objective Data:  CBC with Differential:    Lab Results   Component Value Date/Time    WBC 10.6 01/04/2023 07:38 AM    RBC 3.98 01/04/2023 07:38 AM    HGB 12.6 01/04/2023 07:38 AM    HCT 37.3 01/04/2023 07:38 AM     01/04/2023 07:38 AM    MCV 93.7 01/04/2023 07:38 AM    MCH 31.7 01/04/2023 07:38 AM    MCHC 33.8 01/04/2023 07:38 AM    RDW 16.4 01/04/2023 07:38 AM    NRBC 0.9 11/30/2022 05:18 AM    METASPCT 0.9 12/22/2022 06:18 AM    LYMPHOPCT 11.4 01/04/2023 07:38 AM    MONOPCT 4.5 01/04/2023 07:38 AM    MYELOPCT 1.0 12/28/2022 07:02 PM    BASOPCT 0.2 01/04/2023 07:38 AM    MONOSABS 0.48 01/04/2023 07:38 AM    LYMPHSABS 1.21 01/04/2023 07:38 AM    EOSABS 0.06 01/04/2023 07:38 AM    BASOSABS 0.02 01/04/2023 07:38 AM     CMP:    Lab Results   Component Value Date/Time     01/04/2023 07:38 AM    K 3.1 01/04/2023 07:38 AM    CL 94 01/04/2023 07:38 AM    CO2 39 01/04/2023 07:38 AM    BUN 19 01/04/2023 07:38 AM    CREATININE 0.4 01/04/2023 07:38 AM    GFRAA >60 04/13/2022 02:20 PM    LABGLOM >60 01/04/2023 07:38 AM    GLUCOSE 126 01/04/2023 07:38 AM    PROT 6.7 01/04/2023 07:38 AM    LABALBU 2.9 01/04/2023 07:38 AM    CALCIUM 8.2 01/04/2023 07:38 AM    BILITOT 2.0 01/04/2023 07:38 AM    ALKPHOS 332 01/04/2023 07:38 AM     01/04/2023 07:38 AM     01/04/2023 07:38 AM     Recent Labs     01/05/23  0015   TROPHS 18*         Wound Documentation:    See documentation    Assessment:  Acutely decompensated diastolic congestive heart failure with now depressed systolic function and motion abnormalities/hypokinesis necessitating stress test  Recent stay for Transaminitis with hyperbilirubinemia, multiple liver cysts, negative MRCP for biliary obstruction, Abnormal GB appearance with negative ultrasound   COVID-19 infection without overt respiratory symptomatology  Recurrent diverticulitis versus resolving diverticulitis  Recent sepsis secondary to acute diverticulitis with perforation and abscess status post IR guided drainage  Chronic respiratory failure with hypoxia secondary to advanced COPD without exacerbation   Ongoing tobacco abuse  Prior history of DVT  Hyperlipidemia on statin agent    Plan:   Magan Mckeon is a 78-year-old female who was readmitted to the hospital yesterday with increased shortness of breath and apparent decompensated diastolic congestive heart failure. She denies any change in her diet or fluid intake and states she has been taking all medications as prescribed. Echocardiogram was updated and this revealed areas of new akinesis and newly depressed systolic functioning. Previous echo indicated ejection fraction between 50 and 55%, now between 40 and 45%. She is post viral syndrome and may have a viral component to this however we must exclude blockages with stress test.  I discussed performance of stress test today with a cardiologist.  If abnormal, will consult cardiology. Maximal medical therapy is being implemented as condition allows. She is on aspirin. Statin was placed on hold during last admission secondary to elevated liver enzymes. They are downtrending and we may need to weigh the benefits versus the risk of this statin reintroduction. Not presently on beta-blocker therapy due to the severity of her lung disease. We will make Lovenox therapeutic. We will continue with IV Lasix and albumin pulsed dosing today with plans for scale back therapy versus oral therapy tomorrow depending upon response. Labs from today are pending and will be addressed accordingly with close monitoring of renal function and electrolytes. Respiratory supportive measures in the setting of advanced/end-stage COPD with recent COVID-19 infection. Chronic morbidities, labs and vital signs are being monitored and addressed accordingly. Continue current therapy. See orders for further plan of care. More than 50% of my  time was spent at the bedside counseling/coordinating care with the patient and/or family with face to face contact. This time was spent reviewing notes and laboratory data as well as instructing and counseling the patient. Time I spent with the family or surrogate(s) is included only if the patient was incapable of providing the necessary information or participating in medical decisions. I also discussed the differential diagnosis and all of the proposed management plans with the patient and individuals accompanying the patient. Nando Hernández requires this high level of physician care and nursing on the IMC/Telemetry unit due the complexity of decision management and chance of rapid decline or death. Continued cardiac monitoring and higher level of nursing are required. I am readily available for any further decision-making and intervention.      Salomón Robert, SKYE - CNP  1/5/2023  8:19 AM

## 2023-01-05 NOTE — PLAN OF CARE
Problem: Discharge Planning  Goal: Discharge to home or other facility with appropriate resources  1/5/2023 1444 by Ray Denton RN  Outcome: Progressing  Flowsheets (Taken 1/5/2023 0800)  Discharge to home or other facility with appropriate resources: Identify barriers to discharge with patient and caregiver  1/5/2023 0231 by Олег Hi RN  Outcome: Progressing     Problem: Skin/Tissue Integrity  Goal: Absence of new skin breakdown  Description: 1. Monitor for areas of redness and/or skin breakdown  2. Assess vascular access sites hourly  3. Every 4-6 hours minimum:  Change oxygen saturation probe site  4. Every 4-6 hours:  If on nasal continuous positive airway pressure, respiratory therapy assess nares and determine need for appliance change or resting period.   1/5/2023 1444 by Ray Denton RN  Outcome: Progressing  1/5/2023 0231 by Олег Hi RN  Outcome: Progressing     Problem: Safety - Adult  Goal: Free from fall injury  1/5/2023 1444 by Ray Denton RN  Outcome: Progressing  1/5/2023 0231 by Олег Hi RN  Outcome: Progressing     Problem: ABCDS Injury Assessment  Goal: Absence of physical injury  1/5/2023 1444 by Ray Denton RN  Outcome: Progressing  1/5/2023 0231 by Олег Hi RN  Outcome: Progressing     Problem: Pain  Goal: Verbalizes/displays adequate comfort level or baseline comfort level  1/5/2023 1444 by Ray Denton RN  Outcome: Progressing  Flowsheets (Taken 1/5/2023 0800)  Verbalizes/displays adequate comfort level or baseline comfort level: Encourage patient to monitor pain and request assistance  1/5/2023 0231 by Олег Hi RN  Outcome: Progressing     Problem: Chronic Conditions and Co-morbidities  Goal: Patient's chronic conditions and co-morbidity symptoms are monitored and maintained or improved  Outcome: Progressing  Flowsheets (Taken 1/5/2023 0800)  Care Plan - Patient's Chronic Conditions and Co-Morbidity Symptoms are Monitored and Maintained or Improved: Monitor and assess patient's chronic conditions and comorbid symptoms for stability, deterioration, or improvement

## 2023-01-05 NOTE — ACP (ADVANCE CARE PLANNING)
Advance Care Planning   Healthcare Decision Maker:    Primary Decision Maker: Mckayla May Child - [phone]    Secondary Decision Maker: shannan basilio - Child - [phone]    Click here to complete Healthcare Decision Makers including selection of the Healthcare Decision Maker Relationship (ie \"Primary\").

## 2023-01-05 NOTE — CARE COORDINATION
PATIENT DISCHARGE EDUCATION INSTRUCTION SHEET    REASONS TO CALL YOUR PEDIATRICIAN  Projectile or forceful vomiting for more than one feeding  Unusual rash lasting more than 24 hours  Very sleepy, difficult to wake up  Bright yellow or pumpkin colored skin with extreme sleepiness  Temperature below 97.6 or above 100.4 F rectally  Feeding problems  Breathing problems  Excessive crying with no known cause  If cord starts to become red, swollen, develops a smell or discharge  No wet diaper or stool in a 24 hour time period     SAFE SLEEP POSITIONING FOR YOUR BABY  The [employer] advises your baby should be placed on his/her back for  Sleeping to reduce the risk of Sudden Infant Death Syndrome (SIDS)  Baby should sleep by themselves in a crib, portable crib or bassinet  Baby should not share a bed with his/her parents  Baby should be placed on his or her back to sleep, night time and at naps  Baby should sleep on firm mattress with a tightly fitted sheet  NO couches, waterbeds or anything soft  Baby's sleep area should not contain any loose blankets, comforters, stuffed animals or any other soft items, (pillows, bumper pads, etc. ...)  Baby's face should be kept uncovered at all times  Baby should sleep in a smoke-free environment  Do not dress baby too warmly to prevent overheating    HAND WASHING  All family and friends should wash their hands:  Before and after holding the baby  Before feeding the baby  After using the restroom or changing the baby's diaper    TAKING BABY'S TEMPERATURE   If you feel your baby may have a fever take your baby's temperature per thermometer instructions  If taking axillary temperature place thermometer under baby's armpit and hold arm close to body  The most precise and accurate way to take a temperature is rectally  Turn on the digital thermometer and lubricate the tip of the thermometer with petroleum jelly.  Lay your baby or child on his or her back, lift  SS NOTE: COVID POSITIVE 1/5. This pt is a readmission. SW completed the ACP with pt today. Pt was to have a Stress Test today but it was cancelled d/t the IV infiltrating- so it is rescheduled for tomorrow. Pt is on 4 liters here and at home from [employer] (dtr relates that the concentrator only goes to 5 liters) - pt does have a neb from them at home but it is old and needs replaced. Pt had an ECHO 40-45%. Pt has a peripheral line. She is on IV Lasix, Mag and Albumen. SW spoke with pt  and her dtr Audrey Solomoncaryn today. Pt resides with Bladimir Bronson boyfriend- Rubi Grimes  And her 2 children ages 9 and 5 in a 1 floor plan home with 2 step entry. Pt was becoming dependent upon her dtr for mobility, adls, iadls and driving. Pt has dme at home ww, bsc and home O2. Pt has had [facility] In the past. She has never had SNF or rehab. Pt's PCP is Dr Fiordaliza Gutierrez and her pharmacy is [facility] on [facility]. SW discussed SNF with pt's dtr today and she is requesting a provider list to review with pt and will let this SW know decision in the AM tomorrow. For a new SNF placement pt will need NO PRECERT, a signed ALFONZO, current PT/OT notes and SW will need to complete the HENs. SS to continue. EPI Henry.1/5/2023.3:45 PM. his or her thighs, and insert the lubricated thermometer 1/2 to 1 inch (1.3 to 2.5 centimeters) into the rectum  Call your Pediatrician for temperature lower than 97.6 or greater than 100.4 F rectally    BATHE AND SHAMPOO BABY  Gently wash baby with a soft cloth using warm water and mild soap - rinse well  Do not put baby in tub bath until umbilical cord falls off and appears well-healed  Bathing baby 2-3 times a week might be enough until your baby becomes more mobile. Bathing your baby too much can dry out his or her skin     NAIL CARE  First recommendation is to keep them covered to prevent facial scratching  During the first few weeks,  nails are very soft. Doctors recommend using only a fine emery board. Don't bite or tear your baby's nails. When your baby's nails are stronger, after a few weeks, you can switch to clippers or scissors making sure not to cut too short and nip the quick   A good time for nail care is while your baby is sleeping and moving less     CORD CARE  Fold diaper below umbilical cord until cord falls off  Keep umbilical cord clean and dry  May see a small amount of crust around the base of the cord. Clean off with mild soap and water and dry       DIAPER AND DRESS BABY  For baby girls: gently wipe from front to back. Mucous or pink tinged drainage is normal  For uncircumcised baby boys: do NOT pull back the foreskin to clean the penis. Gently clean with wipes or warm, soapy water  Dress baby in one more layer of clothing than you are wearing  Use a hat to protect from sun or cold. NO ties or drawstrings    URINATION AND BOWEL MOVEMENTS  If formula feeding or when breast milk feeding is established, your baby should wet 6-8 diapers a day and have at least 2 bowel movements a day during the first month  Bowel movements color and type can vary from day to day    CIRCUMCISION  If your child was circumcised watch out for the following:  Foul smelling discharge  Fever  Swelling   Crusty,  fluid filled sores  Trouble urinating   Persistent bleeding or more than a quarter size spot of blood on his diaper  Yellow discharge lasting more than a week  Continue with care procedures until healed or have a visit with your Pediatrician     INFANT FEEDING  Most newborns feed 8-12 times, every 24 hours. YOU MAY NEED TO WAKE YOUR BABY UP TO FEED  If breastfeeding, offer both breasts when your baby is showing feeding cues, such as rooting or bringing hand to mouth and sucking  Common for  babies to feed every 1-3 hours   Only allow baby to sleep up to 4 hours in between feeds if baby is feeding well at each feed. Offer breast anytime baby is showing feeding cues and at least every 3 hours  Follow up with outpatient [facility] for continued breast feeding support    FORMULA FEEDING  Feed baby formula every 2-3 hours when your baby is showing feeding cues  Paced bottle feeding will help baby not over eat at each feed     BOTTLE FEEDING   Paced Bottle Feeding is a method of bottle feeding that allows the infant to be more in control of the feeding pace. This feeding method slows down the flow of milk into the nipple and the mouth, allowing the baby to eat more slowly, and take breaks. Paced feeding reduces the risk of overfeeding that may result in discomfort for the baby   Hold baby almost upright or slightly reclined position supporting the head and neck  Use a small nipple for slow-flowing. Slow flow nipple holes help in controlling flow   Don't force the bottle's nipple into your baby's mouth. Tickle babies lip so baby opens their mouth  Insert nipple and hold the bottle flat  Let the baby suck three to four times without milk then tip the bottle just enough to fill the nipple about retirement with milk  Let baby suck 3-5 continuous swallows, about 20-30 seconds tip the bottle down to give the baby a break  After a few seconds, when the baby begins to suck again, tip bottle up to allow milk to  "flow into the nipple  Continue to Pace feed until baby shows signs of fullness; no longer sucking after a break, turning away or pushing away the nipple   Bottle propping is not a recommended practice for feeding  Bottle propping is when you give a baby a bottle by leaning the bottle against a pillow, or other support, rather than holding the baby and the bottle.  Forces your baby to keep up with the flow, even if the baby is full   This can increase your baby's risk of choking, ear infections, and tooth decay    BOTTLE PREPARATION   Never feed  formula to your baby, or use formula if the container is dented  When using ready-to-feed, shake formula containers before opening  If formula is in a can, clean the lid of any dust, and be sure the can opener is clean  Formula does not need to be warmed. If you choose to feed warmed formula, do not microwave it. This can cause \"hot spots\" that could burn your baby. Instead, set the filled bottle in a bowl of warm (not boiling) water or hold the bottle under warm tap water. Sprinkle a few drops of formula on the inside of your wrist to make sure it's not too hot  Measure and pour desired amount of water into baby bottle  Add unpacked, level scoop(s) of powder to the bottle as directed on formula container. Return dry scoop to can  Put the cap on the bottle and shake. Move your wrist in a twisting motion helps powder formula mix more quickly and more thoroughly  Feed or store immediately in refrigerator  You need to sterilize bottles, nipples, rings, etc., only before the first use    CLEANING BOTTLE  Use hot, soapy water  Rinse the bottles and attachments separately and clean with a bottle brush  If your bottles are labelled  safe, you can alternatively go ahead and wash them in the    After washing, rinse the bottle parts thoroughly in hot running water to remove any bubbles or soap residue   Place the parts on a bottle drying rack   Make sure the " bottles are left to drain in a well-ventilated location to ensure that they dry thoroughly    CAR SEAT  For your baby's safety and to comply with [place] Law you will need to bring a car seat to the hospital before taking your baby home. Please read your car seat instructions before your baby's discharge from the hospital.  Make sure you place an emergency contact sticker on your baby's car seat with your baby's identifying information  Car seat should not be placed in the front seat of a vehicle. The car seat should be placed in the back seat in the rear-facing position.  Car seat information is available through [employer] at [phone] and also at [email or web address]

## 2023-01-05 NOTE — PROGRESS NOTES
Date:2023  Patient Name: Jean-Pierre Sauceda  MRN: [id number]  : 1960  ROOM #: [id number]    Occupational Therapy order received, chart reviewed and evaluation attempted this date. Patient declined OT evaluation due to just working with PT and wanting to rest. Will re-attempt OT evaluation at a later time. Thank you.      Mitchel Flor OTR/L #[id number]

## 2023-01-05 NOTE — PLAN OF CARE
Problem: Discharge Planning  Goal: Discharge to home or other facility with appropriate resources  Outcome: Progressing     Problem: Skin/Tissue Integrity  Goal: Absence of new skin breakdown  Description: 1. Monitor for areas of redness and/or skin breakdown  2. Assess vascular access sites hourly  3. Every 4-6 hours minimum:  Change oxygen saturation probe site  4. Every 4-6 hours:  If on nasal continuous positive airway pressure, respiratory therapy assess nares and determine need for appliance change or resting period.   Outcome: Progressing     Problem: Safety - Adult  Goal: Free from fall injury  Outcome: Progressing     Problem: ABCDS Injury Assessment  Goal: Absence of physical injury  Outcome: Progressing  Flowsheets (Taken 1/4/2023 2306)  Absence of Physical Injury: Implement safety measures based on patient assessment

## 2023-01-06 ENCOUNTER — APPOINTMENT (OUTPATIENT)
Dept: ULTRASOUND IMAGING | Age: 63
DRG: 291 | End: 2023-01-06
Payer: COMMERCIAL

## 2023-01-06 ENCOUNTER — APPOINTMENT (OUTPATIENT)
Dept: NON INVASIVE DIAGNOSTICS | Age: 63
DRG: 291 | End: 2023-01-06
Payer: COMMERCIAL

## 2023-01-06 ENCOUNTER — APPOINTMENT (OUTPATIENT)
Dept: NUCLEAR MEDICINE | Age: 63
DRG: 291 | End: 2023-01-06
Payer: COMMERCIAL

## 2023-01-06 LAB
LV EF: 48 %
LVEF MODALITY: NORMAL

## 2023-01-06 PROCEDURE — 6370000000 HC RX 637 (ALT 250 FOR IP): Performed by: NURSE PRACTITIONER

## 2023-01-06 PROCEDURE — 93971 EXTREMITY STUDY: CPT

## 2023-01-06 PROCEDURE — 2060000000 HC ICU INTERMEDIATE R&B

## 2023-01-06 PROCEDURE — 97110 THERAPEUTIC EXERCISES: CPT

## 2023-01-06 PROCEDURE — P9047 ALBUMIN (HUMAN), 25%, 50ML: HCPCS | Performed by: INTERNAL MEDICINE

## 2023-01-06 PROCEDURE — 6360000002 HC RX W HCPCS: Performed by: NURSE PRACTITIONER

## 2023-01-06 PROCEDURE — 93017 CV STRESS TEST TRACING ONLY: CPT

## 2023-01-06 PROCEDURE — 93016 CV STRESS TEST SUPVJ ONLY: CPT | Performed by: INTERNAL MEDICINE

## 2023-01-06 PROCEDURE — A9500 TC99M SESTAMIBI: HCPCS | Performed by: RADIOLOGY

## 2023-01-06 PROCEDURE — 94640 AIRWAY INHALATION TREATMENT: CPT

## 2023-01-06 PROCEDURE — 97165 OT EVAL LOW COMPLEX 30 MIN: CPT

## 2023-01-06 PROCEDURE — 6370000000 HC RX 637 (ALT 250 FOR IP): Performed by: INTERNAL MEDICINE

## 2023-01-06 PROCEDURE — 6360000002 HC RX W HCPCS: Performed by: INTERNAL MEDICINE

## 2023-01-06 PROCEDURE — 78452 HT MUSCLE IMAGE SPECT MULT: CPT | Performed by: INTERNAL MEDICINE

## 2023-01-06 PROCEDURE — 2580000003 HC RX 258: Performed by: INTERNAL MEDICINE

## 2023-01-06 PROCEDURE — 78452 HT MUSCLE IMAGE SPECT MULT: CPT

## 2023-01-06 PROCEDURE — 93018 CV STRESS TEST I&R ONLY: CPT | Performed by: INTERNAL MEDICINE

## 2023-01-06 PROCEDURE — 3430000000 HC RX DIAGNOSTIC RADIOPHARMACEUTICAL: Performed by: RADIOLOGY

## 2023-01-06 RX ORDER — TECHNETIUM TC-99M SESTAMIBI 1 MG/10ML
30 INJECTION INTRAVENOUS
Status: COMPLETED | OUTPATIENT
Start: 2023-01-06 | End: 2023-01-06

## 2023-01-06 RX ORDER — TECHNETIUM TC-99M SESTAMIBI 1 MG/10ML
10 INJECTION INTRAVENOUS
Status: COMPLETED | OUTPATIENT
Start: 2023-01-06 | End: 2023-01-06

## 2023-01-06 RX ADMIN — IPRATROPIUM BROMIDE AND ALBUTEROL SULFATE 1 AMPULE: .5; 2.5 SOLUTION RESPIRATORY (INHALATION) at 16:18

## 2023-01-06 RX ADMIN — ALBUMIN (HUMAN) 50 G: 0.25 INJECTION, SOLUTION INTRAVENOUS at 13:14

## 2023-01-06 RX ADMIN — OXYCODONE HYDROCHLORIDE AND ACETAMINOPHEN 1000 MG: 500 TABLET ORAL at 13:15

## 2023-01-06 RX ADMIN — BUDESONIDE 500 MCG: 0.5 SUSPENSION RESPIRATORY (INHALATION) at 05:36

## 2023-01-06 RX ADMIN — ARFORMOTEROL TARTRATE 15 MCG: 15 SOLUTION RESPIRATORY (INHALATION) at 05:36

## 2023-01-06 RX ADMIN — Medication 10 MILLICURIE: at 07:58

## 2023-01-06 RX ADMIN — VITAMIN D, TAB 1000IU (100/BT) 2000 UNITS: 25 TAB at 13:17

## 2023-01-06 RX ADMIN — PANTOPRAZOLE SODIUM 40 MG: 40 TABLET, DELAYED RELEASE ORAL at 17:26

## 2023-01-06 RX ADMIN — REGADENOSON 0.4 MG: 0.08 INJECTION, SOLUTION INTRAVENOUS at 10:21

## 2023-01-06 RX ADMIN — BUDESONIDE 500 MCG: 0.5 SUSPENSION RESPIRATORY (INHALATION) at 16:18

## 2023-01-06 RX ADMIN — IPRATROPIUM BROMIDE AND ALBUTEROL SULFATE 1 AMPULE: .5; 2.5 SOLUTION RESPIRATORY (INHALATION) at 05:36

## 2023-01-06 RX ADMIN — ENOXAPARIN SODIUM 70 MG: 100 INJECTION SUBCUTANEOUS at 13:14

## 2023-01-06 RX ADMIN — CEFDINIR 300 MG: 300 CAPSULE ORAL at 13:15

## 2023-01-06 RX ADMIN — ASPIRIN 81 MG: 81 TABLET, CHEWABLE ORAL at 13:15

## 2023-01-06 RX ADMIN — Medication 10 ML: at 21:38

## 2023-01-06 RX ADMIN — PANTOPRAZOLE SODIUM 40 MG: 40 TABLET, DELAYED RELEASE ORAL at 06:38

## 2023-01-06 RX ADMIN — Medication 50 MG: at 13:15

## 2023-01-06 RX ADMIN — Medication 10 ML: at 13:11

## 2023-01-06 RX ADMIN — SERTRALINE 25 MG: 50 TABLET, FILM COATED ORAL at 13:15

## 2023-01-06 RX ADMIN — ARFORMOTEROL TARTRATE 15 MCG: 15 SOLUTION RESPIRATORY (INHALATION) at 16:18

## 2023-01-06 RX ADMIN — PREDNISONE 30 MG: 20 TABLET ORAL at 13:14

## 2023-01-06 RX ADMIN — IPRATROPIUM BROMIDE AND ALBUTEROL SULFATE 1 AMPULE: .5; 2.5 SOLUTION RESPIRATORY (INHALATION) at 12:52

## 2023-01-06 RX ADMIN — CEFDINIR 300 MG: 300 CAPSULE ORAL at 21:37

## 2023-01-06 RX ADMIN — Medication 30 MILLICURIE: at 10:26

## 2023-01-06 RX ADMIN — ENOXAPARIN SODIUM 70 MG: 100 INJECTION SUBCUTANEOUS at 21:37

## 2023-01-06 NOTE — PROGRESS NOTES
Internal Medicine Progress Note    [facility]    Juan Luis Bernal. Lei Vargas., TIFFANIE. Aishwarya Reese D.O., NERY Peace D.O. Courtney Mendoza, MSN, APRN, NP-C  Nataly Stuart. Marco Lanier, MSN, APRN-CNP     Primary Care Physician: Ole Vargas MD   Admitting Physician:  Susan Yan DO  Admission date and time: 1/4/2023  6:11 AM    Room:  [address] [place], [place] [address]  Admitting diagnosis: Acute on chronic diastolic CHF (congestive heart failure) (HCC) [I50.33]  Hypervolemia, unspecified hypervolemia type [E87.70]    Patient Name: Aiyana Wooten  MRN: [id number]    Date of Service: 1/6/2023     Subjective:  Warren Galan is a 58 y.o. female who was seen and examined today,1/6/2023, at the bedside. Patient was seen in cardiac suite. She has complaint of moist cough and states she is unable to expectorate the mucus. States it feels like it stuck in her throat. Patient has complaint of lateral extremity pain and swelling. Has also noted weeping and blistering at the antecubital.    No family present during my examination. Review of systems:  Constitutional:   Positive for significant fatigue and malaise , - fever/chills  HEENT:   Denies ear pain, sore throat, sinus or eye problems. Cardiovascular:   Denies any chest pain, irregular heartbeats, or palpitations. Respiratory:   - dyspnea at rest, + but improving dyspnea on exertion, + coughing, - sputum, - hemoptysis  Gastrointestinal:   - nausea, -vomiting. - diarrhea, - constipation. + but improving poor appetite and poor intake. - abdominal pain. Genitourinary:    Denies any urgency, frequency, hematuria. Voiding  without difficulty. Extremities:   Improving lower extremity swelling. See subjective  Neurology:    Denies any headache or focal neurological deficits, positive for generalized weakness and fatigue without focal component  Psch:   Denies being anxious or depressed.   Musculoskeletal:    Denies  myalgias, joint complaints or back pain. Integumentary:   Blistering of the left antecubital.  Present bilateral extremities secondary to venipuncture. Hematologic/Lymphatic:  Denies bruising or bleeding. Physical Exam:  No intake/output data recorded. Intake/Output Summary (Last 24 hours) at 1/6/2023 0913  Last data filed at 1/5/2023 1814  Gross per 24 hour   Intake 600 ml   Output --   Net 600 ml   I/O last 3 completed shifts: In: 600 [P.O.:600]  Out: 200 [Urine:200]  Patient Vitals for the past 96 hrs (Last 3 readings):   Weight   01/06/23 0524 152 lb (68.9 kg)   01/04/23 0611 157 lb (71.2 kg)     Vital Signs:   Blood pressure 117/79, pulse 88, temperature 97.7 °F (36.5 °C), temperature source Axillary, resp. rate 18, height 5' 3\" (1.6 m), weight 152 lb (68.9 kg), SpO2 99 %, not currently breastfeeding. General appearance:  Alert, responsive, oriented to person, place, and time. Acute on chronic ill appearance, no distress. Head:  Normocephalic. No masses, lesions or tenderness. Eyes:  PERRLA. EOMI. Sclera clear. ENT:  Ears normal. Mucosa normal.  Neck:    Supple. Trachea midline. No thyromegaly. No JVD. No bruits. Heart:    Rhythm regular. Rate controlled. S1 and S2. Systolic murmur. Lungs:    Symmetrical.  Air exchange is improved. No further rales. No wheezing or rhonchi. Pattern is regular, even and non-labored. Abdomen:   Soft. Non-tender. Non-distended. Bowel sounds positive. No organomegaly or masses. No pain on palpation. Extremities:    Peripheral pulses present. Interval improvement in the degree of lower extremity peripheral edema. Patient has a left upper extremity swelling/edema. IV infiltrate. Weeping noted at venipuncture site. Neurologic:    Alert x 3. Mild generalized weakness without focal deficit. Cranial nerves grossly intact. No focal weakness. Psych:   Behavior is normal. Mood appears normal. Speech is not rapid and/or pressured.   Musculoskeletal:   No unilateral joint edema, erythema, or warmth. Gait not assessed. Integumentary:  No rashes  Skin normal color and texture.   Genitalia/Breast:  Deferred    Medication:  Scheduled Meds:   enoxaparin  1 mg/kg SubCUTAneous BID    ascorbic acid  1,000 mg Oral Daily    arformoterol tartrate  15 mcg Nebulization BID    budesonide  0.5 mg Nebulization BID    cefdinir  300 mg Oral 2 times per day    pantoprazole  40 mg Oral BID AC    sertraline  25 mg Oral Daily    Vitamin D  2,000 Units Oral Daily    zinc sulfate  50 mg Oral Daily    ipratropium-albuterol  1 ampule Inhalation 4x daily    albumin human  50 g IntraVENous BID    sodium chloride flush  5-40 mL IntraVENous 2 times per day    aspirin  81 mg Oral Daily    predniSONE  30 mg Oral Daily    Followed by    Leandro Bernard ON 1/9/2023] predniSONE  20 mg Oral Daily    Followed by    Leandro Bernard ON 1/12/2023] predniSONE  10 mg Oral Daily     Continuous Infusions:   sodium chloride         Objective Data:  CBC with Differential:    Lab Results   Component Value Date/Time    WBC 10.5 01/05/2023 08:29 AM    RBC 3.18 01/05/2023 08:29 AM    HGB 10.7 01/05/2023 08:29 AM    HCT 30.1 01/05/2023 08:29 AM     01/05/2023 08:29 AM    MCV 94.7 01/05/2023 08:29 AM    MCH 33.6 01/05/2023 08:29 AM    MCHC 35.5 01/05/2023 08:29 AM    RDW 16.6 01/05/2023 08:29 AM    NRBC 0.9 11/30/2022 05:18 AM    METASPCT 0.9 12/22/2022 06:18 AM    LYMPHOPCT 10.5 01/05/2023 08:29 AM    MONOPCT 7.0 01/05/2023 08:29 AM    MYELOPCT 1.0 12/28/2022 07:02 PM    BASOPCT 0.1 01/05/2023 08:29 AM    MONOSABS 0.73 01/05/2023 08:29 AM    LYMPHSABS 1.10 01/05/2023 08:29 AM    EOSABS 0.01 01/05/2023 08:29 AM    BASOSABS 0.01 01/05/2023 08:29 AM     CMP:    Lab Results   Component Value Date/Time     01/05/2023 08:29 AM    K 4.4 01/05/2023 08:29 AM    K 3.1 01/04/2023 07:38 AM    CL 98 01/05/2023 08:29 AM    CO2 35 01/05/2023 08:29 AM    BUN 26 01/05/2023 08:29 AM    CREATININE 0.4 01/05/2023 08:29 AM    GFRAA >60 04/13/2022 02:20 PM    LABGLOM >60 01/05/2023 08:29 AM    GLUCOSE 93 01/05/2023 08:29 AM    PROT 6.1 01/05/2023 08:29 AM    LABALBU 2.9 01/05/2023 08:29 AM    CALCIUM 8.5 01/05/2023 08:29 AM    BILITOT 1.4 01/05/2023 08:29 AM    ALKPHOS 281 01/05/2023 08:29 AM    AST 92 01/05/2023 08:29 AM    ALT 93 01/05/2023 08:29 AM     Recent Labs     01/05/23  0015   TROPHS 18*         Wound Documentation:    See documentation    Assessment:  Acutely decompensated diastolic congestive heart failure with now depressed systolic function and motion abnormalities/hypokinesis necessitating stress test  Recent stay for Transaminitis with hyperbilirubinemia, multiple liver cysts, negative MRCP for biliary obstruction, Abnormal GB appearance with negative ultrasound   COVID-19 infection without overt respiratory symptomatology  Recurrent diverticulitis versus resolving diverticulitis  Recent sepsis secondary to acute diverticulitis with perforation and abscess status post IR guided drainage  Chronic respiratory failure with hypoxia secondary to advanced COPD without exacerbation   Left upper extremity IV infiltrate  Ongoing tobacco abuse  Prior history of DVT  Hyperlipidemia on statin agent    Plan:   Lourdes Yao was seen in the cardiac suite awaiting stress test today. Has complaint of left upper extremity pain and swelling. IV infiltrate. Will obtain ultrasound left upper extremity to rule out DVT. Diuretic therapy as prescribed. Monitor intake and output. Unfortunately I's and O's are incomplete. Daily weight. Continue nebulized respiratory treatments. Continue antibiotic therapy. LFTs are trending downward. Hemoglobin did trend downward. We will continue to monitor closely. Monitor for signs and symptoms of bleeding. PT/OT to evaluate and treat. Respiratory supportive measures in the setting of advanced/end-stage COPD with recent COVID-19 infection.     Chronic morbidities, labs and vital signs are being monitored and addressed accordingly. Continue current therapy. See orders for further plan of care. More than 50% of my  time was spent at the bedside counseling/coordinating care with the patient and/or family with face to face contact. This time was spent reviewing notes and laboratory data as well as instructing and counseling the patient. Time I spent with the family or surrogate(s) is included only if the patient was incapable of providing the necessary information or participating in medical decisions. I also discussed the differential diagnosis and all of the proposed management plans with the patient and individuals accompanying the patient. Nando Betty requires this high level of physician care and nursing on the IMC/Telemetry unit due the complexity of decision management and chance of rapid decline or death. Continued cardiac monitoring and higher level of nursing are required. I am readily available for any further decision-making and intervention.      SKYE Ball CNP  1/6/2023  9:13 AM

## 2023-01-06 NOTE — DISCHARGE INSTRUCTIONS
HEART FAILURE  / CONGESTIVE HEART FAILURE  DISCHARGE INSTRUCTIONS:  GUIDELINES TO FOLLOW AT HOME    Self- Managed Care:     MEDICATIONS:  Take your medication as directed. If you are experiencing any side effects, inform your doctor, Do not stop taking any of your medications without letting your doctor know. Check with your doctor before taking any over-the-counter medications / herbal / or dietary supplements. They may interfere with your other medications. Do not take ibuprofen (Advil or Motrin) and naproxen (Aleve) without talking to your doctor first. They could make your heart failure worse. WEIGHT MONITORING:   Weigh yourself everyday (with the same scale) around the same time of the day and write it down. (you can chart them on a calendar or keep track of them on paper. Notify your doctor of a weight gain of 3 pounds or more in 1 day   OR a total of 5 pounds or more in 1 week    Take your weight record to your doctor visits  Also, the same goes if you loose more than 3# in one day, let your heart doctor know. DIET:   Cardiac heart healthy diet- Low saturated / low trans fat, no added salt, caffeine restricted, Low sodium diet-   No more than 2,000mg (2 grams) of salt / sodium per day (which equals to a little less than  a teaspoon of salt)  If your doctor wants you on a fluid restriction. ..it is usually recommended a fluid limit of 2,000cc -  Fluid restriction- 2,000 ml (milliliters) = 64 ounces = you can have 8 glasses of fluid per day (each glass 8 ounces)    Follow a low salt diet - avoid using salt at the table, avoid / limit use of canned soups, processed / packaged foods, salted snacks, olives and pickles. Do not use a salt substitute without checking with your doctor, they may contain a high amount of potassioum. (Mrs. Susette Bosworth is safe to use).     Limit the use of alcohol       CALL YOUR DOCTOR THE FIRST DAY YOU NOTICE ANY OF THESE   SYMPTOMS:  You have a weight gain of 3 pounds or more in 1 day         OR 5 pounds or more in one week  More shortness of breath  More swelling of your stomach, legs, ankles or feet  Feeling more tired, No energy  Dry hacky cough  Dizziness  More chest pain / discomfort       (CALL 911 IF ANY OF THE FOLLOWING OCCURS  Chest pain (not relieved with nitroglycerine, if you have been prescribed this medication)  Severe shortness of breath  Faint / Pass out  Confusion / cannot think clearly  If symptoms get worse           SMOKING - TOBACCO USE:  * IF YOU SMOKE - STOP! Kick the habit. [address] Program is offered at [facility] [address] and [address] [facility]. Call [phone] extension [phone] for more information. ACTIVITY:   (Ask your doctor when you will be able to return to work and before starting any exercise program.  Do not drive unless unless your doctor has given you permission to do so). Start light exercise. Even if you can only do a small amount, exercise will help you get stronger, have more energy, help manage your weight and decrease  stress. Walking is an easy way to get exercise. Start out slowly and  increase the amount you walk as tolerated  If you become short of breath, dizzy or have chest pain; stop, sit down, and rest.  If you feel \"wiped out\" the day after you exercise, walk at a slower pace or for a shorter distance. You can gradually increase the pace or amount of time. (Do not exercise right after a meal or in extreme temperatures, such as above 85 degrees, if the air is really humid, or wind chill is less than 20 degrees)                                             ADDITIONAL INFORMATION:  Avoid getting sick from colds and the flu. Stay away from friends or family that you know may have a contagious illness  Get plenty of rest   Get a flu shot each year. Get a pneumococcal vaccine shot.  If you have had one before, ask your doctor whether you need another dose. My Goal for Self-management of Heart Failure Includes 5 steps :    1. Notice a change in symptoms ( weight gain, short of breath, leg swelling, decreased activity level, bloating. ...)    2. Evaluate the change: (use the Heart Failure Zones )     3. Decide to take action: decide what your options are, such as: (call your doctor for an extra visit, take a prescribed medication, such as your water pill if your doctor has given you directions to do so, [address])    4. Come up with a strategy:  (now you call the doctor for advice / appointment. This is where you take action!!! Do not wait, catch the symptom early and treat it before it worsens. 5. Evaluate the response: The next day, check your Heart Failure Zones: are you in the GREEN ZONE (safe zone)? Worsening symptoms of YELLOW ZONE? Or have you moved to the RED ZONE and need to call 911 or go to the Emergency Room for evaluation? Call your doctor's office to update them on your symptoms of heart failure.

## 2023-01-06 NOTE — PROGRESS NOTES
[address] [facility]  [facility] [place]. [place]        Date:2023                                                  Patient Name: Talib Gr    MRN: [id number]    : 1960    Room: [address] [place] [address]      Evaluating OT: Erika Sun OTR/L #[id number]     Referring Provider and Specific Provider Orders/Date:      23 124  OT eval and treat  Start:  23 124,   End:  23 1245,   ONE TIME,   Standing Count:  1 Occurrences,   R         Álvaro , DO      Placement Recommendation: Subacute vs Home Health Occupational Therapy if patient is able to meet goals        Diagnosis:   1. Hypervolemia, unspecified hypervolemia type         Surgery: None       Pertinent Medical History:       Past Medical History:   Diagnosis Date    Abscess     colon    Acute on chronic diastolic CHF (congestive heart failure) ([facility] [address] 2023    COPD (chronic obstructive pulmonary disease) ([facility])     Diverticulitis     Provoked DVT 3/2018     3 months Eliquis for DVT due to PICC line    Seasonal allergic rhinitis     Smoker 2019    5-6 per day    Tobacco use disorder      : 1 ppd since age 25      Past Surgical History:   Procedure Laterality Date    CARDIOVASCULAR STRESS TEST N/A 2023    lexiscan stress test      Precautions:  Up with assistance, falls, Droplet plus/COVID-19, and Hep A  , extremely SOB , 5L O2 via nasal canula      Assessment of current deficits    [x] Functional mobility  [x]ADLs  [x] Strength               []Cognition    [x] Functional transfers   [x] IADLs         [x] Safety Awareness   [x]Endurance    [] Fine Coordination              [x] Balance      [] Vision/perception   []Sensation     []Gross Motor Coordination  [] ROM  [] Delirium                   [] Motor Control     OT PLAN OF CARE   OT POC based on physician orders, patient diagnosis and results of clinical assessment    Frequency/Duration 1-3 days/wk for 2 weeks PRN     Specific OT Treatment Interventions to include:   * Instruction/training on adapted ADL techniques and AE recommendations to increase functional independence within precautions       * Training on energy conservation strategies, correct breathing pattern and techniques to improve independence/tolerance for self-care routine  * Functional transfer/mobility training/DME recommendations for increased independence, safety, and fall prevention  * Patient/Family education to increase follow through with safety techniques and functional independence  * Recommendation of environmental modifications for increased safety with functional transfers/mobility and ADLs  * Therapeutic exercise to improve motor endurance, ROM, and functional strength for ADLs/functional transfers  * Therapeutic activities to facilitate/challenge dynamic balance, stand tolerance for increased safety and independence with ADLs    Recommended Adaptive Equipment: TBD  - shower chair if plans for home     Home Living: Patient lives with daughter and daughter's boyfriend  in a two story home resides first  with 2 steps, with rail  to enter Tub shower , grab bars       Equipment owned: Bedside commode and Hospital bed , O2     Prior Level of Function: pt states dghtr assists with ADLs as needed;  ambulated independently. Pain Level: pt denied pain; Nursing notified.       Cognition: A&O: 4/4; Follows 3 step directions   Memory: intact   Sequencing: intact   Problem solving: fair    Judgement/safety: fair     [facility]   AM-PAC Daily Activity Inpatient   How much help for putting on and taking off regular lower body clothing?: A Lot  How much help for Bathing?: A Lot  How much help for Toileting?: A Lot  How much help for putting on and taking off regular upper body clothing?: A Little  How much help for taking care of personal grooming?: A Little  How much help for eating meals?: A Little  AM-PAC Inpatient Daily Activity Raw Score: 15  AM-PAC Inpatient ADL T-Scale Score : 34.69  ADL Inpatient CMS 0-100% Score: 56.46  ADL Inpatient CMS G-Code Modifier : CK     Functional Assessment:    Initial Eval Status  Date: 1/6/23   Treatment Status  Date: STGs = LTGs  Time frame: 10-14 days   Feeding Supervision     Independent    Grooming Stand by Assist    Moderate [place]    UB Dressing Minimal Assist    Moderate [place]    LB Dressing Moderate Assist     Moderate [place]    Bathing Moderate Assist    Moderate [place]    Toileting Moderate Assist     Moderate [place]    Bed Mobility  Supine to sit: Supervision   Sit to supine: Supervision       Supine to sit: Independent   Sit to supine: Independent    Functional Transfers Sit to stand: Minimal Assist   Stand to sit: Minimal Assist     Transfer training with verbal cues for hand placement throughout session to improve safety.     Moderate [place]    Functional Mobility Moderate Assist with wheeled walker to improve balance 3-4 steps along side of bed, verbal cues for walker sequence and safety.     Moderate [place] with use of wheeled walker    Balance Sitting:     Static: fair plus    Dynamic: fair plus  Standing: fair/fair minus with walker     Sitting:     Static: good    Dynamic: good  Standing: good with walker    Activity Tolerance fair    Increase standing tolerance >3 minutes for improved engagement with functional transfers and indep in ADLs     Visual/  Perceptual Glasses: readers     Reports changes in vision since admission: no      NA      Hand Dominance: right      AROM (PROM) Strength Additional Info:  Goal:   RUE  WFL 4-/5 good  and wfl FMC/dexterity noted during ADL tasks   Improve overall RUE strength  for participation in functional tasks   LUE WFL 4-/5 good  and wfl FMC/dexterity noted during ADL tasks   Improve overall LUE strength  for participation in functional tasks     Hearing: WFL   Sensation:  No c/o numbness or  tingling  Tone: WFL   Edema: Left UE       Vitals:  HR at rest: 77 bpm HR with activity:  bpm HR at end of session:  bpm   SpO2 at rest: 98% SpO2 with activity: 91-92% SpO2 at end of session: 94%   BP at rest:  BP with activity:  BP at end of session:      Comments: RN cleared patient for OT. Upon arrival patient in supine. Therapist facilitated and instructed pt on adapted  techniques & compensatory strategies to improve safety and independence with basic ADLs, bed mobility, functional transfers and mobility to allow pt to achieve highest level of independence and safely. Pt demonstrated fair understanding of education & follow through. At end of session, patient was supine (on bedpan and CNA informed/aware)  with call light and phone within reach, all lines and tubes intact. Overall, patient demonstrated  decreased independence and safety during completion of ADL tasks. Pt would benefit from continued skilled OT to increase safety and independence with completion of ADL tasks and functional mobility for improved quality of life. Treatment: OT treatment provided this date includes:   Instructions/training on safety, sequencing, and adapted techniques for completion of ADLs. Facilitated bed mobility with cues for proper body mechanics and sequencing to prepare for ADL completion. Instruction/training on safe functional mobility/transfer techniques including hand and feet placement   B UE there ex completed through all planes to increase strength/endurance with ADLs   Sitting EOB x 10 minutes to improve dynamic sitting balance and activity tolerance during ADLs   Skilled monitoring of O2 sats - see section above     Rehab Potential: Good for established goals. Patient / Family Goal: Not stated       Patient and/or family were instructed on functional diagnosis, prognosis/goals and OT plan of care. Demonstrated fair understanding.      Eval Complexity: Low    Time In: 1:35 PM   Time Out: 2:05 PM Total Treatment Time: 10       Min Units   OT Eval Low 97165  X  1    OT Eval Medium 03384      OT Eval High 91982      OT Re-Eval [id number]            ADL/Self Care 92869     Therapeutic Activities 13541       Therapeutic Ex 36602  10 1   Orthotic Management 13934       Manual [id number]     Neuro Re-Ed [id number]       Non-Billable Time        Evaluation Time additionally includes thorough review of current medical information, gathering information on past medical history/social history and prior level of function, interpretation of standardized testing/informal observation of tasks, assessment of data and development of plan of care and goals.         Evaluating OT: Mitchel Flor OTR/L #[id number]

## 2023-01-06 NOTE — CARE COORDINATION
SS NOTE: COVID POSITIVE 1/5. Pt is in Stress Test now. Pt is on 4 liters here and at home from [facility] (dtr relates that the concentrator only goes to 5 liters) - pt does have a neb from them at home but it is old and needs replaced. Pt had an ECHO 40-45%. Pt has a peripheral line. She is on IV Lasix, Mag and Albumen. SW spoke with pt  and her dtr Emily Badillo today to obtain SNF choices- she requests Tremaine Samuels of the [facility]- referral completed today will await decision. For a new SNF placement pt will need NO PRECERT, a signed ALFONZO, current PT/OT notes and SW will need to complete the HENs. SS to continue. EPI Albert.1/6/2023.10:43AM.

## 2023-01-06 NOTE — DISCHARGE INSTR - COC
Continuity of Care Form    Patient Name: Jean-Pierre Sauceda   :  1960  MRN:  [id number]    Admit date:  2023  Discharge date:  23    Code Status Order: Full Code   Advance Directives:     Admitting Physician:  Katherine Zelaya DO  PCP: Karri Iqbal MD    Discharging Nurse: [facility] Unit/Room#: 4979/4341-96  Discharging Unit Phone Number: [phone]    Emergency Contact:   Extended Emergency Contact Information  Primary Emergency Contact: [place], [place] Jania Ice of [facility]  Mobile Phone: [phone]  Relation: Child   needed? No  Secondary Emergency Contact: shannan basilio  Mobile Phone: [phone]  Relation: Child  Preferred language: English   needed?  No    Past Surgical History:  Past Surgical History:   Procedure Laterality Date    CARDIOVASCULAR STRESS TEST N/A 2023    lexiscan stress test       Immunization History:   Immunization History   Administered Date(s) Administered    COVID-19, MODERNA BLUE border, Primary or Immunocompromised, (age 12y+), IM, 100 mcg/0.5mL 2021, 2021    Influenza, FLUARIX, FLULAVAL, FLUZONE (age 10 mo+) AND AFLURIA, (age 1 y+), PF, 0.5mL 2018, 10/30/2019    Pneumococcal Polysaccharide ([id number]) 2018    Td, unspecified formulation 2003    Tdap (Boostrix, Adacel) 2019       Active Problems:  Patient Active Problem List   Diagnosis Code    Tobacco use disorder F17.200    Seasonal allergic rhinitis J30.2    Chronic bronchitis (HCC) J42    Chronic respiratory failure with hypoxia (HCC) J96.11    Chronic obstructive pulmonary disease (HCC) J44.9    Acute respiratory failure with hypercapnia (HCC) J96.02    Acute on chronic respiratory failure with hypoxia and hypercapnia (HCC) J96.21, J96.22    COPD exacerbation (HCC) J44.1    Acute diverticulitis with abscess K57.20    Acute cholecystitis K81.0    Acute on chronic diastolic CHF (congest[facility]) ([facility] I50.33 Isolation/Infection:   Isolation            Droplet Plus          Patient Infection Status       Infection Onset Added Last Indicated Last Indicated By Review Planned Expiration Resolved Resolved By    COVID-19 22 Respiratory Panel, Molecular, with COVID-19 (Restricted: peds pts or suitable admitted adults) 01/15/23 01/19/23      Hepatitis A 22 Hepatitis Panel, Acute 23      Resolved    COVID-19 (Rule Out) 23 Respiratory Panel, Molecular, with COVID-19 (Restricted: peds pts or suitable admitted adults) (Ordered)   23 Rule-Out Test Resulted    COVID-19 (Rule Out) 22 Respiratory Panel, Molecular, with COVID-19 (Restricted: peds pts or suitable admitted adults) (Ordered)   22 Rule-Out Test Resulted    COVID-19 (Rule Out) 22 Respiratory Panel, Molecular, with COVID-19 (Restricted: peds pts or suitable admitted adults) (Ordered)   22 Rule-Out Test Resulted    COVID-19 (Rule Out) 22 COVID-19, Rapid (Ordered)   22 Rule-Out Test Resulted    COVID-19 (Rule Out) 22 COVID-19, Rapid (Ordered)   22 Rule-Out Test Resulted    COVID-19 (Rule Out) 02/10/22 02/10/22 02/10/22 Respiratory Panel, Molecular, with COVID-19 (Restricted: peds pts or suitable admitted adults) (Ordered)   22     COVID-19 (Rule Out) 02/10/22 02/10/22 02/10/22 COVID-19, Rapid (Ordered)   02/10/22 Rule-Out Test Resulted    COVID-19 (Rule Out) 22 COVID-19, Rapid (Ordered)   22 Rule-Out Test Resulted    COVID-19 (Rule Out) 21 Respiratory Panel, Molecular, with COVID-19 (Restricted: peds pts or suitable admitted adults) (Ordered)   21 Rule-Out Test Resulted    COVID-19 (Rule Out) 21 Respiratory Panel, Molecular, with COVID-19 (Restricted: peds pts or suitable admitted adults) (Ordered)   09/22/21 Rule-Out Test Resulted    COVID-19 (Rule Out) 09/21/21 09/21/21 09/21/21 COVID-19, Rapid (Ordered)   09/21/21 Rule-Out Test Resulted    COVID-19 (Rule Out) 05/20/21 05/20/21 05/20/21 Respiratory Panel, Molecular, with COVID-19 (Restricted: peds pts or suitable admitted adults) (Ordered)   05/20/21 Rule-Out Test Resulted    COVID-19 (Rule Out) 05/17/21 05/17/21 05/17/21 COVID-19, Rapid (Ordered)   05/17/21 Rule-Out Test Resulted            Nurse Assessment:  Last Vital Signs: /75   Pulse 90   Temp 98 °F (36.7 °C) (Infrared)   Resp 18   Ht 5' 3\" (1.6 m)   Wt 152 lb (68.9 kg)   SpO2 96%   BMI 26.93 kg/m²     Last documented pain score (0-10 scale): Pain Level: 0  Last Weight:   Wt Readings from Last 1 Encounters:   01/06/23 152 lb (68.9 kg)     Mental Status:  oriented and alert    IV Access:  - None    Nursing Mobility/ADLs:  Walking   Assisted  Transfer  Assisted  Bathing  Assisted  Dressing  Assisted  Toileting  Assisted  Feeding  Assisted  Med Admin  Assisted  Med Delivery   whole and prefers mixed with applesauce    Wound Care Documentation and Therapy:        Elimination:  Continence:   Bowel: Yes  Bladder: Yes  Urinary Catheter: None   Colostomy/Ileostomy/Ileal Conduit: No       Date of Last BM: 1/9/23    Intake/Output Summary (Last 24 hours) at 1/6/2023 1351  Last data filed at 1/5/2023 1814  Gross per 24 hour   Intake 600 ml   Output --   Net 600 ml     I/O last 3 completed shifts:  In: 600 [P.O.:600]  Out: 200 [Urine:200]    Safety Concerns:     At Risk for Falls    Impairments/Disabilities:      None    Nutrition Therapy:  Current Nutrition Therapy:   - Oral Diet:  Cardiac    Routes of Feeding: Oral  Liquids: Thin Liquids  Daily Fluid Restriction: yes - amount 1800ml/day  Last Modified Barium Swallow with Video (Video Swallowing Test): not done    Treatments at the Time of Hospital Discharge:   Respiratory Treatments: N/A  Oxygen Therapy:  is on  oxygen at 4 L/min per nasal cannula. Ventilator:    - No ventilator support    Rehab Therapies: Physical Therapy and Occupational Therapy  Weight Bearing Status/Restrictions: No weight bearing restrictions  Other Medical Equipment (for information only, NOT a DME order):  walker  Other Treatments: L arm dressing changes as needed    Patient's personal belongings (please select all that are sent with patient):  None    RN SIGNATURE:  Electronically signed by Linda Campbell RN on 1/9/23 at 12:41 PM EST    CASE MANAGEMENT/SOCIAL WORK SECTION    Inpatient Status Date: ***    Readmission Risk Assessment Score:  Readmission Risk              Risk of Unplanned Readmission:  44           Discharging to Facility/ Agency   Name: [address] [place] [address] [address]: [phone]  FAX: [phone]    Dialysis Facility (if applicable)   Name:  Address:  Dialysis Schedule:  Phone:  Fax:    / signature: Electronically signed by Johnny English on 1/6/23 at 1:52 PM EST    PHYSICIAN SECTION    Prognosis: Good    Condition at Discharge: Stable    Rehab Potential (if transferring to Rehab): Good    Recommended Labs or Other Treatments After Discharge: ***    Physician Certification: I certify the above information and transfer of Eimly Green  is necessary for the continuing treatment of the diagnosis listed and that she requires [facility] for less 30 days. Update Admission H&P: {CHP DME Changes in [id number]}    PHYSICIAN SIGNATURE:  Electronically signed by David Doss DO on 1/9/23 at 11:24 AM EST      ***HEART FAILURE - CONGESTIVE HEART FAILURE***  DISCHARGE INSTRUCTIONS:  GUIDELINES TO FOLLOW AT [facility]/SNF/ Assisted Living    No future appointments.        MEDICATIONS:  Please notify the doctor if patient is not able to take their medications or if medications are being held for any reasons (such as low blood pressure ect.)  Do not give the patient ibuprofen (Advil or Motrin), naproxen (Aleve) without talking to the doctor first. This could make their heart failure worse. WEIGHT MONITORING:   Weigh patient every day in the morning after they void (If patient is able to stand, please get a standing weight.)   Notify the doctor of a weight gain of 3 pounds or more in 1 day   OR  a total of 5 pounds or more in 1 week             DIET   Cardiac heart healthy diet:  Low sodium diet: no  more than 2,000mg (2 grams) of salt / sodium per day (which equals to a little less than  a teaspoon of salt)/ Cardiac Diet: Low saturated / low trans fat, no added salt, caffeine restricted    If patient is there for rehab and will be returning home in the near future; reinforce with the patient and the family to follow a low sodium diet (2,000 mg)- avoid using salt at the table, avoid / limit use of canned soups, processed / packaged foods, salted snacks, olives and pickles. Do not use a salt substitute without checking with the doctor. (Mrs. Lory Cornejo is safe to use).        NOTIFY THE DOCTOR THE FIRST DAY OF ONSET OF ANY OF THESE   SYMPTOMS:   Weight gain of 3 pounds or more in 1 day         OR 5 pounds or more in one week  More shortness of breath  More swelling in stomach, legs, ankles or feet  Feeling more tired, No energy  Dry hacky cough  Dizziness  More chest pain / discomfort  Hard time breathing laying down

## 2023-01-06 NOTE — PLAN OF CARE
Patient's chart updated to reflect:      . - HF care plan, and  HF discharge instructions.  -Orders:  daily weights, I/O.  -PCP and cardiology follow up appointments to be scheduled within 7 days of hospital discharge. -CHF education session will be provided to the patient prior to hospital discharge.     Claire Lopez MSN, RN  Heart Failure Navigator

## 2023-01-06 NOTE — CARE COORDINATION
SS NOTE: COVID POSITIVE 1/5. Nishant of the [facility] has a bed for pt when dched. Pt had a Stress Test.  Pt is on 4 liters here and at home from [employer] [facility] (dtr relates that the concentrator only goes to 5 liters) - pt does have a neb from them at home but it is old and needs replaced. Pt had an ECHO 40-45%. Pt has a peripheral line. She is on IV Lasix, Mag and Albumen. [facility] pt will need NO PRECERT, a signed ALFONZO, current PT/OT notes and SW will need to complete the HENs. SS to continue. EPI Burgos.1/6/2023.1:48PM.

## 2023-01-06 NOTE — PLAN OF CARE
Problem: Discharge Planning  Goal: Discharge to home or other facility with appropriate resources  Outcome: Progressing  Flowsheets  Taken 1/6/2023 0720 by Krupa Hernandez RN  Discharge to home or other facility with appropriate resources: Identify barriers to discharge with patient and caregiver  Taken 1/5/2023 2120 by Josie Alfonso RN  Discharge to home or other facility with appropriate resources: Identify barriers to discharge with patient and caregiver     Problem: Skin/Tissue Integrity  Goal: Absence of new skin breakdown  Description: 1. Monitor for areas of redness and/or skin breakdown  2. Assess vascular access sites hourly  3. Every 4-6 hours minimum:  Change oxygen saturation probe site  4. Every 4-6 hours:  If on nasal continuous positive airway pressure, respiratory therapy assess nares and determine need for appliance change or resting period.   Outcome: Progressing     Problem: Safety - Adult  Goal: Free from fall injury  Outcome: Progressing     Problem: ABCDS Injury Assessment  Goal: Absence of physical injury  Outcome: Progressing     Problem: Pain  Goal: Verbalizes/displays adequate comfort level or baseline comfort level  Outcome: Progressing  Flowsheets (Taken 1/6/2023 0658)  Verbalizes/displays adequate comfort level or baseline comfort level: Encourage patient to monitor pain and request assistance     Problem: Chronic Conditions and Co-morbidities  Goal: Patient's chronic conditions and co-morbidity symptoms are monitored and maintained or improved  Outcome: Progressing  Flowsheets  Taken 1/6/2023 0720 by Donaldo Barreto 34 - Patient's Chronic Conditions and Co-Morbidity Symptoms are Monitored and Maintained or Improved: Monitor and assess patient's chronic conditions and comorbid symptoms for stability, deterioration, or improvement  Taken 1/5/2023 2120 by Donaldo Moralez 34 - Patient's Chronic Conditions and Co-Morbidity Symptoms are Monitored and Maintained or Improved: Monitor and assess patient's chronic conditions and comorbid symptoms for stability, deterioration, or improvement

## 2023-01-06 NOTE — PROGRESS NOTES
Date:2023  Patient Name: Korin Boyd  MRN: [id number]  : 1960  ROOM #: [id number]    Occupational Therapy order received, chart reviewed and evaluation attempted this date. Patient is unavailable for OT evaluation due to off floor at nuclear medicine. Will re-attempt OT evaluation at a later time. Thank you.      Jeff Yusuf OTR/L #[id number]

## 2023-01-07 LAB
ALBUMIN SERPL-MCNC: 3.8 G/DL (ref 3.5–5.2)
ALP BLD-CCNC: 183 U/L (ref 35–104)
ALT SERPL-CCNC: 52 U/L (ref 0–32)
ANION GAP SERPL CALCULATED.3IONS-SCNC: 8 MMOL/L (ref 7–16)
AST SERPL-CCNC: 52 U/L (ref 0–31)
BASOPHILS ABSOLUTE: 0.01 E9/L (ref 0–0.2)
BASOPHILS RELATIVE PERCENT: 0.1 % (ref 0–2)
BILIRUB SERPL-MCNC: 1.1 MG/DL (ref 0–1.2)
BILIRUBIN DIRECT: 0.5 MG/DL (ref 0–0.3)
BILIRUBIN, INDIRECT: 0.6 MG/DL (ref 0–1)
BUN BLDV-MCNC: 22 MG/DL (ref 6–23)
CALCIUM SERPL-MCNC: 8.7 MG/DL (ref 8.6–10.2)
CHLORIDE BLD-SCNC: 100 MMOL/L (ref 98–107)
CO2: 34 MMOL/L (ref 22–29)
CREAT SERPL-MCNC: 0.3 MG/DL (ref 0.5–1)
EOSINOPHILS ABSOLUTE: 0.02 E9/L (ref 0.05–0.5)
EOSINOPHILS RELATIVE PERCENT: 0.3 % (ref 0–6)
GFR SERPL CREATININE-BSD FRML MDRD: >60 ML/MIN/1.73
GLUCOSE BLD-MCNC: 72 MG/DL (ref 74–99)
HCT VFR BLD CALC: 30.9 % (ref 34–48)
HEMOGLOBIN: 10 G/DL (ref 11.5–15.5)
IMMATURE GRANULOCYTES #: 0.12 E9/L
IMMATURE GRANULOCYTES %: 1.7 % (ref 0–5)
LYMPHOCYTES ABSOLUTE: 1.42 E9/L (ref 1.5–4)
LYMPHOCYTES RELATIVE PERCENT: 19.8 % (ref 20–42)
MAGNESIUM: 2.1 MG/DL (ref 1.6–2.6)
MCH RBC QN AUTO: 31.1 PG (ref 26–35)
MCHC RBC AUTO-ENTMCNC: 32.4 % (ref 32–34.5)
MCV RBC AUTO: 96 FL (ref 80–99.9)
MONOCYTES ABSOLUTE: 0.59 E9/L (ref 0.1–0.95)
MONOCYTES RELATIVE PERCENT: 8.2 % (ref 2–12)
NEUTROPHILS ABSOLUTE: 5.01 E9/L (ref 1.8–7.3)
NEUTROPHILS RELATIVE PERCENT: 69.9 % (ref 43–80)
PDW BLD-RTO: 17.2 FL (ref 11.5–15)
PHOSPHORUS: 3.1 MG/DL (ref 2.5–4.5)
PLATELET # BLD: 166 E9/L (ref 130–450)
PMV BLD AUTO: 11.9 FL (ref 7–12)
POTASSIUM SERPL-SCNC: 4.2 MMOL/L (ref 3.5–5)
RBC # BLD: 3.22 E12/L (ref 3.5–5.5)
SODIUM BLD-SCNC: 142 MMOL/L (ref 132–146)
TOTAL PROTEIN: 6.2 G/DL (ref 6.4–8.3)
WBC # BLD: 7.2 E9/L (ref 4.5–11.5)

## 2023-01-07 PROCEDURE — 94669 MECHANICAL CHEST WALL OSCILL: CPT

## 2023-01-07 PROCEDURE — 6360000002 HC RX W HCPCS: Performed by: INTERNAL MEDICINE

## 2023-01-07 PROCEDURE — 6360000002 HC RX W HCPCS: Performed by: NURSE PRACTITIONER

## 2023-01-07 PROCEDURE — 94640 AIRWAY INHALATION TREATMENT: CPT

## 2023-01-07 PROCEDURE — 2700000000 HC OXYGEN THERAPY PER DAY

## 2023-01-07 PROCEDURE — 6370000000 HC RX 637 (ALT 250 FOR IP): Performed by: INTERNAL MEDICINE

## 2023-01-07 PROCEDURE — 83735 ASSAY OF MAGNESIUM: CPT

## 2023-01-07 PROCEDURE — 2580000003 HC RX 258: Performed by: INTERNAL MEDICINE

## 2023-01-07 PROCEDURE — 36415 COLL VENOUS BLD VENIPUNCTURE: CPT

## 2023-01-07 PROCEDURE — 80076 HEPATIC FUNCTION PANEL: CPT

## 2023-01-07 PROCEDURE — 85025 COMPLETE CBC W/AUTO DIFF WBC: CPT

## 2023-01-07 PROCEDURE — 80048 BASIC METABOLIC PNL TOTAL CA: CPT

## 2023-01-07 PROCEDURE — 99254 IP/OBS CNSLTJ NEW/EST MOD 60: CPT | Performed by: INTERNAL MEDICINE

## 2023-01-07 PROCEDURE — 84100 ASSAY OF PHOSPHORUS: CPT

## 2023-01-07 PROCEDURE — 6370000000 HC RX 637 (ALT 250 FOR IP): Performed by: NURSE PRACTITIONER

## 2023-01-07 PROCEDURE — 2060000000 HC ICU INTERMEDIATE R&B

## 2023-01-07 RX ORDER — ATORVASTATIN CALCIUM 40 MG/1
40 TABLET, FILM COATED ORAL NIGHTLY
Status: DISCONTINUED | OUTPATIENT
Start: 2023-01-07 | End: 2023-01-09 | Stop reason: HOSPADM

## 2023-01-07 RX ORDER — METOPROLOL SUCCINATE 25 MG/1
25 TABLET, EXTENDED RELEASE ORAL DAILY
Status: DISCONTINUED | OUTPATIENT
Start: 2023-01-07 | End: 2023-01-09 | Stop reason: HOSPADM

## 2023-01-07 RX ORDER — FUROSEMIDE 20 MG/1
20 TABLET ORAL DAILY
Status: DISCONTINUED | OUTPATIENT
Start: 2023-01-07 | End: 2023-01-09 | Stop reason: HOSPADM

## 2023-01-07 RX ORDER — IPRATROPIUM BROMIDE AND ALBUTEROL SULFATE 2.5; .5 MG/3ML; MG/3ML
1 SOLUTION RESPIRATORY (INHALATION) EVERY 4 HOURS PRN
Status: DISCONTINUED | OUTPATIENT
Start: 2023-01-07 | End: 2023-01-09 | Stop reason: HOSPADM

## 2023-01-07 RX ADMIN — ENOXAPARIN SODIUM 70 MG: 100 INJECTION SUBCUTANEOUS at 20:18

## 2023-01-07 RX ADMIN — SERTRALINE 25 MG: 50 TABLET, FILM COATED ORAL at 12:14

## 2023-01-07 RX ADMIN — ARFORMOTEROL TARTRATE 15 MCG: 15 SOLUTION RESPIRATORY (INHALATION) at 03:57

## 2023-01-07 RX ADMIN — VITAMIN D, TAB 1000IU (100/BT) 2000 UNITS: 25 TAB at 12:16

## 2023-01-07 RX ADMIN — PANTOPRAZOLE SODIUM 40 MG: 40 TABLET, DELAYED RELEASE ORAL at 17:01

## 2023-01-07 RX ADMIN — Medication 10 ML: at 20:18

## 2023-01-07 RX ADMIN — ARFORMOTEROL TARTRATE 15 MCG: 15 SOLUTION RESPIRATORY (INHALATION) at 18:04

## 2023-01-07 RX ADMIN — OXYCODONE HYDROCHLORIDE AND ACETAMINOPHEN 1000 MG: 500 TABLET ORAL at 12:14

## 2023-01-07 RX ADMIN — BUDESONIDE 500 MCG: 0.5 SUSPENSION RESPIRATORY (INHALATION) at 03:57

## 2023-01-07 RX ADMIN — ENOXAPARIN SODIUM 70 MG: 100 INJECTION SUBCUTANEOUS at 12:12

## 2023-01-07 RX ADMIN — IPRATROPIUM BROMIDE AND ALBUTEROL SULFATE 1 AMPULE: .5; 2.5 SOLUTION RESPIRATORY (INHALATION) at 09:52

## 2023-01-07 RX ADMIN — CEFDINIR 300 MG: 300 CAPSULE ORAL at 20:18

## 2023-01-07 RX ADMIN — IPRATROPIUM BROMIDE AND ALBUTEROL SULFATE 1 AMPULE: .5; 2.5 SOLUTION RESPIRATORY (INHALATION) at 13:47

## 2023-01-07 RX ADMIN — Medication 50 MG: at 12:15

## 2023-01-07 RX ADMIN — PREDNISONE 30 MG: 20 TABLET ORAL at 12:13

## 2023-01-07 RX ADMIN — IPRATROPIUM BROMIDE AND ALBUTEROL SULFATE 1 AMPULE: .5; 2.5 SOLUTION RESPIRATORY (INHALATION) at 18:04

## 2023-01-07 RX ADMIN — FUROSEMIDE 20 MG: 20 TABLET ORAL at 12:15

## 2023-01-07 RX ADMIN — SACUBITRIL AND VALSARTAN 1 TABLET: 24; 26 TABLET, FILM COATED ORAL at 20:18

## 2023-01-07 RX ADMIN — PANTOPRAZOLE SODIUM 40 MG: 40 TABLET, DELAYED RELEASE ORAL at 05:46

## 2023-01-07 RX ADMIN — ASPIRIN 81 MG: 81 TABLET, CHEWABLE ORAL at 12:14

## 2023-01-07 RX ADMIN — METOPROLOL SUCCINATE 25 MG: 25 TABLET, EXTENDED RELEASE ORAL at 12:26

## 2023-01-07 RX ADMIN — ATORVASTATIN CALCIUM 40 MG: 40 TABLET, FILM COATED ORAL at 20:18

## 2023-01-07 RX ADMIN — CEFDINIR 300 MG: 300 CAPSULE ORAL at 12:26

## 2023-01-07 RX ADMIN — BUDESONIDE 500 MCG: 0.5 SUSPENSION RESPIRATORY (INHALATION) at 18:04

## 2023-01-07 RX ADMIN — IPRATROPIUM BROMIDE AND ALBUTEROL SULFATE 1 AMPULE: .5; 2.5 SOLUTION RESPIRATORY (INHALATION) at 03:57

## 2023-01-07 ASSESSMENT — PAIN SCALES - GENERAL
PAINLEVEL_OUTOF10: 0

## 2023-01-07 NOTE — CONSULTS
INPATIENT CARDIOLOGY CONSULT    Name: Anni Damon    Age: 58 y.o. Date of Admission: 1/4/2023  6:11 AM    Date of Service: 1/7/2023    Reason for Consultation:   Chief Complaint   Patient presents with    Shortness of Breath     Pt arrives to the ED via EMS for c/o shortness of breath and wears oxygen at home 4lpm.           Referring Physician: Westley Patterson DO    History of Present Illness: 22-year-old female with history of diastolic heart failure, COPD, prior history of DVT apparently provoked and completed anticoagulation, tobacco abuse, and hyperlipidemia who is hospitalized for management of COVID-19 and underwent stress test which is abnormal and subsequently cardiology is consulted. Past Medical History:  Past Medical History:   Diagnosis Date    Abscess     colon    Acute on chronic diastolic CHF (congestive heart failure) ([address] 1/4/2023    COPD (chronic obstructive pulmonary disease) (HCC)     Diverticulitis     Provoked DVT 3/2018     3 months Eliquis for DVT due to PICC line    Seasonal allergic rhinitis     Smoker 11/30/2019    5-6 per day    Tobacco use disorder      : 1 ppd since age 25       Past Surgical History:  Past Surgical History:   Procedure Laterality Date    CARDIOVASCULAR STRESS TEST N/A 01/06/2023    lexiscan stress test       Family History:  Family History   Problem Relation Age of Onset    Colon Cancer None     Other Father     Coronary Art Dis Father         mother    Hypertension Father     Diabetes Unknown relative         older age; pat grandmother    Other Daughter         son; ex-    Breast Cancer None     Stroke Mother        Social History:  Social History     Socioeconomic History    Marital status:       Spouse name: Not on file    Number of children: 2    Years of education: Not on file    Highest education level: Not on file   Occupational History    Not on file   Tobacco Use    Smoking status: Former     Packs/day: 0.50     Years: 41.00 Pack years: 20.50     Types: Cigarettes     Quit date: 2021     Years since quittin.6    Smokeless tobacco: Never   Vaping Use    Vaping Use: Never used   Substance and Sexual Activity    Alcohol use: Yes     Comment: social    Drug use: Not Currently     Types: Marijuana (Weed)     Comment: occasional     Sexual activity: Not Currently   Other Topics Concern    Not on file   Social History Narrative    First  is .      Social Determinants of Health     Financial Resource Strain: Not on file   Food Insecurity: Not on file   Transportation Needs: Not on file   Physical Activity: Not on file   Stress: Not on file   Social Connections: Not on file   Intimate Partner Violence: Not on file   Housing Stability: Not on file       Allergies:  Allergies   Allergen Reactions    Penicillin V Hives and Other (See Comments)     2005 UNKNOWN, PLEASE VERIFY, 2005 UNKNOWN, PLEASE VERIFY       Home Medications:  Prior to Admission medications    Medication Sig Start Date End Date Taking? Authorizing Provider   theophylline (UNIPHYL) 400 MG extended release tablet Take 400 mg by mouth daily   Yes Historical Provider, MD   albuterol sulfate HFA (VENTOLIN HFA) 108 (90 Base) MCG/ACT inhaler Inhale 2 puffs into the lungs 4 times daily as needed for Wheezing or Shortness of Breath 23   SKYE Lucas CNP   Arformoterol Tartrate (BROVANA) 15 MCG/2ML NEBU Take 2 mLs by nebulization in the morning and 2 mLs in the evening. 23   SKYE Lucas CNP   budesonide (PULMICORT) 0.5 MG/2ML nebulizer suspension Take 2 mLs by nebulization in the morning and 2 mLs in the evening. 23   SKYE Lucas CNP   ipratropium-albuterol (DUONEB) 0.5-2.5 (3) MG/3ML SOLN nebulizer solution Inhale 3 mLs into the lungs in the morning and 3 mLs at noon and 3 mLs in the evening and 3 mLs before bedtime. 23   SKYE Lucas CNP   Umeclidinium Bromide (INCRUSE ELLIPTA) 62.5 MCG/ACT AEPB  Inhale 1 puff into the lungs daily 1/1/23   SKYE Mcpherson CNP   atorvastatin (LIPITOR) 40 MG tablet Take 1 tablet by mouth nightly 2/1/23   SKYE Mcpherson CNP   sertraline (ZOLOFT) 25 MG tablet Take 1 tablet by mouth daily 1/1/23   SKYE Mcpherson CNP   cefdinir (OMNICEF) 300 MG capsule Take 1 capsule by mouth every 12 hours for 10 days 1/1/23 1/11/23  SKYE Mcpherson CNP   predniSONE (DELTASONE) 10 MG tablet Take by oral route 4 tabs x 3 days, then 3 tabs x 3 days, then 2 tabs x 3 days, then 1 tab x 3 days, then discontinue. Take with food. 1/1/23   SKYE Mcpherson CNP   fluticasone [facility]) 50 MCG/ACT nasal spray 2 sprays by Each Nostril route daily 1/1/23   SKYE Mcpherson CNP   melatonin 5 MG TBDP disintegrating tablet Take 1 tablet by mouth nightly 1/1/23   SKYE Mcpherson CNP   metroNIDAZOLE (FLAGYL) 500 MG tablet Take 1 tablet by mouth every 8 hours for 10 days 1/1/23 1/11/23  SKYE Mcpherson CNP   Vitamin D (CHOLECALCIFEROL) 50 MCG (2000 UT) TABS tablet Take 1 tablet by mouth daily 1/1/23   SKYE Mcpherson CNP   pantoprazole (PROTONIX) 40 MG tablet Take 1 tablet by mouth in the morning and at bedtime for 14 days, THEN 1 tablet daily.  1/1/23 2/14/23  SKYE Mcpherson CNP   zinc 50 MG CAPS Take 50 mg by mouth daily  Patient not taking: Reported on 1/4/2023 1/1/23   SKYE Mcpherson CNP   ascorbic acid (VITAMIN C) 1000 MG tablet Take 1 tablet by mouth daily 12/4/22   SKYE Mcpherson CNP   OXYGEN Inhale 3 L into the lungs continuous    Historical Provider, MD       Current Medications:  Current Facility-Administered Medications   Medication Dose Route Frequency Provider Last Rate Last Admin    metoprolol succinate (TOPROL XL) extended release tablet 25 mg  25 mg Oral Daily Aron Heller DO   25 mg at 01/07/23 1226    atorvastatin (LIPITOR) tablet 40 mg  40 mg Oral Nightly Aron Heller DO        furosemide (LASIX) tablet 20 mg  20 mg Oral Daily Angelica Sic, DO   20 mg at 01/07/23 1215    enoxaparin (LOVENOX) injection 70 mg  1 mg/kg SubCUTAneous BID Viviana Saucer, APRN - CNP   70 mg at 01/07/23 1212    technetium sestamibi (CARDIOLITE) injection 10 millicurie  10 millicurie IntraVENous ONCE PRN Jacque Monsalve MD        ascorbic acid (VITAMIN C) tablet 1,000 mg  1,000 mg Oral Daily Angelica Sic, DO   1,000 mg at 01/07/23 1214    Arformoterol Tartrate (BROVANA) nebulizer solution 15 mcg  15 mcg Nebulization BID Angelica Sic, DO   15 mcg at 01/07/23 1804    budesonide (PULMICORT) nebulizer suspension 500 mcg  0.5 mg Nebulization BID Angelica Sic, DO   500 mcg at 01/07/23 1804    cefdinir (OMNICEF) capsule 300 mg  300 mg Oral 2 times per day Angelica Sic, DO   300 mg at 01/07/23 1226    pantoprazole (PROTONIX) tablet 40 mg  40 mg Oral BID AC Angelica Sic, DO   40 mg at 01/07/23 1701    sertraline (ZOLOFT) tablet 25 mg  25 mg Oral Daily Angelica Sic, DO   25 mg at 01/07/23 1214    vitamin D (CHOLECALCIFEROL) tablet 2,000 Units  2,000 Units Oral Daily Angelica Sic, DO   2,000 Units at 01/07/23 1216    zinc sulfate (ZINCATE) capsule 50 mg  50 mg Oral Daily Angelica Sic, DO   50 mg at 01/07/23 1215    ipratropium-albuterol (DUONEB) nebulizer solution 1 ampule  1 ampule Inhalation 4x daily Angelica Sic, DO   1 ampule at 01/07/23 1804    magnesium sulfate 1000 mg in dextrose 5% 100 mL IVPB  1,000 mg IntraVENous PRN Angelica Sic, DO        sodium phosphate 12 mmol in sodium chloride 0.9 % 250 mL IVPB  12 mmol IntraVENous PRN Angelica Sic, DO        Or    sodium phosphate 21 mmol in sodium chloride 0.9 % 500 mL IVPB  21 mmol IntraVENous PRN Angelica Sic, DO        potassium chloride (KLOR-CON M) extended release tablet 40 mEq  40 mEq Oral PRN Angelica Sic, DO        Or    potassium bicarb-citric acid (EFFER-K) effervescent tablet 40 mEq  40 mEq Oral PRN Angelica Sic, DO        Or    potassium chloride 10 mEq/100 mL IVPB (Peripheral Line)  10 mEq IntraVENous PRN Angelica Sic, DO sodium chloride flush 0.9 % injection 5-40 mL  5-40 mL IntraVENous 2 times per day Alejandro Holiday, DO   10 mL at 01/06/23 2138    sodium chloride flush 0.9 % injection 5-40 mL  5-40 mL IntraVENous PRN Alejandro Holiday, DO        0.9 % sodium chloride infusion   IntraVENous PRN Alejandro Holiday, DO        acetaminophen (TYLENOL) tablet 650 mg  650 mg Oral Q6H PRN Alejandro Holiday, DO        Or    acetaminophen (TYLENOL) suppository 650 mg  650 mg Rectal Q6H PRN Alejandro Holiday, DO        polyethylene glycol (GLYCOLAX) packet 17 g  17 g Oral Daily PRN Alejadnro Holiday, DO        aspirin chewable tablet 81 mg  81 mg Oral Daily Alejandro Holiday, DO   81 mg at 01/07/23 1214    perflutren lipid microspheres (DEFINITY) injection 1.5 mL  1.5 mL IntraVENous ONCE PRN Errol Grime, APRN - CNP        predniSONE (DELTASONE) tablet 30 mg  30 mg Oral Daily Errol Grime, APRN - CNP   30 mg at 01/07/23 1213    Followed by    Mia Lane ON 1/9/2023] predniSONE (DELTASONE) tablet 20 mg  20 mg Oral Daily Errol Grime, APRN - CNP        Followed by    Mia Lane ON 1/12/2023] predniSONE (DELTASONE) tablet 10 mg  10 mg Oral Daily Errol Grime, APRN - CNP          sodium chloride             Review of Systems:   Cardiac: As per HPI  General: Denies fever or chills  Pulmonary: As per HPI  HEENT: Denies runny nose  GI: No complaints  : No complaints  Endocrine: Denies night sweats  Musculoskeletal: No complaints  Skin: Dry skin  Neuro: No complaints  Psych: Denies depression    Physical Exam:  /76   Pulse 77   Temp 97.5 °F (36.4 °C) (Oral)   Resp 20   Ht 5' 3\" (1.6 m)   Wt 152 lb (68.9 kg)   SpO2 99%   BMI 26.93 kg/m²   Wt Readings from Last 3 Encounters:   01/06/23 152 lb (68.9 kg)   12/28/22 157 lb (71.2 kg)   12/22/22 157 lb 5 oz (71.4 kg)       Appearance: Alert and oriented x3 not in acute distress.   Skin: Dry skin  Head: Atraumatic  Eyes: Intact extraocular muscles   ENMT: Mucous membranes are moist  Neck: Supple  Lungs: Clear to auscultation  Cardiac: Normal S1 and S2  Abdomen: Protuberant  Extremities: Intact range of motion  Neurologic: No focal neurological deficits  Peripheral Pulses: 2+ peripheral pulses      Intake/Output:    Intake/Output Summary (Last 24 hours) at 1/7/2023 1846  Last data filed at 1/7/2023 1422  Gross per 24 hour   Intake --   Output 200 ml   Net -200 ml     I/O this shift:  In: -   Out: 200 [Urine:200]      Laboratory Tests:  Recent Labs     01/05/23  0829 01/07/23  0728    142   K 4.4 4.2   CL 98 100   CO2 35* 34*   BUN 26* 22   CREATININE 0.4* 0.3*   GLUCOSE 93 72*   CALCIUM 8.5* 8.7     Lab Results   Component Value Date/Time    MG 2.1 01/07/2023 07:28 AM    MG 2.2 01/05/2023 08:29 AM    MG 2.1 01/04/2023 07:38 AM     Recent Labs     01/05/23  0829 01/07/23  0728   ALKPHOS 281* 183*   ALT 93* 52*   AST 92* 52*   PROT 6.1* 6.2*   BILITOT 1.4* 1.1   BILIDIR 0.7* 0.5*   LABALBU 2.9* 3.8     Recent Labs     01/05/23  0829 01/07/23  0728   WBC 10.5 7.2   RBC 3.18* 3.22*   HGB 10.7* 10.0*   HCT 30.1* 30.9*   MCV 94.7 96.0   MCH 33.6 31.1   MCHC 35.5* 32.4   RDW 16.6* 17.2*    166   MPV 11.8 11.9     Lab Results   Component Value Date    TROPONINI <0.01 05/20/2021    TROPONINI <0.01 05/17/2021    TROPONINI <0.01 11/29/2019     Recent Labs     01/04/23  2250 01/05/23  0015   TROPHS 18* 18*     Lab Results   Component Value Date    INR 1.5 12/19/2022    INR 1.0 10/28/2019    INR 0.9 01/31/2018    PROTIME 17.1 (H) 12/19/2022    PROTIME 11.3 10/28/2019    PROTIME 9.8 01/31/2018     Lab Results   Component Value Date    TSH 1.120 01/05/2023    TSH 0.648 12/17/2022    TSH 0.264 (L) 02/10/2022     Lab Results   Component Value Date    LABA1C 5.0 02/10/2022    LABA1C 4.8 03/08/2021    LABA1C 4.9 11/30/2019     No results found for: EAG  Lab Results   Component Value Date    CHOL 262 (H) 02/10/2022    CHOL 272 (H) 11/30/2019    CHOL 199 10/29/2019     Lab Results   Component Value Date    TRIG 51 02/10/2022    TRIG 82 11/30/2019    TRIG 79 10/29/2019     Lab Results   Component Value Date    HDL 69 02/10/2022    HDL 61 11/30/2019    HDL 45 10/29/2019     Lab Results   Component Value Date    LDLCALC 183 (H) 02/10/2022    LDLCALC 195 (H) 11/30/2019    LDLCALC 138 (H) 10/29/2019     Lab Results   Component Value Date    LABVLDL 10 02/10/2022    LABVLDL 16 11/30/2019    LABVLDL 16 10/29/2019     No results found for: CHOLHDLRATIO  No results for input(s): PROBNP in the last 72 hours. Cardiac Tests:        Echocardiogram reviewed: January 4, 3663   LV systolic function is not well assessed but appears mildly reduced. Ejection fraction is visually estimated at 40-45%. Abnormal diastolic function. Wall motion not well seen; LV contrast agent not utilized. Appears to be hypokinesis of the mid inferoseptal and mid anteroseptal   wall. Normal left ventricular wall thickness. Grossly normal right ventricular size and function. No significant valvular abnormalities. Stress test reviewed: January 5, 2023  Impression       The myocardial perfusion imaging was abnormal with a moderate size   moderate intensity and partial reversible anteroseptal and   anterolateral perfusion abnormality with mild associated regional wall   motion abnormalities consistent with ischemia of the left anterior   descending distribution potentially in addition to that of   postischemic stunning. Overall left ventricular systolic function was at the lower limits of   normal with regional wall motion abnormalities as described above. Intermediate risk pharmacologic myocardial perfusion imaging study. Cardiac catheterization reviewed:     CXR reviewed:      The 10-year ASCVD risk score (Vu SILVA, et al., 2019) is: 4.2%    Values used to calculate the score:      Age: 58 years      Sex: Female      Is Non- : No      Diabetic: No      Tobacco smoker: No      Systolic Blood Pressure: 350 mmHg      Is BP treated: No      HDL Cholesterol: 69 mg/dL      Total Cholesterol: 262 mg/dL    ASSESSMENT / PLAN:    Assessment:  Acutely decompensated diastolic congestive heart failure with now depressed systolic function and motion abnormalities/hypokinesis   Abnormal stress test with intermediate risk scan   History of  transaminitis with hyperbilirubinemia, multiple liver cysts, negative MRCP for biliary obstruction, Abnormal GB appearance with negative ultrasound   COVID-19 infection with respiratory failure  Recurrent diverticulitis with prior or recent abscess drainage  Chronic respiratory failure with hypoxia secondary to advanced COPD   Left upper extremity IV infiltrate  Ongoing tobacco abuse  Prior history of DVT  Hyperlipidemia on statin agent     Plan:   Continue aspirin and beta-blocker   Hold statin if worsening liver function test  Invasive coronary angiogram once COVID-19 is resolved   If hemoglobin is dropping please rule out GI bleed prior to cardiac catheterization  Initiate low-dose Entresto if there is no contraindication  If patient is able to tolerate Entresto please initiate spironolactone the following day  Rest of management per primary and pulmonology as well as ID      Thank you for allowing me to participate in your patient's care. Please feel free to contact me if you have any questions or concerns.     Charli Cronin MD  [facility]

## 2023-01-07 NOTE — PLAN OF CARE
Problem: Discharge Planning  Goal: Discharge to home or other facility with appropriate resources  1/7/2023 0045 by Yobany Dotson  Outcome: Progressing     Problem: Skin/Tissue Integrity  Goal: Absence of new skin breakdown  Description: 1. Monitor for areas of redness and/or skin breakdown  2. Assess vascular access sites hourly  3. Every 4-6 hours minimum:  Change oxygen saturation probe site  4. Every 4-6 hours:  If on nasal continuous positive airway pressure, respiratory therapy assess nares and determine need for appliance change or resting period.   1/7/2023 0045 by Yobany Dotson  Outcome: Progressing     Problem: Safety - Adult  Goal: Free from fall injury  1/7/2023 0045 by Yobany Dotson  Outcome: Progressing     Problem: ABCDS Injury Assessment  Goal: Absence of physical injury  1/7/2023 0045 by Yobany Dotson  Outcome: Progressing     Problem: Pain  Goal: Verbalizes/displays adequate comfort level or baseline comfort level  1/7/2023 0045 by Yobany Dotson  Outcome: Progressing  Flowsheets  Taken 1/6/2023 1715 by Jeronimo Soares RN  Verbalizes/displays adequate comfort level or baseline comfort level: Encourage patient to monitor pain and request assistance  Taken 1/6/2023 1300 by Jeronimo Soares RN  Verbalizes/displays adequate comfort level or baseline comfort level: Encourage patient to monitor pain and request assistance     Problem: Chronic Conditions and Co-morbidities  Goal: Patient's chronic conditions and co-morbidity symptoms are monitored and maintained or improved  1/7/2023 0045 by Yobany Dotson  Outcome: Progressing

## 2023-01-07 NOTE — PROGRESS NOTES
Internal Medicine Progress Note    [facility]    Irlanda MARIELOS HernandezORAFAL Ohara D.O., ASHLI Wong, MSN, APRN, NP-C  Carlo Ivy. Samantha Vega, MSN, APRN-CNP     Primary Care Physician: Courtney Hill. Ladonna Murphy MD   Admitting Physician:  Jennifer Solis DO  Admission date and time: 1/4/2023  6:11 AM    Room:  [address] [place] [address]  Admitting diagnosis: Acute on chronic diastolic CHF (congestive heart failure) (HCC) [I50.33]  Hypervolemia, unspecified hypervolemia type [E87.70]    Patient Name: Nena Castillo  MRN: [id number]    Date of Service: 1/7/2023     Subjective:  Aj Casanova is a 58 y.o. female who was seen and examined today,1/7/2023, at the bedside. Patient was resting in bed. Admits to fatigue/malaise. States lower leg edema is slightly improved today. Patient did have IV infiltrate in the left upper extremity. She states that there is less pain and swelling but she has noticed a lot of weeping. Patient did have cardiac stress test yesterday. Results were abnormal.  This was discussed with the patient at length. She is aware that cardiology is going to be consulted and she may need a tentative cardiac catheterization. Patient continues to have complaint of weak cough and inability to expectorate sputum. No family present during my examination. Review of systems:  Constitutional:   Positive for significant fatigue and malaise , - fever/chills  HEENT:   Denies ear pain, sore throat, sinus or eye problems. Cardiovascular:   Denies any chest pain, irregular heartbeats, or palpitations. Respiratory:   - dyspnea at rest, + but improving dyspnea on exertion, + coughing, - sputum, - hemoptysis  Gastrointestinal:   - nausea, -vomiting. - diarrhea, - constipation. + but improving poor appetite and poor intake. - abdominal pain. Genitourinary:    Denies any urgency, frequency, hematuria. Voiding  without difficulty.   Extremities: Improving lower extremity swelling. See subjective  Neurology:    Denies any headache or focal neurological deficits, positive for generalized weakness and fatigue without focal component  Psch:   Denies being anxious or depressed. Musculoskeletal:    Denies  myalgias, joint complaints or back pain. Integumentary:   Blistering of the left antecubital.  Present bilateral extremities secondary to venipuncture. Hematologic/Lymphatic:  Denies bruising or bleeding. Physical Exam:  No intake/output data recorded. No intake or output data in the 24 hours ending 01/07/23 0845  No intake/output data recorded. Patient Vitals for the past 96 hrs (Last 3 readings):   Weight   01/06/23 0524 152 lb (68.9 kg)   01/04/23 0611 157 lb (71.2 kg)     Vital Signs:   Blood pressure 123/72, pulse 78, temperature 98.6 °F (37 °C), resp. rate 18, height 5' 3\" (1.6 m), weight 152 lb (68.9 kg), SpO2 94 %, not currently breastfeeding. General appearance:  Alert, responsive, oriented to person, place, and time. Acute on chronic ill appearance, no distress. Head:  Normocephalic. No masses, lesions or tenderness. Eyes:  PERRLA. EOMI. Sclera clear. ENT:  Ears normal. Mucosa normal.  Neck:    Supple. Trachea midline. No thyromegaly. No JVD. No bruits. Heart:    Rhythm regular. Rate controlled. S1 and S2. Systolic murmur. Lungs:    Symmetrical.  Air exchange is improved. No further rales. No wheezing or rhonchi. Pattern is regular, even and non-labored. Abdomen:   Soft. Non-tender. Non-distended. Bowel sounds positive. No organomegaly or masses. No pain on palpation. Extremities:    Peripheral pulses present. Continued improvement in the degree of lower extremity peripheral edema. Patient has left upper extremity swelling/edema from IV infiltrate. Serosanguineous drainage noted. Dressing intact. Neurologic:    Alert x 3. Mild generalized weakness without focal deficit. Cranial nerves grossly intact.  No focal weakness. Psych:   Behavior is normal. Mood appears normal. Speech is not rapid and/or pressured. Musculoskeletal:   No unilateral joint edema, erythema, or warmth. Gait not assessed. Integumentary:  No rashes  Skin normal color and texture.   Genitalia/Breast:  Deferred    Medication:  Scheduled Meds:   enoxaparin  1 mg/kg SubCUTAneous BID    ascorbic acid  1,000 mg Oral Daily    arformoterol tartrate  15 mcg Nebulization BID    budesonide  0.5 mg Nebulization BID    cefdinir  300 mg Oral 2 times per day    pantoprazole  40 mg Oral BID AC    sertraline  25 mg Oral Daily    Vitamin D  2,000 Units Oral Daily    zinc sulfate  50 mg Oral Daily    ipratropium-albuterol  1 ampule Inhalation 4x daily    sodium chloride flush  5-40 mL IntraVENous 2 times per day    aspirin  81 mg Oral Daily    predniSONE  30 mg Oral Daily    Followed by    Vidya Galarza ON 1/9/2023] predniSONE  20 mg Oral Daily    Followed by    Vidya Galarza ON 1/12/2023] predniSONE  10 mg Oral Daily     Continuous Infusions:   sodium chloride         Objective Data:  CBC with Differential:    Lab Results   Component Value Date/Time    WBC 7.2 01/07/2023 07:28 AM    RBC 3.22 01/07/2023 07:28 AM    HGB 10.0 01/07/2023 07:28 AM    HCT 30.9 01/07/2023 07:28 AM     01/07/2023 07:28 AM    MCV 96.0 01/07/2023 07:28 AM    MCH 31.1 01/07/2023 07:28 AM    MCHC 32.4 01/07/2023 07:28 AM    RDW 17.2 01/07/2023 07:28 AM    NRBC 0.9 11/30/2022 05:18 AM    METASPCT 0.9 12/22/2022 06:18 AM    LYMPHOPCT 19.8 01/07/2023 07:28 AM    MONOPCT 8.2 01/07/2023 07:28 AM    MYELOPCT 1.0 12/28/2022 07:02 PM    BASOPCT 0.1 01/07/2023 07:28 AM    MONOSABS 0.59 01/07/2023 07:28 AM    LYMPHSABS 1.42 01/07/2023 07:28 AM    EOSABS 0.02 01/07/2023 07:28 AM    BASOSABS 0.01 01/07/2023 07:28 AM     CMP:    Lab Results   Component Value Date/Time     01/05/2023 08:29 AM    K 4.4 01/05/2023 08:29 AM    K 3.1 01/04/2023 07:38 AM    CL 98 01/05/2023 08:29 AM    CO2 35 01/05/2023 08:29 AM BUN 26 01/05/2023 08:29 AM    CREATININE 0.4 01/05/2023 08:29 AM    GFRAA >60 04/13/2022 02:20 PM    LABGLOM >60 01/05/2023 08:29 AM    GLUCOSE 93 01/05/2023 08:29 AM    PROT 6.1 01/05/2023 08:29 AM    LABALBU 2.9 01/05/2023 08:29 AM    CALCIUM 8.5 01/05/2023 08:29 AM    BILITOT 1.4 01/05/2023 08:29 AM    ALKPHOS 281 01/05/2023 08:29 AM    AST 92 01/05/2023 08:29 AM    ALT 93 01/05/2023 08:29 AM     Recent Labs     01/05/23  0015   TROPHS 18*         Wound Documentation:    See documentation    Assessment:  Acutely decompensated diastolic congestive heart failure with now depressed systolic function and motion abnormalities/hypokinesis   Abnormal stress test-findings consistent with ischemia of the LAD   Recent hospitalization for transaminitis with hyperbilirubinemia, multiple liver cysts, negative MRCP for biliary obstruction, Abnormal GB appearance with negative ultrasound   COVID-19 infection with respiratory symptomatology  Recurrent diverticulitis versus resolving diverticulitis  Recent sepsis secondary to acute diverticulitis with perforation and abscess status post IR guided drainage  Chronic respiratory failure with hypoxia secondary to advanced COPD without exacerbation   Left upper extremity IV infiltrate  Ongoing tobacco abuse  Prior history of DVT  Hyperlipidemia on statin agent    Plan:   Jolie Velásquez underwent cardiac stress test yesterday. Results were abnormal and findings were consistent with ischemia of the LAD. Cardiology has been consulted. Will await their recommendations. Patient will likely need to undergo cardiac catheterization. Patient has less swelling of the left upper extremity from the IV infiltrate. Ultrasound was negative for DVT. Continue to elevate the affected extremity and change dressings as needed  Continue diuretic therapy as prescribed. Monitor intake and output. Unfortunately I's and O's remain incomplete. Will reorder. Discussed with staff. Daily weight.   Continue nebulized respiratory treatments. Continue antibiotic therapy. Awaiting CMP results. Hemoglobin did trend downward. We will continue to monitor closely. Monitor for signs and symptoms of bleeding. PT/OT to evaluate and treat. Respiratory supportive measures in the setting of advanced/end-stage COPD with recent COVID-19 infection. Chronic morbidities, labs and vital signs are being monitored and addressed accordingly. Continue current therapy. See orders for further plan of care. More than 50% of my  time was spent at the bedside counseling/coordinating care with the patient and/or family with face to face contact. This time was spent reviewing notes and laboratory data as well as instructing and counseling the patient. Time I spent with the family or surrogate(s) is included only if the patient was incapable of providing the necessary information or participating in medical decisions. I also discussed the differential diagnosis and all of the proposed management plans with the patient and individuals accompanying the patient. Aj Casanova requires this high level of physician care and nursing on the IMC/Telemetry unit due the complexity of decision management and chance of rapid decline or death. Continued cardiac monitoring and higher level of nursing are required. I am readily available for any further decision-making and intervention.      Marily Skelton, SKYE - CNP  1/7/2023  8:45 AM

## 2023-01-08 LAB
ALBUMIN SERPL-MCNC: 3.8 G/DL (ref 3.5–5.2)
ALP BLD-CCNC: 174 U/L (ref 35–104)
ALT SERPL-CCNC: 41 U/L (ref 0–32)
ANION GAP SERPL CALCULATED.3IONS-SCNC: 7 MMOL/L (ref 7–16)
AST SERPL-CCNC: 37 U/L (ref 0–31)
BASOPHILS ABSOLUTE: 0.01 E9/L (ref 0–0.2)
BASOPHILS RELATIVE PERCENT: 0.1 % (ref 0–2)
BILIRUB SERPL-MCNC: 1.3 MG/DL (ref 0–1.2)
BILIRUBIN DIRECT: 0.4 MG/DL (ref 0–0.3)
BILIRUBIN, INDIRECT: 0.9 MG/DL (ref 0–1)
BUN BLDV-MCNC: 22 MG/DL (ref 6–23)
CALCIUM SERPL-MCNC: 9.1 MG/DL (ref 8.6–10.2)
CHLORIDE BLD-SCNC: 100 MMOL/L (ref 98–107)
CO2: 33 MMOL/L (ref 22–29)
CREAT SERPL-MCNC: 0.3 MG/DL (ref 0.5–1)
EOSINOPHILS ABSOLUTE: 0.04 E9/L (ref 0.05–0.5)
EOSINOPHILS RELATIVE PERCENT: 0.4 % (ref 0–6)
GFR SERPL CREATININE-BSD FRML MDRD: >60 ML/MIN/1.73
GLUCOSE BLD-MCNC: 94 MG/DL (ref 74–99)
HCT VFR BLD CALC: 32.5 % (ref 34–48)
HEMOGLOBIN: 10.2 G/DL (ref 11.5–15.5)
IMMATURE GRANULOCYTES #: 0.13 E9/L
IMMATURE GRANULOCYTES %: 1.4 % (ref 0–5)
LYMPHOCYTES ABSOLUTE: 1.66 E9/L (ref 1.5–4)
LYMPHOCYTES RELATIVE PERCENT: 18 % (ref 20–42)
MAGNESIUM: 2 MG/DL (ref 1.6–2.6)
MCH RBC QN AUTO: 30.2 PG (ref 26–35)
MCHC RBC AUTO-ENTMCNC: 31.4 % (ref 32–34.5)
MCV RBC AUTO: 96.2 FL (ref 80–99.9)
MONOCYTES ABSOLUTE: 0.63 E9/L (ref 0.1–0.95)
MONOCYTES RELATIVE PERCENT: 6.8 % (ref 2–12)
NEUTROPHILS ABSOLUTE: 6.75 E9/L (ref 1.8–7.3)
NEUTROPHILS RELATIVE PERCENT: 73.3 % (ref 43–80)
PDW BLD-RTO: 17 FL (ref 11.5–15)
PHOSPHORUS: 2.7 MG/DL (ref 2.5–4.5)
PLATELET # BLD: 190 E9/L (ref 130–450)
PMV BLD AUTO: 12.1 FL (ref 7–12)
POTASSIUM SERPL-SCNC: 3.8 MMOL/L (ref 3.5–5)
RBC # BLD: 3.38 E12/L (ref 3.5–5.5)
SODIUM BLD-SCNC: 140 MMOL/L (ref 132–146)
TOTAL PROTEIN: 6.2 G/DL (ref 6.4–8.3)
WBC # BLD: 9.2 E9/L (ref 4.5–11.5)

## 2023-01-08 PROCEDURE — 99222 1ST HOSP IP/OBS MODERATE 55: CPT | Performed by: INTERNAL MEDICINE

## 2023-01-08 PROCEDURE — 6360000002 HC RX W HCPCS: Performed by: NURSE PRACTITIONER

## 2023-01-08 PROCEDURE — 6370000000 HC RX 637 (ALT 250 FOR IP): Performed by: INTERNAL MEDICINE

## 2023-01-08 PROCEDURE — 2700000000 HC OXYGEN THERAPY PER DAY

## 2023-01-08 PROCEDURE — 84100 ASSAY OF PHOSPHORUS: CPT

## 2023-01-08 PROCEDURE — 80076 HEPATIC FUNCTION PANEL: CPT

## 2023-01-08 PROCEDURE — 6370000000 HC RX 637 (ALT 250 FOR IP): Performed by: NURSE PRACTITIONER

## 2023-01-08 PROCEDURE — 6360000002 HC RX W HCPCS: Performed by: INTERNAL MEDICINE

## 2023-01-08 PROCEDURE — 2060000000 HC ICU INTERMEDIATE R&B

## 2023-01-08 PROCEDURE — 94640 AIRWAY INHALATION TREATMENT: CPT

## 2023-01-08 PROCEDURE — 85025 COMPLETE CBC W/AUTO DIFF WBC: CPT

## 2023-01-08 PROCEDURE — 2580000003 HC RX 258: Performed by: INTERNAL MEDICINE

## 2023-01-08 PROCEDURE — 80048 BASIC METABOLIC PNL TOTAL CA: CPT

## 2023-01-08 PROCEDURE — 36415 COLL VENOUS BLD VENIPUNCTURE: CPT

## 2023-01-08 PROCEDURE — 83735 ASSAY OF MAGNESIUM: CPT

## 2023-01-08 RX ADMIN — OXYCODONE HYDROCHLORIDE AND ACETAMINOPHEN 1000 MG: 500 TABLET ORAL at 10:15

## 2023-01-08 RX ADMIN — VITAMIN D, TAB 1000IU (100/BT) 2000 UNITS: 25 TAB at 10:13

## 2023-01-08 RX ADMIN — IPRATROPIUM BROMIDE AND ALBUTEROL SULFATE 1 AMPULE: .5; 2.5 SOLUTION RESPIRATORY (INHALATION) at 06:30

## 2023-01-08 RX ADMIN — PANTOPRAZOLE SODIUM 40 MG: 40 TABLET, DELAYED RELEASE ORAL at 17:09

## 2023-01-08 RX ADMIN — METOPROLOL SUCCINATE 25 MG: 25 TABLET, EXTENDED RELEASE ORAL at 10:14

## 2023-01-08 RX ADMIN — ASPIRIN 81 MG: 81 TABLET, CHEWABLE ORAL at 10:15

## 2023-01-08 RX ADMIN — Medication 10 ML: at 21:13

## 2023-01-08 RX ADMIN — CEFDINIR 300 MG: 300 CAPSULE ORAL at 10:49

## 2023-01-08 RX ADMIN — IPRATROPIUM BROMIDE AND ALBUTEROL SULFATE 1 AMPULE: .5; 2.5 SOLUTION RESPIRATORY (INHALATION) at 18:37

## 2023-01-08 RX ADMIN — IPRATROPIUM BROMIDE AND ALBUTEROL SULFATE 1 AMPULE: .5; 2.5 SOLUTION RESPIRATORY (INHALATION) at 09:27

## 2023-01-08 RX ADMIN — ENOXAPARIN SODIUM 70 MG: 100 INJECTION SUBCUTANEOUS at 21:13

## 2023-01-08 RX ADMIN — BUDESONIDE 500 MCG: 0.5 SUSPENSION RESPIRATORY (INHALATION) at 06:30

## 2023-01-08 RX ADMIN — PANTOPRAZOLE SODIUM 40 MG: 40 TABLET, DELAYED RELEASE ORAL at 05:13

## 2023-01-08 RX ADMIN — ENOXAPARIN SODIUM 70 MG: 100 INJECTION SUBCUTANEOUS at 10:13

## 2023-01-08 RX ADMIN — ARFORMOTEROL TARTRATE 15 MCG: 15 SOLUTION RESPIRATORY (INHALATION) at 18:37

## 2023-01-08 RX ADMIN — Medication 10 ML: at 10:17

## 2023-01-08 RX ADMIN — BUDESONIDE 500 MCG: 0.5 SUSPENSION RESPIRATORY (INHALATION) at 18:37

## 2023-01-08 RX ADMIN — SERTRALINE 25 MG: 50 TABLET, FILM COATED ORAL at 10:14

## 2023-01-08 RX ADMIN — SACUBITRIL AND VALSARTAN 1 TABLET: 24; 26 TABLET, FILM COATED ORAL at 21:12

## 2023-01-08 RX ADMIN — PREDNISONE 30 MG: 20 TABLET ORAL at 10:15

## 2023-01-08 RX ADMIN — ARFORMOTEROL TARTRATE 15 MCG: 15 SOLUTION RESPIRATORY (INHALATION) at 06:30

## 2023-01-08 RX ADMIN — IPRATROPIUM BROMIDE AND ALBUTEROL SULFATE 1 AMPULE: .5; 2.5 SOLUTION RESPIRATORY (INHALATION) at 15:05

## 2023-01-08 RX ADMIN — CEFDINIR 300 MG: 300 CAPSULE ORAL at 21:12

## 2023-01-08 RX ADMIN — FUROSEMIDE 20 MG: 20 TABLET ORAL at 10:15

## 2023-01-08 RX ADMIN — Medication 50 MG: at 10:16

## 2023-01-08 RX ADMIN — ATORVASTATIN CALCIUM 40 MG: 40 TABLET, FILM COATED ORAL at 21:12

## 2023-01-08 ASSESSMENT — PAIN DESCRIPTION - DESCRIPTORS: DESCRIPTORS: SORE

## 2023-01-08 ASSESSMENT — PAIN SCALES - GENERAL
PAINLEVEL_OUTOF10: 0
PAINLEVEL_OUTOF10: 5
PAINLEVEL_OUTOF10: 0

## 2023-01-08 ASSESSMENT — PAIN DESCRIPTION - LOCATION: LOCATION: GENERALIZED

## 2023-01-08 NOTE — PROGRESS NOTES
Internal Medicine Progress Note    [facility]    Vincent Kenny. Maria Fernanda Anguiano., F.A.CChenteOChenteI. Gabriela Maria D.O., MARIELOSOChenteIASHLI Walker, MSN, APRN, NP-C  Stephanie Cooper. Yecenia Au, MSN, APRN-CNP     Primary Care Physician: Dominic Lopez. Yan Pan MD   Admitting Physician:  Angelica Esquivel DO  Admission date and time: 1/4/2023  6:11 AM    Room:  [address] [place] [address]  Admitting diagnosis: Acute on chronic diastolic CHF (congestive heart failure) ([facility]) [I50.33]  Hypervolemia, unspecified hypervolemia type [E87.70]    Patient Name: Korin Boyd  MRN: [id number]    Date of Service: 1/8/2023     Subjective:  Evelyn Rose is a 58 y.o. female who was seen and examined today,1/8/2023, at the bedside. Patient was resting in bed. States she does feel little better today. She has been using the flutter valve. Able to expectorate some sputum now. Continues to have mild shortness of breath at rest on occasion as well as exertional shortness of breath. Moist cough. Denies further lower leg edema. Patient has continued swelling of the left upper extremity. States she is noticing more blistering and has a large skin tear. No family present during my examination. Review of systems:  Constitutional:   Positive for significant fatigue and malaise , - fever/chills  HEENT:   Denies ear pain, sore throat, sinus or eye problems. Cardiovascular:   Denies any chest pain, irregular heartbeats, or palpitations. Respiratory:   +dyspnea at rest, + but improving dyspnea on exertion, + coughing, - + sputum, - hemoptysis  Gastrointestinal:   - nausea, -vomiting. - diarrhea, - constipation. Appetite is better. Abdominal pain. Genitourinary:    Denies any urgency, frequency, hematuria. Voiding  without difficulty. Extremities:   No denies edema of the lower extremities.    See subjective-left upper extremity  Neurology:    Denies any headache or focal neurological deficits, positive for generalized weakness and fatigue without focal component  Psch:   Denies being anxious or depressed. Musculoskeletal:    Denies  myalgias, joint complaints or back pain. Integumentary:   Blistering of the left antecubital.  Bruising present bilateral extremities secondary to venipuncture. Hematologic/Lymphatic:  Denies bruising or bleeding. Physical Exam:  No intake/output data recorded. Intake/Output Summary (Last 24 hours) at 1/8/2023 0846  Last data filed at 1/7/2023 1422  Gross per 24 hour   Intake --   Output 200 ml   Net -200 ml     I/O last 3 completed shifts:  In: -   Out: 200 [Urine:200]  Patient Vitals for the past 96 hrs (Last 3 readings):   Weight   01/06/23 0524 152 lb (68.9 kg)     Vital Signs:   Blood pressure 116/74, pulse 79, temperature 98.4 °F (36.9 °C), temperature source Infrared, resp. rate 18, height 5' 3\" (1.6 m), weight 152 lb (68.9 kg), SpO2 98 %, not currently breastfeeding. General appearance:  Alert, responsive, oriented to person, place, and time. Joseph Hernandez appearing today. No acute distress. Head:  Normocephalic. No masses, lesions or tenderness. Eyes:  PERRLA. EOMI. Sclera clear. ENT:  Ears normal. Mucosa normal.  Neck:    Supple. Trachea midline. No thyromegaly. No JVD. No bruits. Heart:    Rhythm regular. Rate controlled. S1 and S2. Systolic murmur. Lungs:    Scattered rhonchi. Moist cough with deep inspiration. Pattern is regular, even and non-labored. Abdomen:   Soft. Non-tender. Non-distended. Bowel sounds positive. No organomegaly or masses. No pain on palpation. Extremities:    Peripheral pulses present. No lower leg edema. Patient has left upper extremity swelling/edema from IV infiltrate/large skin tear. Bullous was painful blistering noted. Neurologic:    Alert x 3. Mild generalized weakness without focal deficit. Cranial nerves grossly intact. No focal weakness.   Psych:   Behavior is normal. Mood appears normal. Speech is not rapid and/or pressured. Musculoskeletal:   No unilateral joint edema, erythema, or warmth. Gait not assessed. Integumentary:  No rashes  Skin normal color and texture.   Genitalia/Breast:  Deferred    Medication:  Scheduled Meds:   metoprolol succinate  25 mg Oral Daily    atorvastatin  40 mg Oral Nightly    furosemide  20 mg Oral Daily    sacubitril-valsartan  1 tablet Oral BID    enoxaparin  1 mg/kg SubCUTAneous BID    ascorbic acid  1,000 mg Oral Daily    arformoterol tartrate  15 mcg Nebulization BID    budesonide  0.5 mg Nebulization BID    cefdinir  300 mg Oral 2 times per day    pantoprazole  40 mg Oral BID AC    sertraline  25 mg Oral Daily    Vitamin D  2,000 Units Oral Daily    zinc sulfate  50 mg Oral Daily    ipratropium-albuterol  1 ampule Inhalation 4x daily    sodium chloride flush  5-40 mL IntraVENous 2 times per day    aspirin  81 mg Oral Daily    predniSONE  30 mg Oral Daily    Followed by    Cortez Quintanilla ON 1/9/2023] predniSONE  20 mg Oral Daily    Followed by    Cortez Quintanilla ON 1/12/2023] predniSONE  10 mg Oral Daily     Continuous Infusions:   sodium chloride         Objective Data:  CBC with Differential:    Lab Results   Component Value Date/Time    WBC 9.2 01/08/2023 05:48 AM    RBC 3.38 01/08/2023 05:48 AM    HGB 10.2 01/08/2023 05:48 AM    HCT 32.5 01/08/2023 05:48 AM     01/08/2023 05:48 AM    MCV 96.2 01/08/2023 05:48 AM    MCH 30.2 01/08/2023 05:48 AM    MCHC 31.4 01/08/2023 05:48 AM    RDW 17.0 01/08/2023 05:48 AM    NRBC 0.9 11/30/2022 05:18 AM    METASPCT 0.9 12/22/2022 06:18 AM    LYMPHOPCT 18.0 01/08/2023 05:48 AM    MONOPCT 6.8 01/08/2023 05:48 AM    MYELOPCT 1.0 12/28/2022 07:02 PM    BASOPCT 0.1 01/08/2023 05:48 AM    MONOSABS 0.63 01/08/2023 05:48 AM    LYMPHSABS 1.66 01/08/2023 05:48 AM    EOSABS 0.04 01/08/2023 05:48 AM    BASOSABS 0.01 01/08/2023 05:48 AM     CMP:    Lab Results   Component Value Date/Time     01/08/2023 05:48 AM    K 3.8 01/08/2023 05:48 AM    K 3.1 01/04/2023 07:38 AM     01/08/2023 05:48 AM    CO2 33 01/08/2023 05:48 AM    BUN 22 01/08/2023 05:48 AM    CREATININE 0.3 01/08/2023 05:48 AM    GFRAA >60 04/13/2022 02:20 PM    LABGLOM >60 01/08/2023 05:48 AM    GLUCOSE 94 01/08/2023 05:48 AM    PROT 6.2 01/08/2023 05:48 AM    LABALBU 3.8 01/08/2023 05:48 AM    CALCIUM 9.1 01/08/2023 05:48 AM    BILITOT 1.3 01/08/2023 05:48 AM    ALKPHOS 174 01/08/2023 05:48 AM    AST 37 01/08/2023 05:48 AM    ALT 41 01/08/2023 05:48 AM     No results for input(s): TROPHS in the last 72 hours. Wound Documentation:    See documentation    Assessment:  Acutely decompensated diastolic congestive heart failure with now depressed systolic function and motion abnormalities/hypokinesis   Abnormal stress test-findings consistent with ischemia of the LAD   Recent hospitalization for transaminitis with hyperbilirubinemia, multiple liver cysts, negative MRCP for biliary obstruction, Abnormal GB appearance with negative ultrasound   COVID-19 infection with respiratory symptomatology  Recurrent diverticulitis versus resolving diverticulitis  Recent sepsis secondary to acute diverticulitis with perforation and abscess status post IR guided drainage  Chronic respiratory failure with hypoxia secondary to advanced COPD without exacerbation   Left upper extremity IV infiltrate  Ongoing tobacco abuse  Prior history of DVT  Hyperlipidemia on statin agent    Plan:   Aj Casanova underwent cardiac stress test and results were abnormal and findings were consistent with ischemia of the LAD. Cardiology is following patient. Recommendations for patient to have cardiac catheterization once COVID-19 has resolved and hemoglobin is stable. Continue to elevate the affected extremity and change dressings as needed-wound care follow. Continue diuretic therapy as prescribed. Monitor intake and output. Unfortunately I's and O's remain incomplete. Will reorder. Discussed with staff. Daily weight.   Continue nebulized respiratory treatments. Continue antibiotic therapy. Awaiting CMP results. Hemoglobin has trended upward. 10.2 today. We will continue to monitor closely. Monitor for signs and symptoms of bleeding. PT/OT to evaluate and treat. Respiratory supportive measures in the setting of advanced/end-stage COPD with recent COVID-19 infection. Chronic morbidities, labs and vital signs are being monitored and addressed accordingly. Continue current therapy. See orders for further plan of care. More than 50% of my  time was spent at the bedside counseling/coordinating care with the patient and/or family with face to face contact. This time was spent reviewing notes and laboratory data as well as instructing and counseling the patient. Time I spent with the family or surrogate(s) is included only if the patient was incapable of providing the necessary information or participating in medical decisions. I also discussed the differential diagnosis and all of the proposed management plans with the patient and individuals accompanying the patient. Jolie Velásquez requires this high level of physician care and nursing on the IMC/Telemetry unit due the complexity of decision management and chance of rapid decline or death. Continued cardiac monitoring and higher level of nursing are required. I am readily available for any further decision-making and intervention.      SKYE Dowell CNP  1/8/2023  8:46 AM

## 2023-01-08 NOTE — PROGRESS NOTES
INPATIENT CARDIOLOGY CONSULT    Name: Balwinder Ramírez    Age: 58 y.o. Date of Admission: 2023  6:11 AM    Date of Service: 2023    Reason for Consultation:   Chief Complaint   Patient presents with    Shortness of Breath     Pt arrives to the ED via EMS for c/o shortness of breath and wears oxygen at home 4lpm.           Referring Physician: Nancy Mendez DO    Interim:  Apparently declining PT OT today  Her stress test was abnormal and echocardiogram showed mild to moderately depressed LV function          Past Medical History:  Past Medical History:   Diagnosis Date    Abscess     colon    Acute on chronic diastolic CHF (congestive heart failure) ([address] 2023    COPD (chronic obstructive pulmonary disease) ([address]     Diverticulitis     Provoked DVT 3/2018     3 months Eliquis for DVT due to PICC line    Seasonal allergic rhinitis     Smoker 2019    5-6 per day    Tobacco use disorder      : 1 ppd since age 25       Past Surgical History:  Past Surgical History:   Procedure Laterality Date    CARDIOVASCULAR STRESS TEST N/A 2023    lexiscan stress test       Family History:  Family History   Problem Relation Age of Onset    Colon Cancer None     Other Father     Coronary Art Dis Father         mother    Hypertension Father     Diabetes Unknown relative         older age; pat grandmother    Other Daughter         son; ex-    Breast Cancer None     Stroke Mother        Social History:  Social History     Socioeconomic History    Marital status:       Spouse name: Not on file    Number of children: 2    Years of education: Not on file    Highest education level: Not on file   Occupational History    Not on file   Tobacco Use    Smoking status: Former     Packs/day: 0.50     Years: 41.00     Pack years: 20.50     Types: Cigarettes     Quit date: 2021     Years since quittin.6    Smokeless tobacco: Never   Vaping Use    Vaping Use: Never used   Substance and Sexual Activity Alcohol use: Yes     Comment: social    Drug use: Not Currently     Types: Marijuana [place])     Comment: occasional     Sexual activity: Not Currently   Other Topics Concern    Not on file   Social History Narrative    First  is . Social Determinants of Health     Financial Resource Strain: Not on file   Food Insecurity: Not on file   Transportation Needs: Not on file   Physical Activity: Not on file   Stress: Not on file   Social Connections: Not on file   Intimate Partner Violence: Not on file   Housing Stability: Not on file       Allergies: Allergies   Allergen Reactions    Penicillin V Hives and Other (See Comments)     2005 UNKNOWN, PLEASE VERIFY, 2005 UNKNOWN, PLEASE VERIFY       Home Medications:  Prior to Admission medications    Medication Sig Start Date End Date Taking? Authorizing Provider   theophylline (UNIPHYL) 400 MG extended release tablet Take 400 mg by mouth daily   Yes Historical Provider, MD   albuterol sulfate HFA (VENTOLIN HFA) 108 (90 Base) MCG/ACT inhaler Inhale 2 puffs into the lungs 4 times daily as needed for Wheezing or Shortness of Breath 23   Vivi Pu, APRN - CNP   Arformoterol Tartrate (BROVANA) 15 MCG/2ML NEBU Take 2 mLs by nebulization in the morning and 2 mLs in the evening. 23   Vivi Pu, APRN - CNP   budesonide (PULMICORT) 0.5 MG/2ML nebulizer suspension Take 2 mLs by nebulization in the morning and 2 mLs in the evening. 23   Vivi Pu, APRN - CNP   ipratropium-albuterol (DUONEB) 0.5-2.5 (3) MG/3ML SOLN nebulizer solution Inhale 3 mLs into the lungs in the morning and 3 mLs at noon and 3 mLs in the evening and 3 mLs before bedtime.  23   Vivi Pu, APRN - CNP   Umeclidinium Bromide (INCRUSE ELLIPTA) 62.5 MCG/ACT AEPB Inhale 1 puff into the lungs daily 23   Vivi Pu, APRN - CNP   atorvastatin (LIPITOR) 40 MG tablet Take 1 tablet by mouth nightly 23   Vivi Pu, APRN - CNP   sertraline (ZOLOFT) 25 MG tablet Take 1 tablet by mouth daily 1/1/23   SKYE Arrieta CNP   cefdinir (OMNICEF) 300 MG capsule Take 1 capsule by mouth every 12 hours for 10 days 1/1/23 1/11/23  SKYE Arrieta CNP   predniSONE (DELTASONE) 10 MG tablet Take by oral route 4 tabs x 3 days, then 3 tabs x 3 days, then 2 tabs x 3 days, then 1 tab x 3 days, then discontinue. Take with food. 1/1/23   SKYE Arrieta CNP   fluticasone [facility]) 50 MCG/ACT nasal spray 2 sprays by Each Nostril route daily 1/1/23   SKYE Arrieta CNP   melatonin 5 MG TBDP disintegrating tablet Take 1 tablet by mouth nightly 1/1/23   SKYE Arrieta CNP   metroNIDAZOLE (FLAGYL) 500 MG tablet Take 1 tablet by mouth every 8 hours for 10 days 1/1/23 1/11/23  SKYE Arrieta CNP   Vitamin D (CHOLECALCIFEROL) 50 MCG (2000 UT) TABS tablet Take 1 tablet by mouth daily 1/1/23   SKYE Arrieta CNP   pantoprazole (PROTONIX) 40 MG tablet Take 1 tablet by mouth in the morning and at bedtime for 14 days, THEN 1 tablet daily.  1/1/23 2/14/23  SKYE Arrieta CNP   zinc 50 MG CAPS Take 50 mg by mouth daily  Patient not taking: Reported on 1/4/2023 1/1/23   SKYE Arrieta CNP   ascorbic acid (VITAMIN C) 1000 MG tablet Take 1 tablet by mouth daily 12/4/22   SKYE Arrieta CNP   OXYGEN Inhale 3 L into the lungs continuous    Historical Provider, MD       Current Medications:  Current Facility-Administered Medications   Medication Dose Route Frequency Provider Last Rate Last Admin    metoprolol succinate (TOPROL XL) extended release tablet 25 mg  25 mg Oral Daily Mae Evans, DO   25 mg at 01/08/23 1014    atorvastatin (LIPITOR) tablet 40 mg  40 mg Oral Nightly Mae Evans, DO   40 mg at 01/07/23 2018    furosemide (LASIX) tablet 20 mg  20 mg Oral Daily Mae Evans, DO   20 mg at 01/08/23 1015    sacubitril-valsartan (ENTRESTO) 24-26 MG per tablet 1 tablet  1 tablet Oral BID Mark Alcala MD   1 tablet at 01/07/23 2018 ipratropium-albuterol (DUONEB) nebulizer solution 1 ampule  1 ampule Inhalation Q4H PRN Escobar Silver DO        enoxaparin (LOVENOX) injection 70 mg  1 mg/kg SubCUTAneous BID SKYE Sigala - CNP   70 mg at 01/08/23 1013    technetium sestamibi (CARDIOLITE) injection 10 millicurie  10 millicurie IntraVENous ONCE PRN Pastor Jinny MD        ascorbic acid (VITAMIN C) tablet 1,000 mg  1,000 mg Oral Daily Yara Live, DO   1,000 mg at 01/08/23 1015    Arformoterol Tartrate (BROVANA) nebulizer solution 15 mcg  15 mcg Nebulization BID Yara Live, DO   15 mcg at 01/08/23 0630    budesonide (PULMICORT) nebulizer suspension 500 mcg  0.5 mg Nebulization BID Yara Live, DO   500 mcg at 01/08/23 0630    cefdinir (OMNICEF) capsule 300 mg  300 mg Oral 2 times per day Yara Live, DO   300 mg at 01/08/23 1049    pantoprazole (PROTONIX) tablet 40 mg  40 mg Oral BID AC Yara Live, DO   40 mg at 01/08/23 0513    sertraline (ZOLOFT) tablet 25 mg  25 mg Oral Daily Yara Live, DO   25 mg at 01/08/23 1014    vitamin D (CHOLECALCIFEROL) tablet 2,000 Units  2,000 Units Oral Daily Yara Live, DO   2,000 Units at 01/08/23 1013    zinc sulfate (ZINCATE) capsule 50 mg  50 mg Oral Daily Yara Live, DO   50 mg at 01/08/23 1016    ipratropium-albuterol (DUONEB) nebulizer solution 1 ampule  1 ampule Inhalation 4x daily Yara Live, DO   1 ampule at 01/08/23 1505    magnesium sulfate 1000 mg in dextrose 5% 100 mL IVPB  1,000 mg IntraVENous PRN Yara Live, DO        sodium phosphate 12 mmol in sodium chloride 0.9 % 250 mL IVPB  12 mmol IntraVENous PRN Yara Live, DO        Or    sodium phosphate 21 mmol in sodium chloride 0.9 % 500 mL IVPB  21 mmol IntraVENous PRN Yara Live, DO        potassium chloride (KLOR-CON M) extended release tablet 40 mEq  40 mEq Oral PRN Yara Live, DO        Or    potassium bicarb-citric acid (EFFER-K) effervescent tablet 40 mEq  40 mEq Oral PRN Yara Live, DO        Or    potassium chloride 10 mEq/100 mL IVPB (Peripheral Line)  10 mEq IntraVENous PRN Greyson Pastel, DO        sodium chloride flush 0.9 % injection 5-40 mL  5-40 mL IntraVENous 2 times per day Greyson Pastel, DO   10 mL at 01/08/23 1017    sodium chloride flush 0.9 % injection 5-40 mL  5-40 mL IntraVENous PRN Greyson Pastel, DO        0.9 % sodium chloride infusion   IntraVENous PRN Greyson Pastel, DO        acetaminophen (TYLENOL) tablet 650 mg  650 mg Oral Q6H PRN Greyson Pastel, DO        Or    acetaminophen (TYLENOL) suppository 650 mg  650 mg Rectal Q6H PRN Greyson Pastel, DO        polyethylene glycol (GLYCOLAX) packet 17 g  17 g Oral Daily PRN Greyson Pastel, DO        aspirin chewable tablet 81 mg  81 mg Oral Daily Greyson Pastel, DO   81 mg at 01/08/23 1015    perflutren lipid microspheres (DEFINITY) injection 1.5 mL  1.5 mL IntraVENous ONCE PRN SKYE Friend CNP        [START ON 1/9/2023] predniSONE (DELTASONE) tablet 20 mg  20 mg Oral Daily EldaKSYE Ace CNP        Followed by    Ras Mitchell ON 1/12/2023] predniSONE (DELTASONE) tablet 10 mg  10 mg Oral Daily EldaSKYE Ace - CNP          sodium chloride             Review of Systems:   Cardiac: As per HPI  General: Denies fever or chills  Pulmonary: As per HPI  HEENT: Denies runny nose  GI: No complaints  : No complaints  Endocrine: Denies night sweats  Musculoskeletal: No complaints  Skin: Dry skin  Neuro: No complaints  Psych: Denies depression    Physical Exam:  BP 97/61   Pulse 82   Temp 97.5 °F (36.4 °C) (Oral)   Resp 18   Ht 5' 3\" (1.6 m)   Wt 152 lb (68.9 kg)   SpO2 98%   BMI 26.93 kg/m²   Wt Readings from Last 3 Encounters:   01/06/23 152 lb (68.9 kg)   12/28/22 157 lb (71.2 kg)   12/22/22 157 lb 5 oz (71.4 kg)       Appearance: Alert and oriented x3 not in acute distress.   Skin: Dry skin  Head: Atraumatic  Eyes: Intact extraocular muscles   ENMT: Mucous membranes are moist  Neck: Supple  Lungs: Clear to auscultation  Cardiac: Normal S1 and S2  Abdomen: Protuberant  Extremities: Intact range of motion  Neurologic: No focal neurological deficits  Peripheral Pulses: 2+ peripheral pulses      Intake/Output:  No intake or output data in the 24 hours ending 01/08/23 1525    No intake/output data recorded. Laboratory Tests:  Recent Labs     01/07/23 0728 01/08/23  0548    140   K 4.2 3.8    100   CO2 34* 33*   BUN 22 22   CREATININE 0.3* 0.3*   GLUCOSE 72* 94   CALCIUM 8.7 9.1     Lab Results   Component Value Date/Time    MG 2.0 01/08/2023 05:48 AM    MG 2.1 01/07/2023 07:28 AM    MG 2.2 01/05/2023 08:29 AM     Recent Labs     01/07/23 0728 01/08/23  0548   ALKPHOS 183* 174*   ALT 52* 41*   AST 52* 37*   PROT 6.2* 6.2*   BILITOT 1.1 1.3*   BILIDIR 0.5* 0.4*   LABALBU 3.8 3.8     Recent Labs     01/07/23 0728 01/08/23  0548   WBC 7.2 9.2   RBC 3.22* 3.38*   HGB 10.0* 10.2*   HCT 30.9* 32.5*   MCV 96.0 96.2   MCH 31.1 30.2   MCHC 32.4 31.4*   RDW 17.2* 17.0*    190   MPV 11.9 12.1*     Lab Results   Component Value Date    TROPONINI <0.01 05/20/2021    TROPONINI <0.01 05/17/2021    TROPONINI <0.01 11/29/2019     No results for input(s): CKTOTAL, CKMB, CKMBINDEX, TROPHS in the last 72 hours.     Lab Results   Component Value Date    INR 1.5 12/19/2022    INR 1.0 10/28/2019    INR 0.9 01/31/2018    PROTIME 17.1 (H) 12/19/2022    PROTIME 11.3 10/28/2019    PROTIME 9.8 01/31/2018     Lab Results   Component Value Date    TSH 1.120 01/05/2023    TSH 0.648 12/17/2022    TSH 0.264 (L) 02/10/2022     Lab Results   Component Value Date    LABA1C 5.0 02/10/2022    LABA1C 4.8 03/08/2021    LABA1C 4.9 11/30/2019     No results found for: EAG  Lab Results   Component Value Date    CHOL 262 (H) 02/10/2022    CHOL 272 (H) 11/30/2019    CHOL 199 10/29/2019     Lab Results   Component Value Date    TRIG 51 02/10/2022    TRIG 82 11/30/2019    TRIG 79 10/29/2019     Lab Results   Component Value Date    HDL 69 02/10/2022    HDL 61 11/30/2019    HDL 45 10/29/2019 Lab Results   Component Value Date    LDLCALC 183 (H) 02/10/2022    LDLCALC 195 (H) 11/30/2019    LDLCALC 138 (H) 10/29/2019     Lab Results   Component Value Date    LABVLDL 10 02/10/2022    LABVLDL 16 11/30/2019    LABVLDL 16 10/29/2019     No results found for: CHOLHDLRATIO  No results for input(s): PROBNP in the last 72 hours. Cardiac Tests:        Echocardiogram reviewed: January 4, 9641   LV systolic function is not well assessed but appears mildly reduced. Ejection fraction is visually estimated at 40-45%. Abnormal diastolic function. Wall motion not well seen; LV contrast agent not utilized. Appears to be hypokinesis of the mid inferoseptal and mid anteroseptal   wall. Normal left ventricular wall thickness. Grossly normal right ventricular size and function. No significant valvular abnormalities. Stress test reviewed: January 5, 2023  Impression       The myocardial perfusion imaging was abnormal with a moderate size   moderate intensity and partial reversible anteroseptal and   anterolateral perfusion abnormality with mild associated regional wall   motion abnormalities consistent with ischemia of the left anterior   descending distribution potentially in addition to that of   postischemic stunning. Overall left ventricular systolic function was at the lower limits of   normal with regional wall motion abnormalities as described above. Intermediate risk pharmacologic myocardial perfusion imaging study. Cardiac catheterization reviewed:     CXR reviewed:      The 10-year ASCVD risk score (Vu SILVA, et al., 2019) is: 2.5%    Values used to calculate the score:      Age: 58 years      Sex: Female      Is Non- : No      Diabetic: No      Tobacco smoker: No      Systolic Blood Pressure: 97 mmHg      Is BP treated: No      HDL Cholesterol: 69 mg/dL      Total Cholesterol: 262 mg/dL    ASSESSMENT / PLAN:    Assessment:  Acutely decompensated diastolic congestive heart failure with now depressed systolic function and motion abnormalities/hypokinesis   Abnormal stress test with intermediate risk scan   History of  transaminitis with hyperbilirubinemia, multiple liver cysts, negative MRCP for biliary obstruction, Abnormal GB appearance with negative ultrasound   COVID-19 infection with respiratory failure  Recurrent diverticulitis with prior or recent abscess drainage  Chronic respiratory failure with hypoxia secondary to advanced COPD   Left upper extremity IV infiltrate  Ongoing tobacco abuse  Prior history of DVT  Hyperlipidemia on statin agent     Plan:   Her stress test was abnormal and echocardiogram showed mild to moderately depressed LV function  Continue aspirin and beta-blocker (as blood pressure allows)  Hold statin if worsening liver function test  Invasive coronary angiogram once COVID-19 is resolved   If hemoglobin is dropping please rule out GI bleed prior to cardiac catheterization  Continue low-dose Entresto as blood pressure allows   If patient is able to tolerate Entresto please initiate spironolactone when blood pressure and kidney function are stable  Rest of management per primary and pulmonology as well as ID      Thank you for allowing me to participate in your patient's care. Please feel free to contact me if you have any questions or concerns.     Karlos Ayala MD  [facility]) Cardiology

## 2023-01-08 NOTE — PLAN OF CARE
Problem: Discharge Planning  Goal: Discharge to home or other facility with appropriate resources  Outcome: Progressing  Flowsheets  Taken 1/7/2023 2000 by Gerson Mccoy RN  Discharge to home or other facility with appropriate resources:   Identify barriers to discharge with patient and caregiver   Arrange for needed discharge resources and transportation as appropriate   Identify discharge learning needs (meds, wound care, etc)  Taken 1/7/2023 1148 by Freeda Brittle, RN  Discharge to home or other facility with appropriate resources:   Identify barriers to discharge with patient and caregiver   Arrange for needed discharge resources and transportation as appropriate     Problem: Skin/Tissue Integrity  Goal: Absence of new skin breakdown  Description: 1. Monitor for areas of redness and/or skin breakdown  2. Assess vascular access sites hourly  3. Every 4-6 hours minimum:  Change oxygen saturation probe site  4. Every 4-6 hours:  If on nasal continuous positive airway pressure, respiratory therapy assess nares and determine need for appliance change or resting period.   Outcome: Progressing     Problem: Safety - Adult  Goal: Free from fall injury  Outcome: Progressing  Flowsheets (Taken 1/7/2023 2000)  Free From Fall Injury:   Instruct family/caregiver on patient safety   Based on caregiver fall risk screen, instruct family/caregiver to ask for assistance with transferring infant if caregiver noted to have fall risk factors     Problem: ABCDS Injury Assessment  Goal: Absence of physical injury  Outcome: Progressing  Flowsheets (Taken 1/7/2023 2000)  Absence of Physical Injury: Implement safety measures based on patient assessment     Problem: Pain  Goal: Verbalizes/displays adequate comfort level or baseline comfort level  Outcome: Progressing  Flowsheets (Taken 1/7/2023 1645 by Freeda Brittle, RN)  Verbalizes/displays adequate comfort level or baseline comfort level:   Encourage patient to monitor pain and request assistance   Assess pain using appropriate pain scale     Problem: Chronic Conditions and Co-morbidities  Goal: Patient's chronic conditions and co-morbidity symptoms are monitored and maintained or improved  Outcome: Progressing  Flowsheets  Taken 1/7/2023 2000 by Donaldo Roy 34 - Patient's Chronic Conditions and Co-Morbidity Symptoms are Monitored and Maintained or Improved:   Monitor and assess patient's chronic conditions and comorbid symptoms for stability, deterioration, or improvement   Collaborate with multidisciplinary team to address chronic and comorbid conditions and prevent exacerbation or deterioration   Update acute care plan with appropriate goals if chronic or comorbid symptoms are exacerbated and prevent overall improvement and discharge  Taken 1/7/2023 1148 by Donaldo Feliciano 34 - Patient's Chronic Conditions and Co-Morbidity Symptoms are Monitored and Maintained or Improved: Monitor and assess patient's chronic conditions and comorbid symptoms for stability, deterioration, or improvement

## 2023-01-09 VITALS
DIASTOLIC BLOOD PRESSURE: 60 MMHG | WEIGHT: 152 LBS | TEMPERATURE: 97.5 F | SYSTOLIC BLOOD PRESSURE: 83 MMHG | BODY MASS INDEX: 26.93 KG/M2 | OXYGEN SATURATION: 90 % | RESPIRATION RATE: 21 BRPM | HEART RATE: 87 BPM | HEIGHT: 63 IN

## 2023-01-09 LAB
ALBUMIN SERPL-MCNC: 3.6 G/DL (ref 3.5–5.2)
ALP BLD-CCNC: 165 U/L (ref 35–104)
ALT SERPL-CCNC: 34 U/L (ref 0–32)
ANION GAP SERPL CALCULATED.3IONS-SCNC: 8 MMOL/L (ref 7–16)
AST SERPL-CCNC: 35 U/L (ref 0–31)
BASOPHILS ABSOLUTE: 0.01 E9/L (ref 0–0.2)
BASOPHILS RELATIVE PERCENT: 0.1 % (ref 0–2)
BILIRUB SERPL-MCNC: 1.1 MG/DL (ref 0–1.2)
BILIRUBIN DIRECT: 0.4 MG/DL (ref 0–0.3)
BILIRUBIN, INDIRECT: 0.7 MG/DL (ref 0–1)
BLOOD CULTURE, ROUTINE: NORMAL
BUN BLDV-MCNC: 24 MG/DL (ref 6–23)
CALCIUM SERPL-MCNC: 8.8 MG/DL (ref 8.6–10.2)
CHLORIDE BLD-SCNC: 100 MMOL/L (ref 98–107)
CO2: 35 MMOL/L (ref 22–29)
CREAT SERPL-MCNC: 0.3 MG/DL (ref 0.5–1)
CULTURE, BLOOD 2: NORMAL
EOSINOPHILS ABSOLUTE: 0.07 E9/L (ref 0.05–0.5)
EOSINOPHILS RELATIVE PERCENT: 0.7 % (ref 0–6)
FUNGUS (MYCOLOGY) CULTURE: NORMAL
FUNGUS STAIN: NORMAL
GFR SERPL CREATININE-BSD FRML MDRD: >60 ML/MIN/1.73
GLUCOSE BLD-MCNC: 79 MG/DL (ref 74–99)
HCT VFR BLD CALC: 32.1 % (ref 34–48)
HEMOGLOBIN: 10.5 G/DL (ref 11.5–15.5)
IMMATURE GRANULOCYTES #: 0.13 E9/L
IMMATURE GRANULOCYTES %: 1.3 % (ref 0–5)
LYMPHOCYTES ABSOLUTE: 1.34 E9/L (ref 1.5–4)
LYMPHOCYTES RELATIVE PERCENT: 13.8 % (ref 20–42)
MAGNESIUM: 1.9 MG/DL (ref 1.6–2.6)
MCH RBC QN AUTO: 31.5 PG (ref 26–35)
MCHC RBC AUTO-ENTMCNC: 32.7 % (ref 32–34.5)
MCV RBC AUTO: 96.4 FL (ref 80–99.9)
MONOCYTES ABSOLUTE: 0.63 E9/L (ref 0.1–0.95)
MONOCYTES RELATIVE PERCENT: 6.5 % (ref 2–12)
NEUTROPHILS ABSOLUTE: 7.51 E9/L (ref 1.8–7.3)
NEUTROPHILS RELATIVE PERCENT: 77.6 % (ref 43–80)
PDW BLD-RTO: 17 FL (ref 11.5–15)
PHOSPHORUS: 3 MG/DL (ref 2.5–4.5)
PLATELET # BLD: 174 E9/L (ref 130–450)
PMV BLD AUTO: 12.1 FL (ref 7–12)
POTASSIUM SERPL-SCNC: 3.2 MMOL/L (ref 3.5–5)
RBC # BLD: 3.33 E12/L (ref 3.5–5.5)
SODIUM BLD-SCNC: 143 MMOL/L (ref 132–146)
TOTAL PROTEIN: 6.1 G/DL (ref 6.4–8.3)
WBC # BLD: 9.7 E9/L (ref 4.5–11.5)

## 2023-01-09 PROCEDURE — 97530 THERAPEUTIC ACTIVITIES: CPT | Performed by: PHYSICAL THERAPIST

## 2023-01-09 PROCEDURE — 94640 AIRWAY INHALATION TREATMENT: CPT

## 2023-01-09 PROCEDURE — 80048 BASIC METABOLIC PNL TOTAL CA: CPT

## 2023-01-09 PROCEDURE — 36415 COLL VENOUS BLD VENIPUNCTURE: CPT

## 2023-01-09 PROCEDURE — 6360000002 HC RX W HCPCS: Performed by: INTERNAL MEDICINE

## 2023-01-09 PROCEDURE — 97535 SELF CARE MNGMENT TRAINING: CPT

## 2023-01-09 PROCEDURE — 84100 ASSAY OF PHOSPHORUS: CPT

## 2023-01-09 PROCEDURE — 6370000000 HC RX 637 (ALT 250 FOR IP): Performed by: NURSE PRACTITIONER

## 2023-01-09 PROCEDURE — 6360000002 HC RX W HCPCS: Performed by: NURSE PRACTITIONER

## 2023-01-09 PROCEDURE — 85025 COMPLETE CBC W/AUTO DIFF WBC: CPT

## 2023-01-09 PROCEDURE — 83735 ASSAY OF MAGNESIUM: CPT

## 2023-01-09 PROCEDURE — 6370000000 HC RX 637 (ALT 250 FOR IP): Performed by: INTERNAL MEDICINE

## 2023-01-09 PROCEDURE — 80076 HEPATIC FUNCTION PANEL: CPT

## 2023-01-09 PROCEDURE — 2700000000 HC OXYGEN THERAPY PER DAY

## 2023-01-09 RX ORDER — CEFDINIR 300 MG/1
300 CAPSULE ORAL EVERY 12 HOURS SCHEDULED
Qty: 10 CAPSULE | Refills: 0 | Status: SHIPPED | OUTPATIENT
Start: 2023-01-09 | End: 2023-01-14

## 2023-01-09 RX ORDER — ASPIRIN 81 MG/1
81 TABLET, CHEWABLE ORAL DAILY
Qty: 30 TABLET | Refills: 3 | Status: SHIPPED | OUTPATIENT
Start: 2023-01-10

## 2023-01-09 RX ORDER — FUROSEMIDE 20 MG/1
20 TABLET ORAL DAILY
Qty: 60 TABLET | Refills: 3 | Status: SHIPPED | OUTPATIENT
Start: 2023-01-09

## 2023-01-09 RX ORDER — METOPROLOL SUCCINATE 25 MG/1
12.5 TABLET, EXTENDED RELEASE ORAL DAILY
Qty: 30 TABLET | Refills: 3 | Status: SHIPPED | OUTPATIENT
Start: 2023-01-09

## 2023-01-09 RX ORDER — METOPROLOL SUCCINATE 25 MG/1
25 TABLET, EXTENDED RELEASE ORAL DAILY
Qty: 30 TABLET | Refills: 3 | Status: SHIPPED | OUTPATIENT
Start: 2023-01-09 | End: 2023-01-09 | Stop reason: SDUPTHER

## 2023-01-09 RX ORDER — POTASSIUM CHLORIDE 20 MEQ/1
20 TABLET, EXTENDED RELEASE ORAL DAILY
Qty: 30 TABLET | Refills: 0 | Status: SHIPPED | OUTPATIENT
Start: 2023-01-09

## 2023-01-09 RX ADMIN — OXYCODONE HYDROCHLORIDE AND ACETAMINOPHEN 1000 MG: 500 TABLET ORAL at 10:58

## 2023-01-09 RX ADMIN — IPRATROPIUM BROMIDE AND ALBUTEROL SULFATE 1 AMPULE: .5; 2.5 SOLUTION RESPIRATORY (INHALATION) at 09:22

## 2023-01-09 RX ADMIN — VITAMIN D, TAB 1000IU (100/BT) 2000 UNITS: 25 TAB at 10:57

## 2023-01-09 RX ADMIN — PANTOPRAZOLE SODIUM 40 MG: 40 TABLET, DELAYED RELEASE ORAL at 05:39

## 2023-01-09 RX ADMIN — ASPIRIN 81 MG: 81 TABLET, CHEWABLE ORAL at 10:58

## 2023-01-09 RX ADMIN — Medication 50 MG: at 10:57

## 2023-01-09 RX ADMIN — BUDESONIDE 500 MCG: 0.5 SUSPENSION RESPIRATORY (INHALATION) at 06:41

## 2023-01-09 RX ADMIN — IPRATROPIUM BROMIDE AND ALBUTEROL SULFATE 1 AMPULE: .5; 2.5 SOLUTION RESPIRATORY (INHALATION) at 14:30

## 2023-01-09 RX ADMIN — ARFORMOTEROL TARTRATE 15 MCG: 15 SOLUTION RESPIRATORY (INHALATION) at 06:40

## 2023-01-09 RX ADMIN — PREDNISONE 20 MG: 20 TABLET ORAL at 10:58

## 2023-01-09 RX ADMIN — CEFDINIR 300 MG: 300 CAPSULE ORAL at 10:58

## 2023-01-09 RX ADMIN — IPRATROPIUM BROMIDE AND ALBUTEROL SULFATE 1 AMPULE: .5; 2.5 SOLUTION RESPIRATORY (INHALATION) at 06:41

## 2023-01-09 RX ADMIN — SERTRALINE 25 MG: 50 TABLET, FILM COATED ORAL at 10:57

## 2023-01-09 RX ADMIN — ENOXAPARIN SODIUM 70 MG: 100 INJECTION SUBCUTANEOUS at 10:58

## 2023-01-09 ASSESSMENT — PAIN SCALES - GENERAL: PAINLEVEL_OUTOF10: 0

## 2023-01-09 NOTE — CARE COORDINATION
SS NOTE: COVID POSITIVE 1/5. Arrangements completed for pt to be transferred to [address] [place] [address] today at [facility] by [facility]. SW called and messaged pt's dtr about the transfer today. For the new SNF placement pt will need NO PRECERT, a signed ALFONZO, current PT/OT notes and SW completed the HENs. SS to continue. EPI [place].1/9/2023.12:25PM.

## 2023-01-09 NOTE — PROGRESS NOTES
Physical Therapy  Physical Therapy Treatment Note/Plan of Care    Room #:  0532/0532-01  Patient Name: Fiona Grey  YOB: 1960  MRN: [id number]    Date of Service: 1/9/2023     Tentative placement recommendation: Subacute rehab or Long Term Acute Care  Equipment recommendation: To be determined      Evaluating Physical Therapist: Fransisco Marie PT, DPT #[id number]      Specific Provider Orders/Date/Referring Provider :     01/04/23 1245    PT eval and treat  Start:  01/04/23 1245,   End:  01/04/23 1245,   ONE TIME,   Standing Count:  1 Occurrences,   R         Daniel Rodriguez, DO Acknowledge New     Admitting Diagnosis:   Acute on chronic diastolic CHF (congestive heart failure) (HCC) [I50.33]  Hypervolemia, unspecified hypervolemia type [E87.70]      Surgery: none  Visit Diagnoses         Codes    Hypervolemia, unspecified hypervolemia type    -  Primary E87.70            Patient Active Problem List   Diagnosis    Tobacco use disorder    Seasonal allergic rhinitis    Chronic bronchitis (HCC)    Chronic respiratory failure with hypoxia (HCC)    Chronic obstructive pulmonary disease (HCC)    Acute respiratory failure with hypercapnia (HCC)    Acute on chronic respiratory failure with hypoxia and hypercapnia (HCC)    COPD exacerbation (HCC)    Acute diverticulitis with abscess    Acute cholecystitis    Acute on chronic diastolic CHF (congestive heart failure) (HCC)        ASSESSMENT of Current Deficits Patient exhibits decreased strength, balance, and endurance impairing functional mobility, transfers, gait , gait distance, and tolerance to activity. Pt demonstrated poor tolerance for function with 10+/10 PHILIPPE during activity with 5-10 minutes of PLB needed to resolve symptoms following each change of position. Once in chair, pt needed bedpan and had a BM. Following 10 minutes of PLB, pt requested a breathing treatment to help her catch her breath in order to return to bed. Pt had another bowel movement once in  bed and nursing assisted with cleanup. PHYSICAL THERAPY  PLAN OF CARE       Physical therapy plan of care is established based on physician order,  patient diagnosis and clinical assessment    Current Treatment Recommendations:    -Bed Mobility: Lower extremity exercises  and Trunk control activities   -Sitting Balance: Incorporate reaching activities to activate trunk muscles , Hands on support to maintain midline , Facilitate active trunk muscle engagement , Facilitate postural control in all planes , and Engage in core activities to allow for movement within base of support   -Standing Balance: Perform strengthening exercises in standing to promote motor control with or without upper extremity support , Instruct patient on adequate base of support to maintain balance, and Challenge balance utilizing reaching  activities beyond center of gravity    -Transfers: Provide instruction on proper hand and foot position for adequate transfer of weight onto lower extremities and use of gait device if needed, Cues for hand placement, technique and safety.  Provide stabilization to prevent fall , Facilitate weight shift forward on to lower extremities and provide necessary stabilization of bilateral lower extremities , Support transfer of weight on to lower extremities, and Assist with extension of knees trunk and hip to accept weight transfer   -Gait: Gait training, Standing activities to improve: base of support, weight shift, weight bearing , Exercises to improve trunk control, Exercises to improve hip and knee control, Performance of protected weight bearing activities, and Activities to increase weight bearing   -Endurance: Utilize Supervised activities to increase level of endurance to allow for safe functional mobility including transfers and gait  and Use graduated activities to promote good breathing techniques and provide support and education to maximize respiratory function  -Stairs: Stair training with instruction on proper technique and hand placement on rail    PT long term treatment goals are located in below grid    Patient and or family understand(s) diagnosis, prognosis, and plan of care. Frequency of treatments: Patient will be seen  daily. Prior Level of Function: Patient ambulated independently   Rehab Potential: fair + for baseline    Past medical history:   Past Medical History:   Diagnosis Date    Abscess     colon    Acute on chronic diastolic CHF (congestive heart failure) ([address] 1/4/2023    COPD (chronic obstructive pulmonary disease) ([facility])     Diverticulitis     Provoked DVT 3/2018     3 months Eliquis for DVT due to PICC line    Seasonal allergic rhinitis     Smoker 11/30/2019    5-6 per day    Tobacco use disorder      : 1 ppd since age 25     Past Surgical History:   Procedure Laterality Date    CARDIOVASCULAR STRESS TEST N/A 01/06/2023    lexiscan stress test       SUBJECTIVE:    Precautions:  Up with assistance, falls, Droplet plus/COVID-19, and Hep A  , extremely SOB    Social history: Patient lives with daughter and daughter's boyfriend  in a two story home resides first  with 2 steps, with rail  to enter Tub shower , grab bars      Equipment owned: [facility],      [address] [place] [address],[address] Floor Mobility Inpatient   How much difficulty turning over in bed?: A Little  How much difficulty sitting down on / standing up from a chair with arms?: A Little  How much difficulty moving from lying on back to sitting on side of bed?: A Little  How much help from another person moving to and from a bed to a chair?: A Lot  How much help from another person needed to walk in hospital room?: Total  How much help from another person for climbing 3-5 steps with a railing?: Total  AM-PAC Inpatient Mobility Raw Score : 13  AM-PAC Inpatient T-Scale Score : 36.74  Mobility Inpatient CMS 0-100% Score: 64.91  Mobility Inpatient CMS G-Code Modifier : CL    Nursing cleared patient for PT treatment. OBJECTIVE;   Initial Evaluation  Date: 1/5/2023 Treatment Date:  1/9/2023     Short Term/ Long Term   Goals   Was pt agreeable to Eval/treatment? Yes Y To be met in 3 days   Pain level   0/10   0/10    Bed Mobility    Rolling: Minimal assist of 1    Supine to sit: Minimal assist of 1    Sit to supine: Minimal assist of 1    Scooting: Minimal assist of 1   Rolling: Supervision    Supine to sit: Supervision    Sit to supine: Minimal assist of 1   Scooting: Minimal assist of 1    Rolling: Independent    Supine to sit: Independent    Sit to supine: Independent    Scooting: Independent     Transfers Sit to stand:  Moderate assist of 1  Sit to stand: Minimal assist of 1    Sit to stand: Supervision     Ambulation     3 feet using  wheeled walker with Moderate assist of 1   for walker control, walker approximation, balance, upright, weight shift, multiplane instability, and safety 3-5 feet using  no device with Moderate assist of 1   for balance, upright, weight shift, multiplane instability, safety, and O2 line management      > 25 feet using  wheeled walker with Supervision     Stair negotiation: ascended and descended   Not assessed       2 steps, 1 rail with Sussy   ROM Within functional limits    Increase range of motion 10% of affected joints    Strength BUE:  refer to OT eval  RLE:  3-/5  LLE:  3-/5  Increase strength in affected mm groups by 1/3 grade   Balance Sitting EOB:  fair   Dynamic Standing:  fair- with [employer] Sitting EOB: fair   Dynamic Standing: fair - with HHA   Sitting EOB:  fair +  Dynamic Standing: fair with [employer]     Patient is Alert & Oriented x person, place, time, and situation and follows directions    Sensation:  Patient  denies numbness/tingling   Edema:  no   Endurance: poor     Vitals:  5-10 liters nasal cannula   Blood Pressure at rest  Blood Pressure during session    Heart Rate at rest  Heart Rate during session    SPO2 at rest %  SPO2 during session 87-97%     Patient education  Patient educated on role of Physical Therapy, risks of immobility, safety and plan of care, energy conservation,  importance of mobility while in hospital , purse lip breathing, ankle pumps, quad set and glut set for edema control, blood clot prevention, importance and purpose of adaptive device and adjusted to proper height for the patient. , and safety      Patient response to education:   Pt verbalized understanding Pt demonstrated skill Pt requires further education in this area   Yes Partial Yes      Treatment:  Patient practiced and was instructed/facilitated in the following treatment: Patient Sat edge of bed 20 minutes with SBA to increase dynamic sitting balance and activity tolerance. Pt performed bed mobility, transfers, short ambulation in room, sitting EOB, instruction for PLB. Therapeutic Exercises:  not performed       At end of session, patient in bed with     call light and phone within reach,  all lines and tubes intact, nursing notified. Patient would benefit from continued skilled Physical Therapy to improve functional independence and quality of life.          Patient's/ family goals   rehab    Time in  900  Time out  945    Total Treatment Time  45 minutes    CPT codes:  Therapeutic activities (94650)   45 minutes  3 unit(s)    Lul Durand PT

## 2023-01-09 NOTE — CONSULTS
Of note, person to person interview was not performed in order to limit exposure and preserve PPE due to isolation status. Called pt to review information- Left voice message that I would call and speak with her daughter Yvrose Sharma. Called and Spoke with Daughter Adrian Pond regarding [place] appointments and establishing with the CHF clinic. CHF NURSE NAVIGATOR CONSULT NOTE:      Patient currently admitted with diagnosis of Systolic heart failure. Patient was in [facility] isolation during the consultation, HF education provided to her Daughter Adrian Pond over the phone. Daughter Adrian Pond was engaged and asked appropriate questions throughout the education session. She is agreeable to symptom monitoring, daily weights, and following a low sodium diet. Scheduling with the [facility] APRN and CHF clinic Yes. Patient is being discharged to [address] [place], [place] [address]. Future Appointments   Date Time Provider Lupe Arnett   1/13/2023  9:00 AM [facility] CHF ROOM 1 [facility] CHF [facility]   1/20/2023 10:00 AM SKYE Rehman - CNP [facility] CHF [facility]   1/26/2023  9:30 AM [facility] CHF ROOM 1 [facility]       Barriers to Care:  Contributing risk factors for Heart Failure are identified as impairment:  physical: mobility, overwhelmed by complexity of regimen, and multiple comorbidites  - COPD, tobacco abuse, HLD, abnormal stress test, as noted per cardiology- will need coronary angiogram after recovered from [facility]. Pt was seen in consult by Dr. Emily Hill this admission- new to [facility]. HF education and review of HF zones provided to daughter via phone. HF book, HF zones and CHF clinic information taken to pt bedside for daughter and pt to review as pt is in [facility] isolation. Agreeable to establishing with the CHF clinic. The patient is ordered:  Diet: ADULT DIET; Regular;  Low Sodium (2 gm); 1800 ml; No Caffeine   Sodium controlled diet Yes  Fluid restriction daily ordered (fluid restriction recommended if patient is hyponatremic and/or diuretic is initiated or increased) Yes  FR:   Daily Weights: Patient Vitals for the past 96 hrs (Last 3 readings):   Weight   01/06/23 0524 152 lb (68.9 kg)     I/O:   Intake/Output Summary (Last 24 hours) at 1/9/2023 1627  Last data filed at 1/9/2023 1226  Gross per 24 hour   Intake 240 ml   Output --   Net 240 ml              We reviewed the introduction to Heart Failure, the HF zones, signs and symptoms to report on day 1 of onset, medications, medication compliance, the importance of obtaining daily weights, following a low sodium diet, reading food labels for the sodium content, keeping physician appointments, and smoking cessation. We discussed writing / tracking daily weights on a calendar / log because a 5 pound gain in 1 week can sneak up if you are not tracking it. I advised patient they can reduce the risk for Heart Failure exacerbations by modifying / controlling the risk factors. We discussed self-managed care which includes the following:  to take medications as prescribed, report any intolerable side effects of medications to the cardiologist / doctor, do not just stop taking the medication; follow a cardiac heart healthy / low sodium diet; weigh yourself daily, exercise regularly- per doctor recommendation and not to smoke or use an excess amount of alcohol. We discussed calling the cardiologist / doctor with a weight gain of 3 pounds in one day or a total of 5 pounds or more in one week. Also, if you should have a significant weight loss of 3# or more in one day to call the doctor, they may need to decrease or hold the diuretic dose. On days you feels nauseated and not eating / drinking, having emesis or diarrhea,  informed to call the cardiologist  / doctor, they may need to decrease or hold diuretic to avoid dehydration.   I stressed the importance of informing their cardiologist the first day of onset of any of the signs and symptoms in the [facility] Zone\" of the HF Zones. Patient verbalizes understanding. Greater than 30 minutes was spent educating patient. The Heart Failure Booklet given to the patient with additional patient education addressing:  What is Heart Failure? Things You Can Do to Live Well with HF  How to Take Your Medications  How to Eat Less Salt  [place] its Salt? Exercising Well with Heart Failure  Signs and symptoms of HF to report  Weight Yourself Each Day  Home Self Management- activity, weight tracking, taking medications as prescribed, meals /diet planning (sodium and fluid restriction), how to read food labels, keeping physician follow ups, smoking cessation, follow the Heart Failure Zones  The Heart Failure zones  Every Dose Every Day      Instructed  to call 911 if you have any of the following symptoms:       Struggling to breathe unrelieved with rest,     Having chest pain     Have confusion or cant think clearly        Natalia BOLTON, RN  Heart Failure [facility] (CHF) AHA GUIDELINES  (Must be completed for Primary Diagnosis CHF or History of CHF)    Discharge Plan:  I placed the Heart Failure Home Instructions in patient's discharge instructions. Per Heart Failure GWTG, the patient should have a follow-up appointment made within 7 days of discharge.     New Diagnosis No    ECHO Results most recent: 23 EF 40-45%  Lab Results   Component Value Date    LVEF 52 2021                                        Social History     Tobacco Use   Smoking Status Former    Packs/day: 0.50    Years: 41.00    Pack years: 20.50    Types: Cigarettes    Quit date: 2021    Years since quittin.6   Smokeless Tobacco Never        Immunization History   Administered Date(s) Administered    COVID-19, MODERNA BLUE border, Primary or Immunocompromised, (age 12y+), IM, 100 mcg/0.5mL 2021, 2021    Influenza, FLUARIX, FLULAVAL, FLUZONE (age 10 mo+) AND AFLURIA, (age 1 y+), PF, 0.5mL 01/18/2018, 10/30/2019    Pneumococcal Polysaccharide ([id number]) 03/05/2018    Td, unspecified formulation 09/05/2003    Tdap (Boostrix, Adacel) 11/30/2019          Angiotensin-Converting-Enzyme (ACE) inhibitor ordered:  [] Yes  [x] No (specify contraindication):    [] Contraindicated  [] Hypotensive patient who was at immediate risk of cardiogenic shock  [] Hospitalized patient who experienced marked azotemia  [] Other Contraindications  [x] Not Eligible  [] Not Tolerant  [] Patient Reason  [] System Reason  [] Other Reason    Angiotensin II receptor blockers (ARB) ordered:  [] Yes  [x] No (specify contraindication):    [] Contraindicated  [] Hypotensive patient who was at immediate risk of cardiogenic shock  [] Hospitalized patient who experienced marked azotemia  [] Other Contraindications    ARNI - Angiotensin Receptor Neprilysin Inhibitor ordered:  [x] Yes- Entresto   [] No (specify contraindication):    [] ACE inhibitor use within the prior 36 hours  [] Allergy  [] Hyperkalemia  [] Hypotension  [] Renal dysfunction defined as creatinine > 2.5 mg/dL in men or > 2.0 mg/dL in women  [] Other Contraindications  [] Not Eligible  [] Not Tolerant  [] Patient Reason  []System Reason  []Other Reason      Beta Blocker ordered:    [x] Yes Toprol XL   [] No (specify contraindication):    [] Contraindicated  [] Asthma  [] Fluid Overload  [] Low Blood Pressure  [] Patient recently treated with an intravenous positive inotropic agent  [] Other Contraindications  [] Not Eligible  [] Not Tolerant  [] Patient Reason  [] System Reason    SGLT2 Inhibitor ordered:  [] Yes  [x] No (specify contraindication):    [] Contraindicated  [] Patient currently on dialysis  [] Ketoacidosis  [] Known hypersensitivity to the medication  [] Type I diabetes (not approved for use in patients with Type I diabetes due to increased risk of ketoacidosis)  [] Other Contraindications  [] Not Eligible  [] Not Tolerant  [] Patient Reason  [] System Reason  [] Other Reason    Aldosterone Antagonist ordered:  [] Yes  [x] No (specify contraindication):    [] Contraindicated  [] Allergy due to aldosterone receptor antagonist  [] Hyperkalemia  [] Renal dysfunction defined as creatinine >2.5 mg/dL in men or >2.0 mg/dL in women.   [] Other contraindications  [] Not Eligible  [] Not Tolerant  [] Patient Reason  [] System Reason  [x] Other Reason    As noted by Dr. Means Kinds- If patient is able to tolerate Entresto please initiate spironolactone when blood pressure and kidney function are stable

## 2023-01-09 NOTE — PLAN OF CARE
Problem: Discharge Planning  Goal: Discharge to home or other facility with appropriate resources  Outcome: Progressing  Flowsheets  Taken 1/8/2023 2000 by Vilma Noel RN  Discharge to home or other facility with appropriate resources:   Identify barriers to discharge with patient and caregiver   Arrange for needed discharge resources and transportation as appropriate   Identify discharge learning needs (meds, wound care, etc)  Taken 1/8/2023 1700 by Becky Toure RN  Discharge to home or other facility with appropriate resources:   Identify barriers to discharge with patient and caregiver   Arrange for needed discharge resources and transportation as appropriate     Problem: Skin/Tissue Integrity  Goal: Absence of new skin breakdown  Description: 1. Monitor for areas of redness and/or skin breakdown  2. Assess vascular access sites hourly  3. Every 4-6 hours minimum:  Change oxygen saturation probe site  4. Every 4-6 hours:  If on nasal continuous positive airway pressure, respiratory therapy assess nares and determine need for appliance change or resting period.   Outcome: Progressing     Problem: Safety - Adult  Goal: Free from fall injury  Outcome: Progressing  Flowsheets (Taken 1/8/2023 2110)  Free From Fall Injury:   Instruct family/caregiver on patient safety   Based on caregiver fall risk screen, instruct family/caregiver to ask for assistance with transferring infant if caregiver noted to have fall risk factors     Problem: ABCDS Injury Assessment  Goal: Absence of physical injury  Outcome: Progressing  Flowsheets (Taken 1/8/2023 2110)  Absence of Physical Injury: Implement safety measures based on patient assessment     Problem: Pain  Goal: Verbalizes/displays adequate comfort level or baseline comfort level  Outcome: Progressing  Flowsheets (Taken 1/8/2023 1004 by Monisha Zamora RN)  Verbalizes/displays adequate comfort level or baseline comfort level:   Encourage patient to monitor pain and request assistance   Assess pain using appropriate pain scale     Problem: Chronic Conditions and Co-morbidities  Goal: Patient's chronic conditions and co-morbidity symptoms are monitored and maintained or improved  Outcome: Progressing  Flowsheets  Taken 1/8/2023 2000 by Daniel Garrido RN  Care Plan - Patient's Chronic Conditions and Co-Morbidity Symptoms are Monitored and Maintained or Improved:   Monitor and assess patient's chronic conditions and comorbid symptoms for stability, deterioration, or improvement   Collaborate with multidisciplinary team to address chronic and comorbid conditions and prevent exacerbation or deterioration  Taken 1/8/2023 1700 by David Dixon RN  Care Plan - Patient's Chronic Conditions and Co-Morbidity Symptoms are Monitored and Maintained or Improved:   Monitor and assess patient's chronic conditions and comorbid symptoms for stability, deterioration, or improvement   Collaborate with multidisciplinary team to address chronic and comorbid conditions and prevent exacerbation or deterioration

## 2023-01-09 NOTE — PLAN OF CARE
Problem: Discharge Planning  Goal: Discharge to home or other facility with appropriate resources  1/8/2023 2259 by Andrea Cordero RN  Outcome: Progressing  1/8/2023 2154 by Najma Johnson RN  Outcome: Progressing  Flowsheets  Taken 1/8/2023 2000 by Najma Johnson RN  Discharge to home or other facility with appropriate resources:   Identify barriers to discharge with patient and caregiver   Arrange for needed discharge resources and transportation as appropriate   Identify discharge learning needs (meds, wound care, etc)  Taken 1/8/2023 1700 by Alethea Pradhan RN  Discharge to home or other facility with appropriate resources:   Identify barriers to discharge with patient and caregiver   Arrange for needed discharge resources and transportation as appropriate     Problem: Skin/Tissue Integrity  Goal: Absence of new skin breakdown  Description: 1. Monitor for areas of redness and/or skin breakdown  2. Assess vascular access sites hourly  3. Every 4-6 hours minimum:  Change oxygen saturation probe site  4. Every 4-6 hours:  If on nasal continuous positive airway pressure, respiratory therapy assess nares and determine need for appliance change or resting period.   1/8/2023 2259 by Andrea Cordero RN  Outcome: Progressing  1/8/2023 2154 by Najma Johnson RN  Outcome: Progressing     Problem: Safety - Adult  Goal: Free from fall injury  1/8/2023 2259 by Andrea Cordero RN  Outcome: Progressing  1/8/2023 2154 by Najma Johnson RN  Outcome: Progressing  [facility] (Taken 1/8/2023 2110)  Free From Fall Injury:   Instruct family/caregiver on patient safety   Based on caregiver fall risk screen, instruct family/caregiver to ask for assistance with transferring infant if caregiver noted to have fall risk factors     Problem: ABCDS Injury Assessment  Goal: Absence of physical injury  1/8/2023 2259 by Andrea Cordero RN  Outcome: Progressing  1/8/2023 2154 by Najma Johnson RN  Outcome: Progressing  Flowsheets (Taken 1/8/2023 2110)  Absence of Physical Injury: Implement safety measures based on patient assessment     Problem: Pain  Goal: Verbalizes/displays adequate comfort level or baseline comfort level  1/8/2023 2259 by Laura Toussaint RN  Outcome: Progressing  1/8/2023 2154 by Ela Cheng RN  Outcome: Progressing  Flowsheets (Taken 1/8/2023 1004 by Laura Toussaint RN)  Verbalizes/displays adequate comfort level or baseline comfort level:   Encourage patient to monitor pain and request assistance   Assess pain using appropriate pain scale     Problem: Chronic Conditions and Co-morbidities  Goal: Patient's chronic conditions and co-morbidity symptoms are monitored and maintained or improved  1/8/2023 2259 by Laura Toussaint RN  Outcome: Progressing  1/8/2023 2154 by Ela Cheng RN  Outcome: Progressing  Flowsheets  Taken 1/8/2023 2000 by Ela Cheng RN  Care Plan - Patient's Chronic Conditions and Co-Morbidity Symptoms are Monitored and Maintained or Improved:   Monitor and assess patient's chronic conditions and comorbid symptoms for stability, deterioration, or improvement   Collaborate with multidisciplinary team to address chronic and comorbid conditions and prevent exacerbation or deterioration  Taken 1/8/2023 1700 by Donaldo Keller 34 - Patient's Chronic Conditions and Co-Morbidity Symptoms are Monitored and Maintained or Improved:   Monitor and assess patient's chronic conditions and comorbid symptoms for stability, deterioration, or improvement   Collaborate with multidisciplinary team to address chronic and comorbid conditions and prevent exacerbation or deterioration

## 2023-01-09 NOTE — DISCHARGE SUMMARY
Internal Medicine Progress Note     [facility]     Samantha Lujanhaydee. Kuldip Connors., F.A.C.O.I. Geronimo Cortez D.O., KAUSHAL.A.CChenteORAFAL Dawson D.O. Ronnie Arrieta, MSN, APRN, NP-DIANE Gallo. Julien Terrazas, MSN, [id number] [facility]       Internal Medicine  Discharge Summary    NAME: Anni Damon  :  1960  MRN:  [id number]  PCP:Nikolay Pennington MD  ADMITTED: 2023      DISCHARGED: 23    ADMITTING PHYSICIAN: Westley Patterson DO    CONSULTANT(S):   IP CONSULT TO SOCIAL WORK  IP CONSULT TO HEART FAILURE NURSE/COORDINATOR  IP CONSULT TO CARDIOLOGY     ADMITTING DIAGNOSIS:   Acute on chronic diastolic CHF (congestive heart failure) (HCC) [I50.33]  Hypervolemia, unspecified hypervolemia type [E87.70]     DISCHARGE DIAGNOSES:   Acutely decompensated diastolic congestive heart failure with now depressed systolic function and motion abnormalities/hypokinesis   Abnormal stress test-findings consistent with ischemia of LAD with plans for eventual heart cath once medical conditions clear. Recent hospitalization for transaminitis with hyperbilirubinemia, multiple liver cysts, negative MRCP for biliary obstruction, Abnormal GB appearance with negative ultrasound   COVID-19 infection with respiratory symptomatology  Recurrent diverticulitis versus resolving diverticulitis  Recent sepsis secondary to acute diverticulitis with perforation and abscess status post IR guided drainage  Chronic respiratory failure with hypoxia secondary to advanced COPD without exacerbation   Left upper extremity IV infiltrate  Ongoing tobacco abuse  Prior history of DVT  Hyperlipidemia on statin agent    BRIEF HISTORY OF PRESENT ILLNESS:   Hay Jiang is a 70-year-old female who was discharged from this hospital under our service again on 2023. She has had multiple hospital stays in the recent past for various issues.   She returned home and states that she was compliant with all medication and treatment regimen however presented back to the hospital grossly volume overloaded. proBNP was elevated on ER work-up. High-sensitivity troponin was mildly elevated with no repeat yet obtained. Chest x-ray suggested pulmonary edema. Metabolic panel revealed mild hypokalemia with otherwise no significant electrolyte or renal function disturbance. LFTs were elevated at 332, 134 and 160 in regards to alk phos, ALT and AST respectively. Patient had recent extensive work-up for liver process as previously documented. Case was discussed with ER physician with plan for admission, further care admitted to service of Dr. Hailey Quiroz.     LABS[de-identified]  Lab Results   Component Value Date    WBC 9.7 01/09/2023    HGB 10.5 (L) 01/09/2023    HCT 32.1 (L) 01/09/2023     01/09/2023     01/09/2023    K 3.2 (L) 01/09/2023     01/09/2023    CREATININE 0.3 (L) 01/09/2023    BUN 24 (H) 01/09/2023    CO2 35 (H) 01/09/2023    GLUCOSE 79 01/09/2023    ALT 34 (H) 01/09/2023    AST 35 (H) 01/09/2023    INR 1.5 12/19/2022     Lab Results   Component Value Date    INR 1.5 12/19/2022    INR 1.0 10/28/2019    INR 0.9 01/31/2018    PROTIME 17.1 (H) 12/19/2022    PROTIME 11.3 10/28/2019    PROTIME 9.8 01/31/2018      Lab Results   Component Value Date    TSH 1.120 01/05/2023     Lab Results   Component Value Date    TRIG 51 02/10/2022    TRIG 82 11/30/2019    TRIG 79 10/29/2019     Lab Results   Component Value Date    HDL 69 02/10/2022    HDL 61 11/30/2019    HDL 45 10/29/2019     Lab Results   Component Value Date    LDLCALC 183 (H) 02/10/2022    LDLCALC 195 (H) 11/30/2019    LDLCALC 138 (H) 10/29/2019     Lab Results   Component Value Date    LABA1C 5.0 02/10/2022       IMAGING:  CT ABDOMEN PELVIS W IV CONTRAST Additional Contrast? None    Result Date: 12/28/2022  EXAMINATION: CT OF THE ABDOMEN AND PELVIS WITH CONTRAST 12/28/2022 9:45 pm TECHNIQUE: CT of the abdomen and pelvis was performed with the administration of intravenous contrast. Multiplanar reformatted images are provided for review. Automated exposure control, iterative reconstruction, and/or weight based adjustment of the mA/kV was utilized to reduce the radiation dose to as low as reasonably achievable. COMPARISON: 12/16/2022 HISTORY: ORDERING SYSTEM PROVIDED HISTORY: Diverticulitis and abscess hx TECHNOLOGIST PROVIDED HISTORY: Reason for exam:->Diverticulitis and abscess hx Additional Contrast?->None Decision Support Exception - unselect if not a suspected or confirmed emergency medical condition->Emergency Medical Condition (MA) FINDINGS: Lower Chest: Trace right pleural effusion. Advanced emphysematous change Organs: Multiple cystic lesions noted within the liver. Subcentimeter hypoattenuating lesions are technically indeterminate, but unchanged from prior examination and likely also cysts. The gallbladder is distended. The spleen, pancreas, and adrenals are within limits. There are bilateral nonobstructing renal calculi. Bilateral renal cysts. Sara Ruffing GI/Bowel: No evidence of obstruction. There is diverticulosis. There is wall thickening and inflammatory stranding of the sigmoid colon. No extraluminal fluid collection. Pelvis: The urinary bladder is partially filled. The uterus is unremarkable. Peritoneum/Retroperitoneum: There is small volume free fluid throughout the abdomen and pelvis. No extraluminal gas. Reactive retroperitoneal lymph nodes. Aorta is normal in caliber. Bones/Soft Tissues:  No acute abnormality of the visualized osseous structures. Interval resolution of previously seen fluid collection associated with the sigmoid colon. There is inflammatory stranding and wall thickening of the sigmoid colon, which could represent resolving versus recurrent diverticulitis. Clinical correlation recommended.      CT ABDOMEN PELVIS W IV CONTRAST Additional Contrast? None    Result Date: 12/16/2022  EXAMINATION: CT OF THE ABDOMEN AND PELVIS WITH CONTRAST 12/16/2022 10:53 pm TECHNIQUE: CT of the abdomen and pelvis was performed with the administration of intravenous contrast. Multiplanar reformatted images are provided for review. Automated exposure control, iterative reconstruction, and/or weight based adjustment of the mA/kV was utilized to reduce the radiation dose to as low as reasonably achievable. COMPARISON: 11/29/2022. HISTORY: ORDERING SYSTEM PROVIDED HISTORY: LLQ abd pain, concern for diverticulitis TECHNOLOGIST PROVIDED HISTORY: Reason for exam:->LLQ abd pain, concern for diverticulitis Additional Contrast?->None Decision Support Exception - unselect if not a suspected or confirmed emergency medical condition->Emergency Medical Condition (MA) FINDINGS: Lower Chest:   Emphysematous changes. Pleural thickening and/or atelectasis right lower lobe. Organs: Multiple liver cysts. The spleen, adrenal glands, left kidney, pancreas and gallbladder are normal.  5 mm and 2 mm nonobstructing right renal calculi. GI/bowel: Extensive thickening of the mid sigmoid colon consistent with diverticulitis. Present within the mid sigmoid colon colonic wall is a 5.5 cm x 3.5 cm fluid collection consistent with abscess. Pelvis: Normal urinary bladder. Peritoneum/Retroperitoneum:   No free fluid or free air. Normal caliber abdominal aorta with scattered atherosclerotic plaque. Bones/Soft Tissues: Unremarkable. Extensive thickening of the mid sigmoid colon consistent with diverticulitis. Present within the mid sigmoid colon colonic wall is a 5.5 cm x 3.5 cm fluid collection consistent with abscess. US GALLBLADDER RUQ    Result Date: 12/28/2022  EXAMINATION: RIGHT UPPER QUADRANT ULTRASOUND 12/28/2022 9:16 pm COMPARISON: None.  HISTORY: ORDERING SYSTEM PROVIDED HISTORY: eval gb, possible stone TECHNOLOGIST PROVIDED HISTORY: Reason for exam:->eval gb, possible stone What reading provider will be dictating this exam?->CRC FINDINGS: LIVER:  The liver demonstrates normal echogenicity without evidence of intrahepatic biliary ductal dilatation. Multiple hepatic cysts measuring up to 6.5 cm. Small amount of perihepatic fluid. BILIARY SYSTEM: Mild gallbladder wall thickening. No stones, sludge or pericholecystic fluid. .  Negative sonographic Jiménez's sign. Common bile duct is within normal limits measuring 4.2 mm. RIGHT KIDNEY: The right kidney is grossly unremarkable without evidence of hydronephrosis. PANCREAS:  Visualized portions of the pancreas are unremarkable. OTHER: No evidence of right upper quadrant ascites. Multiple liver cysts measuring up to 6.5 cm. Small mount of fluid. Mild gallbladder wall thickening. No stones, sludge or pericholecystic fluid. Normal common bile duct. XR CHEST PORTABLE    Result Date: 1/4/2023  EXAMINATION: ONE XRAY VIEW OF THE CHEST 1/4/2023 5:29 am COMPARISON: 28 December 2022 HISTORY: ORDERING SYSTEM PROVIDED HISTORY: SOB TECHNOLOGIST PROVIDED HISTORY: Reason for exam:->SOB FINDINGS: There are pronounced emphysematous changes in the mid to lower lungs with compression of the upper lobes. Normal heart and pulmonary vascularity. Neither costophrenic angle is blunted. Pronounced obstructive airways disease with emphysema at the mid to lower lungs and compression of the upper lobes. XR CHEST PORTABLE    Result Date: 12/28/2022  EXAMINATION: ONE XRAY VIEW OF THE CHEST 12/28/2022 6:08 pm COMPARISON: None. HISTORY: ORDERING SYSTEM PROVIDED HISTORY: sob TECHNOLOGIST PROVIDED HISTORY: Reason for exam:->sob FINDINGS: The lungs are without acute focal process. There is no effusion or pneumothorax. The cardiomediastinal silhouette is without acute process. The osseous structures are without acute process. No acute process. COPD.      XR CHEST PORTABLE    Result Date: 12/20/2022  EXAMINATION: ONE XRAY VIEW OF THE CHEST 12/20/2022 5:50 am COMPARISON: December 3, 2022 HISTORY: ORDERING SYSTEM PROVIDED HISTORY: SOB TECHNOLOGIST PROVIDED HISTORY: Reason for exam:->SOB FINDINGS: Normal cardiomediastinal silhouette. Lungs clear. There is hyperinflation of the lungs with flattening of the diaphragms suggesting COPD. No pneumothorax or effusion. Osseous thorax intact. No acute disease. RECOMMENDATION: Careful clinical correlation and follow up recommended. MRI ABDOMEN W WO CONTRAST MRCP    Result Date: 12/29/2022  EXAMINATION: MRI OF THE ABDOMEN WITH AND WITHOUT CONTRAST AND MRCP 12/29/2022 10:16 am TECHNIQUE: Multiplanar multisequence MRI of the abdomen was performed with and without the administration of intravenous contrast.  After initial T2 axial and coronal images, thick slab, thin slab and 3D coronal MRCP sequences were obtained without the administration of intravenous contrast.  3D and MIP images are provided for review. COMPARISON: CT abdomen and pelvis dated 12/28/2022 HISTORY: ORDERING SYSTEM PROVIDED HISTORY: rule out biliary obstruction TECHNOLOGIST PROVIDED HISTORY: Reason for exam:->rule out biliary obstruction Decision Support Exception - unselect if not a suspected or confirmed emergency medical condition->Emergency Medical Condition (MA) FINDINGS: Lung bases reveal small right and trace left pleural effusions. Liver contains multiple T2 hyperintense lesions scattered throughout none of which demonstrate abnormal enhancing findings on post-contrast sequences consistent of hepatic cysts. Trace perihepatic ascites. Gallbladder: Unremarkable without filling defect. Bile Ducts: No intrahepatic or extrahepatic biliary dilatation. Tapering contour is expected towards the distal portion or near the ampulla without filling defect of choledocholithiasis. Pancreatic Duct: Normal caliber and contour without pancreas divisum anatomy. Other:  Pancreas, spleen, adrenals and kidneys unremarkable.   Hyperdense hemorrhagic cyst left kidney without suspicious lesion numerous other small simple appearing renal cortical cysts without hydronephrosis no bulky retroperitoneal adenopathy. Visualized GI tract unremarkable apart from trace fluid in the right pericolic gutter on partial imaging. No aggressive bone marrow signal findings or soft tissue findings     No biliary dilatation or obstructive elements as there is no cholelithiasis or choledocholithiasis evident. Multiple hepatic cystic lesions without abnormal enhancing or contour deforming mass evident. Trace perihepatic ascites along with fluid extending along the inferior margin to the partially visualized right pericolic gutter is indeterminate and may be secondary from inflammation of a recent colitis or enteritis. Small right and trace left pleural effusions     CTA PULMONARY W CONTRAST    Result Date: 12/28/2022  EXAMINATION: CTA OF THE CHEST 12/28/2022 9:45 pm TECHNIQUE: CTA of the chest was performed after the administration of intravenous contrast.  Multiplanar reformatted images are provided for review. MIP images are provided for review. Automated exposure control, iterative reconstruction, and/or weight based adjustment of the mA/kV was utilized to reduce the radiation dose to as low as reasonably achievable. COMPARISON: 11/29/2022 HISTORY: ORDERING SYSTEM PROVIDED HISTORY: r/o pe TECHNOLOGIST PROVIDED HISTORY: Reason for exam:->r/o pe Decision Support Exception - unselect if not a suspected or confirmed emergency medical condition->Emergency Medical Condition (MA) FINDINGS: Pulmonary Arteries: Pulmonary arteries are adequately opacified for evaluation. No evidence of intraluminal filling defect to suggest pulmonary embolism. Main pulmonary artery is normal in caliber. Mediastinum: No evidence of mediastinal lymphadenopathy. The heart and pericardium demonstrate no acute abnormality. There is no acute abnormality of the thoracic aorta. Lungs/pleura: The lungs are without acute process. No focal consolidation or pulmonary edema. Advanced lower lung predominant emphysematous change.   No evidence of pleural effusion or pneumothorax. Upper Abdomen: Please see separately dictated report for findings below the diaphragm. Soft Tissues/Bones: No acute bone or soft tissue abnormality. No evidence of pulmonary embolism or acute pulmonary abnormality. Advanced emphysematous changes. US DUP UPPER EXTREMITY LEFT VENOUS    Result Date: 1/6/2023  EXAMINATION: VENOUS ULTRASOUND OF THE LEFT UPPER EXTREMITY, 1/6/2023 10:57 am TECHNIQUE: Duplex ultrasound using B-mode/gray scaled imaging and Doppler spectral analysis and color flow was obtained of the deep venous structures of the left upper extremity. COMPARISON: None. HISTORY: ORDERING SYSTEM PROVIDED HISTORY: rule out DVT TECHNOLOGIST PROVIDED HISTORY: Reason for exam:->rule out DVT What reading provider will be dictating this exam?->CRC FINDINGS: There is normal flow and compressibility of the visualized venous structures. There is no evidence of echogenic thrombus. The veins demonstrate good compressibility with normal color flow study and spectral analysis. No evidence of DVT. NM Cardiac Stress Test Nuclear Imaging    Result Date: 1/6/2023  Indication: Exertional dyspnea, cardiomyopathy Clinical History:   Patient has no known history of coronary artery disease. IMAGING: Myocardial perfusion imaging was performed at rest 30-35 minutes following the intravenous injection of 8.7 mCi of (Tc-Sestamibi) followed by 10 ml of Normal Saline. At peak exercise, the patient was injected intravenously with 29. 1mCi of (Tc-Sestamibi) followed by 10 ml of Normal Saline. Gated post-stress tomographic imaging was performed 20-25 minutes after stress. FINDINGS: The overall quality study was good.  Left ventricular cavity size was noted to be normal both on the post regadenoson and resting images Rotational analog analysis demonstrated no evidence of motion artifact or attenuation The gated SPECT stress imaging in the short, vertical long, and horizontal long axis moderately diminished tracer uptake throughout the anteroseptal and anterolateral segments on the post regadenoson images with mild residual diminished tracer uptake at time of resting images. Gated SPECT left ventricular ejection fraction was calculated to be 48 % with a mild anteroseptal hypokinesis. The myocardial perfusion imaging was abnormal with a moderate size moderate intensity and partial reversible anteroseptal and anterolateral perfusion abnormality with mild associated regional wall motion abnormalities consistent with ischemia of the left anterior descending distribution potentially in addition to that of postischemic stunning. Overall left ventricular systolic function was at the lower limits of normal with regional wall motion abnormalities as described above. Intermediate risk pharmacologic myocardial perfusion imaging study. IR ABSCESS DRAINAGE PERC    Result Date: 12/19/2022  PROCEDURE: IR ABSCESS DRAIN PERC MODERATE CONSCIOUS SEDATION 12/19/2022 HISTORY: ORDERING SYSTEM PROVIDED HISTORY: Drainage of intra-abdominal abscess TECHNOLOGIST PROVIDED HISTORY: Reason for exam:->Drainage of intra-abdominal abscess TECHNIQUE: CT-guided CONTRAST: None SEDATION: Moderate sedation was ordered and supervised by the attending with physician face-to-face monitoring. Medications were provided and recorded by Radiology nurses. The administration, documentation and monitoring of IV conscious sedation under my direct supervision for time frame of 30 minutes. 1 mg of IV Versed and 50 mcg of IV fentanyl was administered. FLUOROSCOPY DOSE AND TYPE OR TIME AND EXPOSURES: The procedure was performed under CT guidance. DESCRIPTION OF PROCEDURE: Informed consent was obtained after a detailed explanation of the procedure including risks, benefits, and alternatives. Universal protocol was observed. Sterile gowns, masks, hats and gloves utilized for maximal sterile barrier.  FINDINGS: CT images of the pelvis were obtained. A time-out was performed The patient's anterior pelvic wall was prepped and draped in a sterile fashion using maximum sterile barrier technique. Following the uneventful administration of lidocaine, I introduced a 5 Western Genny Yueh catheter into the 6.5 cm x 4 cm pericolonic abscess adjacent to the sigmoid colon. Through the Yueh needle an Amplatz wire was advanced into the abscess cavity. Over the Amplatz wire a multi side hole pigtail drainage catheter was placed. 10 mL of purulent material was aspirated and sent to microbiology for further evaluation. Successful placement of an 8 Western Genny multi side hole pigtail drainage catheter within the pericolonic abscess adjacent to the sigmoid colon. Administration of conscious sedation. HOSPITAL COURSE:   Lauren Carrero spent an extended period of time hospitalized and this will serve as a brief synopsis. She was admitted 1/4/2023 due to increased SOB and worsening edema. Echo revealed newly depressed systolic function. Stress was abnormal. Cardiology recommended eventual stress test once medical conditions improve. Status was addressed accordingly with IV diuresis with plan to transition back to oral regimen with fluid and sodium restricted diet. Her chronic morbidities, labs and vital signs were monitored and addressed accordingly throughout the hospitalization. During the course of the work-up for her shortness of breath she was identified as being COVID-19 positive. She was treated maximally as condition allowed. She understands that she has multiple intra-abdominal issues that need to continue to be followed up as an outpatient as previously planned. She performed well enough with the therapy teams that she is acceptable for discharge to skilled nursing facility today. Patient is medically stable and acceptable for discharge today.     BRIEF PHYSICAL EXAMINATION AND LABORATORIES ON DAY OF DISCHARGE:  VITALS:  BP 83/60   Pulse 87   Temp 97.5 °F (36.4 °C) (Oral)   Resp 21   Ht 5' 3\" (1.6 m)   Wt 152 lb (68.9 kg)   SpO2 90%   BMI 26.93 kg/m²     HEENT:  PERRLA. EOMI. Sclera clear. Buccal mucosa moist.    Neck:  Supple. Trachea midline. No thyromegaly. No JVD. No bruits. Heart:  Rhythm regular, rate controlled. S1 and S2. Systolic murmur. Lungs:  Symmetrical.  Diminished. Lungs clear to auscultation bilaterally. No wheezes. No rhonchi. No rales. Abdomen: Soft. Non-tender. Non-distended. Bowel sounds positive. No organomegaly or masses. No pain on palpation    Extremities:  Peripheral pulses present. Significant interval improvement in lower extremity peripheral edema. No ulcers. Neurologic:  Alert x 3. Mild generalized weakness without focal deficit. Cranial nerves grossly intact. Skin:  No petechia. No hemorrhage. No wounds. DISPOSITION:  The patient's condition is stable. At this time the patient is without objective evidence of an acute process requiring continuing hospitalization or inpatient management. They are stable for discharge with outpatient follow-up. I have spoken with the patient and discussed the results of the current hospitalization, in addition to providing specific details for the plan of care and counseling regarding the diagnosis and prognosis. The plan has been discussed in detail and they are aware of the specific conditions for emergent return, as well as the importance of follow-up.   Their questions are answered at this time and they are agreeable with the plan for discharge to nursing home    DISCHARGE MEDICATIONS:   Current Discharge Medication List             Details   aspirin 81 MG chewable tablet Take 1 tablet by mouth daily  Qty: 30 tablet, Refills: 3      furosemide (LASIX) 20 MG tablet Take 1 tablet by mouth daily  Qty: 60 tablet, Refills: 3      potassium chloride (KLOR-CON M) 20 MEQ extended release tablet Take 1 tablet by mouth daily  Qty: 30 tablet, Refills: 0      metoprolol succinate (TOPROL XL) 25 MG extended release tablet Take 0.5 tablets by mouth daily  Qty: 30 tablet, Refills: 3                Details   cefdinir (OMNICEF) 300 MG capsule Take 1 capsule by mouth every 12 hours for 5 days  Qty: 10 capsule, Refills: 0                Details   albuterol sulfate HFA (VENTOLIN HFA) 108 (90 Base) MCG/ACT inhaler Inhale 2 puffs into the lungs 4 times daily as needed for Wheezing or Shortness of Breath  Qty: 1 each, Refills: 2      Arformoterol Tartrate (BROVANA) 15 MCG/2ML NEBU Take 2 mLs by nebulization in the morning and 2 mLs in the evening. Qty: 120 mL, Refills: 0      budesonide (PULMICORT) 0.5 MG/2ML nebulizer suspension Take 2 mLs by nebulization in the morning and 2 mLs in the evening. Qty: 60 each, Refills: 0      ipratropium-albuterol (DUONEB) 0.5-2.5 (3) MG/3ML SOLN nebulizer solution Inhale 3 mLs into the lungs in the morning and 3 mLs at noon and 3 mLs in the evening and 3 mLs before bedtime. Qty: 360 mL, Refills: 0      atorvastatin (LIPITOR) 40 MG tablet Take 1 tablet by mouth nightly  Qty: 30 tablet, Refills: 0      sertraline (ZOLOFT) 25 MG tablet Take 1 tablet by mouth daily  Qty: 30 tablet, Refills: 0    Associated Diagnoses: Current moderate episode of major depressive disorder without prior episode (HCC)      predniSONE (DELTASONE) 10 MG tablet Take by oral route 4 tabs x 3 days, then 3 tabs x 3 days, then 2 tabs x 3 days, then 1 tab x 3 days, then discontinue. Take with food. Qty: 30 tablet, Refills: 0      fluticasone (FLONASE) 50 MCG/ACT nasal spray 2 sprays by Each Nostril route daily  Qty: 1 each, Refills: 5    Associated Diagnoses: Seasonal allergic rhinitis due to pollen      melatonin 5 MG TBDP disintegrating tablet Take 1 tablet by mouth nightly  Qty: 30 tablet, Refills: 0      Vitamin D (CHOLECALCIFEROL) 50 MCG (2000 UT) TABS tablet Take 1 tablet by mouth daily  Qty: 30 tablet, Refills: 0    Comments: Labeling may look different. 25 mcg=1000 Units.  Please double check dosages. pantoprazole (PROTONIX) 40 MG tablet Take 1 tablet by mouth in the morning and at bedtime for 14 days, THEN 1 tablet daily. Qty: 58 tablet, Refills: 0      zinc 50 MG CAPS Take 50 mg by mouth daily  Qty: 30 capsule, Refills: 0      ascorbic acid (VITAMIN C) 1000 MG tablet Take 1 tablet by mouth daily  Qty: 30 tablet, Refills: 0      OXYGEN Inhale 3 L into the lungs continuous             FOLLOW UP/INSTRUCTIONS:  This patient is instructed to follow-up with her primary care physician. Patient is instructed to follow-up with the consults listed above as directed by them. she is instructed to resume home medications and take new medications as indicated in the list above. If the patient has a recurrence of symptoms, she is instructed to go to the ED. Preparing for this patient's discharge, including paperwork, orders, instructions, and meeting with patient did require > 40 minutes. SKYE Ng CNP   1/9/2023  11:11 AM     Kelli Witt will complete treatment for underlying coronavirus and receive aggressive therapy at which point she will be considered for outpatient cardiac catheterization. She will complete therapy at a skilled nursing facility. Cardiac medications have been maximized as much as possible with some limitation related to hypotension. This includes the addition of beta-blocker therapy but the patient was intolerant to [place] therapy secondary to hypotension. This can be reevaluated for implementation pending her stabilization at the nursing home facility. I have personally discussed the case with the cardiovascular team and consideration will be undertaken for outpatient cardiac catheterization down the line. Her abdominal symptoms have improved. She will complete a course of antibiotics. I have updated her daughter accordingly. I saw, examined, and discussed the patient with Kennedy VALDOVINOS and agree with his assessment and plan.     Electronically signed by Álvaro Galeas DO on 1/9/2023 at 11:25 AM

## 2023-01-09 NOTE — PROGRESS NOTES
OCCUPATIONAL THERAPY BEDSIDE TREATMENT NOTE  [facility]  [address] [place]    Date:2023  Patient Name: Fiona Grey  MRN: [id number]  : 1960  Room: [address] [place] [address]       Evaluating OT: Jeanie Rivers OTR/L #[id number]      Referring Provider and Specific Provider Orders/Date:      23 1245   OT eval and treat  Start:  23 1245,   End:  23 1245,   ONE TIME,   Standing Count:  1 Occurrences,   R         Daniel Rodriguez, DO       Placement Recommendation: Subacute        Diagnosis:   1. Hypervolemia, unspecified hypervolemia type         Surgery: None        Pertinent Medical History:       Past Medical History        Past Medical History:   Diagnosis Date    Abscess       colon    Acute on chronic diastolic CHF (congestive heart failure) ([facility]) 2023    COPD (chronic obstructive pulmonary disease) ([facility])      Diverticulitis      Provoked DVT 3/2018       3 months Eliquis for DVT due to PICC line    Seasonal allergic rhinitis      Smoker 2019     5-6 per day    Tobacco use disorder        : 1 ppd since age 18         Past Surgical History         Past Surgical History:   Procedure Laterality Date    CARDIOVASCULAR STRESS TEST N/A 2023     lexiscan stress test          Precautions: Up with assistance, falls, Droplet plus/COVID-19, and Hep A  , extremely SOB , 5L O2 via nasal canula       Assessment of current deficits    [x] Functional mobility            [x]ADLs           [x] Strength                   []Cognition    [x] Functional transfers          [x] IADLs          [x] Safety Awareness   [x]Endurance    [] Fine Coordination              [x] Balance      [] Vision/perception    []Sensation      []Gross Motor Coordination  [] ROM           [] Delirium                   [] Motor Control      OT PLAN OF CARE   OT POC based on physician orders, patient diagnosis and results of clinical assessment     Frequency/Duration  1-3 days/wk for 2 weeks PRN      Specific OT Treatment Interventions to include:   * Instruction/training on adapted ADL techniques and AE recommendations to increase functional independence within precautions       * Training on energy conservation strategies, correct breathing pattern and techniques to improve independence/tolerance for self-care routine  * Functional transfer/mobility training/DME recommendations for increased independence, safety, and fall prevention  * Patient/Family education to increase follow through with safety techniques and functional independence  * Recommendation of environmental modifications for increased safety with functional transfers/mobility and ADLs  * Therapeutic exercise to improve motor endurance, ROM, and functional strength for ADLs/functional transfers  * Therapeutic activities to facilitate/challenge dynamic balance, stand tolerance for increased safety and independence with ADLs     Recommended Adaptive Equipment: TBD  - shower chair if plans for home      Home Living: Patient lives with daughter and daughter's boyfriend  in a two story home resides first  with 2 steps, with rail  to enter Tub shower , grab bars       Equipment owned: Bedside commode and Hospital bed , O2      Prior Level of Function: pt states dghtr assists with ADLs as needed;  ambulated independently.       Pain Level: pt denied pain; Nsg notified of blisters in L UE (proximal); positioning and ice provided d/t edema              Cognition: A&O: 4/4; Follows 3 step directions              Memory: intact              Sequencing: intact              Problem solving: fair               Judgement/safety: fair      [facility]   AM-PAC Daily Activity Inpatient   How much help for putting on and taking off regular lower body clothing?: A Lot  How much help for Bathing?: A Lot  How much help for Toileting?: A Lot  How much help for putting on and taking off regular upper body clothing?: A Little  How much help for taking care of personal grooming?: A Little  How much help for eating meals?: A Little  AM-PAC Inpatient Daily Activity Raw Score: 15  AM-PAC Inpatient ADL T-Scale Score : 34.69  ADL Inpatient CMS 0-100% Score: 56.46  ADL Inpatient CMS G-Code Modifier : CK                Functional Assessment:     Initial Eval Status  Date: 1/6/23    Treatment Status  Date:1/9/23 STGs = LTGs  Time frame: 10-14 days   Feeding Supervision     Supervision; pt able to manage containers and bring utensil to mouth, as well as cup to mouth  Independent    Grooming Stand by Assist    Min A to brush hair at back of head; pt able to wash face with set up assist; pt able to manage containers and complete oral hygiene when seated in bed with HOB elevated; mod A to place deodorant under B UE's d/t decreased endurance with overhead activities Moderate [place]    UB Dressing Minimal Assist    Min A for gown management; pt able to thread B UE's through sleeves; assist to tie/untie back of gown  Moderate [place]    LB Dressing Moderate Assist     N/T  Moderate [place]    Bathing Moderate Assist    Mod A; pt able to wash UB and complete pericare when seated in bed; pt required rest breaks d/t SOB and fatigue; assist required to wash LE's  when seated in bed d/t decreased endurance, as well as back and buttocks when side rolling in bed Moderate [place]    Toileting Moderate Assist     Pt slightly incontinent of bowel on arrival with mod A/max A for rear hygiene ; pt able to complete pericare when seated in bed Moderate [place]    Bed Mobility  Supine to sit: Supervision   Sit to supine: Supervision        Min A for side rolling in bed for ADL tasks; mod/max A to scoot to [facility] with use of TAPS as sling; HOB elevated at end of session so pt can eat lunch  Supine to sit:  Independent   Sit to supine: Independent    Functional Transfers Sit to stand: Minimal Assist   Stand to sit: Minimal Assist      Transfer training with verbal cues for hand placement throughout session to improve safety. N/T pt declined d/t fatigue and SOB  Moderate [place]    Functional Mobility Moderate Assist with wheeled walker to improve balance 3-4 steps along side of bed, verbal cues for walker sequence and safety. N/T pt declined d/t fatigue and SOB   Moderate [place] with use of wheeled walker    Balance Sitting:     Static: fair plus    Dynamic: fair plus  Standing: fair/fair minus with walker     Sitting:     Static: fair     Dynamic: fair   Standing: N/T pt declined 2* to fatigue and SOB Sitting:     Static: good    Dynamic: good  Standing: good with walker    Activity Tolerance fair    fair/fair minus; rest breaks provided d/t fatigue/SOB Increase standing tolerance >3 minutes for improved engagement with functional transfers and indep in ADLs      Visual/  Perceptual Glasses: readers      Reports changes in vision since admission: no       NA       Hand Dominance: right      Hearing: WFL   Sensation:  No c/o numbness or tingling  Tone: WFL   Edema: Left UE  ; positioning and ice provided; nsg notified that pt has 3 blisters and one popped blister with edema of L UE; pt wants blisters covered     Comments: Patient cleared by nursing staff. Upon arrival pt supine in bed. Pt agreeable to OT tx session with encouragement provided. Pt educated with regards to bed mobility, hand placement, safety awareness,  ADL retraining,  grooming tasks, UE/LE bathing, UE dressing, pericare and rear hygiene, positioning of L UE with ice provided, self feeding, ECT's. At end of session pt seated in bed with HOB elevated to eat lunch; O2 tube intact, call light within reach. Bed alarm on. Overall, pt demonstrated decreased independence and safety during completion of ADL/functional transfers/mobility tasks.  Pt would benefit from continued skilled OT to increase safety and independence with completion of ADL/IADL tasks for functional independence and quality of life. Pt required cues and education as noted above for safe facilitation and completion of tasks. Therapist provided skilled monitoring of patient's response during treatment session. Prior to and at the end of session, environmental modifications /O2 line management completed for patients safety and efficiency of treatment session. Overall, patient demonstrates moderate difficulties with completion of BADLs and IADLs. Factors contributing to these difficulties include slight bowel incontinence, SOB with activities requiring RB's, decreased endurance, and generalized weakness. As noted above, patient likely to benefit from further OT intervention to increase independence, safety, and overall quality of life. Treatment:     Bed mobility: Facilitated bed mobility with cues for proper body mechanics and sequencing to prepare for ADL completion. ADL completion: Self-care retraining for the above-mentioned ADLs; training on proper hand placement, safety technique, sequencing, and energy conservation techniques. Skilled positioning: Proper positioning to improve interaction with environment, overall functioning and decrease/prevent edema and contractures. Pt has made fair/fair minus progress towards set goals    Pt limited d/t SOB with activities and min rest breaks provided   OT 1-3 days/wk for 2 weeks PRN       Treatment Time also includes thorough review of current medical information, gathering information on past medical history/social history and prior level of function, informal observation of tasks, assessment of data and education on plan of care and goals.     Treatment Time In: 11:51 AM     Treatment Time Out: 12:29 PM            Treatment Charges: Mins Units   ADL/Home Mgt     [id number]   Thera Activities     [id number]       Ther Ex                 [id number]       Manual Therapy    08144     Neuro Re-ed         [id number]     Orthotic manage/training                               [id number]     Non Billable Time     Total Timed Treatment 38 1 [address] [place]

## 2023-01-09 NOTE — PROGRESS NOTES
Called and gave report to Tevin Knowles RN at [facility]: [address]. Provided call back number and pickup time. Discharge teaching provided. IV removed. Heart monitor removed, cleaned and returned to nurse's station.

## 2023-01-13 ENCOUNTER — HOSPITAL ENCOUNTER (OUTPATIENT)
Dept: OTHER | Age: 63
Setting detail: THERAPIES SERIES
Discharge: HOME | End: 2023-01-13

## 2023-01-16 LAB
FUNGUS (MYCOLOGY) CULTURE: NORMAL
FUNGUS STAIN: NORMAL

## 2023-01-20 ENCOUNTER — HOSPITAL ENCOUNTER (OUTPATIENT)
Dept: OTHER | Age: 63
Setting detail: THERAPIES SERIES
Discharge: HOME | End: 2023-01-20

## 2023-01-20 VITALS
HEART RATE: 112 BPM | HEIGHT: 63 IN | OXYGEN SATURATION: 96 % | BODY MASS INDEX: 22.5 KG/M2 | DIASTOLIC BLOOD PRESSURE: 66 MMHG | SYSTOLIC BLOOD PRESSURE: 105 MMHG | RESPIRATION RATE: 16 BRPM | WEIGHT: 127 LBS

## 2023-01-20 RX ORDER — METOPROLOL SUCCINATE 25 MG/1
12.5 TABLET, EXTENDED RELEASE ORAL NIGHTLY
Qty: 30 TABLET | Refills: 3 | Status: SHIPPED | OUTPATIENT
Start: 2023-01-20

## 2023-01-20 ASSESSMENT — PAIN SCALES - GENERAL: PAINLEVEL_OUTOF10: 0

## 2023-01-20 ASSESSMENT — EJECTION FRACTION
EF_VALUE: 40-45
EF_SOURCE: 2D ECHO

## 2023-01-20 ASSESSMENT — SLEEP AND FATIGUE QUESTIONNAIRES: NECK CIRCUMFERENCE (INCHES): 12.5

## 2023-01-20 NOTE — PROGRESS NOTES
[id number] [facility] Cardiology  Office Visit         Reason for Visit: Heart failure    Primary Cardiologist: Dr. Alecia Mayo         History of Present Illness:     Ms. Ghazal Dukes is a 58year old female with a PMHx of HFmrEF, recurrent diverticulitis, chronic hypoxic respiratory failure with supplemental O2, HLD, COPD, long standing tobacco abuse, hx of DVT. She presented to the ED with complaints of increased shortness of breath, cough. She was admitted to the hospital with COVID-19 and acute heart failure. During hospitalization she underwent TTE that demonstrated LVEF  40-45%,       {Blank single:23812::\"He\",\"She\"} denies dyspnea with exertion, shortness of breath, or decline in overall functional capacity. {Blank single:33187::\"He\",\"She\"} denies orthopnea, PND, nocturnal cough or hemoptysis. {Blank single:53265::\"He\",\"She\"} denies abdominal distention or bloating, early satiety, anorexia/change in appetite, unintentional weight loss. {Blank single:82853::\"He\",\"She\"} {Blank single:90652::\"does\",\"does not\"} lower extremity edema. {Blank single:57292::\"He\",\"She\"} denies exertional lightheadedness. {Blank single:54175::\"He\",\"She\"} denies palpitations, syncope or near syncope. Review of systems is negative for chest pain, pressure, discomfort. When ambulating on an incline, {Blank single:96373::\"He\",\"She\"} {Blank single:36605::\"does\",\"does not\"} leg claudication. History is negative for neurological symptoms including transient loss of vision, asymmetric weakness, aphasia, dysphasia, numbness, tingling.            Patient Active Problem List    Diagnosis Date Noted    Acute on chronic diastolic CHF (congestive heart failure) ([facility] 01/04/2023     Priority: Medium    Acute cholecystitis 12/28/2022     Priority: Medium    Acute diverticulitis with abscess 12/17/2022     Priority: Medium    COPD exacerbation ([facility] 02/11/2022    Acute on chronic respiratory failure with hypoxia and hypercapnia ([facility] 02/10/2022    Acute respiratory failure with hypercapnia (HCC) 05/20/2021    Chronic respiratory failure with hypoxia ([facility]) 10/26/2020    Chronic obstructive pulmonary disease (HCC) 10/26/2020    Chronic bronchitis ([facility])     Tobacco use disorder      1 ppd since age 25        Seasonal allergic rhinitis            Past Medical History:   Diagnosis Date    Abscess     colon    Acute on chronic diastolic CHF (congestive heart failure) ([facility] 1/4/2023    COPD (chronic obstructive pulmonary disease) ([facility]     Diverticulitis     Provoked DVT 3/2018     3 months Eliquis for DVT due to PICC line    Seasonal allergic rhinitis     Smoker 11/30/2019    5-6 per day    Tobacco use disorder      : 1 ppd since age 25           Past Surgical History:   Procedure Laterality Date    CARDIOVASCULAR STRESS TEST N/A 01/06/2023    lexiscan stress test             Allergies   Allergen Reactions    Penicillin V Hives and Other (See Comments)     11/07/2005 UNKNOWN, PLEASE VERIFY, 11/07/2005 UNKNOWN, PLEASE VERIFY         No outpatient medications have been marked as taking for the 1/20/23 encounter [facility] HOS[facility] Encounter) with SKYE Nava CNP.            Guideline directed medical/device therapy:  ARNI/ACE I/ARB: {Blank single:19197::\"Yes\",\"No\"}  Beta blocker:  {Blank single:19197::\"Yes\",\"No\"}  Aldosterone antagonist:  {Blank single:19197::\"Yes\",\"No\"}  ICD/CRT-P/-D:  {Blank single:19197::\"ICD\",\"CRT-D\",\"CRT-P\"}  QRS interval on recent ECG (personally reviewed/interpreted): {Blank single:19197::\"<120 ms\",\"120< but < 150 ms\",\">150 ms\"}  Percentage RV pacing (personally reviewed/interpreted): %/NA            Review of Systems:   Cardiac: As per HPI  General: No fever, chills, rigors  Pulmonary: As per HPI  HEENT: No visual disturbances, difficult swallowing  GI: No nausea, vomiting, abdominal pain  : No dysuria or hematuria  Endocrine: No thyroid disease or diabetes  Musculoskeletal: CHEN x 4, no focal motor deficits  Skin: Intact, no rashes  Neuro/Psych: No headache or seizures          Weights: Wt Readings from Last 3 Encounters:   01/20/23 127 lb (57.6 kg)   01/06/23 152 lb (68.9 kg)   12/28/22 157 lb (71.2 kg)             Physical Examination:     /66   Pulse (!) 112   Resp 16   Ht 5' 3\" (1.6 m)   Wt 127 lb (57.6 kg)   SpO2 96%   BMI 22.50 kg/m²     CONSTITUTIONAL: {CONSTITUTIONAL:259356176::\"Alert and oriented times 3, no acute distress and cooperative to examination with proper mood and affect. \"}  SKIN: {:20065::\"Skin color, texture, turgor normal. No rashes or lesions. \"}  LYMPH: {LN exam:20852::\"no cervical nodes\",\"no inguinal nodes\"}  HEENT: {Exam; LWTUF:14846::\"MPMJ is normocephalic, atraumatic. EOMI, PERRLA\"}. NECK: {NECK:457445981::\"Supple, symmetrical, trachea midline, no adenopathy, thyroid symmetric, not enlarged and no tenderness, skin normal\"}. CHEST/LUNGS: {:20834::\"chest symmetric with normal A/P diameter\",\"normal respiratory rate and rhythm\",\"lungs clear to auscultation\"} without wheezes, rales or rhonchi. No accessory muscle use. Scars {scar:26143::\"None\"}   CARDIOVASCULAR: {:62099::\"Heart sounds are normal.  Regular rate and rhythm without murmur, gallop or rub. \"} {:57631::\"Normal S1 and S2.\"}. Carotid and femoral pulses 2+/4 and equal bilaterally. ABDOMEN: {shape:09086::\"Normal shape. \"} {SCARS:028438104::\"No\",\"Laparoscopic\"} scar(s) present. {ausc:55050::\"Normal bowel sounds. No bruits. \"} {abd exam:097986::\"soft, nondistended, no masses or organomegaly\"}. {hernia:68204::\"no evidence of hernia\"}. Percussion: {percuss:47889::\"Normal without hepatosplenomegally. \"} Tenderness: {:01990::\"absent\"}. RECTAL: {rectal exam:156587::\"deferred, not clinically indicated\"}  NEUROLOGIC: {neurologic:599267360::\"There are no focalizing motor or sensory deficits. CN II-XII are grossly intact. \"}. EXTREMITIES: {GENERAL EXTREMITY EXAM:19987::\"no cyanosis\",\"no clubbing\",\"no edema\"}.           All the following diagnostics were personally reviewed and interpreted by me. LAB DATA:     1/7/23 07:28 1/8/23 05:48 1/9/23 07:26   Sodium 142 140 143   Potassium 4.2 3.8 3.2 (L)   Chloride 100 100 100   CO2 34 (H) 33 (H) 35 (H)   BUN,BUNPL 22 22 24 (H)   Creatinine 0.3 (L) 0.3 (L) 0.3 (L)   Anion Gap 8 7 8   Est, Glom Filt Rate >60 >60 >60   Magnesium 2.1 2.0 1.9   Glucose, Random 72 (L) 94 79   CALCIUM, SERUM, 240719 8.7 9.1 8.8   Phosphorus 3.1 2.7 3.0   Total Protein 6.2 (L) 6.2 (L) 6.1 (L)   Albumin 3.8 3.8 3.6   Alk Phos 183 (H) 174 (H) 165 (H)   ALT 52 (H) 41 (H) 34 (H)   AST 52 (H) 37 (H) 35 (H)   Bilirubin 1.1 1.3 (H) 1.1   Bilirubin, Direct 0.5 (H) 0.4 (H) 0.4 (H)   Bilirubin, Indirect 0.6 0.9 0.7   WBC 7.2 9.2 9.7   RBC 3.22 (L) 3.38 (L) 3.33 (L)   Hemoglobin Quant 10.0 (L) 10.2 (L) 10.5 (L)   Hematocrit 30.9 (L) 32.5 (L) 32.1 (L)   MCV 96.0 96.2 96.4   MCH 31.1 30.2 31.5   MCHC 32.4 31.4 (L) 32.7   MPV 11.9 12.1 (H) 12.1 (H)   RDW 17.2 (H) 17.0 (H) 17.0 (H)   Platelet Count 917 784 174       IMAGING:    CTA Pulmonary (12/28/2022)  FINDINGS:  Pulmonary Arteries: Pulmonary arteries are adequately opacified for  evaluation. No evidence of intraluminal filling defect to suggest pulmonary  embolism. Main pulmonary artery is normal in caliber. Mediastinum: No evidence of mediastinal lymphadenopathy. The heart and  pericardium demonstrate no acute abnormality. There is no acute abnormality of the thoracic aorta. Lungs/pleura: The lungs are without acute process. No focal consolidation or  pulmonary edema. Advanced lower lung predominant emphysematous change. No evidence of pleural effusion or pneumothorax. Upper Abdomen: Please see separately dictated report for findings below the  diaphragm. Soft Tissues/Bones: No acute bone or soft tissue abnormality. Impression:  No evidence of pulmonary embolism or acute pulmonary abnormality. Advanced emphysematous changes. CARDIAC TESTING:    TTE (1/4/2023)   Summary   Poor quality study.    LV systolic function is not well assessed but appears mildly reduced. Ejection fraction is visually estimated at 40-45%. Abnormal diastolic function. Wall motion not well seen; LV contrast agent not utilized. Appears to be hypokinesis of the mid inferoseptal and mid anteroseptal wall. Normal left ventricular wall thickness. Grossly normal right ventricular size and function. No significant valvular abnormalities. NM Stress (1/6/2023)  Gated SPECT left ventricular ejection fraction was calculated to be 48  % with a mild anteroseptal hypokinesis. Impression: The myocardial perfusion imaging was abnormal with a moderate size  moderate intensity and partial reversible anteroseptal and  anterolateral perfusion abnormality with mild associated regional wall  motion abnormalities consistent with ischemia of the left anterior  descending distribution potentially in addition to that of  postischemic stunning. Overall left ventricular systolic function was at the lower limits of  normal with regional wall motion abnormalities as described above. Intermediate risk pharmacologic myocardial perfusion imaging study.       Telemetry  Sinus Tachycardia, rate 112        ASSESSMENT:    Acutely decompensated diastolic congestive heart failure with now depressed systolic function and motion abnormalities/hypokinesis   Abnormal stress test with intermediate risk scan   History of  transaminitis with hyperbilirubinemia, multiple liver cysts, negative MRCP for biliary obstruction, Abnormal GB appearance with negative ultrasound   COVID-19 infection with respiratory failure  Recurrent diverticulitis with prior or recent abscess drainage  Chronic respiratory failure with hypoxia secondary to advanced COPD   Left upper extremity IV infiltrate  Ongoing tobacco abuse  Prior history of DVT  Hyperlipidemia on statin agent     Plan:   Continue aspirin and beta-blocker   Hold statin if worsening liver function test  Invasive coronary angiogram once COVID-19 is resolved   If hemoglobin is dropping please rule out GI bleed prior to cardiac catheterization  Initiate low-dose Entresto if there is no contraindication  If patient is able to tolerate Entresto please initiate spironolactone the following day  Rest of management per primary and pulmonology as well as ID    PLAN:      Jesika CHEUNG-CNP  [facility] Cardiology

## 2023-01-20 NOTE — DISCHARGE INSTRUCTIONS
Start metoprolol succinate (Toprol) 12.5 mg nightly   Continue current cardiac medications   Blood work reviewed from facility 2 days ago   Daily weights  Return to CHF clinic in 1 week 0 we will continue to adjust your heart failure medication as tolerate   Follow up with Dr. Cullen in 1 month - tentative plan to set up heart catheterization once your symptoms improve from COVID-19    Weigh yourself daily    -Stay Hydrated, 64 oz     -Diet should sodium restricted to 2 grams    -Again watch your daily weight trends and if you gain water weight please follow below instructions.    -If you gain 3-5 pounds in 2-3 days OR notice that you are retaining fluid in anyway just like you did before then take an extra dose of your water pill (furosemide/Lasix) every day until you lose the weight or feel better.     -If you notice that you have taken more than 2 extra doses in 1 week then please call and let us know.    -If at any time you feel that you are retaining fluid, your medications are not working, or you feel ill in anyway, then please call us for either same day appointment or the next day, and for instructions. Our goal is to keep you out of the emergency room and the hospital and we have ways to do it. You just need to call us in a timely manner.     -If you become sick for other reasons, and notice that you are not urinating as much, the urine is very dark, you have significant diarrhea or vomiting, then please DO NOT take your water pill and CALL US immediately.

## 2023-01-23 LAB
FUNGUS (MYCOLOGY) CULTURE: NORMAL
FUNGUS STAIN: NORMAL

## 2023-01-27 ENCOUNTER — HOSPITAL ENCOUNTER (OUTPATIENT)
Dept: OTHER | Age: 63
Setting detail: THERAPIES SERIES
Discharge: HOME | End: 2023-01-27
Payer: COMMERCIAL

## 2023-01-27 VITALS
SYSTOLIC BLOOD PRESSURE: 92 MMHG | BODY MASS INDEX: 23.04 KG/M2 | DIASTOLIC BLOOD PRESSURE: 52 MMHG | WEIGHT: 130 LBS | RESPIRATION RATE: 18 BRPM | OXYGEN SATURATION: 98 % | HEART RATE: 88 BPM | HEIGHT: 63 IN

## 2023-01-27 LAB
ANION GAP SERPL CALCULATED.3IONS-SCNC: 10 MMOL/L (ref 7–16)
BUN BLDV-MCNC: 12 MG/DL (ref 6–23)
CALCIUM SERPL-MCNC: 8.5 MG/DL (ref 8.6–10.2)
CHLORIDE BLD-SCNC: 100 MMOL/L (ref 98–107)
CO2: 31 MMOL/L (ref 22–29)
CREAT SERPL-MCNC: 0.4 MG/DL (ref 0.5–1)
GFR SERPL CREATININE-BSD FRML MDRD: >60 ML/MIN/1.73
GLUCOSE BLD-MCNC: 159 MG/DL (ref 74–99)
POTASSIUM SERPL-SCNC: 4.5 MMOL/L (ref 3.5–5)
PRO-BNP: 422 PG/ML (ref 0–125)
SODIUM BLD-SCNC: 141 MMOL/L (ref 132–146)

## 2023-01-27 PROCEDURE — 80048 BASIC METABOLIC PNL TOTAL CA: CPT

## 2023-01-27 PROCEDURE — 36415 COLL VENOUS BLD VENIPUNCTURE: CPT

## 2023-01-27 PROCEDURE — 99204 OFFICE O/P NEW MOD 45 MIN: CPT

## 2023-01-27 PROCEDURE — 83880 ASSAY OF NATRIURETIC PEPTIDE: CPT

## 2023-01-27 RX ORDER — ACETAMINOPHEN 650 MG/1
650 SUPPOSITORY RECTAL EVERY 4 HOURS PRN
COMMUNITY

## 2023-01-27 RX ORDER — DOCUSATE SODIUM 100 MG/1
100 CAPSULE, LIQUID FILLED ORAL DAILY
COMMUNITY

## 2023-01-27 NOTE — PROGRESS NOTES
Congestive Heart Failure [address] [facility] [address] [place]   1960    Referring Provider: Dr. Nadege eHrman   Primary Care Physician: Dr. Kathy Buchanan   Cardiologist: Dr. Maria Teresa CHEUNG-CNP   Nephrologist: JUANITA      History of Present Illness:     Iveth Pedersen is a 58 y.o. female with a history of  HFmrEF , most recent EF 40-45% TTE. 01/4/2023    Patient Story:  She does  have dyspnea with exertion, shortness of breath, or decline in overall functional capacity (unchanged) Chronic wearing 4-5L of (N/C). She does not have orthopnea, PND, nocturnal cough or hemoptysis. She does not have abdominal distention or bloating, early satiety, anorexia/change in appetite. She does has a good urinary response to  oral diuretic. She does have  lower extremity edema. She denies lightheadedness, dizziness. She denies palpitations, syncope or near syncope. Pt was recently started on metoprolol succinate 12.5mg nightly and states she longer has palpitations. She does not complain of chest pain, pressure, discomfort.      Allergies   Allergen Reactions    Penicillin V Hives and Other (See Comments)     11/07/2005 UNKNOWN, PLEASE VERIFY, 11/07/2005 UNKNOWN, PLEASE VERIFY     Outpatient Medications Marked as Taking for the 1/27/23 encounter [facility] Encounter) with [facility] CHF ROOM 1   Medication Sig Dispense Refill    docusate sodium (COLACE) 100 MG capsule Take 100 mg by mouth daily      acetaminophen (TYLENOL) 650 MG suppository Place 650 mg rectally every 4 hours as needed for Fever       Current Outpatient Medications on File Prior to Encounter   Medication Sig Dispense Refill    docusate sodium (COLACE) 100 MG capsule Take 100 mg by mouth daily      acetaminophen (TYLENOL) 650 MG suppository Place 650 mg rectally every 4 hours as needed for Fever      metoprolol succinate (TOPROL XL) 25 MG extended release tablet Take 0.5 tablets by mouth nightly 30 tablet 3    aspirin 81 MG chewable tablet Take 1 tablet by mouth daily 30 tablet 3    furosemide (LASIX) 20 MG tablet Take 1 tablet by mouth daily 60 tablet 3    potassium chloride (KLOR-CON M) 20 MEQ extended release tablet Take 1 tablet by mouth daily (Patient taking differently: Take 40 mEq by mouth daily) 30 tablet 0    albuterol sulfate HFA (VENTOLIN HFA) 108 (90 Base) MCG/ACT inhaler Inhale 2 puffs into the lungs 4 times daily as needed for Wheezing or Shortness of Breath 1 each 2    Arformoterol Tartrate (BROVANA) 15 MCG/2ML NEBU Take 2 mLs by nebulization in the morning and 2 mLs in the evening. 120 mL 0    budesonide (PULMICORT) 0.5 MG/2ML nebulizer suspension Take 2 mLs by nebulization in the morning and 2 mLs in the evening. 60 each 0    ipratropium-albuterol (DUONEB) 0.5-2.5 (3) MG/3ML SOLN nebulizer solution Inhale 3 mLs into the lungs in the morning and 3 mLs at noon and 3 mLs in the evening and 3 mLs before bedtime. 360 mL 0    [START ON 2/1/2023] atorvastatin (LIPITOR) 40 MG tablet Take 1 tablet by mouth nightly 30 tablet 0    sertraline (ZOLOFT) 25 MG tablet Take 1 tablet by mouth daily 30 tablet 0    fluticasone (FLONASE) 50 MCG/ACT nasal spray 2 sprays by Each Nostril route daily 1 each 5    melatonin 5 MG TBDP disintegrating tablet Take 1 tablet by mouth nightly 30 tablet 0    Vitamin D (CHOLECALCIFEROL) 50 MCG (2000 UT) TABS tablet Take 1 tablet by mouth daily 30 tablet 0    pantoprazole (PROTONIX) 40 MG tablet Take 1 tablet by mouth in the morning and at bedtime for 14 days, THEN 1 tablet daily. (Patient taking differently: Take 1 tablet by mouth in the daily) 58 tablet 0    zinc 50 MG CAPS Take 50 mg by mouth daily 30 capsule 0    ascorbic acid (VITAMIN C) 1000 MG tablet Take 1 tablet by mouth daily 30 tablet 0    OXYGEN Inhale 4 L into the lungs continuous       No current facility-administered medications on file prior to encounter.      Guideline directed medical:  ARNI/ACE I/ARB: No  Beta blocker:  Yes metoprolol succinate 12.5mg nightly  Aldosterone antagonist:  No  SGLT2i:  No      Physical Examination:     BP (!) 92/52   Pulse 88   Resp 18   Ht 5' 3\" (1.6 m)   Wt 130 lb (59 kg)   SpO2 98%   BMI 23.03 kg/m²     Assessment  Charting Type: Shift assessment    Neurological  Level of Consciousness: Alert (0)  Orientation Level: Oriented X4  Cognition: Follows commands  Speech: Clear    HEENT (Head, Ears, Eyes, Nose, & Throat)  HEENT (WDL): Exceptions to WDL  Right Eye: Glasses  Left Eye: Glasses  Teeth: Dentures upper    Respiratory  Respiratory Pattern: Regular  Respiratory Depth: Normal  Respiratory Quality/Effort: Unlabored  Chest Assessment: Chest expansion symmetrical, Trachea midline  L Breath Sounds: Diminished  R Breath Sounds: Diminished, End Expiratory Wheezes      Cough/Sputum  Cough: Productive  Frequency: Infrequent  Sputum Amount: Small  Sputum Color: Clear    Cardiac  Cardiac Regularity: Regular  Heart Sounds: S1, S2  Cardiac Rhythm: Sinus rhythm    Rhythm Interpretation  Heart Rate: 88    Gastrointestinal  Abdominal (WDL): Within Defined Limits  Abdomen Inspection: Soft, Rounded  RUQ Bowel Sounds: Active  LUQ Bowel Sounds: Active  RLQ Bowel Sounds: Active  LLQ Bowel Sounds: Active  Tenderness: Nontender       Bowel Sounds  RUQ Bowel Sounds: Active  LUQ Bowel Sounds: Active  RLQ Bowel Sounds: Active  LLQ Bowel Sounds:  Active    Peripheral Vascular  Peripheral Vascular (WDL): Exceptions to WDL  Edema: Right lower extremity, Left lower extremity, Left upper extremity  RLE Edema: Trace, Pitting  LLE Edema: Pitting, Trace    Musculoskeletal  RUE: Weakness  LUE: Weakness, Swelling  RL Extremity: Weakness  LL Extremity: Weakness      Psychosocial  Psychosocial (WDL): Within Defined Limits    Cough Description  Sputum Amount: Small  Sputum Color: Clear    Heart Rate: 88        LAB DATA:    Last 3 BMP      Sodium (mmol/L)   Date Value   01/27/2023 141   01/09/2023 143   01/08/2023 140     Potassium (mmol/L)   Date Value 01/27/2023 4.5   01/09/2023 3.2 (L)   01/08/2023 3.8     Potassium reflex Magnesium (mmol/L)   Date Value   01/04/2023 3.1 (L)   12/28/2022 3.2 (L)   12/16/2022 3.9     Chloride (mmol/L)   Date Value   01/27/2023 100   01/09/2023 100   01/08/2023 100     CO2 (mmol/L)   Date Value   01/27/2023 31 (H)   01/09/2023 35 (H)   01/08/2023 33 (H)     BUN (mg/dL)   Date Value   01/27/2023 12   01/09/2023 24 (H)   01/08/2023 22     Glucose (mg/dL)   Date Value   01/27/2023 159 (H)   01/09/2023 79   01/08/2023 94     Calcium (mg/dL)   Date Value   01/27/2023 8.5 (L)   01/09/2023 8.8   01/08/2023 9.1       Last 3 BNP       Pro-BNP (pg/mL)   Date Value   01/27/2023 422 (H)   01/04/2023 8,609 (H)   12/28/2022 165 (H)          CBC: No results for input(s): WBC, HGB, PLT in the last 72 hours. BMP:    Recent Labs     01/27/23  0915      K 4.5      CO2 31*   BUN 12   CREATININE 0.4*   GLUCOSE 159*     Hepatic: No results for input(s): AST, ALT, ALB, BILITOT, ALKPHOS in the last 72 hours. Troponin: No results for input(s): TROPONINI in the last 72 hours. BNP: No results for input(s): BNP in the last 72 hours. Lipids: No results for input(s): CHOL, HDL in the last 72 hours. Invalid input(s): LDLCALCU  INR: No results for input(s): INR in the last 72 hours. WEIGHTS:    Wt Readings from Last 3 Encounters:   01/27/23 130 lb (59 kg)   01/20/23 127 lb (57.6 kg)   01/06/23 152 lb (68.9 kg)         TELEMETRY:  Cardiac Regularity: Regular  Cardiac Rhythm/Interpretation: SR      ASSESSMENT:  Wilma Cornell First visit with CHF clinic today. Patient presented with Kayleen Sanders. Discussed with patient and Kayleen Sanders, the purpose of CHF clinic and importance of daily weights and doing a self check every day to monitor for changes. Went over the three heart failure zones and to call cardiologist if in yellow zone immediately to prevent any further decline.      Upon assessment bilateral lungs are diminished with expiratory wheezes, abdomen soft, trace pitting edema BLE. Patient has been weight daily at [facility] and patient states overall stable weights. Patient is agreeable to future CHF clinic visits. Next 3 consecutive weekly appointments made today so that patient has a total of 4 visits within first 30 days. Interventions completed this visit:  IV diuretics given no  Lab work obtained yes, pro-BNP and BMP    Reviewed currently prescribed medications with patient, educated on importance of compliance and answered any questions regarding their medication  Educated on signs and symptoms of HF  Educated on low sodium diet    PLAN:  Scheduled to follow up in CHF clinic on   Future Appointments   Date Time Provider Lupe Arnett   2/3/2023 10:00 AM [facility] CHF ROOM [address]   2/10/2023  9:30 AM [facility] CHF ROOM 1 [place] [id number] Fri Yanira     Given clinic phone number and aware of signs and symptoms to call with any HF change in symptoms.

## 2023-02-03 ENCOUNTER — HOSPITAL ENCOUNTER (OUTPATIENT)
Dept: OTHER | Age: 63
Setting detail: THERAPIES SERIES
Discharge: HOME | End: 2023-02-03

## 2023-02-03 NOTE — PROGRESS NOTES
RN stated patient was having an [address] performed this morning and that is why patient was unable to make her appointment this AM.       Notified RN at [facility] in [place] of appointment for 2/10      Future Appointments   Date Time Provider Lupe Arnett   2/10/2023  9:30 AM [facility] CHF ROOM 1 [facility] Ashley Henao

## 2023-02-06 ENCOUNTER — HOSPITAL ENCOUNTER (INPATIENT)
Age: 63
LOS: 4 days | Discharge: HOME | End: 2023-02-10
Attending: EMERGENCY MEDICINE | Admitting: FAMILY MEDICINE
Payer: COMMERCIAL

## 2023-02-06 ENCOUNTER — APPOINTMENT (OUTPATIENT)
Dept: CT IMAGING | Age: 63
End: 2023-02-06
Payer: COMMERCIAL

## 2023-02-06 ENCOUNTER — APPOINTMENT (OUTPATIENT)
Dept: GENERAL RADIOLOGY | Age: 63
End: 2023-02-06
Payer: COMMERCIAL

## 2023-02-06 DIAGNOSIS — A41.9 SEPTICEMIA (HCC): ICD-10-CM

## 2023-02-06 DIAGNOSIS — J44.1 COPD EXACERBATION (HCC): Primary | ICD-10-CM

## 2023-02-06 DIAGNOSIS — R06.89 DYSPNEA AND RESPIRATORY ABNORMALITIES: ICD-10-CM

## 2023-02-06 DIAGNOSIS — R06.00 DYSPNEA AND RESPIRATORY ABNORMALITIES: ICD-10-CM

## 2023-02-06 DIAGNOSIS — J96.00 ACUTE RESPIRATORY FAILURE, UNSPECIFIED WHETHER WITH HYPOXIA OR HYPERCAPNIA (HCC): ICD-10-CM

## 2023-02-06 PROBLEM — R09.02 HYPOXEMIA: Status: ACTIVE | Noted: 2023-02-06

## 2023-02-06 PROBLEM — J84.10 PULMONARY FIBROSIS (HCC): Status: ACTIVE | Noted: 2023-02-06

## 2023-02-06 PROBLEM — I50.9 HEART FAILURE (HCC): Status: ACTIVE | Noted: 2023-02-06

## 2023-02-06 LAB
ADENOVIRUS BY PCR: NOT DETECTED
ALBUMIN SERPL-MCNC: 2.4 G/DL (ref 3.5–5.2)
ALP BLD-CCNC: 157 U/L (ref 35–104)
ALT SERPL-CCNC: 44 U/L (ref 0–32)
ANION GAP SERPL CALCULATED.3IONS-SCNC: 7 MMOL/L (ref 7–16)
AST SERPL-CCNC: 52 U/L (ref 0–31)
B.E.: 2.9 MMOL/L (ref -3–3)
BASOPHILS ABSOLUTE: 0.1 E9/L (ref 0–0.2)
BASOPHILS RELATIVE PERCENT: 0.5 % (ref 0–2)
BILIRUB SERPL-MCNC: 0.6 MG/DL (ref 0–1.2)
BORDETELLA PARAPERTUSSIS BY PCR: NOT DETECTED
BORDETELLA PERTUSSIS BY PCR: NOT DETECTED
BUN BLDV-MCNC: 13 MG/DL (ref 6–23)
CALCIUM SERPL-MCNC: 8.6 MG/DL (ref 8.6–10.2)
CHLAMYDOPHILIA PNEUMONIAE BY PCR: NOT DETECTED
CHLORIDE BLD-SCNC: 98 MMOL/L (ref 98–107)
CO2: 34 MMOL/L (ref 22–29)
COHB: 0.6 % (ref 0–1.5)
CORONAVIRUS 229E BY PCR: NOT DETECTED
CORONAVIRUS HKU1 BY PCR: NOT DETECTED
CORONAVIRUS NL63 BY PCR: NOT DETECTED
CORONAVIRUS OC43 BY PCR: NOT DETECTED
CREAT SERPL-MCNC: 0.4 MG/DL (ref 0.5–1)
CRITICAL: ABNORMAL
D DIMER: 1784 NG/ML DDU
DATE ANALYZED: ABNORMAL
DATE OF COLLECTION: ABNORMAL
EOSINOPHILS ABSOLUTE: 0.07 E9/L (ref 0.05–0.5)
EOSINOPHILS RELATIVE PERCENT: 0.3 % (ref 0–6)
FIO2: 60 %
GFR SERPL CREATININE-BSD FRML MDRD: >60 ML/MIN/1.73
GLUCOSE BLD-MCNC: 126 MG/DL (ref 74–99)
HCO3: 28.5 MMOL/L (ref 22–26)
HCT VFR BLD CALC: 42.7 % (ref 34–48)
HEMOGLOBIN: 13.3 G/DL (ref 11.5–15.5)
HHB: 2.3 % (ref 0–5)
HUMAN METAPNEUMOVIRUS BY PCR: NOT DETECTED
HUMAN RHINOVIRUS/ENTEROVIRUS BY PCR: NOT DETECTED
IMMATURE GRANULOCYTES #: 0.13 E9/L
IMMATURE GRANULOCYTES %: 0.6 % (ref 0–5)
INFLUENZA A BY PCR: NOT DETECTED
INFLUENZA A BY PCR: NOT DETECTED
INFLUENZA B BY PCR: NOT DETECTED
INFLUENZA B BY PCR: NOT DETECTED
LAB: ABNORMAL
LACTIC ACID, SEPSIS: 2.6 MMOL/L (ref 0.5–1.9)
LACTIC ACID: 2.9 MMOL/L (ref 0.5–2.2)
LYMPHOCYTES ABSOLUTE: 4.02 E9/L (ref 1.5–4)
LYMPHOCYTES RELATIVE PERCENT: 19.7 % (ref 20–42)
Lab: ABNORMAL
MCH RBC QN AUTO: 31.2 PG (ref 26–35)
MCHC RBC AUTO-ENTMCNC: 31.1 % (ref 32–34.5)
MCV RBC AUTO: 100.2 FL (ref 80–99.9)
METHB: 0.3 % (ref 0–1.5)
MODE: ABNORMAL
MONOCYTES ABSOLUTE: 1.49 E9/L (ref 0.1–0.95)
MONOCYTES RELATIVE PERCENT: 7.3 % (ref 2–12)
MYCOPLASMA PNEUMONIAE BY PCR: NOT DETECTED
NEUTROPHILS ABSOLUTE: 14.62 E9/L (ref 1.8–7.3)
NEUTROPHILS RELATIVE PERCENT: 71.6 % (ref 43–80)
O2 CONTENT: 18.2 ML/DL
O2 SATURATION: 97.7 % (ref 92–98.5)
O2HB: 96.8 % (ref 94–97)
OPERATOR ID: 467
PARAINFLUENZA VIRUS 1 BY PCR: NOT DETECTED
PARAINFLUENZA VIRUS 2 BY PCR: NOT DETECTED
PARAINFLUENZA VIRUS 3 BY PCR: NOT DETECTED
PARAINFLUENZA VIRUS 4 BY PCR: NOT DETECTED
PATIENT TEMP: 37 C
PCO2: 47.7 MMHG (ref 35–45)
PDW BLD-RTO: 15.8 FL (ref 11.5–15)
PEEP/CPAP: 6 CMH2O
PFO2: 1.75 MMHG/%
PH BLOOD GAS: 7.39 (ref 7.35–7.45)
PLATELET # BLD: 300 E9/L (ref 130–450)
PMV BLD AUTO: 10.4 FL (ref 7–12)
PO2: 105.3 MMHG (ref 75–100)
POTASSIUM REFLEX MAGNESIUM: 4 MMOL/L (ref 3.5–5)
PRO-BNP: 500 PG/ML (ref 0–125)
PROCALCITONIN: 0.06 NG/ML (ref 0–0.08)
PS: 15 CMH20
RBC # BLD: 4.26 E12/L (ref 3.5–5.5)
RESPIRATORY SYNCYTIAL VIRUS BY PCR: NOT DETECTED
SARS-COV-2, NAAT: NOT DETECTED
SARS-COV-2, PCR: NOT DETECTED
SODIUM BLD-SCNC: 139 MMOL/L (ref 132–146)
SOURCE, BLOOD GAS: ABNORMAL
THB: 13.3 G/DL (ref 11.5–16.5)
TIME ANALYZED: 801
TOTAL PROTEIN: 6.4 G/DL (ref 6.4–8.3)
TROPONIN, HIGH SENSITIVITY: 20 NG/L (ref 0–9)
WBC # BLD: 20.4 E9/L (ref 4.5–11.5)

## 2023-02-06 PROCEDURE — 6360000002 HC RX W HCPCS: Performed by: STUDENT IN AN ORGANIZED HEALTH CARE EDUCATION/TRAINING PROGRAM

## 2023-02-06 PROCEDURE — 93005 ELECTROCARDIOGRAM TRACING: CPT | Performed by: EMERGENCY MEDICINE

## 2023-02-06 PROCEDURE — 36415 COLL VENOUS BLD VENIPUNCTURE: CPT

## 2023-02-06 PROCEDURE — 6360000002 HC RX W HCPCS

## 2023-02-06 PROCEDURE — 6360000002 HC RX W HCPCS: Performed by: FAMILY MEDICINE

## 2023-02-06 PROCEDURE — 82805 BLOOD GASES W/O2 SATURATION: CPT

## 2023-02-06 PROCEDURE — 94660 CPAP INITIATION&MGMT: CPT

## 2023-02-06 PROCEDURE — 71275 CT ANGIOGRAPHY CHEST: CPT

## 2023-02-06 PROCEDURE — 84484 ASSAY OF TROPONIN QUANT: CPT

## 2023-02-06 PROCEDURE — 99232 SBSQ HOSP IP/OBS MODERATE 35: CPT | Performed by: FAMILY MEDICINE

## 2023-02-06 PROCEDURE — 87502 INFLUENZA DNA AMP PROBE: CPT

## 2023-02-06 PROCEDURE — 96374 THER/PROPH/DIAG INJ IV PUSH: CPT

## 2023-02-06 PROCEDURE — 6360000004 HC RX CONTRAST MEDICATION: Performed by: RADIOLOGY

## 2023-02-06 PROCEDURE — 80053 COMPREHEN METABOLIC PANEL: CPT

## 2023-02-06 PROCEDURE — 87449 NOS EACH ORGANISM AG IA: CPT

## 2023-02-06 PROCEDURE — 94640 AIRWAY INHALATION TREATMENT: CPT

## 2023-02-06 PROCEDURE — 2580000003 HC RX 258: Performed by: RADIOLOGY

## 2023-02-06 PROCEDURE — 85025 COMPLETE CBC W/AUTO DIFF WBC: CPT

## 2023-02-06 PROCEDURE — 71045 X-RAY EXAM CHEST 1 VIEW: CPT

## 2023-02-06 PROCEDURE — 99255 IP/OBS CONSLTJ NEW/EST HI 80: CPT | Performed by: INTERNAL MEDICINE

## 2023-02-06 PROCEDURE — 2700000000 HC OXYGEN THERAPY PER DAY

## 2023-02-06 PROCEDURE — 87522 HEPATITIS C REVRS TRNSCRPJ: CPT

## 2023-02-06 PROCEDURE — 6370000000 HC RX 637 (ALT 250 FOR IP): Performed by: FAMILY MEDICINE

## 2023-02-06 PROCEDURE — 94664 DEMO&/EVAL PT USE INHALER: CPT

## 2023-02-06 PROCEDURE — 2580000003 HC RX 258: Performed by: STUDENT IN AN ORGANIZED HEALTH CARE EDUCATION/TRAINING PROGRAM

## 2023-02-06 PROCEDURE — 84145 PROCALCITONIN (PCT): CPT

## 2023-02-06 PROCEDURE — 2580000003 HC RX 258: Performed by: EMERGENCY MEDICINE

## 2023-02-06 PROCEDURE — 83880 ASSAY OF NATRIURETIC PEPTIDE: CPT

## 2023-02-06 PROCEDURE — 87635 SARS-COV-2 COVID-19 AMP PRB: CPT

## 2023-02-06 PROCEDURE — 2060000000 HC ICU INTERMEDIATE R&B

## 2023-02-06 PROCEDURE — 0202U NFCT DS 22 TRGT SARS-COV-2: CPT

## 2023-02-06 PROCEDURE — 83605 ASSAY OF LACTIC ACID: CPT

## 2023-02-06 PROCEDURE — 87081 CULTURE SCREEN ONLY: CPT

## 2023-02-06 PROCEDURE — 85378 FIBRIN DEGRADE SEMIQUANT: CPT

## 2023-02-06 PROCEDURE — 6370000000 HC RX 637 (ALT 250 FOR IP): Performed by: EMERGENCY MEDICINE

## 2023-02-06 PROCEDURE — 2580000003 HC RX 258: Performed by: FAMILY MEDICINE

## 2023-02-06 PROCEDURE — 99285 EMERGENCY DEPT VISIT HI MDM: CPT

## 2023-02-06 PROCEDURE — 87040 BLOOD CULTURE FOR BACTERIA: CPT

## 2023-02-06 RX ORDER — ZINC SULFATE 50(220)MG
50 CAPSULE ORAL DAILY
Status: DISCONTINUED | OUTPATIENT
Start: 2023-02-06 | End: 2023-02-10 | Stop reason: HOSPADM

## 2023-02-06 RX ORDER — ASPIRIN 81 MG/1
81 TABLET, CHEWABLE ORAL DAILY
Status: DISCONTINUED | OUTPATIENT
Start: 2023-02-06 | End: 2023-02-10 | Stop reason: HOSPADM

## 2023-02-06 RX ORDER — BUDESONIDE 0.5 MG/2ML
0.5 INHALANT ORAL 2 TIMES DAILY
Status: DISCONTINUED | OUTPATIENT
Start: 2023-02-06 | End: 2023-02-10 | Stop reason: HOSPADM

## 2023-02-06 RX ORDER — MECOBALAMIN 5000 MCG
5 TABLET,DISINTEGRATING ORAL NIGHTLY
Status: DISCONTINUED | OUTPATIENT
Start: 2023-02-06 | End: 2023-02-10 | Stop reason: HOSPADM

## 2023-02-06 RX ORDER — POLYETHYLENE GLYCOL 3350 17 G/17G
17 POWDER, FOR SOLUTION ORAL DAILY PRN
Status: DISCONTINUED | OUTPATIENT
Start: 2023-02-06 | End: 2023-02-10 | Stop reason: HOSPADM

## 2023-02-06 RX ORDER — ONDANSETRON 4 MG/1
4 TABLET, ORALLY DISINTEGRATING ORAL EVERY 8 HOURS PRN
Status: DISCONTINUED | OUTPATIENT
Start: 2023-02-06 | End: 2023-02-10 | Stop reason: HOSPADM

## 2023-02-06 RX ORDER — ATORVASTATIN CALCIUM 40 MG/1
40 TABLET, FILM COATED ORAL NIGHTLY
Status: DISCONTINUED | OUTPATIENT
Start: 2023-02-06 | End: 2023-02-10 | Stop reason: HOSPADM

## 2023-02-06 RX ORDER — SODIUM CHLORIDE 9 MG/ML
INJECTION, SOLUTION INTRAVENOUS PRN
Status: DISCONTINUED | OUTPATIENT
Start: 2023-02-06 | End: 2023-02-10 | Stop reason: HOSPADM

## 2023-02-06 RX ORDER — ONDANSETRON 2 MG/ML
INJECTION INTRAMUSCULAR; INTRAVENOUS
Status: DISPENSED
Start: 2023-02-06 | End: 2023-02-06

## 2023-02-06 RX ORDER — METOPROLOL SUCCINATE 25 MG/1
12.5 TABLET, EXTENDED RELEASE ORAL NIGHTLY
Status: DISCONTINUED | OUTPATIENT
Start: 2023-02-06 | End: 2023-02-10 | Stop reason: HOSPADM

## 2023-02-06 RX ORDER — FUROSEMIDE 40 MG/1
20 TABLET ORAL DAILY
Status: DISCONTINUED | OUTPATIENT
Start: 2023-02-06 | End: 2023-02-10 | Stop reason: HOSPADM

## 2023-02-06 RX ORDER — SODIUM CHLORIDE 0.9 % (FLUSH) 0.9 %
10 SYRINGE (ML) INJECTION
Status: COMPLETED | OUTPATIENT
Start: 2023-02-06 | End: 2023-02-06

## 2023-02-06 RX ORDER — ACETAMINOPHEN 325 MG/1
650 TABLET ORAL EVERY 6 HOURS PRN
Status: DISCONTINUED | OUTPATIENT
Start: 2023-02-06 | End: 2023-02-10 | Stop reason: HOSPADM

## 2023-02-06 RX ORDER — ALBUTEROL SULFATE 2.5 MG/3ML
2.5 SOLUTION RESPIRATORY (INHALATION) 4 TIMES DAILY PRN
Status: DISCONTINUED | OUTPATIENT
Start: 2023-02-06 | End: 2023-02-10 | Stop reason: HOSPADM

## 2023-02-06 RX ORDER — ASCORBIC ACID 500 MG
1000 TABLET ORAL DAILY
Status: DISCONTINUED | OUTPATIENT
Start: 2023-02-06 | End: 2023-02-10 | Stop reason: HOSPADM

## 2023-02-06 RX ORDER — ONDANSETRON 2 MG/ML
4 INJECTION INTRAMUSCULAR; INTRAVENOUS ONCE
Status: COMPLETED | OUTPATIENT
Start: 2023-02-06 | End: 2023-02-06

## 2023-02-06 RX ORDER — POTASSIUM CHLORIDE 20 MEQ/1
20 TABLET, EXTENDED RELEASE ORAL DAILY
Status: DISCONTINUED | OUTPATIENT
Start: 2023-02-06 | End: 2023-02-10 | Stop reason: HOSPADM

## 2023-02-06 RX ORDER — ONDANSETRON 2 MG/ML
INJECTION INTRAMUSCULAR; INTRAVENOUS
Status: COMPLETED
Start: 2023-02-06 | End: 2023-02-06

## 2023-02-06 RX ORDER — SODIUM CHLORIDE 0.9 % (FLUSH) 0.9 %
5-40 SYRINGE (ML) INJECTION EVERY 12 HOURS SCHEDULED
Status: DISCONTINUED | OUTPATIENT
Start: 2023-02-06 | End: 2023-02-10 | Stop reason: HOSPADM

## 2023-02-06 RX ORDER — ALBUTEROL SULFATE 90 UG/1
2 AEROSOL, METERED RESPIRATORY (INHALATION) 4 TIMES DAILY PRN
Status: DISCONTINUED | OUTPATIENT
Start: 2023-02-06 | End: 2023-02-06 | Stop reason: CLARIF

## 2023-02-06 RX ORDER — BISACODYL 10 MG
10 SUPPOSITORY, RECTAL RECTAL DAILY
COMMUNITY

## 2023-02-06 RX ORDER — SODIUM CHLORIDE 0.9 % (FLUSH) 0.9 %
5-40 SYRINGE (ML) INJECTION PRN
Status: DISCONTINUED | OUTPATIENT
Start: 2023-02-06 | End: 2023-02-10 | Stop reason: HOSPADM

## 2023-02-06 RX ORDER — 0.9 % SODIUM CHLORIDE 0.9 %
500 INTRAVENOUS SOLUTION INTRAVENOUS ONCE
Status: COMPLETED | OUTPATIENT
Start: 2023-02-06 | End: 2023-02-06

## 2023-02-06 RX ORDER — FAMOTIDINE 20 MG/1
20 TABLET, FILM COATED ORAL 2 TIMES DAILY
Status: DISCONTINUED | OUTPATIENT
Start: 2023-02-06 | End: 2023-02-10 | Stop reason: HOSPADM

## 2023-02-06 RX ORDER — ARFORMOTEROL TARTRATE 15 UG/2ML
15 SOLUTION RESPIRATORY (INHALATION) 2 TIMES DAILY
Status: DISCONTINUED | OUTPATIENT
Start: 2023-02-06 | End: 2023-02-10 | Stop reason: HOSPADM

## 2023-02-06 RX ORDER — ACETAMINOPHEN 325 MG/1
650 TABLET ORAL EVERY 6 HOURS PRN
COMMUNITY

## 2023-02-06 RX ORDER — ACETAMINOPHEN 650 MG/1
650 SUPPOSITORY RECTAL EVERY 6 HOURS PRN
Status: DISCONTINUED | OUTPATIENT
Start: 2023-02-06 | End: 2023-02-10 | Stop reason: HOSPADM

## 2023-02-06 RX ORDER — ENOXAPARIN SODIUM 100 MG/ML
40 INJECTION SUBCUTANEOUS DAILY
Status: DISCONTINUED | OUTPATIENT
Start: 2023-02-06 | End: 2023-02-10 | Stop reason: HOSPADM

## 2023-02-06 RX ORDER — IPRATROPIUM BROMIDE AND ALBUTEROL SULFATE 2.5; .5 MG/3ML; MG/3ML
1 SOLUTION RESPIRATORY (INHALATION) EVERY 6 HOURS
Status: DISCONTINUED | OUTPATIENT
Start: 2023-02-06 | End: 2023-02-10 | Stop reason: HOSPADM

## 2023-02-06 RX ORDER — CHOLECALCIFEROL (VITAMIN D3) 50 MCG
2000 TABLET ORAL DAILY
Status: DISCONTINUED | OUTPATIENT
Start: 2023-02-06 | End: 2023-02-10 | Stop reason: HOSPADM

## 2023-02-06 RX ORDER — FLUTICASONE PROPIONATE 50 MCG
2 SPRAY, SUSPENSION (ML) NASAL DAILY
Status: DISCONTINUED | OUTPATIENT
Start: 2023-02-06 | End: 2023-02-10 | Stop reason: HOSPADM

## 2023-02-06 RX ORDER — IPRATROPIUM BROMIDE AND ALBUTEROL SULFATE 2.5; .5 MG/3ML; MG/3ML
1 SOLUTION RESPIRATORY (INHALATION) ONCE
Status: COMPLETED | OUTPATIENT
Start: 2023-02-06 | End: 2023-02-06

## 2023-02-06 RX ORDER — DOCUSATE SODIUM 100 MG/1
100 CAPSULE, LIQUID FILLED ORAL DAILY
Status: DISCONTINUED | OUTPATIENT
Start: 2023-02-06 | End: 2023-02-10 | Stop reason: HOSPADM

## 2023-02-06 RX ORDER — ONDANSETRON 2 MG/ML
4 INJECTION INTRAMUSCULAR; INTRAVENOUS EVERY 6 HOURS PRN
Status: DISCONTINUED | OUTPATIENT
Start: 2023-02-06 | End: 2023-02-10 | Stop reason: HOSPADM

## 2023-02-06 RX ADMIN — ARFORMOTEROL TARTRATE 15 MCG: 15 SOLUTION RESPIRATORY (INHALATION) at 12:40

## 2023-02-06 RX ADMIN — IPRATROPIUM BROMIDE AND ALBUTEROL SULFATE 1 AMPULE: 2.5; .5 SOLUTION RESPIRATORY (INHALATION) at 07:02

## 2023-02-06 RX ADMIN — BUDESONIDE 500 MCG: 0.5 SUSPENSION RESPIRATORY (INHALATION) at 12:40

## 2023-02-06 RX ADMIN — VANCOMYCIN HYDROCHLORIDE 1500 MG: 10 INJECTION, POWDER, LYOPHILIZED, FOR SOLUTION INTRAVENOUS at 16:09

## 2023-02-06 RX ADMIN — VANCOMYCIN HYDROCHLORIDE 1250 MG: 10 INJECTION, POWDER, LYOPHILIZED, FOR SOLUTION INTRAVENOUS at 23:48

## 2023-02-06 RX ADMIN — SODIUM CHLORIDE, PRESERVATIVE FREE 10 ML: 5 INJECTION INTRAVENOUS at 20:52

## 2023-02-06 RX ADMIN — Medication 10 ML: at 12:25

## 2023-02-06 RX ADMIN — ATORVASTATIN CALCIUM 40 MG: 40 TABLET, FILM COATED ORAL at 20:51

## 2023-02-06 RX ADMIN — SODIUM CHLORIDE 500 ML: 9 INJECTION, SOLUTION INTRAVENOUS at 09:50

## 2023-02-06 RX ADMIN — ARFORMOTEROL TARTRATE 15 MCG: 15 SOLUTION RESPIRATORY (INHALATION) at 20:11

## 2023-02-06 RX ADMIN — ONDANSETRON 4 MG: 2 INJECTION INTRAMUSCULAR; INTRAVENOUS at 07:47

## 2023-02-06 RX ADMIN — FAMOTIDINE 20 MG: 20 TABLET, FILM COATED ORAL at 20:52

## 2023-02-06 RX ADMIN — CEFEPIME 2000 MG: 2 INJECTION, POWDER, FOR SOLUTION INTRAVENOUS at 17:38

## 2023-02-06 RX ADMIN — Medication 5 MG: at 20:51

## 2023-02-06 RX ADMIN — BUDESONIDE 500 MCG: 0.5 SUSPENSION RESPIRATORY (INHALATION) at 20:11

## 2023-02-06 RX ADMIN — IOPAMIDOL 60 ML: 755 INJECTION, SOLUTION INTRAVENOUS at 12:24

## 2023-02-06 RX ADMIN — IPRATROPIUM BROMIDE AND ALBUTEROL SULFATE 1 AMPULE: 2.5; .5 SOLUTION RESPIRATORY (INHALATION) at 20:12

## 2023-02-06 RX ADMIN — CEFEPIME 2000 MG: 2 INJECTION, POWDER, FOR SOLUTION INTRAVENOUS at 10:10

## 2023-02-06 RX ADMIN — METOPROLOL SUCCINATE 12.5 MG: 25 TABLET, EXTENDED RELEASE ORAL at 20:51

## 2023-02-06 RX ADMIN — IPRATROPIUM BROMIDE AND ALBUTEROL SULFATE 1 AMPULE: 2.5; .5 SOLUTION RESPIRATORY (INHALATION) at 12:40

## 2023-02-06 ASSESSMENT — ENCOUNTER SYMPTOMS
CHEST TIGHTNESS: 0
RHINORRHEA: 0
ABDOMINAL PAIN: 0
SHORTNESS OF BREATH: 1

## 2023-02-06 ASSESSMENT — PAIN - FUNCTIONAL ASSESSMENT: PAIN_FUNCTIONAL_ASSESSMENT: NONE - DENIES PAIN

## 2023-02-06 ASSESSMENT — PAIN SCALES - GENERAL: PAINLEVEL_OUTOF10: 0

## 2023-02-06 NOTE — PROGRESS NOTES
Pharmacy Consultation Note  (Antibiotic Dosing and Monitoring)    Initial consult date: 2/6/23  Consulting physician/provider: 2/6/23  Drug: Vancomycin  Indication: CAP; 7 days     Age/  Gender Height Weight IBW  Allergy Information   62 y.o./female 5' 3\" (160 cm) 130 lb (59 kg)     Ideal body weight: 52.4 kg (115 lb 8.3 oz)  Adjusted ideal body weight: 55 kg (121 lb 5 oz)   Penicillin v      Renal Function:  Recent Labs     02/06/23  0659   BUN 13   CREATININE 0.4*     No intake or output data in the 24 hours ending 02/06/23 1202    Vancomycin Monitoring:  Trough:  No results for input(s): VANCOTROUGH in the last 72 hours. Random:  No results for input(s): VANCORANDOM in the last 72 hours. No results for input(s): Kenzie Nigh in the last 72 hours. Historical Cultures:  Organism   Date Value Ref Range Status   12/19/2022 Streptococcus gordonii (A)  Final     No results for input(s): BC in the last 72 hours. Vancomycin Administration Times:    Recent vancomycin administrations        No vancomycin IV orders with administrations found. Orders not given:            vancomycin (VANCOCIN) 1,250 mg in sodium chloride 0.9 % 250 mL IVPB    vancomycin (VANCOCIN) 1,250 mg in sodium chloride 0.9 % 250 mL IVPB                  Assessment:  Patient is a 58 y.o. female who has been initiated on vancomycin  Estimated Creatinine Clearance: 121 mL/min (A) (based on SCr of 0.4 mg/dL (L)).   To dose vancomycin, pharmacy will be utilizing [employer] calculation software for goal AUC/RAINE 400-600 mg/L-hr  2/6: Scr 0.4      Plan:  Vancomycin 1500 mg IV x 1 dose, followed by vancomycin 1250 mg IV q 12 hr   Will check vancomycin levels when appropriate  Will continue to monitor renal function   Pharmacy to follow      Bradley Fam PharmD, BCPS, BCCCP 2/6/2023 12:06 PM

## 2023-02-06 NOTE — PROGRESS NOTES
[facility] Attending    S: 58 y.o. female with a 4 hour history of acute shortness of breath. She has a known history of significant COPD, as well as chronic diastolic heart failure. She had a provoked DVT in 2018. She has a long smoking history, but quit in 2019. She was in the hospital at [address] [place] [address],[address] in December for [facility] as well as with a colon abscess after a perforated diverticuli. She had a drain in place for a few days, and received antibiotics. She was moved to [place] [address] for rehab following her hospitalization. She notes that she was hoping to go home next week. She cannot recall any other symptoms in the few days prior to this SOB. She is chronically on 4 L of oxygen via NC. She was actually placed on bipap once ABGs showed hypercapnea. She  denies chest pain. She notes that her left foot is mildly edematous compared to the right. She had an ultrasound on 1/6/23 that did not show any blood clots per the patient. A CTA is pending currently. O: VS- Blood pressure 100/62, pulse (!) 102, temperature 97.6 °F (36.4 °C), temperature source Axillary, resp. rate 28, height 5' 3\" (1.6 m), weight 130 lb (59 kg), SpO2 100 %, not currently breastfeeding. Exam is as noted by resident with the following changes, additions or corrections:  Gen:  AAOx3  CVS:  tachycardia, regular  Lungs:  poor air movement; patient wearing Bipap  Ext:  there is 1+ edema on the dorsal aspect of the left foot; normal right foot and no other edema noted.       Pro   WBC 20.4  Ddimer 1784  Flu and covid negative  CTA chest pending    Impressions:  Acute on Chronic Respiratory Failure with Hypoxia and Hypercapnea  COPD exacerbation  Sepsis (HCC)-elevated WBC, tachycardia, tachypnea, hypotension  Transaminitis-hx of positive Hep A and Hep C antibodies in 9/2022      Plan:     CTA pending  Started Abx  Pulm consulted  Check Hep C viral load  Procal pending  Nebs  Can consult cardio as respiratory status improves-patient needs further cardio work up as noted from previous admission     Attending Physician Statement  I have reviewed the chart and seen the patient with the resident(s). I personally reviewed images, EKG's and similar tests, if present. I personally reviewed and performed key elements of the history and exam.  I have reviewed and confirmed student and/or resident history and exam with changes as indicated above. I agree with the assessment, plan and orders as documented by the resident. Please refer to the resident progress note today and admission history and physical  for additional information.       Zenon Melo MD

## 2023-02-06 NOTE — ED PROVIDER NOTES
[address]        Pt Name: Kylie Manning  MRN: [id number]  [place] 1960  Date of evaluation: 2/6/2023  Provider: Tahir Schneider MD  PCP: Inocencio Pires MD  Note Started: 7:00 AM EST 2/6/23    CHIEF COMPLAINT       Chief Complaint   Patient presents with    Shortness of Breath     Pt states that the shortness of breath started after staff at the [facility] woke her up for medication. Pt states at 0500 the she also had nausea and diarrhea. HISTORY OF PRESENT ILLNESS: 1 or more Elements   History From: Patient, patient in distress because of shortness of breath and ongoing nausea. Patient was unable to provide detailed history specifically about her medications. Limitations to history : acute distress due to acute shortness of breath    Kylie Manning is a 58 y.o. female who presents from nursing home ([place]) with sudden onset of shortness of breath that started at around 5 AM today. According to the patient, she had difficulty breathing after taking her morning medication. Patient denies any chest pain, headache, dizziness, fever, chills, numbness or tingling, confusion or weakness. Patient does report having extreme nausea, but denies throwing up. Patient is on 4 L of oxygen at baseline, currently with difficulty breathing, mostly on lying down. Patient does not report having excessive phlegm production but does report having excessive saliva. Patient was not sure of what medications she was taking in nursing home. Patient was not able to provide history regarding diarrhea adequately but reported having 1 episode of loose stool. As per EMS, patient was hypotensive and received 500 cc saline bolus on way to hospital.  In ER patient was hypotensive with BP 74/44 mmHg, tachycardic with , SPO2 95% on 4 L nasal cannula. Patient was afebrile.   500 cc normal saline bolus with considerable improvement in blood pressure. Patient also received DuoNeb nebulization and Zofran given. Patient was having increased work of breathing and shortness of breath, so patient was put on noninvasive ventilation (BiPAP) with improvement of SPO2 to 99%. Labs were suggestive of elevated WBC count, ABG :hypercapnia with compensated metabolic alkalosis. Influenza a, B and COVID was negative. Troponin- 20 (baseline 18), proBNP 500, CMP grossly normal and CBC suggestive of leukocytosis with WBC 20.4. Pro-Myles was elevated at 0.26, lactic acidosis likely type A-  2.6    Patient was given 1 dose of cefepime and vancomycin and decision was taken to admit under family medicine team.  Consult sent to family medicine service. Nursing Notes were all reviewed and agreed with or any disagreements were addressed in the HPI. REVIEW OF EXTERNAL NOTE :       Patient was recently admitted on 1/9/2023 for acutely decompensated diastolic congestive heart failure, and COVID-19 infection. She underwent stress test with findings consistent with ischemia of LAD and plans for heart cath after medically stabilizing patient. Patient was also evaluated for transaminitis and hyperbilirubinemia with multiple liver cysts. MRCP was negative for pelvic obstruction. Patient does have history of chronic respiratory failure with hypoxemia 2/2 COPD in setting of tobacco use. REVIEW OF SYSTEMS :      ROS positive for nausea, shortness of breath, diarrhea. Positives and Pertinent negatives as per HPI.      SURGICAL HISTORY     Past Surgical History:   Procedure Laterality Date    CARDIOVASCULAR STRESS TEST N/A 01/06/2023    lexiscan stress test       CURRENTMEDICATIONS       Previous Medications    ACETAMINOPHEN (TYLENOL) 325 MG TABLET    Take 650 mg by mouth every 6 hours as needed for Pain or Fever    ACETAMINOPHEN (TYLENOL) 650 MG SUPPOSITORY    Place 650 mg rectally every 4 hours as needed for Fever    ALBUTEROL SULFATE HFA (VENTOLIN HFA) 108 (90 BASE) MCG/ACT INHALER    Inhale 2 puffs into the lungs 4 times daily as needed for Wheezing or Shortness of Breath    ARFORMOTEROL TARTRATE (BROVANA) 15 MCG/2ML NEBU    Take 2 mLs by nebulization in the morning and 2 mLs in the evening. ASCORBIC ACID (VITAMIN C) 1000 MG TABLET    Take 1 tablet by mouth daily    ASPIRIN 81 MG CHEWABLE TABLET    Take 1 tablet by mouth daily    ATORVASTATIN (LIPITOR) 40 MG TABLET    Take 1 tablet by mouth nightly    BISACODYL (DULCOLAX) 10 MG SUPPOSITORY    Place 10 mg rectally daily    BUDESONIDE (PULMICORT) 0.5 MG/2ML NEBULIZER SUSPENSION    Take 2 mLs by nebulization in the morning and 2 mLs in the evening. DOCUSATE SODIUM (COLACE) 100 MG CAPSULE    Take 100 mg by mouth daily    FLUTICASONE (FLONASE) 50 MCG/ACT NASAL SPRAY    2 sprays by Each Nostril route daily    FUROSEMIDE (LASIX) 20 MG TABLET    Take 1 tablet by mouth daily    IPRATROPIUM-ALBUTEROL (DUONEB) 0.5-2.5 (3) MG/3ML SOLN NEBULIZER SOLUTION    Inhale 3 mLs into the lungs in the morning and 3 mLs at noon and 3 mLs in the evening and 3 mLs before bedtime. MAGNESIUM HYDROXIDE (MILK OF MAGNESIA) 400 MG/5ML SUSPENSION    Take by mouth daily as needed for Constipation    MELATONIN 5 MG TBDP DISINTEGRATING TABLET    Take 1 tablet by mouth nightly    METOPROLOL SUCCINATE (TOPROL XL) 25 MG EXTENDED RELEASE TABLET    Take 0.5 tablets by mouth nightly    OXYGEN    Inhale 4 L into the lungs continuous    PANTOPRAZOLE (PROTONIX) 40 MG TABLET    Take 1 tablet by mouth in the morning and at bedtime for 14 days, THEN 1 tablet daily.     POTASSIUM CHLORIDE (KLOR-CON M) 20 MEQ EXTENDED RELEASE TABLET    Take 1 tablet by mouth daily    SERTRALINE (ZOLOFT) 25 MG TABLET    Take 1 tablet by mouth daily    VITAMIN D (CHOLECALCIFEROL) 50 MCG (2000 UT) TABS TABLET    Take 1 tablet by mouth daily    ZINC 50 MG CAPS    Take 50 mg by mouth daily       ALLERGIES     Penicillin v    FAMILYHISTORY       Family History   Problem Relation Age of Onset    Colon Cancer None     Other Father     Coronary Art Dis Father         mother    Hypertension Father     Diabetes Unknown relative         older age; pat grandmother    Other Daughter         son; ex-    Breast Cancer None     Stroke Mother         SOCIAL HISTORY       Social History     Tobacco Use    Smoking status: Former     Packs/day: 0.50     Years: 41.00     Pack years: 20.50     Types: Cigarettes     Quit date: 2021     Years since quittin.7    Smokeless tobacco: Never   Vaping Use    Vaping Use: Never used   Substance Use Topics    Alcohol use: Not Currently     Comment: social    Drug use: Not Currently     Types: Marijuana [place])     Comment: occasional .  FEW YEARS AGO FOR PAIN USED. SCREENINGS        [place] Coma Scale  Eye Opening: Spontaneous  Best Verbal Response: Oriented  Best Motor Response: Obeys commands  [place] Coma Scale Score: 15                CIWA Assessment  BP: 109/65  Heart Rate: (!) 112           PHYSICAL EXAM  1 or more Elements     ED Triage Vitals [23 0655]   BP Temp Temp Source Heart Rate Resp SpO2 Height Weight   (!) 74/44 97.6 °F (36.4 °C) Axillary (!) 108 24 -- 5' 3\" (1.6 m) 130 lb (59 kg)       Constitutional/General: Alert and oriented x3, in significant distress because of shortness of breath  Head: Normocephalic and atraumatic  Eyes: PERRL, EOMI, conjunctiva normal  ENT:  Oropharynx clear, handling secretions poorly but is able to spit saliva adequately, no trismus, no asymmetry of the posterior oropharynx or uvular edema  Neck: Supple, full ROM, no stridor, no meningeal signs  Respiratory:  bilateral wheezes noted, left> right, increased work of breathing with use of accessory muscles. Worsening of shortness of breath with flattening head end of bed. Cardiovascular:  Regular rate. Regular rhythm. No murmurs, no gallops, no rubs. 2+ distal pulses. Equal extremity pulses.    Chest: No chest wall tenderness  GI:  Abdomen Soft, Non tender, Non distended. No rebound, guarding, or rigidity. No pulsatile masses. Musculoskeletal: Moves all extremities x 4. Warm and well perfused, no clubbing, no cyanosis, no edema. Capillary refill <3 seconds  Integument: skin warm and dry. No rashes.    Neurologic: GCS 15, no focal deficits, symmetric strength 5/5 in the upper and lower extremities bilaterally  Psychiatric: Normal Affect    DIAGNOSTIC RESULTS   LABS:    Labs Reviewed   CBC WITH AUTO DIFFERENTIAL - Abnormal; Notable for the following components:       Result Value    WBC 20.4 (*)     .2 (*)     MCHC 31.1 (*)     RDW 15.8 (*)     Lymphocytes % 19.7 (*)     Neutrophils Absolute 14.62 (*)     Lymphocytes Absolute 4.02 (*)     Monocytes Absolute 1.49 (*)     All other components within normal limits   COMPREHENSIVE METABOLIC PANEL W/ REFLEX TO MG FOR LOW K - Abnormal; Notable for the following components:    CO2 34 (*)     Glucose 126 (*)     Creatinine 0.4 (*)     Albumin 2.4 (*)     Alkaline Phosphatase 157 (*)     ALT 44 (*)     AST 52 (*)     All other components within normal limits   TROPONIN - Abnormal; Notable for the following components:    Troponin, High Sensitivity 20 (*)     All other components within normal limits   BRAIN NATRIURETIC PEPTIDE - Abnormal; Notable for the following components:    Pro- (*)     All other components within normal limits   LACTATE, SEPSIS - Abnormal; Notable for the following components:    Lactic Acid, Sepsis 2.6 (*)     All other components within normal limits   BLOOD GAS, ARTERIAL - Abnormal; Notable for the following components:    PCO2 47.7 (*)     PO2 105.3 (*)     HCO3 28.5 (*)     All other components within normal limits   COVID-19, RAPID   RAPID INFLUENZA A/B ANTIGENS   CULTURE, BLOOD 1   CULTURE, BLOOD 2   CULTURE, URINE   D-DIMER, QUANTITATIVE   ARTERIAL BLOOD GAS, RESPIRATORY ONLY   URINALYSIS WITH MICROSCOPIC   PROCALCITONIN       As interpreted by me, the above displayed labs are abnormal. All other labs obtained during this visit were within normal range or not returned as of this dictation. EKG Interpretation  Interpreted by emergency department physician, Tyrel Reddy MD  Sinus tachycardia    RADIOLOGY:   Non-plain film images such as CT, Ultrasound and MRI are read by the radiologist. Plain radiographic images are visualized and preliminarily interpreted by the ED Provider with the below findings:    No evidence of infiltrate/pneumonia. Minimal blunted of CP angle noted on left    Interpretation per the Radiologist below, if available at the time of this note:    XR CHEST PORTABLE   Final Result   1. COPD. There is no evidence of failure or pneumonia   2. Significant pleuroparenchymal scarring seen within the right and left lung   apex. CTA CHEST W CONTRAST    (Results Pending)     No results found. No results found. PROCEDURES   Unless otherwise noted below, none          CRITICAL CARE TIME (.cct)       PAST MEDICAL HISTORY/Chronic Conditions Affecting Care      has a past medical history of Abscess, Acute on chronic diastolic CHF (congestive heart failure) ([facility] [address] (1/4/2023), COPD (chronic obstructive pulmonary disease) ([facility] [address] Diverticulitis, Provoked DVT 3/2018, Seasonal allergic rhinitis, Smoker (11/30/2019), and Tobacco use disorder.      EMERGENCY DEPARTMENT COURSE    Vitals:    Vitals:    02/06/23 0655 02/06/23 0715 02/06/23 0748   BP: (!) 74/44  109/65   Pulse: (!) 108  (!) 112   Resp: 24 27 28   Temp: 97.6 °F (36.4 °C)     TempSrc: Axillary     SpO2:   100%   Weight: 130 lb (59 kg)     Height: 5' 3\" (1.6 m)         Patient was given the following medications:  Medications   ondansetron (ZOFRAN) 4 MG/2ML injection (has no administration in time range)   0.9 % sodium chloride bolus (has no administration in time range)   cefepime (MAXIPIME) 2,000 mg in sodium chloride 0.9 % 50 mL IVPB (Gyod8Msn) (has no administration in time range) vancomycin (VANCOCIN) 1,250 mg in sodium chloride 0.9 % 250 mL IVPB (has no administration in time range)   ipratropium-albuterol (DUONEB) nebulizer solution 1 ampule (1 ampule Inhalation Given 2/6/23 0702)   ondansetron (ZOFRAN) injection 4 mg (4 mg IntraVENous Given 2/6/23 0747)           Medical Decision Making/Differential Diagnosis:    CC/HPI Summary, Social Determinants of health, Records Reviewed, DDx, testing done/not done, ED Course, Reassessment, disposition considerations/shared decision making with patient, consults, disposition:            Medical Decision Making  Amount and/or Complexity of Data Reviewed  Labs: ordered. Radiology: ordered. ECG/medicine tests: ordered. Risk  Prescription drug management. Decision regarding hospitalization. Reynaldo Max is a 58 y.o. female who presents from nursing home ([place]) with sudden onset of shortness of breath that started at around 5 AM today. Patient does report having extreme nausea, but denies throwing up. Patient is on 4 L of oxygen at baseline, currently with difficulty breathing, mostly on lying down. Patient does not report having excessive phlegm production but does report having excessive saliva. Patient was not sure of what medications she was taking in nursing home. Patient was not able to provide history regarding diarrhea adequately but reported having 1 episode of loose stool. As per EMS, patient was hypotensive and received 500 cc saline bolus on way to hospital.  In ER patient was hypotensive with BP 74/44 mmHg, tachycardic with , SPO2 95% on 4 L nasal cannula. Patient was afebrile. 500 cc normal saline bolus with considerable improvement in blood pressure. Patient also received DuoNeb nebulization and Zofran given. Patient was having increased work of breathing and shortness of breath, so patient was put on noninvasive ventilation (BiPAP) with improvement of SPO2 to 99%.   Labs were suggestive of elevated WBC count, ABG :hypercapnia with compensated metabolic alkalosis. Influenza a, B and COVID was negative. Troponin- 20 (baseline 18), proBNP 500, CMP grossly normal and CBC suggestive of leukocytosis with WBC 20.4. Pro-Myles was elevated at 0.26, lactic acidosis likely type A-  2.6    Patient was given 1 dose of cefepime and vancomycin and decision was taken to admit under family medicine team.  Consult sent to family medicine service and patient was accepted for admission to intermediate care for further management and treatment. CONSULTS: (Who and What was discussed)  Tanya Villarreal was updated about patient and ED course and patient was accepted for admission under Family Medicine service    I am the Primary Clinician of Record. FINAL IMPRESSION      1. COPD exacerbation ([facility] [address]    2. Dyspnea and respiratory abnormalities    3. Septicemia [facility])          DISPOSITION/PLAN     DISPOSITION Admitted 02/06/2023 09:34:47 AM      PATIENT REFERRED TO:  No follow-up provider specified. DISCHARGE MEDICATIONS:  New Prescriptions    No medications on file       DISCONTINUED MEDICATIONS:  Discontinued Medications    No medications on file              (Please note that portions of this note were completed with a voice recognition program.  Efforts were made to edit the dictations but occasionally words are mis-transcribed. )    Corazon Kelley MD (electronically signed)          Corazon Kelley MD  Resident  02/06/23 0065

## 2023-02-06 NOTE — CONSULTS
[facility]  Department of Internal Medicine  Division of Pulmonary, Critical Care and Sleep Medicine  Consult Note    Maritza Mendes DO, MPH, Lyudmilayamilex Fernandez, [address] [facility] Pratima STRICKLAND MD      Patient: Reyna Donis  MRN: [id number]  : 1960    Encounter Time: 1:07 PM     Date of Admission: 2023  6:39 AM    Primary Care Physician: Ying Ricci MD    Reason for Consultation: Hypoxemic resp failure     HISTORY OF PRESENT ILLNESS : Reyna Donis 58 y.o. female was seen in consultation regarding the above chief compliant. She has had multiple admissions to [facility] over the last several years. She presented to the emergency room today after waking up around 4:30 in the morning and having progressive shortness of breath. She also endorsed increased wheezing. She denies cough. Denies recent fevers or chills. She denies symptoms of nausea, vomiting, dizziness. She reports to me that she has seen Dr. Román Ye in the past however she has missed multiple appointments and tells me that she has a certified letter at home from his office but she is unaware of the contents. She reports that she quit smoking approximately 3 years ago however did have a few cigarettes 3 to 4 months ago. Prior to that she had a longstanding history of smoking a pack a day for approximately 40 years. She reports that at [facility] she was on 4 L nasal cannula. She denies any prior history of radiation to the chest.  At time of my examination in the emergency room she is on BiPAP and saturating well at 98%. Admitted from 2019 to 2019 due to acute hypoxemic respiratory failure and COPD exacerbation. Treated with steroids and started on [facility]  Admitted 2019 to 2019 with diagnosis of acute respiratory failure with hypercapnia and hypoxemia and exacerbation of COPD.   Treated with steroid taper placed on Spiriva, DuoNebs, and treated with BiPAP while inpatient. Admitted November 29, 2019 to December 3, 2019 due to respiratory distress with hypoxemic and hypercapnic respiratory failure and COPD exacerbation. Was discharged this admission with nasal cannula oxygen at 2.5 L. She was placed on Wixela and Spiriva in November 2020 by her primary care provider. At some point this was switched to Spiriva and Advair by March 2021 she was on 3 L nasal cannula. Seen in the ER May 17, 2021 for COPD exacerbation and treated with prednisone and Z-Frankie  Admitted May 20, 2020 for due to COPD exacerbation with respiratory failure and hypercapnia. This time she was also seen by pulmonary. And she had acute rhinovirus. He was discharged on theophylline, Incruse, Advair, Z-Frankie, and a prednisone taper  Admitted September 21, 2021 through September 25, 2021 with diagnosis of COPD with acute exacerbation respiratory failure with hypercapnia, was also diagnosed with enterovirus. She was discharged with DuoNebs, Brovana, Pulmicort, Advair, prednisone taper, theophylline, and Incruse. Admitted February 10 to February 12 and again found to have a COPD exacerbation with respiratory failure. Also had elevated troponins at that time. Admitted November 29, 2022 due to respiratory failure and COPD exacerbation. Treated with BiPAP. She was admitted December 16, 2022 to December 22, 2022 with respiratory failure with hypoxemia, and sepsis.   She was discharged with Advair, Incruse, DuoNebs, and albuterol  She was admitted on December 28, 2022 and discharged on January 1, 2023 at that time with transaminitis with hyperbilirubinemia, multiple liver cysts, COVID-19 infection, diverticulitis, sepsis, chronic respiratory failure  She was admitted on January 4 until January 9, 2023 with diagnosis of acutely decompensated diastolic congestive heart failure, abnormal stress test findings, diverticulitis, sepsis, respiratory failure due to COPD without exacerbation, she was discharged to a skilled nursing facility. PAST MEDICAL HISTORY:  has a past medical history of Abscess, Acute on chronic diastolic CHF (congestive heart failure) ([facility] [address] COPD (chronic obstructive pulmonary disease) ([facility] [address] Diverticulitis, Provoked DVT 3/2018, Seasonal allergic rhinitis, Smoker, and Tobacco use disorder. SURGICAL HISTORY:  has a past surgical history that includes cardiovascular stress test (N/A, 01/06/2023). SOCIAL HISTORY:  reports that she quit smoking about 20 months ago. Her smoking use included cigarettes. She has a 20.50 pack-year smoking history. She has never used smokeless tobacco. She reports that she does not currently use alcohol. She reports that she does not currently use drugs after having used the following drugs: Marijuana Delgado Saris). FAMILY  HISTORY: family history includes Breast Cancer in her none; Colon Cancer in her none; Coronary Art Dis in her father; Diabetes in her unknown relative; Hypertension in her father; Other in her daughter and father; Stroke in her mother. MEDICATIONS:    Prior to Admission medications    Medication Sig Start Date End Date Taking?  Authorizing Provider   acetaminophen (TYLENOL) 325 MG tablet Take 650 mg by mouth every 6 hours as needed for Pain or Fever   Yes Historical Provider, MD   bisacodyl (DULCOLAX) 10 MG suppository Place 10 mg rectally daily   Yes Historical Provider, MD   magnesium hydroxide (MILK OF MAGNESIA) 400 MG/5ML suspension Take by mouth daily as needed for Constipation   Yes Historical Provider, MD   docusate sodium (COLACE) 100 MG capsule Take 100 mg by mouth daily    Historical Provider, MD   acetaminophen (TYLENOL) 650 MG suppository Place 650 mg rectally every 4 hours as needed for Fever  Patient not taking: Reported on 2/6/2023    Historical Provider, MD   metoprolol succinate (TOPROL XL) 25 MG extended release tablet Take 0.5 tablets by mouth nightly 1/20/23   SKYE Tinsley - CNP   aspirin 81 MG chewable tablet Take 1 tablet by mouth daily 1/10/23   Jesus Alberto Carmichael,    furosemide (LASIX) 20 MG tablet Take 1 tablet by mouth daily 1/9/23   Jesus Alberto Carmichael DO   potassium chloride (KLOR-CON M) 20 MEQ extended release tablet Take 1 tablet by mouth daily  Patient taking differently: Take 40 mEq by mouth daily 1/9/23   Jesus Alberto Carmichael,    albuterol sulfate HFA (VENTOLIN HFA) 108 (90 Base) MCG/ACT inhaler Inhale 2 puffs into the lungs 4 times daily as needed for Wheezing or Shortness of Breath 1/1/23   SKYE Iraheta CNP   Arformoterol Tartrate (BROVANA) 15 MCG/2ML NEBU Take 2 mLs by nebulization in the morning and 2 mLs in the evening. 1/1/23   SKYE Iraheta CNP   budesonide (PULMICORT) 0.5 MG/2ML nebulizer suspension Take 2 mLs by nebulization in the morning and 2 mLs in the evening. 1/1/23   SKYE Iraheta CNP   ipratropium-albuterol (DUONEB) 0.5-2.5 (3) MG/3ML SOLN nebulizer solution Inhale 3 mLs into the lungs in the morning and 3 mLs at noon and 3 mLs in the evening and 3 mLs before bedtime. 1/1/23   SKYE Iraheta CNP   atorvastatin (LIPITOR) 40 MG tablet Take 1 tablet by mouth nightly 2/1/23   SKYE Iraheta CNP   sertraline (ZOLOFT) 25 MG tablet Take 1 tablet by mouth daily 1/1/23   SKYE Iraheta CNP   fluticasone [facility]) 50 MCG/ACT nasal spray 2 sprays by Each Nostril route daily 1/1/23   SKYE Iraheta CNP   melatonin 5 MG TBDP disintegrating tablet Take 1 tablet by mouth nightly 1/1/23   SKYE Iraheta CNP   Vitamin D (CHOLECALCIFEROL) 50 MCG (2000 UT) TABS tablet Take 1 tablet by mouth daily 1/1/23   SKYE Iraheta CNP   pantoprazole (PROTONIX) 40 MG tablet Take 1 tablet by mouth in the morning and at bedtime for 14 days, THEN 1 tablet daily.   Patient taking differently: Take 1 tablet by mouth in the daily 1/1/23 2/14/23  SKYE Iraheta - CNP   zinc 50 MG CAPS Take 50 mg by mouth daily 1/1/23   SKYE Iraheta - CNP   ascorbic acid (VITAMIN C) 1000 MG tablet Take 1 tablet by mouth daily 12/4/22   SKYE Larkin CNP   OXYGEN Inhale 4 L into the lungs continuous    Historical Provider, MD       ALLERGIES: Penicillin v       REVIEW OF SYSTEMS:  Otherwise negative if not reported or listed below  Constitutional:  No unanticipated weight loss. No change in sleep pattern or activity. No fevers, chills or rigors. Eyes:    No visual changes or diplopia. No scleral icterus. ENT:    No Headaches, hearing loss or vertigo. No mouth sores or sore throat. Cardiovascular:  No chest discomfort, palpitations. Respiratory:  Positive for cough, shortness of breath  Gastrointestinal:  No abdominal pain, appetite loss, blood in stools. No hematemesis  Genitourinary:  No dysuria, trouble voiding or hematuria. No nocturia. Musculoskeletal:   No weakness or joint complaints. Integumentary: No rashes or pruritis. Neurological:  No headache, numbness or tingling. Psychiatric:   No effect on mood, memory, mentation, or behavior. No anxiety or depression. Endocrine:   No excessive thirst, fluid intake, or urination. No tremor. Hematologic: No abnormal bruising or bleeding. Lymphatic:  No swollen lymph nodes. Immunologic:  No hives or hx of anaphylaxis      OBJECTIVE:     PHYSICAL EXAM:   VITALS:   Vitals:    02/06/23 1000 02/06/23 1015 02/06/23 1030 02/06/23 1045   BP: 96/77 96/73 99/73 100/62   Pulse: (!) 106 (!) 106 (!) 105 (!) 102   Resp: 21 (!) 38 28 28   Temp:       TempSrc:       SpO2: 100% 100% 100% 100%   Weight:       Height:          No intake or output data in the 24 hours ending 02/06/23 1307     CONSTITUTIONAL:   A&O x 3, NAD.   Currently on BiPAP  SKIN:     No rash, No suspicious lesions, No skin discoloration  HEENT:     EOMI, MMM, No thrush  NECK:    No bruits, No JVP appreciated  CV:      Sinus,  No murmur, No rubs, No gallops  PULMONARY:   Very few wheezes bilaterally, otherwise breath sounds diminished  ABDOMEN:     Soft, non-tender. BS normal. No R/R/G  EXT:    No deformities . No clubbing. Trace edema of left foot. No clubbing. No cyanosis. PULSE:   Appears equal and palpable.   PSYCHIATRIC:  Seems appropriate, No acute psychosis  MS:    No fractures, No gross weakness  NEUROLOGIC:   The clinical assessment is non-focal     DATA: IMAGING & TESTING:     LABORATORY TESTS:    CBC with Differential:    Lab Results   Component Value Date/Time    WBC 20.4 02/06/2023 06:59 AM    RBC 4.26 02/06/2023 06:59 AM    HGB 13.3 02/06/2023 06:59 AM    HCT 42.7 02/06/2023 06:59 AM     02/06/2023 06:59 AM    .2 02/06/2023 06:59 AM    MCH 31.2 02/06/2023 06:59 AM    MCHC 31.1 02/06/2023 06:59 AM    RDW 15.8 02/06/2023 06:59 AM    NRBC 0.9 11/30/2022 05:18 AM    METASPCT 0.9 12/22/2022 06:18 AM    LYMPHOPCT 19.7 02/06/2023 06:59 AM    MONOPCT 7.3 02/06/2023 06:59 AM    MYELOPCT 1.0 12/28/2022 07:02 PM    BASOPCT 0.5 02/06/2023 06:59 AM    MONOSABS 1.49 02/06/2023 06:59 AM    LYMPHSABS 4.02 02/06/2023 06:59 AM    EOSABS 0.07 02/06/2023 06:59 AM    BASOSABS 0.10 02/06/2023 06:59 AM     CMP:    Lab Results   Component Value Date/Time     02/06/2023 06:59 AM    K 4.0 02/06/2023 06:59 AM    CL 98 02/06/2023 06:59 AM    CO2 34 02/06/2023 06:59 AM    BUN 13 02/06/2023 06:59 AM    CREATININE 0.4 02/06/2023 06:59 AM    GFRAA >60 04/13/2022 02:20 PM    LABGLOM >60 02/06/2023 06:59 AM    GLUCOSE 126 02/06/2023 06:59 AM    PROT 6.4 02/06/2023 06:59 AM    LABALBU 2.4 02/06/2023 06:59 AM    CALCIUM 8.6 02/06/2023 06:59 AM    BILITOT 0.6 02/06/2023 06:59 AM    ALKPHOS 157 02/06/2023 06:59 AM    AST 52 02/06/2023 06:59 AM    ALT 44 02/06/2023 06:59 AM     Magnesium:    Lab Results   Component Value Date/Time    MG 1.9 01/09/2023 07:26 AM     Phosphorus:    Lab Results   Component Value Date/Time    PHOS 3.0 01/09/2023 07:26 AM        PRO-BNP:   Lab Results   Component Value Date PROBNP 500 (H) 02/06/2023    PROBNP 422 (H) 01/27/2023      ABGs:   Lab Results   Component Value Date/Time    PH 7.394 02/06/2023 08:01 AM    PO2 105.3 02/06/2023 08:01 AM    PCO2 47.7 02/06/2023 08:01 AM     Hemoglobin A1C: No components found for: HGBA1C    IMAGING:  Imaging tests were completed and reviewed and discussed radiology and care team involved and reveals   XR CHEST PORTABLE    Result Date: 2/6/2023  EXAMINATION: ONE XRAY VIEW OF THE CHEST 2/6/2023 7:31 am COMPARISON: 01/04/2023 and CT scan of 12/28/2022 HISTORY: ORDERING SYSTEM PROVIDED HISTORY: chest pain TECHNOLOGIST PROVIDED HISTORY: Reason for exam:->chest pain What reading provider will be dictating this exam?->CRC FINDINGS: There is hyperinflation of the lungs and flattening of diaphragms consistent with COPD. There is no pulmonary infiltrate, mass or nodule. There is no pleural effusion. The cardiac silhouette is within normal limits. There is significant pleuroparenchymal scarring seen within the right and left lung apex. 1. COPD. There is no evidence of failure or pneumonia 2. Significant pleuroparenchymal scarring seen within the right and left lung apex. Assessment:   Acute hypoxemic respiratory failure  COPD with exacerbation  Progressive fibrotic changes of right upper lobe, right middle lobe, and lingula  Heart failure with reduced ejection fraction of 40 to 45% on echocardiogram dated January 2023. Abnormal stress test on January 5, 2023: Intermediate risk pharmacologic myocardial perfusion imaging study  Leukocytosis        Plan:   Obtain records from Dr. Jacob Carvajal office particularly patient's most recent pulmonary function test.  Obtain MRSA swab  Full respiratory viral panel  Continue Brovana, budesonide. Continue DuoNebs  Agree with empiric antibiotics. I personally reviewed the patient's CT of the chest.  I compared it to multiple CTs of the chest that the patient has had in the past 3 years.   Appears to get show progressive fibrotic changes of the right upper lobe, right middle lobe, and lingula. These have significantly worsened in the last 6 months. At this time after we get the respiratory viral panel along with other testing may consider bronchoscopy possibly with transbronchial biopsies. We will send screening labs for connective tissue disease. Also for granulomatous diseases. The pattern of fibrosis is not consistent with UIP and rather may be consistent with possibly allergic bronchopulmonary aspergillosis versus extrinsic allergic alveolitis versus sarcoidosis versus histiocytosis versus occupational lung diseases. Recommend cardiology consult as patient was found on previous admission to have an abnormal echo, abnormal stress test.  At her most recent admission the plan had been to have an invasive coronary angiogram completed once the patient's COVID-19 was resolved. I spent > 50 minutes reviewing prior records in addition to time spent examining the patient and discussing  the plan of care.      Kavya Barnett, DO    Pulmonary, Critical Care and Sleep Medicine

## 2023-02-06 NOTE — PROGRESS NOTES
Date: 2/6/2023    Time: 8:14 AM    Patient Placed On BIPAP/CPAP/ Non-Invasive Ventilation? Yes    If no must comment. Facial area red/color change? No           If YES are Blister/Lesion present? No   If yes must notify nursing staff  BIPAP/CPAP skin barrier?   Yes    Skin barrier type:mepilexlite       Comments:        David Skinner RCP

## 2023-02-06 NOTE — H&P
[facility]  [facility]  History and Physical    Patient:  Estela Castro 58 y.o. female MRN: [id number]     Date of Service: 2/6/2023    Hospital Day: 1      Chief complaint: had concerns including Shortness of Breath (Pt states that the shortness of breath started after staff at the [facility] woke her up for medication. Pt states at 0500 the she also had nausea and diarrhea. ). History of Present Illness   The patient is a 58 y.o. female with a PMHx of diastolic CHF, end stage COPD requiring 4L O2 at home, previous DVT in 2018, diverticulitis, tobacco abuse who presented from [place] [address] due to increasing SOB. Dyspnea increases with laying flat and any movement including turning over in bed. She also endorses increased sputum production that started a day ao. She was previously admitted in Jan 2023 for acute on chronic CHF exacerbation. ECHO at the time showed EF of 40-55% and overall motion abnormality. She also had an abnormal stress test that was consistent with ischemic LAD, txt deferred to after the acute insult. Per EMS, she was hypotensive en route to the hospital and was given 500cc saline bolus. BP 74/44 on admission, improved to 109/65/ She was placed on BiPAP due to SpO2 <90% without it. She was tachypneic, tachycardic and normotensive on exam. Her WBC was 20.4 from a previous 9.4 recorded in Jan 2023. Lactic acid 2.6 on presentation. Flu and COVID test negative, CXR showed no acute processes. , D-dimer 1784 and CTA is pending. EKG showed sinus tachycardia. Patient was admitted for acute hypoxic respiratory failure w hypercapnia.       Medications   ondansetron (ZOFRAN) 4 MG/2ML injection (has no administration in time range)   vancomycin (VANCOCIN) 1,250 mg in sodium chloride 0.9 % 250 mL IVPB (has no administration in time range)   albuterol sulfate HFA (PROVENTIL;VENTOLIN;PROAIR) 108 (90 Base) MCG/ACT inhaler 2 puff (has no administration in time range)   ascorbic acid (VITAMIN C) tablet 1,000 mg (has no administration in time range)   arformoterol tartrate (BROVANA) nebulizer solution 15 mcg (has no administration in time range)   budesonide (PULMICORT) nebulizer suspension 500 mcg (has no administration in time range)   aspirin chewable tablet 81 mg (has no administration in time range)   atorvastatin (LIPITOR) tablet 40 mg (has no administration in time range)   docusate sodium (COLACE) capsule 100 mg (has no administration in time range)   fluticasone (FLONASE) 50 MCG/ACT nasal spray 2 spray (has no administration in time range)   furosemide (LASIX) tablet 20 mg (has no administration in time range)   ipratropium-albuterol (DUONEB) nebulizer solution 1 ampule (has no administration in time range)   magnesium hydroxide (MILK OF MAGNESIA) 400 MG/5ML suspension 5 mL (has no administration in time range)   melatonin disintegrating tablet 5 mg (has no administration in time range)   metoprolol succinate (TOPROL XL) extended release tablet 12.5 mg (has no administration in time range)   potassium chloride (KLOR-CON M) extended release tablet 20 mEq (has no administration in time range)   sertraline (ZOLOFT) tablet 25 mg (has no administration in time range)   vitamin D (CHOLECALCIFEROL) tablet 2,000 Units (has no administration in time range)   zinc sulfate (ZINCATE) capsule 50 mg (has no administration in time range)   sodium chloride flush 0.9 % injection 5-40 mL (has no administration in time range)   sodium chloride flush 0.9 % injection 5-40 mL (has no administration in time range)   0.9 % sodium chloride infusion (has no administration in time range)   enoxaparin (LOVENOX) injection 40 mg (has no administration in time range)   ondansetron (ZOFRAN-ODT) disintegrating tablet 4 mg (has no administration in time range)     Or   ondansetron (ZOFRAN) injection 4 mg (has no administration in time range)   polyethylene glycol (GLYCOLAX) packet 17 g (has no administration in time range)   acetaminophen (TYLENOL) tablet 650 mg (has no administration in time range)     Or   acetaminophen (TYLENOL) suppository 650 mg (has no administration in time range)   famotidine (PEPCID) tablet 20 mg (has no administration in time range)   vancomycin (VANCOCIN) 1,250 mg in sodium chloride 0.9 % 250 mL IVPB (has no administration in time range)   cefepime (MAXIPIME) 2,000 mg in sodium chloride 0.9 % 50 mL IVPB ([id number]) (has no administration in time range)   ipratropium-albuterol (DUONEB) nebulizer solution 1 ampule (1 ampule Inhalation Given 2/6/23 0702)   0.9 % sodium chloride bolus (500 mLs IntraVENous New Bag 2/6/23 0950)   ondansetron (ZOFRAN) injection 4 mg (4 mg IntraVENous Given 2/6/23 0747)   cefepime (MAXIPIME) 2,000 mg in sodium chloride 0.9 % 50 mL IVPB ([id number]) (2,000 mg IntraVENous New Bag 2/6/23 1010)         Past Medical History:      Diagnosis Date    Abscess     colon    Acute on chronic diastolic CHF (congestive heart failure) ([facility] [address] 1/4/2023    COPD (chronic obstructive pulmonary disease) ([facility] [address]     Diverticulitis     Provoked DVT 3/2018     3 months Eliquis for DVT due to PICC line    Seasonal allergic rhinitis     Smoker 11/30/2019    5-6 per day    Tobacco use disorder      : 1 ppd since age 25       Past Surgical History:        Procedure Laterality Date    CARDIOVASCULAR STRESS TEST N/A 01/06/2023    lexiscan stress test       Medications Prior to Admission:    Prior to Admission medications    Medication Sig Start Date End Date Taking?  Authorizing Provider   acetaminophen (TYLENOL) 325 MG tablet Take 650 mg by mouth every 6 hours as needed for Pain or Fever   Yes Historical Provider, MD   bisacodyl (DULCOLAX) 10 MG suppository Place 10 mg rectally daily   Yes Historical Provider, MD   magnesium hydroxide (MILK OF MAGNESIA) 400 MG/5ML suspension Take by mouth daily as needed for Constipation   Yes Historical Provider, MD   docusate sodium (COLACE) 100 MG capsule Take 100 mg by mouth daily    Historical Provider, MD   acetaminophen (TYLENOL) 650 MG suppository Place 650 mg rectally every 4 hours as needed for Fever  Patient not taking: Reported on 2/6/2023    Historical Provider, MD   metoprolol succinate (TOPROL XL) 25 MG extended release tablet Take 0.5 tablets by mouth nightly 1/20/23   SKYE Pereira CNP   aspirin 81 MG chewable tablet Take 1 tablet by mouth daily 1/10/23   Lynda Walls DO   furosemide (LASIX) 20 MG tablet Take 1 tablet by mouth daily 1/9/23   Lynda Walls DO   potassium chloride (KLOR-CON M) 20 MEQ extended release tablet Take 1 tablet by mouth daily  Patient taking differently: Take 40 mEq by mouth daily 1/9/23   Lynda Walls DO   albuterol sulfate HFA (VENTOLIN HFA) 108 (90 Base) MCG/ACT inhaler Inhale 2 puffs into the lungs 4 times daily as needed for Wheezing or Shortness of Breath 1/1/23   SKYE Burnett CNP   Arformoterol Tartrate (BROVANA) 15 MCG/2ML NEBU Take 2 mLs by nebulization in the morning and 2 mLs in the evening. 1/1/23   KSYE Burnett CNP   budesonide (PULMICORT) 0.5 MG/2ML nebulizer suspension Take 2 mLs by nebulization in the morning and 2 mLs in the evening. 1/1/23   SKYE Burnett CNP   ipratropium-albuterol (DUONEB) 0.5-2.5 (3) MG/3ML SOLN nebulizer solution Inhale 3 mLs into the lungs in the morning and 3 mLs at noon and 3 mLs in the evening and 3 mLs before bedtime.  1/1/23   SKYE Burnett CNP   atorvastatin (LIPITOR) 40 MG tablet Take 1 tablet by mouth nightly 2/1/23   SKYE Burnett CNP   sertraline (ZOLOFT) 25 MG tablet Take 1 tablet by mouth daily 1/1/23   SKYE Burnett CNP   fluticasone [facility]) 50 MCG/ACT nasal spray 2 sprays by Each Nostril route daily 1/1/23   SKYE Burnett CNP   melatonin 5 MG TBDP disintegrating tablet Take 1 tablet by mouth nightly 1/1/23   SKYE Burnett CNP   Vitamin D (CHOLECALCIFEROL) 50 MCG (2000 UT) TABS tablet Take 1 tablet by mouth daily 1/1/23   SKYE Pérez CNP   pantoprazole (PROTONIX) 40 MG tablet Take 1 tablet by mouth in the morning and at bedtime for 14 days, THEN 1 tablet daily. Patient taking differently: Take 1 tablet by mouth in the daily 1/1/23 2/14/23  SKYE Pérez CNP   zinc 50 MG CAPS Take 50 mg by mouth daily 1/1/23   SKYE Pérez CNP   ascorbic acid (VITAMIN C) 1000 MG tablet Take 1 tablet by mouth daily 12/4/22   SKYE Pérez CNP   OXYGEN Inhale 4 L into the lungs continuous    Historical Provider, MD       Allergies:  Penicillin v    Social History:   TOBACCO:   reports that she quit smoking about 20 months ago. Her smoking use included cigarettes. She has a 20.50 pack-year smoking history. She has never used smokeless tobacco.  ETOH:   reports that she does not currently use alcohol. Family History:       Problem Relation Age of Onset    Colon Cancer None     Other Father     Coronary Art Dis Father         mother    Hypertension Father     Diabetes Unknown relative         older age; pat grandmother    Other Daughter         son; ex-    Breast Cancer None     Stroke Mother        REVIEW OF SYSTEMS:    Review of Systems   Constitutional:  Positive for chills. Negative for fever. HENT:  Positive for congestion. Negative for rhinorrhea. Eyes:  Negative for visual disturbance. Respiratory:  Positive for shortness of breath. Negative for chest tightness. Cardiovascular:  Positive for leg swelling. Negative for chest pain. Gastrointestinal:  Negative for abdominal pain. Genitourinary:  Negative for dysuria and frequency. Musculoskeletal:  Positive for joint swelling. Negative for myalgias. Neurological:  Negative for dizziness and headaches. Psychiatric/Behavioral:  Negative for confusion.         Physical Exam   Vitals: /62   Pulse (!) 102   Temp 97.6 °F (36.4 °C) (Axillary)   Resp 28   Ht 5' 3\" (1.6 m)   Wt 130 lb (59 kg)   SpO2 100% BMI 23.03 kg/m²     Physical Exam  Vitals and nursing note reviewed. Constitutional:       General: She is in acute distress. HENT:      Nose: No rhinorrhea. Eyes:      Extraocular Movements: Extraocular movements intact. Cardiovascular:      Rate and Rhythm: Regular rhythm. Tachycardia present. Pulses: Normal pulses. Heart sounds: Normal heart sounds. Pulmonary:      Effort: Respiratory distress present. Comments: Decreased air movement globally, bibasilar expiratory wheezing. On Bipap. Abdominal:      General: Abdomen is flat. Palpations: Abdomen is soft. Musculoskeletal:      Left lower leg: Edema (trace) present. Skin:     General: Skin is warm and dry. Comments: Log rolled to check for any skin breaks, none found   Neurological:      General: No focal deficit present. Mental Status: She is alert and oriented to person, place, and time. Psychiatric:         Mood and Affect: Mood normal.       Labs and Imaging Studies   Basic Labs  CBC:   Recent Labs     02/06/23  0659   WBC 20.4*   RBC 4.26   HGB 13.3   HCT 42.7   .2*   MCH 31.2   MCHC 31.1*   RDW 15.8*      MPV 10.4       BMP:    Recent Labs     02/06/23  0659      K 4.0   CL 98   CO2 34*   BUN 13   CREATININE 0.4*   GLUCOSE 126*   CALCIUM 8.6   PROT 6.4   LABALBU 2.4*   BILITOT 0.6   ALKPHOS 157*   AST 52*   ALT 44*       LIVER PROFILE:   Recent Labs     02/06/23  0659   AST 52*   ALT 44*   BILITOT 0.6   ALKPHOS 157*       PT/INR:   No results for input(s): PROTIME, INR in the last 72 hours. APTT:   No results for input(s): APTT in the last 72 hours.     Fasting Lipid Panel:    Lab Results   Component Value Date/Time    CHOL 262 02/10/2022 08:38 AM    TRIG 51 02/10/2022 08:38 AM    HDL 69 02/10/2022 08:38 AM       Cardiac Enzymes:    Lab Results   Component Value Date    TROPONINI <0.01 05/20/2021    TROPONINI <0.01 05/17/2021    TROPONINI <0.01 11/29/2019       Imaging Studies:     XR CHEST PORTABLE    Result Date: 2/6/2023  EXAMINATION: ONE XRAY VIEW OF THE CHEST 2/6/2023 7:31 am COMPARISON: 01/04/2023 and CT scan of 12/28/2022 HISTORY: ORDERING SYSTEM PROVIDED HISTORY: chest pain TECHNOLOGIST PROVIDED HISTORY: Reason for exam:->chest pain What reading provider will be dictating this exam?->CRC FINDINGS: There is hyperinflation of the lungs and flattening of diaphragms consistent with COPD. There is no pulmonary infiltrate, mass or nodule. There is no pleural effusion. The cardiac silhouette is within normal limits. There is significant pleuroparenchymal scarring seen within the right and left lung apex. 1. COPD. There is no evidence of failure or pneumonia 2. Significant pleuroparenchymal scarring seen within the right and left lung apex. Resident's Assessment and Plan     Gayathri Moura is a 58 y.o. female with PMH of diastolic CHF and COPD who presented to the ED for worsening SOB and increasing O2 requirement. Admitted for acute respiratory failure     Principal Problem:    Sepsis ([address]  Resolved Problems:    * No resolved hospital problems.  *    Acute Hypoxic Respiratory Failure w hypercapnia  -2/2 COPD exacerbation vs CHF vs sepsis of unknown origin  -Has end stage COPD, candidate for lung tx previously discussed w [facility] 8/2021, previous A1AT levels below normal range  -Recently discharged for acute on chronic diastolic HF, proBNP 661  -Tachypneic, tachycardic and hypoxic with RR in the high 20s, HR in the 100s, and on BiPAP  -WBC on presentation 20k  -ABG showed hypercapnic pattern  -CXR negative for acute processes  -Procal pending, cultures pending  -DuoNeb, Brovana and Pulicort  -Started on Vanc and Cefepime for 7 days  -Aggressive pulmonary hygiene  -Pulmonology consult; appreciate recs    Possible sepsis  -Tachypneic, tachycardic and hypoxic with RR in the high 20s, HR in the 100s, and on BiPAP  -Afebrile since admission  -CXR negative for acute processes  -blood cultures, urine culture, UA    Hx of abnormal stress test  -Stress test 1/6/2023 was abnormal, consistent with ischemia of LAD, to be followed up outpatient  -ECHO 1/4/2023 showed depressed systolic fxn and EF 33-06%  -EKG in ED today showed sinus tachycardia  -Troponin 20    Hx of Liver cyst  -positive for Hep A and Hep C antibodies in 9/22  -Hospitalized for transaminitis with hyperbilirubinemia, multiple liver cysts, abnormal GB appearance with negative US.   -Negative MRCP for biliary obstruction at that time  -AST and ALT slightly elevated  -Hep C viral load pending      PT/OT evaluation: pending  DVT prophylaxis: lovenox  GI prophylaxis: pepcid  Diet: Regular  Full Code  Disposition: pending      Nura Hunt MD  Attending physician: Dr. Nicole Nance

## 2023-02-06 NOTE — Clinical Note
Concern for sepsis with acute on chronic respiratory failure in setting of recent hospitalization and HFrEF

## 2023-02-06 NOTE — ED PROVIDER NOTES
ATTENDING PROVIDER ATTESTATION:     Benjamin Capps presented to the emergency department for evaluation of Shortness of Breath (Pt states that the shortness of breath started after staff at the [facility] woke her up for medication. Pt states at 0500 the she also had nausea and diarrhea. )   and was initially evaluated by the Medical Resident. See Original ED Note for H&P and ED course above. I have reviewed and discussed the case, including pertinent history (medical, surgical, family and social) and exam findings with the Medical Resident assigned to Benjamin Capps. I have personally performed and/or participated in the history, exam, medical decision making, and procedures and agree with all pertinent clinical information and any additional changes or corrections are noted below in my assessment and plan. I have discussed this patient in detail with the resident, and provided the instruction and education,       I have reviewed my findings and recommendations with the assigned Medical Resident, Benjamin Capps and members of family present at the time of disposition. I have performed a history and physical examination of this patient and directly supervised the resident caring for this patient            [facility]        Pt Name: Benjamin Capps  MRN: [id number]  [place] 1960  Date of evaluation: 2/6/2023  Provider: Mia Hernadez MD  PCP: Anne Gomez MD  Note Started: 7:00 AM EST 2/6/23    CHIEF COMPLAINT       Chief Complaint   Patient presents with    Shortness of Breath     Pt states that the shortness of breath started after staff at the [facility] woke her up for medication. Pt states at 0500 the she also had nausea and diarrhea. HISTORY OF PRESENT ILLNESS: 1 or more Elements        Symptoms of breath. Benjamin Capps is a 58 y.o. female who presents for shortness of breath. Patient arrives in respiratory distress.   She comes from nursing facility with shortness of breath. She was hypoxemic as well. She also reports he has had some diarrhea. No abdominal pain. She denies chest pain but says her lungs feel tight. She has a history of COPD as well as congestive heart failure. Last ejection fraction 40%. On the she was placed on BiPAP to prevent life-threatening deterioration. She improved quickly on BiPAP. Denies any history of DVT. Nursing Notes were all reviewed and agreed with or any disagreements were addressed in the HPI. REVIEW OF EXTERNAL NOTE :       Discharge summary from January 9, 2023 internal medicine    Echo from January 4, 2023    Stress test from January 5, 2023    Controlled Substance Monitoring:    Acute and Chronic Pain Monitoring:   No flowsheet data found. REVIEW OF SYSTEMS :      Positives and Pertinent negatives as per HPI.      SURGICAL HISTORY     Past Surgical History:   Procedure Laterality Date    CARDIOVASCULAR STRESS TEST N/A 01/06/2023    lexiscan stress test       CURRENTMEDICATIONS       Current Discharge Medication List        CONTINUE these medications which have NOT CHANGED    Details   acetaminophen (TYLENOL) 325 MG tablet Take 650 mg by mouth every 6 hours as needed for Pain or Fever      bisacodyl (DULCOLAX) 10 MG suppository Place 10 mg rectally daily      magnesium hydroxide (MILK OF MAGNESIA) 400 MG/5ML suspension Take by mouth daily as needed for Constipation      docusate sodium (COLACE) 100 MG capsule Take 100 mg by mouth daily      acetaminophen (TYLENOL) 650 MG suppository Place 650 mg rectally every 4 hours as needed for Fever      metoprolol succinate (TOPROL XL) 25 MG extended release tablet Take 0.5 tablets by mouth nightly  Qty: 30 tablet, Refills: 3      aspirin 81 MG chewable tablet Take 1 tablet by mouth daily  Qty: 30 tablet, Refills: 3      furosemide (LASIX) 20 MG tablet Take 1 tablet by mouth daily  Qty: 60 tablet, Refills: 3      potassium chloride (KLOR-CON M) 20 MEQ extended release tablet Take 1 tablet by mouth daily  Qty: 30 tablet, Refills: 0      albuterol sulfate HFA (VENTOLIN HFA) 108 (90 Base) MCG/ACT inhaler Inhale 2 puffs into the lungs 4 times daily as needed for Wheezing or Shortness of Breath  Qty: 1 each, Refills: 2      Arformoterol Tartrate (BROVANA) 15 MCG/2ML NEBU Take 2 mLs by nebulization in the morning and 2 mLs in the evening. Qty: 120 mL, Refills: 0      budesonide (PULMICORT) 0.5 MG/2ML nebulizer suspension Take 2 mLs by nebulization in the morning and 2 mLs in the evening. Qty: 60 each, Refills: 0      ipratropium-albuterol (DUONEB) 0.5-2.5 (3) MG/3ML SOLN nebulizer solution Inhale 3 mLs into the lungs in the morning and 3 mLs at noon and 3 mLs in the evening and 3 mLs before bedtime. Qty: 360 mL, Refills: 0      atorvastatin (LIPITOR) 40 MG tablet Take 1 tablet by mouth nightly  Qty: 30 tablet, Refills: 0      sertraline (ZOLOFT) 25 MG tablet Take 1 tablet by mouth daily  Qty: 30 tablet, Refills: 0    Associated Diagnoses: Current moderate episode of major depressive disorder without prior episode (HCC)      fluticasone (FLONASE) 50 MCG/ACT nasal spray 2 sprays by Each Nostril route daily  Qty: 1 each, Refills: 5    Associated Diagnoses: Seasonal allergic rhinitis due to pollen      melatonin 5 MG TBDP disintegrating tablet Take 1 tablet by mouth nightly  Qty: 30 tablet, Refills: 0      Vitamin D (CHOLECALCIFEROL) 50 MCG (2000 UT) TABS tablet Take 1 tablet by mouth daily  Qty: 30 tablet, Refills: 0    Comments: Labeling may look different. 25 mcg=1000 Units. Please double check dosages. pantoprazole (PROTONIX) 40 MG tablet Take 1 tablet by mouth in the morning and at bedtime for 14 days, THEN 1 tablet daily.   Qty: 58 tablet, Refills: 0      zinc 50 MG CAPS Take 50 mg by mouth daily  Qty: 30 capsule, Refills: 0      ascorbic acid (VITAMIN C) 1000 MG tablet Take 1 tablet by mouth daily  Qty: 30 tablet, Refills: 0      OXYGEN Inhale 4 L into the lungs continuous ALLERGIES     Penicillin v    FAMILYHISTORY       Family History   Problem Relation Age of Onset    Colon Cancer None     Other Father     Coronary Art Dis Father         mother    Hypertension Father     Diabetes Unknown relative         older age; pat grandmother    Other Daughter         son; ex-    Breast Cancer None     Stroke Mother         SOCIAL HISTORY       Social History     Tobacco Use    Smoking status: Former     Packs/day: 0.50     Years: 41.00     Pack years: 20.50     Types: Cigarettes     Quit date: 2021     Years since quittin.7    Smokeless tobacco: Never   Vaping Use    Vaping Use: Never used   Substance Use Topics    Alcohol use: Not Currently     Comment: social    Drug use: Not Currently     Types: Marijuana Sheng Dus)     Comment: occasional .  FEW YEARS AGO FOR PAIN USED. SCREENINGS        [place] Coma Scale  Eye Opening: Spontaneous  Best Verbal Response: Oriented  Best Motor Response: Obeys commands  [place] Coma Scale Score: 15                CIWA Assessment  BP: 104/67  Heart Rate: 84           PHYSICAL EXAM  1 or more Elements     ED Triage Vitals [23 0655]   BP Temp Temp Source Heart Rate Resp SpO2 Height Weight   (!) 74/44 97.6 °F (36.4 °C) Axillary (!) 108 24 -- 5' 3\" (1.6 m) 130 lb (59 kg)           Constitutional/General: Alert and oriented x3  Head: Normocephalic and atraumatic  Eyes: PERRL, EOMI, conjunctiva normal, sclera non icteric  ENT:  Oropharynx clear, handling secretions, no trismus, no asymmetry of the posterior oropharynx or uvular edema  Neck: Supple, full ROM, no stridor, no meningeal signs  Respiratory: Lungs with scattered rales and wheezes bilaterally, tachypnea, in respiratory distress   Cardiovascular:  Regular rate. Regular rhythm. No murmurs, no gallops, no rubs. 2+ distal pulses. Equal extremity pulses. Chest: No chest wall tenderness  GI:  Abdomen Soft, Non tender, Non distended. No rebound, guarding, or rigidity.  No pulsatile masses. Musculoskeletal: Moves all extremities x 4. Warm and well perfused, no clubbing, no cyanosis,. No calf tenderness or palpable cords. . Capillary refill <3 seconds  Integument: skin warm and dry. No rashes.    Neurologic: GCS 15, no focal deficits  Psychiatric: Normal Affect            DIAGNOSTIC RESULTS   LABS:    Labs Reviewed   CBC WITH AUTO DIFFERENTIAL - Abnormal; Notable for the following components:       Result Value    WBC 20.4 (*)     .2 (*)     MCHC 31.1 (*)     RDW 15.8 (*)     Lymphocytes % 19.7 (*)     Neutrophils Absolute 14.62 (*)     Lymphocytes Absolute 4.02 (*)     Monocytes Absolute 1.49 (*)     All other components within normal limits   COMPREHENSIVE METABOLIC PANEL W/ REFLEX TO MG FOR LOW K - Abnormal; Notable for the following components:    CO2 34 (*)     Glucose 126 (*)     Creatinine 0.4 (*)     Albumin 2.4 (*)     Alkaline Phosphatase 157 (*)     ALT 44 (*)     AST 52 (*)     All other components within normal limits   TROPONIN - Abnormal; Notable for the following components:    Troponin, High Sensitivity 20 (*)     All other components within normal limits   BRAIN NATRIURETIC PEPTIDE - Abnormal; Notable for the following components:    Pro- (*)     All other components within normal limits   LACTATE, SEPSIS - Abnormal; Notable for the following components:    Lactic Acid, Sepsis 2.6 (*)     All other components within normal limits   BLOOD GAS, ARTERIAL - Abnormal; Notable for the following components:    PCO2 47.7 (*)     PO2 105.3 (*)     HCO3 28.5 (*)     All other components within normal limits   LACTIC ACID - Abnormal; Notable for the following components:    Lactic Acid 2.9 (*)     All other components within normal limits   COVID-19, RAPID   RAPID INFLUENZA A/B ANTIGENS   RESPIRATORY PANEL, MOLECULAR, WITH COVID-19   CULTURE, BLOOD 1   CULTURE, BLOOD 2   CULTURE, URINE   CULTURE, MRSA, SCREENING   D-DIMER, QUANTITATIVE   PROCALCITONIN   ARTERIAL BLOOD GAS, RESPIRATORY ONLY   URINALYSIS WITH MICROSCOPIC   HEP C VIRAL LOAD    Narrative:     HCV by Quant NAAT   HISTOPLASMA ANTIGEN, URINE   RHEUMATOID FACTOR   TJ   ANGIOTENSIN CONVERTING ENZYME   1,3 BETA-D-GLUCAN   T-SPOT TB TEST   CBC WITH AUTO DIFFERENTIAL   COMPREHENSIVE METABOLIC PANEL W/ REFLEX TO MG FOR LOW K       As interpreted by me, the above displayed labs are abnormal. All other labs obtained during this visit were within normal range or not returned as of this dictation. RADIOLOGY:   Unless a wet read is documented in the ED course or MDM, non-plain film images such as CT, Ultrasound and MRI are read by the radiologist unless otherwise specified in the medical decision-making. Plain radiographic images are preliminarily interpreted by this ED Provider with the findings documented in the ED Course with official radiology read to follow. Interpretation per the Radiologist below, if available at the time of this note:    CTA CHEST W CONTRAST   Final Result   1. No acute pulmonary emboli. 2.  No aneurysm formation or dissection of the thoracic aorta. 3.  Progressive fibroretraction/multi segmental atelectasis of both upper   lobes/right middle lobe/lingula with confluent parenchymal opacities that can   represent areas of organized pneumonia. 4.  Progressive ongoing chronic inflammatory including granulomatous and   atypical infectious process of the lung parenchyma consider in the   differential diagnosis. Further consultation/investigation with pulmonary   medicine recommended. 5.  Liver lesions seen in the upper abdomen partially covered on this   examination, see report MRI of the abdomen of December 29, 2022. XR CHEST PORTABLE   Final Result   1. COPD. There is no evidence of failure or pneumonia   2. Significant pleuroparenchymal scarring seen within the right and left lung   apex. No results found. No results found.       PAST MEDICAL HISTORY/Chronic Conditions Affecting Care      has a past medical history of Abscess, Acute on chronic diastolic CHF (congestive heart failure) ([facility] (1/4/2023), COPD (chronic obstructive pulmonary disease) ([facility] Diverticulitis, Provoked DVT 3/2018, Seasonal allergic rhinitis, Smoker (11/30/2019), and Tobacco use disorder.      EMERGENCY DEPARTMENT COURSE    Vitals:    Vitals:    02/06/23 1045 02/06/23 1600 02/06/23 1940 02/06/23 2012   BP: 100/62 108/67 104/67    Pulse: (!) 102 99 88 84   Resp: 28 20 18 18   Temp:  98.4 °F (36.9 °C) 98.2 °F (36.8 °C)    TempSrc:  Oral Temporal    SpO2: 100% 97% 94% 97%   Weight:       Height:           Patient was given the following medications:  Medications   ascorbic acid (VITAMIN C) tablet 1,000 mg (1,000 mg Oral Not Given 2/6/23 1551)   arformoterol tartrate (BROVANA) nebulizer solution 15 mcg (15 mcg Nebulization Given 2/6/23 2011)   budesonide (PULMICORT) nebulizer suspension 500 mcg (500 mcg Nebulization Given 2/6/23 2011)   aspirin chewable tablet 81 mg (81 mg Oral Not Given 2/6/23 1552)   atorvastatin (LIPITOR) tablet 40 mg (40 mg Oral Given 2/6/23 2051)   docusate sodium (COLACE) capsule 100 mg (100 mg Oral Not Given 2/6/23 1433)   fluticasone (FLONASE) 50 MCG/ACT nasal spray 2 spray (2 sprays Each Nostril Not Given 2/6/23 1413)   furosemide (LASIX) tablet 20 mg (20 mg Oral Not Given 2/6/23 1552)   ipratropium-albuterol (DUONEB) nebulizer solution 1 ampule (1 ampule Inhalation Given 2/6/23 2012)   magnesium hydroxide (MILK OF MAGNESIA) 400 MG/5ML suspension 5 mL (has no administration in time range)   melatonin disintegrating tablet 5 mg (5 mg Oral Given 2/6/23 2051)   metoprolol succinate (TOPROL XL) extended release tablet 12.5 mg (12.5 mg Oral Given 2/6/23 2051)   potassium chloride (KLOR-CON M) extended release tablet 20 mEq (20 mEq Oral Not Given 2/6/23 1552)   sertraline (ZOLOFT) tablet 25 mg (25 mg Oral Not Given 2/6/23 1552)   vitamin D (CHOLECALCIFEROL) tablet 2,000 Units (2,000 Units Oral Not Given 2/6/23 1553)   zinc sulfate (ZINCATE) capsule 50 mg (50 mg Oral Not Given 2/6/23 1553)   sodium chloride flush 0.9 % injection 5-40 mL (10 mLs IntraVENous Given 2/6/23 2052)   sodium chloride flush 0.9 % injection 5-40 mL (has no administration in time range)   0.9 % sodium chloride infusion (has no administration in time range)   enoxaparin (LOVENOX) injection 40 mg (40 mg SubCUTAneous Not Given 2/6/23 1552)   ondansetron (ZOFRAN-ODT) disintegrating tablet 4 mg (has no administration in time range)     Or   ondansetron (ZOFRAN) injection 4 mg (has no administration in time range)   polyethylene glycol (GLYCOLAX) packet 17 g (has no administration in time range)   acetaminophen (TYLENOL) tablet 650 mg (has no administration in time range)     Or   acetaminophen (TYLENOL) suppository 650 mg (has no administration in time range)   famotidine (PEPCID) tablet 20 mg (20 mg Oral Given 2/6/23 2052)   cefepime (MAXIPIME) 2,000 mg in sodium chloride 0.9 % 50 mL IVPB ([id number]) (0 mg IntraVENous Stopped 2/6/23 2138)   albuterol (PROVENTIL) nebulizer solution 2.5 mg (has no administration in time range)   vancomycin (VANCOCIN) 1,250 mg in sodium chloride 0.9 % 250 mL IVPB (has no administration in time range)   ipratropium-albuterol (DUONEB) nebulizer solution 1 ampule (1 ampule Inhalation Given 2/6/23 0702)   0.9 % sodium chloride bolus (0 mLs IntraVENous Stopped 2/6/23 1433)   ondansetron (ZOFRAN) injection 4 mg (4 mg IntraVENous Given 2/6/23 0747)   cefepime (MAXIPIME) 2,000 mg in sodium chloride 0.9 % 50 mL IVPB ([id number]) (0 mg IntraVENous Stopped 2/6/23 1433)   iopamidol (ISOVUE-370) 76 % injection 60 mL (60 mLs IntraVENous Given 2/6/23 1224)   sodium chloride flush 0.9 % injection 10 mL (10 mLs IntraVENous Given 2/6/23 1225)   vancomycin 1500 mg in sodium chloride 0.9% 300 mL IVPB (0 mg IntraVENous Stopped 2/6/23 8935)         Is          Medical Decision Making/Differential Diagnosis:    CC/HPI Summary, Social Determinants of health, Records Reviewed, DDx, testing done/not done, ED Course, Reassessment, disposition considerations/shared decision making with patient, consults, disposition:        My independent interpretation of the EKG shows sinus tachycardia, tachycardic rate, normal conduction, normal axis, no acute ST segment elevations or depressions or acute ischemic changes. Stable from prior EKG. Medical Decision Making  Shortness of breath, differential includes but not limited to exacerbation of heart failure, COPD exacerbation, pneumothorax, pneumonia, flu or COVID, anemia, myocardial infarction. She arrived in distress. She was placed on BiPAP to prevent life-threatening deterioration. She improved on BiPAP and was breathing much better. Chest x-ray is interpreted by me on the wet read shows no pneumothorax. My independent interpretation of laboratory test that showed lactate 2.6, arterial blood gas with pH 7.39, PCO2 47, CBC with white count 20,000, hemoglobin normal, CMP with bicarbonate 34, glucose 126, troponin 20, , elevated D-dimer, flu and COVID-negative. CT angio of the chest ordered and interpreted by radiology, no pulmonary embolism but does show inflammatory changes. Resident ordered cultures and antimicrobials as well. By definition she does meet sepsis, she does however have depressed ejection fraction so while she did receive fluids we did this cautiously as to not precipitously fluid overload her. Antibiotics were started after blood cultures. Lactate and repeat lactate as well as reassessment.     Problems Addressed:  Acute respiratory failure, unspecified whether with hypoxia or hypercapnia ([facility]): acute illness or injury  COPD exacerbation ([facility]): acute illness or injury  Dyspnea and respiratory abnormalities: acute illness or injury  Septicemia [facility]): acute illness or injury    Amount and/or Complexity of Data Reviewed  Independent Historian: EMS  External Data Reviewed: notes. Labs: ordered. Decision-making details documented in ED Course. Radiology: ordered and independent interpretation performed. ECG/medicine tests: ordered and independent interpretation performed. Discussion of management or test interpretation with external provider(s): Family medicine    Risk  OTC drugs. Prescription drug management. Drug therapy requiring intensive monitoring for toxicity. Decision regarding hospitalization. CONSULTS:   [address] TO DOSE VANCOMYCIN  IP CONSULT TO PULMONOLOGY        PROCEDURES   Unless otherwise noted below, none       CRITICAL CARE TIME (.cct)     CRITICAL CARE:  Please note that the withdrawal or failure to initiate urgent interventions for this patient would likely result in a life threatening deterioration or permanent disability. Accordingly this patient received 30 minutes of critical care time, including coordination of care, and direct bedside care and excluding separately billable procedures. SEP-1 CORE MEASURE DATA      Sepsis Criteria   Severe Sepsis Criteria   Septic Shock Criteria     Must be confirmed or suspected to move forward with diagnosis of sepsis. Must meet 2:    [] Temperature > 100.9 F (38.3 C)        or < 96.8 F (36 C)  [x] HR > 90  [x] RR > 20  [x] WBC > 12 or < 4 or 10% bands    AND:    [] Infection Confirmed or        Suspected.     OR:    [] Exclude from SEP-1 because:    [] No infection present or suspected  [] Does not have 2+ SIRS criteria but may have an incidental infection that requires treatment  [] May have sepsis, but does not meet criteria for severe sepsis or septic shock  [] Alternative explanation for abnormal labs and/or vitals (see MDM)  [] Viral etiology found or highly suspected (including COVID-19) without concomitant bacterial infection   Must meet 1:    [x] Lactate > 2       or   [] Signs of Organ Dysfunction:    - SBP < 90 or MAP < 65  - Altered mental status  - Creatinine > 2 or increased from      baseline  - Urine Output < 0.5 ml/kg/hr  - Bilirubin > 2  - INR > 1.5 (not anticoagulated)  - Platelets < 658,372  - Acute Respiratory Failure as     evidenced by new need for NIPPV     or mechanical ventilation        [] No criteria met for Severe Sepsis. Must meet 1:    [] Lactate > 4        or   [] SBP < 90 or MAP < 65 for at        least two readings in the first        hour after fluid bolus        administration      [] Vasopressors initiated (if hypotension persists after fluid resuscitation)                [] No criteria met for Septic Shock. Patient Vitals for the past 6 hrs:   BP Temp Pulse Resp SpO2   02/06/23 1600 108/67 98.4 °F (36.9 °C) 99 20 97 %   02/06/23 1940 104/67 98.2 °F (36.8 °C) 88 18 94 %   02/06/23 2012 -- -- 84 18 97 %      Recent Labs     02/06/23  0659 02/06/23  1548   WBC 20.4*  --    LACTA  --  2.9*   CREATININE 0.4*  --    BILITOT 0.6  --      --          Sepsis Time Identified: 1350    Fluid Resuscitation Rational: less than 30mL/kg because CHF      Infection Source: Pulmonary - Nosocomial     Repeat lactate level: pending    Reassessment Exam:   I have reassessed tissue perfusion and hemodynamic status after fluid bolus at this time:            I am the Primary Clinician of Record. FINAL IMPRESSION      1. COPD exacerbation ([facility]    2. Dyspnea and respiratory abnormalities    3. Septicemia ([facility]    4. Acute respiratory failure, unspecified whether with hypoxia or hypercapnia ([facility]          DISPOSITION/PLAN     DISPOSITION Admitted 02/06/2023 09:34:47 AM      PATIENT REFERRED TO:  No follow-up provider specified.     DISCHARGE MEDICATIONS:  Current Discharge Medication List          DISCONTINUED MEDICATIONS:  Current Discharge Medication List                 (Please note that portions of this note were completed with a voice recognition program.  Efforts were made to edit the dictations but occasionally words are mis-transcribed.)    Inocencio Porter MD (electronically signed)           Ro Harmon MD  02/06/23 6553

## 2023-02-07 PROBLEM — R94.39 ABNORMAL NUCLEAR STRESS TEST: Status: ACTIVE | Noted: 2023-02-07

## 2023-02-07 LAB
ALBUMIN SERPL-MCNC: 2.1 G/DL (ref 3.5–5.2)
ALP BLD-CCNC: 121 U/L (ref 35–104)
ALT SERPL-CCNC: 33 U/L (ref 0–32)
ANION GAP SERPL CALCULATED.3IONS-SCNC: 3 MMOL/L (ref 7–16)
ANTI-NUCLEAR ANTIBODY (ANA): NEGATIVE
AST SERPL-CCNC: 40 U/L (ref 0–31)
BASOPHILS ABSOLUTE: 0.07 E9/L (ref 0–0.2)
BASOPHILS RELATIVE PERCENT: 0.7 % (ref 0–2)
BILIRUB SERPL-MCNC: 0.4 MG/DL (ref 0–1.2)
BUN BLDV-MCNC: 15 MG/DL (ref 6–23)
CALCIUM SERPL-MCNC: 8.2 MG/DL (ref 8.6–10.2)
CHLORIDE BLD-SCNC: 104 MMOL/L (ref 98–107)
CO2: 34 MMOL/L (ref 22–29)
CREAT SERPL-MCNC: 0.4 MG/DL (ref 0.5–1)
EKG ATRIAL RATE: 103 BPM
EKG P AXIS: 85 DEGREES
EKG P-R INTERVAL: 142 MS
EKG Q-T INTERVAL: 356 MS
EKG QRS DURATION: 74 MS
EKG QTC CALCULATION (BAZETT): 466 MS
EKG R AXIS: 80 DEGREES
EKG T AXIS: 82 DEGREES
EKG VENTRICULAR RATE: 103 BPM
EOSINOPHILS ABSOLUTE: 0.07 E9/L (ref 0.05–0.5)
EOSINOPHILS RELATIVE PERCENT: 0.7 % (ref 0–6)
GFR SERPL CREATININE-BSD FRML MDRD: >60 ML/MIN/1.73
GLUCOSE BLD-MCNC: 85 MG/DL (ref 74–99)
HCT VFR BLD CALC: 36.6 % (ref 34–48)
HEMOGLOBIN: 11.4 G/DL (ref 11.5–15.5)
IMMATURE GRANULOCYTES #: 0.04 E9/L
IMMATURE GRANULOCYTES %: 0.4 % (ref 0–5)
LYMPHOCYTES ABSOLUTE: 2.18 E9/L (ref 1.5–4)
LYMPHOCYTES RELATIVE PERCENT: 20.7 % (ref 20–42)
MAGNESIUM: 1.7 MG/DL (ref 1.6–2.6)
MCH RBC QN AUTO: 31.7 PG (ref 26–35)
MCHC RBC AUTO-ENTMCNC: 31.1 % (ref 32–34.5)
MCV RBC AUTO: 101.7 FL (ref 80–99.9)
MONOCYTES ABSOLUTE: 0.76 E9/L (ref 0.1–0.95)
MONOCYTES RELATIVE PERCENT: 7.2 % (ref 2–12)
NEUTROPHILS ABSOLUTE: 7.43 E9/L (ref 1.8–7.3)
NEUTROPHILS RELATIVE PERCENT: 70.3 % (ref 43–80)
PDW BLD-RTO: 15.9 FL (ref 11.5–15)
PLATELET # BLD: 182 E9/L (ref 130–450)
PMV BLD AUTO: 10.5 FL (ref 7–12)
POTASSIUM REFLEX MAGNESIUM: 3.5 MMOL/L (ref 3.5–5)
RBC # BLD: 3.6 E12/L (ref 3.5–5.5)
REASON FOR REJECTION: NORMAL
REJECTED TEST: NORMAL
RHEUMATOID FACTOR: <10 IU/ML (ref 0–13)
SODIUM BLD-SCNC: 141 MMOL/L (ref 132–146)
TOTAL PROTEIN: 5 G/DL (ref 6.4–8.3)
TROPONIN, HIGH SENSITIVITY: 22 NG/L (ref 0–9)
WBC # BLD: 10.6 E9/L (ref 4.5–11.5)

## 2023-02-07 PROCEDURE — 86431 RHEUMATOID FACTOR QUANT: CPT

## 2023-02-07 PROCEDURE — 94660 CPAP INITIATION&MGMT: CPT

## 2023-02-07 PROCEDURE — 99233 SBSQ HOSP IP/OBS HIGH 50: CPT | Performed by: INTERNAL MEDICINE

## 2023-02-07 PROCEDURE — 99254 IP/OBS CNSLTJ NEW/EST MOD 60: CPT | Performed by: INTERNAL MEDICINE

## 2023-02-07 PROCEDURE — 86481 TB AG RESPONSE T-CELL SUSP: CPT

## 2023-02-07 PROCEDURE — 2580000003 HC RX 258: Performed by: FAMILY MEDICINE

## 2023-02-07 PROCEDURE — APPSS60 APP SPLIT SHARED TIME 46-60 MINUTES: Performed by: NURSE PRACTITIONER

## 2023-02-07 PROCEDURE — 84484 ASSAY OF TROPONIN QUANT: CPT

## 2023-02-07 PROCEDURE — 2700000000 HC OXYGEN THERAPY PER DAY

## 2023-02-07 PROCEDURE — 93010 ELECTROCARDIOGRAM REPORT: CPT | Performed by: INTERNAL MEDICINE

## 2023-02-07 PROCEDURE — 94640 AIRWAY INHALATION TREATMENT: CPT

## 2023-02-07 PROCEDURE — 36415 COLL VENOUS BLD VENIPUNCTURE: CPT

## 2023-02-07 PROCEDURE — 6360000002 HC RX W HCPCS: Performed by: FAMILY MEDICINE

## 2023-02-07 PROCEDURE — 86038 ANTINUCLEAR ANTIBODIES: CPT

## 2023-02-07 PROCEDURE — 2060000000 HC ICU INTERMEDIATE R&B

## 2023-02-07 PROCEDURE — 80053 COMPREHEN METABOLIC PANEL: CPT

## 2023-02-07 PROCEDURE — 6370000000 HC RX 637 (ALT 250 FOR IP): Performed by: FAMILY MEDICINE

## 2023-02-07 PROCEDURE — 99232 SBSQ HOSP IP/OBS MODERATE 35: CPT | Performed by: FAMILY MEDICINE

## 2023-02-07 PROCEDURE — 82164 ANGIOTENSIN I ENZYME TEST: CPT

## 2023-02-07 PROCEDURE — 83735 ASSAY OF MAGNESIUM: CPT

## 2023-02-07 PROCEDURE — 85025 COMPLETE CBC W/AUTO DIFF WBC: CPT

## 2023-02-07 RX ADMIN — VANCOMYCIN HYDROCHLORIDE 1250 MG: 10 INJECTION, POWDER, LYOPHILIZED, FOR SOLUTION INTRAVENOUS at 22:56

## 2023-02-07 RX ADMIN — FUROSEMIDE 20 MG: 40 TABLET ORAL at 10:44

## 2023-02-07 RX ADMIN — IPRATROPIUM BROMIDE AND ALBUTEROL SULFATE 1 AMPULE: 2.5; .5 SOLUTION RESPIRATORY (INHALATION) at 01:00

## 2023-02-07 RX ADMIN — BUDESONIDE 500 MCG: 0.5 SUSPENSION RESPIRATORY (INHALATION) at 08:52

## 2023-02-07 RX ADMIN — POTASSIUM CHLORIDE 20 MEQ: 1500 TABLET, EXTENDED RELEASE ORAL at 10:44

## 2023-02-07 RX ADMIN — Medication 5 MG: at 21:06

## 2023-02-07 RX ADMIN — ARFORMOTEROL TARTRATE 15 MCG: 15 SOLUTION RESPIRATORY (INHALATION) at 08:51

## 2023-02-07 RX ADMIN — FLUTICASONE PROPIONATE 2 SPRAY: 50 SPRAY, METERED NASAL at 12:33

## 2023-02-07 RX ADMIN — VANCOMYCIN HYDROCHLORIDE 1250 MG: 10 INJECTION, POWDER, LYOPHILIZED, FOR SOLUTION INTRAVENOUS at 12:33

## 2023-02-07 RX ADMIN — FAMOTIDINE 20 MG: 20 TABLET, FILM COATED ORAL at 21:06

## 2023-02-07 RX ADMIN — Medication 50 MG: at 12:33

## 2023-02-07 RX ADMIN — BUDESONIDE 500 MCG: 0.5 SUSPENSION RESPIRATORY (INHALATION) at 20:49

## 2023-02-07 RX ADMIN — CEFEPIME 2000 MG: 2 INJECTION, POWDER, FOR SOLUTION INTRAVENOUS at 18:25

## 2023-02-07 RX ADMIN — FAMOTIDINE 20 MG: 20 TABLET, FILM COATED ORAL at 10:43

## 2023-02-07 RX ADMIN — ASPIRIN 81 MG CHEWABLE TABLET 81 MG: 81 TABLET CHEWABLE at 10:43

## 2023-02-07 RX ADMIN — IPRATROPIUM BROMIDE AND ALBUTEROL SULFATE 1 AMPULE: 2.5; .5 SOLUTION RESPIRATORY (INHALATION) at 08:52

## 2023-02-07 RX ADMIN — SODIUM CHLORIDE, PRESERVATIVE FREE 10 ML: 5 INJECTION INTRAVENOUS at 21:07

## 2023-02-07 RX ADMIN — SODIUM CHLORIDE, PRESERVATIVE FREE 10 ML: 5 INJECTION INTRAVENOUS at 10:45

## 2023-02-07 RX ADMIN — CEFEPIME 2000 MG: 2 INJECTION, POWDER, FOR SOLUTION INTRAVENOUS at 10:43

## 2023-02-07 RX ADMIN — DOCUSATE SODIUM 100 MG: 100 CAPSULE, LIQUID FILLED ORAL at 10:44

## 2023-02-07 RX ADMIN — IPRATROPIUM BROMIDE AND ALBUTEROL SULFATE 1 AMPULE: 2.5; .5 SOLUTION RESPIRATORY (INHALATION) at 12:24

## 2023-02-07 RX ADMIN — CEFEPIME 2000 MG: 2 INJECTION, POWDER, FOR SOLUTION INTRAVENOUS at 02:09

## 2023-02-07 RX ADMIN — IPRATROPIUM BROMIDE AND ALBUTEROL SULFATE 1 AMPULE: 2.5; .5 SOLUTION RESPIRATORY (INHALATION) at 20:49

## 2023-02-07 RX ADMIN — SERTRALINE HYDROCHLORIDE 25 MG: 50 TABLET ORAL at 10:44

## 2023-02-07 RX ADMIN — ARFORMOTEROL TARTRATE 15 MCG: 15 SOLUTION RESPIRATORY (INHALATION) at 20:49

## 2023-02-07 RX ADMIN — ATORVASTATIN CALCIUM 40 MG: 40 TABLET, FILM COATED ORAL at 21:06

## 2023-02-07 RX ADMIN — METOPROLOL SUCCINATE 12.5 MG: 25 TABLET, EXTENDED RELEASE ORAL at 21:06

## 2023-02-07 RX ADMIN — Medication 2000 UNITS: at 10:44

## 2023-02-07 RX ADMIN — ENOXAPARIN SODIUM 40 MG: 100 INJECTION SUBCUTANEOUS at 10:43

## 2023-02-07 RX ADMIN — Medication 1000 MG: at 10:43

## 2023-02-07 ASSESSMENT — PAIN SCALES - GENERAL
PAINLEVEL_OUTOF10: 0

## 2023-02-07 NOTE — CONSULTS
CHF NURSE NAVIGATOR CONSULT NOTE:      Patient currently admitted with diagnosis of Diastolic heart failure. Patient was alert and oriented during the consultation. She was engaged and asked appropriate questions throughout the education session. She is agreeable to self monitoring, medication compliance, outpatient follow up, and following a low sodium diet. She states that she was feeling fine and then she was given a med pass at 4:30 AM at [facility] on 2/6 and she felt unwell after that and experienced difficulty breathing and asked to come to the ED. Scheduling with the CHF clinic and cardiology APRN Yes. Future Appointments   Date Time Provider Lupe Arnett   2/24/2023 11:00 AM SKYE Lee - CNP [facility] CHF Nadia Harmon       Barriers to Care:  Contributing risk factors for Heart Failure are identified as establishing stability with chronic disease management while residing in a facility before returning home. The patient is ordered:  Diet: ADULT DIET; Regular; Low Sodium (2 gm)   Sodium controlled diet Yes  Fluid restriction daily ordered (fluid restriction recommended if patient is hyponatremic and/or diuretic is initiated or increased) No  FR:   Daily Weights: Patient Vitals for the past 96 hrs (Last 3 readings):   Weight   02/06/23 0655 130 lb (59 kg)     I/O:   Intake/Output Summary (Last 24 hours) at 2/7/2023 0959  Last data filed at 2/7/2023 0954  Gross per 24 hour   Intake 550 ml   Output 200 ml   Net 350 ml              We reviewed the introduction to Heart Failure, the HF zones, signs and symptoms to report on day 1 of onset, medications, medication compliance, the importance of obtaining daily weights, following a low sodium diet, reading food labels for the sodium content, keeping physician appointments, and smoking cessation. We discussed writing / tracking daily weights on a calendar / log because a 5 pound gain in 1 week can sneak up if you are not tracking it.           I advised patient they can reduce the risk for Heart Failure exacerbations by modifying / controlling the risk factors. We discussed self-managed care which includes the following:  to take medications as prescribed, report any intolerable side effects of medications to the cardiologist / doctor, do not just stop taking the medication; follow a cardiac heart healthy / low sodium diet; weigh yourself daily, exercise regularly- per doctor recommendation and not to smoke or use an excess amount of alcohol. We discussed calling the cardiologist / doctor with a weight gain of 3 pounds in one day or a total of 5 pounds or more in one week. Also, if you should have a significant weight loss of 3# or more in one day to call the doctor, they may need to decrease or hold the diuretic dose. On days you feels nauseated and not eating / drinking, having emesis or diarrhea,  informed to call the cardiologist  / doctor, they may need to decrease or hold diuretic to avoid dehydration. I stressed the importance of informing their cardiologist the first day of onset of any of the signs and symptoms in the \"Yellow Zone\" of the HF Zones. Patient verbalizes understanding. Greater than 30 minutes was spent educating patient. The Heart Failure Booklet given to the patient with additional patient education addressing:  What is Heart Failure? Things You Can Do to Live Well with HF  How to Take Your Medications  How to Eat Less Salt  Garden its Salt?   Exercising Well with Heart Failure  Signs and symptoms of HF to report  Weight Yourself Each Day  Home Self Management- activity, weight tracking, taking medications as prescribed, meals /diet planning (sodium and fluid restriction), how to read food labels, keeping physician follow ups, smoking cessation, follow the Heart Failure Zones  The Heart Failure zones  Every Dose Every Day      Instructed  to call 911 if you have any of the following symptoms:       Struggling to breathe unrelieved with rest,     Having chest pain     Have confusion or cant think clearly          Chato Juarez RN BSN, RN  Heart Failure [facility],[address] [facility] (CHF) AHA GUIDELINES  (Must be completed for Primary Diagnosis CHF or History of CHF)    Discharge Plan:  I placed the Heart Failure Home Instructions in patient's discharge instructions. Per Heart Failure GWTG, the patient should have a follow-up appointment made within 7 days of discharge.     New Diagnosis No    ECHO Results most recent:  Lab Results   Component Value Date    LVEF 48 2023                                        Social History     Tobacco Use   Smoking Status Former    Packs/day: 0.50    Years: 41.00    Pack years: 20.50    Types: Cigarettes    Quit date: 2021    Years since quittin.7   Smokeless Tobacco Never        Immunization History   Administered Date(s) Administered    COVID-19, MODERNA BLUE border, Primary or Immunocompromised, (age 12y+), IM, 100 mcg/0.5mL 2021, 2021    Influenza, FLUARIX, FLULAVAL, FLUZONE (age 10 mo+) AND AFLURIA, (age 1 y+), PF, 0.5mL 2018, 10/30/2019    Pneumococcal Polysaccharide ([id number]) 2018    Td, unspecified formulation 2003    Tdap (Boostrix, Adacel) 2019          Angiotensin-Converting-Enzyme (ACE) inhibitor ordered:  [] Yes  [x] No (specify contraindication):    [] Contraindicated  [] Hypotensive patient who was at immediate risk of cardiogenic shock  [] Hospitalized patient who experienced marked azotemia  [] Other Contraindications  [] Not Eligible  [] Not Tolerant  [] Patient Reason  [] System Reason  [] Other Reason    Angiotensin II receptor blockers (ARB) ordered:  [] Yes  [x] No (specify contraindication):    [] Contraindicated  [] Hypotensive patient who was at immediate risk of cardiogenic shock  [] Hospitalized patient who experienced marked azotemia  [] Other Contraindications    ARNI - Angiotensin Receptor Neprilysin Inhibitor ordered:  [] Yes  [x] No (specify contraindication):    [] ACE inhibitor use within the prior 36 hours  [] Allergy  [] Hyperkalemia  [] Hypotension  [] Renal dysfunction defined as creatinine > 2.5 mg/dL in men or > 2.0 mg/dL in women  [] Other Contraindications  [] Not Eligible  [] Not Tolerant  [] Patient Reason  []System Reason  []Other Reason      Beta Blocker ordered:    [x] Yes Toprol XL  [] No (specify contraindication):    [] Contraindicated  [] Asthma  [] Fluid Overload  [] Low Blood Pressure  [] Patient recently treated with an intravenous positive inotropic agent  [] Other Contraindications  [] Not Eligible  [] Not Tolerant  [] Patient Reason  [] System Reason    SGLT2 Inhibitor ordered:  [] Yes  [x] No (specify contraindication):    [] Contraindicated  [] Patient currently on dialysis  [] Ketoacidosis  [] Known hypersensitivity to the medication  [] Type I diabetes (not approved for use in patients with Type I diabetes due to increased risk of ketoacidosis)  [] Other Contraindications  [] Not Eligible  [] Not Tolerant  [] Patient Reason  [] System Reason  [] Other Reason    Aldosterone Antagonist ordered:  [] Yes  [x] No (specify contraindication):    [] Contraindicated  [] Allergy due to aldosterone receptor antagonist  [] Hyperkalemia  [] Renal dysfunction defined as creatinine >2.5 mg/dL in men or >2.0 mg/dL in women.   [] Other contraindications  [] Not Eligible  [] Not Tolerant  [] Patient Reason  [] System Reason  [] Other Reason

## 2023-02-07 NOTE — PROGRESS NOTES
[address] [place] [address] Attending    S: 58 y.o. female with a 4 hour history of acute shortness of breath. She has a known history of significant COPD, as well as chronic diastolic heart failure. She had a provoked DVT in 2018. She has a long smoking history, but quit in 2019. She was in the hospital at [address] [place] [address],[address] in December for [facility] as well as with a colon abscess after a perforated diverticuli. She had a drain in place for a few days, and received antibiotics. She was moved to [place] [address] for rehab following her hospitalization. She notes that she was hoping to go home next week. She cannot recall any other symptoms in the few days prior to this SOB. She is chronically on 4 L of oxygen via NC. She was actually placed on bipap once ABGs showed hypercapnea. She  denies chest pain. She notes that her left foot is mildly edematous compared to the right. She had an ultrasound on 1/6/23 that did not show any blood clots per the patient. Today, patient is breathing much easier. She is tolerating the antibiotics well. She is on 10 liters of high flow oxygen when we rounded. Patient states that she has 3 dogs. No other animals. She has not had exposure recently to chemicals or molds. No recent travel. She has had all vaccinations up until this year. She cannot recall receiving the flu or pneumonia vaccine this year. O: VS- Blood pressure 103/66, pulse 80, temperature 97.5 °F (36.4 °C), temperature source Temporal, resp. rate 18, height 5' 3\" (1.6 m), weight 130 lb (59 kg), SpO2 99 %, not currently breastfeeding. Exam is as noted by resident with the following changes, additions or corrections:  Gen:  AAOx3  CVS:  regular rate, regular rhythm  Lungs:  improved air movement  Ext:  there is 1+ edema on the dorsal aspect of the left foot; normal right foot and no other edema noted.       Pro   WBC 20.4  Ddimer 1784  Flu and covid negative  CTA chest pending    Impressions:  Acute on Chronic Respiratory Failure with Hypoxia and Hypercapnea  COPD exacerbation  Sepsis (HCC)-elevated WBC, tachycardia, tachypnea, hypotension  Transaminitis-hx of positive Hep A and Hep C antibodies in 9/2022      Plan:     CTA-no PE; progressive fibroretraction/multi segmental atelectasis of both upper lobes/right middle lobe/lingula with confluent parenchymal opacities that can represent areas of organized pneumonia; granulomatous process and atypical infectious process. Started Abx  Pulm consulted  Check Hep C viral load  Nebs  Cardio consulted-consider cardiac cath when pneumonia resolves; repeat troponin      Dispo:  per  note, [facility] will not take pt back due to outstanding balance and pt will not file for medicaid. Attending Physician Statement  I have reviewed the chart and seen the patient with the resident(s). I personally reviewed images, EKG's and similar tests, if present. I personally reviewed and performed key elements of the history and exam.  I have reviewed and confirmed student and/or resident history and exam with changes as indicated above. I agree with the assessment, plan and orders as documented by the resident. Please refer to the resident progress note today for additional information.       Ledy Gonzalez MD

## 2023-02-07 NOTE — CARE COORDINATION
[facility] and she said she thought she was to be discharged this week  home. I left a  for the admissions rep to call me back. Pt has [employer] and will need precert to return. Will wait for PT and OT to see pt to determine if able to go home with hhc. Pt said she lives with her daughter who works not even 1 day a week at times and when she is not there her boyfriend of the daughter is there to help. Pt lives in a 2 story home with tub shower unit and 2 small steps to enter. Pt has a fww, w/c 3l o2 with portables, nebulizer and 3:1 commode. If goes home will need hhc. Provided a hhc list for pt. Her pcp is dr. Donnie Blake. But she hasnt seen him in about 9 months. Pt would like to go home will assess and call daughter for direction. [facility] will not [facility] pt back due to outstanding balance and pt will not file for medicaid. Case Management Assessment  Initial Evaluation    Date/Time of Evaluation: 2/7/2023 1:03 PM  Assessment Completed by: EPI Stark    If patient is discharged prior to next notation, then this note serves as note for discharge by case management. Patient Name: Calderon Caballero                   YOB: 1960  Diagnosis: Septicemia ([facility] [A41.9]  Dyspnea and respiratory abnormalities [R06.00, R06.89]  COPD exacerbation ([facility] [J44.1]  Sepsis ([facility] [A41.9]                   Date / Time: 2/6/2023  6:39 AM    Patient Admission Status: Inpatient   Readmission Risk (Low < 19, Mod (19-27), High > 27): Readmission Risk Score: 33.5    Current PCP: Jeb La MD  PCP verified by ? Yes    Chart Reviewed: Yes      History Provided by: Patient  Patient Orientation: Alert and Oriented    Patient Cognition: Alert    Hospitalization in the last 30 days (Readmission):  Yes    If yes, Readmission Assessment in  Navigator will be completed.     Advance Directives:      Code Status: Full Code   Patient's Primary Decision Maker is:      Primary Decision Maker: Rojelio Moore Child - [phone]    Secondary Decision Maker: shannan basilio - Child - [phone]    Discharge Planning:    Patient lives with: Alone Type of Home: House  Primary Care Giver: Self  Patient Support Systems include: Children   Current Financial resources:    Current community resources:    Current services prior to admission: None            Current DME:              Type of Home Care services:  None    ADLS  Prior functional level: Bathing, Dressing, Toileting, Cooking, Housework, Shopping, Mobility  Current functional level: Assistance with the following:, Bathing, Dressing, Toileting, Cooking, Housework, Shopping, Mobility    PT AM-PAC:   /24  OT AM-PAC:   /24    Family can provide assistance at DC: Yes  Would you like Case Management to discuss the discharge plan with any other family members/significant others, and if so, who? Yes  Plans to Return to Present Housing: Unknown at present  Other Identified Issues/Barriers to RETURNING to current housing:    To be determined for return to [facility]   Potential Assistance needed at discharge: N/A            Potential DME:    Patient expects to discharge to: [address] [place] [address] for transportation at discharge:      Financial    Payor: [employer] EFRAIN Torres. / Plan: [id number] EFRAIN Torres. NAP CHOICE POS II / Product Type: *No Product type* /     Does insurance require precert for SNF: Yes    Potential assistance Purchasing Medications: No  Meds-to-Beds request: Yes      [facility] [address], [address] [phone]  [address] [address]  Phone: [phone] Fax: [phone]    [address] [place] [address] [phone] - F [phone]  [place] [address]  Phone: [phone] Fax: [phone]    [facility]pharmacy #[id number]- [facility] [phone] Chely Bain [phone]  [address] [facility] [address]  Phone: [phone] Fax: [phone]      Notes:    Factors facilitating achievement of predicted outcomes: Family support, Motivated, Cooperative, and Pleasant    Barriers to discharge: to be determined      Additional Case Management Notes: see below     The Plan for Transition of Care is related to the following treatment goals of Septicemia ([facility] [A41.9]  Dyspnea and respiratory abnormalities [R06.00, R06.89]  COPD exacerbation ([facility] [J44.1]  Sepsis ([facility] [O07.7]    IF APPLICABLE: The Patient and/or patient representative Jose Farrar and her family were provided with a choice of provider and agrees with the discharge plan. Freedom of choice list with basic dialogue that supports the patient's individualized plan of care/goals and shares the quality data associated with the providers was provided to:     Patient Representative Name:       The Patient and/or Patient Representative Agree with the Discharge Plan?       Ubaldo Leon [facility]  Case Management Department  Ph: [phone] Fax: [phone]

## 2023-02-07 NOTE — PLAN OF CARE
Patient's chart updated to reflect:      . - HF care plan, HF education points and HF discharge instructions.  -Orders: 2 gram sodium diet, daily weights, I/O.  -PCP and cardiology follow up appointments to be scheduled within 7 days of hospital discharge. -CHF education session will be provided to the patient prior to hospital discharge.     Muna Eduardo RN RN, BSN  Heart Failure Navigator

## 2023-02-07 NOTE — PROGRESS NOTES
Pharmacy Consultation Note  (Antibiotic Dosing and Monitoring)    Initial consult date: 2/6/23  Consulting physician/provider: 2/6/23  Drug: Vancomycin  Indication: CAP; 7 days     Age/  Gender Height Weight IBW  Allergy Information   62 y.o./female 5' 3\" (160 cm) 130 lb (59 kg)     Ideal body weight: 52.4 kg (115 lb 8.3 oz)  Adjusted ideal body weight: 55 kg (121 lb 5 oz)   Penicillin v      Renal Function:  Recent Labs     02/06/23  0659 02/07/23  0618   BUN 13 15   CREATININE 0.4* 0.4*         Intake/Output Summary (Last 24 hours) at 2/7/2023 0836  Last data filed at 2/6/2023 1433  Gross per 24 hour   Intake 550 ml   Output --   Net 550 ml       Vancomycin Monitoring:  Trough:  No results for input(s): VANCOTROUGH in the last 72 hours. Random:  No results for input(s): VANCORANDOM in the last 72 hours. No results for input(s): Leticia Mustard in the last 72 hours. Historical Cultures:  Organism   Date Value Ref Range Status   12/19/2022 Streptococcus gordonii (A)  Final     No results for input(s): BC in the last 72 hours. Vancomycin Administration Times:    Recent vancomycin administrations                     vancomycin (VANCOCIN) 1,250 mg in sodium chloride 0.9 % 250 mL IVPB (mg) 1,250 mg New Bag 02/06/23 2348    vancomycin 1500 mg in sodium chloride 0.9% 300 mL IVPB (mg) 1,500 mg New Bag 02/06/23 1609          Assessment:  Patient is a 58 y.o. female who has been initiated on vancomycin  Estimated Creatinine Clearance: 121 mL/min (A) (based on SCr of 0.4 mg/dL (L)).   To dose vancomycin, pharmacy will be utilizing [employer] calculation software for goal AUC/RAINE 400-600 mg/L-hr  Vancomycin 1250 mg q12h (Predicted AUC/RAINE = 507, Tr = 13.7)  Random level 2/8 with morning labs      Plan:  Continue Vancomycin 1250 q12h  Will check vancomycin levels when appropriate  Will continue to monitor renal function   Pharmacy to follow      Thank you for this consult,    Doug Ca, PharmD 2/7/2023 8:42 AM

## 2023-02-07 NOTE — PROGRESS NOTES
Physical Therapy  Physical Therapy Missed Visit    Name: Kylie Manning  : 1960  MRN: [id number]  Room #: 6315/0956-P    Date of Service: 2023    Attempted PT evaluation. Pt declined,stating she was too fatigued to participate at this time. Will attempt again later as schedule allows.     Sheba Desai, PT, DPT  [id number]

## 2023-02-07 NOTE — DISCHARGE INSTRUCTIONS
HEART FAILURE  / CONGESTIVE HEART FAILURE  DISCHARGE INSTRUCTIONS:  GUIDELINES TO FOLLOW AT HOME    Self- Managed Care:     MEDICATIONS:  Take your medication as directed. If you are experiencing any side effects, inform your doctor, Do not stop taking any of your medications without letting your doctor know. Check with your doctor before taking any over-the-counter medications / herbal / or dietary supplements. They may interfere with your other medications. Do not take ibuprofen (Advil or Motrin) and naproxen (Aleve) without talking to your doctor first. They could make your heart failure worse. WEIGHT MONITORING:   Weigh yourself everyday (with the same scale) around the same time of the day and write it down. (you can chart them on a calendar or keep track of them on paper. Notify your doctor of a weight gain of 3 pounds or more in 1 day   OR a total of 5 pounds or more in 1 week    Take your weight record to your doctor visits  Also, the same goes if you loose more than 3# in one day, let your heart doctor know. DIET:   Cardiac heart healthy diet- Low saturated / low trans fat, no added salt, caffeine restricted, Low sodium diet-   No more than 2,000mg (2 grams) of salt / sodium per day (which equals to a little less than  a teaspoon of salt)  If your doctor wants you on a fluid restriction. ..it is usually recommended a fluid limit of 2,000cc -  Fluid restriction- 2,000 ml (milliliters) = 64 ounces = you can have 8 glasses of fluid per day (each glass 8 ounces)    Follow a low salt diet - avoid using salt at the table, avoid / limit use of canned soups, processed / packaged foods, salted snacks, olives and pickles. Do not use a salt substitute without checking with your doctor, they may contain a high amount of potassioum. (Mrs. Johnnie Davenport is safe to use).     Limit the use of alcohol       CALL YOUR DOCTOR THE FIRST DAY YOU NOTICE ANY OF THESE   SYMPTOMS:  You have a weight gain of 3 pounds or more in 1 day         OR 5 pounds or more in one week  More shortness of breath  More swelling of your stomach, legs, ankles or feet  Feeling more tired, No energy  Dry hacky cough  Dizziness  More chest pain / discomfort       (CALL 911 IF ANY OF THE FOLLOWING OCCURS  Chest pain (not relieved with nitroglycerine, if you have been prescribed this medication)  Severe shortness of breath  Faint / Pass out  Confusion / cannot think clearly  If symptoms get worse           SMOKING - TOBACCO USE:  * IF YOU SMOKE - STOP! Kick the habit. [address] Program is offered at [facility] and [address] [facility]. Call [phone] extension [phone] for more information. ACTIVITY:   (Ask your doctor when you will be able to return to work and before starting any exercise program.  Do not drive unless unless your doctor has given you permission to do so). Start light exercise. Even if you can only do a small amount, exercise will help you get stronger, have more energy, help manage your weight and decrease  stress. Walking is an easy way to get exercise. Start out slowly and  increase the amount you walk as tolerated  If you become short of breath, dizzy or have chest pain; stop, sit down, and rest.  If you feel \"wiped out\" the day after you exercise, walk at a slower pace or for a shorter distance. You can gradually increase the pace or amount of time. (Do not exercise right after a meal or in extreme temperatures, such as above 85 degrees, if the air is really humid, or wind chill is less than 20 degrees)                                             ADDITIONAL INFORMATION:  Avoid getting sick from colds and the flu. Stay away from friends or family that you know may have a contagious illness  Get plenty of rest   Get a flu shot each year. Get a pneumococcal vaccine shot.  If you have had one before, ask your doctor whether you need another dose. My Goal for Self-management of Heart Failure Includes 5 steps :    1. Notice a change in symptoms ( weight gain, short of breath, leg swelling, decreased activity level, bloating. ...)    2. Evaluate the change: (use the Heart Failure Zones )     3. Decide to take action: decide what your options are, such as: (call your doctor for an extra visit, take a prescribed medication, such as your water pill if your doctor has given you directions to do so, [address])    4. Come up with a strategy:  (now you call the doctor for advice / appointment. This is where you take action!!! Do not wait, catch the symptom early and treat it before it worsens. 5. Evaluate the response: The next day, check your Heart Failure Zones: are you in the GREEN ZONE (safe zone)? Worsening symptoms of YELLOW ZONE? Or have you moved to the RED ZONE and need to call 911 or go to the Emergency Room for evaluation? Call your doctor's office to update them on your symptoms of heart failure. DISCHARGE INSTRUCTIONS - FAMILY MEDICINE    Follow up at the [facility]) on 2/14/2023 at 3:30pm. If there are any issues and cannot present to your appointment, please contact us at [phone] and we will schedule a new appointment for you. Future Appointments   Date Time Provider Department Center   2/14/2023  3:30 PM [address] [address] [facility] MD Yumi Pastor [facility] AND WOMEN[facility]   2/17/2023  9:30 AM [facility] CHF ROOM 1 [facility]   2/24/2023 11:00 AM SKYE Headley CNP [facility].:  [address], ' [facility], [facility]         Phone: [phone]    Once discharged from [facility] [address] , you can :    Return to work : Yes, you may return to work  Activity : As tolerated  Stairs : As tolerated  Exercise : As tolerated  Lifting : As tolerated   Sexual activity : Yes  Driving : With seat belt on.  NO driving on narcotic pain medication if prescribed   Medications : Always take your medications as prescribed  Wound Care: none needed  Diet : You are asked to make an attempt to improve diet and exercise patterns to aid in medical management of your medical condition/problem. Call [facility] with any further questions. Return to Emergency Department with any worsening of your condition and/or fever greater than 101 degrees, new weakness, shortness of breath or chest pain.  ______________________________________________________________________    =====================================   [facility], [address] [place] [address]   =====================================   Take your medications as directed in this summary     Follow up with all your future appointments as advised   Call [phone] to confirm your appointment with [facility]) as soon as possible and/or if there are any appointment changes or other issues. It is important that you follow up with us for better monitoring of the current cause of your hospitalization. Follow up as well with the specialists that saw you during your stay. If you have any questions call us [phone].

## 2023-02-07 NOTE — PROGRESS NOTES
[facility] Inpatient   Resident Progress Note    S:  Hospital day: 1    Brief Synopsis: Carolin Alexander is a 58 y.o. female with a PMH of diastolic CHF,  Patient presented from [place] [address] due to increasing shortness of breath. Per EMS she needed 500 cc bolus for hypotension. Was placed on BiPAP due to hypoxia in ED. BP 74/44 on admission, improved to 109/65/ She was placed on BiPAP due to SpO2 <90% without it. She was tachypneic, tachycardic and normotensive on exam. Her WBC was 20.4 from a previous 9.4 recorded in Jan 2023. Lactic acid 2.6 on presentation. Flu and COVID test negative, CXR showed no acute processes. , D-dimer 1784 and CTA is pending. EKG showed sinus tachycardia. Admitted for COPD exacerbation. Patient started on breathing treatments, steroid burst and consulted pulmonology. CTA showed progressive fiber retraction/multisegmental atelectasis. Compared to previous CTs, he has significantly worsened in the last 6 months. Connective tissue, granulomatous diseases screening tests ordered. No acute events overnight. Patient was seen and examined this morning. Clinically improved significantly. On HFNC, has some bleeding around the nose. Still dyspneic when turning in bed.     Cont meds:    sodium chloride       Scheduled meds:    ascorbic acid  1,000 mg Oral Daily    arformoterol tartrate  15 mcg Nebulization BID    budesonide  0.5 mg Nebulization BID    aspirin  81 mg Oral Daily    atorvastatin  40 mg Oral Nightly    docusate sodium  100 mg Oral Daily    fluticasone  2 spray Each Nostril Daily    furosemide  20 mg Oral Daily    ipratropium-albuterol  1 ampule Inhalation Q6H    melatonin  5 mg Oral Nightly    metoprolol succinate  12.5 mg Oral Nightly    potassium chloride  20 mEq Oral Daily    sertraline  25 mg Oral Daily    vitamin D  2,000 Units Oral Daily    zinc sulfate  50 mg Oral Daily    sodium chloride flush  5-40 mL IntraVENous 2 times per day    enoxaparin 40 mg SubCUTAneous Daily    famotidine  20 mg Oral BID    cefepime  2,000 mg IntraVENous Q8H    vancomycin  1,250 mg IntraVENous Q12H     PRN meds: magnesium hydroxide, sodium chloride flush, sodium chloride, ondansetron **OR** ondansetron, polyethylene glycol, acetaminophen **OR** acetaminophen, albuterol     I reviewed the patient's Past Medical and Surgical History, Medications and Allergies. O:  VS: /66   Pulse 81   Temp 98.2 °F (36.8 °C) (Temporal)   Resp 15   Ht 5' 3\" (1.6 m)   Wt 130 lb (59 kg)   SpO2 99%   BMI 23.03 kg/m²     Physical Exam:  Physical Exam  Vitals reviewed. Constitutional:       General: She is not in acute distress. Eyes:      Extraocular Movements: Extraocular movements intact. Cardiovascular:      Rate and Rhythm: Normal rate and regular rhythm. Heart sounds: No murmur heard. No gallop. Pulmonary:      Effort: No respiratory distress. Comments: On 15 L high flow nasal cannula  Abdominal:      General: Abdomen is flat. Palpations: Abdomen is soft. Musculoskeletal:      Right lower leg: No edema. Left lower leg: Edema (+1 on dorsal aspect) present. Skin:     General: Skin is warm and dry. Neurological:      General: No focal deficit present. Mental Status: She is alert and oriented to person, place, and time. Psychiatric:         Mood and Affect: Mood normal.          Labs:  Na/K/Cl/CO2:  141/3.5/104/34 (02/07 6394)  BUN/Cr/glu/ALT/AST/amyl/lip:  15/0.4/--/33/40/--/-- (02/07 0661)  WBC/Hgb/Hct/Plts:  10.6/11.4/36.6/182 (02/07 0729)  estimated creatinine clearance is 121 mL/min (A) (based on SCr of 0.4 mg/dL (L)). Other pertinent labs as noted below    New Imaging:  CTA CHEST W CONTRAST   Final Result   1. No acute pulmonary emboli. 2.  No aneurysm formation or dissection of the thoracic aorta.       3.  Progressive fibroretraction/multi segmental atelectasis of both upper   lobes/right middle lobe/lingula with confluent parenchymal opacities that can   represent areas of organized pneumonia. 4.  Progressive ongoing chronic inflammatory including granulomatous and   atypical infectious process of the lung parenchyma consider in the   differential diagnosis. Further consultation/investigation with pulmonary   medicine recommended. 5.  Liver lesions seen in the upper abdomen partially covered on this   examination, see report MRI of the abdomen of December 29, 2022. XR CHEST PORTABLE   Final Result   1. COPD. There is no evidence of failure or pneumonia   2. Significant pleuroparenchymal scarring seen within the right and left lung   apex. A/P:  Principal Problem:    Sepsis ([address]  Active Problems:    Pulmonary fibrosis (HCC)    Hypoxemia    Heart failure ([address]  Resolved Problems:    * No resolved hospital problems. *      Acute Hypoxic Respiratory Failure w hypercapnia  -2/2 COPD exacerbation vs CHF vs sepsis of unknown origin  -Has end stage COPD, candidate for lung tx previously discussed w [facility] 8/2021, previous A1AT levels below normal range  -Recently discharged for acute on chronic diastolic HF, proBNP 504  -WBC on presentation 20k, leukocytosis resolved. Procal 0.06  -CXR negative for acute processes  -CTA shows progressive fibroretraction/multi segmental atelactasis.  Significantly worsened in last 6 months.  -Cultures pending  -DuoNeb, Brovana and Pulmicort  -Day 2 of Vanc and Cefepime for 7 days  -Aggressive pulmonary hygiene  -Pulmonology consult; screening labs for connective tissue, granulomatous diseases pending.  -Nasal spray     Possible sepsis  -Tachypneic, tachycardic and hypoxic with RR in the high 20s, HR in the 100s, and on BiPAP  -Afebrile since admission  -CXR negative for acute processes  -Leukocytosis resolved  -blood cultures, urine culture, UA     Hx of abnormal stress test  -Stress test 1/6/2023 was abnormal, consistent with ischemia of LAD, to be followed up outpatient  -ECHO 1/4/2023 showed depressed systolic fxn and EF 00-75%  -EKG in ED today showed sinus tachycardia  -Troponin 20     Hx of Liver cyst  -positive for Hep A and Hep C antibodies in 9/22  -Hospitalized for transaminitis with hyperbilirubinemia, multiple liver cysts, abnormal GB appearance with negative US.   -Negative MRCP for biliary obstruction at that time  -AST and ALT slightly elevated  -Hep C viral load pending    DVT Prophylaxis: Lovenox  GI Prophylaxis: Pepcid  Diet: Regular  PT/OT on board      Electronically signed by Bijan Pastrana MD on 2/7/2023 at 11:37 AM  This case was discussed with attending physician Dr. Marcia Villarreal

## 2023-02-07 NOTE — PROGRESS NOTES
OT SESSION ATTEMPT     Date:2023  Patient Name: Estela Castro  MRN: [id number]  : 1960  Room: [address] [place] [address]     Occupational therapy orders received/chart review completed and OT session attempted this date. Pt politely declined Occupational Therapy this date due to overall fatigue and feeling \"wiped out\" from yesterday. Benefits of participation in therapy and purpose during hospital admission reviewed with pt. Pt requested therapist to return tomorrow. Will reattempt OT eval at a later time/date.     Iqra Bradley, [address]ton OTR/L #[id number]

## 2023-02-07 NOTE — DISCHARGE INSTR - COC
Continuity of Care Form    Patient Name: Ayush Campbell   :  1960  MRN:  [id number]    Admit date:  2023  Discharge date:  ***    Code Status Order: Full Code   Advance Directives:     Admitting Physician:  Toni Habermann, MD  PCP: Lyubov Crews MD    Discharging Nurse: [facility] Unit/Room#: 3499/8872-Z  Discharging Unit Phone Number: ***    Emergency Contact:   Extended Emergency Contact Information  Primary Emergency Contact: ROGERIO Bland [facility]  Mobile Phone: [phone]  Relation: Child   needed? No  Secondary Emergency Contact: shannan basilio  Mobile Phone: [phone]  Relation: Child  Preferred language: English   needed?  No    Past Surgical History:  Past Surgical History:   Procedure Laterality Date    CARDIOVASCULAR STRESS TEST N/A 2023    lexiscan stress test       Immunization History:   Immunization History   Administered Date(s) Administered    COVID-19, MODERNA BLUE border, Primary or Immunocompromised, (age 12y+), IM, 100 mcg/0.5mL 2021, 2021    Influenza, FLUARIX, FLULAVAL, FLUZONE (age 10 mo+) AND AFLURIA, (age 1 y+), PF, 0.5mL 2018, 10/30/2019    Pneumococcal Polysaccharide ([id number]) 2018    Td, unspecified formulation 2003    Tdap (Boostrix, Adacel) 2019       Active Problems:  Patient Active Problem List   Diagnosis Code    Tobacco use disorder F17.200    Seasonal allergic rhinitis J30.2    Chronic bronchitis (HCC) J42    Chronic respiratory failure with hypoxia (HCC) J96.11    Chronic obstructive pulmonary disease (HCC) J44.9    Acute respiratory failure with hypercapnia (HCC) J96.02    Acute on chronic respiratory failure with hypoxia and hypercapnia (HCC) J96.21, J96.22    COPD exacerbation (HCC) J44.1    Acute diverticulitis with abscess K57.20    Acute cholecystitis K81.0    Acute on chronic diastolic CHF (congestive heart failure) ([facility] I50.33    Sepsis ([facility] A41.9    Pulmonary fibrosis ([facility] J84.10    Hypoxemia R09.02    Heart failure ([facility] I50.9       Isolation/Infection:   Isolation            No Isolation          Patient Infection Status       Infection Onset Added Last Indicated Last Indicated By Review Planned Expiration Resolved Resolved By    None active    Resolved    COVID-19 (Rule Out) 23 Respiratory Panel, Molecular, with COVID-19 (Restricted: peds pts or suitable admitted adults) (Ordered)   23 Rule-Out Test Resulted    COVID-19 (Rule Out) 23 Respiratory Panel, Molecular, with COVID-19 (Restricted: peds pts or suitable admitted adults) (Ordered)   23 Rule-Out Test Resulted    COVID-19 22 Respiratory Panel, Molecular, with COVID-19 (Restricted: peds pts or suitable admitted adults)   23     COVID-19 (Rule Out) 22 Respiratory Panel, Molecular, with COVID-19 (Restricted: peds pts or suitable admitted adults) (Ordered)   22 Rule-Out Test Resulted    Hepatitis A 22 Hepatitis Panel, Acute   23     COVID-19 (Rule Out) 22 Respiratory Panel, Molecular, with COVID-19 (Restricted: peds pts or suitable admitted adults) (Ordered)   22 Rule-Out Test Resulted    COVID-19 (Rule Out) 22 COVID-19, Rapid (Ordered)   22 Rule-Out Test Resulted    COVID-19 (Rule Out) 22 COVID-19, Rapid (Ordered)   22 Rule-Out Test Resulted    COVID-19 (Rule Out) 02/10/22 02/10/22 02/10/22 Respiratory Panel, Molecular, with COVID-19 (Restricted: peds pts or suitable admitted adults) (Ordered)   22     COVID-19 (Rule Out) 02/10/22 02/10/22 02/10/22 COVID-19, Rapid (Ordered)   02/10/22 Rule-Out Test Resulted    COVID-19 (Rule Out) 22 COVID-19, Rapid (Ordered)   22 Rule-Out Test Resulted    COVID-19 (Rule Out) 09/24/21 09/24/21 09/24/21 Respiratory Panel, Molecular, with COVID-19 (Restricted: peds pts or suitable admitted adults) (Ordered)   09/24/21 Rule-Out Test Resulted    COVID-19 (Rule Out) 09/21/21 09/21/21 09/21/21 Respiratory Panel, Molecular, with COVID-19 (Restricted: peds pts or suitable admitted adults) (Ordered)   09/22/21 Rule-Out Test Resulted    COVID-19 (Rule Out) 09/21/21 09/21/21 09/21/21 COVID-19, Rapid (Ordered)   09/21/21 Rule-Out Test Resulted    COVID-19 (Rule Out) 05/20/21 05/20/21 05/20/21 Respiratory Panel, Molecular, with COVID-19 (Restricted: peds pts or suitable admitted adults) (Ordered)   05/20/21 Rule-Out Test Resulted    COVID-19 (Rule Out) 05/17/21 05/17/21 05/17/21 COVID-19, Rapid (Ordered)   05/17/21 Rule-Out Test Resulted            Nurse Assessment:  Last Vital Signs: /67   Pulse 81   Temp 98.2 °F (36.8 °C) (Temporal)   Resp 15   Ht 5' 3\" (1.6 m)   Wt 130 lb (59 kg)   SpO2 99%   BMI 23.03 kg/m²     Last documented pain score (0-10 scale): Pain Level: 0  Last Weight:   Wt Readings from Last 1 Encounters:   02/06/23 130 lb (59 kg)     Mental Status:  {IP PT MENTAL STATUS:35446}    IV Access:  { ALFONZO IV ACCESS:005619687}    Nursing Mobility/ADLs:  Walking   {CHP DME HFGQ:220195872}  Transfer  {CHP DME XPGK:123060561}  Bathing  {CHP DME HRNM:413876115}  Dressing  {CHP DME OMPJ:597662656}  Toileting  {CHP DME BGHB:323907596}  Feeding  {CHP DME CZDB:532577921}  Med Admin  {CHP DME PBFR:749033606}  Med Delivery   { ALFONZO MED Delivery:635643199}    Wound Care Documentation and Therapy:        Elimination:  Continence:    Bowel: {YES / IH:42696}  Bladder: {YES / NO:19711}  Urinary Catheter: {Urinary Catheter:966270321}   Colostomy/Ileostomy/Ileal Conduit: {YES / WP:01970}       Date of Last BM: ***    Intake/Output Summary (Last 24 hours) at 2/7/2023 0956  Last data filed at 2/7/2023 0954  Gross per 24 hour   Intake 550 ml   Output 200 ml   Net 350 ml     I/O last 3 completed shifts:   In: 26 [IV Piggyback:550]  Out: -     Safety Concerns:     [address]A Safety Concerns:713558009}    Impairments/Disabilities:      [address] ME[address]A Impairments/Disabilities:380877472}    Nutrition Therapy:  Current Nutrition Therapy:   [address]A Diet List:528674291}    Routes of Feeding: {CHP DME Other Feedings:390564408}  Liquids: {Slp liquid thickness:60383}  Daily Fluid Restriction: {CHP DME Yes amt example:109061602}  Last Modified Barium Swallow with Video (Video Swallowing Test): {Done Not Done KJOC:070242495}    Treatments at the Time of Hospital Discharge:   Respiratory Treatments: ***  Oxygen Therapy:  {Therapy; copd oxygen:39347}  Ventilator:    { CC Vent URAA:811682599}    Rehab Therapies: {THERAPEUTIC INTERVENTION:4124447795}  Weight Bearing Status/Restrictions: { CC Weight Bearin}  Other Medical Equipment (for information only, NOT a DME order):  {EQUIPMENT:248944036}  Other Treatments: ***    Patient's personal belongings (please select all that are sent with patient):  {CHP DME Belongings:028589434}    RN SIGNATURE:  {Esignature:186028937}    CASE MANAGEMENT/SOCIAL WORK SECTION    Inpatient Status Date: ***    Readmission Risk Assessment Score:  Readmission Risk              Risk of Unplanned Readmission:  33           Discharging to Facility/ Agency   Name:   Address:  Phone:  Fax:    Dialysis Facility (if applicable)   Name:  Address:  Dialysis Schedule:  Phone:  Fax:    / signature: {Esignature:954971321}    PHYSICIAN SECTION    Prognosis: {Prognosis:9441462966}    Condition at Discharge: [address] Patient Condition:497776493}    Rehab Potential (if transferring to Rehab): {Prognosis:6834947730}    Recommended Labs or Other Treatments After Discharge: ***    Physician Certification: I certify the above information and transfer of Bailey Olsen  is necessary for the continuing treatment of the diagnosis listed and that she requires {Admit to Appropriate Level  Care:31006} for {GREATER/LESS:150529831} 30 days. Update Admission H&P: {CHP DME Changes in ONPVF:798359685}    PHYSICIAN SIGNATURE:  {Esignature:620773893}    ***HEART FAILURE - CONGESTIVE HEART FAILURE***  DISCHARGE INSTRUCTIONS:  GUIDELINES TO FOLLOW AT [facility]/SNF/ Assisted Living    Future Appointments   Date Time Provider Lupe Drakei   2/24/2023 11:00 AM Raya Courser, SKYE - CNP [facility] [address]:  Please notify the doctor if patient is not able to take their medications or if medications are being held for any reasons (such as low blood pressure ect.)  Do not give the patient ibuprofen (Advil or Motrin), naproxen (Aleve) without talking to the doctor first. This could make their heart failure worse. WEIGHT MONITORING:   Weigh patient every day in the morning after they void (If patient is able to stand, please get a standing weight.)   Notify the doctor of a weight gain of 3 pounds or more in 1 day   OR  a total of 5 pounds or more in 1 week             DIET   Cardiac heart healthy diet:  Low sodium diet: no  more than 2,000mg (2 grams) of salt / sodium per day (which equals to a little less than  a teaspoon of salt)/ Cardiac Diet: Low saturated / low trans fat, no added salt, caffeine restricted    If patient is there for rehab and will be returning home in the near future; reinforce with the patient and the family to follow a low sodium diet (2,000 mg)- avoid using salt at the table, avoid / limit use of canned soups, processed / packaged foods, salted snacks, olives and pickles. Do not use a salt substitute without checking with the doctor. (Mrs. You Pimentel is safe to use).        NOTIFY THE DOCTOR THE FIRST DAY OF ONSET OF ANY OF THESE   SYMPTOMS:   Weight gain of 3 pounds or more in 1 day         OR 5 pounds or more in one week  More shortness of breath  More swelling in stomach, legs, ankles or feet  Feeling more tired, No energy  Dry hacky cough  Dizziness  More chest pain / discomfort  Hard time breathing laying down

## 2023-02-07 NOTE — CONSULTS
Inpatient Cardiology Consultation      Reason for Consult:  Abnormal Stress Test from last hospitalization     Consulting Physician: Dr. Nathaniel Ambrosio     Requesting Physician:  Dr. Lexi Lopes     Date of Consultation: 2/7/2023    HISTORY OF PRESENT ILLNESS:   Ms. Grzegorz Cervantes is a 58year old  female who was most recently seen in consultation with Dr. Francisca Candelaria on 01/07/2023. Her medical history includes tobacco abuse, COPD, HFpEF, DVT, and HLD. Ms. Grzegorz Cervantes presented to [facility] ED on 02/06/2023 with complaints of dyspnea. She states that prior to presentation she has been having chronic PHILIPPE since she was diagnosed with COVID the beginning of January 2023. She denies accompanying chest pain with exertion. She also denies orthopnea and PND. She states that on 02/06/2023 at the nursing facility where she was discharged to she woke to receive medications and approximately 30 minutes later had significant dyspnea at rest.  She denies accompanying chest pain, palpitations or feelings of heart racing. She states soon afterwards EMS was summoned. Upon arrival to the ED her VS were axillary temperature 97.6-108-24-74/% RA. EKG ST.  CXR with COPD. CT of the chest was unremarkable. She received an NS bolus 500 cc, Zofran, DuoNeb, and cefepime. She was admitted to a telemetry monitored unit. Cardiology was consulted for management of recent abnormal stress test in the setting of COVID-19 in January 2023      Please note: past medical records were reviewed per electronic medical record (EMR) - see detailed reports under Past Medical/ Surgical History. Past Medical and Surgical History:    HLD  Tobacco abuse with recent cessation  COPD on chronic O2 therapy  Family Hx premature CAD  DVT, provoked in 03/2018 with completion of Eliquis (3 months) at that time   Diverticulitis  Covid 19 Infection, + on 01/05/2023  HFpEF   TTE 01/04/2023 (Dr. Darrin Shaffer): Poor quality study.  LV systolic function is not well assessed but appears mildly reduced. Ejection fraction is visually estimated at 40-45%. Abnormal diastolic function. Wall motion not well seen; LV contrast agent not utilized. Appears to be hypokinesis of the mid inferoseptal and mid anteroseptal wall. Normal left ventricular wall thickness. Grossly normal right ventricular size and function. No significant valvular abnormalities. Lexiscan MPS 01/06/2023: The myocardial perfusion imaging was abnormal with a moderate size moderate intensity and partial reversible anteroseptal and anterolateral perfusion abnormality with mild associated regional wall motion abnormalities consistent with ischemia of the left anterior descending distribution potentially in addition to that of postischemic stunning. Overall left ventricular systolic function was at the lower limits of normal with regional wall motion abnormalities as described above. Intermediate risk pharmacologic myocardial perfusion imaging study. Medications Prior to admit:  Prior to Admission medications    Medication Sig Start Date End Date Taking?  Authorizing Provider   acetaminophen (TYLENOL) 325 MG tablet Take 650 mg by mouth every 6 hours as needed for Pain or Fever   Yes Historical Provider, MD   bisacodyl (DULCOLAX) 10 MG suppository Place 10 mg rectally daily   Yes Historical Provider, MD   magnesium hydroxide (MILK OF MAGNESIA) 400 MG/5ML suspension Take by mouth daily as needed for Constipation   Yes Historical Provider, MD   docusate sodium (COLACE) 100 MG capsule Take 100 mg by mouth daily    Historical Provider, MD   acetaminophen (TYLENOL) 650 MG suppository Place 650 mg rectally every 4 hours as needed for Fever  Patient not taking: Reported on 2/6/2023    Historical Provider, MD   metoprolol succinate (TOPROL XL) 25 MG extended release tablet Take 0.5 tablets by mouth nightly 1/20/23   SKYE Qiu CNP   aspirin 81 MG chewable tablet Take 1 tablet by mouth daily 1/10/23   Rubin Alan DO   furosemide (LASIX) 20 MG tablet Take 1 tablet by mouth daily 1/9/23   Lizbeth Casye DO   potassium chloride (KLOR-CON M) 20 MEQ extended release tablet Take 1 tablet by mouth daily  Patient taking differently: Take 40 mEq by mouth daily 1/9/23   Lizbeth Casey DO   albuterol sulfate HFA (VENTOLIN HFA) 108 (90 Base) MCG/ACT inhaler Inhale 2 puffs into the lungs 4 times daily as needed for Wheezing or Shortness of Breath 1/1/23   SKYE Mallory CNP   Arformoterol Tartrate (BROVANA) 15 MCG/2ML NEBU Take 2 mLs by nebulization in the morning and 2 mLs in the evening. 1/1/23   SKYE Mallory CNP   budesonide (PULMICORT) 0.5 MG/2ML nebulizer suspension Take 2 mLs by nebulization in the morning and 2 mLs in the evening. 1/1/23   SKYE Mallory CNP   ipratropium-albuterol (DUONEB) 0.5-2.5 (3) MG/3ML SOLN nebulizer solution Inhale 3 mLs into the lungs in the morning and 3 mLs at noon and 3 mLs in the evening and 3 mLs before bedtime. 1/1/23   SKYE Mallory CNP   atorvastatin (LIPITOR) 40 MG tablet Take 1 tablet by mouth nightly 2/1/23   SKYE Mallory CNP   sertraline (ZOLOFT) 25 MG tablet Take 1 tablet by mouth daily 1/1/23   SKYE Mallory CNP   fluticasone [facility]) 50 MCG/ACT nasal spray 2 sprays by Each Nostril route daily 1/1/23   SKYE Mallory CNP   melatonin 5 MG TBDP disintegrating tablet Take 1 tablet by mouth nightly 1/1/23   SKYE Mallory CNP   Vitamin D (CHOLECALCIFEROL) 50 MCG (2000 UT) TABS tablet Take 1 tablet by mouth daily 1/1/23   SKYE Mallory CNP   pantoprazole (PROTONIX) 40 MG tablet Take 1 tablet by mouth in the morning and at bedtime for 14 days, THEN 1 tablet daily.   Patient taking differently: Take 1 tablet by mouth in the daily 1/1/23 2/14/23  SKYE Mallory CNP   zinc 50 MG CAPS Take 50 mg by mouth daily 1/1/23   SKYE Mallory CNP   ascorbic acid (VITAMIN C) 1000 MG tablet Take 1 tablet by mouth daily 12/4/22   SKYE Mallory CNP OXYGEN Inhale 4 L into the lungs continuous    Historical Provider, MD       Current Medications:    Current Facility-Administered Medications: sodium chloride (OCEAN, BABY AYR) 0.65 % nasal spray 1 spray, 1 spray, Each Nostril, Q4H PRN  ascorbic acid (VITAMIN C) tablet 1,000 mg, 1,000 mg, Oral, Daily  arformoterol tartrate (BROVANA) nebulizer solution 15 mcg, 15 mcg, Nebulization, BID  budesonide (PULMICORT) nebulizer suspension 500 mcg, 0.5 mg, Nebulization, BID  aspirin chewable tablet 81 mg, 81 mg, Oral, Daily  atorvastatin (LIPITOR) tablet 40 mg, 40 mg, Oral, Nightly  docusate sodium (COLACE) capsule 100 mg, 100 mg, Oral, Daily  fluticasone (FLONASE) 50 MCG/ACT nasal spray 2 spray, 2 spray, Each Nostril, Daily  furosemide (LASIX) tablet 20 mg, 20 mg, Oral, Daily  ipratropium-albuterol (DUONEB) nebulizer solution 1 ampule, 1 ampule, Inhalation, Q6H  magnesium hydroxide (MILK OF MAGNESIA) 400 MG/5ML suspension 5 mL, 5 mL, Oral, Daily PRN  melatonin disintegrating tablet 5 mg, 5 mg, Oral, Nightly  metoprolol succinate (TOPROL XL) extended release tablet 12.5 mg, 12.5 mg, Oral, Nightly  potassium chloride (KLOR-CON M) extended release tablet 20 mEq, 20 mEq, Oral, Daily  sertraline (ZOLOFT) tablet 25 mg, 25 mg, Oral, Daily  vitamin D (CHOLECALCIFEROL) tablet 2,000 Units, 2,000 Units, Oral, Daily  zinc sulfate (ZINCATE) capsule 50 mg, 50 mg, Oral, Daily  sodium chloride flush 0.9 % injection 5-40 mL, 5-40 mL, IntraVENous, 2 times per day  sodium chloride flush 0.9 % injection 5-40 mL, 5-40 mL, IntraVENous, PRN  0.9 % sodium chloride infusion, , IntraVENous, PRN  enoxaparin (LOVENOX) injection 40 mg, 40 mg, SubCUTAneous, Daily  ondansetron (ZOFRAN-ODT) disintegrating tablet 4 mg, 4 mg, Oral, Q8H PRN **OR** ondansetron (ZOFRAN) injection 4 mg, 4 mg, IntraVENous, Q6H PRN  polyethylene glycol (GLYCOLAX) packet 17 g, 17 g, Oral, Daily PRN  acetaminophen (TYLENOL) tablet 650 mg, 650 mg, Oral, Q6H PRN **OR** acetaminophen (TYLENOL) suppository 650 mg, 650 mg, Rectal, Q6H PRN  famotidine (PEPCID) tablet 20 mg, 20 mg, Oral, BID  cefepime (MAXIPIME) 2,000 mg in sodium chloride 0.9 % 50 mL IVPB ([id number]), 2,000 mg, IntraVENous, Q8H  albuterol (PROVENTIL) nebulizer solution 2.5 mg, 2.5 mg, Nebulization, 4x Daily PRN  vancomycin (VANCOCIN) 1,250 mg in sodium chloride 0.9 % 250 mL IVPB, 1,250 mg, IntraVENous, Q12H    Allergies:  Penicillin v    Social History:    States that she quit smoking 1-2 years ago prior to this smoked half a pack of cigarettes a day for much of her life  Denies alcohol and illicit drug use. Denies routine caffeine ingestion. Currently resides in a nursing facility and is using a walker to ambulate with the help of PT. Family History: Mother with history of initial MI in either his 46s or 62s. REVIEW OF SYSTEMS:     Constitutional: Denies fevers, chills, night sweats, and fatigue  HEENT: Denies headaches, nose bleeds, and blurred vision,oral pain, abscess or lesion. Musculoskeletal: Denies falls, pain to BLE with ambulation and edema to BLE. Neurological: Denies dizziness and lightheadedness, numbness and tingling  Cardiovascular: Denies chest pain, palpitations, and feelings of heart racing. Respiratory: Denies orthopnea and PND. Complains of PHILIPPE-see HPI  Gastrointestinal: Denies heartburn, nausea/vomiting, diarrhea and constipation, black/bloody, and tarry stools. Genitourinary: Denies dysuria and hematuria  Hematologic: Denies excessive bruising or bleeding  Lymphatic: Denies lumps and bumps to neck, axilla, breast, and groin  Endocrine: Denies excessive thirst. Denies intolerance to hot and cold  GYN: Postmenopausal state; Denies vaginal bleeding. Psychiatric: Denies anxiety and depression.     PHYSICAL EXAM:   /66   Pulse 80   Temp 97.5 °F (36.4 °C) (Temporal)   Resp 18   Ht 5' 3\" (1.6 m)   Wt 130 lb (59 kg)   SpO2 99%   BMI 23.03 kg/m²   CONST:  Well developed, well nourished middle-aged  female who appears stated age. Awake, alert, cooperative, no apparent distress  HEENT:   Head- Normocephalic, atraumatic   Eyes- Conjunctivae pink, anicteric  Throat- Oral mucosa pink and moist  Neck-  No stridor, trachea midline, no jugular venous distention. No adenopathy   CHEST: Chest symmetrical and non-tender to palpation. No accessory muscle use or intercostal retractions  RESPIRATORY: Lung sounds -diminished throughout fields   CARDIOVASCULAR:     No carotid bruit  Heart Inspection- shows no noted pulsations  Heart Palpation- no heaves or thrills; PMI is non-displaced   Heart Ausculation- Regular rate and rhythm, no murmur. No s3, s4 or rub   PV: No lower extremity edema. No varicosities. Pedal pulses palpable, no clubbing or cyanosis   ABDOMEN: Soft, non-tender to light palpation. Bowel sounds present. No palpable masses no organomegaly; no abdominal bruit  MS: Good muscle strength and tone. No atrophy or abnormal movements. : Deferred  SKIN: Warm and dry no statis dermatitis or ulcers   NEURO / PSYCH: Oriented to person, place and time. Speech clear and appropriate. Follows all commands.  Pleasant affect     DATA:    ECG: As above  Tele strips: SR  Diagnostic:      Intake/Output Summary (Last 24 hours) at 2/7/2023 1233  Last data filed at 2/7/2023 0954  Gross per 24 hour   Intake 870 ml   Output 200 ml   Net 670 ml       Labs:   CBC:   Recent Labs     02/06/23  0659 02/07/23  0729   WBC 20.4* 10.6   HGB 13.3 11.4*   HCT 42.7 36.6    182     BMP:   Recent Labs     02/06/23  0659 02/07/23  0618    141   K 4.0 3.5   CO2 34* 34*   BUN 13 15   CREATININE 0.4* 0.4*   LABGLOM >60 >60   CALCIUM 8.6 8.2*     Mag:   Recent Labs     02/07/23  0618   MG 1.7   HgA1c:   Lab Results   Component Value Date    LABA1C 5.0 02/10/2022   FASTING LIPID PANEL:  Lab Results   Component Value Date/Time    CHOL 262 02/10/2022 08:38 AM    HDL 69 02/10/2022 08:38 AM    LDLCALC 183 02/10/2022 08:38 AM    TRIG 51 02/10/2022 08:38 AM     LIVER PROFILE:  Recent Labs     02/06/23  0659 02/07/23  0618   AST 52* 40*   ALT 44* 33*   LABALBU 2.4* 2.1*      Latest Reference Range & Units 1/4/23 12:59 1/4/23 22:50 1/5/23 00:15 1/27/23 09:15 2/6/23 06:59   Pro-BNP 0 - 125 pg/mL    422 (H) 500 (H)   Troponin, High Sensitivity 0 - 9 ng/L 23 (H) 18 (H) 18 (H)  20 (H)   (H): Data is abnormally high    02/06/2023 CXR:  1. COPD. There is no evidence of failure or pneumonia  2. Significant pleuroparenchymal scarring seen within the right and left lung apex. 02/06/2023 CTA Chest:  1. No acute pulmonary emboli. 2.  No aneurysm formation or dissection of the thoracic aorta. 3.  Progressive fibroretraction/multi segmental atelectasis of both upper lobes/right middle lobe/lingula with confluent parenchymal opacities that can represent areas of organized pneumonia. 4.  Progressive ongoing chronic inflammatory including granulomatous and atypical infectious process of the lung parenchyma consider in the differential diagnosis. Further consultation/investigation with pulmonary medicine recommended. 5.  Liver lesions seen in the upper abdomen partially covered on this examination, see report MRI of the abdomen of December 29, 2022. Latest Reference Range & Units 1/5/23 06:00 2/6/23 16:45   SARS-CoV-2, PCR Not Detected  DETECTED ! Not Detected   !: Data is abnormal    IMPRESSION:  PHILIPPE, probably multifactorial, including COPD, questionable pneumonia, and probable CAD  Abnormal Lexiscan 01/06/2023  HLD, on statin  Recent tobacco cessation  COPD  COVID-19 infection (01/05/2023)  Hepatitis a and C antibodies noted 09/2022  Hypoalbuminemia. PLAN:  Repeat troponin  Continue current cardiac medications  Consider discontinuing Lipitor if patient has hepatitis C  Consider cardiac catheterization when pneumonia resolves  Above as discussed with Dr. Arlen Arce  6. Cardiology will sign off. May call if needed.     Electronically signed by SKYE Bueno CNP on 2/7/2023 at 12:33 PM      Patient chart reviewed with SKYE Bueno CNP. Patient current admission, past medical history, medications, EKG, laboratory data, imaging eluding chest CTA and recent Lexiscan were all reviewed. Patient seen and examined independently. I agree with the above assessment and plan made in collaboration with SKYE Bueno CNP. Thank you for allowing me to share in the care of patient.   Mike Nguyen MD

## 2023-02-07 NOTE — ACP (ADVANCE CARE PLANNING)
Advance Care Planning   Healthcare Decision Maker:    Primary Decision Maker: Carolann Ira Child - [phone]    Secondary Decision Maker: shannan basilio - Child - [phone]    Click here to complete Healthcare Decision Makers including selection of the Healthcare Decision Maker Relationship (ie \"Primary\").

## 2023-02-07 NOTE — PROGRESS NOTES
[place]  Department of Pulmonary, Critical Care and Sleep Medicine  [facility]  Department of Internal Medicine  Progress Note    SUBJECTIVE:    Patient seen and examined reports that she feels better than yesterday. She is currently on high flow nasal cannula at 11 L. Discussed the results of her imaging studies she reports that she has not told previously of any scarring in her lungs. OBJECTIVE:  Vitals:    02/07/23 0900 02/07/23 1044 02/07/23 1225 02/07/23 1638   BP:  103/66  115/68   Pulse:    85   Resp:    18   Temp:    97.1 °F (36.2 °C)   TempSrc:    Temporal   SpO2: 99%  99% 100%   Weight:       Height:         Constitutional: Alert,     EENT: EOMI SONU. MMM. No icterus. No thrush. Neck: No thyromegaly. Trachea was midline. Respiratory: Symmetrical.  Few scattered crackles  Cardiovascular: Regular, No murmur. No rubs. Pulses:  Equal bilaterally. Abdomen: Soft without organomegaly. No rebound, rigidity. No guarding. Lymphatic: No lymphadenopathy. Musculoskeletal: Without weakness or gross deficits  Extremities:  No lower extremity edema. Reflexes appear adequate. Skin:  Warm and dry. No skin rashes. Neurological/Psychiatric: No acute psychosis. Cranial nerves are intact. DATA:    Monitor Strips:  Reviewed & discusses with technical team. No changes noted.     RADIOLOGY:  Films were read/reviewed/discussed with radiology shows no new imaging      CBC with Differential:    Lab Results   Component Value Date/Time    WBC 10.6 02/07/2023 07:29 AM    RBC 3.60 02/07/2023 07:29 AM    HGB 11.4 02/07/2023 07:29 AM    HCT 36.6 02/07/2023 07:29 AM     02/07/2023 07:29 AM    .7 02/07/2023 07:29 AM    MCH 31.7 02/07/2023 07:29 AM    MCHC 31.1 02/07/2023 07:29 AM    RDW 15.9 02/07/2023 07:29 AM    NRBC 0.9 11/30/2022 05:18 AM    METASPCT 0.9 12/22/2022 06:18 AM    LYMPHOPCT 20.7 02/07/2023 07:29 AM    MONOPCT 7.2 02/07/2023 07:29 AM    MYELOPCT 1.0 12/28/2022 07:02 PM    BASOPCT 0.7 02/07/2023 07:29 AM    MONOSABS 0.76 02/07/2023 07:29 AM    LYMPHSABS 2.18 02/07/2023 07:29 AM    EOSABS 0.07 02/07/2023 07:29 AM    BASOSABS 0.07 02/07/2023 07:29 AM     CMP:    Lab Results   Component Value Date/Time     02/07/2023 06:18 AM    K 3.5 02/07/2023 06:18 AM     02/07/2023 06:18 AM    CO2 34 02/07/2023 06:18 AM    BUN 15 02/07/2023 06:18 AM    CREATININE 0.4 02/07/2023 06:18 AM    GFRAA >60 04/13/2022 02:20 PM    LABGLOM >60 02/07/2023 06:18 AM    GLUCOSE 85 02/07/2023 06:18 AM    PROT 5.0 02/07/2023 06:18 AM    LABALBU 2.1 02/07/2023 06:18 AM    CALCIUM 8.2 02/07/2023 06:18 AM    BILITOT 0.4 02/07/2023 06:18 AM    ALKPHOS 121 02/07/2023 06:18 AM    AST 40 02/07/2023 06:18 AM    ALT 33 02/07/2023 06:18 AM     Assessment:   Acute hypoxemic respiratory failure  COPD with exacerbation  Progressive fibrotic changes of right upper lobe, right middle lobe, and lingula  Heart failure with reduced ejection fraction of 40 to 45% on echocardiogram dated January 2023. Abnormal stress test on January 5, 2023: Intermediate risk pharmacologic myocardial perfusion imaging study  Leukocytosis           Plan:   Records from Dr. Jeffy Jorge office reviewed. Patient with very severe COPD and air trapping on PFTs  Obtain MRSA swab  Full respiratory viral panel-negative  Continue Brovana, budesonide. Continue DuoNebs  Agree with empiric antibiotics. I personally reviewed the patient's CT of the chest.  I compared it to multiple CTs of the chest that the patient has had in the past 3 years. Appears to get show progressive fibrotic changes of the right upper lobe, right middle lobe, and lingula. These have significantly worsened in the last 6 months. At this time after we get the respiratory viral panel along with other testing may consider bronchoscopy possibly with transbronchial biopsies.   We will send screening labs for connective tissue disease. Also for granulomatous diseases. The pattern of fibrosis is not consistent with UIP and rather may be consistent with possibly allergic bronchopulmonary aspergillosis versus extrinsic allergic alveolitis versus sarcoidosis versus histiocytosis versus occupational lung diseases.   Appreciate cardiology recommendations    Odalys Ontiveros, DO

## 2023-02-07 NOTE — PROGRESS NOTES
HARRISON Wilhelm notified of [facility] cardio consult per jennifer.   Pt added to census

## 2023-02-08 LAB
ALBUMIN SERPL-MCNC: 2 G/DL (ref 3.5–5.2)
ALP BLD-CCNC: 112 U/L (ref 35–104)
ALT SERPL-CCNC: 34 U/L (ref 0–32)
ANGIOTENSIN CONVERTING ENZYME: 52 U/L (ref 16–85)
ANION GAP SERPL CALCULATED.3IONS-SCNC: 3 MMOL/L (ref 7–16)
AST SERPL-CCNC: 38 U/L (ref 0–31)
BASOPHILS ABSOLUTE: 0.07 E9/L (ref 0–0.2)
BASOPHILS RELATIVE PERCENT: 0.9 % (ref 0–2)
BILIRUB SERPL-MCNC: 0.4 MG/DL (ref 0–1.2)
BUN BLDV-MCNC: 14 MG/DL (ref 6–23)
CALCIUM SERPL-MCNC: 8.1 MG/DL (ref 8.6–10.2)
CHLORIDE BLD-SCNC: 102 MMOL/L (ref 98–107)
CO2: 34 MMOL/L (ref 22–29)
CREAT SERPL-MCNC: 0.3 MG/DL (ref 0.5–1)
EOSINOPHILS ABSOLUTE: 0.09 E9/L (ref 0.05–0.5)
EOSINOPHILS RELATIVE PERCENT: 1.2 % (ref 0–6)
GFR SERPL CREATININE-BSD FRML MDRD: >60 ML/MIN/1.73
GLUCOSE BLD-MCNC: 81 MG/DL (ref 74–99)
HCT VFR BLD CALC: 33.3 % (ref 34–48)
HEMOGLOBIN: 10.5 G/DL (ref 11.5–15.5)
IMMATURE GRANULOCYTES #: 0.03 E9/L
IMMATURE GRANULOCYTES %: 0.4 % (ref 0–5)
LYMPHOCYTES ABSOLUTE: 1.94 E9/L (ref 1.5–4)
LYMPHOCYTES RELATIVE PERCENT: 25.7 % (ref 20–42)
MAGNESIUM: 1.6 MG/DL (ref 1.6–2.6)
MCH RBC QN AUTO: 30.8 PG (ref 26–35)
MCHC RBC AUTO-ENTMCNC: 31.5 % (ref 32–34.5)
MCV RBC AUTO: 97.7 FL (ref 80–99.9)
MONOCYTES ABSOLUTE: 0.98 E9/L (ref 0.1–0.95)
MONOCYTES RELATIVE PERCENT: 13 % (ref 2–12)
MRSA CULTURE ONLY: NORMAL
NEUTROPHILS ABSOLUTE: 4.43 E9/L (ref 1.8–7.3)
NEUTROPHILS RELATIVE PERCENT: 58.8 % (ref 43–80)
PDW BLD-RTO: 15.7 FL (ref 11.5–15)
PLATELET # BLD: 208 E9/L (ref 130–450)
PMV BLD AUTO: 10.4 FL (ref 7–12)
POTASSIUM REFLEX MAGNESIUM: 3.5 MMOL/L (ref 3.5–5)
RBC # BLD: 3.41 E12/L (ref 3.5–5.5)
REASON FOR REJECTION: NORMAL
REJECTED TEST: NORMAL
SODIUM BLD-SCNC: 139 MMOL/L (ref 132–146)
TOTAL PROTEIN: 4.8 G/DL (ref 6.4–8.3)
TROPONIN, HIGH SENSITIVITY: 19 NG/L (ref 0–9)
VANCOMYCIN RANDOM: 23.3 MCG/ML (ref 5–40)
WBC # BLD: 7.5 E9/L (ref 4.5–11.5)

## 2023-02-08 PROCEDURE — 99232 SBSQ HOSP IP/OBS MODERATE 35: CPT | Performed by: FAMILY MEDICINE

## 2023-02-08 PROCEDURE — 94640 AIRWAY INHALATION TREATMENT: CPT

## 2023-02-08 PROCEDURE — 6360000002 HC RX W HCPCS: Performed by: FAMILY MEDICINE

## 2023-02-08 PROCEDURE — 84484 ASSAY OF TROPONIN QUANT: CPT

## 2023-02-08 PROCEDURE — 2060000000 HC ICU INTERMEDIATE R&B

## 2023-02-08 PROCEDURE — 6370000000 HC RX 637 (ALT 250 FOR IP): Performed by: FAMILY MEDICINE

## 2023-02-08 PROCEDURE — 99232 SBSQ HOSP IP/OBS MODERATE 35: CPT | Performed by: INTERNAL MEDICINE

## 2023-02-08 PROCEDURE — 97530 THERAPEUTIC ACTIVITIES: CPT

## 2023-02-08 PROCEDURE — 94660 CPAP INITIATION&MGMT: CPT

## 2023-02-08 PROCEDURE — 97535 SELF CARE MNGMENT TRAINING: CPT

## 2023-02-08 PROCEDURE — 2580000003 HC RX 258: Performed by: FAMILY MEDICINE

## 2023-02-08 PROCEDURE — 97161 PT EVAL LOW COMPLEX 20 MIN: CPT

## 2023-02-08 PROCEDURE — 2700000000 HC OXYGEN THERAPY PER DAY

## 2023-02-08 PROCEDURE — 36415 COLL VENOUS BLD VENIPUNCTURE: CPT

## 2023-02-08 PROCEDURE — 80202 ASSAY OF VANCOMYCIN: CPT

## 2023-02-08 PROCEDURE — 85025 COMPLETE CBC W/AUTO DIFF WBC: CPT

## 2023-02-08 PROCEDURE — 6370000000 HC RX 637 (ALT 250 FOR IP): Performed by: INTERNAL MEDICINE

## 2023-02-08 PROCEDURE — 80053 COMPREHEN METABOLIC PANEL: CPT

## 2023-02-08 PROCEDURE — 83735 ASSAY OF MAGNESIUM: CPT

## 2023-02-08 PROCEDURE — 97165 OT EVAL LOW COMPLEX 30 MIN: CPT

## 2023-02-08 RX ORDER — PREDNISONE 20 MG/1
40 TABLET ORAL DAILY
Status: DISCONTINUED | OUTPATIENT
Start: 2023-02-08 | End: 2023-02-10 | Stop reason: HOSPADM

## 2023-02-08 RX ADMIN — Medication 2000 UNITS: at 09:31

## 2023-02-08 RX ADMIN — SODIUM CHLORIDE, PRESERVATIVE FREE 10 ML: 5 INJECTION INTRAVENOUS at 20:38

## 2023-02-08 RX ADMIN — Medication 1000 MG: at 09:30

## 2023-02-08 RX ADMIN — FLUTICASONE PROPIONATE 2 SPRAY: 50 SPRAY, METERED NASAL at 09:31

## 2023-02-08 RX ADMIN — SODIUM CHLORIDE, PRESERVATIVE FREE 10 ML: 5 INJECTION INTRAVENOUS at 09:31

## 2023-02-08 RX ADMIN — ARFORMOTEROL TARTRATE 15 MCG: 15 SOLUTION RESPIRATORY (INHALATION) at 08:05

## 2023-02-08 RX ADMIN — FUROSEMIDE 20 MG: 40 TABLET ORAL at 09:34

## 2023-02-08 RX ADMIN — FAMOTIDINE 20 MG: 20 TABLET, FILM COATED ORAL at 09:31

## 2023-02-08 RX ADMIN — CEFEPIME 2000 MG: 2 INJECTION, POWDER, FOR SOLUTION INTRAVENOUS at 01:56

## 2023-02-08 RX ADMIN — Medication 5 MG: at 20:39

## 2023-02-08 RX ADMIN — DOCUSATE SODIUM 100 MG: 100 CAPSULE, LIQUID FILLED ORAL at 09:30

## 2023-02-08 RX ADMIN — BUDESONIDE 500 MCG: 0.5 SUSPENSION RESPIRATORY (INHALATION) at 19:56

## 2023-02-08 RX ADMIN — BUDESONIDE 500 MCG: 0.5 SUSPENSION RESPIRATORY (INHALATION) at 08:05

## 2023-02-08 RX ADMIN — FAMOTIDINE 20 MG: 20 TABLET, FILM COATED ORAL at 20:39

## 2023-02-08 RX ADMIN — PREDNISONE 40 MG: 20 TABLET ORAL at 11:55

## 2023-02-08 RX ADMIN — ATORVASTATIN CALCIUM 40 MG: 40 TABLET, FILM COATED ORAL at 20:39

## 2023-02-08 RX ADMIN — ENOXAPARIN SODIUM 40 MG: 100 INJECTION SUBCUTANEOUS at 09:32

## 2023-02-08 RX ADMIN — IPRATROPIUM BROMIDE AND ALBUTEROL SULFATE 1 AMPULE: 2.5; .5 SOLUTION RESPIRATORY (INHALATION) at 08:05

## 2023-02-08 RX ADMIN — IPRATROPIUM BROMIDE AND ALBUTEROL SULFATE 1 AMPULE: 2.5; .5 SOLUTION RESPIRATORY (INHALATION) at 12:23

## 2023-02-08 RX ADMIN — CEFEPIME 2000 MG: 2 INJECTION, POWDER, FOR SOLUTION INTRAVENOUS at 18:48

## 2023-02-08 RX ADMIN — POTASSIUM CHLORIDE 20 MEQ: 1500 TABLET, EXTENDED RELEASE ORAL at 09:30

## 2023-02-08 RX ADMIN — ARFORMOTEROL TARTRATE 15 MCG: 15 SOLUTION RESPIRATORY (INHALATION) at 19:56

## 2023-02-08 RX ADMIN — SERTRALINE HYDROCHLORIDE 25 MG: 50 TABLET ORAL at 09:30

## 2023-02-08 RX ADMIN — ASPIRIN 81 MG CHEWABLE TABLET 81 MG: 81 TABLET CHEWABLE at 09:30

## 2023-02-08 RX ADMIN — CEFEPIME 2000 MG: 2 INJECTION, POWDER, FOR SOLUTION INTRAVENOUS at 09:38

## 2023-02-08 RX ADMIN — VANCOMYCIN HYDROCHLORIDE 1250 MG: 10 INJECTION, POWDER, LYOPHILIZED, FOR SOLUTION INTRAVENOUS at 11:55

## 2023-02-08 RX ADMIN — IPRATROPIUM BROMIDE AND ALBUTEROL SULFATE 1 AMPULE: 2.5; .5 SOLUTION RESPIRATORY (INHALATION) at 19:56

## 2023-02-08 RX ADMIN — METOPROLOL SUCCINATE 12.5 MG: 25 TABLET, EXTENDED RELEASE ORAL at 20:39

## 2023-02-08 RX ADMIN — Medication 50 MG: at 09:31

## 2023-02-08 ASSESSMENT — PAIN SCALES - GENERAL
PAINLEVEL_OUTOF10: 0

## 2023-02-08 NOTE — CARE COORDINATION
SOCIAL WORK/CASEMANAGEMENT TRANSITION OF CARE PLANNINGBojarvis Narendra Cruz, [address]):  plan is to home with daughter and hhc only per pt if has 100% coverage. Pt has no preference so I made a referral to [facility] for PT , OT and nsg, they will call back with benefits and I will discuss with pt. Pt is on 6l hf o2 and uses 3l at home. She is on iv vanco and cefepime . EPI Iglesias  2/8/2023    Per [facility] pt  has 100% coverage with after a $2000 deductible. Went over this with pt  and she wants to think about it.  will need orders for PT and OT and nsg.  EPI Iglesias  2/8/2023

## 2023-02-08 NOTE — PROGRESS NOTES
[facility] Inpatient   Resident Progress Note    S:  Hospital day: 2    Brief Synopsis: Yoselyn Polo is a 58 y.o. female with a PMH of diastolic CHF,  Patient presented from [place] [address] due to increasing shortness of breath. Per EMS she needed 500 cc bolus for hypotension. Was placed on BiPAP due to hypoxia in ED. BP 74/44 on admission, improved to 109/65/ She was placed on BiPAP due to SpO2 <90% without it. She was tachypneic, tachycardic and normotensive on exam. Her WBC was 20.4 from a previous 9.4 recorded in Jan 2023. Lactic acid 2.6 on presentation. Flu and COVID test negative, CXR showed no acute processes. , D-dimer 1784 and CTA is pending. EKG showed sinus tachycardia. Admitted for COPD exacerbation. Patient started on breathing treatments, steroid burst and consulted pulmonology. CTA showed progressive fiber retraction/multisegmental atelectasis. Compared to previous CTs, he has significantly worsened in the last 6 months. Connective tissue, granulomatous diseases screening tests ordered. Overnight, she had a headache from beeping of machines, didn't have BiPAP on. Patient was seen and examined this morning. Doing well, no complaints. Blood cx negative. TJ and RF negative.     Cont meds:    sodium chloride       Scheduled meds:    predniSONE  40 mg Oral Daily    ascorbic acid  1,000 mg Oral Daily    arformoterol tartrate  15 mcg Nebulization BID    budesonide  0.5 mg Nebulization BID    aspirin  81 mg Oral Daily    atorvastatin  40 mg Oral Nightly    docusate sodium  100 mg Oral Daily    fluticasone  2 spray Each Nostril Daily    furosemide  20 mg Oral Daily    ipratropium-albuterol  1 ampule Inhalation Q6H    melatonin  5 mg Oral Nightly    metoprolol succinate  12.5 mg Oral Nightly    potassium chloride  20 mEq Oral Daily    sertraline  25 mg Oral Daily    vitamin D  2,000 Units Oral Daily    zinc sulfate  50 mg Oral Daily    sodium chloride flush  5-40 mL IntraVENous 2 times per day    enoxaparin  40 mg SubCUTAneous Daily    famotidine  20 mg Oral BID    cefepime  2,000 mg IntraVENous Q8H    vancomycin  1,250 mg IntraVENous Q12H     PRN meds: sodium chloride, magnesium hydroxide, sodium chloride flush, sodium chloride, ondansetron **OR** ondansetron, polyethylene glycol, acetaminophen **OR** acetaminophen, albuterol     I reviewed the patient's Past Medical and Surgical History, Medications and Allergies. O:  VS: /74   Pulse 82   Temp 97.1 °F (36.2 °C) (Temporal)   Resp 18   Ht 5' 3\" (1.6 m)   Wt 135 lb 8 oz (61.5 kg)   SpO2 97%   BMI 24.00 kg/m²     Physical Exam:  Physical Exam  Vitals reviewed. Constitutional:       General: She is not in acute distress. Eyes:      Extraocular Movements: Extraocular movements intact. Cardiovascular:      Rate and Rhythm: Normal rate and regular rhythm. Heart sounds: No murmur heard. No gallop. Pulmonary:      Effort: No respiratory distress. Comments: On 4-5 L high flow nasal cannula  Abdominal:      General: Abdomen is flat. Palpations: Abdomen is soft. Musculoskeletal:      Right lower leg: No edema. Left lower leg: Edema (+1 on dorsal aspect) present. Skin:     General: Skin is warm and dry. Neurological:      General: No focal deficit present. Mental Status: She is alert and oriented to person, place, and time. Psychiatric:         Mood and Affect: Mood normal.          Labs:  Na/K/Cl/CO2:  139/3.5/102/34 (02/08 0540)  BUN/Cr/glu/ALT/AST/amyl/lip:  14/0.3/--/34/38/--/-- (02/08 0540)  WBC/Hgb/Hct/Plts:  7.5/10.5/33.3/208 (02/08 0540)  estimated creatinine clearance is 161 mL/min (A) (based on SCr of 0.3 mg/dL (L)). Other pertinent labs as noted below    New Imaging:  CTA CHEST W CONTRAST   Final Result   1. No acute pulmonary emboli. 2.  No aneurysm formation or dissection of the thoracic aorta.       3.  Progressive fibroretraction/multi segmental atelectasis of both upper   lobes/right middle lobe/lingula with confluent parenchymal opacities that can   represent areas of organized pneumonia. 4.  Progressive ongoing chronic inflammatory including granulomatous and   atypical infectious process of the lung parenchyma consider in the   differential diagnosis. Further consultation/investigation with pulmonary   medicine recommended. 5.  Liver lesions seen in the upper abdomen partially covered on this   examination, see report MRI of the abdomen of December 29, 2022. XR CHEST PORTABLE   Final Result   1. COPD. There is no evidence of failure or pneumonia   2. Significant pleuroparenchymal scarring seen within the right and left lung   apex. A/P:  Principal Problem:    Sepsis ([address]  Active Problems:    Pulmonary fibrosis (HCC)    Hypoxemia    Heart failure (HCC)    Abnormal nuclear stress test  Resolved Problems:    * No resolved hospital problems. *      Acute Hypoxic Respiratory Failure w hypercapnia  -2/2 COPD exacerbation vs CHF vs sepsis of unknown origin  -Has end stage COPD, candidate for lung tx previously discussed w [facility] 8/2021, previous A1AT levels below normal range  -Recently discharged for acute on chronic diastolic HF, proBNP 747  -WBC on presentation 20k, leukocytosis resolved. Procal 0.06  -CXR negative for acute processes  -CTA shows progressive fibroretraction/multi segmental atelactasis.  Significantly worsened in last 6 months.  -Cultures pending  -DuoNeb, Brovana and Pulmicort  -Day 3 of Vanc and Cefepime for 7 days  -Aggressive pulmonary hygiene  -Pulmonology consult; screening labs for connective tissue, granulomatous diseases pending.  -Nasal spray     Possible sepsis  -Tachypneic, tachycardic and hypoxic with RR in the high 20s, HR in the 100s, and on BiPAP  -Afebrile since admission  -CXR negative for acute processes  -Leukocytosis resolved  -blood cultures, urine culture, UA     Hx of abnormal stress test  -Stress test 1/6/2023 was abnormal, consistent with ischemia of LAD, to be followed up outpatient  -ECHO 1/4/2023 showed depressed systolic fxn and EF 86-60%  -EKG in ED today showed sinus tachycardia  -Troponin 20 to 22, repeat pending     Hx of Liver cyst  -positive for Hep A and Hep C antibodies in 9/22  -Hospitalized for transaminitis with hyperbilirubinemia, multiple liver cysts, abnormal GB appearance with negative US.   -Negative MRCP for biliary obstruction at that time  -AST and ALT slightly elevated  -Hep C viral load pending    DVT Prophylaxis: Lovenox  GI Prophylaxis: Pepcid  Diet: Regular  PT/OT on board      Electronically signed by Grisel Stephens MD on 2/8/2023 at 12:24 PM  This case was discussed with attending physician Dr. Nando Coronado

## 2023-02-08 NOTE — PROGRESS NOTES
Physical Therapy  Physical Therapy Initial Assessment     Name: Galilea Arnold  : 1960  MRN: [id number]      Date of Service: 2023    Evaluating PT:  Jann Chen PT, DPT    Room #:  8431/4681-E  Diagnosis:  Septicemia ([facility] [A41.9]  Dyspnea and respiratory abnormalities [R06.00, R06.89]  COPD exacerbation ([facility] [J44.1]  Sepsis ([facility] [A41.9]  PMHx/PSHx:  COPD, CHF, DVT, HLD  Procedure/Surgery:  N/A  Precautions:  fall risk, O2  Equipment Needs:  None anticipated    SUBJECTIVE:    Pt admitted from [place] [address]. Typically, pt lives with dtr in a 2 story home with 2 steps to enter and 1 handrail. Bed/bath is on 1st floor. Pt ambulated with ww PTA. Pt also owns a w/c for longer distances. Receives O2 at baseline    OBJECTIVE:   Initial Evaluation  Date: 23 Treatment Short Term/ Long Term   Goals   AM-PAC 6 Clicks 90/58     Was pt agreeable to Eval/treatment? yes     Does pt have pain?  No pain     Bed Mobility  Rolling: SBA  Supine to sit: SBA  Sit to supine: NT  Scooting: SBA  Rolling: mod I  Supine to sit: mod I  Sit to supine: mod I  Scooting: mod I   Transfers Sit to stand: SBA  Stand to sit: SBA  Stand pivot: CGA with ww  Sit to stand: mod I  Stand to sit: mod I  Stand pivot: mod I with AAD   Ambulation    20'x1, 10'x1 with ww CGA  100'+ with AAD mod I   Stair negotiation: ascended and descended  NT  2 steps with 1 handrail mod I     Strength/ROM:   BLE grossly 4/5  BLE AROM WFL    Balance:   Static Sitting: Supervision  Dynamic Sitting: SBA  Static Standing: SBA with ww  Dynamic Standing: CGA with ww    Pt is A & O x 3  Sensation:  Pt denies numbness and tingling to extremities  Edema:  unremarkable    Vitals:  SpO2 WNL throughout session on 6L of O2, HR elevated into 100's after short bouts of activity    Therapeutic Exercises:    Bed mobility: supine<>sit, cued for EOB positioning  Transfers: STSx3, cued for hand placement and postural correction  Ambulation: 20'x1, 10'x1 with ww  BLE AROM    Patient education  Pt educated on role of PT, importance of functional mobility during hospital stay, safety with functional mobility    Patient response to education:   Pt verbalized understanding Pt demonstrated skill Pt requires further education in this area   yes partial yes     ASSESSMENT:    Conditions Requiring Skilled Therapeutic Intervention:    [x]Decreased strength     []Decreased ROM  [x]Decreased functional mobility  [x]Decreased balance   [x]Decreased endurance   [x]Decreased posture  []Decreased sensation  []Decreased coordination   []Decreased vision  []Decreased safety awareness   []Increased pain       Comments:    Pt supine in bed upon entering, agreeable to participate. Pt instructed to transfer to EOB, completing transfer with no physical assistance. Pt sitting upright with good static sitting balance. Pt instructed to transfer from EOB and ambulate to tolerance. Pt required increased time between transfers 2/2 to feeling SOB. Pt's SpO2 WNL throughout. Pt demonstrated fair martin and balance with ww use during ambulation bout. Pt cued for pacing for energy conservation. Pt was assisted to bedside chair at the end of session, all needs met and call bell in reach prior to exiting. Nursing aware of pt's performance. Treatment:  Patient practiced and was instructed in the following treatment:    Bed mobility training - pt given verbal and tactile cues to facilitate proper sequencing and safety during rolling and supine>sit as well as provided with physical assistance to complete task   STS and pivot transfer training - pt educated on proper hand and foot placement, safety and sequencing, and use of verbal and tactile cues to safely complete sit<>stand and pivot transfers with hands on assistance to complete task safely   Gait training- pt was given verbal and tactile cues to facilitate pt safety with ww use during ambulation as well as provided with hands on assistance. Pt's/ family goals   1. Return home    Prognosis is fair for reaching above PT goals. Patient and or family understand(s) diagnosis, prognosis, and plan of care. yes    PHYSICAL THERAPY PLAN OF CARE:    PT POC is established based on physician order and patient diagnosis     Referring provider/PT Order:  Lee Stinson MD  Diagnosis:  Septicemia [facility]) [A41.9]  Dyspnea and respiratory abnormalities [R06.00, R06.89]  COPD exacerbation ([facility] [J44.1]  Sepsis ([facility] [A41.9]  Specific instructions for next treatment:  Progress as tolerated    Current Treatment Recommendations:     [x] Strengthening to improve independence with functional mobility   [] ROM to improve independence with functional mobility   [x] Balance Training to improve static/dynamic balance and to reduce fall risk  [x] Endurance Training to improve activity tolerance during functional mobility   [x] Transfer Training to improve safety and independence with all functional transfers   [x] Gait Training to improve gait mechanics, endurance and assess need for appropriate assistive device  [x] Stair Training in preparation for safe discharge home and/or into the community   [] Positioning to prevent skin breakdown and contractures  [x] Safety and Education Training   [x] Patient/Caregiver Education   [] HEP  [] Other     PT long term treatment goals are located in above grid    Frequency of treatments: 2-5x/week x 1-2 weeks. Time in  1030  Time out  1113    Total Treatment Time  23 minutes     Evaluation Time includes thorough review of current medical information, gathering information on past medical history/social history and prior level of function, completion of standardized testing/informal observation of tasks, assessment of data and education on plan of care and goals.     CPT codes:  [x] Low Complexity PT evaluation 77470  [] Moderate Complexity PT evaluation 76203  [] High Complexity PT evaluation 03274  [] PT Re-evaluation [id number]  [] Gait training 67715 -- minutes  [] Manual therapy 09544 [facility] -- minutes  [x] Therapeutic activities 86850 23 minutes  [] Therapeutic exercises 06862 -- minutes  [] Neuromuscular reeducation 40509 -- minutes     Dayday Borrero, PT, DPT  [id number]

## 2023-02-08 NOTE — PLAN OF CARE
Problem: Chronic Conditions and Co-morbidities  Goal: Patient's chronic conditions and co-morbidity symptoms are monitored and maintained or improved  Outcome: Progressing     Problem: Discharge Planning  Goal: Discharge to home or other facility with appropriate resources  Outcome: Progressing     Problem: Safety - Adult  Goal: Free from fall injury  Outcome: Progressing     Problem: Cardiovascular - Adult  Goal: Maintains optimal cardiac output and hemodynamic stability  Outcome: Progressing     Problem: Metabolic/Fluid and Electrolytes - Adult  Goal: Hemodynamic stability and optimal renal function maintained  Outcome: Progressing

## 2023-02-08 NOTE — PROGRESS NOTES
Spoke with patient's daughter, Mackenzie Connolly, gave her an update and addressed all questions and concerns.

## 2023-02-08 NOTE — PROGRESS NOTES
[address] [place] [address] Attending    S: 58 y.o. female with a 4 hour history of acute shortness of breath. She has a known history of significant COPD, as well as chronic diastolic heart failure. She had a provoked DVT in 2018. She has a long smoking history, but quit in 2019. She was in the hospital at [address] [place] [address],[address] in December for [facility] as well as with a colon abscess after a perforated diverticuli. She had a drain in place for a few days, and received antibiotics. She was moved to [place] [address] for rehab following her hospitalization. She notes that she was hoping to go home next week. She cannot recall any other symptoms in the few days prior to this SOB. She is chronically on 4 L of oxygen via NC. She was actually placed on bipap once ABGs showed hypercapnea. She  denies chest pain. She notes that her left foot is mildly edematous compared to the right. She had an ultrasound on 1/6/23 that did not show any blood clots per the patient. Today, patient is breathing much easier. She is tolerating the antibiotics well. She is on 6 liters of high flow oxygen when we rounded. She has been in [facility] and was set to be discharged. She has a hard time getting up and ambulating. She has never before been told she has hep C and is unsure how she would have gotten it. O: VS- Blood pressure 112/74, pulse 82, temperature 97.1 °F (36.2 °C), temperature source Temporal, resp. rate 18, height 5' 3\" (1.6 m), weight 135 lb 8 oz (61.5 kg), SpO2 97 %, not currently breastfeeding. Exam is as noted by resident with the following changes, additions or corrections:  Gen:  AAOx3  CVS:  regular rate, regular rhythm  Lungs:  improved air movement  Ext:  there is 1+ edema on the dorsal aspect of the left foot; trace edema of right leg.        Pro   WBC 20.4  Ddimer 1784  Flu and covid negative  CTA chest pending    Impressions:  Acute on Chronic Respiratory Failure with Hypoxia and Hypercapnea  COPD exacerbation  Sepsis (HCC)-elevated WBC, tachycardia, tachypnea, hypotension  Transaminitis-hx of positive Hep A and Hep C antibodies in 9/2022      Plan:     CTA-no PE; progressive fibroretraction/multi segmental atelectasis of both upper lobes/right middle lobe/lingula with confluent parenchymal opacities that can represent areas of organized pneumonia; granulomatous process and atypical infectious process. Started Abx  Pulm consulted  Check Hep C viral load  Nebs  Cardio consulted-consider cardiac cath when pneumonia resolves; repeat troponin      Dispo:  per  note, [facility] will not take pt back due to outstanding balance and pt will not file for medicaid. Attending Physician Statement  I have reviewed the chart and seen the patient with the resident(s). I personally reviewed images, EKG's and similar tests, if present. I personally reviewed and performed key elements of the history and exam.  I have reviewed and confirmed student and/or resident history and exam with changes as indicated above. I agree with the assessment, plan and orders as documented by the resident. Please refer to the resident progress note today for additional information. Tiffany Del Toro.  Alexis Moncada MD

## 2023-02-08 NOTE — PROGRESS NOTES
Patient states she has a headache and does not want to wear bipap tonight. Also does not want any treatments until morning. She will call if she changes her mind.

## 2023-02-08 NOTE — PROGRESS NOTES
[address], [address] [place] [address] [address]        Date:2023                                                  Patient Name: Yoselyn Polo    MRN: [id number]    : 1960    Room: [address] [place] [address]      Evaluating OT: Mason Albrecht, [address] Suk[address] Pennington OTR/L; [id number]      Referring Provider: Adeola Negrete MD    Specific Provider Orders/Date: OT Eval and Treat 23      Diagnosis: Sepsis    Pulmonary fibrosis    Hypoxemia    Heart failure     Abnormal nuclear stress test    Surgery: none this admission     Pertinent Medical History:  has a past medical history of Abscess, Acute on chronic diastolic CHF (congestive heart failure) ([facility] [address] COPD (chronic obstructive pulmonary disease) ([facility] [address] Diverticulitis, Provoked DVT 3/2018, Seasonal allergic rhinitis, Smoker, and Tobacco use disorder.       Reason for Admission: SOB, nausea, diahrea, 40% SpO2    Recommended Adaptive Equipment:  TBD pending progression - shower chair     Precautions:  Fall Risk, IV, O2 (4 L Baseline), Incontinent, dyspnea with exertion     Assessment of current deficits:    [x] Functional mobility  [x]ADLs  [x] Strength               [x]Cognition    [x] Functional transfers   [x] IADLs         [x] Safety Awareness   [x]Endurance    [x] Fine Coordination              [x] Balance      [] Vision/perception   []Sensation     []Gross Motor Coordination  [] ROM  [] Delirium                   [] Motor Control     OT PLAN OF CARE   OT POC based on physician orders, patient diagnosis and results of clinical assessment    Frequency/Duration: 1-3 days/wk for 2 weeks PRN   Specific OT Treatment Interventions to include:   * Instruction/training on adapted ADL techniques and AE recommendations to increase functional independence within precautions       * Training on energy conservation strategies, correct breathing pattern and techniques to improve independence/tolerance for self-care routine  * Functional transfer/mobility training/DME recommendations for increased independence, safety, and fall prevention  * Patient/Family education to increase follow through with safety techniques and functional independence  * Recommendation of environmental modifications for increased safety with functional transfers/mobility and ADLs  * Therapeutic exercise to improve motor endurance, ROM, and functional strength for ADLs/functional transfers  * Therapeutic activities to facilitate/challenge dynamic balance, stand tolerance for increased safety and independence with ADLs    Home Living: Pt admitted room SNF ([facility]) reports plan was to discharge to home with daughter prior to this admission. Pt plans to discharge to daughters home with daughters boyfriend 2 story home with 2 steps and 1HR to enter.    Bathroom setup: tub/shower unit with grab bars  Equipment owned: BSC, O2, hosptial bed    Prior Level of Function: ADLs - assist with bathing tasks/LB dressing     Dependent on daughter with IADLs  ambulated with ww with assist of 1 at SNF prior to admission  Driving: daughter provides tx    Available Assist/Support: daughter works part time, when daughter not present daughter boyfriend available for assist - pt reports assists 24/7     Pain Level: 0/10  Cognition: A&O: 4/4  Follows multi step directions    Memory:  good   Sequencing:  fair   Problem solving:  fair   Judgement/safety:  fair     Functional Assessment:  AM-PAC Daily Activity Raw Score: 15/24   Initial Eval Status  Date: 2/8/23 Treatment Status  Date: STGs = LTGs  Time frame: 10-14 days   Feeding Set Up   Independent    Grooming Minimal Assist   Independent    UB Dressing Minimal Assist   Best gown  Independent    LB Dressing Moderate Assist   Best brief seated EOB assist to thread BLE and manage over hips  Moderate Trinity    Bathing Moderate Assist  Limited dynamic standing balance/endurance for tasks  Moderate Acra    Toileting Minimal Assist   Incontinent of bowels during functional mobility from supine to sit  Assist with posterior pericare standing EOB  Using bed pan seated EOB d/t urgency and pt reported concern for ambulating to bathroom   Moderate Acra    Bed Mobility  Log Roll: NT  Supine to sit:  SBA  Sit to supine: SBA   Supine to sit: Moderate Acra   Sit to supine: Moderate Acra    Functional Transfers Sit to stand: SBA   Stand to sit: SBA  Stand pivot: CGA with ww  Commode: NT  Sit to stand: Mod Ind   Stand to sit:Mod Ind  Stand pivot: Mod Ind  Commode: Mod Ind    Functional Mobility CGA with ww   within household distance  Mod Ind with AD    Balance Sitting:     Static - SBA     Dynamic - SBA  Standing: CGA with ww  Sitting:  Static: IND  Dynamic: IND  Standing: Mod Ind with AD   Activity Tolerance FAIR  Limited d/t dyspnea with exertion  Requires frequent and extended seated rest breaks d/t SOB   SpO2 stable on 6L NC   GOOD   Visual/  Perceptual Glasses: yes - reading only           Vitals Prior to Activity  SpO2 on 6L NC: 96%    During Activity  SpO2 on 6L NC: 96-98%         BUE  ROM/Strength/  Fine motor Coordination Hand dominance:Right     RUE: ROM WFL     Strength: 4-/5      Strength: FAIR     Coordination:  FAIR     LUE: ROM WFL     Strength: 4-/5      Strength: FAIR     Coordination:  FAIR  increase BUE muscle strength for improved indep with functional transfers       Hearing: WFL   Sensation:  No c/o numbness or tingling   Tone: WFL   Edema: unremarkable    Patient/Family Goal: pt goal is to return home with assist from daughter    Based on patient's functional performance as stated below and level of assistance needed prior to admission, this therapist believes that the patient would benefit from further skilled OT following hospital stay in an effort to increase safety, functional independence, and quality of life.     Comment: Cleared by RN to see pt. Upon arrival patient lying supine in bed and agreeable to OT session. At end of session, patient seated in bedside chair and RN notified with call light and phone within reach, all lines and tubes intact. Overall patient demonstrated  decreased independence and safety during completion of ADL/functional transfer/mobility tasks. Pt would benefit from continued skilled OT to increase safety and independence with completion of ADL/IADL tasks for functional independence and quality of life. Treatment: Pt required vc's for proper technique/safety with hand placement/body mechanics/posture for bed mobility/ADLs/functional tranfers/mobility/ww management. Pt required vc's for sequencing/initiation of ADLs/functional transfers. Pt able to  sit EOB ~20 mins to increase core strength/balance/activity tolerance for ease with ADLs. Pt completed supine to sit EOB required rest break d/t fatigue and SOB. Pt incontient of bowel movement required assist with pericare standing EOB, pt reported bladdar movement urgency encouraged functional mobility to bathroom, pt concerned and requested use of bed pan seated EOB, required extended seated rest break, pt became SOB and diaphoretic provided cold wash cloth for comfort. Pt donned briefs seated EOB when symptoms resolved and completed sit to stand 3rd time and assist from EOB to bedside chair, extended rest break required and pt ambulated within room and agreeable to remain seated in bedside chair. Pt required increased time to complete ADLs/functional transfers due to overall fatigue/SOB. Pt required skilled monitoring of SpO2 during session and education on energy conservation techniques. Pt required rest breaks during session and educated on pursed lip breathing. Pt appeared to have tolerated session well and appears motivated/cooperative/pleasant. Pt instructed on use of call light for assistance and fall prevention.  Pt demo'ing fair understanding of education provided. Continue to educate. Rehab Potential: Good  for established goals       LTG: maximize independence with ADLs to return to PLOF    Patient and/or family were instructed on functional diagnosis, prognosis/goals and OT plan of care. Demonstrated FAIR understanding. [] Malnutrition indicators have been identified and nursing has been notified to ensure a dietitian consult is ordered. Eval Complexity: Low     Evaluation time includes thorough review of current medical information, gathering information on past medical & social history & PLOF, completion of standardized testing, informal observation of tasks, consultation with other medical professions/disciplines, assessment of data & development of POC/goals. Time In: 1030  Time Out: 1111  Total Treatment Time: 26    Min Units   OT Eval Low 01221  x     OT Eval Medium 30197      OT Eval High 36508      OT Re-Eval [id number]       Therapeutic Ex 42313       Therapeutic Activities 65135  11  1   ADL/Self Care 62568  15  1   Orthotic Management 62564       Manual [id number]     Neuro Re-Ed 15410       Non-Billable Time          Evaluation Time additionally includes thorough review of current medical information, gathering information on past medical history/social history and prior level of function, interpretation of standardized testing/informal observation of tasks, assessment of data and development of plan of care and goals.           MICHEAL Sullivan OTR/L; [id number]

## 2023-02-08 NOTE — PROGRESS NOTES
Pharmacy Consultation Note  (Antibiotic Dosing and Monitoring)    Initial consult date: 2/6/23  Consulting physician/provider: 2/6/23  Drug: Vancomycin  Indication: CAP; 7 days     Age/  Gender Height Weight IBW  Allergy Information   62 y.o./female 5' 3\" (160 cm) 130 lb (59 kg)     Ideal body weight: 52.4 kg (115 lb 8.3 oz)  Adjusted ideal body weight: 56 kg (123 lb 8.2 oz)   Penicillin v      Renal Function:  Recent Labs     02/06/23  0659 02/07/23  0618 02/08/23  0540   BUN 13 15 14   CREATININE 0.4* 0.4* 0.3*         Intake/Output Summary (Last 24 hours) at 2/8/2023 0918  Last data filed at 2/8/2023 0026  Gross per 24 hour   Intake 370 ml   Output 200 ml   Net 170 ml         Vancomycin Monitoring:  Trough:  No results for input(s): VANCOTROUGH in the last 72 hours. Random:    Recent Labs     02/08/23  0540   VANCORANDOM 23.3       Recent Labs     02/06/23  1548   BLOODCULT2 24 Hours no growth          Historical Cultures:  Organism   Date Value Ref Range Status   12/19/2022 Streptococcus gordonii (A)  Final     Recent Labs     02/06/23  1549   BC 24 Hours no growth       Vancomycin Administration Times:    Recent vancomycin administrations                     vancomycin (VANCOCIN) 1,250 mg in sodium chloride 0.9 % 250 mL IVPB (mg) 1,250 mg New Bag 02/07/23 2256     1,250 mg New Bag  1233     1,250 mg New Bag 02/06/23 2348    vancomycin 1500 mg in sodium chloride 0.9% 300 mL IVPB (mg) 1,500 mg New Bag 02/06/23 1609        Assessment:  Patient is a 58 y.o. female who has been initiated on vancomycin  Estimated Creatinine Clearance: 161 mL/min (A) (based on SCr of 0.3 mg/dL (L)).   To dose vancomycin, pharmacy will be utilizing [employer] calculation software for goal AUC/RAINE 400-600 mg/L-hr  Vancomycin 1250 mg q12h (Predicted AUC/RAINE = 536, Tr = 14.7)  Random level 2/8 with morning labs= 23.3 (~7hr post dose)      Plan:  Continue Vancomycin 1250 q12h  Will check vancomycin levels when appropriate  Will continue to monitor renal function   Pharmacy to follow      Thank you for this consult,    Nanda Holbrook, PharmD 2/8/2023 9:18 AM

## 2023-02-09 LAB
(1,3)-BETA-D-GLUCAN (FUNGITELL) INTERPRETATION: NEGATIVE
(1,3)-BETA-D-GLUCAN (FUNGITELL): 37 PG/ML
ALBUMIN SERPL-MCNC: 2 G/DL (ref 3.5–5.2)
ALP BLD-CCNC: 105 U/L (ref 35–104)
ALT SERPL-CCNC: 35 U/L (ref 0–32)
ANION GAP SERPL CALCULATED.3IONS-SCNC: 5 MMOL/L (ref 7–16)
AST SERPL-CCNC: 41 U/L (ref 0–31)
BASOPHILS ABSOLUTE: 0.03 E9/L (ref 0–0.2)
BASOPHILS RELATIVE PERCENT: 0.4 % (ref 0–2)
BILIRUB SERPL-MCNC: 0.4 MG/DL (ref 0–1.2)
BUN BLDV-MCNC: 12 MG/DL (ref 6–23)
CALCIUM SERPL-MCNC: 8.4 MG/DL (ref 8.6–10.2)
CHLORIDE BLD-SCNC: 103 MMOL/L (ref 98–107)
CO2: 33 MMOL/L (ref 22–29)
CREAT SERPL-MCNC: 0.3 MG/DL (ref 0.5–1)
EOSINOPHILS ABSOLUTE: 0.01 E9/L (ref 0.05–0.5)
EOSINOPHILS RELATIVE PERCENT: 0.1 % (ref 0–6)
GFR SERPL CREATININE-BSD FRML MDRD: >60 ML/MIN/1.73
GLUCOSE BLD-MCNC: 75 MG/DL (ref 74–99)
HCT VFR BLD CALC: 33.2 % (ref 34–48)
HCV QNT BY NAAT IU/ML: NOT DETECTED IU/ML
HCV QNT BY NAAT LOG IU/ML: NOT DETECTED LOG IU/ML
HEMOGLOBIN: 10.7 G/DL (ref 11.5–15.5)
IMMATURE GRANULOCYTES #: 0.02 E9/L
IMMATURE GRANULOCYTES %: 0.3 % (ref 0–5)
INTERPRETATION: NOT DETECTED
LYMPHOCYTES ABSOLUTE: 2.09 E9/L (ref 1.5–4)
LYMPHOCYTES RELATIVE PERCENT: 26.9 % (ref 20–42)
MCH RBC QN AUTO: 31 PG (ref 26–35)
MCHC RBC AUTO-ENTMCNC: 32.2 % (ref 32–34.5)
MCV RBC AUTO: 96.2 FL (ref 80–99.9)
MONOCYTES ABSOLUTE: 1.09 E9/L (ref 0.1–0.95)
MONOCYTES RELATIVE PERCENT: 14 % (ref 2–12)
NEUTROPHILS ABSOLUTE: 4.52 E9/L (ref 1.8–7.3)
NEUTROPHILS RELATIVE PERCENT: 58.3 % (ref 43–80)
PDW BLD-RTO: 15.4 FL (ref 11.5–15)
PLATELET # BLD: 201 E9/L (ref 130–450)
PMV BLD AUTO: 10.7 FL (ref 7–12)
POTASSIUM REFLEX MAGNESIUM: 4.3 MMOL/L (ref 3.5–5)
RBC # BLD: 3.45 E12/L (ref 3.5–5.5)
SODIUM BLD-SCNC: 141 MMOL/L (ref 132–146)
TOTAL PROTEIN: 5.1 G/DL (ref 6.4–8.3)
WBC # BLD: 7.8 E9/L (ref 4.5–11.5)

## 2023-02-09 PROCEDURE — 85025 COMPLETE CBC W/AUTO DIFF WBC: CPT

## 2023-02-09 PROCEDURE — 99232 SBSQ HOSP IP/OBS MODERATE 35: CPT | Performed by: FAMILY MEDICINE

## 2023-02-09 PROCEDURE — 99233 SBSQ HOSP IP/OBS HIGH 50: CPT | Performed by: INTERNAL MEDICINE

## 2023-02-09 PROCEDURE — 6370000000 HC RX 637 (ALT 250 FOR IP): Performed by: INTERNAL MEDICINE

## 2023-02-09 PROCEDURE — 2700000000 HC OXYGEN THERAPY PER DAY

## 2023-02-09 PROCEDURE — 6370000000 HC RX 637 (ALT 250 FOR IP): Performed by: FAMILY MEDICINE

## 2023-02-09 PROCEDURE — 36415 COLL VENOUS BLD VENIPUNCTURE: CPT

## 2023-02-09 PROCEDURE — 6360000002 HC RX W HCPCS: Performed by: FAMILY MEDICINE

## 2023-02-09 PROCEDURE — 80053 COMPREHEN METABOLIC PANEL: CPT

## 2023-02-09 PROCEDURE — 94660 CPAP INITIATION&MGMT: CPT

## 2023-02-09 PROCEDURE — 94640 AIRWAY INHALATION TREATMENT: CPT

## 2023-02-09 PROCEDURE — 2580000003 HC RX 258: Performed by: FAMILY MEDICINE

## 2023-02-09 PROCEDURE — 2060000000 HC ICU INTERMEDIATE R&B

## 2023-02-09 PROCEDURE — 87385 HISTOPLASMA CAPSUL AG IA: CPT

## 2023-02-09 RX ADMIN — PREDNISONE 40 MG: 20 TABLET ORAL at 10:04

## 2023-02-09 RX ADMIN — CEFEPIME 2000 MG: 2 INJECTION, POWDER, FOR SOLUTION INTRAVENOUS at 18:10

## 2023-02-09 RX ADMIN — ATORVASTATIN CALCIUM 40 MG: 40 TABLET, FILM COATED ORAL at 21:09

## 2023-02-09 RX ADMIN — FUROSEMIDE 20 MG: 40 TABLET ORAL at 10:10

## 2023-02-09 RX ADMIN — FAMOTIDINE 20 MG: 20 TABLET, FILM COATED ORAL at 21:09

## 2023-02-09 RX ADMIN — FLUTICASONE PROPIONATE 2 SPRAY: 50 SPRAY, METERED NASAL at 10:05

## 2023-02-09 RX ADMIN — Medication 5 MG: at 21:09

## 2023-02-09 RX ADMIN — ASPIRIN 81 MG CHEWABLE TABLET 81 MG: 81 TABLET CHEWABLE at 10:04

## 2023-02-09 RX ADMIN — ARFORMOTEROL TARTRATE 15 MCG: 15 SOLUTION RESPIRATORY (INHALATION) at 11:52

## 2023-02-09 RX ADMIN — DOCUSATE SODIUM 100 MG: 100 CAPSULE, LIQUID FILLED ORAL at 10:04

## 2023-02-09 RX ADMIN — Medication 1000 MG: at 10:04

## 2023-02-09 RX ADMIN — Medication 2000 UNITS: at 10:04

## 2023-02-09 RX ADMIN — SODIUM CHLORIDE, PRESERVATIVE FREE 10 ML: 5 INJECTION INTRAVENOUS at 21:11

## 2023-02-09 RX ADMIN — ARFORMOTEROL TARTRATE 15 MCG: 15 SOLUTION RESPIRATORY (INHALATION) at 20:58

## 2023-02-09 RX ADMIN — METOPROLOL SUCCINATE 12.5 MG: 25 TABLET, EXTENDED RELEASE ORAL at 21:09

## 2023-02-09 RX ADMIN — SODIUM CHLORIDE, PRESERVATIVE FREE 10 ML: 5 INJECTION INTRAVENOUS at 10:08

## 2023-02-09 RX ADMIN — POTASSIUM CHLORIDE 20 MEQ: 1500 TABLET, EXTENDED RELEASE ORAL at 10:05

## 2023-02-09 RX ADMIN — Medication 50 MG: at 10:05

## 2023-02-09 RX ADMIN — SERTRALINE HYDROCHLORIDE 25 MG: 50 TABLET ORAL at 10:04

## 2023-02-09 RX ADMIN — IPRATROPIUM BROMIDE AND ALBUTEROL SULFATE 1 AMPULE: 2.5; .5 SOLUTION RESPIRATORY (INHALATION) at 16:41

## 2023-02-09 RX ADMIN — CEFEPIME 2000 MG: 2 INJECTION, POWDER, FOR SOLUTION INTRAVENOUS at 10:13

## 2023-02-09 RX ADMIN — CEFEPIME 2000 MG: 2 INJECTION, POWDER, FOR SOLUTION INTRAVENOUS at 02:15

## 2023-02-09 RX ADMIN — IPRATROPIUM BROMIDE AND ALBUTEROL SULFATE 1 AMPULE: 2.5; .5 SOLUTION RESPIRATORY (INHALATION) at 11:51

## 2023-02-09 RX ADMIN — BUDESONIDE 500 MCG: 0.5 SUSPENSION RESPIRATORY (INHALATION) at 20:58

## 2023-02-09 RX ADMIN — BUDESONIDE 500 MCG: 0.5 SUSPENSION RESPIRATORY (INHALATION) at 11:53

## 2023-02-09 RX ADMIN — ENOXAPARIN SODIUM 40 MG: 100 INJECTION SUBCUTANEOUS at 10:10

## 2023-02-09 RX ADMIN — FAMOTIDINE 20 MG: 20 TABLET, FILM COATED ORAL at 10:04

## 2023-02-09 RX ADMIN — IPRATROPIUM BROMIDE AND ALBUTEROL SULFATE 1 AMPULE: 2.5; .5 SOLUTION RESPIRATORY (INHALATION) at 20:58

## 2023-02-09 ASSESSMENT — PAIN SCALES - GENERAL
PAINLEVEL_OUTOF10: 0
PAINLEVEL_OUTOF10: 0

## 2023-02-09 NOTE — PROGRESS NOTES
SIGN OFF CONSULT    Pharmacy no longer has an active consult to dose and follow vancomycin. Pharmacy signing off this patient. Please re-consult pharmacy if future dosing is needed.     Thank you for the consult,    Jovanna Dockery, PharmD 2/9/2023 7:39 AM

## 2023-02-09 NOTE — PROGRESS NOTES
[place]  Department of Pulmonary, Critical Care and Sleep Medicine  [address] [facility]  Department of Internal Medicine  Progress Note    SUBJECTIVE:    Patient seen and examined reports that she feels better than yesterday. She is currently on high flow nasal cannula at 6 L. Still quite short of breath going from the bed to bedside commode. OBJECTIVE:  Vitals:    02/08/23 0148 02/08/23 0750 02/08/23 1223 02/08/23 1559   BP:  112/74  110/69   Pulse:  82 84 81   Resp:  18 24 20   Temp:  97.1 °F (36.2 °C)  97.3 °F (36.3 °C)   TempSrc:  Temporal  Temporal   SpO2:  97%  100%   Weight: 135 lb 8 oz (61.5 kg)      Height:         Constitutional: Alert,     EENT: EOMI SONU. MMM. No icterus. No thrush. Neck: No thyromegaly. Trachea was midline. Respiratory: Symmetrical.  Few scattered crackles  Cardiovascular: Regular, No murmur. No rubs. Pulses:  Equal bilaterally. Abdomen: Soft without organomegaly. No rebound, rigidity. No guarding. Lymphatic: No lymphadenopathy. Musculoskeletal: Without weakness or gross deficits  Extremities:  No lower extremity edema. Reflexes appear adequate. Skin:  Warm and dry. No skin rashes. Neurological/Psychiatric: No acute psychosis. Cranial nerves are intact. DATA:    Monitor Strips:  Reviewed & discusses with technical team. No changes noted.     RADIOLOGY:  Films were read/reviewed/discussed with radiology shows no new imaging      CBC with Differential:    Lab Results   Component Value Date/Time    WBC 7.5 02/08/2023 05:40 AM    RBC 3.41 02/08/2023 05:40 AM    HGB 10.5 02/08/2023 05:40 AM    HCT 33.3 02/08/2023 05:40 AM     02/08/2023 05:40 AM    MCV 97.7 02/08/2023 05:40 AM    MCH 30.8 02/08/2023 05:40 AM    MCHC 31.5 02/08/2023 05:40 AM    RDW 15.7 02/08/2023 05:40 AM    NRBC 0.9 11/30/2022 05:18 AM    METASPCT 0.9 12/22/2022 06:18 AM    LYMPHOPCT 25.7 02/08/2023 05:40 AM    MONOPCT 13.0 02/08/2023 05:40 AM    MYELOPCT 1.0 12/28/2022 07:02 PM    BASOPCT 0.9 02/08/2023 05:40 AM    MONOSABS 0.98 02/08/2023 05:40 AM    LYMPHSABS 1.94 02/08/2023 05:40 AM    EOSABS 0.09 02/08/2023 05:40 AM    BASOSABS 0.07 02/08/2023 05:40 AM     CMP:    Lab Results   Component Value Date/Time     02/08/2023 05:40 AM    K 3.5 02/08/2023 05:40 AM     02/08/2023 05:40 AM    CO2 34 02/08/2023 05:40 AM    BUN 14 02/08/2023 05:40 AM    CREATININE 0.3 02/08/2023 05:40 AM    GFRAA >60 04/13/2022 02:20 PM    LABGLOM >60 02/08/2023 05:40 AM    GLUCOSE 81 02/08/2023 05:40 AM    PROT 4.8 02/08/2023 05:40 AM    LABALBU 2.0 02/08/2023 05:40 AM    CALCIUM 8.1 02/08/2023 05:40 AM    BILITOT 0.4 02/08/2023 05:40 AM    ALKPHOS 112 02/08/2023 05:40 AM    AST 38 02/08/2023 05:40 AM    ALT 34 02/08/2023 05:40 AM     Assessment:   Acute hypoxemic respiratory failure  COPD with exacerbation  Progressive fibrotic changes of right upper lobe, right middle lobe, and lingula  Heart failure with reduced ejection fraction of 40 to 45% on echocardiogram dated January 2023. Abnormal stress test on January 5, 2023: Intermediate risk pharmacologic myocardial perfusion imaging study  Leukocytosis           Plan:   Records from Dr. Luna Collier office reviewed. Patient with very severe COPD and air trapping on PFTs  MRSA swab negative. Full respiratory viral panel-negative  Continue Brovana, budesonide. Continue DuoNebs  Stop vancomycin, continue cefepime  I personally reviewed the patient's CT of the chest.  I compared it to multiple CTs of the chest that the patient has had in the past 3 years. Appears to get show progressive fibrotic changes of the right upper lobe, right middle lobe, and lingula. These have significantly worsened in the last 6 months. Connective tissue labs negative, remainder of send out testing pending.  The pattern of fibrosis is not consistent with UIP and rather may be consistent with possibly allergic bronchopulmonary aspergillosis versus extrinsic allergic alveolitis versus sarcoidosis versus histiocytosis versus occupational lung diseases.   Appreciate cardiology recommendations    Devin Lindsey, DO

## 2023-02-09 NOTE — PROGRESS NOTES
[facility] Inpatient   Resident Progress Note    S:  Hospital day: 3    Brief Synopsis: Simpson Sicard is a 58 y.o. female with a PMH of diastolic CHF,  Patient presented from [place] [address] due to increasing shortness of breath. Per EMS she needed 500 cc bolus for hypotension. Was placed on BiPAP due to hypoxia in ED. BP 74/44 on admission, improved to 109/65/ She was placed on BiPAP due to SpO2 <90% without it. She was tachypneic, tachycardic and normotensive on exam. Her WBC was 20.4 from a previous 9.4 recorded in Jan 2023. Lactic acid 2.6 on presentation. Flu and COVID test negative, CXR showed no acute processes. , D-dimer 1784 and CTA is pending. EKG showed sinus tachycardia. Admitted for COPD exacerbation. Patient started on breathing treatments, steroid burst and consulted pulmonology. CTA showed progressive fiber retraction/multisegmental atelectasis. Compared to previous CTs, he has significantly worsened in the last 6 months. Connective tissue, granulomatous diseases screening tests ordered. TJ and RF was negative, blood cx showed no growth. Overnight, no acute events. Patient was seen and examined this morning. Back to [facility].      Cont meds:    sodium chloride       Scheduled meds:    predniSONE  40 mg Oral Daily    ascorbic acid  1,000 mg Oral Daily    arformoterol tartrate  15 mcg Nebulization BID    budesonide  0.5 mg Nebulization BID    aspirin  81 mg Oral Daily    atorvastatin  40 mg Oral Nightly    docusate sodium  100 mg Oral Daily    fluticasone  2 spray Each Nostril Daily    furosemide  20 mg Oral Daily    ipratropium-albuterol  1 ampule Inhalation Q6H    melatonin  5 mg Oral Nightly    metoprolol succinate  12.5 mg Oral Nightly    potassium chloride  20 mEq Oral Daily    sertraline  25 mg Oral Daily    vitamin D  2,000 Units Oral Daily    zinc sulfate  50 mg Oral Daily    sodium chloride flush  5-40 mL IntraVENous 2 times per day    enoxaparin  40 mg SubCUTAneous Daily    famotidine  20 mg Oral BID    cefepime  2,000 mg IntraVENous Q8H     PRN meds: sodium chloride, magnesium hydroxide, sodium chloride flush, sodium chloride, ondansetron **OR** ondansetron, polyethylene glycol, acetaminophen **OR** acetaminophen, albuterol     I reviewed the patient's Past Medical and Surgical History, Medications and Allergies. O:  VS: /64   Pulse 92   Temp 96.8 °F (36 °C) (Temporal)   Resp 24   Ht 5' 3\" (1.6 m)   Wt 142 lb (64.4 kg)   SpO2 100%   BMI 25.15 kg/m²     Physical Exam:  Physical Exam  Vitals reviewed. Constitutional:       General: She is not in acute distress. Eyes:      Extraocular Movements: Extraocular movements intact. Cardiovascular:      Rate and Rhythm: Normal rate and regular rhythm. Heart sounds: No murmur heard. No gallop. Pulmonary:      Effort: No respiratory distress. Comments: On 4-5 L high flow nasal cannula  Abdominal:      General: Abdomen is flat. Palpations: Abdomen is soft. Musculoskeletal:      Right lower leg: No edema. Left lower leg: Edema (+1 on dorsal aspect) present. Skin:     General: Skin is warm and dry. Neurological:      General: No focal deficit present. Mental Status: She is alert and oriented to person, place, and time. Psychiatric:         Mood and Affect: Mood normal.          Labs:  Na/K/Cl/CO2:  141/4.3/103/33 (02/09 1761)  BUN/Cr/glu/ALT/AST/amyl/lip:  12/0.3/--/35/41/--/-- (02/09 9981)  WBC/Hgb/Hct/Plts:  7.8/10.7/33.2/201 (02/09 6215)  estimated creatinine clearance is 176 mL/min (A) (based on SCr of 0.3 mg/dL (L)). Other pertinent labs as noted below    New Imaging:  CTA CHEST W CONTRAST   Final Result   1. No acute pulmonary emboli. 2.  No aneurysm formation or dissection of the thoracic aorta.       3.  Progressive fibroretraction/multi segmental atelectasis of both upper   lobes/right middle lobe/lingula with confluent parenchymal opacities that can represent areas of organized pneumonia. 4.  Progressive ongoing chronic inflammatory including granulomatous and   atypical infectious process of the lung parenchyma consider in the   differential diagnosis. Further consultation/investigation with pulmonary   medicine recommended. 5.  Liver lesions seen in the upper abdomen partially covered on this   examination, see report MRI of the abdomen of December 29, 2022. XR CHEST PORTABLE   Final Result   1. COPD. There is no evidence of failure or pneumonia   2. Significant pleuroparenchymal scarring seen within the right and left lung   apex. A/P:  Principal Problem:    Sepsis ([address]  Active Problems:    Pulmonary fibrosis (HCC)    Hypoxemia    Heart failure (HCC)    Abnormal nuclear stress test  Resolved Problems:    * No resolved hospital problems. *      Acute Hypoxic Respiratory Failure w hypercapnia  -2/2 COPD exacerbation vs CHF vs sepsis of unknown origin  -Has end stage COPD, candidate for lung tx previously discussed w [facility] 8/2021, previous A1AT levels below normal range  -Recently discharged for acute on chronic diastolic HF, proBNP 264  -WBC on presentation 20k, leukocytosis resolved. Procal 0.06  -CXR negative for acute processes  -CTA shows progressive fibroretraction/multi segmental atelactasis.  Significantly worsened in last 6 months.  -Cultures pending  -DuoNeb, Brovana and Pulmicort  -Day 3 of Vanc and Cefepime for 7 days  -Aggressive pulmonary hygiene  -Pulmonology consult; screening labs for connective tissue, granulomatous diseases pending.  -Nasal spray     Possible sepsis  -Tachypneic, tachycardic and hypoxic with RR in the high 20s, HR in the 100s, and on BiPAP  -Afebrile since admission  -CXR negative for acute processes  -Leukocytosis resolved  -blood cultures, urine culture, UA     Hx of abnormal stress test  -Stress test 1/6/2023 was abnormal, consistent with ischemia of LAD, to be followed up outpatient  -ECHO 1/4/2023 showed depressed systolic fxn and EF 38-84%  -EKG in ED today showed sinus tachycardia  -Troponin 20 to 22, repeat trop 19, trending down     Hx of Liver cyst  -positive for Hep A and Hep C antibodies in 9/22  -Hospitalized for transaminitis with hyperbilirubinemia, multiple liver cysts, abnormal GB appearance with negative US.   -Negative MRCP for biliary obstruction at that time  -AST and ALT slightly elevated  -Hep C viral load pending    DVT Prophylaxis: Lovenox  GI Prophylaxis: Pepcid  Diet: Regular  PT/OT on board      Electronically signed by Adeola Negrete MD on 2/9/2023 at 3:07 PM  This case was discussed with attending physician Dr. Ely Saucedo

## 2023-02-09 NOTE — PROGRESS NOTES
[address] [place] [address] Attending    S: 58 y.o. female with a 4 hour history of acute shortness of breath. She has a known history of significant COPD, as well as chronic diastolic heart failure. She had a provoked DVT in 2018. She has a long smoking history, but quit in 2019. She was in the hospital at [address] [place] [address],[address] in December for [facility] as well as with a colon abscess after a perforated diverticuli. She had a drain in place for a few days, and received antibiotics. She was moved to [place] [address] for rehab following her hospitalization. She notes that she was hoping to go home next week. She cannot recall any other symptoms in the few days prior to this SOB. She is chronically on 4 L of oxygen via NC. She was actually placed on bipap once ABGs showed hypercapnea. She  denies chest pain. She notes that her left foot is mildly edematous compared to the right. She had an ultrasound on 1/6/23 that did not show any blood clots per the patient. Today, patient is breathing much easier. She is tolerating the antibiotics well. She is on 4 liters of oxygen presently which is her baseline requirement. She does feel more short of breath when she walks. O: VS- Blood pressure 114/64, pulse 92, temperature 96.8 °F (36 °C), temperature source Temporal, resp. rate 24, height 5' 3\" (1.6 m), weight 142 lb (64.4 kg), SpO2 100 %, not currently breastfeeding. Exam is as noted by resident with the following changes, additions or corrections:  Gen:  AAOx3  CVS:  regular rate, regular rhythm  Lungs:  improved air movement  Ext:  there is 1+ edema on the dorsal aspect of the left foot; trace edema of right leg. Pro   WBC 20.4  Ddimer 1784  Flu and covid negative    CTA chest 2/6/23  Impression   1. No acute pulmonary emboli. 2.  No aneurysm formation or dissection of the thoracic aorta.        3.  Progressive fibroretraction/multi segmental atelectasis of both upper lobes/right middle lobe/lingula with confluent parenchymal opacities that can   represent areas of organized pneumonia. 4.  Progressive ongoing chronic inflammatory including granulomatous and   atypical infectious process of the lung parenchyma consider in the   differential diagnosis. Further consultation/investigation with pulmonary   medicine recommended. 5.  Liver lesions seen in the upper abdomen partially covered on this   examination, see report MRI of the abdomen of December 29, 2022. Impressions:  Acute on Chronic Respiratory Failure with Hypoxia and Hypercapnea  COPD exacerbation  Sepsis (HCC)-elevated WBC, tachycardia, tachypnea, hypotension  Transaminitis-hx of positive Hep A and Hep C antibodies in 9/2022      Plan:     CTA-no PE; progressive fibroretraction/multi segmental atelectasis of both upper lobes/right middle lobe/lingula with confluent parenchymal opacities that can represent areas of organized pneumonia; granulomatous process and atypical infectious process. Started Abx  Pulm consulted-?bronch? Hep C viral load still pending  Nebs  Cardio consulted-consider cardiac cath when pneumonia resolves; repeat troponin      Dispo:  per  note, [facility] will not take pt back due to outstanding balance and pt will not file for medicaid. she will likely discharge home with [facility] once pulmonology gives recommendations on whether they plan to bronch and what abx are recommended. Attending Physician Statement  I have reviewed the chart and seen the patient with the resident(s). I personally reviewed images, EKG's and similar tests, if present. I personally reviewed and performed key elements of the history and exam.  I have reviewed and confirmed student and/or resident history and exam with changes as indicated above. I agree with the assessment, plan and orders as documented by the resident. Please refer to the resident progress note today for additional information. Adela Allen MD

## 2023-02-10 VITALS
WEIGHT: 141.6 LBS | TEMPERATURE: 97.5 F | BODY MASS INDEX: 25.09 KG/M2 | SYSTOLIC BLOOD PRESSURE: 120 MMHG | RESPIRATION RATE: 16 BRPM | HEART RATE: 67 BPM | HEIGHT: 63 IN | DIASTOLIC BLOOD PRESSURE: 67 MMHG | OXYGEN SATURATION: 98 %

## 2023-02-10 LAB
ALBUMIN SERPL-MCNC: 2 G/DL (ref 3.5–5.2)
ALP BLD-CCNC: 99 U/L (ref 35–104)
ALT SERPL-CCNC: 38 U/L (ref 0–32)
ANION GAP SERPL CALCULATED.3IONS-SCNC: 2 MMOL/L (ref 7–16)
AST SERPL-CCNC: 38 U/L (ref 0–31)
BASOPHILS ABSOLUTE: 0.02 E9/L (ref 0–0.2)
BASOPHILS RELATIVE PERCENT: 0.3 % (ref 0–2)
BILIRUB SERPL-MCNC: 0.3 MG/DL (ref 0–1.2)
BUN BLDV-MCNC: 13 MG/DL (ref 6–23)
CALCIUM SERPL-MCNC: 8 MG/DL (ref 8.6–10.2)
CHLORIDE BLD-SCNC: 104 MMOL/L (ref 98–107)
CO2: 36 MMOL/L (ref 22–29)
COMMENT: NORMAL
CREAT SERPL-MCNC: 0.3 MG/DL (ref 0.5–1)
EOSINOPHILS ABSOLUTE: 0.02 E9/L (ref 0.05–0.5)
EOSINOPHILS RELATIVE PERCENT: 0.3 % (ref 0–6)
GFR SERPL CREATININE-BSD FRML MDRD: >60 ML/MIN/1.73
GLUCOSE BLD-MCNC: 82 MG/DL (ref 74–99)
HCT VFR BLD CALC: 29.9 % (ref 34–48)
HEMOGLOBIN: 9.4 G/DL (ref 11.5–15.5)
IMMATURE GRANULOCYTES #: 0.03 E9/L
IMMATURE GRANULOCYTES %: 0.4 % (ref 0–5)
LYMPHOCYTES ABSOLUTE: 2.24 E9/L (ref 1.5–4)
LYMPHOCYTES RELATIVE PERCENT: 30.4 % (ref 20–42)
MCH RBC QN AUTO: 30.7 PG (ref 26–35)
MCHC RBC AUTO-ENTMCNC: 31.4 % (ref 32–34.5)
MCV RBC AUTO: 97.7 FL (ref 80–99.9)
MONOCYTES ABSOLUTE: 1 E9/L (ref 0.1–0.95)
MONOCYTES RELATIVE PERCENT: 13.6 % (ref 2–12)
NEUTROPHILS ABSOLUTE: 4.05 E9/L (ref 1.8–7.3)
NEUTROPHILS RELATIVE PERCENT: 55 % (ref 43–80)
PDW BLD-RTO: 15.5 FL (ref 11.5–15)
PLATELET # BLD: 169 E9/L (ref 130–450)
PMV BLD AUTO: 10.5 FL (ref 7–12)
POTASSIUM REFLEX MAGNESIUM: 3.7 MMOL/L (ref 3.5–5)
RBC # BLD: 3.06 E12/L (ref 3.5–5.5)
REPORT: NORMAL
SODIUM BLD-SCNC: 142 MMOL/L (ref 132–146)
TOTAL PROTEIN: 5.3 G/DL (ref 6.4–8.3)
WBC # BLD: 7.4 E9/L (ref 4.5–11.5)

## 2023-02-10 PROCEDURE — 2580000003 HC RX 258: Performed by: FAMILY MEDICINE

## 2023-02-10 PROCEDURE — 94660 CPAP INITIATION&MGMT: CPT

## 2023-02-10 PROCEDURE — 99238 HOSP IP/OBS DSCHRG MGMT 30/<: CPT | Performed by: FAMILY MEDICINE

## 2023-02-10 PROCEDURE — 6370000000 HC RX 637 (ALT 250 FOR IP): Performed by: FAMILY MEDICINE

## 2023-02-10 PROCEDURE — 85025 COMPLETE CBC W/AUTO DIFF WBC: CPT

## 2023-02-10 PROCEDURE — 80053 COMPREHEN METABOLIC PANEL: CPT

## 2023-02-10 PROCEDURE — 94640 AIRWAY INHALATION TREATMENT: CPT

## 2023-02-10 PROCEDURE — 6360000002 HC RX W HCPCS: Performed by: FAMILY MEDICINE

## 2023-02-10 PROCEDURE — 2700000000 HC OXYGEN THERAPY PER DAY

## 2023-02-10 PROCEDURE — 36415 COLL VENOUS BLD VENIPUNCTURE: CPT

## 2023-02-10 PROCEDURE — 6370000000 HC RX 637 (ALT 250 FOR IP): Performed by: INTERNAL MEDICINE

## 2023-02-10 RX ORDER — LEVOFLOXACIN 750 MG/1
750 TABLET ORAL DAILY
Qty: 5 TABLET | Refills: 0 | Status: SHIPPED | OUTPATIENT
Start: 2023-02-10 | End: 2023-02-15

## 2023-02-10 RX ORDER — SODIUM CHLORIDE/ALOE VERA
GEL (GRAM) NASAL PRN
Qty: 14.1 G | Refills: 3 | Status: SHIPPED | OUTPATIENT
Start: 2023-02-10 | End: 2023-02-10 | Stop reason: HOSPADM

## 2023-02-10 RX ORDER — PREDNISONE 20 MG/1
40 TABLET ORAL DAILY
Qty: 4 TABLET | Refills: 0 | Status: SHIPPED | OUTPATIENT
Start: 2023-02-11 | End: 2023-02-13

## 2023-02-10 RX ADMIN — FLUTICASONE PROPIONATE 2 SPRAY: 50 SPRAY, METERED NASAL at 10:20

## 2023-02-10 RX ADMIN — POTASSIUM CHLORIDE 20 MEQ: 1500 TABLET, EXTENDED RELEASE ORAL at 10:21

## 2023-02-10 RX ADMIN — IPRATROPIUM BROMIDE AND ALBUTEROL SULFATE 1 AMPULE: 2.5; .5 SOLUTION RESPIRATORY (INHALATION) at 01:26

## 2023-02-10 RX ADMIN — ARFORMOTEROL TARTRATE 15 MCG: 15 SOLUTION RESPIRATORY (INHALATION) at 10:45

## 2023-02-10 RX ADMIN — DOCUSATE SODIUM 100 MG: 100 CAPSULE, LIQUID FILLED ORAL at 10:21

## 2023-02-10 RX ADMIN — Medication 50 MG: at 10:21

## 2023-02-10 RX ADMIN — ENOXAPARIN SODIUM 40 MG: 100 INJECTION SUBCUTANEOUS at 10:20

## 2023-02-10 RX ADMIN — CEFEPIME 2000 MG: 2 INJECTION, POWDER, FOR SOLUTION INTRAVENOUS at 10:41

## 2023-02-10 RX ADMIN — IPRATROPIUM BROMIDE AND ALBUTEROL SULFATE 1 AMPULE: 2.5; .5 SOLUTION RESPIRATORY (INHALATION) at 10:45

## 2023-02-10 RX ADMIN — Medication 2000 UNITS: at 10:21

## 2023-02-10 RX ADMIN — SERTRALINE HYDROCHLORIDE 25 MG: 50 TABLET ORAL at 10:22

## 2023-02-10 RX ADMIN — SODIUM CHLORIDE, PRESERVATIVE FREE 10 ML: 5 INJECTION INTRAVENOUS at 10:22

## 2023-02-10 RX ADMIN — BUDESONIDE 500 MCG: 0.5 SUSPENSION RESPIRATORY (INHALATION) at 10:45

## 2023-02-10 RX ADMIN — CEFEPIME 2000 MG: 2 INJECTION, POWDER, FOR SOLUTION INTRAVENOUS at 02:37

## 2023-02-10 RX ADMIN — Medication 1000 MG: at 10:21

## 2023-02-10 RX ADMIN — FUROSEMIDE 20 MG: 40 TABLET ORAL at 10:28

## 2023-02-10 RX ADMIN — PREDNISONE 40 MG: 20 TABLET ORAL at 10:21

## 2023-02-10 RX ADMIN — IPRATROPIUM BROMIDE AND ALBUTEROL SULFATE 1 AMPULE: 2.5; .5 SOLUTION RESPIRATORY (INHALATION) at 14:39

## 2023-02-10 RX ADMIN — FAMOTIDINE 20 MG: 20 TABLET, FILM COATED ORAL at 10:22

## 2023-02-10 RX ADMIN — ASPIRIN 81 MG CHEWABLE TABLET 81 MG: 81 TABLET CHEWABLE at 10:21

## 2023-02-10 NOTE — PROGRESS NOTES
[address] [place] [address] Attending    S: 58 y.o. female with a 4 hour history of acute shortness of breath. She has a known history of significant COPD, as well as chronic diastolic heart failure. She had a provoked DVT in 2018. She has a long smoking history, but quit in 2019. She was in the hospital at [address] [place] [address],[address] in December for [facility] as well as with a colon abscess after a perforated diverticuli. She had a drain in place for a few days, and received antibiotics. She was moved to [place] [address] for rehab following her hospitalization. She notes that she was hoping to go home next week. She cannot recall any other symptoms in the few days prior to this SOB. She is chronically on 3-4 L of oxygen via NC. She was actually placed on bipap once ABGs showed hypercapnea. She  denies chest pain. She notes that her left foot is mildly edematous compared to the right. She had an ultrasound on 1/6/23 that did not show any blood clots per the patient. Today, patient is breathing much easier. She is tolerating the antibiotics well. She is on 4 liters of oxygen presently which is her baseline requirement. No new complaints. O: VS- Blood pressure 120/67, pulse 67, temperature 97.5 °F (36.4 °C), temperature source Oral, resp. rate 16, height 5' 3\" (1.6 m), weight 141 lb 9.6 oz (64.2 kg), SpO2 98 %, not currently breastfeeding. Exam is as noted by resident with the following changes, additions or corrections:  Gen:  AAOx3  CVS:  regular rate, regular rhythm  Lungs:  improved air movement  Ext:  there is 1+ edema on the dorsal aspect of the left foot; trace edema of right leg. Pro   WBC 20.4  Ddimer 1784  Flu and covid negative    CTA chest 2/6/23  Impression   1. No acute pulmonary emboli. 2.  No aneurysm formation or dissection of the thoracic aorta.        3.  Progressive fibroretraction/multi segmental atelectasis of both upper   lobes/right middle lobe/lingula with confluent parenchymal opacities that can   represent areas of organized pneumonia. 4.  Progressive ongoing chronic inflammatory including granulomatous and   atypical infectious process of the lung parenchyma consider in the   differential diagnosis. Further consultation/investigation with pulmonary   medicine recommended. 5.  Liver lesions seen in the upper abdomen partially covered on this   examination, see report MRI of the abdomen of December 29, 2022. Impressions:  Acute on Chronic Respiratory Failure with Hypoxia and Hypercapnea  COPD exacerbation  Sepsis (HCC)-elevated WBC, tachycardia, tachypnea, hypotension  Transaminitis-hx of positive Hep A and Hep C antibodies in 9/2022      Plan:     CTA-no PE; progressive fibroretraction/multi segmental atelectasis of both upper lobes/right middle lobe/lingula with confluent parenchymal opacities that can represent areas of organized pneumonia; granulomatous process and atypical infectious process. Started Abx  Pulm consulted  Hep C viral load not detected  Nebs  Cardio consulted-consider cardiac cath when pneumonia resolves-need outpatient follow up. Dispo:  plan is for discharge home today on PO abx and close follow up with PCP and cardiology     Attending Physician Statement  I have reviewed the chart and seen the patient with the resident(s). I personally reviewed images, EKG's and similar tests, if present. I personally reviewed and performed key elements of the history and exam.  I have reviewed and confirmed student and/or resident history and exam with changes as indicated above. I agree with the assessment, plan and orders as documented by the resident. Please refer to the resident progress note today and discharge summary for additional information.       Pratima Boyd MD

## 2023-02-10 NOTE — PROGRESS NOTES
CLINICAL PHARMACY NOTE: MEDS TO BEDS    Total # of Prescriptions Filled: 2   The following medications were delivered to the patient:  Prednisone 20 mg  Levofloxacin 750 mg  Saline nasal  spray  Delivered to pt    Additional Documentation:

## 2023-02-10 NOTE — CARE COORDINATION
SOCIAL WORK/CASEMANAGEMENT TRANSITION OF CARE Casey Yolandatammy Husseinclaudia, [address]):  anticipate home today once pcp rounds. [facility] is setup with orders in epic. Pt is on 4l o2 here with 4l usage at home and with portables. Daughter to . EPI Mcmahon met with pt this a.m. and plan is home tomorrow. No needs per pt when hhc was offered. Pt is on 3-4 liters o2  here with 3l usage at home and has portables. Morena Bynum,  2/10/2023  Notified [facility] of discharge and that orders are In the chart.  EPI Mcmahon  2/10/2023

## 2023-02-10 NOTE — DISCHARGE SUMMARY
Discharge Summary    Familia Olsen  :  1960  MRN:  [id number]    Admit date:  2023  Discharge date:      Admitting Physician:  Yue Mariano MD    Discharge Diagnoses:    Patient Active Problem List   Diagnosis    Tobacco use disorder    Seasonal allergic rhinitis    Chronic bronchitis ([address]    Chronic respiratory failure with hypoxia ([address]    Chronic obstructive pulmonary disease ([address]    Acute respiratory failure with hypercapnia (HCC)    Acute on chronic respiratory failure with hypoxia and hypercapnia (HCC)    COPD exacerbation (HCC)    Acute diverticulitis with abscess    Acute cholecystitis    Acute on chronic diastolic CHF (congestive heart failure) ([address]    Sepsis ([address]    Pulmonary fibrosis (HCC)    Hypoxemia    Heart failure (HCC)    Abnormal nuclear stress test       Admission Condition:  {condition:84585}    Discharged Condition:  {condition:94720}    Hospital Course:   ***    Discharge Medications:         Medication List        START taking these medications      levoFLOXacin 750 MG tablet  Commonly known as: Levaquin  Take 1 tablet by mouth daily for 5 days     predniSONE 20 MG tablet  Commonly known as: DELTASONE  Take 2 tablets by mouth daily for 2 days  Start taking on: 2023     * sodium chloride 0.65 % nasal spray  Commonly known as: OCEAN, BABY AYR  1 spray by Nasal route every 4 hours as needed for Congestion     * saline nasal gel Gel  by Nasal route as needed for Congestion (Dryness in nostril)           * This list has 2 medication(s) that are the same as other medications prescribed for you. Read the directions carefully, and ask your doctor or other care provider to review them with you. CHANGE how you take these medications      acetaminophen 325 MG tablet  Commonly known as: TYLENOL  What changed: Another medication with the same name was removed. Continue taking this medication, and follow the directions you see here.      pantoprazole 40 MG on 4L NC. To follow-up with pulmonologist in 6 weeks for repeat CT scan. Cardiology was consulted due to abnormal stress test from last hospitalization. ECHO 1/4/2023 showed depressed systolic fxn and EF 83-32%. Catheterization was not indicated at this time either, to follow-up outpatient. Hepatitis C viral load not detected. Patient tolerated diet, and able to ambulate on her own. She was discharged in a stable condition back home. Discharge plan:  Continue taking Levaquin for 5 days. Continue prednisone for 2 more days. Follow up with cardiology outpatient for cardiac catheterization. Follow-up with pulmonology outpatient for repeat CT scan in 6 weeks. Follow-up with Dr. Marco Su[facility]rez of [facility] primary care    Discharge Medications:         Medication List        START taking these medications      levoFLOXacin 750 MG tablet  Commonly known as: Levaquin  Take 1 tablet by mouth daily for 5 days     predniSONE 20 MG tablet  Commonly known as: DELTASONE  Take 2 tablets by mouth daily for 2 days  Start taking on: February 11, 2023     * sodium chloride 0.65 % nasal spray  Commonly known as: OCEAN, BABY AYR  1 spray by Nasal route every 4 hours as needed for Congestion     * saline nasal gel Gel  by Nasal route as needed for Congestion (Dryness in nostril)           * This list has 2 medication(s) that are the same as other medications prescribed for you. Read the directions carefully, and ask your doctor or other care provider to review them with you. CHANGE how you take these medications      acetaminophen 325 MG tablet  Commonly known as: TYLENOL  What changed: Another medication with the same name was removed. Continue taking this medication, and follow the directions you see here. pantoprazole 40 MG tablet  Commonly known as: Protonix  Take 1 tablet by mouth in the morning and at bedtime for 14 days, THEN 1 tablet daily.   Start taking on: January 1, 2023  What changed: See the new tablet  Commonly known as: ZOLOFT  Take 1 tablet by mouth daily     vitamin D 50 MCG (2000 UT) Tabs tablet  Commonly known as: CHOLECALCIFEROL  Take 1 tablet by mouth daily     zinc 50 MG Caps  Take 50 mg by mouth daily               Where to Get Your Medications        These medications were sent to [facility] [address] [address]      Phone: [phone]   levoFLOXacin 750 MG tablet  predniSONE 20 MG tablet  saline nasal gel Gel  sodium chloride 0.65 % nasal spray         Consults:  {consultation:89271}    Significant Diagnostic Studies:  {diagnostics:32842}    Labs:  Na/K/Cl/CO2:  142/3.7/104/36 (02/10 7729)  BUN/Cr/glu/ALT/AST/amyl/lip:  13/0.3/--/38/38/--/-- (02/10 5707)  WBC/Hgb/Hct/Plts:  7.4/9.4/29.9/169 (02/10 4520)  [unfilled]  estimated creatinine clearance is 175 mL/min (A) (based on SCr of 0.3 mg/dL (L)). New Imaging:  XR CHEST PORTABLE    Result Date: 2/6/2023  EXAMINATION: ONE XRAY VIEW OF THE CHEST 2/6/2023 7:31 am COMPARISON: 01/04/2023 and CT scan of 12/28/2022 HISTORY: ORDERING SYSTEM PROVIDED HISTORY: chest pain TECHNOLOGIST PROVIDED HISTORY: Reason for exam:->chest pain What reading provider will be dictating this exam?->CRC FINDINGS: There is hyperinflation of the lungs and flattening of diaphragms consistent with COPD. There is no pulmonary infiltrate, mass or nodule. There is no pleural effusion. The cardiac silhouette is within normal limits. There is significant pleuroparenchymal scarring seen within the right and left lung apex. 1. COPD. There is no evidence of failure or pneumonia 2. Significant pleuroparenchymal scarring seen within the right and left lung apex.      CTA CHEST W CONTRAST    Result Date: 2/6/2023  EXAMINATION: CTA OF THE CHEST 2/6/2023 12:18 pm TECHNIQUE: CTA of the chest was performed after the administration of intravenous contrast.  Multiplanar reformatted images are provided for review. MIP images are provided for review. Automated exposure control, iterative reconstruction, and/or weight based adjustment of the mA/kV was utilized to reduce the radiation dose to as low as reasonably achievable. COMPARISON: Previous CT chest April 13, November 29, December 28, 2022. HISTORY: ORDERING SYSTEM PROVIDED HISTORY: r/o PE TECHNOLOGIST PROVIDED HISTORY: Reason for exam:->r/o PE Decision Support Exception - unselect if not a suspected or confirmed emergency medical condition->Emergency Medical Condition (MA) What reading provider will be dictating this exam?->CRC FINDINGS: There is a progressive fibro-retraction/multi segmental atelectasis the right upper lobe/right middle lobe and of the left upper lobe/lingula included, in a relative symmetrical pattern in both sides of the midline with compensatory hyperexpansion of the corresponding right and left lower lobes, which demonstrate progressive emphysematous changes to advanced degree, in part due compensatory hyperexpansion. There are confluent opacities scattered throughout the retracted right and left upper lobes some of them in bronchocentric areas, others more peripheral located in subpleural regions. These represent areas of consolidation in the lung parenchyma which can be related with areas of chronic organized pneumonia. The possibility for a chronic infectious process as seen with atypical pneumonia including of a granulomatous etiology will be part of the differential diagnosis. Further consultation/investigation with pulmonary medicine recommended. There is no indication for acute pulmonary embolus in the main PA, right left main PAs, in the lobar, segmental and subsegmental branches to the 3/4 order. There is pruning of the segmental and subsegmental arteries of both lower lobes more likely related with the compensatory emphysema present. Heart has normal size.   LV inner diameter 0.6 cm.  RV inner diameter 2.9 cm. There is incomplete filling of the left ventricular cavity with contrast difficult to separate from hypertrophy of the patellar muscles. Further evaluation with echocardiogram is recommended. There is no pericardial effusion. There is no mediastinal mass or adenopathy. There is no aneurysm formation or dissection of the thoracic aorta. Ascending aorta diameter 3.2 cm. Calcified atheromatous changes are seen in the arch of the aorta. Main pulmonary artery diameter 2.9 cm. There is no pleural effusions. Upper abdominal structures are not fully covered on this study. There is a 6 cm simple density cyst in the left lobe of the liver with some internal septations. Please see report of MRI abdomen of December 29, 2022. The liver is not fully evaluated on this study. Mild spondylosis are seen in the thoracic spine. 1.  No acute pulmonary emboli. 2.  No aneurysm formation or dissection of the thoracic aorta. 3.  Progressive fibroretraction/multi segmental atelectasis of both upper lobes/right middle lobe/lingula with confluent parenchymal opacities that can represent areas of organized pneumonia. 4.  Progressive ongoing chronic inflammatory including granulomatous and atypical infectious process of the lung parenchyma consider in the differential diagnosis. Further consultation/investigation with pulmonary medicine recommended. 5.  Liver lesions seen in the upper abdomen partially covered on this examination, see report MRI of the abdomen of December 29, 2022.          Treatments:   {Tx:79379}    Discharge Exam:  ***    Disposition:   {disposition:54750}    Future Appointments   Date Time Provider Lupe Hope   2/14/2023  3:30 PM [id number] [address] [facility],[place] [address] [facility]   2/17/2023  9:30 AM [facility] ROOM 1 [facility]   2/24/2023 11:00 AM SKYE Mina - CNP [facility] [address]       More than 30 minutes was spent in preparation of this patient's discharge including, but not limited to, examination, preparation of documents, prescription preparation, counseling and coordination.     SignedGeneral Deb Gallo MD  2/10/2023, 11:56 AM dry.   Neurological:      General: No focal deficit present. Mental Status: She is alert and oriented to person, place, and time. Psychiatric:         Mood and Affect: Mood normal.     Disposition:   Home with home health    Future Appointments   Date Time Provider Lupe Arnett   2/14/2023  3:30 PM [address] [address] [facility],[place] [address] Thurmond Jeans, MD [facility] AND [facility]   2/17/2023  9:30 AM [facility] ROOM 1 [facility]   2/24/2023 11:00 AM Zi Gil APRN - CNP [facility] [address] [facility]       More than 30 minutes was spent in preparation of this patient's discharge including, but not limited to, examination, preparation of documents, prescription preparation, counseling and coordination.     SylviaValli Killer Thurmond Jeans, MD  2/10/2023, 11:56 AM

## 2023-02-10 NOTE — PROGRESS NOTES
[place]  Department of Pulmonary, Critical Care and Sleep Medicine  [address] [facility]  Department of Internal Medicine  Progress Note    SUBJECTIVE:    Patient seen and examined reports that she feels better than yesterday but is still very short of breath with exertion. OBJECTIVE:  Vitals:    02/09/23 0833 02/09/23 1153 02/09/23 1641 02/09/23 1915   BP: 114/64   (!) 111/97   Pulse: 66 92  87   Resp: 20 24  20   Temp: 96.8 °F (36 °C)   98.4 °F (36.9 °C)   TempSrc: Temporal   Oral   SpO2: 100%  97% 98%   Weight:       Height:         Constitutional: Alert,     EENT: EOMI SONU. MMM. No icterus. No thrush. Neck: No thyromegaly. Trachea was midline. Respiratory: Symmetrical.  Few scattered crackles  Cardiovascular: Regular, No murmur. No rubs. Pulses:  Equal bilaterally. Abdomen: Soft without organomegaly. No rebound, rigidity. No guarding. Lymphatic: No lymphadenopathy. Musculoskeletal: Without weakness or gross deficits  Extremities:  No lower extremity edema. Reflexes appear adequate. Skin:  Warm and dry. No skin rashes. Neurological/Psychiatric: No acute psychosis. Cranial nerves are intact. DATA:    Monitor Strips:  Reviewed & discusses with technical team. No changes noted.     RADIOLOGY:  Films were read/reviewed/discussed with radiology shows no new imaging      CBC with Differential:    Lab Results   Component Value Date/Time    WBC 7.8 02/09/2023 06:53 AM    RBC 3.45 02/09/2023 06:53 AM    HGB 10.7 02/09/2023 06:53 AM    HCT 33.2 02/09/2023 06:53 AM     02/09/2023 06:53 AM    MCV 96.2 02/09/2023 06:53 AM    MCH 31.0 02/09/2023 06:53 AM    MCHC 32.2 02/09/2023 06:53 AM    RDW 15.4 02/09/2023 06:53 AM    NRBC 0.9 11/30/2022 05:18 AM    METASPCT 0.9 12/22/2022 06:18 AM    LYMPHOPCT 26.9 02/09/2023 06:53 AM    MONOPCT 14.0 02/09/2023 06:53 AM    MYELOPCT 1.0 12/28/2022 07:02 PM    BASOPCT 0.4 02/09/2023 06:53 AM MONOSABS 1.09 02/09/2023 06:53 AM    LYMPHSABS 2.09 02/09/2023 06:53 AM    EOSABS 0.01 02/09/2023 06:53 AM    BASOSABS 0.03 02/09/2023 06:53 AM     CMP:    Lab Results   Component Value Date/Time     02/09/2023 06:53 AM    K 4.3 02/09/2023 06:53 AM     02/09/2023 06:53 AM    CO2 33 02/09/2023 06:53 AM    BUN 12 02/09/2023 06:53 AM    CREATININE 0.3 02/09/2023 06:53 AM    GFRAA >60 04/13/2022 02:20 PM    LABGLOM >60 02/09/2023 06:53 AM    GLUCOSE 75 02/09/2023 06:53 AM    PROT 5.1 02/09/2023 06:53 AM    LABALBU 2.0 02/09/2023 06:53 AM    CALCIUM 8.4 02/09/2023 06:53 AM    BILITOT 0.4 02/09/2023 06:53 AM    ALKPHOS 105 02/09/2023 06:53 AM    AST 41 02/09/2023 06:53 AM    ALT 35 02/09/2023 06:53 AM     Assessment:   Acute hypoxemic respiratory failure  COPD with exacerbation  Progressive fibrotic changes of right upper lobe, right middle lobe, and lingula  Heart failure with reduced ejection fraction of 40 to 45% on echocardiogram dated January 2023. Abnormal stress test on January 5, 2023: Intermediate risk pharmacologic myocardial perfusion imaging study  Leukocytosis- resolved         Plan:   Records from Dr. Joey Hidalgo office reviewed. Patient with very severe COPD and air trapping on PFTs  MRSA swab negative. Full respiratory viral panel-negative  Continue Brovana, budesonide. Continue DuoNebs  Stop vancomycin, continue cefepime. Ok to switch to levaquin to finish po antibiotics. I personally reviewed the patient's CT of the chest.  I compared it to multiple CTs of the chest that the patient has had in the past 3 years. Appears to get show progressive fibrotic changes of the right upper lobe, right middle lobe, and lingula. These have significantly worsened in the last 6 months. Connective tissue labs negative, remainder of send out testing pending.  The pattern of fibrosis is not consistent with UIP and rather may be consistent with possibly allergic bronchopulmonary aspergillosis versus extrinsic allergic alveolitis versus sarcoidosis versus histiocytosis versus occupational lung diseases. Appreciate cardiology recommendations  Discussed with patient today that she will need repeat CT of the chest in six weeks with follow up in the office.      Latha Simmons,

## 2023-02-11 LAB
BLOOD CULTURE, ROUTINE: NORMAL
CULTURE, BLOOD 2: NORMAL

## 2023-02-11 RX ORDER — POTASSIUM CHLORIDE 20 MEQ/1
20 TABLET, EXTENDED RELEASE ORAL DAILY
Qty: 30 TABLET | Refills: 0 | Status: SHIPPED | OUTPATIENT
Start: 2023-02-11

## 2023-02-11 RX ORDER — FUROSEMIDE 20 MG/1
20 TABLET ORAL DAILY
Qty: 60 TABLET | Refills: 3 | Status: SHIPPED | OUTPATIENT
Start: 2023-02-11

## 2023-02-11 NOTE — PROGRESS NOTES
Daughter called regarding not having rx for lasix or potassium. Sent to pharmacy.     Electronically signed by Davina Sullivan MD on 2/11/23 at 3:00 PM EST

## 2023-02-12 LAB
HISTOPLASMA ANTIGEN URINE INTERP: NOT DETECTED
HISTOPLASMA ANTIGEN URINE: NOT DETECTED NG/ML

## 2023-02-16 ENCOUNTER — TELEPHONE (OUTPATIENT)
Dept: OTHER | Age: 63
End: 2023-02-16

## 2023-02-16 NOTE — TELEPHONE ENCOUNTER
Spoke with patient's daughter who request CHF clinic appt be rescheduled until next week. Denies any increasing signs of HF.  Patient scheduled with Gurdeep VALDOVINOS:    Future Appointments   Date Time Provider Lupe Arnett   2/17/2023 10:40 AM Margarette Lorenz MD [facility]   2/24/2023 11:00 [facility]   3/3/2023 11:30 AM [facility] CHF ROOM 1 [facility] [address] [facility]         Electronically signed by Lulú Anderson RN on 2/16/2023 at 10:59 AM

## 2023-02-17 ENCOUNTER — HOSPITAL ENCOUNTER (OUTPATIENT)
Dept: OTHER | Age: 63
Setting detail: THERAPIES SERIES
Discharge: HOME | End: 2023-02-17
Payer: COMMERCIAL

## 2023-02-24 ENCOUNTER — HOSPITAL ENCOUNTER (OUTPATIENT)
Dept: OTHER | Age: 63
Setting detail: THERAPIES SERIES
Discharge: HOME | End: 2023-02-24
Payer: COMMERCIAL

## 2023-02-24 VITALS
BODY MASS INDEX: 22.15 KG/M2 | HEART RATE: 95 BPM | HEIGHT: 63 IN | OXYGEN SATURATION: 97 % | RESPIRATION RATE: 22 BRPM | WEIGHT: 125 LBS | DIASTOLIC BLOOD PRESSURE: 64 MMHG | SYSTOLIC BLOOD PRESSURE: 102 MMHG

## 2023-02-24 LAB
ANION GAP SERPL CALCULATED.3IONS-SCNC: 7 MMOL/L (ref 7–16)
BUN BLDV-MCNC: 11 MG/DL (ref 6–23)
CALCIUM SERPL-MCNC: 8.9 MG/DL (ref 8.6–10.2)
CHLORIDE BLD-SCNC: 98 MMOL/L (ref 98–107)
CO2: 33 MMOL/L (ref 22–29)
CREAT SERPL-MCNC: 0.4 MG/DL (ref 0.5–1)
GFR SERPL CREATININE-BSD FRML MDRD: >60 ML/MIN/1.73
GLUCOSE BLD-MCNC: 110 MG/DL (ref 74–99)
POTASSIUM SERPL-SCNC: 3.8 MMOL/L (ref 3.5–5)
PRO-BNP: 147 PG/ML (ref 0–125)
SODIUM BLD-SCNC: 138 MMOL/L (ref 132–146)

## 2023-02-24 PROCEDURE — 83880 ASSAY OF NATRIURETIC PEPTIDE: CPT

## 2023-02-24 PROCEDURE — 99214 OFFICE O/P EST MOD 30 MIN: CPT | Performed by: NURSE PRACTITIONER

## 2023-02-24 PROCEDURE — 36415 COLL VENOUS BLD VENIPUNCTURE: CPT

## 2023-02-24 PROCEDURE — 80048 BASIC METABOLIC PNL TOTAL CA: CPT

## 2023-02-24 RX ORDER — PANTOPRAZOLE SODIUM 40 MG/1
40 TABLET, DELAYED RELEASE ORAL NIGHTLY
COMMUNITY

## 2023-02-24 ASSESSMENT — SLEEP AND FATIGUE QUESTIONNAIRES
HOW LIKELY ARE YOU TO NOD OFF OR FALL ASLEEP WHILE SITTING AND READING: 2
HOW LIKELY ARE YOU TO NOD OFF OR FALL ASLEEP WHILE WATCHING TV: 3
NECK CIRCUMFERENCE (INCHES): 12.5
HOW LIKELY ARE YOU TO NOD OFF OR FALL ASLEEP WHEN YOU ARE A PASSENGER IN A CAR FOR AN HOUR WITHOUT A BREAK: 0
HOW LIKELY ARE YOU TO NOD OFF OR FALL ASLEEP WHILE SITTING AND TALKING TO SOMEONE: 0
HOW LIKELY ARE YOU TO NOD OFF OR FALL ASLEEP WHILE LYING DOWN TO REST IN THE AFTERNOON WHEN CIRCUMSTANCES PERMIT: 3
HOW LIKELY ARE YOU TO NOD OFF OR FALL ASLEEP WHILE SITTING QUIETLY AFTER LUNCH WITHOUT ALCOHOL: 1
HOW LIKELY ARE YOU TO NOD OFF OR FALL ASLEEP WHILE SITTING INACTIVE IN A PUBLIC PLACE: 1
HOW LIKELY ARE YOU TO NOD OFF OR FALL ASLEEP IN A CAR, WHILE STOPPED FOR A FEW MINUTES IN TRAFFIC: 0
ESS TOTAL SCORE: 10

## 2023-02-24 ASSESSMENT — PATIENT HEALTH QUESTIONNAIRE - PHQ9
SUM OF ALL RESPONSES TO PHQ9 QUESTIONS 1 & 2: 0
1. LITTLE INTEREST OR PLEASURE IN DOING THINGS: NOT AT ALL
2. FEELING DOWN, DEPRESSED OR HOPELESS: NOT AT ALL

## 2023-02-24 NOTE — DISCHARGE INSTRUCTIONS
Get blood work today  Continue current cardiac medications   Follow up with CHF clinic in 7-10 days  Follow up with Dr. Faby Sanchez in 1 month (at [facility])  Plan for eventual cardiac catheterization once able to tolerate from a respiratory standpoint. Weigh yourself daily    -Stay Hydrated, 64 oz     -Diet should sodium restricted to 2 grams    -Again watch your daily weight trends and if you gain water weight please follow below instructions.    -If you gain 3-5 pounds in 2-3 days OR notice that you are retaining fluid in anyway just like you did before then take an extra dose of your water pill (furosemide/Lasix OR bumetanide/Bumex OR Demadex/Torsemide) every day until you lose the weight or feel better.     -If you notice that you have taken more than 2 extra doses in 1 week then please call and let us know. -If at any time you feel that you are retaining fluid, your medications are not working, or you feel ill in anyway, then please call us for either same day appointment or the next day, and for instructions. Our goal is to keep you out of the emergency room and the hospital and we have ways to do it. You just need to call us in a timely manner.     -If you become sick for other reasons, and notice that you are not urinating as much, the urine is very dark, you have significant diarrhea or vomiting, then please DO NOT take your water pill and CALL US immediately. HEART FAILURE  / CONGESTIVE HEART FAILURE  DISCHARGE INSTRUCTIONS:  GUIDELINES TO FOLLOW AT HOME    Self- Managed Care:     MEDICATIONS:  Take your medication as directed. If you are experiencing any side effects, inform your doctor, Do not stop taking any of your medications without letting your doctor know. Check with your doctor before taking any over-the-counter medications / herbal / or dietary supplements. They may interfere with your other medications.   Do not take ibuprofen (Advil or Motrin) and naproxen (Aleve) without talking to your doctor first. They could make your heart failure worse. WEIGHT MONITORING:   Weigh yourself everyday (with the same scale) around the same time of the day and write it down. (you can chart them on a calendar or keep track of them on paper. Notify your doctor of a weight gain of 3 pounds or more in 1 day   OR a total of 5 pounds or more in 1 week    Take your weight record to your doctor visits  Also, the same goes if you loose more than 3# in one day, let your heart doctor know. DIET:   Cardiac heart healthy diet- Low saturated / low trans fat, no added salt, caffeine restricted, Low sodium diet-   No more than 2,000mg (2 grams) of salt / sodium per day (which equals to a little less than  a teaspoon of salt)  If your doctor wants you on a fluid restriction. ..it is usually recommended a fluid limit of 2,000cc -  Fluid restriction- 2,000 ml (milliliters) = 64 ounces = you can have 8 glasses of fluid per day (each glass 8 ounces)    Follow a low salt diet - avoid using salt at the table, avoid / limit use of canned soups, processed / packaged foods, salted snacks, olives and pickles. Do not use a salt substitute without checking with your doctor, they may contain a high amount of potassioum. (Mrs. Shanika Cardenas is safe to use). Limit the use of alcohol       CALL YOUR DOCTOR THE FIRST DAY YOU NOTICE ANY OF THESE   SYMPTOMS:  You have a weight gain of 3 pounds or more in 1 day         OR 5 pounds or more in one week  More shortness of breath  More swelling of your stomach, legs, ankles or feet  Feeling more tired, No energy  Dry hacky cough  Dizziness  More chest pain / discomfort       (CALL 911 IF ANY OF THE FOLLOWING OCCURS  Chest pain (not relieved with nitroglycerine, if you have been prescribed this medication)  Severe shortness of breath  Faint / Pass out  Confusion / cannot think clearly  If symptoms get worse           SMOKING - TOBACCO USE:  * IF YOU SMOKE - STOP!   Kick the habit. [address] Program is offered at [facility] [address] and [address] [facility]. Call [phone] for more information. ACTIVITY:   (Ask your doctor when you will be able to return to work and before starting any exercise program.  Do not drive unless unless your doctor has given you permission to do so). Start light exercise. Even if you can only do a small amount, exercise will help you get stronger, have more energy, help manage your weight and decrease  stress. Walking is an easy way to get exercise. Start out slowly and  increase the amount you walk as tolerated  If you become short of breath, dizzy or have chest pain; stop, sit down, and rest.  If you feel \"wiped out\" the day after you exercise, walk at a slower pace or for a shorter distance. You can gradually increase the pace or amount of time. (Do not exercise right after a meal or in extreme temperatures, such as above 85 degrees, if the air is really humid, or wind chill is less than 20 degrees)                                             ADDITIONAL INFORMATION:  Avoid getting sick from colds and the flu. Stay away from friends or family that you know may have a contagious illness  Get plenty of rest   Get a flu shot each year. Get a pneumococcal vaccine shot. If you have had one before, ask your doctor whether you need another dose. My Goal for Self-management of Heart Failure Includes 5 steps :    1. Notice a change in symptoms ( weight gain, short of breath, leg swelling, decreased activity level, bloating. ...)    2. Evaluate the change: (use the Heart Failure Zones )     3. Decide to take action: decide what your options are, such as: (call your doctor for an extra visit, take a prescribed medication, such as your water pill if your doctor has given you directions to do so, [address])    4.  Come up with a strategy:  (now you call the doctor for advice / appointment. This is where you take action!!!  Do not wait, catch the symptom early and treat it before it worsens.    5. Evaluate the response: The next day, check your Heart Failure Zones: are you in the GREEN ZONE (safe zone)?  Worsening symptoms of YELLOW ZONE? Or have you moved to the RED ZONE and need to call 911 or go to the Emergency Room for evaluation? Call your doctor's office to update them on your symptoms of heart failure.            HEART FAILURE ZONES   GREEN ZONE: All Clear- Your Symptoms Are Under Control    No shortness of breath   No weight gain of 3 pounds in 1 day or 5 pounds in 1 week   No increase in your usual amount of swelling   No chest pain    No decreased ability to maintain usual activity                                         This Means You Should:   Continue taking your medications as prescribed   Continue daily weights   Continue low salt diet   Keep all doctor appointments   YELLOW ZONE: Caution   Weight increases 3 pounds in 1 day or 5 pounds in 1 week   Increased cough, especially at night  Increased shortness of breath with activity   Increased shortness of breath laying down   Have any wheezing or chest tightness at rest  Need to sleep sitting in a chair or require more pillows when lying down  Increased swelling in feet, ankles, or legs   Feeling more tired or lack of energy   Uneasy feeling or that something is wrong   This Means You Should:   Call your doctor for further instructions  ***   RED ZONE: Medical Alert   Unrelieved shortness of breath with rest   Unrelieved chest pain   Confusion or you can't think clearly Fainting  Fainting   This Means You Should Call 911 Immediately                            Preventing Falls: Care Instructions  Overview     Getting around your home safely can be a challenge if you have injuries or health problems that make it easy for you to fall. Loose rugs and furniture in walkways are among the dangers for  many older people who have problems walking or who have poor eyesight. People who have conditions such as arthritis, osteoporosis, or dementia also have to be careful not to fall. You can make your home safer with a few simple measures. Follow-up care is a key part of your treatment and safety. Be sure to make and go to all appointments, and call your doctor if you are having problems. It's also a good idea to know your test results and keep a list of the medicines you take. How can you care for yourself at home? Taking care of yourself  Exercise regularly to improve your strength, muscle tone, and balance. Walk if you can. Swimming may be a good choice if you cannot walk easily. Have your vision and hearing checked each year or any time you notice a change. If you have trouble seeing and hearing, you might not be able to avoid objects and could lose your balance. Know the side effects of the medicines you take. Ask your doctor or pharmacist whether the medicines you take can affect your balance. Sleeping pills or sedatives can affect your balance. Limit the amount of alcohol you drink. Alcohol can impair your balance and other senses. Ask your doctor whether calluses or corns on your feet need to be removed. If you wear loose-fitting shoes because of calluses or corns, you can lose your balance and fall. Talk to your doctor if you have numbness in your feet. You may get dizzy if you do not drink enough water. To prevent dehydration, drink plenty of fluids. Choose water and other clear liquids. If you have kidney, heart, or liver disease and have to limit fluids, talk with your doctor before you increase the amount of fluids you drink. Preventing falls at home  Remove raised doorway thresholds, throw rugs, and clutter. Repair loose carpet or raised areas in the floor. Move furniture and electrical cords to keep them out of walking paths.   Use nonskid floor wax, and wipe up spills right away, especially on ceramic tile floors. If you use a walker or cane, put rubber tips on it. If you use crutches, clean the bottoms of them regularly with an abrasive pad, such as steel wool. Keep your house well lit, especially stairways, porches, and outside walkways. Use night-lights in areas such as hallways and bathrooms. Add extra light switches or use remote switches (such as switches that go on or off when you clap your hands) to make it easier to turn lights on if you have to get up during the night. Install sturdy handrails on stairways. Move items in your cabinets so that the things you use a lot are on the lower shelves (about waist level). Keep a cordless phone and a flashlight with new batteries by your bed. If possible, put a phone in each of the main rooms of your house, or carry a cell phone in case you fall and cannot reach a phone. Or, you can wear a device around your neck or wrist. You push a button that sends a signal for help. Wear low-heeled shoes that fit well and give your feet good support. Use footwear with nonskid soles. Check the heels and soles of your shoes for wear. Repair or replace worn heels or soles. Do not wear socks without shoes on smooth floors, such as wood. Walk on the grass when the sidewalks are slippery. If you live in an area that gets snow and ice in the winter, sprinkle salt on slippery steps and sidewalks. Or ask a family member or friend to do this for you. Preventing falls in the bath  Install grab bars and nonskid mats inside and outside your shower or tub and near the toilet and sinks. Use shower chairs and bath benches. Use a hand-held shower head that will allow you to sit while showering. Get into a tub or shower by putting the weaker leg in first. Get out of a tub or shower with your strong side first.  Repair loose toilet seats and consider installing a raised toilet seat to make getting on and off the toilet easier.   Keep your bathroom door unlocked while you are in the shower. Where can you learn more? Go to http://www.collins.com/ and enter G117 to learn more about \"Preventing Falls: Care Instructions. \"  Current as of: May 4, 2022               Content Version: 13.5  © 9205-4717 [employer]. Care instructions adapted under license by [facility] ([facility]). If you have questions about a medical condition or this instruction, always ask your healthcare professional. [address] any warranty or liability for your use of this information.

## 2023-02-24 NOTE — PROGRESS NOTES
[address] [facility] [place] [facility]"" [address] [address] CHF Clinic  [facility] Clinic Visit         Reason for Visit: Heart failure    Primary Cardiologist: Dr. Ashley Fairchild         History of Present Illness:     Ms. Katie Gastelum is a 58year old female with a PMHx of HFmrEF, recurrent diverticulitis, chronic hypoxic respiratory failure with supplemental O2, HLD, COPD, long standing tobacco abuse, hx of DVT. Since her last CHF clinic visit she has been rehospitalized for COPD exacerbation, pneumonia. She presents today in follow-up since discharge from the hospital she has been compliant with all of her current cardiac medications. She has good urinary response to oral furosemide with clear yellow urine production. She is staying well-hydrated and attempting to monitor sodium in her diet. She has chronic dyspnea with exertion, shortness of breath, denies worsening decline in overall functional capacity. She denies orthopnea, PND, nocturnal cough or hemoptysis. She denies abdominal distention or bloating, early satiety, anorexia/change in appetite, unintentional weight loss. She does lower extremity edema. She denies exertional lightheadedness. She denies palpitations, syncope or near syncope. Review of systems is negative for chest pain, pressure, discomfort. When ambulating on an incline, She does not leg claudication. History is negative for neurological symptoms including transient loss of vision, asymmetric weakness, aphasia, dysphasia, numbness, tingling. Past medical, surgical, family and social histories have been reviewed. Any changes in the past medical history, social history or family history have been made and are reflected in the electronic medical record.         Patient Active Problem List    Diagnosis Date Noted    Abnormal nuclear stress test 02/07/2023     Priority: Medium    Sepsis ([address] 02/06/2023     Priority: Medium    Pulmonary fibrosis ([address] 02/06/2023     Priority: Medium    Hypoxemia 02/06/2023     Priority: Medium    Heart failure ([address] 02/06/2023     Priority: Medium    Acute on chronic diastolic CHF (congestive heart failure) ([facility] 01/04/2023     Priority: Medium    Acute cholecystitis 12/28/2022     Priority: Medium    Acute diverticulitis with abscess 12/17/2022     Priority: Medium    COPD exacerbation ([facility] 02/11/2022    Acute on chronic respiratory failure with hypoxia and hypercapnia (HCC) 02/10/2022    Acute respiratory failure with hypercapnia (HCC) 05/20/2021    Chronic respiratory failure with hypoxia (HCC) 10/26/2020    Chronic obstructive pulmonary disease ([facility] 10/26/2020    Chronic bronchitis (HCC)     Tobacco use disorder      1 ppd since age 25        Seasonal allergic rhinitis      Past Medical and Surgical History:    HLD  Tobacco abuse with recent cessation  COPD on chronic O2 therapy  Family Hx premature CAD  DVT, provoked in 03/2018 with completion of Eliquis (3 months) at that time   Diverticulitis  Covid 19 Infection, + on 01/05/2023  HFpEF   TTE 01/04/2023 (Dr. Lio Summers): Poor quality study. LV systolic function is not well assessed but appears mildly reduced. Ejection fraction is visually estimated at 40-45%. Abnormal diastolic function. Wall motion not well seen; LV contrast agent not utilized. Appears to be hypokinesis of the mid inferoseptal and mid anteroseptal wall. Normal left ventricular wall thickness. Grossly normal right ventricular size and function. No significant valvular abnormalities. Lexiscan MPS 01/06/2023: The myocardial perfusion imaging was abnormal with a moderate size moderate intensity and partial reversible anteroseptal and anterolateral perfusion abnormality with mild associated regional wall motion abnormalities consistent with ischemia of the left anterior descending distribution potentially in addition to that of postischemic stunning. Overall left ventricular systolic function was at the lower limits of normal with regional wall motion abnormalities as described above.  Intermediate risk pharmacologic myocardial perfusion imaging study.       Past Medical History:   Diagnosis Date    Abscess     colon    Acute on chronic diastolic CHF (congestive heart failure) ([facility]) 1/4/2023    COPD (chronic obstructive pulmonary disease) ([facility])     Diverticulitis     Provoked DVT 3/2018     3 months Eliquis for DVT due to PICC line    Seasonal allergic rhinitis     Smoker 11/30/2019    5-6 per day    Tobacco use disorder      : 1 ppd since age 25           Past Surgical History:   Procedure Laterality Date    CARDIOVASCULAR STRESS TEST N/A 01/06/2023    lexiscan stress test             Allergies   Allergen Reactions    Penicillin V Hives and Other (See Comments)     11/07/2005 UNKNOWN, PLEASE VERIFY, 11/07/2005 UNKNOWN, PLEASE VERIFY           Current Outpatient Medications:     pantoprazole (PROTONIX) 40 MG tablet, Take 40 mg by mouth nightly, Disp: , Rfl:     furosemide (LASIX) 20 MG tablet, Take 1 tablet by mouth daily, Disp: 60 tablet, Rfl: 3    potassium chloride (KLOR-CON M) 20 MEQ extended release tablet, Take 1 tablet by mouth daily, Disp: 30 tablet, Rfl: 0    sodium chloride (OCEAN, BABY AYR) 0.65 % nasal spray, 2 sprays by Nasal route every 4 hours as needed for Congestion (congestion, nasal pain), Disp: 30 mL, Rfl: 0    acetaminophen (TYLENOL) 325 MG tablet, Take 650 mg by mouth every 6 hours as needed for Pain or Fever, Disp: , Rfl:     bisacodyl (DULCOLAX) 10 MG suppository, Place 10 mg rectally daily, Disp: , Rfl:     magnesium hydroxide (MILK OF MAGNESIA) 400 MG/5ML suspension, Take by mouth daily as needed for Constipation (Patient not taking: Reported on 2/24/2023), Disp: , Rfl:     docusate sodium (COLACE) 100 MG capsule, Take 100 mg by mouth daily, Disp: , Rfl:     metoprolol succinate (TOPROL XL) 25 MG extended release tablet, Take 0.5 tablets by mouth nightly, Disp: 30 tablet, Rfl: 3    aspirin 81 MG chewable tablet, Take 1 tablet by mouth daily, Disp: 30 tablet, Rfl: 3    albuterol sulfate HFA (VENTOLIN HFA) 108 (90 Base) MCG/ACT inhaler, Inhale 2 puffs into the lungs 4 times daily as needed for Wheezing or Shortness of Breath, Disp: 1 each, Rfl: 2    Arformoterol Tartrate (BROVANA) 15 MCG/2ML NEBU, Take 2 mLs by nebulization in the morning and 2 mLs in the evening., Disp: 120 mL, Rfl: 0    budesonide (PULMICORT) 0.5 MG/2ML nebulizer suspension, Take 2 mLs by nebulization in the morning and 2 mLs in the evening., Disp: 60 each, Rfl: 0    ipratropium-albuterol (DUONEB) 0.5-2.5 (3) MG/3ML SOLN nebulizer solution, Inhale 3 mLs into the lungs in the morning and 3 mLs at noon and 3 mLs in the evening and 3 mLs before bedtime. , Disp: 360 mL, Rfl: 0    atorvastatin (LIPITOR) 40 MG tablet, Take 1 tablet by mouth nightly, Disp: 30 tablet, Rfl: 0    sertraline (ZOLOFT) 25 MG tablet, Take 1 tablet by mouth daily, Disp: 30 tablet, Rfl: 0    fluticasone (FLONASE) 50 MCG/ACT nasal spray, 2 sprays by Each Nostril route daily, Disp: 1 each, Rfl: 5    melatonin 5 MG TBDP disintegrating tablet, Take 1 tablet by mouth nightly, Disp: 30 tablet, Rfl: 0    Vitamin D (CHOLECALCIFEROL) 50 MCG (2000 UT) TABS tablet, Take 1 tablet by mouth daily, Disp: 30 tablet, Rfl: 0    pantoprazole (PROTONIX) 40 MG tablet, Take 1 tablet by mouth in the morning and at bedtime for 14 days, THEN 1 tablet daily.  (Patient taking differently: Take 1 tablet by mouth in the daily), Disp: 58 tablet, Rfl: 0    zinc 50 MG CAPS, Take 50 mg by mouth daily (Patient not taking: Reported on 2/24/2023), Disp: 30 capsule, Rfl: 0    ascorbic acid (VITAMIN C) 1000 MG tablet, Take 1 tablet by mouth daily, Disp: 30 tablet, Rfl: 0    OXYGEN, Inhale 4 L into the lungs continuous, Disp: , Rfl:        Review of Systems:   Cardiac: As per HPI  General: No fever, chills, rigors  Pulmonary: As per HPI  HEENT: No visual disturbances, difficult swallowing  GI: No nausea, vomiting, abdominal pain  : No dysuria or hematuria  Endocrine: No thyroid disease or diabetes  Musculoskeletal: CHEN x 4, no focal motor deficits  Skin: Intact, no rashes  Neuro/Psych: No headache or seizures      Weights: Wt Readings from Last 3 Encounters:   02/24/23 125 lb (56.7 kg)   02/10/23 141 lb 9.6 oz (64.2 kg)   01/27/23 130 lb (59 kg)         Physical Examination:     /64   Pulse 95   Resp 22   Ht 5' 3\" (1.6 m)   Wt 125 lb (56.7 kg)   SpO2 97%   BMI 22.14 kg/m²     CONSTITUTIONAL: Alert and oriented times 3, no acute distress and cooperative to examination with proper mood and affect. SKIN: Skin color, texture, turgor normal. No rashes or lesions. LYMPH: no cervical nodes, no inguinal nodes  HEENT: Head is normocephalic, atraumatic. EOMI, PERRLA. NECK: Supple, symmetrical, trachea midline, no adenopathy, thyroid symmetric, not enlarged and no tenderness, skin normal.  CHEST/LUNGS: chest symmetric with normal A/P diameter, normal respiratory rate and rhythm, lungs clear to auscultation without wheezes, rales or rhonchi. No accessory muscle use. Scars None   CARDIOVASCULAR: Heart sounds are normal.  Regular rate and rhythm without murmur, gallop or rub. Normal S1 and S2. . Carotid and femoral pulses 2+/4 and equal bilaterally. ABDOMEN: Normal shape. No and Laparoscopic scar(s) present. Normal bowel sounds. No bruits. soft, nondistended, no masses or organomegaly. no evidence of hernia. Percussion: Normal without hepatosplenomegally. Tenderness: absent. RECTAL: deferred, not clinically indicated  NEUROLOGIC: There are no focalizing motor or sensory deficits. CN II-XII are grossly intact. Author Showman EXTREMITIES: no cyanosis, no clubbing. 1+ bilateral lower extremity edema. Warm and well perfused. All the following diagnostics were personally reviewed and interpreted by me.        LAB DATA:     2/9/23 06:53 2/10/23 06:20   Sodium 141 142   Potassium 4.3 3.7   Chloride 103 104   CO2 33 (H) 36 (H)   BUN,BUNPL 12 13   Creatinine 0.3 (L) 0.3 (L)   Anion Gap 5 (L) 2 (L)   Est, Glom Filt Rate >60 >60   Glucose, Random 75 82   CALCIUM, SERUM, 256370 8.4 (L) 8.0 (L)   Total Protein 5.1 (L) 5.3 (L)   Albumin 2.0 (L) 2.0 (L)   Alk Phos 105 (H) 99   ALT 35 (H) 38 (H)   AST 41 (H) 38 (H)   BILIRUBIN TOTAL 0.4 0.3   WBC 7.8 7.4   RBC 3.45 (L) 3.06 (L)   Hemoglobin Quant 10.7 (L) 9.4 (L)   Hematocrit 33.2 (L) 29.9 (L)   MCV 96.2 97.7   MCH 31.0 30.7   MCHC 32.2 31.4 (L)   MPV 10.7 10.5   RDW 15.4 (H) 15.5 (H)   Platelet Count 448 337       IMAGING:    CTA Pulmonary (2/6/2023  Impression  1. No acute pulmonary emboli. 2.  No aneurysm formation or dissection of the thoracic aorta. 3.  Progressive fibroretraction/multi segmental atelectasis of both upper  lobes/right middle lobe/lingula with confluent parenchymal opacities that can  represent areas of organized pneumonia. 4.  Progressive ongoing chronic inflammatory including granulomatous and  atypical infectious process of the lung parenchyma consider in the  differential diagnosis. Further consultation/investigation with pulmonary  medicine recommended. 5.  Liver lesions seen in the upper abdomen partially covered on this  examination, see report MRI of the abdomen of December 29, 2022. CARDIAC TESTING:    TTE (5/21/2021)   Summary   Left ventricular size is grossly normal.   No evidence of left ventricular mass or thrombus noted. Normal left ventricular wall thickness. Ejection fraction is measured at 52%. No regional wall motion abnormalities seen. Borderline dilated right ventricle. No evidence of a thrombus in the right ventricle. Physiologic and/or trace mitral regurgitation is present. No evidence of mitral valve stenosis. Physiologic and/or trace tricuspid regurgitation. Regular rhythm. TTE (1/4/2023)   Summary   Poor quality study. LV systolic function is not well assessed but appears mildly reduced. Ejection fraction is visually estimated at 40-45%. Abnormal diastolic function.    Wall motion not well seen; LV contrast agent not utilized. Appears to be hypokinesis of the mid inferoseptal and mid anteroseptal wall. Normal left ventricular wall thickness. Grossly normal right ventricular size and function. No significant valvular abnormalities. NM Stress (1/6/2023)  Gated SPECT left ventricular ejection fraction was calculated to be 48  % with a mild anteroseptal hypokinesis. Impression: The myocardial perfusion imaging was abnormal with a moderate size  moderate intensity and partial reversible anteroseptal and  anterolateral perfusion abnormality with mild associated regional wall  motion abnormalities consistent with ischemia of the left anterior  descending distribution potentially in addition to that of  postischemic stunning. Overall left ventricular systolic function was at the lower limits of  normal with regional wall motion abnormalities as described above. Intermediate risk pharmacologic myocardial perfusion imaging study. Telemetry  Sinus Rhythm, PAC        ASSESSMENT:  Chronic HFmrEF  ACC stage C / NYHA class III  Euvolemic   Abnormal stress test with intermediate risk scan  Chronic hypoxic respiratory failure with supplemental O2  HLD - on statin therapy  COPD with long standing tobacco abuse   The pattern of fibrosis is not consistent with UIP and rather may be consistent with possibly allergic bronchopulmonary aspergillosis versus extrinsic allergic alveolitis versus sarcoidosis versus histiocytosis versus occupational lung diseases.   Hx of DVT  History of  transaminitis with hyperbilirubinemia, multiple liver cysts, negative MRCP for biliary obstruction, Abnormal GB appearance with negative ultrasound   Hepatitis a and C antibodies noted 09/2022  Recurrent diverticulitis with prior or recent abscess drainage  Recent COVID-19 (1/5/2023)      PLAN:  Get blood work today  Continue current cardiac medications   Follow up with CHF clinic in 7-10 days  Follow up with Dr. Gen Chahal in 1 month (at [facility])  Plan for eventual cardiac catheterization once able to tolerate from a respiratory standpoint. Weigh yourself daily    -Stay Hydrated, 64 oz     -Diet should sodium restricted to 2 grams    -Again watch your daily weight trends and if you gain water weight please follow below instructions.    -If you gain 3-5 pounds in 2-3 days OR notice that you are retaining fluid in anyway just like you did before then take an extra dose of your water pill (furosemide/Lasix) every day until you lose the weight or feel better.     -If you notice that you have taken more than 2 extra doses in 1 week then please call and let us know. -If at any time you feel that you are retaining fluid, your medications are not working, or you feel ill in anyway, then please call us for either same day appointment or the next day, and for instructions. Our goal is to keep you out of the emergency room and the hospital and we have ways to do it. You just need to call us in a timely manner.     -If you become sick for other reasons, and notice that you are not urinating as much, the urine is very dark, you have significant diarrhea or vomiting, then please DO NOT take your water pill and CALL US immediately.        Ravinder CHEUNG-CNP  [facility]

## 2023-03-06 ENCOUNTER — HOSPITAL ENCOUNTER (EMERGENCY)
Age: 63
Discharge: HOME | End: 2023-03-07
Attending: EMERGENCY MEDICINE
Payer: COMMERCIAL

## 2023-03-06 ENCOUNTER — TELEPHONE (OUTPATIENT)
Dept: OTHER | Facility: CLINIC | Age: 63
End: 2023-03-06

## 2023-03-06 ENCOUNTER — APPOINTMENT (OUTPATIENT)
Dept: GENERAL RADIOLOGY | Age: 63
End: 2023-03-06
Payer: COMMERCIAL

## 2023-03-06 VITALS
BODY MASS INDEX: 22.82 KG/M2 | RESPIRATION RATE: 27 BRPM | SYSTOLIC BLOOD PRESSURE: 125 MMHG | WEIGHT: 124 LBS | HEART RATE: 98 BPM | HEIGHT: 62 IN | OXYGEN SATURATION: 97 % | DIASTOLIC BLOOD PRESSURE: 79 MMHG | TEMPERATURE: 98.9 F

## 2023-03-06 DIAGNOSIS — J44.1 COPD EXACERBATION (HCC): Primary | ICD-10-CM

## 2023-03-06 LAB
ALBUMIN SERPL-MCNC: 2.6 G/DL (ref 3.5–5.2)
ALP BLD-CCNC: 129 U/L (ref 35–104)
ALT SERPL-CCNC: 27 U/L (ref 0–32)
ANION GAP SERPL CALCULATED.3IONS-SCNC: 7 MMOL/L (ref 7–16)
AST SERPL-CCNC: 30 U/L (ref 0–31)
BASOPHILS ABSOLUTE: 0 E9/L (ref 0–0.2)
BASOPHILS RELATIVE PERCENT: 0.3 % (ref 0–2)
BILIRUB SERPL-MCNC: 0.5 MG/DL (ref 0–1.2)
BUN BLDV-MCNC: 13 MG/DL (ref 6–23)
CALCIUM SERPL-MCNC: 8.7 MG/DL (ref 8.6–10.2)
CHLORIDE BLD-SCNC: 99 MMOL/L (ref 98–107)
CO2: 33 MMOL/L (ref 22–29)
CREAT SERPL-MCNC: 0.3 MG/DL (ref 0.5–1)
EOSINOPHILS ABSOLUTE: 0 E9/L (ref 0.05–0.5)
EOSINOPHILS RELATIVE PERCENT: 0.1 % (ref 0–6)
GFR SERPL CREATININE-BSD FRML MDRD: >60 ML/MIN/1.73
GLUCOSE BLD-MCNC: 115 MG/DL (ref 74–99)
HCT VFR BLD CALC: 43.7 % (ref 34–48)
HEMOGLOBIN: 13.7 G/DL (ref 11.5–15.5)
INFLUENZA A BY PCR: NOT DETECTED
INFLUENZA B BY PCR: NOT DETECTED
INR BLD: 1.1
LACTIC ACID: 1.6 MMOL/L (ref 0.5–2.2)
LIPASE: 8 U/L (ref 13–60)
LYMPHOCYTES ABSOLUTE: 0.58 E9/L (ref 1.5–4)
LYMPHOCYTES RELATIVE PERCENT: 4.2 % (ref 20–42)
MCH RBC QN AUTO: 30.7 PG (ref 26–35)
MCHC RBC AUTO-ENTMCNC: 31.4 % (ref 32–34.5)
MCV RBC AUTO: 98 FL (ref 80–99.9)
MONOCYTES ABSOLUTE: 0 E9/L (ref 0.1–0.95)
MONOCYTES RELATIVE PERCENT: 2.5 % (ref 2–12)
MYELOCYTE PERCENT: 0.8 % (ref 0–0)
NEUTROPHILS ABSOLUTE: 14.02 E9/L (ref 1.8–7.3)
NEUTROPHILS RELATIVE PERCENT: 94.9 % (ref 43–80)
PDW BLD-RTO: 13.5 FL (ref 11.5–15)
PLATELET # BLD: 284 E9/L (ref 130–450)
PMV BLD AUTO: 10.7 FL (ref 7–12)
POLYCHROMASIA: ABNORMAL
POTASSIUM SERPL-SCNC: 3.8 MMOL/L (ref 3.5–5)
PRO-BNP: 651 PG/ML (ref 0–125)
PROTHROMBIN TIME: 12.2 SEC (ref 9.3–12.4)
RBC # BLD: 4.46 E12/L (ref 3.5–5.5)
SARS-COV-2, NAAT: NOT DETECTED
SODIUM BLD-SCNC: 139 MMOL/L (ref 132–146)
TOTAL PROTEIN: 5.7 G/DL (ref 6.4–8.3)
TROPONIN, HIGH SENSITIVITY: 15 NG/L (ref 0–9)
TROPONIN, HIGH SENSITIVITY: 16 NG/L (ref 0–9)
WBC # BLD: 14.6 E9/L (ref 4.5–11.5)

## 2023-03-06 PROCEDURE — 85025 COMPLETE CBC W/AUTO DIFF WBC: CPT

## 2023-03-06 PROCEDURE — 6370000000 HC RX 637 (ALT 250 FOR IP): Performed by: EMERGENCY MEDICINE

## 2023-03-06 PROCEDURE — 36415 COLL VENOUS BLD VENIPUNCTURE: CPT

## 2023-03-06 PROCEDURE — 99285 EMERGENCY DEPT VISIT HI MDM: CPT

## 2023-03-06 PROCEDURE — 83690 ASSAY OF LIPASE: CPT

## 2023-03-06 PROCEDURE — 71045 X-RAY EXAM CHEST 1 VIEW: CPT

## 2023-03-06 PROCEDURE — 83880 ASSAY OF NATRIURETIC PEPTIDE: CPT

## 2023-03-06 PROCEDURE — 94640 AIRWAY INHALATION TREATMENT: CPT

## 2023-03-06 PROCEDURE — 85610 PROTHROMBIN TIME: CPT

## 2023-03-06 PROCEDURE — 93005 ELECTROCARDIOGRAM TRACING: CPT | Performed by: EMERGENCY MEDICINE

## 2023-03-06 PROCEDURE — 87502 INFLUENZA DNA AMP PROBE: CPT

## 2023-03-06 PROCEDURE — 83605 ASSAY OF LACTIC ACID: CPT

## 2023-03-06 PROCEDURE — 87635 SARS-COV-2 COVID-19 AMP PRB: CPT

## 2023-03-06 PROCEDURE — 84484 ASSAY OF TROPONIN QUANT: CPT

## 2023-03-06 PROCEDURE — 80053 COMPREHEN METABOLIC PANEL: CPT

## 2023-03-06 RX ORDER — PREDNISONE 50 MG/1
50 TABLET ORAL DAILY
Qty: 5 TABLET | Refills: 0 | Status: SHIPPED | OUTPATIENT
Start: 2023-03-06 | End: 2023-03-07

## 2023-03-06 RX ORDER — IPRATROPIUM BROMIDE AND ALBUTEROL SULFATE 2.5; .5 MG/3ML; MG/3ML
1 SOLUTION RESPIRATORY (INHALATION) ONCE
Status: COMPLETED | OUTPATIENT
Start: 2023-03-06 | End: 2023-03-06

## 2023-03-06 RX ADMIN — IPRATROPIUM BROMIDE AND ALBUTEROL SULFATE 1 AMPULE: 2.5; .5 SOLUTION RESPIRATORY (INHALATION) at 17:49

## 2023-03-06 ASSESSMENT — PAIN - FUNCTIONAL ASSESSMENT: PAIN_FUNCTIONAL_ASSESSMENT: NONE - DENIES PAIN

## 2023-03-06 ASSESSMENT — ENCOUNTER SYMPTOMS
BACK PAIN: 0
ABDOMINAL PAIN: 0
SHORTNESS OF BREATH: 1
COUGH: 1

## 2023-03-07 ENCOUNTER — APPOINTMENT (OUTPATIENT)
Dept: CT IMAGING | Age: 63
End: 2023-03-07
Payer: COMMERCIAL

## 2023-03-07 ENCOUNTER — APPOINTMENT (OUTPATIENT)
Dept: GENERAL RADIOLOGY | Age: 63
End: 2023-03-07
Payer: COMMERCIAL

## 2023-03-07 ENCOUNTER — HOSPITAL ENCOUNTER (INPATIENT)
Age: 63
LOS: 4 days | Discharge: HOME | End: 2023-03-11
Attending: EMERGENCY MEDICINE | Admitting: FAMILY MEDICINE
Payer: COMMERCIAL

## 2023-03-07 DIAGNOSIS — J44.1 COPD WITH ACUTE EXACERBATION (HCC): Primary | ICD-10-CM

## 2023-03-07 LAB
ADENOVIRUS BY PCR: NOT DETECTED
ALBUMIN SERPL-MCNC: 2.6 G/DL (ref 3.5–5.2)
ALP BLD-CCNC: 131 U/L (ref 35–104)
ALT SERPL-CCNC: 29 U/L (ref 0–32)
ANION GAP SERPL CALCULATED.3IONS-SCNC: 10 MMOL/L (ref 7–16)
AST SERPL-CCNC: 32 U/L (ref 0–31)
B.E.: 7.4 MMOL/L (ref -3–3)
BASOPHILS ABSOLUTE: 0.01 E9/L (ref 0–0.2)
BASOPHILS RELATIVE PERCENT: 0.1 % (ref 0–2)
BILIRUB SERPL-MCNC: 0.5 MG/DL (ref 0–1.2)
BORDETELLA PARAPERTUSSIS BY PCR: NOT DETECTED
BORDETELLA PERTUSSIS BY PCR: NOT DETECTED
BUN BLDV-MCNC: 15 MG/DL (ref 6–23)
CALCIUM SERPL-MCNC: 8.8 MG/DL (ref 8.6–10.2)
CHLAMYDOPHILIA PNEUMONIAE BY PCR: NOT DETECTED
CHLORIDE BLD-SCNC: 98 MMOL/L (ref 98–107)
CO2: 32 MMOL/L (ref 22–29)
COHB: 0.7 % (ref 0–1.5)
CORONAVIRUS 229E BY PCR: NOT DETECTED
CORONAVIRUS HKU1 BY PCR: NOT DETECTED
CORONAVIRUS NL63 BY PCR: NOT DETECTED
CORONAVIRUS OC43 BY PCR: NOT DETECTED
CREAT SERPL-MCNC: 0.4 MG/DL (ref 0.5–1)
CRITICAL: ABNORMAL
D DIMER: 604 NG/ML DDU
DATE ANALYZED: ABNORMAL
DATE OF COLLECTION: ABNORMAL
EOSINOPHILS ABSOLUTE: 0 E9/L (ref 0.05–0.5)
EOSINOPHILS RELATIVE PERCENT: 0 % (ref 0–6)
GFR SERPL CREATININE-BSD FRML MDRD: >60 ML/MIN/1.73
GLUCOSE BLD-MCNC: 140 MG/DL (ref 74–99)
HCO3: 33.8 MMOL/L (ref 22–26)
HCT VFR BLD CALC: 41.3 % (ref 34–48)
HEMOGLOBIN: 13.5 G/DL (ref 11.5–15.5)
HHB: 6.5 % (ref 0–5)
HUMAN METAPNEUMOVIRUS BY PCR: NOT DETECTED
HUMAN RHINOVIRUS/ENTEROVIRUS BY PCR: NOT DETECTED
IMMATURE GRANULOCYTES #: 0.04 E9/L
IMMATURE GRANULOCYTES %: 0.3 % (ref 0–5)
INFLUENZA A BY PCR: NOT DETECTED
INFLUENZA B BY PCR: NOT DETECTED
LAB: ABNORMAL
LYMPHOCYTES ABSOLUTE: 0.94 E9/L (ref 1.5–4)
LYMPHOCYTES RELATIVE PERCENT: 7 % (ref 20–42)
Lab: ABNORMAL
MAGNESIUM: 1.9 MG/DL (ref 1.6–2.6)
MCH RBC QN AUTO: 30.5 PG (ref 26–35)
MCHC RBC AUTO-ENTMCNC: 32.7 % (ref 32–34.5)
MCV RBC AUTO: 93.2 FL (ref 80–99.9)
METHB: 0.3 % (ref 0–1.5)
MODE: ABNORMAL
MONOCYTES ABSOLUTE: 0.13 E9/L (ref 0.1–0.95)
MONOCYTES RELATIVE PERCENT: 1 % (ref 2–12)
MYCOPLASMA PNEUMONIAE BY PCR: NOT DETECTED
NEUTROPHILS ABSOLUTE: 12.26 E9/L (ref 1.8–7.3)
NEUTROPHILS RELATIVE PERCENT: 91.6 % (ref 43–80)
O2 CONTENT: 18 ML/DL
O2 SATURATION: 93.4 % (ref 92–98.5)
O2HB: 92.5 % (ref 94–97)
OPERATOR ID: ABNORMAL
PARAINFLUENZA VIRUS 1 BY PCR: NOT DETECTED
PARAINFLUENZA VIRUS 2 BY PCR: NOT DETECTED
PARAINFLUENZA VIRUS 3 BY PCR: NOT DETECTED
PARAINFLUENZA VIRUS 4 BY PCR: NOT DETECTED
PATIENT TEMP: 37 C
PCO2: 55.5 MMHG (ref 35–45)
PDW BLD-RTO: 13.6 FL (ref 11.5–15)
PH BLOOD GAS: 7.4 (ref 7.35–7.45)
PLATELET # BLD: 330 E9/L (ref 130–450)
PMV BLD AUTO: 9.9 FL (ref 7–12)
PO2: 69.6 MMHG (ref 75–100)
POTASSIUM SERPL-SCNC: 4.2 MMOL/L (ref 3.5–5)
PRO-BNP: 920 PG/ML (ref 0–125)
RBC # BLD: 4.43 E12/L (ref 3.5–5.5)
REASON FOR REJECTION: NORMAL
REJECTED TEST: NORMAL
RESPIRATORY SYNCYTIAL VIRUS BY PCR: NOT DETECTED
SARS-COV-2, PCR: NOT DETECTED
SODIUM BLD-SCNC: 140 MMOL/L (ref 132–146)
SOURCE, BLOOD GAS: ABNORMAL
THB: 13.8 G/DL (ref 11.5–16.5)
TIME ANALYZED: 1303
TOTAL PROTEIN: 6.2 G/DL (ref 6.4–8.3)
TROPONIN, HIGH SENSITIVITY: 11 NG/L (ref 0–9)
TROPONIN, HIGH SENSITIVITY: 14 NG/L (ref 0–9)
WBC # BLD: 13.4 E9/L (ref 4.5–11.5)

## 2023-03-07 PROCEDURE — 84484 ASSAY OF TROPONIN QUANT: CPT

## 2023-03-07 PROCEDURE — 82805 BLOOD GASES W/O2 SATURATION: CPT

## 2023-03-07 PROCEDURE — 2580000003 HC RX 258

## 2023-03-07 PROCEDURE — 6360000002 HC RX W HCPCS: Performed by: EMERGENCY MEDICINE

## 2023-03-07 PROCEDURE — 6360000002 HC RX W HCPCS

## 2023-03-07 PROCEDURE — 80053 COMPREHEN METABOLIC PANEL: CPT

## 2023-03-07 PROCEDURE — 99285 EMERGENCY DEPT VISIT HI MDM: CPT

## 2023-03-07 PROCEDURE — 6370000000 HC RX 637 (ALT 250 FOR IP)

## 2023-03-07 PROCEDURE — 83735 ASSAY OF MAGNESIUM: CPT

## 2023-03-07 PROCEDURE — 0202U NFCT DS 22 TRGT SARS-COV-2: CPT

## 2023-03-07 PROCEDURE — 2060000000 HC ICU INTERMEDIATE R&B

## 2023-03-07 PROCEDURE — 85378 FIBRIN DEGRADE SEMIQUANT: CPT

## 2023-03-07 PROCEDURE — 6360000004 HC RX CONTRAST MEDICATION: Performed by: RADIOLOGY

## 2023-03-07 PROCEDURE — 71045 X-RAY EXAM CHEST 1 VIEW: CPT

## 2023-03-07 PROCEDURE — 85025 COMPLETE CBC W/AUTO DIFF WBC: CPT

## 2023-03-07 PROCEDURE — 99222 1ST HOSP IP/OBS MODERATE 55: CPT | Performed by: FAMILY MEDICINE

## 2023-03-07 PROCEDURE — 36415 COLL VENOUS BLD VENIPUNCTURE: CPT

## 2023-03-07 PROCEDURE — 94664 DEMO&/EVAL PT USE INHALER: CPT

## 2023-03-07 PROCEDURE — 96374 THER/PROPH/DIAG INJ IV PUSH: CPT

## 2023-03-07 PROCEDURE — 6370000000 HC RX 637 (ALT 250 FOR IP): Performed by: EMERGENCY MEDICINE

## 2023-03-07 PROCEDURE — 71275 CT ANGIOGRAPHY CHEST: CPT

## 2023-03-07 PROCEDURE — 94640 AIRWAY INHALATION TREATMENT: CPT

## 2023-03-07 PROCEDURE — 83880 ASSAY OF NATRIURETIC PEPTIDE: CPT

## 2023-03-07 RX ORDER — FLUTICASONE PROPIONATE 50 MCG
2 SPRAY, SUSPENSION (ML) NASAL DAILY PRN
COMMUNITY

## 2023-03-07 RX ORDER — ACETAMINOPHEN 325 MG/1
650 TABLET ORAL EVERY 6 HOURS PRN
Status: DISCONTINUED | OUTPATIENT
Start: 2023-03-07 | End: 2023-03-11 | Stop reason: HOSPADM

## 2023-03-07 RX ORDER — MECOBALAMIN 5000 MCG
5 TABLET,DISINTEGRATING ORAL NIGHTLY
Status: DISCONTINUED | OUTPATIENT
Start: 2023-03-07 | End: 2023-03-11 | Stop reason: HOSPADM

## 2023-03-07 RX ORDER — METHYLPREDNISOLONE SODIUM SUCCINATE 125 MG/2ML
60 INJECTION, POWDER, LYOPHILIZED, FOR SOLUTION INTRAMUSCULAR; INTRAVENOUS DAILY
Status: DISCONTINUED | OUTPATIENT
Start: 2023-03-08 | End: 2023-03-10

## 2023-03-07 RX ORDER — SODIUM CHLORIDE 0.9 % (FLUSH) 0.9 %
5-40 SYRINGE (ML) INJECTION PRN
Status: DISCONTINUED | OUTPATIENT
Start: 2023-03-07 | End: 2023-03-11 | Stop reason: HOSPADM

## 2023-03-07 RX ORDER — FLUTICASONE PROPIONATE 50 MCG
2 SPRAY, SUSPENSION (ML) NASAL DAILY
Status: DISCONTINUED | OUTPATIENT
Start: 2023-03-07 | End: 2023-03-11 | Stop reason: HOSPADM

## 2023-03-07 RX ORDER — ARFORMOTEROL TARTRATE 15 UG/2ML
15 SOLUTION RESPIRATORY (INHALATION) 2 TIMES DAILY
Status: DISCONTINUED | OUTPATIENT
Start: 2023-03-07 | End: 2023-03-11 | Stop reason: HOSPADM

## 2023-03-07 RX ORDER — BUDESONIDE 0.5 MG/2ML
0.5 INHALANT ORAL 2 TIMES DAILY
Status: DISCONTINUED | OUTPATIENT
Start: 2023-03-07 | End: 2023-03-11 | Stop reason: HOSPADM

## 2023-03-07 RX ORDER — PANTOPRAZOLE SODIUM 40 MG/1
40 TABLET, DELAYED RELEASE ORAL NIGHTLY
Status: DISCONTINUED | OUTPATIENT
Start: 2023-03-07 | End: 2023-03-11 | Stop reason: HOSPADM

## 2023-03-07 RX ORDER — IPRATROPIUM BROMIDE AND ALBUTEROL SULFATE 2.5; .5 MG/3ML; MG/3ML
1 SOLUTION RESPIRATORY (INHALATION)
Status: DISCONTINUED | OUTPATIENT
Start: 2023-03-07 | End: 2023-03-11 | Stop reason: HOSPADM

## 2023-03-07 RX ORDER — ASPIRIN 81 MG/1
81 TABLET, CHEWABLE ORAL DAILY
Status: DISCONTINUED | OUTPATIENT
Start: 2023-03-07 | End: 2023-03-11 | Stop reason: HOSPADM

## 2023-03-07 RX ORDER — THEOPHYLLINE 400 MG/1
400 TABLET, EXTENDED RELEASE ORAL DAILY
Status: ON HOLD | COMMUNITY
End: 2023-03-11 | Stop reason: HOSPADM

## 2023-03-07 RX ORDER — GUAIFENESIN 600 MG/1
600 TABLET, EXTENDED RELEASE ORAL 2 TIMES DAILY
Status: DISCONTINUED | OUTPATIENT
Start: 2023-03-07 | End: 2023-03-07 | Stop reason: CLARIF

## 2023-03-07 RX ORDER — CHOLECALCIFEROL (VITAMIN D3) 50 MCG
2000 TABLET ORAL DAILY
Status: DISCONTINUED | OUTPATIENT
Start: 2023-03-07 | End: 2023-03-11 | Stop reason: HOSPADM

## 2023-03-07 RX ORDER — ATORVASTATIN CALCIUM 40 MG/1
40 TABLET, FILM COATED ORAL NIGHTLY
Status: DISCONTINUED | OUTPATIENT
Start: 2023-03-07 | End: 2023-03-11 | Stop reason: HOSPADM

## 2023-03-07 RX ORDER — METOPROLOL SUCCINATE 25 MG/1
12.5 TABLET, EXTENDED RELEASE ORAL NIGHTLY
Status: DISCONTINUED | OUTPATIENT
Start: 2023-03-07 | End: 2023-03-11 | Stop reason: HOSPADM

## 2023-03-07 RX ORDER — ACETAMINOPHEN 650 MG/1
650 SUPPOSITORY RECTAL EVERY 6 HOURS PRN
Status: DISCONTINUED | OUTPATIENT
Start: 2023-03-07 | End: 2023-03-11 | Stop reason: HOSPADM

## 2023-03-07 RX ORDER — IPRATROPIUM BROMIDE AND ALBUTEROL SULFATE 2.5; .5 MG/3ML; MG/3ML
1 SOLUTION RESPIRATORY (INHALATION) EVERY 4 HOURS PRN
COMMUNITY

## 2023-03-07 RX ORDER — ONDANSETRON 4 MG/1
4 TABLET, ORALLY DISINTEGRATING ORAL EVERY 8 HOURS PRN
Status: DISCONTINUED | OUTPATIENT
Start: 2023-03-07 | End: 2023-03-11 | Stop reason: HOSPADM

## 2023-03-07 RX ORDER — ENOXAPARIN SODIUM 100 MG/ML
40 INJECTION SUBCUTANEOUS DAILY
Status: DISCONTINUED | OUTPATIENT
Start: 2023-03-07 | End: 2023-03-11 | Stop reason: HOSPADM

## 2023-03-07 RX ORDER — SODIUM CHLORIDE 9 MG/ML
INJECTION, SOLUTION INTRAVENOUS PRN
Status: DISCONTINUED | OUTPATIENT
Start: 2023-03-07 | End: 2023-03-11 | Stop reason: HOSPADM

## 2023-03-07 RX ORDER — POTASSIUM CHLORIDE 20 MEQ/1
20 TABLET, EXTENDED RELEASE ORAL DAILY
Status: DISCONTINUED | OUTPATIENT
Start: 2023-03-07 | End: 2023-03-11 | Stop reason: HOSPADM

## 2023-03-07 RX ORDER — METHYLPREDNISOLONE SODIUM SUCCINATE 125 MG/2ML
125 INJECTION, POWDER, LYOPHILIZED, FOR SOLUTION INTRAMUSCULAR; INTRAVENOUS ONCE
Status: COMPLETED | OUTPATIENT
Start: 2023-03-07 | End: 2023-03-07

## 2023-03-07 RX ORDER — ONDANSETRON 2 MG/ML
4 INJECTION INTRAMUSCULAR; INTRAVENOUS EVERY 6 HOURS PRN
Status: DISCONTINUED | OUTPATIENT
Start: 2023-03-07 | End: 2023-03-11 | Stop reason: HOSPADM

## 2023-03-07 RX ORDER — SODIUM CHLORIDE 0.9 % (FLUSH) 0.9 %
5-40 SYRINGE (ML) INJECTION EVERY 12 HOURS SCHEDULED
Status: DISCONTINUED | OUTPATIENT
Start: 2023-03-07 | End: 2023-03-11 | Stop reason: HOSPADM

## 2023-03-07 RX ORDER — FUROSEMIDE 20 MG/1
20 TABLET ORAL DAILY
Status: DISCONTINUED | OUTPATIENT
Start: 2023-03-07 | End: 2023-03-11 | Stop reason: HOSPADM

## 2023-03-07 RX ORDER — POLYETHYLENE GLYCOL 3350 17 G/17G
17 POWDER, FOR SOLUTION ORAL DAILY PRN
Status: DISCONTINUED | OUTPATIENT
Start: 2023-03-07 | End: 2023-03-11 | Stop reason: HOSPADM

## 2023-03-07 RX ORDER — ALBUTEROL SULFATE 2.5 MG/3ML
2.5 SOLUTION RESPIRATORY (INHALATION) EVERY 6 HOURS PRN
Status: ON HOLD | COMMUNITY
End: 2023-03-11 | Stop reason: HOSPADM

## 2023-03-07 RX ORDER — IPRATROPIUM BROMIDE AND ALBUTEROL SULFATE 2.5; .5 MG/3ML; MG/3ML
1 SOLUTION RESPIRATORY (INHALATION)
Status: COMPLETED | OUTPATIENT
Start: 2023-03-07 | End: 2023-03-07

## 2023-03-07 RX ORDER — PANTOPRAZOLE SODIUM 20 MG/1
20 TABLET, DELAYED RELEASE ORAL
Status: DISCONTINUED | OUTPATIENT
Start: 2023-03-08 | End: 2023-03-07

## 2023-03-07 RX ORDER — IPRATROPIUM BROMIDE AND ALBUTEROL SULFATE 2.5; .5 MG/3ML; MG/3ML
3 SOLUTION RESPIRATORY (INHALATION) EVERY 6 HOURS
Status: DISCONTINUED | OUTPATIENT
Start: 2023-03-07 | End: 2023-03-07 | Stop reason: DRUGHIGH

## 2023-03-07 RX ORDER — UMECLIDINIUM 62.5 UG/1
1 AEROSOL, POWDER ORAL DAILY
COMMUNITY

## 2023-03-07 RX ORDER — ASCORBIC ACID 500 MG
1000 TABLET ORAL DAILY
Status: DISCONTINUED | OUTPATIENT
Start: 2023-03-07 | End: 2023-03-11 | Stop reason: HOSPADM

## 2023-03-07 RX ADMIN — ASPIRIN 81 MG CHEWABLE TABLET 81 MG: 81 TABLET CHEWABLE at 10:32

## 2023-03-07 RX ADMIN — IPRATROPIUM BROMIDE AND ALBUTEROL SULFATE 1 AMPULE: .5; 3 SOLUTION RESPIRATORY (INHALATION) at 05:51

## 2023-03-07 RX ADMIN — BUDESONIDE 500 MCG: 0.5 SUSPENSION RESPIRATORY (INHALATION) at 11:53

## 2023-03-07 RX ADMIN — Medication 5 MG: at 21:50

## 2023-03-07 RX ADMIN — SODIUM CHLORIDE, PRESERVATIVE FREE 10 ML: 5 INJECTION INTRAVENOUS at 21:51

## 2023-03-07 RX ADMIN — IPRATROPIUM BROMIDE AND ALBUTEROL SULFATE 1 AMPULE: .5; 2.5 SOLUTION RESPIRATORY (INHALATION) at 11:17

## 2023-03-07 RX ADMIN — ENOXAPARIN SODIUM 40 MG: 100 INJECTION SUBCUTANEOUS at 10:32

## 2023-03-07 RX ADMIN — IPRATROPIUM BROMIDE AND ALBUTEROL SULFATE 1 AMPULE: .5; 3 SOLUTION RESPIRATORY (INHALATION) at 05:50

## 2023-03-07 RX ADMIN — ARFORMOTEROL TARTRATE 15 MCG: 15 SOLUTION RESPIRATORY (INHALATION) at 11:53

## 2023-03-07 RX ADMIN — POTASSIUM CHLORIDE 20 MEQ: 1500 TABLET, EXTENDED RELEASE ORAL at 17:29

## 2023-03-07 RX ADMIN — IPRATROPIUM BROMIDE AND ALBUTEROL SULFATE 1 AMPULE: .5; 2.5 SOLUTION RESPIRATORY (INHALATION) at 20:15

## 2023-03-07 RX ADMIN — IOPAMIDOL 75 ML: 755 INJECTION, SOLUTION INTRAVENOUS at 18:24

## 2023-03-07 RX ADMIN — SERTRALINE 25 MG: 50 TABLET, FILM COATED ORAL at 17:29

## 2023-03-07 RX ADMIN — Medication 2000 UNITS: at 17:29

## 2023-03-07 RX ADMIN — PANTOPRAZOLE SODIUM 40 MG: 40 TABLET, DELAYED RELEASE ORAL at 21:50

## 2023-03-07 RX ADMIN — ATORVASTATIN CALCIUM 40 MG: 40 TABLET, FILM COATED ORAL at 21:50

## 2023-03-07 RX ADMIN — BUDESONIDE 500 MCG: 0.5 SUSPENSION RESPIRATORY (INHALATION) at 20:15

## 2023-03-07 RX ADMIN — FUROSEMIDE 20 MG: 20 TABLET ORAL at 10:32

## 2023-03-07 RX ADMIN — Medication 1000 MG: at 10:32

## 2023-03-07 RX ADMIN — METOPROLOL SUCCINATE 12.5 MG: 25 TABLET, EXTENDED RELEASE ORAL at 21:51

## 2023-03-07 RX ADMIN — ARFORMOTEROL TARTRATE 15 MCG: 15 SOLUTION RESPIRATORY (INHALATION) at 20:15

## 2023-03-07 RX ADMIN — IPRATROPIUM BROMIDE AND ALBUTEROL SULFATE 1 AMPULE: .5; 3 SOLUTION RESPIRATORY (INHALATION) at 05:52

## 2023-03-07 RX ADMIN — IPRATROPIUM BROMIDE AND ALBUTEROL SULFATE 1 AMPULE: .5; 2.5 SOLUTION RESPIRATORY (INHALATION) at 15:19

## 2023-03-07 RX ADMIN — METHYLPREDNISOLONE SODIUM SUCCINATE 125 MG: 125 INJECTION, POWDER, FOR SOLUTION INTRAMUSCULAR; INTRAVENOUS at 06:24

## 2023-03-07 RX ADMIN — CEFTRIAXONE SODIUM 1000 MG: 1 INJECTION, POWDER, FOR SOLUTION INTRAMUSCULAR; INTRAVENOUS at 10:32

## 2023-03-07 RX ADMIN — SODIUM CHLORIDE, PRESERVATIVE FREE 10 ML: 5 INJECTION INTRAVENOUS at 17:30

## 2023-03-07 ASSESSMENT — PAIN DESCRIPTION - ORIENTATION: ORIENTATION: RIGHT;LEFT

## 2023-03-07 ASSESSMENT — LIFESTYLE VARIABLES
HOW MANY STANDARD DRINKS CONTAINING ALCOHOL DO YOU HAVE ON A TYPICAL DAY: PATIENT DOES NOT DRINK
HOW OFTEN DO YOU HAVE A DRINK CONTAINING ALCOHOL: NEVER
HOW MANY STANDARD DRINKS CONTAINING ALCOHOL DO YOU HAVE ON A TYPICAL DAY: PATIENT DOES NOT DRINK
HOW OFTEN DO YOU HAVE A DRINK CONTAINING ALCOHOL: NEVER

## 2023-03-07 ASSESSMENT — PAIN DESCRIPTION - DESCRIPTORS: DESCRIPTORS: TIGHTNESS

## 2023-03-07 ASSESSMENT — PAIN DESCRIPTION - LOCATION: LOCATION: CHEST

## 2023-03-07 ASSESSMENT — PAIN - FUNCTIONAL ASSESSMENT: PAIN_FUNCTIONAL_ASSESSMENT: 0-10

## 2023-03-07 ASSESSMENT — PAIN SCALES - GENERAL: PAINLEVEL_OUTOF10: 0

## 2023-03-07 NOTE — H&P
[facility] Resident Inpatient  History and Physical    CC: Shortness of breath     HPI: History obtained from patient. Wilma Cornell is a 58 y.o. female with a PMH of diastolic CHF, severe end-stage COPD on 4L O2 chronically and DVT in 2018  who presents to ED for chest tightness and shortness of breath. Patient states her symptoms began on Sunday (2 days ago). She states that her grandson had cold-like symptoms around the same time. She was seen at [facility] yesterday (3/6/23) and was given a breathing treatment prior to being discharged home. She was sent home with a prescription of steroids which she did not fill. She states that her symptoms persisted therefore she presented to the ED at [facility]. She also endorses increased cough and sputum production. States her sputum is green. She takes her Lasix 20mg daily. Endorses mild lower extremity swelling. Denies: Headache, dizziness, loss of consciousness, hemoptysis, recent travel, history of malignancy, lower extremity pain or back pain. Patient was recently discharged from hospital on 2/10/23 following a COPD exacerbation. Last ECHO in January 2023 showed EF 40-55% and motion abnormality. Last stress test in January 2023 was intermediate risk showing ischemia of the LAD with no intervention performed at the time. Nurse stated the patient was in tripod position on arrival but seemed improved following DUONEB. Patient states the chest tightness improved following DUONEB. Patient quit smoking 2 years ago. Prior to this, she smoked 1/2ppd for 41 years. Patient previously followed with Dr. Karma Saxena. At the time, lung transplant was offered as an option but patient declined.      ED workup:   Vitals: Afebrile, heart rate 107, 91% on 4L  WBC: 13.4  Lactic acid: 1.6  proBNP: 651 -> 920 (Last was 147 on  2/24/23)  Sodium: 140  Glucose: 140  CXR: Severe emphysema without acute process   Troponin: 16 -> 15 ->11  EKG: NSR, non-specific ST changes. Unchanged from previous   Influenza and COVID: Negative   ABG: Not done    ED treatment: Methylprednisolone 125mg and DUONEB    PMH:  has a past medical history of Abscess, Acute on chronic diastolic CHF (congestive heart failure) ([facility] [address] COPD (chronic obstructive pulmonary disease) ([facility] [address] Diverticulitis, Provoked DVT 3/2018, Seasonal allergic rhinitis, Smoker, and Tobacco use disorder. PSH:  has a past surgical history that includes cardiovascular stress test (N/A, 01/06/2023). FH: family history includes Breast Cancer in her none; Colon Cancer in her none; Coronary Art Dis in her father; Diabetes in her unknown relative; Hypertension in her father; Other in her daughter and father; Stroke in her mother. Social:  reports that she quit smoking about 21 months ago. Her smoking use included cigarettes. She has a 20.50 pack-year smoking history. She has never used smokeless tobacco. She reports that she does not currently use alcohol. She reports that she does not currently use drugs after having used the following drugs: Marijuana Champ Cue). Allergies: Allergies   Allergen Reactions    Penicillin V Hives and Other (See Comments)     11/07/2005 UNKNOWN, PLEASE VERIFY, 11/07/2005 UNKNOWN, PLEASE VERIFY        Home Medications:   No current facility-administered medications on file prior to encounter.      Current Outpatient Medications on File Prior to Encounter   Medication Sig Dispense Refill    predniSONE (DELTASONE) 50 MG tablet Take 1 tablet by mouth daily for 5 days 5 tablet 0    pantoprazole (PROTONIX) 40 MG tablet Take 40 mg by mouth nightly      furosemide (LASIX) 20 MG tablet Take 1 tablet by mouth daily 60 tablet 3    potassium chloride (KLOR-CON M) 20 MEQ extended release tablet Take 1 tablet by mouth daily 30 tablet 0    sodium chloride (OCEAN, BABY AYR) 0.65 % nasal spray 2 sprays by Nasal route every 4 hours as needed for Congestion (congestion, nasal pain) 30 mL 0    acetaminophen (TYLENOL) 325 MG tablet Take 650 mg by mouth every 6 hours as needed for Pain or Fever      bisacodyl (DULCOLAX) 10 MG suppository Place 10 mg rectally daily      magnesium hydroxide (MILK OF MAGNESIA) 400 MG/5ML suspension Take by mouth daily as needed for Constipation (Patient not taking: Reported on 2/24/2023)      docusate sodium (COLACE) 100 MG capsule Take 100 mg by mouth daily      metoprolol succinate (TOPROL XL) 25 MG extended release tablet Take 0.5 tablets by mouth nightly 30 tablet 3    aspirin 81 MG chewable tablet Take 1 tablet by mouth daily 30 tablet 3    albuterol sulfate HFA (VENTOLIN HFA) 108 (90 Base) MCG/ACT inhaler Inhale 2 puffs into the lungs 4 times daily as needed for Wheezing or Shortness of Breath 1 each 2    Arformoterol Tartrate (BROVANA) 15 MCG/2ML NEBU Take 2 mLs by nebulization in the morning and 2 mLs in the evening. 120 mL 0    budesonide (PULMICORT) 0.5 MG/2ML nebulizer suspension Take 2 mLs by nebulization in the morning and 2 mLs in the evening. 60 each 0    ipratropium-albuterol (DUONEB) 0.5-2.5 (3) MG/3ML SOLN nebulizer solution Inhale 3 mLs into the lungs in the morning and 3 mLs at noon and 3 mLs in the evening and 3 mLs before bedtime. 360 mL 0    atorvastatin (LIPITOR) 40 MG tablet Take 1 tablet by mouth nightly 30 tablet 0    sertraline (ZOLOFT) 25 MG tablet Take 1 tablet by mouth daily 30 tablet 0    fluticasone (FLONASE) 50 MCG/ACT nasal spray 2 sprays by Each Nostril route daily 1 each 5    melatonin 5 MG TBDP disintegrating tablet Take 1 tablet by mouth nightly 30 tablet 0    Vitamin D (CHOLECALCIFEROL) 50 MCG (2000 UT) TABS tablet Take 1 tablet by mouth daily 30 tablet 0    pantoprazole (PROTONIX) 40 MG tablet Take 1 tablet by mouth in the morning and at bedtime for 14 days, THEN 1 tablet daily.  (Patient taking differently: Take 1 tablet by mouth in the daily) 58 tablet 0    zinc 50 MG CAPS Take 50 mg by mouth daily (Patient not taking: Reported on 2/24/2023) 30 capsule 0    ascorbic acid (VITAMIN C) 1000 MG tablet Take 1 tablet by mouth daily 30 tablet 0    OXYGEN Inhale 4 L into the lungs continuous         ROS:  Review of Systems    Const: No fever, chills, night sweats, no recent unexplained weight gain/loss  HEENT: No blurred vision, double vision; no URI symptoms  Resp: Cough, Sputum, no pleuritic chest pain, Shortness of breath  Cardio: Chest tightness no exertional dyspnea, no PND, no orthopnea, no palpitation, mild leg swelling. GI: No dysphagia, no reflux; no abdominal pain, no n/v; no c/d. No hematochezia    : No dysuria, no frequency, hesitancy; no hematuria  MSK: no joint pain, no myalgia, no change in ROM  Neuro: no focal weakness, no slurred speech, no double vision, no numbness or tingling in extremities  Endo: no heat/cold intolerance, no polyphagia, polydipsia or polyuria  Hem: no increased bleeding, no bruising, no lymphadenopathy  Skin: no skin changes  Psych: no depressed mood, no suicidal ideation        PE:  Blood pressure (!) 179/86, pulse (!) 112, temperature 97.4 °F (36.3 °C), resp. rate 23, height 5' 2\" (1.575 m), weight 124 lb (56.2 kg), SpO2 95 %, not currently breastfeeding. Physical Exam    General: Alert, cooperative, no acute distress. HEENT: Normocephalic, atraumatic. Conjunctiva/corneas clear, EOM's intact, no pallor or icterus. Neck: Supple, symmetrical, trachea midline, no cervical LAD. No carotid bruit or JVD  Chest: No tenderness or deformity  Lung: No pursed lip breathing or accessory muscle use. Diminished breath sounds. Crackles in lung bases bilaterally  Heart: RRR, S1 and S2 normal, no murmur, rub or gallop. Abdomen: Non-tender, no masses, no organomegaly, no guarding, rebound or rigidity. Extremities:  Extremities normal, atraumatic, no cyanosis. Mild non-pitting peripheral edema of bilateral lower extremities;  Distal pulses equal bilaterally  Skin: Skin color, texture, turgor normal, no rashes or lesions  Musculoskeletal: No joint swelling, no muscle tenderness. Normal ROM in extremities.    Lymph nodes: No lymph node enlargement appreciated  Neurologic: Alert & Oriented x 4    Labs:   Results for orders placed or performed during the hospital encounter of 03/07/23   CBC with Auto Differential   Result Value Ref Range    WBC 13.4 (H) 4.5 - 11.5 E9/L    RBC 4.43 3.50 - 5.50 E12/L    Hemoglobin 13.5 11.5 - 15.5 g/dL    Hematocrit 41.3 34.0 - 48.0 %    MCV 93.2 80.0 - 99.9 fL    MCH 30.5 26.0 - 35.0 pg    MCHC 32.7 32.0 - 34.5 %    RDW 13.6 11.5 - 15.0 fL    Platelets 069 476 - 383 E9/L    MPV 9.9 7.0 - 12.0 fL    Neutrophils % 91.6 (H) 43.0 - 80.0 %    Immature Granulocytes % 0.3 0.0 - 5.0 %    Lymphocytes % 7.0 (L) 20.0 - 42.0 %    Monocytes % 1.0 (L) 2.0 - 12.0 %    Eosinophils % 0.0 0.0 - 6.0 %    Basophils % 0.1 0.0 - 2.0 %    Neutrophils Absolute 12.26 (H) 1.80 - 7.30 E9/L    Immature Granulocytes # 0.04 E9/L    Lymphocytes Absolute 0.94 (L) 1.50 - 4.00 E9/L    Monocytes Absolute 0.13 0.10 - 0.95 E9/L    Eosinophils Absolute 0.00 (L) 0.05 - 0.50 E9/L    Basophils Absolute 0.01 0.00 - 0.20 E9/L   Comprehensive Metabolic Panel   Result Value Ref Range    Sodium 140 132 - 146 mmol/L    Potassium 4.2 3.5 - 5.0 mmol/L    Chloride 98 98 - 107 mmol/L    CO2 32 (H) 22 - 29 mmol/L    Anion Gap 10 7 - 16 mmol/L    Glucose 140 (H) 74 - 99 mg/dL    BUN 15 6 - 23 mg/dL    Creatinine 0.4 (L) 0.5 - 1.0 mg/dL    Est, Glom Filt Rate >60 >=60 mL/min/1.73    Calcium 8.8 8.6 - 10.2 mg/dL    Total Protein 6.2 (L) 6.4 - 8.3 g/dL    Albumin 2.6 (L) 3.5 - 5.2 g/dL    Total Bilirubin 0.5 0.0 - 1.2 mg/dL    Alkaline Phosphatase 131 (H) 35 - 104 U/L    ALT 29 0 - 32 U/L    AST 32 (H) 0 - 31 U/L   Troponin   Result Value Ref Range    Troponin, High Sensitivity 11 (H) 0 - 9 ng/L   Brain Natriuretic Peptide   Result Value Ref Range    Pro- (H) 0 - 125 pg/mL       Imaging:  XR CHEST PORTABLE    Result Date: 3/7/2023  EXAMINATION: ONE XRAY VIEW OF THE CHEST 3/7/2023 2:59 am COMPARISON: 03/06/2023 HISTORY: ORDERING SYSTEM PROVIDED HISTORY: sob TECHNOLOGIST PROVIDED HISTORY: Reason for exam:->sob What reading provider will be dictating this exam?->CRC FINDINGS: Hyperaeration related to underlying severe emphysema. No focal consolidation or pleural effusion. No pneumothorax. Patient is rotated. No acute osseous abnormality is identified. Severe emphysema without acute process. XR CHEST PORTABLE    Result Date: 3/6/2023  EXAMINATION: ONE XRAY VIEW OF THE CHEST 3/6/2023 6:02 pm COMPARISON: 02/06/2023 HISTORY: ORDERING SYSTEM PROVIDED HISTORY: SOB TECHNOLOGIST PROVIDED HISTORY: Reason for exam:->SOB FINDINGS: The lungs are without acute focal process. There is no effusion or pneumothorax. The cardiomediastinal silhouette is without acute process. The osseous structures are without acute process. Emphysematous changes. No acute process. XR CHEST PORTABLE    Result Date: 2/6/2023  EXAMINATION: ONE XRAY VIEW OF THE CHEST 2/6/2023 7:31 am COMPARISON: 01/04/2023 and CT scan of 12/28/2022 HISTORY: ORDERING SYSTEM PROVIDED HISTORY: chest pain TECHNOLOGIST PROVIDED HISTORY: Reason for exam:->chest pain What reading provider will be dictating this exam?->CRC FINDINGS: There is hyperinflation of the lungs and flattening of diaphragms consistent with COPD. There is no pulmonary infiltrate, mass or nodule. There is no pleural effusion. The cardiac silhouette is within normal limits. There is significant pleuroparenchymal scarring seen within the right and left lung apex. 1. COPD. There is no evidence of failure or pneumonia 2. Significant pleuroparenchymal scarring seen within the right and left lung apex.      CTA CHEST W CONTRAST    Result Date: 2/6/2023  EXAMINATION: CTA OF THE CHEST 2/6/2023 12:18 pm TECHNIQUE: CTA of the chest was performed after the administration of intravenous contrast.  Multiplanar reformatted images are provided for review. MIP images are provided for review. Automated exposure control, iterative reconstruction, and/or weight based adjustment of the mA/kV was utilized to reduce the radiation dose to as low as reasonably achievable. COMPARISON: Previous CT chest April 13, November 29, December 28, 2022. HISTORY: ORDERING SYSTEM PROVIDED HISTORY: r/o PE TECHNOLOGIST PROVIDED HISTORY: Reason for exam:->r/o PE Decision Support Exception - unselect if not a suspected or confirmed emergency medical condition->Emergency Medical Condition (MA) What reading provider will be dictating this exam?->CRC FINDINGS: There is a progressive fibro-retraction/multi segmental atelectasis the right upper lobe/right middle lobe and of the left upper lobe/lingula included, in a relative symmetrical pattern in both sides of the midline with compensatory hyperexpansion of the corresponding right and left lower lobes, which demonstrate progressive emphysematous changes to advanced degree, in part due compensatory hyperexpansion. There are confluent opacities scattered throughout the retracted right and left upper lobes some of them in bronchocentric areas, others more peripheral located in subpleural regions. These represent areas of consolidation in the lung parenchyma which can be related with areas of chronic organized pneumonia. The possibility for a chronic infectious process as seen with atypical pneumonia including of a granulomatous etiology will be part of the differential diagnosis. Further consultation/investigation with pulmonary medicine recommended. There is no indication for acute pulmonary embolus in the main PA, right left main PAs, in the lobar, segmental and subsegmental branches to the 3/4 order. There is pruning of the segmental and subsegmental arteries of both lower lobes more likely related with the compensatory emphysema present. Heart has normal size. LV inner diameter 0.6 cm.  RV inner diameter 2.9 cm.  There is incomplete filling of the left ventricular cavity with contrast difficult to separate from hypertrophy of the patellar muscles. Further evaluation with echocardiogram is recommended. There is no pericardial effusion. There is no mediastinal mass or adenopathy. There is no aneurysm formation or dissection of the thoracic aorta. Ascending aorta diameter 3.2 cm. Calcified atheromatous changes are seen in the arch of the aorta. Main pulmonary artery diameter 2.9 cm. There is no pleural effusions. Upper abdominal structures are not fully covered on this study. There is a 6 cm simple density cyst in the left lobe of the liver with some internal septations. Please see report of MRI abdomen of December 29, 2022. The liver is not fully evaluated on this study. Mild spondylosis are seen in the thoracic spine. 1.  No acute pulmonary emboli. 2.  No aneurysm formation or dissection of the thoracic aorta. 3.  Progressive fibroretraction/multi segmental atelectasis of both upper lobes/right middle lobe/lingula with confluent parenchymal opacities that can represent areas of organized pneumonia. 4.  Progressive ongoing chronic inflammatory including granulomatous and atypical infectious process of the lung parenchyma consider in the differential diagnosis. Further consultation/investigation with pulmonary medicine recommended. 5.  Liver lesions seen in the upper abdomen partially covered on this examination, see report MRI of the abdomen of December 29, 2022. Assessment and Plan  Principal Problem:    COPD with acute exacerbation ([address]  Resolved Problems:    * No resolved hospital problems. *          Shortness of Breath  -Likely secondary to COPD exacerbation vs CHF exacerbation  -ProBNP elevated on admission  -Patient is on 20mg Lasix at home   -Plan: Duoneb, Pulmicort, Brovana; ABGs;  Respiratory panel; Ceftriaxone and Methylprednisolone; Guaifenesin     Concern for Sepsis  -Patient with elevated WBC and HR; Lactic acid 1.6  -Plan: Monitor, no cultures at this time    Hx of CHF  -Last ECHO in January 2023 showed EF 40-55% and motion abnormality.  -Continue home Lasix 20mg, Metoprolol and potassium supplement   -Strict intake and output  -Daily weights  -No need for repeat ECHO at this time    Hx of abnormal stress test  -Patient needs to follow-up with cardiology on discharge    Hx of HLD  -Continue home Atorvastatin 40mg    Hx of Depression/Anxiety  -Continue home Zoloft     Hx of allergic rhinitis  -Continue home Flonase     Hx of Tobacco use  -Patient no longer smoking    Hx of Provoked DVT  -Patient completed anticoagulation   -She is on ASA daily     Hx of Liver cyst  -positive for Hep A and Hep C antibodies in 9/22; HCV not detected on 2/6/23  -Hospitalized for transaminitis with hyperbilirubinemia, multiple liver cysts, abnormal GB appearance with negative US.   -Negative MRCP for biliary obstruction at that time  -AST and ALT slightly elevated            Electronically signed by Warden Zainab DO on 3/7/2023 at 6:49 AM  This case was discussed with attending physician, Dr. Rigo Jama

## 2023-03-07 NOTE — ED NOTES
Called lab addressing unacknowledged orders, they stated they received blue and green top tubes and are in process       Jean Aquino RN  03/07/23 [id number]

## 2023-03-07 NOTE — ED NOTES
Notified pharmacy that orders are not verified and they acknowledged to approve when they have time      Jose Toscano [facility]  03/07/23 [id number]

## 2023-03-07 NOTE — ED PROVIDER NOTES
MonitorHPI:  3/7/23, Time: 5:57 AM AKIRA Wolfe is a 58 y.o. female presenting to the ED for shortness of breath as well as chest tightness, beginning days ago. The complaint has been persistent, moderate in severity, and worsened by light exertion. Patient with history of COPD history of CHF presenting her because of shortness of breath. Patient was seen at outlying facility the day before treated and released. Patient reporting no improvement. Patient reporting no fever no chills no abdominal pain. Patient reporting some cough. Patient reporting no headache she reports no syncopal event. ROS:   Pertinent positives and negatives are stated within HPI, all other systems reviewed and are negative.  --------------------------------------------- PAST HISTORY ---------------------------------------------  Past Medical History:  has a past medical history of Abscess, Acute on chronic diastolic CHF (congestive heart failure) ([facility] [address] COPD (chronic obstructive pulmonary disease) ([facility] [address] Diverticulitis, Provoked DVT 3/2018, Seasonal allergic rhinitis, Smoker, and Tobacco use disorder. Past Surgical History:  has a past surgical history that includes cardiovascular stress test (N/A, 01/06/2023). Social History:  reports that she quit smoking about 21 months ago. Her smoking use included cigarettes. She has a 20.50 pack-year smoking history. She has never used smokeless tobacco. She reports that she does not currently use alcohol. She reports that she does not currently use drugs after having used the following drugs: Marijuana Steph Kos). Family History: family history includes Breast Cancer in her none; Colon Cancer in her none; Coronary Art Dis in her father; Diabetes in her unknown relative; Hypertension in her father; Other in her daughter and father; Stroke in her mother. The patients home medications have been reviewed.     Allergies: Penicillin v    ---------------------------------------------------PHYSICAL EXAM--------------------------------------    Constitutional/General: Alert and oriented x3, mild distress  Head: Normocephalic and atraumatic  Eyes: PERRL, EOMI  Mouth: Oropharynx clear, handling secretions, no trismus  Neck: Supple, full ROM, non tender to palpation in the midline, no stridor, no crepitus, no meningeal signs  Pulmonary: Lungs diminished bilateral  Cardiovascular:  Regular rate. Regular rhythm. No murmurs, gallops, or rubs. 2+ distal pulses  Chest: no chest wall tenderness  Abdomen: Soft. Non tender. Non distended. +BS. No rebound, guarding, or rigidity. No pulsatile masses appreciated. Musculoskeletal: Moves all extremities x 4. Warm and well perfused, no clubbing, cyanosis, or edema. Capillary refill <3 seconds  Skin: warm and dry. No rashes. Neurologic: GCS 15, CN 2-12 grossly intact, no focal deficits, symmetric strength 5/5 in the upper and lower extremities bilaterally  Psych: Normal Affect    -------------------------------------------------- RESULTS -------------------------------------------------  I have personally reviewed all laboratory and imaging results for this patient. Results are listed below.      LABS:  Results for orders placed or performed during the hospital encounter of 03/07/23   CBC with Auto Differential   Result Value Ref Range    WBC 13.4 (H) 4.5 - 11.5 E9/L    RBC 4.43 3.50 - 5.50 E12/L    Hemoglobin 13.5 11.5 - 15.5 g/dL    Hematocrit 41.3 34.0 - 48.0 %    MCV 93.2 80.0 - 99.9 fL    MCH 30.5 26.0 - 35.0 pg    MCHC 32.7 32.0 - 34.5 %    RDW 13.6 11.5 - 15.0 fL    Platelets 813 617 - 771 E9/L    MPV 9.9 7.0 - 12.0 fL    Neutrophils % 91.6 (H) 43.0 - 80.0 %    Immature Granulocytes % 0.3 0.0 - 5.0 %    Lymphocytes % 7.0 (L) 20.0 - 42.0 %    Monocytes % 1.0 (L) 2.0 - 12.0 %    Eosinophils % 0.0 0.0 - 6.0 %    Basophils % 0.1 0.0 - 2.0 %    Neutrophils Absolute 12.26 (H) 1.80 - 7.30 E9/L    Immature Granulocytes # 0.04 E9/L    Lymphocytes Absolute 0.94 (L) 1.50 - 4.00 E9/L    Monocytes Absolute 0.13 0.10 - 0.95 E9/L    Eosinophils Absolute 0.00 (L) 0.05 - 0.50 E9/L    Basophils Absolute 0.01 0.00 - 0.20 E9/L   Comprehensive Metabolic Panel   Result Value Ref Range    Sodium 140 132 - 146 mmol/L    Potassium 4.2 3.5 - 5.0 mmol/L    Chloride 98 98 - 107 mmol/L    CO2 32 (H) 22 - 29 mmol/L    Anion Gap 10 7 - 16 mmol/L    Glucose 140 (H) 74 - 99 mg/dL    BUN 15 6 - 23 mg/dL    Creatinine 0.4 (L) 0.5 - 1.0 mg/dL    Est, Glom Filt Rate >60 >=60 mL/min/1.73    Calcium 8.8 8.6 - 10.2 mg/dL    Total Protein 6.2 (L) 6.4 - 8.3 g/dL    Albumin 2.6 (L) 3.5 - 5.2 g/dL    Total Bilirubin 0.5 0.0 - 1.2 mg/dL    Alkaline Phosphatase 131 (H) 35 - 104 U/L    ALT 29 0 - 32 U/L    AST 32 (H) 0 - 31 U/L   Troponin   Result Value Ref Range    Troponin, High Sensitivity 11 (H) 0 - 9 ng/L   Brain Natriuretic Peptide   Result Value Ref Range    Pro- (H) 0 - 125 pg/mL   Troponin   Result Value Ref Range    Troponin, High Sensitivity 14 (H) 0 - 9 ng/L       RADIOLOGY:  Interpreted by Radiologist.  XR CHEST PORTABLE   Final Result   Severe emphysema without acute process. EKG: This EKG is signed and interpreted by me. Rate: 97  Rhythm: Sinus  Interpretation: non-specific EKG  Comparison: stable as compared to patient's most recent EKG 2/6/23        ------------------------- NURSING NOTES AND VITALS REVIEWED ---------------------------   The nursing notes within the ED encounter and vital signs as below have been reviewed by myself. /79   Pulse (!) 109   Temp 97.4 °F (36.3 °C)   Resp 23   Ht 5' 2\" (1.575 m)   Wt 124 lb (56.2 kg)   SpO2 91%   BMI 22.68 kg/m²   Oxygen Saturation Interpretation: Normal    The patients available past medical records and past encounters were reviewed.         ------------------------------ ED COURSE/MEDICAL DECISION MAKING----------------------  Medications ipratropium-albuterol (DUONEB) nebulizer solution 1 ampule (1 ampule Inhalation Given 3/7/23 7033)   methylPREDNISolone sodium (SOLU-MEDROL) injection 125 mg (125 mg IntraVENous Given 3/7/23 6618)             Medical Decision Making:      History From: Patient presenting here because of shortness of breath as well as chest tightness that started several days ago. Patient reports she was seen at outlying facility and was treated and released. Patient reporting worsening shortness of breath. Patient reporting some mild cough patient does not smoke anymore. Patient reporting diarrhea for the past week. Patient reporting no vomiting. She reports no abdominal pain    CC/HPI Summary, DDx, ED Course, Reassessment, Tests Considered, Patient expectation:     Patient with history of COPD history of CHF history of DVT history of diverticular disease presenting here because of shortness of breath over the last several days patient reporting worsening symptoms. She also reports feeling tight in her chest she has had some cough patient reporting no abdominal pain. Patient reporting having diarrhea. Patient reporting no black or tarry stools. Patient is awake alert mild distress lungs are diminished heart exam normal.  Abdomen soft nontender she has no edema. Patient neurologically intact. Patient differential includes COPD exacerbation as well as CHF exacerbation as well as PE as well as pneumothorax as well as pneumonia  Patient had IV established patient was patient on supplemental oxygen. Patient ordered DuoNeb treatments x3 as well as Solu-Medrol 125 IV. Patient white count was 13.4 hemoglobin is 13.5 electrolytes within normal limits potassium specifically is 4.2 troponin was 11 proBNP was 920 patient's chest x-ray showed severe emphysema EKG showed sinus rhythm no ST elevation. Patient heart rate mildly elevated but receiving treatments. Patient made aware of findings and plan.   Plan will be to admit  Patient was reassessed several times here in the emergency department. Patient checked around 7:40 AM patient vital signs heart rate 109 pulse ox is stable. Patient reporting significant improvement after being medicated. Patient was advised by feeling practice resident here I did speak to Dr. Dao Rome who is covering for [facility] practice and she is the attending they agreed admit. Social Determinants affecting Dx or Tx: Patient does not smoke    Chronic Conditions: COPD CHF as well as history of DVT history of COVID    Records Reviewed: [facility] she is admitted in February 2023 for COPD exacerbation. Patient had ejection fraction of 40 to 45% during 42,023        Re-Evaluations:             Re-evaluation. Patients symptoms show no change  Patient was placed on monitor. Patient reportedly was hypoxic out in the waiting room. Patient was ordered DuoNeb treatments x3 here in the emergency department. Patient reporting still having tightness in her chest.  Patient was made aware    Consultations:             Family practice attending as well as resident    Critical Care: This patient's ED course included: a personal history and physicial eaxmination    This patient has been closely monitored during their ED course. Counseling: The emergency provider has spoken with the patient and discussed todays results, in addition to providing specific details for the plan of care and counseling regarding the diagnosis and prognosis. Questions are answered at this time and they are agreeable with the plan.       --------------------------------- IMPRESSION AND DISPOSITION ---------------------------------    IMPRESSION  1. COPD with acute exacerbation ([facility] [address]        DISPOSITION  Disposition: Admit to telemetry  Patient condition is stable        NOTE: This report was transcribed using voice recognition software.  Every effort was made to ensure accuracy; however, inadvertent computerized transcription errors may be present          Buffy Pacheco MD  03/07/23 [address] [facility] [address], [place]  03/07/23 7756

## 2023-03-07 NOTE — ED NOTES
Dr. Zee Landry at bedside while RN attempts to draw ABG. RN unsuccessful. MD verbalized order to d/c ABG due to difficulty with draw.       Dudley Weston RN  03/07/23 8411

## 2023-03-07 NOTE — ED NOTES
Department of Emergency Medicine  FIRST PROVIDER TRIAGE NOTE             Independent MLP           3/7/23  1:44 AM EST    Date of Encounter: 3/7/23   MRN: [id number]      HPI: Anup Lucero is a 58 y.o. female who presents to the ED for No chief complaint on file. Patient was discharged from [facility] ER an hour prior to coming to this ER. Was told to start prednisone. Wears 4 L's of oxygen via nasal cannula. ROS: Negative for abd pain or fever. PE: Gen Appearance/Constitutional: alert  Musculoskeletal: moves all extremities x 4     Initial Plan of Care: All treatment areas with department are currently occupied. Plan to order/Initiate the following while awaiting opening in ED: Supplemental Oxygen.   Initiate Treatment-Testing, Proceed toTreatment Area When Bed Available for ED Attending/FELIPE to Continue Care    Electronically signed by SKYE Rodriguez CNP   DD: 3/7/23       SKYE Rodriguez CNP  03/07/23 0147    ATTENDING PROVIDER ATTESTATION:     Supervising Physician, on-site, available for consultation, non-participatory in the evaluation or care of this patient         Shona Calvillo MD  03/07/23 9148

## 2023-03-07 NOTE — TELEPHONE ENCOUNTER
Writer contacted Dr. Jamie Richardson to inform of 30 day readmission risk. Writer's attempt to contact Dr. Jamie Richardson was unsuccessful.      Call Back: If you need to call back to inform of disposition you can contact me at [phone]

## 2023-03-07 NOTE — PROGRESS NOTES
[address] [facility]  Family Medicine Attending    S: 58 y.o. female with a history of HFmEF, COPD, DVT, abnl stress test, tobacco history, diverticulitis, pulmonary fibrosis and COVID in 1/23 presented with shortness of breath and chest tightness for 2 days. She also noticed increasing swelling in her lower extremities. She is having a nebulized treatment right now but feeling short of breath. O: VS- Blood pressure 139/77, pulse (!) 104, temperature 97.4 °F (36.3 °C), resp. rate 17, height 5' 2\" (1.575 m), weight 124 lb (56.2 kg), SpO2 94 %, not currently breastfeeding. Exam is as noted by resident with the following changes, additions or corrections:  Gen: with some pursed lip breathing, on 5L NC  HEENT: NCAT, PERRL, MMM  CV-RRR   Lungs-diminished breath sounds b/l  ABD-soft nonttp no masses   Ext-no C/C/1-2+ edema bilaterally  Reproducible chest pain    Impressions:   Principal Problem:    COPD with acute exacerbation ([address]  Resolved Problems:    * No resolved hospital problems. *      Plan:   COPD exacerbation with some mild evidence of worsening of her HFmEF. Steroids, nebs, antibiotics and diuretics. Check sputum cx. Check d-dimer. Abg. If co2 elevated, could benefit from cpap. Will need follow up outpt when well for cardiac cath as previously advised. Attending Physician Statement  I have reviewed the chart and seen the patient with the resident(s). I personally reviewed images, EKG's and similar tests, if present. I personally reviewed and performed key elements of the history and exam.  I have reviewed and confirmed student and/or resident history and exam with changes as indicated above. I agree with the assessment, plan and orders as documented by the resident. Please refer to the resident and/or student note for additional information.       Rufina Ch MD

## 2023-03-07 NOTE — ED PROVIDER NOTES
This is a 70-year-old female with a past medical history of CHF as well as COPD who presents to the ED for evaluation of shortness of breath. Patient states that for the past several days has been feeling increasingly short of breath and having wheezing and a dry cough. Patient wears 4 L of oxygen at her baseline and has not had to go up on this. Patient has no fevers or chills. Patient denieany chest pain. According to EMS when they arrived patient had significant wheezing and was treated with DuoNebs as well as steroids. No reported leg pain or leg swelling. Other reported mitigating or exacerbating factors. The history is provided by the patient. Review of Systems   Constitutional:  Negative for fever. Eyes:  Negative for visual disturbance. Respiratory:  Positive for cough and shortness of breath. Cardiovascular:  Negative for chest pain. Gastrointestinal:  Negative for abdominal pain. Endocrine: Negative for polyuria. Genitourinary:  Negative for dysuria. Musculoskeletal:  Negative for back pain. Skin:  Negative for rash. Allergic/Immunologic: Negative for immunocompromised state. Neurological:  Negative for headaches. Hematological:  Does not bruise/bleed easily. Psychiatric/Behavioral:  Negative for confusion. Physical Exam  Vitals and nursing note reviewed. Constitutional:       General: She is not in acute distress. Appearance: She is well-developed. HENT:      Head: Normocephalic and atraumatic. Neck:      Vascular: No JVD. Cardiovascular:      Rate and Rhythm: Normal rate and regular rhythm. Pulmonary:      Effort: Pulmonary effort is normal.      Breath sounds: No wheezing, rhonchi or rales. Comments: Expiratory wheezing bilaterally  Chest:      Chest wall: No tenderness. Abdominal:      General: There is no distension. Palpations: Abdomen is soft. Tenderness: There is no abdominal tenderness. There is no guarding or rebound. Hernia: No hernia is present. Musculoskeletal:      Cervical back: Normal range of motion. Right lower leg: No edema. Left lower leg: No edema. Skin:     General: Skin is warm and dry. Capillary Refill: Capillary refill takes less than 2 seconds. Neurological:      General: No focal deficit present. Mental Status: She is alert and oriented to person, place, and time. Cranial Nerves: No cranial nerve deficit. Psychiatric:         Mood and Affect: Mood normal.         Behavior: Behavior normal.        Procedures     MDM  Number of Diagnoses or Management Options  COPD exacerbation ([facility] [address]  Diagnosis management comments: This is a 41-year-old female who has a longstanding history of emphysema who presented to the ED for several days of shortness of breath. Patient already received steroids as well as DuoNeb prior to arrival she had no signs respiratory distress was breathing comfortably had normal oxygen saturation well above 90%. Patient a broad differential listed below but was not limited to such. On multiple rechecks patient was sitting resting comfortably talking on her phone in no respiratory distress.   Given patient's well appearance oxygen saturation were above 90% we agreed on outpatient follow-up she was given a course of steroids as well as return precautions                 Differential diagnosis: Anemia, ACS, PNA, COPD  Review of ED/ Outpatient Records: Reviwed Dr. Hdz Field note from 2/6  Historians that case was discussed with: EMS  Imaging interpretation by myself: CXR showed no signs of PNA, PTX or effusion  Independent Interpretation of labs: Hemoglobin stable  Discussed with other providers: None  Tests Considered but not ordered: Hemoglobin stable  Decision making tools/risks stratification: None  Disposition decision making/shared decision making: Shared  Chronic Conditions affecting care: COPD  Social Determinants of health impacting treatment or disposition: None  CODE status and Discussions: Full                    --------------------------------------------- PAST HISTORY ---------------------------------------------  Past Medical History:  has a past medical history of Abscess, Acute on chronic diastolic CHF (congestive heart failure) ([facility] [address] COPD (chronic obstructive pulmonary disease) ([facility] [address] Diverticulitis, Provoked DVT 3/2018, Seasonal allergic rhinitis, Smoker, and Tobacco use disorder. Past Surgical History:  has a past surgical history that includes cardiovascular stress test (N/A, 01/06/2023). Social History:  reports that she quit smoking about 21 months ago. Her smoking use included cigarettes. She has a 20.50 pack-year smoking history. She has never used smokeless tobacco. She reports that she does not currently use alcohol. She reports that she does not currently use drugs after having used the following drugs: Marijuana Nisha Villanueva). Family History: family history includes Breast Cancer in her none; Colon Cancer in her none; Coronary Art Dis in her father; Diabetes in her unknown relative; Hypertension in her father; Other in her daughter and father; Stroke in her mother. The patients home medications have been reviewed.     Allergies: Penicillin v    -------------------------------------------------- RESULTS -------------------------------------------------  Labs:  Results for orders placed or performed during the hospital encounter of 03/06/23   COVID-19, Rapid    Specimen: Nasopharyngeal Swab   Result Value Ref Range    SARS-CoV-2, NAAT Not Detected Not Detected   RAPID INFLUENZA A/B ANTIGENS    Specimen: Nasopharyngeal   Result Value Ref Range    Influenza A by PCR Not Detected Not Detected    Influenza B by PCR Not Detected Not Detected   CMP   Result Value Ref Range    Sodium 139 132 - 146 mmol/L    Potassium 3.8 3.5 - 5.0 mmol/L    Chloride 99 98 - 107 mmol/L    CO2 33 (H) 22 - 29 mmol/L    Anion Gap 7 7 - 16 mmol/L    Glucose 115 (H) 74 - 99 mg/dL    BUN 13 6 - 23 mg/dL    Creatinine 0.3 (L) 0.5 - 1.0 mg/dL    Est, Glom Filt Rate >60 >=60 mL/min/1.73    Calcium 8.7 8.6 - 10.2 mg/dL    Total Protein 5.7 (L) 6.4 - 8.3 g/dL    Albumin 2.6 (L) 3.5 - 5.2 g/dL    Total Bilirubin 0.5 0.0 - 1.2 mg/dL    Alkaline Phosphatase 129 (H) 35 - 104 U/L    ALT 27 0 - 32 U/L    AST 30 0 - 31 U/L   CBC with Auto Differential   Result Value Ref Range    WBC 14.6 (H) 4.5 - 11.5 E9/L    RBC 4.46 3.50 - 5.50 E12/L    Hemoglobin 13.7 11.5 - 15.5 g/dL    Hematocrit 43.7 34.0 - 48.0 %    MCV 98.0 80.0 - 99.9 fL    MCH 30.7 26.0 - 35.0 pg    MCHC 31.4 (L) 32.0 - 34.5 %    RDW 13.5 11.5 - 15.0 fL    Platelets 399 449 - 765 E9/L    MPV 10.7 7.0 - 12.0 fL    Neutrophils % 94.9 (H) 43.0 - 80.0 %    Lymphocytes % 4.2 (L) 20.0 - 42.0 %    Monocytes % 2.5 2.0 - 12.0 %    Eosinophils % 0.1 0.0 - 6.0 %    Basophils % 0.3 0.0 - 2.0 %    Neutrophils Absolute 14.02 (H) 1.80 - 7.30 E9/L    Lymphocytes Absolute 0.58 (L) 1.50 - 4.00 E9/L    Monocytes Absolute 0.00 (L) 0.10 - 0.95 E9/L    Eosinophils Absolute 0.00 (L) 0.05 - 0.50 E9/L    Basophils Absolute 0.00 0.00 - 0.20 E9/L    Myelocyte Percent 0.8 0 - 0 %    Polychromasia 1+    Troponin   Result Value Ref Range    Troponin, High Sensitivity 16 (H) 0 - 9 ng/L   Brain Natriuretic Peptide   Result Value Ref Range    Pro- (H) 0 - 125 pg/mL   Lactic Acid   Result Value Ref Range    Lactic Acid 1.6 0.5 - 2.2 mmol/L   Lipase   Result Value Ref Range    Lipase 8 (L) 13 - 60 U/L   Protime-INR   Result Value Ref Range    Protime 12.2 9.3 - 12.4 sec    INR 1.1    EKG 12 Lead   Result Value Ref Range    Ventricular Rate 97 BPM    Atrial Rate 97 BPM    P-R Interval 152 ms    QRS Duration 62 ms    Q-T Interval 350 ms    QTc Calculation (Bazett) 444 ms    P Axis 85 degrees    R Axis 79 degrees    T Axis 80 degrees       Radiology:  XR CHEST PORTABLE   Final Result   No acute process.              ------------------------- NURSING NOTES AND VITALS REVIEWED ---------------------------  Date / Time Roomed:  3/6/2023  5:29 PM  ED Bed Assignment:  16/16    The nursing notes within the ED encounter and vital signs as below have been reviewed. /88   Pulse 99   Temp 98.9 °F (37.2 °C) (Oral)   Resp 21   Wt 124 lb (56.2 kg)   SpO2 96%   BMI 21.97 kg/m²   Oxygen Saturation Interpretation: Normal      ------------------------------------------ PROGRESS NOTES ------------------------------------------  10:41 PM EST  I have spoken with the patient and discussed todays results, in addition to providing specific details for the plan of care and counseling regarding the diagnosis and prognosis. Their questions are answered at this time and they are agreeable with the plan. I discussed at length with them reasons for immediate return here for re evaluation. They will followup with their PCP      --------------------------------- ADDITIONAL PROVIDER NOTES ---------------------------------  At this time the patient is without objective evidence of an acute process requiring hospitalization or inpatient management. They have remained hemodynamically stable throughout their entire ED visit and are stable for discharge with outpatient follow-up. The plan has been discussed in detail and they are aware of the specific conditions for emergent return, as well as the importance of follow-up. New Prescriptions    No medications on file       Diagnosis:  1. COPD exacerbation ([facility] [address]        Disposition:  Patient's disposition: Discharge to home  Patient's condition is stable.         Fabien Pedro DO  03/08/23 1136

## 2023-03-08 LAB
AADO2: 119.5 MMHG
ANION GAP SERPL CALCULATED.3IONS-SCNC: 8 MMOL/L (ref 7–16)
ANISOCYTOSIS: ABNORMAL
B.E.: 9.8 MMOL/L (ref -3–3)
BASOPHILS ABSOLUTE: 0.01 E9/L (ref 0–0.2)
BASOPHILS RELATIVE PERCENT: 0.1 % (ref 0–2)
BUN BLDV-MCNC: 23 MG/DL (ref 6–23)
CALCIUM SERPL-MCNC: 8.8 MG/DL (ref 8.6–10.2)
CHLORIDE BLD-SCNC: 103 MMOL/L (ref 98–107)
CO2: 36 MMOL/L (ref 22–29)
COHB: 0.7 % (ref 0–1.5)
CREAT SERPL-MCNC: 0.4 MG/DL (ref 0.5–1)
CRITICAL: ABNORMAL
DATE ANALYZED: ABNORMAL
DATE OF COLLECTION: ABNORMAL
EKG ATRIAL RATE: 97 BPM
EKG P AXIS: 85 DEGREES
EKG P-R INTERVAL: 152 MS
EKG Q-T INTERVAL: 350 MS
EKG QRS DURATION: 62 MS
EKG QTC CALCULATION (BAZETT): 444 MS
EKG R AXIS: 79 DEGREES
EKG T AXIS: 80 DEGREES
EKG VENTRICULAR RATE: 97 BPM
EOSINOPHILS ABSOLUTE: 0 E9/L (ref 0.05–0.5)
EOSINOPHILS RELATIVE PERCENT: 0 % (ref 0–6)
FIO2: 50 %
GFR SERPL CREATININE-BSD FRML MDRD: >60 ML/MIN/1.73
GLUCOSE BLD-MCNC: 106 MG/DL (ref 74–99)
HCO3: 35.3 MMOL/L (ref 22–26)
HCT VFR BLD CALC: 37.2 % (ref 34–48)
HEMOGLOBIN: 11.4 G/DL (ref 11.5–15.5)
HHB: 0.9 % (ref 0–5)
IMMATURE GRANULOCYTES #: 0.04 E9/L
IMMATURE GRANULOCYTES %: 0.4 % (ref 0–5)
LAB: ABNORMAL
LYMPHOCYTES ABSOLUTE: 1.62 E9/L (ref 1.5–4)
LYMPHOCYTES RELATIVE PERCENT: 14.5 % (ref 20–42)
Lab: ABNORMAL
MCH RBC QN AUTO: 30.8 PG (ref 26–35)
MCHC RBC AUTO-ENTMCNC: 30.6 % (ref 32–34.5)
MCV RBC AUTO: 100.5 FL (ref 80–99.9)
METHB: 0.4 % (ref 0–1.5)
MODE: ABNORMAL
MONOCYTES ABSOLUTE: 1.6 E9/L (ref 0.1–0.95)
MONOCYTES RELATIVE PERCENT: 14.4 % (ref 2–12)
NEUTROPHILS ABSOLUTE: 7.87 E9/L (ref 1.8–7.3)
NEUTROPHILS RELATIVE PERCENT: 70.6 % (ref 43–80)
O2 CONTENT: 17.6 ML/DL
O2 SATURATION: 99.1 % (ref 92–98.5)
O2HB: 98 % (ref 94–97)
OPERATOR ID: 1893
OVALOCYTES: ABNORMAL
PATIENT TEMP: 37 C
PCO2: 51.4 MMHG (ref 35–45)
PDW BLD-RTO: 13.8 FL (ref 11.5–15)
PEEP/CPAP: 6 CMH2O
PFO2: 3.33 MMHG/%
PH BLOOD GAS: 7.46 (ref 7.35–7.45)
PLATELET # BLD: 261 E9/L (ref 130–450)
PMV BLD AUTO: 10.2 FL (ref 7–12)
PO2: 166.7 MMHG (ref 75–100)
POIKILOCYTES: ABNORMAL
POLYCHROMASIA: ABNORMAL
POTASSIUM REFLEX MAGNESIUM: 4.1 MMOL/L (ref 3.5–5)
PS: 12 CMH20
RBC # BLD: 3.7 E12/L (ref 3.5–5.5)
RI(T): 0.72
SMUDGE CELLS: ABNORMAL
SODIUM BLD-SCNC: 147 MMOL/L (ref 132–146)
SOURCE, BLOOD GAS: ABNORMAL
THB: 12.5 G/DL (ref 11.5–16.5)
TIME ANALYZED: 1523
WBC # BLD: 11.1 E9/L (ref 4.5–11.5)

## 2023-03-08 PROCEDURE — 36415 COLL VENOUS BLD VENIPUNCTURE: CPT

## 2023-03-08 PROCEDURE — 94660 CPAP INITIATION&MGMT: CPT

## 2023-03-08 PROCEDURE — 93010 ELECTROCARDIOGRAM REPORT: CPT | Performed by: INTERNAL MEDICINE

## 2023-03-08 PROCEDURE — 6370000000 HC RX 637 (ALT 250 FOR IP)

## 2023-03-08 PROCEDURE — 2580000003 HC RX 258

## 2023-03-08 PROCEDURE — 80048 BASIC METABOLIC PNL TOTAL CA: CPT

## 2023-03-08 PROCEDURE — 94640 AIRWAY INHALATION TREATMENT: CPT

## 2023-03-08 PROCEDURE — 82805 BLOOD GASES W/O2 SATURATION: CPT

## 2023-03-08 PROCEDURE — 2060000000 HC ICU INTERMEDIATE R&B

## 2023-03-08 PROCEDURE — 99232 SBSQ HOSP IP/OBS MODERATE 35: CPT | Performed by: FAMILY MEDICINE

## 2023-03-08 PROCEDURE — 6360000002 HC RX W HCPCS

## 2023-03-08 PROCEDURE — 85025 COMPLETE CBC W/AUTO DIFF WBC: CPT

## 2023-03-08 RX ORDER — POTASSIUM CHLORIDE 20 MEQ/1
TABLET, EXTENDED RELEASE ORAL
Qty: 30 TABLET | Refills: 0 | Status: SHIPPED | OUTPATIENT
Start: 2023-03-08

## 2023-03-08 RX ADMIN — Medication 2000 UNITS: at 09:01

## 2023-03-08 RX ADMIN — FLUTICASONE PROPIONATE 2 SPRAY: 50 SPRAY, METERED NASAL at 10:58

## 2023-03-08 RX ADMIN — FUROSEMIDE 20 MG: 20 TABLET ORAL at 09:01

## 2023-03-08 RX ADMIN — SODIUM CHLORIDE, PRESERVATIVE FREE 10 ML: 5 INJECTION INTRAVENOUS at 22:25

## 2023-03-08 RX ADMIN — IPRATROPIUM BROMIDE AND ALBUTEROL SULFATE 1 AMPULE: .5; 2.5 SOLUTION RESPIRATORY (INHALATION) at 19:54

## 2023-03-08 RX ADMIN — BUDESONIDE 500 MCG: 0.5 SUSPENSION RESPIRATORY (INHALATION) at 08:02

## 2023-03-08 RX ADMIN — Medication 5 MG: at 21:34

## 2023-03-08 RX ADMIN — METOPROLOL SUCCINATE 12.5 MG: 25 TABLET, EXTENDED RELEASE ORAL at 21:34

## 2023-03-08 RX ADMIN — IPRATROPIUM BROMIDE AND ALBUTEROL SULFATE 1 AMPULE: .5; 2.5 SOLUTION RESPIRATORY (INHALATION) at 12:42

## 2023-03-08 RX ADMIN — POTASSIUM CHLORIDE 20 MEQ: 1500 TABLET, EXTENDED RELEASE ORAL at 09:00

## 2023-03-08 RX ADMIN — ASPIRIN 81 MG CHEWABLE TABLET 81 MG: 81 TABLET CHEWABLE at 09:01

## 2023-03-08 RX ADMIN — CEFTRIAXONE SODIUM 1000 MG: 1 INJECTION, POWDER, FOR SOLUTION INTRAMUSCULAR; INTRAVENOUS at 09:01

## 2023-03-08 RX ADMIN — ATORVASTATIN CALCIUM 40 MG: 40 TABLET, FILM COATED ORAL at 21:34

## 2023-03-08 RX ADMIN — SERTRALINE 25 MG: 50 TABLET, FILM COATED ORAL at 09:01

## 2023-03-08 RX ADMIN — IPRATROPIUM BROMIDE AND ALBUTEROL SULFATE 1 AMPULE: .5; 2.5 SOLUTION RESPIRATORY (INHALATION) at 08:02

## 2023-03-08 RX ADMIN — PANTOPRAZOLE SODIUM 40 MG: 40 TABLET, DELAYED RELEASE ORAL at 21:34

## 2023-03-08 RX ADMIN — Medication 1000 MG: at 09:01

## 2023-03-08 RX ADMIN — SODIUM CHLORIDE, PRESERVATIVE FREE 10 ML: 5 INJECTION INTRAVENOUS at 09:02

## 2023-03-08 RX ADMIN — ARFORMOTEROL TARTRATE 15 MCG: 15 SOLUTION RESPIRATORY (INHALATION) at 08:02

## 2023-03-08 RX ADMIN — METHYLPREDNISOLONE SODIUM SUCCINATE 60 MG: 125 INJECTION, POWDER, FOR SOLUTION INTRAMUSCULAR; INTRAVENOUS at 09:02

## 2023-03-08 RX ADMIN — IPRATROPIUM BROMIDE AND ALBUTEROL SULFATE 1 AMPULE: .5; 2.5 SOLUTION RESPIRATORY (INHALATION) at 16:35

## 2023-03-08 RX ADMIN — BUDESONIDE 500 MCG: 0.5 SUSPENSION RESPIRATORY (INHALATION) at 19:54

## 2023-03-08 RX ADMIN — ARFORMOTEROL TARTRATE 15 MCG: 15 SOLUTION RESPIRATORY (INHALATION) at 19:54

## 2023-03-08 RX ADMIN — ENOXAPARIN SODIUM 40 MG: 100 INJECTION SUBCUTANEOUS at 09:00

## 2023-03-08 ASSESSMENT — PAIN SCALES - GENERAL
PAINLEVEL_OUTOF10: 0

## 2023-03-08 NOTE — PROGRESS NOTES
[facility] Inpatient   Resident Progress Note    S:  Hospital day: 1   Brief Synopsis: Afia Garcia is a 58 y.o. female with a PMH of diastolic CHF, severe end-stage COPD on 4L O2 chronically and DVT in 2018 who presented to ED for chest tightness and shortness of breath. She also endorsed increased sputum production which is green in color. She had been discharged from an outside ED prior to presenting here and was sent home on steroids which she never filled at the pharmacy. Patient previously followed with Dr. Juan Cabrera and at the time a lung transplant was offered but she declined. She was admitted for a COPD exacerbation. Overnight/interim:   Patient states that her shortness of breath worsened due to not being placed on the 4L of oxygen she normally wears at home. She was placed on 3L. Bipap not available overnight. CO2 level slightly worse this AM. She denies: Headache, dizziness, vomiting or chest pain.            Cont meds:    sodium chloride       Scheduled meds:    arformoterol tartrate  15 mcg Nebulization BID    ascorbic acid  1,000 mg Oral Daily    aspirin  81 mg Oral Daily    atorvastatin  40 mg Oral Nightly    budesonide  0.5 mg Nebulization BID    fluticasone  2 spray Each Nostril Daily    furosemide  20 mg Oral Daily    melatonin  5 mg Oral Nightly    metoprolol succinate  12.5 mg Oral Nightly    pantoprazole  40 mg Oral Nightly    potassium chloride  20 mEq Oral Daily    sertraline  25 mg Oral Daily    vitamin D  2,000 Units Oral Daily    sodium chloride flush  5-40 mL IntraVENous 2 times per day    enoxaparin  40 mg SubCUTAneous Daily    methylPREDNISolone  60 mg IntraVENous Daily    cefTRIAXone (ROCEPHIN) IV  1,000 mg IntraVENous Q24H    ipratropium-albuterol  1 ampule Inhalation Q4H WA     PRN meds: sodium chloride, sodium chloride flush, sodium chloride, ondansetron **OR** ondansetron, polyethylene glycol, acetaminophen **OR** acetaminophen     I reviewed the patient's past medical and surgical history, Medications and Allergies. O:  /88   Pulse 81   Temp 98.4 °F (36.9 °C) (Temporal)   Resp 18   Ht 5' 2\" (1.575 m)   Wt 124 lb (56.2 kg)   SpO2 100%   BMI 22.68 kg/m²   24 hour I&O: No intake/output data recorded. I/O this shift:  In: 120 [P.O.:120]  Out: -           Physical Exam  Vitals reviewed. Constitutional:       General: She is not in acute distress. Appearance: She is ill-appearing. She is not diaphoretic. Interventions: Nasal cannula in place. Neck:      Trachea: Trachea and phonation normal.   Cardiovascular:      Rate and Rhythm: Normal rate and regular rhythm. Heart sounds: Normal heart sounds. Pulmonary:      Effort: Prolonged expiration present. Breath sounds: Decreased air movement present. Rhonchi present. Abdominal:      General: Bowel sounds are normal.      Palpations: Abdomen is soft. Tenderness: There is no abdominal tenderness. Musculoskeletal:      Cervical back: Full passive range of motion without pain. Right lower leg: Edema present. Left lower leg: Edema present. Lymphadenopathy:      Cervical: No cervical adenopathy. Labs:  Na/K/Cl/CO2:  147/4.1/103/36 (03/08 0457)  BUN/Cr/glu/ALT/AST/amyl/lip:  23/0.4/--/--/--/--/-- (03/08 0457)  WBC/Hgb/Hct/Plts:  11.1/11.4/37.2/261 (03/08 0457)  estimated creatinine clearance is 115 mL/min (A) (based on SCr of 0.4 mg/dL (L)). Other pertinent labs as noted below    Radiology:  CTA PULMONARY W CONTRAST   Final Result   No evidence of pulmonary embolism or acute pulmonary abnormality. Advanced   chronic changes of hyperinflation and chronic obstructive pulmonary disease   with severe emphysema and biapical pleuroparenchymal scarring. XR CHEST PORTABLE   Final Result   Severe emphysema without acute process.                Resident Assessment and Plan       Shortness of Breath  -Likely secondary to COPD exacerbation   -ProBNP elevated on admission  -Patient is on 20mg Lasix at home   -CXR on admission demonstrated no acute process with severe emphysema   -Respiratory panel negative  -ABG yesterday demonstrated metabolic alkalosis with respiratory acidosis   -Plan: Duoneb, Pulmicort, Brovana; ABGs; Ceftriaxone and Methylprednisolone day 2; Guaifenesin  -May consider pulmonary consult if status does not improve  -Obtain sputum cultures   -Bipap to be worn at night and intermittently throughout the day today      Concern for Sepsis  -On admission: Patient with elevated WBC and HR; Lactic acid 1.6  -WBC improved today. Sepsis unlikely   -Plan: Monitor, no cultures at this time     Hx of CHF  -Last ECHO in January 2023 showed EF 40-55% and motion abnormality.  -Continue home Lasix 20mg, Metoprolol and potassium supplement   -Strict intake and output  -Daily weights  -ProBNP elevated on admission but clinically does not appear to be in exacerbation of CHF   -CXR on admission demonstrated no acute process   -No need for repeat ECHO at this time     Hx of abnormal stress test  -Patient needs to follow-up with cardiology on discharge     Hx of HLD  -Continue home Atorvastatin 40mg     Hx of Depression/Anxiety  -Continue home Zoloft      Hx of allergic rhinitis  -Continue home Flonase      Hx of Tobacco use  -Patient no longer smoking     Hx of Provoked DVT  -Patient completed anticoagulation   -She is on ASA daily      Hx of Liver cyst  -positive for Hep A and Hep C antibodies in 9/22; HCV not detected on 2/6/23  -Hospitalized for transaminitis with hyperbilirubinemia, multiple liver cysts, abnormal GB appearance with negative US.   -Negative MRCP for biliary obstruction at that time  -AST and ALT slightly elevated       PT/OT evaluation:Not at this time   DVT prophylaxis:Lovenox   GI prophylaxis: Protonix   Disposition:Continue tele  Diet: Low sodium         Electronically signed by Kaushal Yoo DO on 3/8/2023 at 1:29 PM  Attending physician: Dr. Oralia Ayala

## 2023-03-08 NOTE — ACP (ADVANCE CARE PLANNING)
Advance Care Planning   Healthcare Decision Maker:    Primary Decision Maker: Nadegelatrice HunterRoslyn Child - [phone]    Secondary Decision Maker: shannan basilio - Child - [phone]    Click here to complete Healthcare Decision Makers including selection of the Healthcare Decision Maker Relationship (ie \"Primary\").

## 2023-03-08 NOTE — PLAN OF CARE
Problem: Discharge Planning  Goal: Discharge to home or other facility with appropriate resources  3/8/2023 1146 by Karen Knapp RN  Outcome: Progressing  Flowsheets (Taken 3/8/2023 0830)  Discharge to home or other facility with appropriate resources:   Identify barriers to discharge with patient and caregiver   Arrange for needed discharge resources and transportation as appropriate  3/7/2023 2217 by Vy Trejo RN  Outcome: Progressing  Flowsheets (Taken 3/7/2023 2215)  Discharge to home or other facility with appropriate resources: Identify barriers to discharge with patient and caregiver     Problem: Pain  Goal: Verbalizes/displays adequate comfort level or baseline comfort level  3/8/2023 1146 by Karen Knapp RN  Outcome: Progressing  3/7/2023 2217 by Vy Trejo RN  Outcome: Progressing     Problem: Safety - Adult  Goal: Free from fall injury  3/8/2023 1146 by Karen Knapp RN  Outcome: Progressing  Flowsheets (Taken 3/8/2023 0830)  Free From Fall Injury: Instruct family/caregiver on patient safety  3/7/2023 2217 by Vy Trejo RN  Outcome: Progressing     Problem: Skin/Tissue Integrity  Goal: Absence of new skin breakdown  Description: 1. Monitor for areas of redness and/or skin breakdown  2. Assess vascular access sites hourly  3. Every 4-6 hours minimum:  Change oxygen saturation probe site  4. Every 4-6 hours:  If on nasal continuous positive airway pressure, respiratory therapy assess nares and determine need for appliance change or resting period.   3/8/2023 1146 by Karen Knapp RN  Outcome: Progressing  3/7/2023 2217 by Vy Trejo RN  Outcome: Progressing     Problem: Cardiovascular - Adult  Goal: Maintains optimal cardiac output and hemodynamic stability  Outcome: Progressing  Flowsheets (Taken 3/8/2023 0830)  Maintains optimal cardiac output and hemodynamic stability:   Monitor blood pressure and heart rate   Monitor urine output and notify Licensed Independent Practitioner for values outside of normal range     Problem: Metabolic/Fluid and Electrolytes - Adult  Goal: Hemodynamic stability and optimal renal function maintained  Outcome: Progressing  Flowsheets (Taken 3/8/2023 0830)  Hemodynamic stability and optimal renal function maintained:   Monitor labs and assess for signs and symptoms of volume excess or deficit   Monitor intake, output and patient weight

## 2023-03-08 NOTE — DISCHARGE INSTRUCTIONS
DISCHARGE INSTRUCTIONS - FAMILY MEDICINE    Follow up at the [facility]) on 03/15/2023 at 2:00 pm. If there are any issues and cannot present to your appointment, please contact us at [phone] and we will schedule a new appointment for you. Future Appointments   Date Time Provider Lupe Arnett   3/15/2023  2:00 PM [facility]   3/22/2023  8:00 AM Zainab Cancino MD [facility]   3/24/2023  1:00 PM [facility] ROOM 1 [facility].:  [address] [place] [address],  vance, [address]         Phone: [phone]    Once discharged from [address] [place] [address], you can :    Return to work : Yes, you may return to work  Activity : As tolerated  Stairs : As tolerated  Exercise : As tolerated  Lifting : As tolerated   Sexual activity : Yes  Driving : With seat belt on. NO driving on narcotic pain medication if prescribed   Medications : Always take your medications as prescribed  Diet : You are asked to make an attempt to improve diet and exercise patterns to aid in medical management of your medical condition/problem. Call [facility] with any further questions. Return to Emergency Department with any worsening of your condition and/or fever greater than 101 degrees, new weakness, shortness of breath or chest pain.  ______________________________________________________________________    =====================================   [facility], [address] [place] [address]   =====================================   Take your medications as directed in this summary     Follow up with all your future appointments as advised   Call [phone] to confirm your appointment with [facility]) as soon as possible and/or if there are any appointment changes or other issues. It is important that you follow up with us for better monitoring of the current cause of your hospitalization.  Follow up as well with the specialists that saw you during your stay. If you have any questions call us [phone]. HEART FAILURE  / CONGESTIVE HEART FAILURE  DISCHARGE INSTRUCTIONS:  GUIDELINES TO FOLLOW AT HOME    Self- Managed Care:     MEDICATIONS:  Take your medication as directed. If you are experiencing any side effects, inform your doctor, Do not stop taking any of your medications without letting your doctor know. Check with your doctor before taking any over-the-counter medications / herbal / or dietary supplements. They may interfere with your other medications. Do not take ibuprofen (Advil or Motrin) and naproxen (Aleve) without talking to your doctor first. They could make your heart failure worse. WEIGHT MONITORING:   Weigh yourself everyday (with the same scale) around the same time of the day and write it down. (you can chart them on a calendar or keep track of them on paper. Notify your doctor of a weight gain of 3 pounds or more in 1 day   OR a total of 5 pounds or more in 1 week    Take your weight record to your doctor visits  Also, the same goes if you loose more than 3# in one day, let your heart doctor know. DIET:   Cardiac heart healthy diet- Low saturated / low trans fat, no added salt, caffeine restricted, Low sodium diet-   No more than 2,000mg (2 grams) of salt / sodium per day (which equals to a little less than  a teaspoon of salt)  If your doctor wants you on a fluid restriction. ..it is usually recommended a fluid limit of 2,000cc -  Fluid restriction- 2,000 ml (milliliters) = 64 ounces = you can have 8 glasses of fluid per day (each glass 8 ounces)    Follow a low salt diet - avoid using salt at the table, avoid / limit use of canned soups, processed / packaged foods, salted snacks, olives and pickles. Do not use a salt substitute without checking with your doctor, they may contain a high amount of potassioum. (Mrs. Ta Pittman is safe to use).     Limit the use of alcohol       CALL YOUR DOCTOR THE FIRST DAY YOU NOTICE ANY OF THESE   SYMPTOMS:  You have a weight gain of 3 pounds or more in 1 day         OR 5 pounds or more in one week  More shortness of breath  More swelling of your stomach, legs, ankles or feet  Feeling more tired, No energy  Dry hacky cough  Dizziness  More chest pain / discomfort       (CALL 911 IF ANY OF THE FOLLOWING OCCURS  Chest pain (not relieved with nitroglycerine, if you have been prescribed this medication)  Severe shortness of breath  Faint / Pass out  Confusion / cannot think clearly  If symptoms get worse           SMOKING - TOBACCO USE:  * IF YOU SMOKE - STOP! Kick the habit. [address] Program is offered at [facility] and [address] [facility]. Call [phone] extension [phone] for more information. ACTIVITY:   (Ask your doctor when you will be able to return to work and before starting any exercise program.  Do not drive unless unless your doctor has given you permission to do so). Start light exercise. Even if you can only do a small amount, exercise will help you get stronger, have more energy, help manage your weight and decrease  stress. Walking is an easy way to get exercise. Start out slowly and  increase the amount you walk as tolerated  If you become short of breath, dizzy or have chest pain; stop, sit down, and rest.  If you feel \"wiped out\" the day after you exercise, walk at a slower pace or for a shorter distance. You can gradually increase the pace or amount of time. (Do not exercise right after a meal or in extreme temperatures, such as above 85 degrees, if the air is really humid, or wind chill is less than 20 degrees)                                             ADDITIONAL INFORMATION:  Avoid getting sick from colds and the flu.  Stay away from friends or family that you know may have a contagious illness  Get plenty of rest Get a flu shot each year. Get a pneumococcal vaccine shot. If you have had one before, ask your doctor whether you need another dose. My Goal for Self-management of Heart Failure Includes 5 steps :    1. Notice a change in symptoms ( weight gain, short of breath, leg swelling, decreased activity level, bloating. ...)    2. Evaluate the change: (use the Heart Failure Zones )     3. Decide to take action: decide what your options are, such as: (call your doctor for an extra visit, take a prescribed medication, such as your water pill if your doctor has given you directions to do so, [address])    4. Come up with a strategy:  (now you call the doctor for advice / appointment. This is where you take action!!! Do not wait, catch the symptom early and treat it before it worsens. 5. Evaluate the response: The next day, check your Heart Failure Zones: are you in the GREEN ZONE (safe zone)? Worsening symptoms of YELLOW ZONE? Or have you moved to the RED ZONE and need to call 911 or go to the Emergency Room for evaluation? Call your doctor's office to update them on your symptoms of heart failure.

## 2023-03-08 NOTE — PLAN OF CARE
Patient's chart updated to reflect:      . - HF care plan, HF education points and HF discharge instructions.  -Orders: 2 gram sodium diet, daily weights, I/O.  -PCP and cardiology follow up appointments to be scheduled within 7 days of hospital discharge. -CHF education session will be provided to the patient prior to hospital discharge.     Telma Woodall RN RN, BSN  Heart Failure Navigator

## 2023-03-08 NOTE — CARE COORDINATION
Patient declined to talk, currently on bi pap, called daughter Fitz Olmedo for history. Daughter stated patient lives with her in a two story home with three steps to enter, patient stays on first floor, uses bedside commode. Daughter stated the patient uses as walker and always has one person assisting her when she walks. Daughter stated some days patient refuses to walk. Daughter stated the patient is on 4L of O2 at home, has a nebulizer, does not have a c pap or bi pap. Daughter stated they own a hospital bed and the patient stays in the bed and daughter provides hands on care. Daughter stated she does not have home care but is open to it if needed. Daughter said patient has stayed for rehab at [facility] in the past and stated she feels the patient wouldn't want to go to rehab again. Daughter confirmed if appropriate she could provide transport at discharge. Daughter confirmed PCP is Farida. Daughter asked about c pap or bi pap at home, explained this would go through pulmonologist. Chart review shows a history with Dr. Claire Ma. PT OT will be requested when appropriate. Case Management Assessment  Initial Evaluation    Date/Time of Evaluation: 3/8/2023 1:48 PM  Assessment Completed by: Lianna Trotter    If patient is discharged prior to next notation, then this note serves as note for discharge by case management. Patient Name: Aubrie Navarro                   YOB: 1960  Diagnosis: COPD with acute exacerbation [facility]) [J44.1]                   Date / Time: 3/7/2023  5:39 AM    Patient Admission Status: Inpatient   Readmission Risk (Low < 19, Mod (19-27), High > 27): Readmission Risk Score: 32    Current PCP: Joelle Preciado MD  PCP verified by CM?  Yes    Chart Reviewed: Yes      History Provided by: Child/Family  Patient Orientation: Unable to Assess    Patient Cognition: Alert    Hospitalization in the last 30 days (Readmission):  Yes    If yes, Readmission Assessment in CM Navigator will be completed. Advance Directives:      Code Status: Full Code   Patient's Primary Decision Maker is: Legal Next of Kin    Primary Decision Maker: Tray Lyons - Child - [phone]    Secondary Decision Maker: shannan basilio - Child - [phone]    Discharge Planning:    Patient lives with: Children Type of Home: House  Primary Care Giver: Family  Patient Support Systems include: Children   Current Financial resources:    Current community resources:    Current services prior to admission: None            Current DME:              Type of Home Care services:  None    ADLS  Prior functional level: Assistance with the following:, Housework, Shopping, Cooking, Toileting, Dressing, Bathing, Mobility  Current functional level: Assistance with the following:, Bathing, Dressing, Toileting, Cooking, Housework, Shopping, Mobility    PT AM-PAC:   /24  OT AM-PAC:   /24    Family can provide assistance at DC: Yes  Would you like Case Management to discuss the discharge plan with any other family members/significant others, and if so, who?  Yes  Plans to Return to Present Housing: Unknown at present  Other Identified Issues/Barriers to RETURNING to current housing: weakness  Potential Assistance needed at discharge: N/A            Potential DME:    Patient expects to discharge to: [address] [place] [address] for transportation at discharge:      Financial    Payor: Zo Montoya / Plan: Zo Montoya [facility] / Product Type: *No Product type* /     Does insurance require precert for SNF: Yes    Potential assistance Purchasing Medications: No  Meds-to-Beds request: Yes      [facility] [address], [place] [address] [phone]  [address] [address]  Phone: [phone] Fax: [address] [place] [address]. Dalila Mer[address] [phone] - f [phone]  [facility] [address]  Phone: [phone] Fax: [phone]    [id number] - Nick Blackwood [phone] Nikko Alvarez [phone]  [address] [address]  Phone: [phone] Fax: [phone]      Notes:    Factors facilitating achievement of predicted outcomes: Family support    Barriers to discharge: Upper extremity weakness and Lower extremity weakness    Additional Case Management Notes: The Plan for Transition of Care is related to the following treatment goals of COPD with acute exacerbation ([facility] [K92.7]    IF APPLICABLE: The Patient and/or patient representative Jaki Vela and her family were provided with a choice of provider and agrees with the discharge plan. Freedom of choice list with basic dialogue that supports the patient's individualized plan of care/goals and shares the quality data associated with the providers was provided to:     Patient Representative Name:       The Patient and/or Patient Representative Agree with the Discharge Plan? Note entered by Milana Mansfield, student social work, reviewed by EPI Briscoe.   Vladislav Driscoll, [facility]  Case Management Department  Ph: [phone] Fax:

## 2023-03-08 NOTE — TELEPHONE ENCOUNTER
Last Appointment:  Visit date not found  Future Appointments  3/9/2023   1:00 PM    DO Conner Foster [facility]  3/22/2023  8:00 AM    Sai Cullen MD     [facility]

## 2023-03-08 NOTE — PROGRESS NOTES
[facility]  Family Medicine Attending    S: 58 y.o. female with a history of HFmEF, COPD, DVT, abnl stress test, tobacco history, diverticulitis, pulmonary fibrosis and COVID in 1/23 presented with shortness of breath and chest tightness for 2 days. She also noticed increasing swelling in her lower extremities. She reports that she continues with shortness of breath, about the same as yesterday. O: VS- Blood pressure 109/88, pulse 81, temperature 98.4 °F (36.9 °C), temperature source Temporal, resp. rate 18, height 5' 2\" (1.575 m), weight 124 lb (56.2 kg), SpO2 100 %, not currently breastfeeding. Exam is as noted by resident with the following changes, additions or corrections:  Gen:on cpap on 4L NC  HEENT: NCAT, PERRL, MMM  CV-RRR   Lungs-coarse bs b/l  ABD-soft nonttp no masses   Ext-no C/C/1+ edema bilaterally  Reproducible chest pain    Impressions:   Principal Problem:    COPD with acute exacerbation ([address]  Resolved Problems:    * No resolved hospital problems. *      Plan:   COPD exacerbation with some mild evidence of worsening of her HFmEF. Steroids, nebs, antibiotics and diuretics. Check sputum cx. With elevated d-dimer, cta obtained and neg for pe. For cpap and recheck of abg. Will need follow up outpt when well for cardiac cath as previously advised. Attending Physician Statement  I have reviewed the chart and seen the patient with the resident(s). I personally reviewed images, EKG's and similar tests, if present. I personally reviewed and performed key elements of the history and exam.  I have reviewed and confirmed student and/or resident history and exam with changes as indicated above. I agree with the assessment, plan and orders as documented by the resident. Please refer to the resident and/or student note for additional information.       Jose Castillo MD

## 2023-03-08 NOTE — PLAN OF CARE
Problem: Discharge Planning  Goal: Discharge to home or other facility with appropriate resources  Outcome: Progressing  Flowsheets (Taken 3/7/2023 2215)  Discharge to home or other facility with appropriate resources: Identify barriers to discharge with patient and caregiver     Problem: Pain  Goal: Verbalizes/displays adequate comfort level or baseline comfort level  Outcome: Progressing     Problem: Safety - Adult  Goal: Free from fall injury  Outcome: Progressing     Problem: Skin/Tissue Integrity  Goal: Absence of new skin breakdown  Description: 1. Monitor for areas of redness and/or skin breakdown  2. Assess vascular access sites hourly  3. Every 4-6 hours minimum:  Change oxygen saturation probe site  4. Every 4-6 hours:  If on nasal continuous positive airway pressure, respiratory therapy assess nares and determine need for appliance change or resting period.   Outcome: Progressing

## 2023-03-09 ENCOUNTER — APPOINTMENT (OUTPATIENT)
Dept: GENERAL RADIOLOGY | Age: 63
End: 2023-03-09
Payer: COMMERCIAL

## 2023-03-09 LAB
ANION GAP SERPL CALCULATED.3IONS-SCNC: 5 MMOL/L (ref 7–16)
BASOPHILS ABSOLUTE: 0.01 E9/L (ref 0–0.2)
BASOPHILS RELATIVE PERCENT: 0.1 % (ref 0–2)
BUN BLDV-MCNC: 24 MG/DL (ref 6–23)
CALCIUM SERPL-MCNC: 8.7 MG/DL (ref 8.6–10.2)
CHLORIDE BLD-SCNC: 101 MMOL/L (ref 98–107)
CO2: 39 MMOL/L (ref 22–29)
CREAT SERPL-MCNC: 0.4 MG/DL (ref 0.5–1)
EOSINOPHILS ABSOLUTE: 0 E9/L (ref 0.05–0.5)
EOSINOPHILS RELATIVE PERCENT: 0 % (ref 0–6)
GFR SERPL CREATININE-BSD FRML MDRD: >60 ML/MIN/1.73
GLUCOSE BLD-MCNC: 100 MG/DL (ref 74–99)
HCT VFR BLD CALC: 35.6 % (ref 34–48)
HEMOGLOBIN: 11.1 G/DL (ref 11.5–15.5)
IMMATURE GRANULOCYTES #: 0.05 E9/L
IMMATURE GRANULOCYTES %: 0.6 % (ref 0–5)
LYMPHOCYTES ABSOLUTE: 1.67 E9/L (ref 1.5–4)
LYMPHOCYTES RELATIVE PERCENT: 18.9 % (ref 20–42)
MCH RBC QN AUTO: 31.2 PG (ref 26–35)
MCHC RBC AUTO-ENTMCNC: 31.2 % (ref 32–34.5)
MCV RBC AUTO: 100 FL (ref 80–99.9)
MONOCYTES ABSOLUTE: 1.28 E9/L (ref 0.1–0.95)
MONOCYTES RELATIVE PERCENT: 14.5 % (ref 2–12)
NEUTROPHILS ABSOLUTE: 5.84 E9/L (ref 1.8–7.3)
NEUTROPHILS RELATIVE PERCENT: 65.9 % (ref 43–80)
PDW BLD-RTO: 13.6 FL (ref 11.5–15)
PLATELET # BLD: 260 E9/L (ref 130–450)
PMV BLD AUTO: 10.2 FL (ref 7–12)
POTASSIUM REFLEX MAGNESIUM: 3.7 MMOL/L (ref 3.5–5)
RBC # BLD: 3.56 E12/L (ref 3.5–5.5)
SODIUM BLD-SCNC: 145 MMOL/L (ref 132–146)
WBC # BLD: 8.9 E9/L (ref 4.5–11.5)

## 2023-03-09 PROCEDURE — 2700000000 HC OXYGEN THERAPY PER DAY

## 2023-03-09 PROCEDURE — 94640 AIRWAY INHALATION TREATMENT: CPT

## 2023-03-09 PROCEDURE — 6370000000 HC RX 637 (ALT 250 FOR IP)

## 2023-03-09 PROCEDURE — 99254 IP/OBS CNSLTJ NEW/EST MOD 60: CPT | Performed by: INTERNAL MEDICINE

## 2023-03-09 PROCEDURE — 36415 COLL VENOUS BLD VENIPUNCTURE: CPT

## 2023-03-09 PROCEDURE — 94669 MECHANICAL CHEST WALL OSCILL: CPT

## 2023-03-09 PROCEDURE — 6360000002 HC RX W HCPCS

## 2023-03-09 PROCEDURE — 99232 SBSQ HOSP IP/OBS MODERATE 35: CPT | Performed by: FAMILY MEDICINE

## 2023-03-09 PROCEDURE — 2580000003 HC RX 258

## 2023-03-09 PROCEDURE — 97535 SELF CARE MNGMENT TRAINING: CPT

## 2023-03-09 PROCEDURE — 97165 OT EVAL LOW COMPLEX 30 MIN: CPT

## 2023-03-09 PROCEDURE — 71046 X-RAY EXAM CHEST 2 VIEWS: CPT

## 2023-03-09 PROCEDURE — 2060000000 HC ICU INTERMEDIATE R&B

## 2023-03-09 PROCEDURE — 80048 BASIC METABOLIC PNL TOTAL CA: CPT

## 2023-03-09 PROCEDURE — 85025 COMPLETE CBC W/AUTO DIFF WBC: CPT

## 2023-03-09 RX ORDER — FUROSEMIDE 10 MG/ML
20 INJECTION INTRAMUSCULAR; INTRAVENOUS ONCE
Status: COMPLETED | OUTPATIENT
Start: 2023-03-09 | End: 2023-03-09

## 2023-03-09 RX ORDER — GUAIFENESIN 400 MG/1
400 TABLET ORAL 3 TIMES DAILY
Status: DISCONTINUED | OUTPATIENT
Start: 2023-03-09 | End: 2023-03-11 | Stop reason: HOSPADM

## 2023-03-09 RX ORDER — GUAIFENESIN 600 MG/1
600 TABLET, EXTENDED RELEASE ORAL 2 TIMES DAILY
Status: DISCONTINUED | OUTPATIENT
Start: 2023-03-09 | End: 2023-03-09 | Stop reason: CLARIF

## 2023-03-09 RX ADMIN — METOPROLOL SUCCINATE 12.5 MG: 25 TABLET, EXTENDED RELEASE ORAL at 21:19

## 2023-03-09 RX ADMIN — FUROSEMIDE 20 MG: 20 TABLET ORAL at 09:47

## 2023-03-09 RX ADMIN — POTASSIUM CHLORIDE 20 MEQ: 1500 TABLET, EXTENDED RELEASE ORAL at 09:47

## 2023-03-09 RX ADMIN — Medication 5 MG: at 21:20

## 2023-03-09 RX ADMIN — ARFORMOTEROL TARTRATE 15 MCG: 15 SOLUTION RESPIRATORY (INHALATION) at 07:41

## 2023-03-09 RX ADMIN — IPRATROPIUM BROMIDE AND ALBUTEROL SULFATE 1 AMPULE: .5; 2.5 SOLUTION RESPIRATORY (INHALATION) at 16:33

## 2023-03-09 RX ADMIN — GUAIFENESIN 400 MG: 400 TABLET ORAL at 21:19

## 2023-03-09 RX ADMIN — CEFTRIAXONE SODIUM 1000 MG: 1 INJECTION, POWDER, FOR SOLUTION INTRAMUSCULAR; INTRAVENOUS at 09:47

## 2023-03-09 RX ADMIN — SERTRALINE 25 MG: 50 TABLET, FILM COATED ORAL at 09:46

## 2023-03-09 RX ADMIN — FLUTICASONE PROPIONATE 2 SPRAY: 50 SPRAY, METERED NASAL at 09:47

## 2023-03-09 RX ADMIN — FUROSEMIDE 20 MG: 10 INJECTION, SOLUTION INTRAMUSCULAR; INTRAVENOUS at 13:44

## 2023-03-09 RX ADMIN — IPRATROPIUM BROMIDE AND ALBUTEROL SULFATE 1 AMPULE: .5; 2.5 SOLUTION RESPIRATORY (INHALATION) at 07:41

## 2023-03-09 RX ADMIN — SODIUM CHLORIDE, PRESERVATIVE FREE 10 ML: 5 INJECTION INTRAVENOUS at 20:37

## 2023-03-09 RX ADMIN — ACETAMINOPHEN 650 MG: 325 TABLET ORAL at 16:29

## 2023-03-09 RX ADMIN — BUDESONIDE 500 MCG: 0.5 SUSPENSION RESPIRATORY (INHALATION) at 19:23

## 2023-03-09 RX ADMIN — ASPIRIN 81 MG CHEWABLE TABLET 81 MG: 81 TABLET CHEWABLE at 09:46

## 2023-03-09 RX ADMIN — PANTOPRAZOLE SODIUM 40 MG: 40 TABLET, DELAYED RELEASE ORAL at 21:19

## 2023-03-09 RX ADMIN — ATORVASTATIN CALCIUM 40 MG: 40 TABLET, FILM COATED ORAL at 21:19

## 2023-03-09 RX ADMIN — IPRATROPIUM BROMIDE AND ALBUTEROL SULFATE 1 AMPULE: .5; 2.5 SOLUTION RESPIRATORY (INHALATION) at 13:00

## 2023-03-09 RX ADMIN — ENOXAPARIN SODIUM 40 MG: 100 INJECTION SUBCUTANEOUS at 09:47

## 2023-03-09 RX ADMIN — GUAIFENESIN 400 MG: 400 TABLET ORAL at 13:44

## 2023-03-09 RX ADMIN — Medication 1000 MG: at 09:46

## 2023-03-09 RX ADMIN — METHYLPREDNISOLONE SODIUM SUCCINATE 60 MG: 125 INJECTION, POWDER, FOR SOLUTION INTRAMUSCULAR; INTRAVENOUS at 09:47

## 2023-03-09 RX ADMIN — IPRATROPIUM BROMIDE AND ALBUTEROL SULFATE 1 AMPULE: .5; 2.5 SOLUTION RESPIRATORY (INHALATION) at 19:23

## 2023-03-09 RX ADMIN — BUDESONIDE 500 MCG: 0.5 SUSPENSION RESPIRATORY (INHALATION) at 07:41

## 2023-03-09 RX ADMIN — ARFORMOTEROL TARTRATE 15 MCG: 15 SOLUTION RESPIRATORY (INHALATION) at 19:23

## 2023-03-09 RX ADMIN — Medication 2000 UNITS: at 09:46

## 2023-03-09 ASSESSMENT — PAIN SCALES - GENERAL
PAINLEVEL_OUTOF10: 5
PAINLEVEL_OUTOF10: 0
PAINLEVEL_OUTOF10: 0

## 2023-03-09 NOTE — CARE COORDINATION
CM note. Pt remains on PICU. Admitted with copd exacerbation. Pulmonology consulted today. Duonebs, solu-medrol, and rocephin ordered. Met with pt at bedside. She is currently on oxygen at 5 L nc baseline is 4 L. She reports she is feeling a little better. Coughing of mucous. Pt/ot evals completed and waiting for notes to go in to assist with d/c planning. CM/SW to follow. [place] Cue DOMINGO Salazar Gone [phone].

## 2023-03-09 NOTE — PROGRESS NOTES
[address]      Date:3/9/2023                                                  Patient Name: Kalyani Henry  MRN: [id number]  : 1960  Room: [address] [place] [address]    Evaluating OT: JORDYN Ho, OTR/L  # [id number]    Referring Provider:  Rohan Jiang DO  Specific Provider Orders:  Ben[facility] Pick and Treat\"  3-9-23    Diagnosis: COPD with acute exacerbation ([facility] [J44.1]    Pt was admitted for SOB and chest tightness    Pertinent Medical History:  Pt has a past medical history of Abscess, Acute on chronic diastolic CHF (congestive heart failure) ([facility]ch[facility] Diverticulitis, Provoked DVT 3/2018, Seasonal allergic rhinitis, Smoker, and Tobacco use disorder. ,  has a past surgical history that includes cardiovascular stress test (N/A, 2023).     Surgeries this admission: None     Precautions:  Fall Risk, 5.0 L O2 NC, monitor pulse ox      Assessment of current deficits   [x] Functional mobility  [x]ADLs  [x] Strength               []Cognition   [x] Functional transfers   [x] IADLs         [x] Safety Awareness   [x]Endurance   [] Fine Coordination              [x] Balance     [] Vision/perception   []Sensation    []Gross Motor Coordination  [] ROM  [] Delirium                  [] Motor Control       OT PLAN OF CARE   OT POC based on physician orders, patient diagnosis and results of clinical assessment    Frequency/Duration 1-3 days/wk for 2 weeks PRN   Specific OT Treatment to include:   * Instruction/training on adapted ADL techniques and AE recommendations to increase functional independence within precautions       * Training on energy conservation strategies, correct breathing pattern and techniques to improve independence/tolerance for self-care routine  * Functional transfer/mobility training/DME recommendations for increased independence, safety, and fall prevention  * Patient/Family education to increase follow through with safety techniques and functional independence  * Recommendation of environmental modifications for increased safety with functional transfers/mobility and ADLs  * Cognitive retraining/development of therapeutic activities to improve problem solving, judgement, memory, and attention for increased safety/participation in ADL/IADL tasks  * Therapeutic exercise to improve motor endurance, ROM, and functional strength for ADLs/functional transfers  * Therapeutic activities to facilitate/challenge dynamic balance, stand tolerance for increased safety and independence with ADLs  * Therapeutic activities to facilitate gross/fine motor skills for increased independence with ADLs  * Neuro-muscular re-education: facilitation of righting/equilibrium reactions  * Positioning to improve skin integrity, interaction with environment and functional independence    Recommended Adaptive Equipment: TBD as pt progresses       Home Living:  Pt lives with daughter in a 2 story home but uses set-up on first floor. Bathroom setup:  Tub/shower using BSC as shower chair  Equipment owned:  Hospital bed, O2, BSC, walker    Available Family Assist:  Pt's dtr and dtr's boyfriend are available to assist pt    Prior Level of Function:  Pt requires occasional assist with ADLs can perform light IADLs seated. Occasional assist with Transfers and Mobility using walker for ambulation. Pt sponge bathes at baseline with assist 1 day per week in tub/shower with use of BSC. Driving:  No  Occupation:  Retired    Pain Level:  Pt c/o headache;  Relief w/ Rest and Repositioning, Nsg Notified   Additional Complaints:  Continued SOB and cough    Cognition: A & O x 4   Able to Follow Multi-Step Commands Yes   Memory:  good    Sequencing:  good    Problem solving:  good    Judgement/safety:  fair   Additional Comments:  Pt was pleasant and cooperative.  Required cues for rest breaks and breathing techniques. Vitals/Lab Values:  SpO2 > 96% for duration of session. HR ranged from 82 bpm- 114 bpm, increasing with activity. Functional Assessment:  AM-PAC Daily Activity Raw Score: 15/24     Initial Eval Status  Date: 3-9-23   Treatment Status  Date: STGs = LTGs  Time frame: 10-14 days   Feeding Set-up      NA   Grooming Min A for combing hair d/t limited activity tolerance. SBA  Seated/Standing at the Sink   UB Dressing Supervision/Set-up      Set-up     LB Dressing Mod A  D/t decreased activity tolerance    Set-up     Bathing Mod A  D/t decreased activity tolerance  Supervision      Toileting Mod A   D/t decreased activity tolerance, using bed pan, unable to tolerate BSC    Supervision     Bed Mobility  Supine to sit: SBA   Sit to supine:  SBA       Supine to sit: Independent  Sit to supine: Independent     Functional Transfers Min A  Completed STS from EOB 2x  Pt ed for safety/hand placement    Supervision     Functional Mobility Min A    Pt ed for safety/improved safety awareness, walker safety    Supervision     Balance Sitting:     Static:  Supervision    Dynamic:  SBA w/ functional ax EOB    Standing:     Static:  CGA with FWW    Dynamic:  CGA-Min A w/ FWW w/ functional ax/mobility     Sitting:     Static:  Good    Dynamic:  Good w/ functional ax    Standing:     Static:  Supervision w/ AD PRN    Dynamic:  Supervision w/ functional ax/mobility w/ AD PRN   Activity Tolerance Fair-  Pt able to tolerate sitting EOB with light activity for ~20 min. Standing tolerance ~1 min 2x with seated rest break between trials    Fair+   Visual/  Perceptual    Hearing:  WNL   Glasses: Yes, to read    iMusician   Hearing Aids:  No               Hand Dominance: Right   AROM Strength Additional Info:    RUE  WFL 4-/5 WFL ;   WFL FMC/dexterity noted during ADL tasks     LUE WFL 4-/5 WFL ;    WFL FMC/dexterity noted during ADL tasks       Sensation:  Denies numbness or tingling Jose UEs   Tone: WFL Jose UEs   Edema: Minimal/moderate edema in LEs    Comments: Upon arrival, patient was found supine on bed pan. Pt was agreeable to participate in therapeutic ax. No Family present during session. Received permission from RN prior to engaging pt in OT services. Educated pt on role of OT services. At the end of the session, patient was properly positioned in Semi-Supine. Call light and phone within reach, all lines and tubes intact. Oriented pt to call bell. Made all appropriate Environmental Modifications to facilitate pt's level of IND and safety. All needs met. Bed Alarm activated. Notified RN of pt's position and performance. Overall patient demonstrated decreased independence and safety during completion of ADL/functional transfer/mobility tasks. Pt would benefit from continued skilled OT to increase safety and independence with completion of ADL/IADL tasks for functional independence and quality of life.     Treatment: OT treatment provided this date includes:   Instruction/training on safety and adapted techniques for completion of ADLs, use of DME/AD/Adaptive equip:    Instruction/training on safe functional mobility/transfer techniques, use of DME/AD:       Instruction/training on energy conservation techs (EC)/Work Simplification/Pursed-Lip Breathing (PLB) for completion of ADLs:     Neuromuscular Reeducation to facilitate balance/righting reactions for increased function with ADLs:    Skilled positioning/alignment for Pain Mgmt, Skin Integrity, Edema Control, to maximize Pt's safety and ability to safely and INDly interact w/ his/her environment, maximize respiratory status  Activity tolerance - Sitting/Standing to improve endurance w/ functional ax   Skilled monitoring of Vitals during session and pt's response to tx ax      Consulted RN, SW/CM    Made all appropriate Environmental Modifications to facilitate pt's level of IND and safety. Recommendations for Continued Participation in OT services during Hospitalization and at D/C - [facility] OT    Pt and/or Family verbalized/demonstrated a good understanding of education provided. Will Review PRN. Rehab Potential: Good- for established goals     Patient / Family Goal: To return home with daughter    Patient and/or family were instructed on functional diagnosis, prognosis/goals and OT plan of care. Demonstrated good understanding. Eval Complexity: Low    Time In: 14:40  Time Out: 15:18  Total Treatment Time: 23 minutes    Min Units   OT Eval Low 97165  X  1   OT Eval Medium 32827      OT Eval High 26529      OT Re-Eval [id number]       Therapeutic Ex 65202       Therapeutic Activities 56134       ADL/Self Care 60961  23  2   Orthotic Management [id number]       Manual [id number]     Neuro Re-Ed 90992       Non-Billable Time              Evaluation Time additionally includes thorough review of current medical information, gathering information on past medical history/social history and prior level of function, completion of standardized testing/informal observation of tasks, assessment of data and education on plan of care and goals.           Amadeo Rios, S/OT, WINN/L  JORDYN Dickinson, OTR/L  # [id number]

## 2023-03-09 NOTE — CONSULTS
[facility]  Department of Internal Medicine  Division of Pulmonary, Critical Care and Sleep Medicine  Consult Note    Steve Araiza DO, MPH, Kimani Galindo, [address] [facility] Caridad STRICKLAND MD      Patient: Diana Zendejas  MRN: [id number]  : 1960    Encounter Time: 12:56 PM     Date of Admission: 3/7/2023  5:39 AM    Primary Care Physician: Vidhi Claros. Kendell Trevino MD    Reason for Consultation: AECOPD     HISTORY OF PRESENT ILLNESS : Diana Zendejas 58 y.o. female was seen in consultation regarding the above chief compliant. She has had multiple admissions to [address] [place] [address] over the last several years. She presented to [address] [place], [place] [address] ER on  night having progressive shortness of breath, she was not admitted and when her daughter picked her up she told her to bring her to [facility] because she was still struggling to breath. She was recently discharged from [facility] where she was treated for COPD exacerbation. She also endorsed increased wheezing. She denies cough. Denies recent fevers or chills. She denies symptoms of nausea, vomiting, dizziness. She reports to me that she has seen Dr. Rambo Palacios in the past however she has missed multiple appointments and tells me that she has a certified letter at home from his office but she is unaware of the contents. She reports that she quit smoking approximately 3 years ago however did have a few cigarettes 3 to 4 months ago. Prior to that she had a longstanding history of smoking a pack a day for approximately 40 years. She reports that at [place] [address] she was on 4 L nasal cannula.   She denies any prior history of radiation to the chest.         PAST MEDICAL HISTORY:  has a past medical history of Abscess, Acute on chronic diastolic CHF (congestive heart failure) ([address] COPD (chronic obstructive pulmonary disease) ([facility] [address] Diverticulitis, Provoked DVT 3/2018, Seasonal allergic rhinitis, Smoker, and Tobacco use disorder. SURGICAL HISTORY:  has a past surgical history that includes cardiovascular stress test (N/A, 01/06/2023). SOCIAL HISTORY:  reports that she quit smoking about 21 months ago. Her smoking use included cigarettes. She has a 20.50 pack-year smoking history. She has never used smokeless tobacco. She reports that she does not currently use alcohol. She reports that she does not currently use drugs after having used the following drugs: Marijuana [facility]). FAMILY  HISTORY: family history includes Breast Cancer in her none; Colon Cancer in her none; Coronary Art Dis in her father; Diabetes in her unknown relative; Hypertension in her father; Other in her daughter and father; Stroke in her mother. MEDICATIONS:    Prior to Admission medications    Medication Sig Start Date End Date Taking?  Authorizing Provider   fluticasone (FLONASE) 50 MCG/ACT nasal spray 2 sprays by Nasal route daily as needed for Rhinitis   Yes Historical Provider, MD   umeclidinium bromide (INCRUSE ELLIPTA) 62.5 MCG/ACT inhaler Inhale 1 puff into the lungs daily   Yes Historical Provider, MD   theophylline (UNIPHYL) 400 MG extended release tablet Take 400 mg by mouth daily   Yes Historical Provider, MD   albuterol (PROVENTIL) (2.5 MG/3ML) 0.083% nebulizer solution Take 2.5 mg by nebulization every 6 hours as needed for Wheezing   Yes Historical Provider, MD   ipratropium-albuterol (DUONEB) 0.5-2.5 (3) MG/3ML SOLN nebulizer solution Inhale 1 vial into the lungs every 4 hours as needed for Shortness of Breath   Yes Historical Provider, MD   potassium chloride (KLOR-CON M) 20 MEQ extended release tablet take 1 tablet by mouth once daily 3/8/23   Carmen Alaniz MD   pantoprazole (PROTONIX) 40 MG tablet Take 40 mg by mouth nightly    Historical Provider, MD   furosemide (LASIX) 20 MG tablet Take 1 tablet by mouth daily 2/11/23   Alyce Christine MD   sodium chloride (OCEAN, BABY AYR) 0.65 % nasal spray 2 sprays by Nasal route every 4 hours as needed for Congestion (congestion, nasal pain) 2/10/23   Patti Salomon MD   docusate sodium (COLACE) 100 MG capsule Take 100 mg by mouth 2 times daily as needed for Constipation    Historical Provider, MD   metoprolol succinate (TOPROL XL) 25 MG extended release tablet Take 0.5 tablets by mouth nightly 1/20/23   SKYE Toribio CNP   aspirin 81 MG chewable tablet Take 1 tablet by mouth daily 1/10/23   Jorgito Parry DO   albuterol sulfate HFA (VENTOLIN HFA) 108 (90 Base) MCG/ACT inhaler Inhale 2 puffs into the lungs 4 times daily as needed for Wheezing or Shortness of Breath 1/1/23   Leopoldo Fogo, APRN - CNP   Arformoterol Tartrate (BROVANA) 15 MCG/2ML NEBU Take 2 mLs by nebulization in the morning and 2 mLs in the evening. 1/1/23   Leopoldo Fogo, APRN - CNP   budesonide (PULMICORT) 0.5 MG/2ML nebulizer suspension Take 2 mLs by nebulization in the morning and 2 mLs in the evening. 1/1/23   Leopoldo Fogo, APRN - CNP   atorvastatin (LIPITOR) 40 MG tablet Take 1 tablet by mouth nightly 2/1/23   Leopoldo Fogo, APRN - CNP   sertraline (ZOLOFT) 25 MG tablet Take 1 tablet by mouth daily 1/1/23   Leopoldo Fogo, APRN - CNP   melatonin 5 MG TBDP disintegrating tablet Take 1 tablet by mouth nightly 1/1/23   Leopoldo Fogo, APRN - CNP   Vitamin D (CHOLECALCIFEROL) 50 MCG (2000 UT) TABS tablet Take 1 tablet by mouth daily 1/1/23   Leopoldo Fogo, APRN - CNP   ascorbic acid (VITAMIN C) 1000 MG tablet Take 1 tablet by mouth daily 12/4/22   Leopoldo Fogo, APRN - CNP   OXYGEN Inhale 4 L into the lungs continuous    Historical Provider, MD       ALLERGIES: Penicillin v       REVIEW OF SYSTEMS:  Otherwise negative if not reported or listed below  Constitutional:  No unanticipated weight loss. No change in sleep pattern or activity. No fevers, chills or rigors. Eyes:    No visual changes or diplopia. No scleral icterus. ENT:    No Headaches, hearing loss or vertigo. No mouth sores or sore throat. Cardiovascular:  + chest discomfort, palpitations, +LLE edema. Respiratory:  +shortness of breath, + cough, + wheezing      No sputum production. No hemoptysis, pleuritic pain. Gastrointestinal:  No abdominal pain, appetite loss, blood in stools. No hematemesis  Genitourinary:  No dysuria, trouble voiding or hematuria. No nocturia. Musculoskeletal:   No weakness or joint complaints. Integumentary: No rashes or pruritis. Neurological:  No headache, numbness or tingling. Psychiatric:   No effect on mood, memory, mentation, or behavior. No anxiety or depression. Endocrine:   No excessive thirst, fluid intake, or urination. No tremor. Hematologic: No abnormal bruising or bleeding. Lymphatic:  No swollen lymph nodes. Immunologic:  No hives or hx of anaphylaxis      OBJECTIVE:     PHYSICAL EXAM:   VITALS:   Vitals:    03/08/23 2134 03/09/23 0217 03/09/23 0741 03/09/23 0750   BP: 138/68 132/68  136/67   Pulse: 68 72  72   Resp: 20 18  18   Temp: 98.4 °F (36.9 °C) 98.3 °F (36.8 °C)  98 °F (36.7 °C)   TempSrc: Oral Oral  Temporal   SpO2: 98% 98% 98% 100%   Weight:       Height:            Intake/Output Summary (Last 24 hours) at 3/9/2023 1256  Last data filed at 3/9/2023 0270  Gross per 24 hour   Intake 420 ml   Output 1 ml   Net 419 ml        CONSTITUTIONAL:   A&O x 3, purse lipped breathing  SKIN:     No rash, No suspicious lesions, No skin discoloration  HEENT:     EOMI, MMM, No thrush  NECK:    No bruits, No JVP appreciated  CV:      Sinus,  No murmur, No rubs, No gallops  PULMONARY:   Lung sound globally diminished,  No Wheezing, No Rales, No Rhonchi      No noted egophony  ABDOMEN:     Soft, non-tender. BS normal. No R/R/G  EXT:    No deformities . No clubbing. Trace lower extremity edema, No venous stasis  PULSE:   Appears equal and palpable.   PSYCHIATRIC:  Seems appropriate, No acute psychosis  MS:    No fractures, No gross weakness  NEUROLOGIC:   The clinical assessment is non-focal     DATA: IMAGING & TESTING:     LABORATORY TESTS:    CBC with Differential:    Lab Results   Component Value Date/Time    WBC 8.9 03/09/2023 05:08 AM    RBC 3.56 03/09/2023 05:08 AM    HGB 11.1 03/09/2023 05:08 AM    HCT 35.6 03/09/2023 05:08 AM     03/09/2023 05:08 AM    .0 03/09/2023 05:08 AM    MCH 31.2 03/09/2023 05:08 AM    MCHC 31.2 03/09/2023 05:08 AM    RDW 13.6 03/09/2023 05:08 AM    NRBC 0.9 11/30/2022 05:18 AM    METASPCT 0.9 12/22/2022 06:18 AM    LYMPHOPCT 18.9 03/09/2023 05:08 AM    MONOPCT 14.5 03/09/2023 05:08 AM    MYELOPCT 0.8 03/06/2023 07:47 PM    BASOPCT 0.1 03/09/2023 05:08 AM    MONOSABS 1.28 03/09/2023 05:08 AM    LYMPHSABS 1.67 03/09/2023 05:08 AM    EOSABS 0.00 03/09/2023 05:08 AM    BASOSABS 0.01 03/09/2023 05:08 AM     CMP:    Lab Results   Component Value Date/Time     03/09/2023 05:08 AM    K 3.7 03/09/2023 05:08 AM     03/09/2023 05:08 AM    CO2 39 03/09/2023 05:08 AM    BUN 24 03/09/2023 05:08 AM    CREATININE 0.4 03/09/2023 05:08 AM    GFRAA >60 04/13/2022 02:20 PM    LABGLOM >60 03/09/2023 05:08 AM    GLUCOSE 100 03/09/2023 05:08 AM    PROT 6.2 03/07/2023 01:55 AM    LABALBU 2.6 03/07/2023 01:55 AM    CALCIUM 8.7 03/09/2023 05:08 AM    BILITOT 0.5 03/07/2023 01:55 AM    ALKPHOS 131 03/07/2023 01:55 AM    AST 32 03/07/2023 01:55 AM    ALT 29 03/07/2023 01:55 AM     Magnesium:    Lab Results   Component Value Date/Time    MG 1.9 03/07/2023 12:45 PM     Phosphorus:    Lab Results   Component Value Date/Time    PHOS 3.0 01/09/2023 07:26 AM     HgBA1c:    Lab Results   Component Value Date/Time    LABA1C 5.0 02/10/2022 08:38 AM        PRO-BNP:   Lab Results   Component Value Date    PROBNP 920 (H) 03/07/2023    PROBNP 651 (H) 03/06/2023      ABGs:   Lab Results   Component Value Date/Time    PH 7.455 03/08/2023 03:23 PM    PO2 166.7 03/08/2023 03:23 PM    PCO2 51.4 03/08/2023 03:23 PM     Hemoglobin A1C: No components found for: HGBA1C    IMAGING:  Imaging tests were completed and reviewed and discussed radiology and care team involved and reveals   XR CHEST PORTABLE    Result Date: 3/7/2023  EXAMINATION: ONE XRAY VIEW OF THE CHEST 3/7/2023 2:59 am COMPARISON: 03/06/2023 HISTORY: ORDERING SYSTEM PROVIDED HISTORY: sob TECHNOLOGIST PROVIDED HISTORY: Reason for exam:->sob What reading provider will be dictating this exam?->CRC FINDINGS: Hyperaeration related to underlying severe emphysema. No focal consolidation or pleural effusion. No pneumothorax. Patient is rotated. No acute osseous abnormality is identified. Severe emphysema without acute process. XR CHEST PORTABLE    Result Date: 3/6/2023  EXAMINATION: ONE XRAY VIEW OF THE CHEST 3/6/2023 6:02 pm COMPARISON: 02/06/2023 HISTORY: ORDERING SYSTEM PROVIDED HISTORY: SOB TECHNOLOGIST PROVIDED HISTORY: Reason for exam:->SOB FINDINGS: The lungs are without acute focal process. There is no effusion or pneumothorax. The cardiomediastinal silhouette is without acute process. The osseous structures are without acute process. Emphysematous changes. No acute process. CTA PULMONARY W CONTRAST    Result Date: 3/7/2023  EXAMINATION: CTA OF THE CHEST 3/7/2023 6:13 pm TECHNIQUE: CTA of the chest was performed after the administration of intravenous contrast.  Multiplanar reformatted images are provided for review. 3D and MIP images are provided for review. Automated exposure control, iterative reconstruction, and/or weight based adjustment of the mA/kV was utilized to reduce the radiation dose to as low as reasonably achievable. COMPARISON: CTA chest 02/06/2023 HISTORY: ORDERING SYSTEM PROVIDED HISTORY: Shortness of breath, elevated D-dimer TECHNOLOGIST PROVIDED HISTORY: Reason for exam:->Shortness of breath, elevated D-dimer What reading provider will be dictating this exam?->CRC FINDINGS: Pulmonary Arteries: Pulmonary arteries are adequately opacified for evaluation.   No evidence of intraluminal filling defect to suggest pulmonary embolism. Main pulmonary artery is normal in caliber. Mediastinum: No evidence of mediastinal lymphadenopathy. The heart and pericardium demonstrate no acute abnormality. There is no acute abnormality of the thoracic aorta. Lungs/pleura: Atelectasis and scarring in the mid lungs and upper lungs with intermixed severe emphysema and hyperinflation. No suspicious dominant pulmonary nodule or mass. No evidence of pleural effusion or pneumothorax. Upper Abdomen: Limited images of the upper abdomen are unremarkable. Soft Tissues/Bones: No acute bone or soft tissue abnormality. No evidence of pulmonary embolism or acute pulmonary abnormality. Advanced chronic changes of hyperinflation and chronic obstructive pulmonary disease with severe emphysema and biapical pleuroparenchymal scarring. Assessment:     Acute on chronic hypoxic respiratory failure  Acute exacerbation of COPD  CTA reveals advanced chronic changes of hyperinflation and chronic obstructive pulmonary disease with severe emphysema and biapical pleuroparenchymal scarring. As mentioned by Dr. Timmie Hodgkin during her last admission fibrotic changes have significantly worsened in the last 6 months when all imaging was reviewed. Connective tissue work up in February was negative. The pattern of fibrosis is not consistent with UIP and rather may be consistent with possibly allergic bronchopulmonary aspergillosis versus extrinsic allergic alveolitis versus sarcoidosis  versus histiocytosis versus occupational lung diseases. Histo antigen- negative, Tspot- negative, Fungitell- negative, Eosinophils wnl  Respiratory viral panel- negative  Elevated D-dimer 604, recent hospitalization. CTA negative for PE  Abnormal stress test, needs to follow up with cardiology  Hx of DVT, on aspirin daily        Plan:     Oxygen as needed, currently on 3L with baseline of 4L. SPO2 was 98% on examination  Mucolytics  Steroids and bronchodilators  BIPAP HS  Optimize pulmonary hygiene with flutter valve and incentive spirometer      Case and plan discussed with Dr. Robbie La, APRN - CNP      I saw and evaluated the patient and performed the MDM as documented in my note. Radiographs, labs and medication list were reviewed by me independently. I spoke with bedside nursing, therapists and consultants. The case was discussed in detail and plans for care were reviewed with Keri Gibbons APRN. . I provided the substantive portion of this visit by personally performing the History/MDM component in its entirety. Review of CNP documentation was conducted and revisions were made as appropriate. Acute exacerbation of COPD. CT of the chest is overall relatively unchanged compared to prior. Work up so far has been negative.    Recommend obtaining sputum cx  Wean FIO2  May benefit from bronchoscopy at some point      Anthoney Severance, DO    4:57 PM

## 2023-03-09 NOTE — PROGRESS NOTES
[facility] Inpatient   Resident Progress Note    S:  Hospital day: 2   Brief Synopsis: Doris Encarnacion is a 58 y.o. female with a PMH of diastolic CHF, severe end-stage COPD on 4L O2 chronically and DVT in 2018 who presented to ED for chest tightness and shortness of breath. She also endorsed increased sputum production which is green in color. She had been discharged from an outside ED prior to presenting here and was sent home on steroids which she never filled at the pharmacy. Patient previously followed with Dr. Bhakti Gupta and at the time a lung transplant was offered but she declined. She was admitted for a COPD exacerbation. Overnight/interim:   Patient states that her shortness of breath is mildly improved. She is on her baseline 4L NC. She was not placed on BIPAP last night. ABGs yesterday only minimally improved. She denies: Headache, dizziness, vomiting or chest pain. Will order CXR due to clinically status not improving.            Cont meds:    sodium chloride       Scheduled meds:    guaiFENesin  400 mg Oral TID    arformoterol tartrate  15 mcg Nebulization BID    ascorbic acid  1,000 mg Oral Daily    aspirin  81 mg Oral Daily    atorvastatin  40 mg Oral Nightly    budesonide  0.5 mg Nebulization BID    fluticasone  2 spray Each Nostril Daily    furosemide  20 mg Oral Daily    melatonin  5 mg Oral Nightly    metoprolol succinate  12.5 mg Oral Nightly    pantoprazole  40 mg Oral Nightly    potassium chloride  20 mEq Oral Daily    sertraline  25 mg Oral Daily    vitamin D  2,000 Units Oral Daily    sodium chloride flush  5-40 mL IntraVENous 2 times per day    enoxaparin  40 mg SubCUTAneous Daily    methylPREDNISolone  60 mg IntraVENous Daily    cefTRIAXone (ROCEPHIN) IV  1,000 mg IntraVENous Q24H    ipratropium-albuterol  1 ampule Inhalation Q4H WA     PRN meds: sodium chloride, white petrolatum, sodium chloride flush, sodium chloride, ondansetron **OR** ondansetron, polyethylene glycol, acetaminophen **OR** acetaminophen     I reviewed the patient's past medical and surgical history, Medications and Allergies. O:  /67   Pulse 72   Temp 98 °F (36.7 °C) (Temporal)   Resp 18   Ht 5' 2\" (1.575 m)   Wt 124 lb (56.2 kg)   SpO2 100%   BMI 22.68 kg/m²   24 hour I&O: I/O last 3 completed shifts: In: 360 [P.O.:360]  Out: 0   I/O this shift:  In: 180 [P.O.:180]  Out: 1 [Urine:1]          Physical Exam  Vitals reviewed. Constitutional:       General: She is not in acute distress. Appearance: She is ill-appearing. She is not diaphoretic. Interventions: Nasal cannula in place. Neck:      Trachea: Trachea and phonation normal.   Cardiovascular:      Rate and Rhythm: Normal rate and regular rhythm. Heart sounds: Normal heart sounds. Pulmonary:      Effort: Prolonged expiration present. Breath sounds: Decreased air movement present. Rhonchi present. Abdominal:      General: Bowel sounds are normal.      Palpations: Abdomen is soft. Tenderness: There is no abdominal tenderness. Musculoskeletal:      Cervical back: Full passive range of motion without pain. Right lower leg: Edema present. Left lower leg: Edema present. Lymphadenopathy:      Cervical: No cervical adenopathy. Labs:  Na/K/Cl/CO2:  145/3.7/101/39 (03/09 6300)  BUN/Cr/glu/ALT/AST/amyl/lip:  24/0.4/--/--/--/--/-- (03/09 2288)  WBC/Hgb/Hct/Plts:  8.9/11.1/35.6/260 (03/09 9991)  estimated creatinine clearance is 115 mL/min (A) (based on SCr of 0.4 mg/dL (L)). Other pertinent labs as noted below    Radiology:  CTA PULMONARY W CONTRAST   Final Result   No evidence of pulmonary embolism or acute pulmonary abnormality. Advanced   chronic changes of hyperinflation and chronic obstructive pulmonary disease   with severe emphysema and biapical pleuroparenchymal scarring. XR CHEST PORTABLE   Final Result   Severe emphysema without acute process.          XR CHEST (2 VW)    (Results Pending)         Resident Assessment and Plan       Shortness of Breath  -Likely secondary to COPD exacerbation   -ProBNP elevated on admission  -Patient is on 20mg Lasix at home   -CXR on admission demonstrated no acute process with severe emphysema   -Respiratory panel negative  -ABG yesterday demonstrated metabolic alkalosis with compensated respiratory acidosis   -Plan: Duoneb, Pulmicort, Brovana; ABGs; Ceftriaxone and Methylprednisolone day 3; Guaifenesin; PEEP/flutter, Repeat CXR, Obtain sputum cultures   -Pulmonary consult placed   -Bipap to be worn at night and intermittently throughout the day today      Concern for Sepsis - Unlikely   -On admission: Patient with elevated WBC and HR; Lactic acid 1.6  -WBC WNL, vitals stable. Hx of CHF  -Last ECHO in January 2023 showed EF 40-55% and motion abnormality.  -Continue home Lasix 20mg, Metoprolol and potassium supplement   -Strict intake and output  -Daily weights  -ProBNP elevated on admission but clinically does not appear to be in exacerbation of CHF   -CXR on admission demonstrated no acute process   -No need for repeat ECHO at this time  -1 time dose 20mg IV Lasix      Hx of abnormal stress test  -Patient needs to follow-up with cardiology on discharge     Hx of HLD  -Continue home Atorvastatin 40mg     Hx of Depression/Anxiety  -Continue home Zoloft      Hx of allergic rhinitis  -Continue home Flonase      Hx of Tobacco use  -Patient no longer smoking     Hx of Provoked DVT  -Patient completed anticoagulation   -She is on ASA daily      Hx of Liver cyst  -positive for Hep A and Hep C antibodies in 9/22; HCV not detected on 2/6/23  -Hospitalized for transaminitis with hyperbilirubinemia, multiple liver cysts, abnormal GB appearance with negative US.   -Negative MRCP for biliary obstruction at that time  -AST and ALT slightly elevated       PT/OT evaluation:Not at this time   DVT prophylaxis:Lovenox   GI prophylaxis: Protonix   Disposition:Continue tele, appreciate pulm recs   Diet: Low sodium         Electronically signed by Concha Sharp DO on 3/9/2023 at 12:19 PM  Attending physician: Dr. Johnny Ty

## 2023-03-09 NOTE — PROGRESS NOTES
[facility]  Family Medicine Attending    S: 58 y.o. female with a history of HFmEF, COPD, DVT, abnl stress test, tobacco history, diverticulitis, pulmonary fibrosis and COVID in 1/23 presented with shortness of breath and chest tightness for 2 days. She also noticed increasing swelling in her lower extremities. She reports that she is not as bad as when she had the episode of shortness of breath attacks 2 days ago, but otherwise does not feel that she is improving. Still with sputum production. Feels the leg edema could be worsening. O: VS- Blood pressure 136/67, pulse 72, temperature 98 °F (36.7 °C), temperature source Temporal, resp. rate 18, height 5' 2\" (1.575 m), weight 124 lb (56.2 kg), SpO2 99 %, not currently breastfeeding. Exam is as noted by resident with the following changes, additions or corrections:  Gen:on cpap on 4L NC  HEENT: NCAT, PERRL, MMM  CV-RRR   Lungs-coarse bs b/l  ABD-soft nonttp no masses   Ext-no C/C/1+ edema bilaterally      Impressions:   Principal Problem:    COPD with acute exacerbation ([address]  Resolved Problems:    * No resolved hospital problems. *      Plan:   COPD exacerbation with some mild evidence of worsening of her HFmEF. Steroids, nebs, antibiotics and change to IV diuretics. Check sputum cx. With elevated d-dimer, cta obtained and neg for pe. For cpap overnight. Will consult pulm. Pep flutter valve and mucinex. Will need follow up outpt when well for cardiac cath as previously advised. Attending Physician Statement  I have reviewed the chart and seen the patient with the resident(s). I personally reviewed images, EKG's and similar tests, if present. I personally reviewed and performed key elements of the history and exam.  I have reviewed and confirmed student and/or resident history and exam with changes as indicated above. I agree with the assessment, plan and orders as documented by the resident.   Please refer to the resident and/or student note for additional information.       Rufina Ch MD

## 2023-03-10 LAB
ANION GAP SERPL CALCULATED.3IONS-SCNC: 5 MMOL/L (ref 7–16)
BASOPHILS ABSOLUTE: 0.01 E9/L (ref 0–0.2)
BASOPHILS RELATIVE PERCENT: 0.1 % (ref 0–2)
BUN BLDV-MCNC: 21 MG/DL (ref 6–23)
CALCIUM SERPL-MCNC: 8.3 MG/DL (ref 8.6–10.2)
CHLORIDE BLD-SCNC: 100 MMOL/L (ref 98–107)
CO2: 38 MMOL/L (ref 22–29)
CREAT SERPL-MCNC: 0.4 MG/DL (ref 0.5–1)
EOSINOPHILS ABSOLUTE: 0 E9/L (ref 0.05–0.5)
EOSINOPHILS RELATIVE PERCENT: 0 % (ref 0–6)
GFR SERPL CREATININE-BSD FRML MDRD: >60 ML/MIN/1.73
GLUCOSE BLD-MCNC: 99 MG/DL (ref 74–99)
HCT VFR BLD CALC: 37.4 % (ref 34–48)
HEMOGLOBIN: 11.4 G/DL (ref 11.5–15.5)
IMMATURE GRANULOCYTES #: 0.05 E9/L
IMMATURE GRANULOCYTES %: 0.6 % (ref 0–5)
LYMPHOCYTES ABSOLUTE: 1.62 E9/L (ref 1.5–4)
LYMPHOCYTES RELATIVE PERCENT: 20.6 % (ref 20–42)
MCH RBC QN AUTO: 31.1 PG (ref 26–35)
MCHC RBC AUTO-ENTMCNC: 30.5 % (ref 32–34.5)
MCV RBC AUTO: 101.9 FL (ref 80–99.9)
MONOCYTES ABSOLUTE: 1.04 E9/L (ref 0.1–0.95)
MONOCYTES RELATIVE PERCENT: 13.2 % (ref 2–12)
NEUTROPHILS ABSOLUTE: 5.15 E9/L (ref 1.8–7.3)
NEUTROPHILS RELATIVE PERCENT: 65.5 % (ref 43–80)
PDW BLD-RTO: 13.6 FL (ref 11.5–15)
PLATELET # BLD: 217 E9/L (ref 130–450)
PMV BLD AUTO: 10.5 FL (ref 7–12)
POTASSIUM REFLEX MAGNESIUM: 3.9 MMOL/L (ref 3.5–5)
RBC # BLD: 3.67 E12/L (ref 3.5–5.5)
SODIUM BLD-SCNC: 143 MMOL/L (ref 132–146)
WBC # BLD: 7.9 E9/L (ref 4.5–11.5)

## 2023-03-10 PROCEDURE — 6370000000 HC RX 637 (ALT 250 FOR IP)

## 2023-03-10 PROCEDURE — 36415 COLL VENOUS BLD VENIPUNCTURE: CPT

## 2023-03-10 PROCEDURE — 2580000003 HC RX 258

## 2023-03-10 PROCEDURE — 6360000002 HC RX W HCPCS

## 2023-03-10 PROCEDURE — 94660 CPAP INITIATION&MGMT: CPT

## 2023-03-10 PROCEDURE — 99233 SBSQ HOSP IP/OBS HIGH 50: CPT | Performed by: INTERNAL MEDICINE

## 2023-03-10 PROCEDURE — 94640 AIRWAY INHALATION TREATMENT: CPT

## 2023-03-10 PROCEDURE — 87070 CULTURE OTHR SPECIMN AEROBIC: CPT

## 2023-03-10 PROCEDURE — 80048 BASIC METABOLIC PNL TOTAL CA: CPT

## 2023-03-10 PROCEDURE — 87206 SMEAR FLUORESCENT/ACID STAI: CPT

## 2023-03-10 PROCEDURE — 97161 PT EVAL LOW COMPLEX 20 MIN: CPT

## 2023-03-10 PROCEDURE — 99232 SBSQ HOSP IP/OBS MODERATE 35: CPT | Performed by: FAMILY MEDICINE

## 2023-03-10 PROCEDURE — 97530 THERAPEUTIC ACTIVITIES: CPT

## 2023-03-10 PROCEDURE — 1200000000 HC SEMI PRIVATE

## 2023-03-10 PROCEDURE — 85025 COMPLETE CBC W/AUTO DIFF WBC: CPT

## 2023-03-10 RX ADMIN — IPRATROPIUM BROMIDE AND ALBUTEROL SULFATE 1 AMPULE: .5; 2.5 SOLUTION RESPIRATORY (INHALATION) at 21:35

## 2023-03-10 RX ADMIN — CEFTRIAXONE SODIUM 1000 MG: 1 INJECTION, POWDER, FOR SOLUTION INTRAMUSCULAR; INTRAVENOUS at 08:43

## 2023-03-10 RX ADMIN — FLUTICASONE PROPIONATE 2 SPRAY: 50 SPRAY, METERED NASAL at 09:17

## 2023-03-10 RX ADMIN — GUAIFENESIN 400 MG: 400 TABLET ORAL at 22:31

## 2023-03-10 RX ADMIN — FUROSEMIDE 20 MG: 20 TABLET ORAL at 08:42

## 2023-03-10 RX ADMIN — IPRATROPIUM BROMIDE AND ALBUTEROL SULFATE 1 AMPULE: .5; 2.5 SOLUTION RESPIRATORY (INHALATION) at 13:20

## 2023-03-10 RX ADMIN — IPRATROPIUM BROMIDE AND ALBUTEROL SULFATE 1 AMPULE: .5; 2.5 SOLUTION RESPIRATORY (INHALATION) at 16:20

## 2023-03-10 RX ADMIN — PANTOPRAZOLE SODIUM 40 MG: 40 TABLET, DELAYED RELEASE ORAL at 22:31

## 2023-03-10 RX ADMIN — ASPIRIN 81 MG CHEWABLE TABLET 81 MG: 81 TABLET CHEWABLE at 08:43

## 2023-03-10 RX ADMIN — ARFORMOTEROL TARTRATE 15 MCG: 15 SOLUTION RESPIRATORY (INHALATION) at 21:35

## 2023-03-10 RX ADMIN — METHYLPREDNISOLONE SODIUM SUCCINATE 60 MG: 125 INJECTION, POWDER, FOR SOLUTION INTRAMUSCULAR; INTRAVENOUS at 08:42

## 2023-03-10 RX ADMIN — GUAIFENESIN 400 MG: 400 TABLET ORAL at 16:48

## 2023-03-10 RX ADMIN — Medication 5 MG: at 22:31

## 2023-03-10 RX ADMIN — ENOXAPARIN SODIUM 40 MG: 100 INJECTION SUBCUTANEOUS at 08:42

## 2023-03-10 RX ADMIN — Medication 2000 UNITS: at 08:59

## 2023-03-10 RX ADMIN — GUAIFENESIN 400 MG: 400 TABLET ORAL at 08:43

## 2023-03-10 RX ADMIN — ARFORMOTEROL TARTRATE 15 MCG: 15 SOLUTION RESPIRATORY (INHALATION) at 08:42

## 2023-03-10 RX ADMIN — SERTRALINE 25 MG: 50 TABLET, FILM COATED ORAL at 08:42

## 2023-03-10 RX ADMIN — BUDESONIDE 500 MCG: 0.5 SUSPENSION RESPIRATORY (INHALATION) at 08:42

## 2023-03-10 RX ADMIN — POTASSIUM CHLORIDE 20 MEQ: 1500 TABLET, EXTENDED RELEASE ORAL at 08:59

## 2023-03-10 RX ADMIN — SODIUM CHLORIDE, PRESERVATIVE FREE 10 ML: 5 INJECTION INTRAVENOUS at 22:31

## 2023-03-10 RX ADMIN — ATORVASTATIN CALCIUM 40 MG: 40 TABLET, FILM COATED ORAL at 22:31

## 2023-03-10 RX ADMIN — IPRATROPIUM BROMIDE AND ALBUTEROL SULFATE 1 AMPULE: .5; 2.5 SOLUTION RESPIRATORY (INHALATION) at 08:43

## 2023-03-10 RX ADMIN — METOPROLOL SUCCINATE 12.5 MG: 25 TABLET, EXTENDED RELEASE ORAL at 22:31

## 2023-03-10 RX ADMIN — Medication 1000 MG: at 08:43

## 2023-03-10 RX ADMIN — BUDESONIDE 500 MCG: 0.5 SUSPENSION RESPIRATORY (INHALATION) at 21:35

## 2023-03-10 NOTE — CONSULTS
CHF NURSE NAVIGATOR CONSULT NOTE:      Patient currently admitted with diagnosis of HfpEF heart failure. Patient was alert and oriented during the consultation. Spoke with patient's daughter Marta Shanks also via telephone at patient request. She was engaged and asked appropriate questions throughout the education session. She is agreeable to self monitoring, outpatient follow up, following a low sodium diet, and medication compliance. Scheduling with the CHF clinic, cardiology, and PCPYes. Future Appointments   Date Time Provider Lupe Arnett   3/15/2023  2:00 PM MD Pearl Garcia [facility]   3/22/2023  8:00 AM Renetta Reeves MD [facility]   3/24/2023  1:00 PM [facility] ROOM 1 [facility]       Barriers to Care:  Contributing risk factors for Heart Failure are identified as management of multiple chronic medical conditions. The patient is ordered:  Diet: ADULT DIET; Regular; Low Sodium (2 gm)   Sodium controlled diet Yes  Fluid restriction daily ordered (fluid restriction recommended if patient is hyponatremic and/or diuretic is initiated or increased) No  FR:   Daily Weights: Patient Vitals for the past 96 hrs (Last 3 readings):   Weight   03/07/23 2214 124 lb (56.2 kg)   03/07/23 0145 124 lb (56.2 kg)     I/O:   Intake/Output Summary (Last 24 hours) at 3/10/2023 0919  Last data filed at 3/9/2023 1353  Gross per 24 hour   Intake 300 ml   Output --   Net 300 ml              We reviewed the introduction to Heart Failure, the HF zones, signs and symptoms to report on day 1 of onset, medications, medication compliance, the importance of obtaining daily weights, following a low sodium diet, reading food labels for the sodium content, keeping physician appointments, and smoking cessation. We discussed writing / tracking daily weights on a calendar / log because a 5 pound gain in 1 week can sneak up if you are not tracking it.           I advised patient they can reduce the risk for Heart Failure exacerbations by modifying / controlling the risk factors. We discussed self-managed care which includes the following:  to take medications as prescribed, report any intolerable side effects of medications to the cardiologist / doctor, do not just stop taking the medication; follow a cardiac heart healthy / low sodium diet; weigh yourself daily, exercise regularly- per doctor recommendation and not to smoke or use an excess amount of alcohol. We discussed calling the cardiologist / doctor with a weight gain of 3 pounds in one day or a total of 5 pounds or more in one week. Also, if you should have a significant weight loss of 3# or more in one day to call the doctor, they may need to decrease or hold the diuretic dose. On days you feels nauseated and not eating / drinking, having emesis or diarrhea,  informed to call the cardiologist  / doctor, they may need to decrease or hold diuretic to avoid dehydration. I stressed the importance of informing their cardiologist the first day of onset of any of the signs and symptoms in the \"Yellow Zone\" of the HF Zones. Patient verbalizes understanding. Greater than 30 minutes was spent educating patient. The Heart Failure Booklet given to the patient with additional patient education addressing:  What is Heart Failure? Things You Can Do to Live Well with HF  How to Take Your Medications  How to Eat Less Salt  Daniels its Salt?   Exercising Well with Heart Failure  Signs and symptoms of HF to report  Weight Yourself Each Day  Home Self Management- activity, weight tracking, taking medications as prescribed, meals /diet planning (sodium and fluid restriction), how to read food labels, keeping physician follow ups, smoking cessation, follow the Heart Failure Zones  The Heart Failure zones  Every Dose Every Day      Instructed  to call 911 if you have any of the following symptoms:       Struggling to breathe unrelieved with rest,     Having chest pain     Have confusion or cant think clearly          Montana Christianson, RN BSN, RN  Heart Failure Navigator        CONGESTIVE HEART FAILURE (CHF) AHA GUIDELINES  (Must be completed for Primary Diagnosis CHF or History of CHF)    Discharge Plan:  I placed the Heart Failure Home Instructions in patient's discharge instructions. Per Heart Failure GWTG, the patient should have a follow-up appointment made within 7 days of discharge.     New Diagnosis No    ECHO Results most recent:  Lab Results   Component Value Date    LVEF 48 2023                                        Social History     Tobacco Use   Smoking Status Former    Packs/day: 0.50    Years: 41.00    Pack years: 20.50    Types: Cigarettes    Quit date: 2021    Years since quittin.8   Smokeless Tobacco Never        Immunization History   Administered Date(s) Administered    COVID-19, MODERNA BLUE border, Primary or Immunocompromised, (age 12y+), IM, 100 mcg/0.5mL 2021, 2021    Influenza, FLUARIX, FLULAVAL, FLUZONE (age 10 mo+) AND AFLURIA, (age 1 y+), PF, 0.5mL 2018, 10/30/2019    Pneumococcal Polysaccharide ([id number]) 2018    Td, unspecified formulation 2003    Tdap (Boostrix, Adacel) 2019          Angiotensin-Converting-Enzyme (ACE) inhibitor ordered:  [] Yes  [x] No (specify contraindication):    [] Contraindicated  [] Hypotensive patient who was at immediate risk of cardiogenic shock  [] Hospitalized patient who experienced marked azotemia  [] Other Contraindications  [x] Not Eligible  [] Not Tolerant  [] Patient Reason  [] System Reason  [] Other Reason    Angiotensin II receptor blockers (ARB) ordered:  [] Yes  [x] No (specify contraindication):    [] Contraindicated  [] Hypotensive patient who was at immediate risk of cardiogenic shock  [] Hospitalized patient who experienced marked azotemia  [] Other Contraindications    ARNI - Angiotensin Receptor Neprilysin Inhibitor ordered:  [] Yes  [x] No (specify contraindication): hypotension    [] ACE inhibitor use within the prior 36 hours  [] Allergy  [] Hyperkalemia  [] Hypotension  [] Renal dysfunction defined as creatinine > 2.5 mg/dL in men or > 2.0 mg/dL in women  [] Other Contraindications  [] Not Eligible  [] Not Tolerant  [] Patient Reason  []System Reason  []Other Reason      Beta Blocker ordered:    [x] Yes Toprol XL  [] No (specify contraindication):    [] Contraindicated  [] Asthma  [] Fluid Overload  [] Low Blood Pressure  [] Patient recently treated with an intravenous positive inotropic agent  [] Other Contraindications  [] Not Eligible  [] Not Tolerant  [] Patient Reason  [] System Reason    SGLT2 Inhibitor ordered:  [] Yes  [x] No (specify contraindication):    [] Contraindicated  [] Patient currently on dialysis  [] Ketoacidosis  [] Known hypersensitivity to the medication  [] Type I diabetes (not approved for use in patients with Type I diabetes due to increased risk of ketoacidosis)  [] Other Contraindications  [] Not Eligible  [] Not Tolerant  [] Patient Reason  [] System Reason  [] Other Reason    Aldosterone Antagonist ordered:  [] Yes  [x] No (specify contraindication):    [] Contraindicated  [] Allergy due to aldosterone receptor antagonist  [] Hyperkalemia  [] Renal dysfunction defined as creatinine >2.5 mg/dL in men or >2.0 mg/dL in women.   [] Other contraindications  [] Not Eligible  [] Not Tolerant  [] Patient Reason  [] System Reason  [] Other Reason

## 2023-03-10 NOTE — PROGRESS NOTES
[facility]  Department of Internal Medicine  Division of Pulmonary, Critical Care and Sleep Medicine  Consult Note    Mckenna Saleh DO, MPH, FCCP, FACOI, FACP  Jinyn Sosa DO, FCCP  MD Elzbieta Rutherford, APRN    SUBJECTIVE: We are following Ms. Jonelle Page for acute exacerbation of COPD. She reports her breathing is easier today. She had just finished working with physical therapy at the time of my examination. Reports she wore the BIPAP last night with therapeutic benefit. OBJECTIVE:     PHYSICAL EXAM:   VITALS:   Vitals:    03/09/23 1923 03/09/23 2009 03/10/23 0325 03/10/23 0900   BP:  132/68  138/77   Pulse:  82  84   Resp:  18 23 20   Temp:  97.8 °F (36.6 °C)     TempSrc:  Oral     SpO2: 100% 99%  98%   Weight:       Height:            Intake/Output Summary (Last 24 hours) at 3/10/2023 1031  Last data filed at 3/9/2023 1353  Gross per 24 hour   Intake 120 ml   Output --   Net 120 ml          CONSTITUTIONAL:   A&O x 3, purse lipped breathing  SKIN:     No rash, No suspicious lesions, No skin discoloration  HEENT:     EOMI, MMM, No thrush  NECK:    No bruits, No JVP appreciated  CV:      Sinus,  No murmur, No rubs, No gallops  PULMONARY:   Lung sound globally diminished,  No Wheezing, No Rales, No Rhonchi      No noted egophony  ABDOMEN:     Soft, non-tender. BS normal. No R/R/G  EXT:    No deformities . No clubbing. Trace lower extremity edema, No venous stasis  PULSE:   Appears equal and palpable.   PSYCHIATRIC:  Seems appropriate, No acute psychosis  MS:    No fractures, No gross weakness  NEUROLOGIC:   The clinical assessment is non-focal     DATA: IMAGING & TESTING:     LABORATORY TESTS:    CBC with Differential:    Lab Results   Component Value Date/Time    WBC 7.9 03/10/2023 05:05 AM    RBC 3.67 03/10/2023 05:05 AM    HGB 11.4 03/10/2023 05:05 AM    HCT 37.4 03/10/2023 05:05 AM     03/10/2023 05:05 AM    .9 03/10/2023 05:05 AM    MCH 31.1 03/10/2023 05:05 AM    MCHC 30.5 03/10/2023 05:05 AM    RDW 13.6 03/10/2023 05:05 AM    NRBC 0.9 11/30/2022 05:18 AM    METASPCT 0.9 12/22/2022 06:18 AM    LYMPHOPCT 20.6 03/10/2023 05:05 AM    MONOPCT 13.2 03/10/2023 05:05 AM    MYELOPCT 0.8 03/06/2023 07:47 PM    BASOPCT 0.1 03/10/2023 05:05 AM    MONOSABS 1.04 03/10/2023 05:05 AM    LYMPHSABS 1.62 03/10/2023 05:05 AM    EOSABS 0.00 03/10/2023 05:05 AM    BASOSABS 0.01 03/10/2023 05:05 AM     CMP:    Lab Results   Component Value Date/Time     03/10/2023 05:05 AM    K 3.9 03/10/2023 05:05 AM     03/10/2023 05:05 AM    CO2 38 03/10/2023 05:05 AM    BUN 21 03/10/2023 05:05 AM    CREATININE 0.4 03/10/2023 05:05 AM    GFRAA >60 04/13/2022 02:20 PM    LABGLOM >60 03/10/2023 05:05 AM    GLUCOSE 99 03/10/2023 05:05 AM    PROT 6.2 03/07/2023 01:55 AM    LABALBU 2.6 03/07/2023 01:55 AM    CALCIUM 8.3 03/10/2023 05:05 AM    BILITOT 0.5 03/07/2023 01:55 AM    ALKPHOS 131 03/07/2023 01:55 AM    AST 32 03/07/2023 01:55 AM    ALT 29 03/07/2023 01:55 AM     Magnesium:    Lab Results   Component Value Date/Time    MG 1.9 03/07/2023 12:45 PM     Phosphorus:    Lab Results   Component Value Date/Time    PHOS 3.0 01/09/2023 07:26 AM     HgBA1c:    Lab Results   Component Value Date/Time    LABA1C 5.0 02/10/2022 08:38 AM        PRO-BNP:   Lab Results   Component Value Date    PROBNP 920 (H) 03/07/2023    PROBNP 651 (H) 03/06/2023      ABGs:   Lab Results   Component Value Date/Time    PH 7.455 03/08/2023 03:23 PM    PO2 166.7 03/08/2023 03:23 PM    PCO2 51.4 03/08/2023 03:23 PM     Hemoglobin A1C: No components found for: HGBA1C    IMAGING:  Imaging tests were completed and reviewed and discussed radiology and care team involved and reveals   XR CHEST PORTABLE    Result Date: 3/7/2023  EXAMINATION: ONE XRAY VIEW OF THE CHEST 3/7/2023 2:59 am COMPARISON: 03/06/2023 HISTORY: ORDERING SYSTEM PROVIDED HISTORY: sob TECHNOLOGIST PROVIDED HISTORY: Reason for exam:->sob What reading provider will be dictating this exam?->CRC FINDINGS: Hyperaeration related to underlying severe emphysema. No focal consolidation or pleural effusion. No pneumothorax. Patient is rotated. No acute osseous abnormality is identified. Severe emphysema without acute process. XR CHEST PORTABLE    Result Date: 3/6/2023  EXAMINATION: ONE XRAY VIEW OF THE CHEST 3/6/2023 6:02 pm COMPARISON: 02/06/2023 HISTORY: ORDERING SYSTEM PROVIDED HISTORY: SOB TECHNOLOGIST PROVIDED HISTORY: Reason for exam:->SOB FINDINGS: The lungs are without acute focal process. There is no effusion or pneumothorax. The cardiomediastinal silhouette is without acute process. The osseous structures are without acute process. Emphysematous changes. No acute process. CTA PULMONARY W CONTRAST    Result Date: 3/7/2023  EXAMINATION: CTA OF THE CHEST 3/7/2023 6:13 pm TECHNIQUE: CTA of the chest was performed after the administration of intravenous contrast.  Multiplanar reformatted images are provided for review. 3D and MIP images are provided for review. Automated exposure control, iterative reconstruction, and/or weight based adjustment of the mA/kV was utilized to reduce the radiation dose to as low as reasonably achievable. COMPARISON: CTA chest 02/06/2023 HISTORY: ORDERING SYSTEM PROVIDED HISTORY: Shortness of breath, elevated D-dimer TECHNOLOGIST PROVIDED HISTORY: Reason for exam:->Shortness of breath, elevated D-dimer What reading provider will be dictating this exam?->CRC FINDINGS: Pulmonary Arteries: Pulmonary arteries are adequately opacified for evaluation. No evidence of intraluminal filling defect to suggest pulmonary embolism. Main pulmonary artery is normal in caliber. Mediastinum: No evidence of mediastinal lymphadenopathy. The heart and pericardium demonstrate no acute abnormality. There is no acute abnormality of the thoracic aorta. Lungs/pleura:  Atelectasis and scarring in the mid lungs and upper lungs with intermixed severe emphysema and hyperinflation. No suspicious dominant pulmonary nodule or mass. No evidence of pleural effusion or pneumothorax. Upper Abdomen: Limited images of the upper abdomen are unremarkable. Soft Tissues/Bones: No acute bone or soft tissue abnormality. No evidence of pulmonary embolism or acute pulmonary abnormality. Advanced chronic changes of hyperinflation and chronic obstructive pulmonary disease with severe emphysema and biapical pleuroparenchymal scarring. HISTORY OF PRESENT ILLNESS : Percy Dolan 58 y.o. female was seen in consultation regarding the above chief compliant. She has had multiple admissions to [address] [place] [address] over the last several years. She presented to [address] [place], [place] [address] ER on Sunday night having progressive shortness of breath, she was not admitted and when her daughter picked her up she told her to bring her to [facility] because she was still struggling to breath. She was recently discharged from [facility] where she was treated for COPD exacerbation. She also endorsed increased wheezing. She denies cough. Denies recent fevers or chills. She denies symptoms of nausea, vomiting, dizziness. She reports to me that she has seen Dr. Justa Gonzalez in the past however she has missed multiple appointments and tells me that she has a certified letter at home from his office but she is unaware of the contents. She reports that she quit smoking approximately 3 years ago however did have a few cigarettes 3 to 4 months ago. Prior to that she had a longstanding history of smoking a pack a day for approximately 40 years. She reports that at [place] [address] she was on 4 L nasal cannula.   She denies any prior history of radiation to the chest.      Assessment:     Acute on chronic hypoxic respiratory failure, multifactorial, AECOPD and CHF  Acute exacerbation of COPD  HFrEF 40-45%, proBNP 920, lasix 20 mg po daily  CTA reveals advanced chronic changes of hyperinflation and chronic obstructive pulmonary disease with severe emphysema and biapical pleuroparenchymal scarring. CT chest is overall relatively unchanged compared to last admission. Connective tissue work up in February was negative. The pattern of fibrosis is not consistent with UIP and rather may be consistent with possibly allergic bronchopulmonary aspergillosis versus extrinsic allergic alveolitis versus sarcoidosis  versus histiocytosis versus occupational lung diseases. Histo antigen- negative, Tspot- negative, Fungitell- negative, Eosinophils wnl  Respiratory viral panel- negative  Elevated D-dimer 604, recent hospitalization. CTA negative for PE  Abnormal stress test, needs to follow up with cardiology  Hx of DVT, on aspirin daily        Plan:     Oxygen as needed, currently on 3L with baseline of 4L. SPO2 was 98% on examination  Mucolytics  Steroids and bronchodilators, plan to wean steroids tomorrow  Ceftriaxone daily  BIPAP HS  Optimize pulmonary hygiene with flutter valve and incentive spirometer  May benefit from bronchoscopy at some point  Await respiratory culture       Case and plan discussed with Dr. Jacques Cazares  Electronically signed by SKYE Vilchis - CNP on 3/10/2023 at 10:32 AM      I saw and evaluated the patient and performed the MDM as documented in my note. Radiographs, labs and medication list were reviewed by me independently. I spoke with bedside nursing, therapists and consultants. The case was discussed in detail and plans for care were reviewed with Andreina CHEUNG. . I provided the substantive portion of this visit by personally performing the History/MDM component in its entirety. Review of CNP documentation was conducted and revisions were made as appropriate    Seen and examined this evening. Breath sounds clear, currently no wheezing present.    She is very unsure on what medications she is supposed to be taking at home and reports that when she left [facility] last time she was unclear on what to take. She is NOT to continue theophylline at home. Will plan to discharged on INCRUSE, DULERA, Duonebs q4 hours prn. Consider starting daliresp at discharge due to her multiple exacerbations. Due to her hypercarbic respiratory failure as evidenced by her CO2 of 69.4, 85.9, 55.4 on multiple occassions she may benefit from non-invasive ventilation at home. Recommend discharging on bipap 12/6 with oxygen bleed in. Will need ambulatory pulse ox prior to discharge.        Rose De La Torre,   10:51 PM

## 2023-03-10 NOTE — CARE COORDINATION
Patient refused home care, stated not to contact her daughter about home care. In the past she has home care with IV antibiotics and received a bill and is concerned about this happening again. Educated patient that with out IV antibiotics home care would be covered, patient continued to decline. Spoke to daughter two days ago and confirmed she can provide 24 hour care and is okay with patient returning home. Reinforced to patient daughter needs to bring portable tanks at discharge, patient stated she would. Note entered by Sunshine Villarreal, student social work, reviewed by EPI Baeza.

## 2023-03-10 NOTE — PROGRESS NOTES
Physical Therapy  Physical Therapy Initial Assessment     Name: Joesph Lynch  : 1960  MRN: [id number]      Date of Service: 3/10/2023    Evaluating PT:  Dashawn Don, PT, DPT [id number]    Room #:  7169/2413-O  Diagnosis:  COPD with acute exacerbation ([facility] [J44.1]  PMHx/PSHx:   has a past medical history of Abscess, Acute on chronic diastolic CHF (congestive heart failure) ([facility] COPD (chronic obstructive pulmonary disease) ([facility] Diverticulitis, Provoked DVT 3/2018, Seasonal allergic rhinitis, Smoker, and Tobacco use disorder. has a past surgical history that includes cardiovascular stress test (N/A, 2023). Procedure/Surgery:  None  Precautions:  Falls, O2, external catheter  Equipment Needs:  TBD  Equipment Owned:  FWW    SUBJECTIVE:    Pt lives with daughter in a 2 story home with 2 stair(s) to enter and 1 rail(s). Pt stays on 1st floor. Pt ambulated short distances using FWW with assistance PTA. OBJECTIVE:   Initial Evaluation  Date: 3/10/2023 Treatment Short Term/ Long Term   Goals   AM-PAC 6 Clicks 66/72     Was pt agreeable to Eval/treatment? Yes     Does pt have pain? No c/o pain     Bed Mobility  Rolling: NT  Supine to sit: SBA  Sit to supine: NT  Scooting: SBA (seated)  Rolling: Independent  Supine to sit: Independent  Sit to supine: Independent  Scooting: Independent   Transfers Sit to stand: SBA  Stand to sit: SBA  Stand pivot: NT  Sit to stand: Mod I  Stand to sit: Mod I  Stand pivot: Mod I with AAD   Ambulation    3 feet (side steps) with [employer] SBA  Patient declined to ambulate further distance  100 feet with AAD Mod I   Stair negotiation: ascended and descended  NT  2 step(s) with 1 rail(s) Mod I   ROM BUE:  See OT note  BLE:  WFL     Strength BUE:  See OT note  BLE:  Grossly 5/5     Balance Sitting EOB:  Independent  Dynamic Standing:  SBA with [employer]  Sitting EOB:  Independent  Dynamic Standing:   Mod I with AAD     Pt is A & O x 4  Sensation:  No reports of numbness/tingling to extremities  Edema:  Unremarkable    Vitals:   HR 83, SpO2 99% (4 L O2) at rest.  -104, SpO2 93-94% (4 L O2) with activity. Patient education  Pt educated on role of PT, safety during functional mobility, use of call light for assistance. Patient response to education:   Pt verbalized understanding Pt demonstrated skill Pt requires further education in this area   Yes Yes Reinforcement     ASSESSMENT:    Conditions Requiring Skilled Therapeutic Intervention:    []Decreased strength     []Decreased ROM  [x]Decreased functional mobility  []Decreased balance   [x]Decreased endurance   []Decreased posture  []Decreased sensation  []Decreased coordination   []Decreased vision  [x]Decreased safety awareness   []Increased pain       Comments:  Patient in semi-Delgado's position upon arrival; agreeable to PT session. Patient sat EOB for extended period of time. Required verbal cues related to positioning/sequencing to ensure safety during functional transfers. Static standing performed for short duration; subsequently performed side steps. Patient declined to ambulate further distance. Patient left in semi-Delgado's position with call light in reach. Instructed not to get up on own and to use call light for assistance; expressed understanding. Patient would benefit from continued skilled PT services to address functional deficits and prevent deconditioning.     Treatment:  Patient practiced and was instructed in the following treatment:    Bed mobility - verbal cues to facilitate proper positioning; physical assistance provided as needed during activity  Static sitting - performed to promote activity tolerance and balance maintenance  Functional transfers - verbal cues to facilitate proper positioning and sequencing, particularly related to hand/foot placement; physical assistance provided as needed during activity  Static standing - performed to promote activity tolerance and balance maintenance  Ambulation - physical assistance provided as needed during activity  Skilled monitoring of vitals    Pt's/ family goals   1. None stated    Prognosis is good for reaching above PT goals. Patient and or family understand(s) diagnosis, prognosis, and plan of care. Yes    PHYSICAL THERAPY PLAN OF CARE:    PT POC is established based on physician order and patient diagnosis     Referring provider/PT Order:    03/09/23 0900  PT eval and treat  Start:  03/09/23 0900,   End:  03/09/23 0900,   ONE TIME,   Standing Count:  1 Occurrences,   R         Nicho Rand DO     Diagnosis:  COPD with acute exacerbation ([facility] [J44.1]  Specific instructions for next treatment:  Continue to facilitate safe performance of functional mobility; progress as appropriate. Current Treatment Recommendations:     [x] Strengthening to improve independence with functional mobility   [] ROM to improve independence with functional mobility   [x] Balance Training to improve static/dynamic balance and to reduce fall risk  [x] Endurance Training to improve activity tolerance during functional mobility   [x] Transfer Training to improve safety and independence with all functional transfers   [x] Gait Training to improve gait mechanics, endurance and assess need for appropriate assistive device  [x] Stair Training in preparation for safe discharge home and/or into the community   [x] Positioning to prevent skin breakdown and contractures  [x] Safety and Education Training   [x] Patient/Caregiver Education   [] HEP  [] Other     PT long term treatment goals are located in above grid    Frequency of treatments: 2-5x/week x 1-2 weeks.     Time in  0920  Time out  0940    Total Treatment Time  10 minutes     Evaluation Time includes thorough review of current medical information, gathering information on past medical history/social history and prior level of function, completion of standardized testing/informal observation of tasks, assessment of data and education on plan of care and goals.     CPT codes:  [x] Low Complexity PT evaluation 67352  [] Moderate Complexity PT evaluation 64782  [] High Complexity PT evaluation 02526  [] PT Re-evaluation 17319  [] Gait training 88613 0 minutes  [] Manual therapy 71505 0 minutes  [x] Therapeutic activities 21033 10 minutes  [] Therapeutic exercises 19768 0 minutes  [] Neuromuscular reeducation 47497 0 minutes     Leyla Christianson, PT, DPT  [id number]

## 2023-03-10 NOTE — PROGRESS NOTES
[facility] Inpatient   Resident Progress Note    S:  Hospital day: 3   Brief Synopsis: Moo Gambino is a 58 y.o. female with a PMH of diastolic CHF, severe end-stage COPD on 4L O2 chronically and DVT in 2018 who presented to ED for chest tightness and shortness of breath. She also endorsed increased sputum production which is green in color. She had been discharged from an outside ED prior to presenting here and was sent home on steroids which she never filled at the pharmacy. Patient previously followed with Dr. Elier Baker and at the time a lung transplant was offered but she declined. She was admitted for a COPD exacerbation. Patient was started on course of steroids and Ceftriaxone. She has been wearing bipap at night. Repeat ABG slightly improved from admission. On 3/9/23 CXR unchanged from admission. Received 20mg Lasix IV due to lower extremity edema and continued SOB. Overnight/interim:   Patient states that her shortness of breath is improved. Endorses decreased sputum production. She wore BIPAP overnight. She began ambulating yesterday. She denies: Headache, dizziness, vomiting or chest pain.          Cont meds:    sodium chloride       Scheduled meds:    [START ON 3/11/2023] predniSONE  60 mg Oral Daily    guaiFENesin  400 mg Oral TID    arformoterol tartrate  15 mcg Nebulization BID    ascorbic acid  1,000 mg Oral Daily    aspirin  81 mg Oral Daily    atorvastatin  40 mg Oral Nightly    budesonide  0.5 mg Nebulization BID    fluticasone  2 spray Each Nostril Daily    furosemide  20 mg Oral Daily    melatonin  5 mg Oral Nightly    metoprolol succinate  12.5 mg Oral Nightly    pantoprazole  40 mg Oral Nightly    potassium chloride  20 mEq Oral Daily    sertraline  25 mg Oral Daily    vitamin D  2,000 Units Oral Daily    sodium chloride flush  5-40 mL IntraVENous 2 times per day    enoxaparin  40 mg SubCUTAneous Daily    cefTRIAXone (ROCEPHIN) IV  1,000 mg IntraVENous Q24H ipratropium-albuterol  1 ampule Inhalation Q4H WA     PRN meds: sodium chloride, white petrolatum, sodium chloride flush, sodium chloride, ondansetron **OR** ondansetron, polyethylene glycol, acetaminophen **OR** acetaminophen     I reviewed the patient's past medical and surgical history, Medications and Allergies. O:  /77   Pulse 84   Temp 97.8 °F (36.6 °C) (Oral)   Resp 20   Ht 5' 2\" (1.575 m)   Wt 124 lb (56.2 kg)   SpO2 98%   BMI 22.68 kg/m²   24 hour I&O: I/O last 3 completed shifts: In: 300 [P.O.:300]  Out: 1 [Urine:1]  No intake/output data recorded. Physical Exam  Vitals reviewed. Constitutional:       General: She is not in acute distress. Appearance: She is ill-appearing. She is not diaphoretic. Interventions: Nasal cannula in place. Neck:      Trachea: Trachea and phonation normal.   Cardiovascular:      Rate and Rhythm: Normal rate and regular rhythm. Heart sounds: Normal heart sounds. Pulmonary:      Effort: Prolonged expiration present. No accessory muscle usage or respiratory distress. Breath sounds: Decreased air movement present. No rhonchi. Comments: Air movement improved from previous  Abdominal:      General: Bowel sounds are normal.      Palpations: Abdomen is soft. Tenderness: There is no abdominal tenderness. Musculoskeletal:      Cervical back: Full passive range of motion without pain. Right lower leg: Edema present. Left lower leg: Edema present. Lymphadenopathy:      Cervical: No cervical adenopathy. Labs:  Na/K/Cl/CO2:  143/3.9/100/38 (03/10 0505)  BUN/Cr/glu/ALT/AST/amyl/lip:  21/0.4/--/--/--/--/-- (03/10 0505)  WBC/Hgb/Hct/Plts:  7.9/11.4/37.4/217 (03/10 0505)  estimated creatinine clearance is 115 mL/min (A) (based on SCr of 0.4 mg/dL (L)). Other pertinent labs as noted below    Radiology:  XR CHEST (2 VW)   Final Result   Obstructive airways disease with bullous changes as before.          CTA PULMONARY W CONTRAST   Final Result   No evidence of pulmonary embolism or acute pulmonary abnormality. Advanced   chronic changes of hyperinflation and chronic obstructive pulmonary disease   with severe emphysema and biapical pleuroparenchymal scarring. XR CHEST PORTABLE   Final Result   Severe emphysema without acute process. Resident Assessment and Plan       Shortness of Breath  -Likely secondary to COPD exacerbation   -ProBNP elevated on admission  -Patient is on 20mg Lasix at home   -CXR on admission demonstrated no acute process with severe emphysema   -Respiratory panel negative  -ABG demonstrated metabolic alkalosis with compensated respiratory acidosis   -Plan: Duoneb, Pulmicort, Brovana; ABGs;  Ceftriaxone and Methylprednisolone day 4; Guaifenesin; PEEP/flutter, Repeat CXR, Obtain sputum cultures   -Pulmonary following   -Bipap to be worn at night - Patient may benefit from BIPAP at home         Hx of CHF  -Last ECHO in January 2023 showed EF 40-55% and motion abnormality.  -Continue home Lasix 20mg, Metoprolol and potassium supplement   -Strict intake and output  -Daily weights  -ProBNP elevated on admission but clinically does not appear to be in exacerbation of CHF   -CXR on admission demonstrated no acute process - Repeat CXR unchanged   -No need for repeat ECHO at this time  -Given 1 time dose 20mg IV Lasix      Hx of abnormal stress test  -Patient needs to follow-up with cardiology on discharge     Hx of HLD  -Continue home Atorvastatin 40mg     Hx of Depression/Anxiety  -Continue home Zoloft      Hx of allergic rhinitis  -Continue home Flonase      Hx of Tobacco use  -Patient no longer smoking     Hx of Provoked DVT  -Patient completed anticoagulation   -She is on ASA daily      Hx of Liver cyst  -positive for Hep A and Hep C antibodies in 9/22; HCV not detected on 2/6/23  -Hospitalized for transaminitis with hyperbilirubinemia, multiple liver cysts, abnormal GB appearance with negative US. -Negative MRCP for biliary obstruction at that time  -AST and ALT slightly elevated    Concern for Sepsis - Unlikely   -On admission: Patient with elevated WBC and HR; Lactic acid 1.6  -WBC WNL, vitals stable.         PT/OT evaluation:Not at this time   DVT prophylaxis:Lovenox   GI prophylaxis: Protonix   Disposition:Continue tele, appreciate pulm recs   Diet: Low sodium         Electronically signed by Isabel Sung DO on 3/10/2023 at 11:58 AM  Attending physician: Dr. Kat Livingston

## 2023-03-10 NOTE — PROGRESS NOTES
Nurse to nurse called to 47. Electronically signed by Nadiya Valdez RN on 3/10/2023 at 2:07 PM    Daughter, Manas Turner, called to update on patient's new room number.  Electronically signed by Nadiya Valdez RN on 3/10/2023 at 2:08 PM

## 2023-03-10 NOTE — PROGRESS NOTES
[address] [facility]  Family Medicine Attending    S: 58 y.o. female with a history of HFmEF, COPD, DVT, abnl stress test, tobacco history, diverticulitis, pulmonary fibrosis and COVID in 1/23 presented with shortness of breath and chest tightness for 2 days. She also noticed increasing swelling in her lower extremities. She reports today starting to feel better. Pep flutter has helped. O: VS- Blood pressure 138/77, pulse 84, temperature 97.8 °F (36.6 °C), temperature source Oral, resp. rate 20, height 5' 2\" (1.575 m), weight 124 lb (56.2 kg), SpO2 98 %, not currently breastfeeding. Exam is as noted by resident with the following changes, additions or corrections:  Gen:on cpap on 4L NC  HEENT: NCAT, PERRL, MMM  CV-RRR   Lungs-coarse bs b/l  ABD-soft nonttp no masses   Ext-no C/C/tr edema bilaterally      Impressions:   Principal Problem:    COPD with acute exacerbation ([address]  Resolved Problems:    * No resolved hospital problems. *      Plan:   COPD exacerbation with some mild evidence of worsening of her HFmEF. Steroids, nebs, antibiotics and diuretics. Sputum cx pending. With elevated d-dimer, cta obtained and neg for pe. For bipap overnight. Appreciate pulm input. Pep flutter valve and mucinex. Will need follow up outpt when well for cardiac cath as previously advised. Disposition planning for 1-2 days to home. Follow up with pulm as well. Attending Physician Statement  I have reviewed the chart and seen the patient with the resident(s). I personally reviewed images, EKG's and similar tests, if present. I personally reviewed and performed key elements of the history and exam.  I have reviewed and confirmed student and/or resident history and exam with changes as indicated above. I agree with the assessment, plan and orders as documented by the resident. Please refer to the resident and/or student note for additional information.       Latesha Dykes MD

## 2023-03-10 NOTE — PROGRESS NOTES
Occupational Therapy    Attempted OT tx this date. Pt refused.      Tatyana Silverio, [address] [facility], OTR/L [id number]

## 2023-03-11 VITALS
OXYGEN SATURATION: 96 % | BODY MASS INDEX: 22.82 KG/M2 | RESPIRATION RATE: 18 BRPM | HEIGHT: 62 IN | DIASTOLIC BLOOD PRESSURE: 74 MMHG | HEART RATE: 66 BPM | TEMPERATURE: 97.9 F | WEIGHT: 124 LBS | SYSTOLIC BLOOD PRESSURE: 116 MMHG

## 2023-03-11 PROBLEM — J44.1 COPD WITH ACUTE EXACERBATION (HCC): Status: RESOLVED | Noted: 2023-03-07 | Resolved: 2023-03-11

## 2023-03-11 LAB
ANION GAP SERPL CALCULATED.3IONS-SCNC: 4 MMOL/L (ref 7–16)
BASOPHILS ABSOLUTE: 0.02 E9/L (ref 0–0.2)
BASOPHILS RELATIVE PERCENT: 0.2 % (ref 0–2)
BUN BLDV-MCNC: 22 MG/DL (ref 6–23)
CALCIUM SERPL-MCNC: 7.9 MG/DL (ref 8.6–10.2)
CHLORIDE BLD-SCNC: 99 MMOL/L (ref 98–107)
CO2: 40 MMOL/L (ref 22–29)
CREAT SERPL-MCNC: 0.5 MG/DL (ref 0.5–1)
EOSINOPHILS ABSOLUTE: 0.2 E9/L (ref 0.05–0.5)
EOSINOPHILS RELATIVE PERCENT: 2 % (ref 0–6)
FOLATE: 4.5 NG/ML (ref 4.8–24.2)
GFR SERPL CREATININE-BSD FRML MDRD: >60 ML/MIN/1.73
GLUCOSE BLD-MCNC: 69 MG/DL (ref 74–99)
HCT VFR BLD CALC: 35.4 % (ref 34–48)
HEMOGLOBIN: 10.7 G/DL (ref 11.5–15.5)
IMMATURE GRANULOCYTES #: 0.08 E9/L
IMMATURE GRANULOCYTES %: 0.8 % (ref 0–5)
LYMPHOCYTES ABSOLUTE: 3.02 E9/L (ref 1.5–4)
LYMPHOCYTES RELATIVE PERCENT: 30.7 % (ref 20–42)
MCH RBC QN AUTO: 30.7 PG (ref 26–35)
MCHC RBC AUTO-ENTMCNC: 30.2 % (ref 32–34.5)
MCV RBC AUTO: 101.7 FL (ref 80–99.9)
MONOCYTES ABSOLUTE: 1.1 E9/L (ref 0.1–0.95)
MONOCYTES RELATIVE PERCENT: 11.2 % (ref 2–12)
NEUTROPHILS ABSOLUTE: 5.41 E9/L (ref 1.8–7.3)
NEUTROPHILS RELATIVE PERCENT: 55.1 % (ref 43–80)
PDW BLD-RTO: 13.8 FL (ref 11.5–15)
PLATELET # BLD: 228 E9/L (ref 130–450)
PMV BLD AUTO: 10.2 FL (ref 7–12)
POTASSIUM REFLEX MAGNESIUM: 4.3 MMOL/L (ref 3.5–5)
RBC # BLD: 3.48 E12/L (ref 3.5–5.5)
SODIUM BLD-SCNC: 143 MMOL/L (ref 132–146)
TSH SERPL DL<=0.05 MIU/L-ACNC: 2.4 UIU/ML (ref 0.27–4.2)
VITAMIN B-12: 563 PG/ML (ref 211–946)
WBC # BLD: 9.8 E9/L (ref 4.5–11.5)

## 2023-03-11 PROCEDURE — 2700000000 HC OXYGEN THERAPY PER DAY

## 2023-03-11 PROCEDURE — 94669 MECHANICAL CHEST WALL OSCILL: CPT

## 2023-03-11 PROCEDURE — 82607 VITAMIN B-12: CPT

## 2023-03-11 PROCEDURE — 84443 ASSAY THYROID STIM HORMONE: CPT

## 2023-03-11 PROCEDURE — 2580000003 HC RX 258

## 2023-03-11 PROCEDURE — 36415 COLL VENOUS BLD VENIPUNCTURE: CPT

## 2023-03-11 PROCEDURE — 94660 CPAP INITIATION&MGMT: CPT

## 2023-03-11 PROCEDURE — 99239 HOSP IP/OBS DSCHRG MGMT >30: CPT | Performed by: FAMILY MEDICINE

## 2023-03-11 PROCEDURE — 6360000002 HC RX W HCPCS

## 2023-03-11 PROCEDURE — 85025 COMPLETE CBC W/AUTO DIFF WBC: CPT

## 2023-03-11 PROCEDURE — 80048 BASIC METABOLIC PNL TOTAL CA: CPT

## 2023-03-11 PROCEDURE — 6370000000 HC RX 637 (ALT 250 FOR IP)

## 2023-03-11 PROCEDURE — 82746 ASSAY OF FOLIC ACID SERUM: CPT

## 2023-03-11 PROCEDURE — 94640 AIRWAY INHALATION TREATMENT: CPT

## 2023-03-11 RX ORDER — PREDNISONE 10 MG/1
TABLET ORAL
Qty: 46 TABLET | Refills: 0 | Status: SHIPPED | OUTPATIENT
Start: 2023-03-12 | End: 2023-03-29

## 2023-03-11 RX ORDER — DEXTROSE MONOHYDRATE 100 MG/ML
INJECTION, SOLUTION INTRAVENOUS CONTINUOUS PRN
Status: DISCONTINUED | OUTPATIENT
Start: 2023-03-11 | End: 2023-03-11 | Stop reason: HOSPADM

## 2023-03-11 RX ORDER — ROFLUMILAST 250 UG/1
250 TABLET ORAL DAILY
Qty: 30 TABLET | Refills: 1 | Status: SHIPPED | OUTPATIENT
Start: 2023-03-11

## 2023-03-11 RX ORDER — FOLIC ACID 1 MG/1
1 TABLET ORAL DAILY
Status: DISCONTINUED | OUTPATIENT
Start: 2023-03-11 | End: 2023-03-11 | Stop reason: HOSPADM

## 2023-03-11 RX ORDER — GUAIFENESIN 400 MG/1
400 TABLET ORAL 3 TIMES DAILY
Qty: 56 TABLET | Refills: 0 | Status: SHIPPED | OUTPATIENT
Start: 2023-03-11

## 2023-03-11 RX ORDER — CEFDINIR 300 MG/1
600 CAPSULE ORAL DAILY
Qty: 6 CAPSULE | Refills: 0 | Status: SHIPPED | OUTPATIENT
Start: 2023-03-11 | End: 2023-03-14

## 2023-03-11 RX ORDER — FOLIC ACID 1 MG/1
1 TABLET ORAL DAILY
Qty: 30 TABLET | Refills: 3 | Status: SHIPPED | OUTPATIENT
Start: 2023-03-11

## 2023-03-11 RX ADMIN — PREDNISONE 60 MG: 10 TABLET ORAL at 09:20

## 2023-03-11 RX ADMIN — SODIUM CHLORIDE, PRESERVATIVE FREE 10 ML: 5 INJECTION INTRAVENOUS at 09:28

## 2023-03-11 RX ADMIN — FUROSEMIDE 20 MG: 20 TABLET ORAL at 09:20

## 2023-03-11 RX ADMIN — ARFORMOTEROL TARTRATE 15 MCG: 15 SOLUTION RESPIRATORY (INHALATION) at 08:51

## 2023-03-11 RX ADMIN — Medication 1000 MG: at 09:20

## 2023-03-11 RX ADMIN — CEFTRIAXONE SODIUM 1000 MG: 1 INJECTION, POWDER, FOR SOLUTION INTRAMUSCULAR; INTRAVENOUS at 10:05

## 2023-03-11 RX ADMIN — Medication 2000 UNITS: at 09:20

## 2023-03-11 RX ADMIN — IPRATROPIUM BROMIDE AND ALBUTEROL SULFATE 1 AMPULE: .5; 2.5 SOLUTION RESPIRATORY (INHALATION) at 12:21

## 2023-03-11 RX ADMIN — GUAIFENESIN 400 MG: 400 TABLET ORAL at 15:53

## 2023-03-11 RX ADMIN — FOLIC ACID 1 MG: 1 TABLET ORAL at 11:39

## 2023-03-11 RX ADMIN — IPRATROPIUM BROMIDE AND ALBUTEROL SULFATE 1 AMPULE: .5; 2.5 SOLUTION RESPIRATORY (INHALATION) at 16:19

## 2023-03-11 RX ADMIN — ASPIRIN 81 MG CHEWABLE TABLET 81 MG: 81 TABLET CHEWABLE at 09:20

## 2023-03-11 RX ADMIN — BUDESONIDE 500 MCG: 0.5 SUSPENSION RESPIRATORY (INHALATION) at 08:51

## 2023-03-11 RX ADMIN — GUAIFENESIN 400 MG: 400 TABLET ORAL at 09:20

## 2023-03-11 RX ADMIN — POTASSIUM CHLORIDE 20 MEQ: 1500 TABLET, EXTENDED RELEASE ORAL at 09:20

## 2023-03-11 RX ADMIN — SERTRALINE 25 MG: 50 TABLET, FILM COATED ORAL at 09:20

## 2023-03-11 RX ADMIN — IPRATROPIUM BROMIDE AND ALBUTEROL SULFATE 1 AMPULE: .5; 2.5 SOLUTION RESPIRATORY (INHALATION) at 08:51

## 2023-03-11 RX ADMIN — ENOXAPARIN SODIUM 40 MG: 100 INJECTION SUBCUTANEOUS at 09:20

## 2023-03-11 NOTE — PROGRESS NOTES
Discharge instructions reviewed with patients. All questions answered. Spoke with outpatient pharmacy. ..dulera and daliresp will be at [facility] for . Awaiting daughter to arrive for ride home.

## 2023-03-11 NOTE — PROGRESS NOTES
[facility] Inpatient   Resident Progress Note    S:  Hospital day: 4   Brief Synopsis: Vernell Malloy is a 58 y.o. female with a PMH of diastolic CHF, severe end-stage COPD on 4L O2 chronically and DVT in 2018 who presented to ED for chest tightness and shortness of breath. She also endorsed increased sputum production which is green in color. She had been discharged from an outside ED prior to presenting here and was sent home on steroids which she never filled at the pharmacy. Patient previously followed with Dr. Zuleika Park and at the time a lung transplant was offered but she declined. She was admitted for a COPD exacerbation. Patient was started on course of steroids and Ceftriaxone. She has been wearing bipap at night. Repeat ABG slightly improved from admission. On 3/9/23 CXR unchanged from admission. Received 20mg Lasix IV due to lower extremity edema and continued SOB. Overnight/interim:   Patient states that her shortness of breath is improved. Endorses decreased sputum production. She wore BIPAP overnight. She began ambulating. She denies: Headache, dizziness, vomiting or chest pain.          Cont meds:    dextrose      sodium chloride       Scheduled meds:    folic acid  1 mg Oral Daily    predniSONE  60 mg Oral Daily    guaiFENesin  400 mg Oral TID    arformoterol tartrate  15 mcg Nebulization BID    ascorbic acid  1,000 mg Oral Daily    aspirin  81 mg Oral Daily    atorvastatin  40 mg Oral Nightly    budesonide  0.5 mg Nebulization BID    fluticasone  2 spray Each Nostril Daily    furosemide  20 mg Oral Daily    melatonin  5 mg Oral Nightly    metoprolol succinate  12.5 mg Oral Nightly    pantoprazole  40 mg Oral Nightly    potassium chloride  20 mEq Oral Daily    sertraline  25 mg Oral Daily    vitamin D  2,000 Units Oral Daily    sodium chloride flush  5-40 mL IntraVENous 2 times per day    enoxaparin  40 mg SubCUTAneous Daily    ipratropium-albuterol  1 ampule Inhalation Q4H WA     PRN meds: glucose, dextrose bolus **OR** dextrose bolus, glucagon (rDNA), dextrose, sodium chloride, white petrolatum, sodium chloride flush, sodium chloride, ondansetron **OR** ondansetron, polyethylene glycol, acetaminophen **OR** acetaminophen     I reviewed the patient's past medical and surgical history, Medications and Allergies. O:  /74   Pulse 66   Temp 97.9 °F (36.6 °C) (Temporal)   Resp 18   Ht 5' 2\" (1.575 m)   Wt 124 lb (56.2 kg)   SpO2 96%   BMI 22.68 kg/m²   24 hour I&O: I/O last 3 completed shifts: In: 61 [P.O.:60]  Out: -   I/O this shift:  In: 120 [P.O.:120]  Out: -           Physical Exam  Vitals reviewed. Constitutional:       General: She is not in acute distress. Appearance: She is not diaphoretic. Interventions: Nasal cannula in place. Neck:      Trachea: Trachea and phonation normal.   Cardiovascular:      Rate and Rhythm: Normal rate and regular rhythm. Heart sounds: Normal heart sounds. Pulmonary:      Effort: No accessory muscle usage or respiratory distress. Breath sounds: No rhonchi. Comments: Air movement improved from previous  Abdominal:      General: Bowel sounds are normal.      Palpations: Abdomen is soft. Tenderness: There is no abdominal tenderness. Musculoskeletal:      Cervical back: Full passive range of motion without pain. Right lower leg: Edema present. Left lower leg: Edema present. Lymphadenopathy:      Cervical: No cervical adenopathy. Labs:  Na/K/Cl/CO2:  143/4.3/99/40 (03/11 5045)  BUN/Cr/glu/ALT/AST/amyl/lip:  22/0.5/--/--/--/--/-- (03/11 0458)  WBC/Hgb/Hct/Plts:  9.8/10.7/35.4/228 (03/11 0458)  estimated creatinine clearance is 92 mL/min (based on SCr of 0.5 mg/dL). Other pertinent labs as noted below    Radiology:  XR CHEST (2 VW)   Final Result   Obstructive airways disease with bullous changes as before.          CTA PULMONARY W CONTRAST   Final Result   No evidence of pulmonary embolism or acute pulmonary abnormality. Advanced   chronic changes of hyperinflation and chronic obstructive pulmonary disease   with severe emphysema and biapical pleuroparenchymal scarring. XR CHEST PORTABLE   Final Result   Severe emphysema without acute process. Resident Assessment and Plan       Shortness of Breath  -Likely secondary to COPD exacerbation   -ProBNP elevated on admission  -Patient is on 20mg Lasix at home   -CXR on admission demonstrated no acute process with severe emphysema   -Respiratory panel negative  -ABG demonstrated metabolic alkalosis with compensated respiratory acidosis   -Plan: Duoneb, Pulmicort, Brovana; ABGs; Ceftriaxone and Methylprednisolone day 4; Guaifenesin; PEEP/flutter, Repeat CXR, Obtain sputum cultures   -Pulmonary following   -Bipap to be worn at night - Patient may benefit from BIPAP at home   Corticosteroid tapering at discharge        Hx of CHF  -Last ECHO in January 2023 showed EF 40-55% and motion abnormality.  -Continue home Lasix 20mg, Metoprolol and potassium supplement   -Strict intake and output  -Daily weights  -ProBNP elevated on admission but clinically does not appear to be in exacerbation of CHF   -CXR on admission demonstrated no acute process - Repeat CXR unchanged   -No need for repeat ECHO at this time  -Given 1 time dose 20mg IV Lasix      Hx of abnormal stress test  -Patient needs to follow-up with cardiology on discharge     Hx of HLD  -Continue home Atorvastatin 40mg     Hx of Depression/Anxiety  -Continue home Zoloft      Hx of allergic rhinitis  -Continue home Flonase      Hx of Tobacco use  -Patient no longer smoking     Hx of Provoked DVT  -Patient completed anticoagulation   -She is on ASA daily      Hx of Liver cyst  -positive for Hep A and Hep C antibodies in 9/22; HCV not detected on 2/6/23  -Hospitalized for transaminitis with hyperbilirubinemia, multiple liver cysts, abnormal GB appearance with negative US.   -Negative MRCP for biliary obstruction at that time  -AST and ALT slightly elevated    Concern for Sepsis - Unlikely   -On admission: Patient with elevated WBC and HR; Lactic acid 1.6  -WBC WNL, vitals stable. Macrocytic anemia  Low folate  Start folic acid 1 mg daily       PT 14/24  DVT prophylaxis:Lovenox   GI prophylaxis: Protonix   Disposition:Continue tele, appreciate pulm recs   Diet: Low sodium   Okay to discharge today. Refusing home health care. Daughter will take care of the patient at home.       Electronically signed by Ella Madison MD on 3/11/2023 at 10:42 AM  Attending physician: Dr. Amari Salcedo

## 2023-03-11 NOTE — PROGRESS NOTES
CLINICAL PHARMACY NOTE: MEDS TO BEDS    Total # of Prescriptions Filled: 4   The following medications were delivered to the patient:  Cefdinir 824  Folic acid 1  Chest congestion 400  Prednisone 10    Additional Documentation:

## 2023-03-11 NOTE — CARE COORDINATION
Social Work Discharge Planning:  Discharge order noted. SW spoke with nursing regarding patient discharge . Previous SW noted patient has refused HHC and daughter will bring portable O2 at discharge.    Electronically signed by EPI Riley on 3/11/2023 at 11:06 AM

## 2023-03-11 NOTE — PROGRESS NOTES
[address] [facility]  Family Medicine Attending    S: 58 y.o. female with a history of HFmEF, COPD, DVT, abnl stress test, tobacco history, diverticulitis, pulmonary fibrosis and COVID in 1/23 presented with shortness of breath and chest tightness for 2 days. She also noticed increasing swelling in her lower extremities. Bipap at night. She is on home 3-4 L NC. She wants to go home and has refused home care. O: VS- Blood pressure 116/74, pulse 66, temperature 97.9 °F (36.6 °C), temperature source Temporal, resp. rate 18, height 5' 2\" (1.575 m), weight 124 lb (56.2 kg), SpO2 96 %, not currently breastfeeding. Exam is as noted by resident with the following changes, additions or corrections:  Gen:on 4L NC  HEENT: NCAT, PERRL, MMM  CV-RRR   Lungs-coarse bs b/l  ABD-soft nonttp no masses   Ext-no C/C/tr edema bilaterally      Impressions:   Principal Problem:    COPD with acute exacerbation ([address]  Resolved Problems:    * No resolved hospital problems. *      Plan:   COPD exacerbation with some mild evidence of worsening of her HFmrEF. Steroids, nebs, antibiotics and diuretics. Sputum cx ngt. With elevated d-dimer, cta obtained and neg for pe. For bipap overnight. Appreciate pulm input. Pep flutter valve and mucinex. On day 5/7 of ceftriaxone. Will send home with cefdinir 300mg BID x 3 days. Prednisone taper - 50mg x 3 days, 40mg x 3 days, 30mg x 3 days, 20mg x 3 days, 10mg x 3 days, then stop. Will need follow up outpt when well for cardiac cath as previously advised. D/c home today      Attending Physician Statement  I have reviewed the chart and seen the patient with the resident(s). I personally reviewed images, EKG's and similar tests, if present. I personally reviewed and performed key elements of the history and exam.  I have reviewed and confirmed student and/or resident history and exam with changes as indicated above.   I agree with the assessment, plan and orders as documented by the resident. Please refer to the resident and/or student note for additional information. James Momin.  Maxi Alvarenga MD

## 2023-03-11 NOTE — PROGRESS NOTES
Date: 3/11/2023    Time: 2:48 AM    Patient Placed On BIPAP/CPAP/ Non-Invasive Ventilation? Yes    If no must comment. Facial area red/color change? No           If YES are Blister/Lesion present? No   If yes must notify nursing staff  BIPAP/CPAP skin barrier?   Yes    Skin barrier type:mepilexlite       Comments:        Steve Farias RCP

## 2023-03-12 LAB
CULTURE, RESPIRATORY: NORMAL
SMEAR, RESPIRATORY: NORMAL

## 2023-03-13 ENCOUNTER — CARE COORDINATION (OUTPATIENT)
Dept: CASE MANAGEMENT | Age: 63
End: 2023-03-13

## 2023-03-13 NOTE — CARE COORDINATION
[address] [facility] Care Transitions Initial Follow Up Call    Call within 2 business days of discharge: Yes    Patient Current Location: [facility]      Patient: Anusha Carballo Patient : 1960   MRN: [id number]  Reason for Admission: COPD With Acute Exacerbation  Discharge Date: 3/11/23 RARS: Readmission Risk Score: 31.7      Last Discharge [address]       Date Complaint Diagnosis Description Type Department Provider    3/7/23 Chest Pain; Shortness of Breath COPD with acute exacerbation [facility]) ED to Hosp-Admission (Discharged) (ADMITTED) YZ 5WE Mitchel Astorga DO; Russ Perez... Attempted to reach patient by phone for initial post hospital discharge follow up. HIPAA compliant message left on patient's voicemail; CTN contact information provided.           Anil Cannon RN

## 2023-03-14 ENCOUNTER — CARE COORDINATION (OUTPATIENT)
Dept: CASE MANAGEMENT | Age: 63
End: 2023-03-14

## 2023-03-14 NOTE — CARE COORDINATION
[address] [facility] [address] Care Transitions Initial Follow Up Call    Call within 2 business days of discharge: Yes    Patient Current Location: [facility]      Patient: Austin Kurtz Patient : 1960   MRN: [id number]  Reason for Admission: COPD With Acute Exacerbation  Discharge Date: 3/11/23 RARS: Readmission Risk Score: 31.7      Last Discharge  Street       Date Complaint Diagnosis Description Type Department Provider    3/7/23 Chest Pain; Shortness of Breath COPD with acute exacerbation [facility]) ED to Hosp-Admission (Discharged) (ADMITTED) SEYZ 5WE Sharla Cheng DO; Ltanya Pilling... Second attempt to reach the patient for initial Care Transition call post hospital discharge. HIPAA compliant message left on patient's voicemail; CTN contact information provided. Episode to be resolved and CTN to sign off if return call is not received from the patient.         Follow Up  Future Appointments   Date Time Provider Lupe Arnett   3/15/2023  2:00 PM Alfred Solorio MD [facility] Room [facility] AND [facility]   3/22/2023  8:00 AM Clari Mixon MD [facility]   3/24/2023  1:00 PM [facility] CHF ROOM 1 [facility] CHF [facility] [place] RN

## 2023-03-16 NOTE — DISCHARGE SUMMARY
Discharge Summary    Maryjane Tucker  :  1960  MRN:  [id number]    Admit date:  3/7/2023  Discharge date:  3/11/2023    Admitting Physician:  Robbie Zaldivar DO    Discharge Diagnoses:    Patient Active Problem List   Diagnosis    Tobacco use disorder    Seasonal allergic rhinitis    Chronic bronchitis ([address]    Chronic respiratory failure with hypoxia ([address]    Chronic obstructive pulmonary disease ([address]    Acute respiratory failure with hypercapnia (HCC)    Acute on chronic respiratory failure with hypoxia and hypercapnia (HCC)    COPD exacerbation ([address]    Acute diverticulitis with abscess    Acute cholecystitis    Acute on chronic diastolic CHF (congestive heart failure) ([address]    Sepsis ([address]    Pulmonary fibrosis ([address]    Hypoxemia    Heart failure ([address]    Abnormal nuclear stress test       Admission Condition:  good    Discharged Condition:  good    Hospital Course:  Maryjane Tucker is a 58 y.o. female with a PMH of diastolic CHF, severe end-stage COPD on 4L O2 chronically and DVT in 2018 who presented to ED for chest tightness and shortness of breath. She also endorsed increased sputum production which was green in color. She had been discharged from an outside ED prior to presenting here and was sent home on steroids which she never filled at the pharmacy. Patient previously followed with Dr. Connie Burgos and at the time a lung transplant was offered but she declined. ED workup revealed: WBC 13.4, proBNP 651 -> 920, troponin 16 -> 15 -> 11, CXR was negative for acute process but did showed severe emphysema, EKG NSR non-specific ST changes (Unchanged from previous) and COVID/Flu negative. She was admitted for a COPD exacerbation. Further workup revealed elevated d-dimer of 604 therefore a CTA was ordered. CTA was negative for PE but did show severe emphysema and biapical pleuroparenchymal scarring. Patient was started on a 5-day course Ceftriaxone and IV steroids.  Received 20mg Lasix IV x1 due to lower extremity edema and continued SOB. She wore bipap at night due to continued shortness of breath. Repeat ABG slightly improved from admission. Pulmonary was consulted due to patient's severe COPD and agreed with medical management. On 3/9/23 CXR unchanged from admission. Steroids transitioned to oral. Patient remained stable, recovered and was in a suitable condition to be discharged home on baseline 4L NC. Discharge plan:   Steroid taper: 50mg for 3 days -> 40mg for 3 days -> 30mg for 3 days -> 20mg for 3 days -> 10mg for 3 days -> 5mg for 2 days   Cefdinir for 3 days   Follow-up with PCP  Follow-up with CHF clinic  Follow-up with cardiology  Follow-up with pulmonology. Patient may benefit from wearing bipap at night due to repeat hospitalizations requiring bipap. Patient told by previous pulmonologist she requires lung transplant. Discharge Medications:         Medication List        START taking these medications      folic acid 1 MG tablet  Commonly known as: FOLVITE  Take 1 tablet by mouth daily     guaiFENesin 400 MG tablet  Take 1 tablet by mouth in the morning, at noon, and at bedtime     mometasone-formoterol 100-5 MCG/ACT inhaler  Commonly known as: Dulera  Inhale 2 puffs into the lungs 2 times daily Rinse mouth after using. Roflumilast 250 MCG tablet  Commonly known as: Daliresp  Take 1 tablet by mouth daily            CHANGE how you take these medications      potassium chloride 20 MEQ extended release tablet  Commonly known as: KLOR-CON M  take 1 tablet by mouth once daily  What changed: See the new instructions. predniSONE 10 MG tablet  Commonly known as: DELTASONE  Take 5 tablets by mouth daily for 3 days, THEN 4 tablets daily for 3 days, THEN 3 tablets daily for 3 days, THEN 2 tablets daily for 3 days, THEN 1 tablet daily for 3 days, THEN 0.5 tablets daily for 2 days. Start taking on: March 12, 2023  What changed:   medication strength  See the new instructions.             CONTINUE taking these medications      ascorbic acid 1000 MG tablet  Commonly known as: VITAMIN C  Take 1 tablet by mouth daily     aspirin 81 MG chewable tablet  Take 1 tablet by mouth daily     atorvastatin 40 MG tablet  Commonly known as: LIPITOR  Take 1 tablet by mouth nightly     docusate sodium 100 MG capsule  Commonly known as: COLACE     fluticasone 50 MCG/ACT nasal spray  Commonly known as: FLONASE     furosemide 20 MG tablet  Commonly known as: LASIX  Take 1 tablet by mouth daily     Incruse Ellipta 62.5 MCG/ACT inhaler  Generic drug: umeclidinium bromide     ipratropium-albuterol 0.5-2.5 (3) MG/3ML Soln nebulizer solution  Commonly known as: DUONEB     melatonin 5 MG Tbdp disintegrating tablet  Take 1 tablet by mouth nightly     metoprolol succinate 25 MG extended release tablet  Commonly known as: TOPROL XL  Take 0.5 tablets by mouth nightly     OXYGEN     pantoprazole 40 MG tablet  Commonly known as: PROTONIX     sertraline 25 MG tablet  Commonly known as: ZOLOFT  Take 1 tablet by mouth daily     sodium chloride 0.65 % nasal spray  Commonly known as: OCEAN, BABY AYR  2 sprays by Nasal route every 4 hours as needed for Congestion (congestion, nasal pain)     vitamin D 50 MCG (2000 UT) Tabs tablet  Commonly known as: CHOLECALCIFEROL  Take 1 tablet by mouth daily            STOP taking these medications      albuterol (2.5 MG/3ML) 0.083% nebulizer solution  Commonly known as: PROVENTIL     albuterol sulfate  (90 Base) MCG/ACT inhaler  Commonly known as: Ventolin HFA     arformoterol tartrate 15 MCG/2ML Nebu  Commonly known as: BROVANA     budesonide 0.5 MG/2ML nebulizer suspension  Commonly known as: PULMICORT     theophylline 400 MG extended release tablet  Commonly known as: UNIPHYL     zinc 50 MG Caps            ASK your doctor about these medications      cefdinir 300 MG capsule  Commonly known as: OMNICEF  Take 2 capsules by mouth daily for 3 days  Ask about: Should I take this medication?                Where to Get Your Medications        These medications were sent to [facility], [address], [address]  [address]      Phone: [phone]   potassium chloride 20 MEQ extended release tablet       These medications were sent to [facility], [address] [address]      Phone: [phone]   cefdinir 630 MG capsule  folic acid 1 MG tablet  guaiFENesin 400 MG tablet  mometasone-formoterol 100-5 MCG/ACT inhaler  predniSONE 10 MG tablet  Roflumilast 250 MCG tablet         Consults:  Pulmonary    Significant Diagnostic Studies:  See below    Labs:           [unfilled]  estimated creatinine clearance is 92 mL/min (based on SCr of 0.5 mg/dL). New Imaging:  XR CHEST (2 VW)   Final Result   Obstructive airways disease with bullous changes as before. CTA PULMONARY W CONTRAST   Final Result   No evidence of pulmonary embolism or acute pulmonary abnormality. Advanced   chronic changes of hyperinflation and chronic obstructive pulmonary disease   with severe emphysema and biapical pleuroparenchymal scarring. XR CHEST PORTABLE   Final Result   Severe emphysema without acute process. Treatments:   See above     Discharge Exam:  Refer to physical exam from date of discharge     Disposition:   home    Future Appointments   Date Time Provider Lupe Arnett   3/22/2023  8:00 AM Katalina Castillo MD [facility]   3/24/2023  1:00 PM [facility] ROOM 1 [facility]   3/27/2023 10:00 AM SKYE Logan - CNP [facility]       More than 30 minutes was spent in preparation of this patient's discharge including, but not limited to, examination, preparation of documents, prescription preparation, counseling and coordination.     Shira Joshi DO  3/16/2023, 4:06 PM

## 2023-03-21 ENCOUNTER — TELEPHONE (OUTPATIENT)
Dept: OTHER | Facility: CLINIC | Age: 63
End: 2023-03-21

## 2023-03-21 ENCOUNTER — HOSPITAL ENCOUNTER (EMERGENCY)
Age: 63
Discharge: HOME | End: 2023-03-21
Attending: EMERGENCY MEDICINE
Payer: COMMERCIAL

## 2023-03-21 ENCOUNTER — APPOINTMENT (OUTPATIENT)
Dept: GENERAL RADIOLOGY | Age: 63
End: 2023-03-21
Payer: COMMERCIAL

## 2023-03-21 VITALS
OXYGEN SATURATION: 97 % | WEIGHT: 124 LBS | RESPIRATION RATE: 22 BRPM | BODY MASS INDEX: 22.68 KG/M2 | TEMPERATURE: 97.8 F | HEART RATE: 98 BPM | SYSTOLIC BLOOD PRESSURE: 121 MMHG | DIASTOLIC BLOOD PRESSURE: 74 MMHG

## 2023-03-21 DIAGNOSIS — J44.1 COPD EXACERBATION (HCC): Primary | ICD-10-CM

## 2023-03-21 LAB
ALBUMIN SERPL-MCNC: 3 G/DL (ref 3.5–5.2)
ALP SERPL-CCNC: 82 U/L (ref 35–104)
ALT SERPL-CCNC: 45 U/L (ref 0–32)
ANION GAP SERPL CALCULATED.3IONS-SCNC: 3 MMOL/L (ref 7–16)
AST SERPL-CCNC: 18 U/L (ref 0–31)
BASOPHILS # BLD: 0 E9/L (ref 0–0.2)
BASOPHILS NFR BLD: 0.2 % (ref 0–2)
BILIRUB SERPL-MCNC: 0.3 MG/DL (ref 0–1.2)
BNP BLD-MCNC: 443 PG/ML (ref 0–125)
BUN SERPL-MCNC: 23 MG/DL (ref 6–23)
CALCIUM SERPL-MCNC: 8.8 MG/DL (ref 8.6–10.2)
CHLORIDE SERPL-SCNC: 100 MMOL/L (ref 98–107)
CO2 SERPL-SCNC: 41 MMOL/L (ref 22–29)
CREAT SERPL-MCNC: 0.4 MG/DL (ref 0.5–1)
EKG ATRIAL RATE: 88 BPM
EKG P AXIS: 81 DEGREES
EKG P-R INTERVAL: 138 MS
EKG Q-T INTERVAL: 372 MS
EKG QRS DURATION: 72 MS
EKG QTC CALCULATION (BAZETT): 450 MS
EKG R AXIS: 81 DEGREES
EKG T AXIS: 77 DEGREES
EKG VENTRICULAR RATE: 88 BPM
EOSINOPHIL # BLD: 0 E9/L (ref 0.05–0.5)
EOSINOPHIL NFR BLD: 0.3 % (ref 0–6)
ERYTHROCYTE [DISTWIDTH] IN BLOOD BY AUTOMATED COUNT: 13.7 FL (ref 11.5–15)
GLUCOSE SERPL-MCNC: 109 MG/DL (ref 74–99)
HCT VFR BLD AUTO: 39.2 % (ref 34–48)
HGB BLD-MCNC: 12.3 G/DL (ref 11.5–15.5)
LYMPHOCYTES # BLD: 1.32 E9/L (ref 1.5–4)
LYMPHOCYTES NFR BLD: 5.1 % (ref 20–42)
MCH RBC QN AUTO: 31.4 PG (ref 26–35)
MCHC RBC AUTO-ENTMCNC: 31.4 % (ref 32–34.5)
MCV RBC AUTO: 100 FL (ref 80–99.9)
MONOCYTES # BLD: 1.85 E9/L (ref 0.1–0.95)
MONOCYTES NFR BLD: 6.8 % (ref 2–12)
NEUTROPHILS # BLD: 23.23 E9/L (ref 1.8–7.3)
NEUTS SEG NFR BLD: 88.1 % (ref 43–80)
PLATELET # BLD AUTO: 280 E9/L (ref 130–450)
PMV BLD AUTO: 9.3 FL (ref 7–12)
POTASSIUM SERPL-SCNC: 4.4 MMOL/L (ref 3.5–5)
PROT SERPL-MCNC: 5.7 G/DL (ref 6.4–8.3)
RBC # BLD AUTO: 3.92 E12/L (ref 3.5–5.5)
SODIUM SERPL-SCNC: 144 MMOL/L (ref 132–146)
TROPONIN, HIGH SENSITIVITY: 17 NG/L (ref 0–9)
WBC # BLD: 26.4 E9/L (ref 4.5–11.5)

## 2023-03-21 PROCEDURE — 85025 COMPLETE CBC W/AUTO DIFF WBC: CPT

## 2023-03-21 PROCEDURE — 93010 ELECTROCARDIOGRAM REPORT: CPT | Performed by: INTERNAL MEDICINE

## 2023-03-21 PROCEDURE — 6370000000 HC RX 637 (ALT 250 FOR IP): Performed by: STUDENT IN AN ORGANIZED HEALTH CARE EDUCATION/TRAINING PROGRAM

## 2023-03-21 PROCEDURE — 99285 EMERGENCY DEPT VISIT HI MDM: CPT

## 2023-03-21 PROCEDURE — 71045 X-RAY EXAM CHEST 1 VIEW: CPT

## 2023-03-21 PROCEDURE — 96374 THER/PROPH/DIAG INJ IV PUSH: CPT

## 2023-03-21 PROCEDURE — 83880 ASSAY OF NATRIURETIC PEPTIDE: CPT

## 2023-03-21 PROCEDURE — 2700000000 HC OXYGEN THERAPY PER DAY

## 2023-03-21 PROCEDURE — 84484 ASSAY OF TROPONIN QUANT: CPT

## 2023-03-21 PROCEDURE — 94640 AIRWAY INHALATION TREATMENT: CPT

## 2023-03-21 PROCEDURE — 94664 DEMO&/EVAL PT USE INHALER: CPT

## 2023-03-21 PROCEDURE — 6360000002 HC RX W HCPCS: Performed by: STUDENT IN AN ORGANIZED HEALTH CARE EDUCATION/TRAINING PROGRAM

## 2023-03-21 PROCEDURE — 80053 COMPREHEN METABOLIC PANEL: CPT

## 2023-03-21 PROCEDURE — 93005 ELECTROCARDIOGRAM TRACING: CPT | Performed by: STUDENT IN AN ORGANIZED HEALTH CARE EDUCATION/TRAINING PROGRAM

## 2023-03-21 RX ORDER — METHYLPREDNISOLONE SODIUM SUCCINATE 125 MG/2ML
125 INJECTION, POWDER, LYOPHILIZED, FOR SOLUTION INTRAMUSCULAR; INTRAVENOUS ONCE
Status: COMPLETED | OUTPATIENT
Start: 2023-03-21 | End: 2023-03-21

## 2023-03-21 RX ORDER — IPRATROPIUM BROMIDE AND ALBUTEROL SULFATE 2.5; .5 MG/3ML; MG/3ML
2 SOLUTION RESPIRATORY (INHALATION) ONCE
Status: COMPLETED | OUTPATIENT
Start: 2023-03-21 | End: 2023-03-21

## 2023-03-21 RX ORDER — IPRATROPIUM BROMIDE AND ALBUTEROL SULFATE 2.5; .5 MG/3ML; MG/3ML
3 SOLUTION RESPIRATORY (INHALATION) ONCE
Status: COMPLETED | OUTPATIENT
Start: 2023-03-21 | End: 2023-03-21

## 2023-03-21 RX ADMIN — IPRATROPIUM BROMIDE AND ALBUTEROL SULFATE 2 AMPULE: 2.5; .5 SOLUTION RESPIRATORY (INHALATION) at 08:52

## 2023-03-21 RX ADMIN — IPRATROPIUM BROMIDE AND ALBUTEROL SULFATE 3 AMPULE: 2.5; .5 SOLUTION RESPIRATORY (INHALATION) at 08:03

## 2023-03-21 RX ADMIN — METHYLPREDNISOLONE SODIUM SUCCINATE 125 MG: 125 INJECTION, POWDER, FOR SOLUTION INTRAMUSCULAR; INTRAVENOUS at 08:00

## 2023-03-21 ASSESSMENT — ENCOUNTER SYMPTOMS
VOMITING: 0
COUGH: 0
NAUSEA: 0
SHORTNESS OF BREATH: 1
ABDOMINAL PAIN: 0
BACK PAIN: 0
COLOR CHANGE: 0
DIARRHEA: 0

## 2023-03-21 ASSESSMENT — LIFESTYLE VARIABLES: HOW OFTEN DO YOU HAVE A DRINK CONTAINING ALCOHOL: NEVER

## 2023-03-21 NOTE — ED NOTES
Ambulated pt on 4L home o2. Pt walked approx 5 feet and began feeling extremely short of breath and had to sit down. Pt maintained o2 sat 95%.       Glory Ramirez RN  03/21/23 5698

## 2023-03-21 NOTE — ED PROVIDER NOTES
supple. Right lower leg: No edema. Left lower leg: No edema. Skin:     General: Skin is warm and dry. Capillary Refill: Capillary refill takes less than 2 seconds. Neurological:      General: No focal deficit present. Mental Status: She is alert. Procedures     MDM  This is a 40-year-old female patient with past history of COPD presenting to emergency department for increased shortness of breath diminished breath sounds. Concern for possible pneumonia as patient was recently admitted to the hospital.  Lab work here shows elevated CO2 of 41 but appears to be around her baseline previously was 40 on 3/11. Troponin is stable at 17 this is around her baseline as well no new acute EKG changes. She is not complaining of any chest pain. She was given 2 rounds of breathing treatments as well as a dose of steroids and feeling much better. Ambulated well. Her white count is elevated 26.4 but she is on a prednisone taper currently. She denies any nausea vomiting diarrhea and no chest pain. Since patient is feeling well I believe all of her symptoms are likely secondary to COPD exacerbation. She does have breathing treatments at home and she feels comfortable being discharged. Her chest x-ray showed no obvious pneumonia today as interpreted by me. At this time patient was discharged home. ED Course as of 03/21/23 1004   Tue Mar 21, 2023   0707   ATTENDING PROVIDER ATTESTATION:     I have personally performed and/or participated in the history, exam, medical decision making, and procedures and agree with all pertinent clinical information unless otherwise noted. I have also reviewed and agree with the past medical, family and social history unless otherwise noted. I have discussed this patient in detail with the resident and provided the instruction and education regarding the evidence-based evaluation and treatment of sob.     Any EKG that may have been performed has been

## 2023-03-21 NOTE — TELEPHONE ENCOUNTER
Writer contacted ED provider to inform of 30 day readmission risk. ED provider informed writer of possible readmission.     Call Back: If you need to call back to inform of disposition you can contact me at [phone]       Attending physician: Dr. Jerome Schneider

## 2023-03-23 ENCOUNTER — APPOINTMENT (OUTPATIENT)
Dept: GENERAL RADIOLOGY | Age: 63
DRG: 190 | End: 2023-03-23
Payer: COMMERCIAL

## 2023-03-23 ENCOUNTER — HOSPITAL ENCOUNTER (INPATIENT)
Age: 63
LOS: 3 days | Discharge: HOME HEALTH | DRG: 190 | End: 2023-03-26
Attending: EMERGENCY MEDICINE | Admitting: FAMILY MEDICINE
Payer: COMMERCIAL

## 2023-03-23 ENCOUNTER — TELEPHONE (OUTPATIENT)
Dept: OTHER | Facility: CLINIC | Age: 63
End: 2023-03-23

## 2023-03-23 DIAGNOSIS — J96.11 CHRONIC RESPIRATORY FAILURE WITH HYPOXIA (HCC): ICD-10-CM

## 2023-03-23 DIAGNOSIS — F41.9 ANXIETY: ICD-10-CM

## 2023-03-23 DIAGNOSIS — J44.1 COPD EXACERBATION (HCC): Primary | ICD-10-CM

## 2023-03-23 DIAGNOSIS — F41.8 DEPRESSION WITH ANXIETY: ICD-10-CM

## 2023-03-23 LAB
ALBUMIN SERPL-MCNC: 3.3 G/DL (ref 3.5–5.2)
ALP SERPL-CCNC: 97 U/L (ref 35–104)
ALT SERPL-CCNC: 43 U/L (ref 0–32)
ANION GAP SERPL CALCULATED.3IONS-SCNC: 8 MMOL/L (ref 7–16)
AST SERPL-CCNC: 26 U/L (ref 0–31)
BASOPHILS # BLD: 0 E9/L (ref 0–0.2)
BASOPHILS NFR BLD: 0.2 % (ref 0–2)
BILIRUB SERPL-MCNC: 0.5 MG/DL (ref 0–1.2)
BNP BLD-MCNC: 409 PG/ML (ref 0–125)
BUN SERPL-MCNC: 21 MG/DL (ref 6–23)
CALCIUM SERPL-MCNC: 9.6 MG/DL (ref 8.6–10.2)
CHLORIDE SERPL-SCNC: 98 MMOL/L (ref 98–107)
CO2 SERPL-SCNC: 39 MMOL/L (ref 22–29)
CREAT SERPL-MCNC: 0.4 MG/DL (ref 0.5–1)
EOSINOPHIL # BLD: 0 E9/L (ref 0.05–0.5)
EOSINOPHIL NFR BLD: 0.3 % (ref 0–6)
ERYTHROCYTE [DISTWIDTH] IN BLOOD BY AUTOMATED COUNT: 13.6 FL (ref 11.5–15)
GLUCOSE SERPL-MCNC: 103 MG/DL (ref 74–99)
HCT VFR BLD AUTO: 43.9 % (ref 34–48)
HGB BLD-MCNC: 13.6 G/DL (ref 11.5–15.5)
INFLUENZA A BY PCR: NOT DETECTED
INFLUENZA B BY PCR: NOT DETECTED
LYMPHOCYTES # BLD: 0.98 E9/L (ref 1.5–4)
LYMPHOCYTES NFR BLD: 3.5 % (ref 20–42)
MCH RBC QN AUTO: 31.1 PG (ref 26–35)
MCHC RBC AUTO-ENTMCNC: 31 % (ref 32–34.5)
MCV RBC AUTO: 100.5 FL (ref 80–99.9)
MONOCYTES # BLD: 1.72 E9/L (ref 0.1–0.95)
MONOCYTES NFR BLD: 6.9 % (ref 2–12)
NEUTROPHILS # BLD: 22.05 E9/L (ref 1.8–7.3)
NEUTS SEG NFR BLD: 89.6 % (ref 43–80)
PLATELET # BLD AUTO: 283 E9/L (ref 130–450)
PMV BLD AUTO: 9.6 FL (ref 7–12)
POTASSIUM SERPL-SCNC: 4.4 MMOL/L (ref 3.5–5)
PROT SERPL-MCNC: 6.5 G/DL (ref 6.4–8.3)
RBC # BLD AUTO: 4.37 E12/L (ref 3.5–5.5)
RBC MORPH BLD: NORMAL
REASON FOR REJECTION: NORMAL
REJECTED TEST: NORMAL
SARS-COV-2 RDRP RESP QL NAA+PROBE: NOT DETECTED
SODIUM SERPL-SCNC: 145 MMOL/L (ref 132–146)
TROPONIN, HIGH SENSITIVITY: 15 NG/L (ref 0–9)
WBC # BLD: 24.5 E9/L (ref 4.5–11.5)

## 2023-03-23 PROCEDURE — 96374 THER/PROPH/DIAG INJ IV PUSH: CPT

## 2023-03-23 PROCEDURE — 6360000002 HC RX W HCPCS: Performed by: EMERGENCY MEDICINE

## 2023-03-23 PROCEDURE — 84484 ASSAY OF TROPONIN QUANT: CPT

## 2023-03-23 PROCEDURE — 71046 X-RAY EXAM CHEST 2 VIEWS: CPT

## 2023-03-23 PROCEDURE — 80053 COMPREHEN METABOLIC PANEL: CPT

## 2023-03-23 PROCEDURE — 2060000000 HC ICU INTERMEDIATE R&B

## 2023-03-23 PROCEDURE — 83880 ASSAY OF NATRIURETIC PEPTIDE: CPT

## 2023-03-23 PROCEDURE — 6360000002 HC RX W HCPCS: Performed by: STUDENT IN AN ORGANIZED HEALTH CARE EDUCATION/TRAINING PROGRAM

## 2023-03-23 PROCEDURE — 99285 EMERGENCY DEPT VISIT HI MDM: CPT

## 2023-03-23 PROCEDURE — 2500000003 HC RX 250 WO HCPCS: Performed by: EMERGENCY MEDICINE

## 2023-03-23 PROCEDURE — 6370000000 HC RX 637 (ALT 250 FOR IP): Performed by: STUDENT IN AN ORGANIZED HEALTH CARE EDUCATION/TRAINING PROGRAM

## 2023-03-23 PROCEDURE — 2580000003 HC RX 258: Performed by: STUDENT IN AN ORGANIZED HEALTH CARE EDUCATION/TRAINING PROGRAM

## 2023-03-23 PROCEDURE — 93005 ELECTROCARDIOGRAM TRACING: CPT | Performed by: PHYSICIAN ASSISTANT

## 2023-03-23 PROCEDURE — 87502 INFLUENZA DNA AMP PROBE: CPT

## 2023-03-23 PROCEDURE — 85025 COMPLETE CBC W/AUTO DIFF WBC: CPT

## 2023-03-23 PROCEDURE — 94640 AIRWAY INHALATION TREATMENT: CPT

## 2023-03-23 PROCEDURE — 6370000000 HC RX 637 (ALT 250 FOR IP): Performed by: EMERGENCY MEDICINE

## 2023-03-23 PROCEDURE — 87635 SARS-COV-2 COVID-19 AMP PRB: CPT

## 2023-03-23 PROCEDURE — 2580000003 HC RX 258: Performed by: EMERGENCY MEDICINE

## 2023-03-23 RX ORDER — PREDNISONE 10 MG/1
20 TABLET ORAL DAILY
Status: ON HOLD | COMMUNITY
Start: 2023-03-21 | End: 2023-03-26 | Stop reason: HOSPADM

## 2023-03-23 RX ORDER — DOCUSATE SODIUM 100 MG/1
100 CAPSULE, LIQUID FILLED ORAL 2 TIMES DAILY PRN
Status: DISCONTINUED | OUTPATIENT
Start: 2023-03-23 | End: 2023-03-26 | Stop reason: HOSPADM

## 2023-03-23 RX ORDER — POTASSIUM CHLORIDE 20 MEQ/1
20 TABLET, EXTENDED RELEASE ORAL DAILY
Status: DISCONTINUED | OUTPATIENT
Start: 2023-03-23 | End: 2023-03-26 | Stop reason: HOSPADM

## 2023-03-23 RX ORDER — MECOBALAMIN 5000 MCG
5 TABLET,DISINTEGRATING ORAL NIGHTLY
Status: DISCONTINUED | OUTPATIENT
Start: 2023-03-23 | End: 2023-03-26 | Stop reason: HOSPADM

## 2023-03-23 RX ORDER — ACETAMINOPHEN 325 MG/1
650 TABLET ORAL EVERY 6 HOURS PRN
Status: DISCONTINUED | OUTPATIENT
Start: 2023-03-23 | End: 2023-03-26 | Stop reason: HOSPADM

## 2023-03-23 RX ORDER — FOLIC ACID 1 MG/1
1 TABLET ORAL DAILY
Status: DISCONTINUED | OUTPATIENT
Start: 2023-03-23 | End: 2023-03-26 | Stop reason: HOSPADM

## 2023-03-23 RX ORDER — ONDANSETRON 4 MG/1
4 TABLET, ORALLY DISINTEGRATING ORAL EVERY 8 HOURS PRN
Status: DISCONTINUED | OUTPATIENT
Start: 2023-03-23 | End: 2023-03-26 | Stop reason: HOSPADM

## 2023-03-23 RX ORDER — SODIUM CHLORIDE 9 MG/ML
INJECTION, SOLUTION INTRAVENOUS PRN
Status: DISCONTINUED | OUTPATIENT
Start: 2023-03-23 | End: 2023-03-26 | Stop reason: HOSPADM

## 2023-03-23 RX ORDER — IPRATROPIUM BROMIDE AND ALBUTEROL SULFATE 2.5; .5 MG/3ML; MG/3ML
1 SOLUTION RESPIRATORY (INHALATION)
Status: COMPLETED | OUTPATIENT
Start: 2023-03-23 | End: 2023-03-23

## 2023-03-23 RX ORDER — METOPROLOL SUCCINATE 25 MG/1
12.5 TABLET, EXTENDED RELEASE ORAL NIGHTLY
Status: DISCONTINUED | OUTPATIENT
Start: 2023-03-23 | End: 2023-03-26 | Stop reason: HOSPADM

## 2023-03-23 RX ORDER — ACETAMINOPHEN 650 MG/1
650 SUPPOSITORY RECTAL EVERY 6 HOURS PRN
Status: DISCONTINUED | OUTPATIENT
Start: 2023-03-23 | End: 2023-03-26 | Stop reason: HOSPADM

## 2023-03-23 RX ORDER — FUROSEMIDE 20 MG/1
20 TABLET ORAL DAILY
Status: DISCONTINUED | OUTPATIENT
Start: 2023-03-23 | End: 2023-03-26 | Stop reason: HOSPADM

## 2023-03-23 RX ORDER — ROFLUMILAST 500 UG/1
250 TABLET ORAL DAILY
Status: DISCONTINUED | OUTPATIENT
Start: 2023-03-23 | End: 2023-03-26 | Stop reason: HOSPADM

## 2023-03-23 RX ORDER — ONDANSETRON 2 MG/ML
4 INJECTION INTRAMUSCULAR; INTRAVENOUS EVERY 6 HOURS PRN
Status: DISCONTINUED | OUTPATIENT
Start: 2023-03-23 | End: 2023-03-26 | Stop reason: HOSPADM

## 2023-03-23 RX ORDER — PREDNISONE 20 MG/1
40 TABLET ORAL DAILY
Status: DISCONTINUED | OUTPATIENT
Start: 2023-03-24 | End: 2023-03-26 | Stop reason: HOSPADM

## 2023-03-23 RX ORDER — IPRATROPIUM BROMIDE AND ALBUTEROL SULFATE 2.5; .5 MG/3ML; MG/3ML
1 SOLUTION RESPIRATORY (INHALATION)
Status: DISCONTINUED | OUTPATIENT
Start: 2023-03-23 | End: 2023-03-23

## 2023-03-23 RX ORDER — ARFORMOTEROL TARTRATE 15 UG/2ML
15 SOLUTION RESPIRATORY (INHALATION) 2 TIMES DAILY
Status: DISCONTINUED | OUTPATIENT
Start: 2023-03-23 | End: 2023-03-26 | Stop reason: HOSPADM

## 2023-03-23 RX ORDER — FLUTICASONE PROPIONATE 50 MCG
2 SPRAY, SUSPENSION (ML) NASAL DAILY PRN
Status: DISCONTINUED | OUTPATIENT
Start: 2023-03-23 | End: 2023-03-26 | Stop reason: HOSPADM

## 2023-03-23 RX ORDER — ENOXAPARIN SODIUM 100 MG/ML
40 INJECTION SUBCUTANEOUS DAILY
Status: DISCONTINUED | OUTPATIENT
Start: 2023-03-23 | End: 2023-03-26 | Stop reason: HOSPADM

## 2023-03-23 RX ORDER — ATORVASTATIN CALCIUM 40 MG/1
40 TABLET, FILM COATED ORAL NIGHTLY
Status: DISCONTINUED | OUTPATIENT
Start: 2023-03-23 | End: 2023-03-26 | Stop reason: HOSPADM

## 2023-03-23 RX ORDER — IPRATROPIUM BROMIDE AND ALBUTEROL SULFATE 2.5; .5 MG/3ML; MG/3ML
1 SOLUTION RESPIRATORY (INHALATION) EVERY 4 HOURS PRN
Status: DISCONTINUED | OUTPATIENT
Start: 2023-03-23 | End: 2023-03-24

## 2023-03-23 RX ORDER — POLYETHYLENE GLYCOL 3350 17 G/17G
17 POWDER, FOR SOLUTION ORAL DAILY PRN
Status: DISCONTINUED | OUTPATIENT
Start: 2023-03-23 | End: 2023-03-26 | Stop reason: HOSPADM

## 2023-03-23 RX ORDER — ASCORBIC ACID 500 MG
1000 TABLET ORAL DAILY
Status: DISCONTINUED | OUTPATIENT
Start: 2023-03-23 | End: 2023-03-26 | Stop reason: HOSPADM

## 2023-03-23 RX ORDER — PANTOPRAZOLE SODIUM 40 MG/1
40 TABLET, DELAYED RELEASE ORAL NIGHTLY
Status: DISCONTINUED | OUTPATIENT
Start: 2023-03-23 | End: 2023-03-26 | Stop reason: HOSPADM

## 2023-03-23 RX ORDER — SODIUM CHLORIDE 0.9 % (FLUSH) 0.9 %
5-40 SYRINGE (ML) INJECTION EVERY 12 HOURS SCHEDULED
Status: DISCONTINUED | OUTPATIENT
Start: 2023-03-23 | End: 2023-03-26 | Stop reason: HOSPADM

## 2023-03-23 RX ORDER — SODIUM CHLORIDE 0.9 % (FLUSH) 0.9 %
5-40 SYRINGE (ML) INJECTION PRN
Status: DISCONTINUED | OUTPATIENT
Start: 2023-03-23 | End: 2023-03-26 | Stop reason: HOSPADM

## 2023-03-23 RX ORDER — GUAIFENESIN 400 MG/1
400 TABLET ORAL 3 TIMES DAILY
Status: DISCONTINUED | OUTPATIENT
Start: 2023-03-23 | End: 2023-03-26 | Stop reason: HOSPADM

## 2023-03-23 RX ORDER — BUDESONIDE 0.5 MG/2ML
0.25 INHALANT ORAL 2 TIMES DAILY
Status: DISCONTINUED | OUTPATIENT
Start: 2023-03-23 | End: 2023-03-26 | Stop reason: HOSPADM

## 2023-03-23 RX ORDER — METHYLPREDNISOLONE SODIUM SUCCINATE 125 MG/2ML
60 INJECTION, POWDER, LYOPHILIZED, FOR SOLUTION INTRAMUSCULAR; INTRAVENOUS ONCE
Status: COMPLETED | OUTPATIENT
Start: 2023-03-23 | End: 2023-03-23

## 2023-03-23 RX ADMIN — Medication 5 MG: at 20:10

## 2023-03-23 RX ADMIN — IPRATROPIUM BROMIDE AND ALBUTEROL SULFATE 1 AMPULE: 2.5; .5 SOLUTION RESPIRATORY (INHALATION) at 13:32

## 2023-03-23 RX ADMIN — GUAIFENESIN 400 MG: 400 TABLET ORAL at 20:10

## 2023-03-23 RX ADMIN — DOXYCYCLINE 100 MG: 100 INJECTION, POWDER, LYOPHILIZED, FOR SOLUTION INTRAVENOUS at 16:12

## 2023-03-23 RX ADMIN — ATORVASTATIN CALCIUM 40 MG: 40 TABLET, FILM COATED ORAL at 20:10

## 2023-03-23 RX ADMIN — ARFORMOTEROL TARTRATE 15 MCG: 15 SOLUTION RESPIRATORY (INHALATION) at 19:40

## 2023-03-23 RX ADMIN — IPRATROPIUM BROMIDE 0.5 MG: 0.5 SOLUTION RESPIRATORY (INHALATION) at 19:46

## 2023-03-23 RX ADMIN — PANTOPRAZOLE SODIUM 40 MG: 40 TABLET, DELAYED RELEASE ORAL at 20:10

## 2023-03-23 RX ADMIN — Medication 10 ML: at 20:11

## 2023-03-23 RX ADMIN — METHYLPREDNISOLONE SODIUM SUCCINATE 60 MG: 125 INJECTION, POWDER, FOR SOLUTION INTRAMUSCULAR; INTRAVENOUS at 13:26

## 2023-03-23 RX ADMIN — METOPROLOL SUCCINATE 12.5 MG: 25 TABLET, EXTENDED RELEASE ORAL at 20:10

## 2023-03-23 RX ADMIN — Medication 1000 MG: at 18:37

## 2023-03-23 RX ADMIN — FOLIC ACID 1 MG: 1 TABLET ORAL at 18:37

## 2023-03-23 RX ADMIN — SERTRALINE 25 MG: 50 TABLET, FILM COATED ORAL at 20:10

## 2023-03-23 RX ADMIN — ENOXAPARIN SODIUM 40 MG: 100 INJECTION SUBCUTANEOUS at 18:37

## 2023-03-23 RX ADMIN — BUDESONIDE 250 MCG: 0.5 SUSPENSION RESPIRATORY (INHALATION) at 19:40

## 2023-03-23 RX ADMIN — IPRATROPIUM BROMIDE AND ALBUTEROL SULFATE 1 AMPULE: 2.5; .5 SOLUTION RESPIRATORY (INHALATION) at 13:30

## 2023-03-23 RX ADMIN — IPRATROPIUM BROMIDE AND ALBUTEROL SULFATE 1 AMPULE: 2.5; .5 SOLUTION RESPIRATORY (INHALATION) at 13:31

## 2023-03-23 ASSESSMENT — PAIN SCALES - GENERAL: PAINLEVEL_OUTOF10: 0

## 2023-03-23 NOTE — ED NOTES
Pt did not tolerate ambulation well. SPO2 on 4L NC 95% but pt was very uncomfortable. When RN helped pt back to bed, pt's SPO2 was 90% on 4L NC. Dr. Weiner March aware.       Jaja Mcmanus RN  03/23/23 6411

## 2023-03-23 NOTE — H&P
or tingling in extremities  Endo: no heat/cold intolerance, no polyphagia, polydipsia or polyuria  Hem: no increased bleeding, no bruising, no lymphadenopathy  Skin: no skin changes  Psych: no depressed mood    PE:  Blood pressure 120/74, pulse (!) 103, temperature 97.9 °F (36.6 °C), temperature source Temporal, resp. rate 22, height 5' 2\" (1.575 m), weight 114 lb (51.7 kg), SpO2 97 %, not currently breastfeeding. General: Alert, cooperative, no acute distress. HEENT: Normocephalic, atraumatic. Oropharynx clear. On 4 L NC  Neck: Supple, symmetrical, trachea midline, no JVD. No cervical lymphadenopathy. Chest: No tenderness or deformity, full & symmetric excursion  Lung: Bilateral Lower lobe wheezing. Respirations unlabored. No rales/rubs  Heart: RRR, S1 and S2 normal, no murmur, rub or gallop. DP pulses 2/4  Abdomen: SNTND, no masses, no organomegaly, no guarding, rebound or rigidity. Genital/Rectal: deferred  Extremities:  Extremities normal, atraumatic, no cyanosis or edema. Distal pulses equal bilaterally  Skin: Skin color, texture, turgor normal, no rashes or lesions  Musculoskeletal: No joint swelling, no muscle tenderness. Normal ROM in extremities. Neurologic: Alert & Oriented; Normal and symmetric strength in UEs and LEs; Sensation intact  Psychiatric: appropriate affect. Intact judgment and insight.      Labs:   Results for orders placed or performed during the hospital encounter of 03/23/23   COVID-19, Rapid    Specimen: Nasopharyngeal Swab   Result Value Ref Range    SARS-CoV-2, NAAT Not Detected Not Detected   RAPID INFLUENZA A/B ANTIGENS    Specimen: Nasopharyngeal   Result Value Ref Range    Influenza A by PCR Not Detected Not Detected    Influenza B by PCR Not Detected Not Detected   CBC with Auto Differential   Result Value Ref Range    WBC 24.5 (H) 4.5 - 11.5 E9/L    RBC 4.37 3.50 - 5.50 E12/L    Hemoglobin 13.6 11.5 - 15.5 g/dL    Hematocrit 43.9 34.0 - 48.0 %    .5 (H) 80.0 - 99.9 increase sputum production.  -Status post Solu-Medrol 60 mg, doxycycline and DuoNebs x3 in ED.  -Baseline 4 L nasal cannula. Currently at baseline  -COVID and influenza negative. -CXR: COPD. No evidence of failure or pneumonia. - continue doxy and prednisone 40 mg daily.    - hold off on consulting pulm  - continue mucinex  - continue home breathing treatment Dulera, Daliresp and incruse      CHF  -Echocardiogram January 2023 showed ejection fraction 40-45 percent.  -We will continue home dose Lasix, potassium supplement and metoprolol  -Strict I's and O's  -Continue daily weights    Hyperlipidemia  -Continue atorvastatin    Depression/anxiety  -Continue home dose Zoloft      DVT / GI prophylaxis: lovenox 40mg SC daily and Protonix    Dispo - Med surg      Electronically signed by Franklin Servin MD on 3/23/23 at 3:39 PM EDT  This case was discussed with attending physician: Dr. Yanna Albrecht

## 2023-03-23 NOTE — ACP (ADVANCE CARE PLANNING)
Advance Care Planning   The patient has the following advanced directives on file:  Advance Directives       Power of [address] ACP-Advance Directive ACP-Power of     Not on File Not on File Not on File Not on File            The patient has appointed the following active healthcare agents:    Primary Decision Maker: Ricky Hardy - Child - [phone]    Secondary Decision Maker: shannan basilio - Child - [phone]    The Patient has the following current code status:    Code Status: Full Code    40 EPI Stewart  3/23/2023

## 2023-03-23 NOTE — TELEPHONE ENCOUNTER
Writer contacted ED provider, [facility] to inform of 30 day readmission risk. Writer's attempt to contact ED provider was unsuccessful.      Call Back: If you need to call back to inform of disposition you can contact me at [phone]     Attending physician:LYLE MATTHEW

## 2023-03-23 NOTE — ED PROVIDER NOTES
50 MCG/ACT nasal spray 2 spray (has no administration in time range)   folic acid (FOLVITE) tablet 1 mg (1 mg Oral Given 3/23/23 1837)   furosemide (LASIX) tablet 20 mg (20 mg Oral Not Given 3/23/23 1838)   guaiFENesin tablet 400 mg (400 mg Oral Given 3/23/23 2010)   ipratropium-albuterol (DUONEB) nebulizer solution 3 mL (has no administration in time range)   melatonin disintegrating tablet 5 mg (5 mg Oral Given 3/23/23 2010)   metoprolol succinate (TOPROL XL) extended release tablet 12.5 mg (12.5 mg Oral Given 3/23/23 2010)   pantoprazole (PROTONIX) tablet 40 mg (40 mg Oral Given 3/23/23 2010)   potassium chloride (KLOR-CON M) extended release tablet 20 mEq (20 mEq Oral Not Given 3/23/23 1839)   Roflumilast (DALIRESP) tablet 250 mcg (has no administration in time range)   sertraline (ZOLOFT) tablet 25 mg (25 mg Oral Given 3/23/23 2010)   ipratropium (ATROVENT) 0.02 % nebulizer solution 0.5 mg (0.5 mg Nebulization Not Given 3/23/23 1948)   sodium chloride flush 0.9 % injection 5-40 mL (10 mLs IntraVENous Given 3/23/23 2011)   sodium chloride flush 0.9 % injection 5-40 mL (has no administration in time range)   0.9 % sodium chloride infusion (has no administration in time range)   enoxaparin (LOVENOX) injection 40 mg (40 mg SubCUTAneous Given 3/23/23 1837)   ondansetron (ZOFRAN-ODT) disintegrating tablet 4 mg (has no administration in time range)     Or   ondansetron (ZOFRAN) injection 4 mg (has no administration in time range)   polyethylene glycol (GLYCOLAX) packet 17 g (has no administration in time range)   acetaminophen (TYLENOL) tablet 650 mg (has no administration in time range)     Or   acetaminophen (TYLENOL) suppository 650 mg (has no administration in time range)   predniSONE (DELTASONE) tablet 40 mg (has no administration in time range)   doxycycline (VIBRAMYCIN) 100 mg in sodium chloride 0.9 % 100 mL IVPB ([id number]) (has no administration in time range)   budesonide (PULMICORT) nebulizer suspension 250 mcg (250 mcg Nebulization Given 3/23/23 1940)     And   arformoterol tartrate (BROVANA) nebulizer solution 15 mcg (15 mcg Nebulization Given 3/23/23 1940)   ipratropium-albuterol (DUONEB) nebulizer solution 1 ampule (1 ampule Inhalation Given 3/23/23 1332)   methylPREDNISolone sodium (SOLU-MEDROL) injection 60 mg (60 mg IntraVENous Given 3/23/23 1326)   doxycycline (VIBRAMYCIN) 100 mg in sodium chloride 0.9 % 100 mL IVPB ([id number]) (0 mg IntraVENous Stopped 3/23/23 1826)       Medical Decision Making/ED COURSE:    History From: patient     Patient is a 58 y.o. female presenting to the ED for acute on chronic onset shortness of breath and cough, moderate in severity. In the ED, patient was hemodynamically stable and afebrile. On exam, patient had markedly diminished breath sounds bilaterally but no acute respiratory distress. Patient was placed on the cardiac monitor. I interpreted findings. Rhythm -sinus. Labs and chest x-ray obtained. Differential diagnosis includes COPD exacerbation, pneumonia, pulmonary embolus, acute coronary syndrome. Patient administered DuoNeb treatments and IV Solu-Medrol. I reviewed and interpreted labs. Patient was noted to have a leukocytosis of 24.5. She is currently on steroids. She is reporting increased sputum production with her history of COPD, so she was given IV doxycycline. COVID-19 and influenza swabs are negative. CMP showed normal kidney function and normal electrolytes. BNP was 409. High-sensitivity troponin stable at 15. Chest xray interpreted by me. Interpretation-lungs clear, no infiltrate. Radiologist confirms read. Considered pulmonary embolus. Patient had recent CTA chest for same symptoms with no evidence of PE. She has no PE risk factors and a clinical exam which is more consistent with COPD. Considered repeat CTA chest though that does not appear to be indicated at this time. Patient ambulated on a pulse ox on her baseline oxygen.   She did not

## 2023-03-24 ENCOUNTER — HOSPITAL ENCOUNTER (OUTPATIENT)
Dept: OTHER | Age: 63
Setting detail: THERAPIES SERIES
Discharge: HOME | End: 2023-03-24
Payer: COMMERCIAL

## 2023-03-24 PROBLEM — F41.9 ANXIETY: Status: ACTIVE | Noted: 2023-03-24

## 2023-03-24 LAB
ANION GAP SERPL CALCULATED.3IONS-SCNC: 8 MMOL/L (ref 7–16)
BASOPHILS # BLD: 0.02 E9/L (ref 0–0.2)
BASOPHILS NFR BLD: 0.1 % (ref 0–2)
BUN SERPL-MCNC: 20 MG/DL (ref 6–23)
CALCIUM SERPL-MCNC: 8.8 MG/DL (ref 8.6–10.2)
CHLORIDE SERPL-SCNC: 101 MMOL/L (ref 98–107)
CO2 SERPL-SCNC: 37 MMOL/L (ref 22–29)
CREAT SERPL-MCNC: 0.3 MG/DL (ref 0.5–1)
EKG ATRIAL RATE: 90 BPM
EKG P AXIS: 76 DEGREES
EKG P-R INTERVAL: 146 MS
EKG Q-T INTERVAL: 358 MS
EKG QRS DURATION: 68 MS
EKG QTC CALCULATION (BAZETT): 437 MS
EKG R AXIS: 85 DEGREES
EKG T AXIS: 79 DEGREES
EKG VENTRICULAR RATE: 90 BPM
EOSINOPHIL # BLD: 0 E9/L (ref 0.05–0.5)
EOSINOPHIL NFR BLD: 0 % (ref 0–6)
ERYTHROCYTE [DISTWIDTH] IN BLOOD BY AUTOMATED COUNT: 13.3 FL (ref 11.5–15)
GLUCOSE SERPL-MCNC: 95 MG/DL (ref 74–99)
HCT VFR BLD AUTO: 36.8 % (ref 34–48)
HGB BLD-MCNC: 11.5 G/DL (ref 11.5–15.5)
IMM GRANULOCYTES # BLD: 0.08 E9/L
IMM GRANULOCYTES NFR BLD: 0.5 % (ref 0–5)
LYMPHOCYTES # BLD: 1.28 E9/L (ref 1.5–4)
LYMPHOCYTES NFR BLD: 7.4 % (ref 20–42)
MCH RBC QN AUTO: 31.3 PG (ref 26–35)
MCHC RBC AUTO-ENTMCNC: 31.3 % (ref 32–34.5)
MCV RBC AUTO: 100.3 FL (ref 80–99.9)
MONOCYTES # BLD: 1.09 E9/L (ref 0.1–0.95)
MONOCYTES NFR BLD: 6.3 % (ref 2–12)
NEUTROPHILS # BLD: 14.73 E9/L (ref 1.8–7.3)
NEUTS SEG NFR BLD: 85.7 % (ref 43–80)
PLATELET # BLD AUTO: 237 E9/L (ref 130–450)
PMV BLD AUTO: 10.3 FL (ref 7–12)
POTASSIUM SERPL-SCNC: 4 MMOL/L (ref 3.5–5)
RBC # BLD AUTO: 3.67 E12/L (ref 3.5–5.5)
SODIUM SERPL-SCNC: 146 MMOL/L (ref 132–146)
WBC # BLD: 17.2 E9/L (ref 4.5–11.5)

## 2023-03-24 PROCEDURE — 87070 CULTURE OTHR SPECIMN AEROBIC: CPT

## 2023-03-24 PROCEDURE — 85025 COMPLETE CBC W/AUTO DIFF WBC: CPT

## 2023-03-24 PROCEDURE — 2700000000 HC OXYGEN THERAPY PER DAY

## 2023-03-24 PROCEDURE — 6370000000 HC RX 637 (ALT 250 FOR IP)

## 2023-03-24 PROCEDURE — 2580000003 HC RX 258: Performed by: STUDENT IN AN ORGANIZED HEALTH CARE EDUCATION/TRAINING PROGRAM

## 2023-03-24 PROCEDURE — 36415 COLL VENOUS BLD VENIPUNCTURE: CPT

## 2023-03-24 PROCEDURE — 87206 SMEAR FLUORESCENT/ACID STAI: CPT

## 2023-03-24 PROCEDURE — 93010 ELECTROCARDIOGRAM REPORT: CPT | Performed by: INTERNAL MEDICINE

## 2023-03-24 PROCEDURE — 6370000000 HC RX 637 (ALT 250 FOR IP): Performed by: STUDENT IN AN ORGANIZED HEALTH CARE EDUCATION/TRAINING PROGRAM

## 2023-03-24 PROCEDURE — 6360000002 HC RX W HCPCS: Performed by: STUDENT IN AN ORGANIZED HEALTH CARE EDUCATION/TRAINING PROGRAM

## 2023-03-24 PROCEDURE — 94669 MECHANICAL CHEST WALL OSCILL: CPT

## 2023-03-24 PROCEDURE — 80048 BASIC METABOLIC PNL TOTAL CA: CPT

## 2023-03-24 PROCEDURE — 2060000000 HC ICU INTERMEDIATE R&B

## 2023-03-24 PROCEDURE — 2500000003 HC RX 250 WO HCPCS: Performed by: STUDENT IN AN ORGANIZED HEALTH CARE EDUCATION/TRAINING PROGRAM

## 2023-03-24 PROCEDURE — 94640 AIRWAY INHALATION TREATMENT: CPT

## 2023-03-24 PROCEDURE — 99222 1ST HOSP IP/OBS MODERATE 55: CPT | Performed by: FAMILY MEDICINE

## 2023-03-24 RX ORDER — ALPRAZOLAM 0.25 MG/1
0.25 TABLET ORAL 3 TIMES DAILY PRN
Status: DISCONTINUED | OUTPATIENT
Start: 2023-03-24 | End: 2023-03-26 | Stop reason: HOSPADM

## 2023-03-24 RX ORDER — IPRATROPIUM BROMIDE AND ALBUTEROL SULFATE 2.5; .5 MG/3ML; MG/3ML
1 SOLUTION RESPIRATORY (INHALATION) 4 TIMES DAILY
Status: DISCONTINUED | OUTPATIENT
Start: 2023-03-24 | End: 2023-03-26 | Stop reason: HOSPADM

## 2023-03-24 RX ADMIN — ENOXAPARIN SODIUM 40 MG: 100 INJECTION SUBCUTANEOUS at 09:19

## 2023-03-24 RX ADMIN — Medication 10 ML: at 21:44

## 2023-03-24 RX ADMIN — IPRATROPIUM BROMIDE AND ALBUTEROL SULFATE 3 ML: .5; 2.5 SOLUTION RESPIRATORY (INHALATION) at 17:14

## 2023-03-24 RX ADMIN — FUROSEMIDE 20 MG: 40 TABLET ORAL at 09:19

## 2023-03-24 RX ADMIN — POTASSIUM CHLORIDE 20 MEQ: 1500 TABLET, EXTENDED RELEASE ORAL at 09:19

## 2023-03-24 RX ADMIN — BUDESONIDE 250 MCG: 0.5 SUSPENSION RESPIRATORY (INHALATION) at 21:08

## 2023-03-24 RX ADMIN — ARFORMOTEROL TARTRATE 15 MCG: 15 SOLUTION RESPIRATORY (INHALATION) at 21:08

## 2023-03-24 RX ADMIN — ATORVASTATIN CALCIUM 40 MG: 40 TABLET, FILM COATED ORAL at 21:43

## 2023-03-24 RX ADMIN — METOPROLOL SUCCINATE 12.5 MG: 25 TABLET, EXTENDED RELEASE ORAL at 21:43

## 2023-03-24 RX ADMIN — ALPRAZOLAM 0.25 MG: 0.25 TABLET ORAL at 15:02

## 2023-03-24 RX ADMIN — PREDNISONE 40 MG: 20 TABLET ORAL at 09:18

## 2023-03-24 RX ADMIN — SERTRALINE 25 MG: 50 TABLET, FILM COATED ORAL at 09:19

## 2023-03-24 RX ADMIN — Medication 1000 MG: at 09:19

## 2023-03-24 RX ADMIN — IPRATROPIUM BROMIDE 0.5 MG: 0.5 SOLUTION RESPIRATORY (INHALATION) at 08:46

## 2023-03-24 RX ADMIN — GUAIFENESIN 400 MG: 400 TABLET ORAL at 09:19

## 2023-03-24 RX ADMIN — GUAIFENESIN 400 MG: 400 TABLET ORAL at 21:44

## 2023-03-24 RX ADMIN — IPRATROPIUM BROMIDE AND ALBUTEROL SULFATE 3 ML: .5; 2.5 SOLUTION RESPIRATORY (INHALATION) at 12:38

## 2023-03-24 RX ADMIN — Medication 5 MG: at 21:43

## 2023-03-24 RX ADMIN — GUAIFENESIN 400 MG: 400 TABLET ORAL at 15:02

## 2023-03-24 RX ADMIN — ARFORMOTEROL TARTRATE 15 MCG: 15 SOLUTION RESPIRATORY (INHALATION) at 08:47

## 2023-03-24 RX ADMIN — PANTOPRAZOLE SODIUM 40 MG: 40 TABLET, DELAYED RELEASE ORAL at 21:44

## 2023-03-24 RX ADMIN — FOLIC ACID 1 MG: 1 TABLET ORAL at 09:19

## 2023-03-24 RX ADMIN — ROFLUMILAST 250 MCG: 500 TABLET ORAL at 09:19

## 2023-03-24 RX ADMIN — Medication 10 ML: at 09:19

## 2023-03-24 RX ADMIN — BUDESONIDE 250 MCG: 0.5 SUSPENSION RESPIRATORY (INHALATION) at 08:48

## 2023-03-24 RX ADMIN — IPRATROPIUM BROMIDE AND ALBUTEROL SULFATE 3 ML: .5; 2.5 SOLUTION RESPIRATORY (INHALATION) at 21:08

## 2023-03-24 RX ADMIN — DOXYCYCLINE 100 MG: 100 INJECTION, POWDER, LYOPHILIZED, FOR SOLUTION INTRAVENOUS at 04:19

## 2023-03-24 ASSESSMENT — PAIN SCALES - GENERAL
PAINLEVEL_OUTOF10: 0

## 2023-03-24 NOTE — ACP (ADVANCE CARE PLANNING)
Advance Care Planning   Healthcare Decision Maker:    Primary Decision Maker: Annabelle Liu Child - [phone]    Secondary Decision Maker: shannan basilio - Child - [phone]    Click here to complete Healthcare Decision Makers including selection of the Healthcare Decision Maker Relationship (ie \"Primary\"). Today we documented Decision Maker(s) consistent with Legal Next of Kin hierarchy.        If the relationship to the patient does NOT follow our state's Next of Kin hierarchy, the patient MUST complete an ACP Document to allow him/her to act on the patient's behalf. :

## 2023-03-24 NOTE — PROGRESS NOTES
Physician Progress Note      Jonna Lacey  [facility] #:                  [id number]  :                       1960  ADMIT DATE:       3/23/2023 11:39 AM  DISCH DATE:  RESPONDING  PROVIDER #:        Gavin Smith          QUERY TEXT:    Pt admitted with COPD exacerbation. Noted documentation of Chronic Hypoxic   Respiratory Failure per ED provider  If possible, please document in progress   notes and discharge summary:    The medical record reflects the following:  Risk Factors: Chronic home 02 use at baseline 4 liters; COPD; CHF  Clinical Indicators:  3/23/23: Per ED provider: Impression: Chronic Respiratory Failure with   hypoxia. She states that she gets episodes of anxiety and has to increase her   oxygen to 6 liters though she has not had any worsening hypoxia at home. History of 4 liters oxygen at baseline  Initially in ED had 02 6 liters with pulse ox of  96% and respiratory rate 16   and then 02 down to 4 liters which is baseline. 3/23/23: Chest X-ray: COPD; There is no evidence of failure or pneumonia  Treatment: Oxygen per protocol; Nebulizers; Chest X-ray; Prednisone PO;   Solumedrol IV X 1    Thank Karolina DIAZN, R.N.  Clinical Documentation Improvement  [phone]  Options provided:  -- Chronic Respiratory failure with hypoxia confirmed present on admission  -- Other - I will add my own diagnosis  -- Disagree - Not applicable / Not valid  -- Disagree - Clinically unable to determine / Unknown  -- Refer to Clinical Documentation Reviewer    PROVIDER RESPONSE TEXT:    The diagnosis of Chronic Respiratory Failure with hypoxia was confirmed as   present on admission.     Query created by: Frandy Navarrete on 3/24/2023 8:06 AM      Electronically signed by:  Gavin Smith 3/24/2023 8:39 AM

## 2023-03-24 NOTE — PLAN OF CARE
Problem: Safety - Adult  Goal: Free from fall injury  Outcome: Progressing     Problem: Chronic Conditions and Co-morbidities  Goal: Patient's chronic conditions and co-morbidity symptoms are monitored and maintained or improved  Outcome: Progressing     Problem: Discharge Planning  Goal: Discharge to home or other facility with appropriate resources  Outcome: Progressing     Problem: Skin/Tissue Integrity  Goal: Absence of new skin breakdown  Description: 1. Monitor for areas of redness and/or skin breakdown  2. Assess vascular access sites hourly  3. Every 4-6 hours minimum:  Change oxygen saturation probe site  4. Every 4-6 hours:  If on nasal continuous positive airway pressure, respiratory therapy assess nares and determine need for appliance change or resting period.   Outcome: Progressing

## 2023-03-24 NOTE — PLAN OF CARE
Problem: Safety - Adult  Goal: Free from fall injury  3/24/2023 1954 by John Paul Erazo RN  Outcome: Progressing  3/24/2023 0602 by Kunal Donovan RN  Outcome: Progressing     Problem: Chronic Conditions and Co-morbidities  Goal: Patient's chronic conditions and co-morbidity symptoms are monitored and maintained or improved  3/24/2023 1954 by John Paul Erazo RN  Outcome: Progressing  3/24/2023 0602 by Kunal Donovan RN  Outcome: Progressing     Problem: Discharge Planning  Goal: Discharge to home or other facility with appropriate resources  3/24/2023 1954 by John Paul Erazo RN  Outcome: Progressing  3/24/2023 0602 by Kunal Donovan RN  Outcome: Progressing     Problem: Skin/Tissue Integrity  Goal: Absence of new skin breakdown  Description: 1. Monitor for areas of redness and/or skin breakdown  2. Assess vascular access sites hourly  3. Every 4-6 hours minimum:  Change oxygen saturation probe site  4. Every 4-6 hours:  If on nasal continuous positive airway pressure, respiratory therapy assess nares and determine need for appliance change or resting period.   3/24/2023 1954 by John Paul Erazo RN  Outcome: Progressing  3/24/2023 0602 by Kunal Donovan RN  Outcome: Progressing     Problem: Cardiovascular - Adult  Goal: Maintains optimal cardiac output and hemodynamic stability  Outcome: Progressing     Problem: Metabolic/Fluid and Electrolytes - Adult  Goal: Hemodynamic stability and optimal renal function maintained  Outcome: Progressing

## 2023-03-24 NOTE — PROGRESS NOTES
[address] [facility]  Family Medicine Attending    TENNILLE VALDIVIA Course : 58 y.o. female with a PMH of HFrEF (EF-40-45%), COPD on 4L NC baseline and DVT who presented to ED for Shortness of Breath. Patient reports becoming anxious over the past 2 nights and feeling short of breath. She reports she has chronically had wax/waning periods of \"air hunger\" type sx associated with anxiety. S: No f/c/ns. No cp. Baseline SOB. Provided some history about her prognosis being poor and previous physician recommending consultation for lung transplant @ [facility]. Discussed anxiety associated with her breathing and air hunger. O: VS- Blood pressure 130/80, pulse 86, temperature 97.3 °F (36.3 °C), temperature source Temporal, resp. rate 20, height 5' 2\" (1.575 m), weight 114 lb (51.7 kg), SpO2 98 %, not currently breastfeeding. Exam is as noted by resident with the following changes, additions or corrections:  NAD, Chronically ill appearing. AAOx3,very pleasant  EOMI Anicteric No droop, NC on nares. RRR   Mild increase work of breathing. Doesn't speak in complete sentences. Poor airflow thru-out. Expiratory wheeze w/ prolonged insp/exp phase.      Latest Reference Range & Units 3/24/23 06:13   WBC 4.5 - 11.5 E9/L 17.2 (H)   RBC 3.50 - 5.50 E12/L 3.67   Hemoglobin Quant 11.5 - 15.5 g/dL 11.5   Hematocrit 34.0 - 48.0 % 36.8   MCV 80.0 - 99.9 fL 100.3 (H)   MCH 26.0 - 35.0 pg 31.3   MCHC 32.0 - 34.5 % 31.3 (L)   MPV 7.0 - 12.0 fL 10.3   RDW 11.5 - 15.0 fL 13.3   Platelet Count 897 - 450 E9/L 237   (H): Data is abnormally high  (L): Data is abnormally low   Latest Reference Range & Units 3/24/23 06:13   Sodium 132 - 146 mmol/L 146   Potassium 3.5 - 5.0 mmol/L 4.0   Chloride 98 - 107 mmol/L 101   CO2 22 - 29 mmol/L 37 (H)   BUN,BUNPL 6 - 23 mg/dL 20   Creatinine 0.5 - 1.0 mg/dL 0.3 (L)   Anion Gap 7 - 16 mmol/L 8   Est, Glom Filt Rate >=60 mL/min/1.73 >60   Glucose, Random 74 - 99 mg/dL 95   CALCIUM, SERUM, 207161 8.6 - 10.2 mg/dL 8.8   (H): Data is abnormally high  (L): Data is abnormally low    Trop 15. CXR:   Impression   COPD. There is no evidence of failure or pneumonia. Impressions:   Principal Problem:    COPD exacerbation ([address]  Active Problems:    Heart failure ([address]    Depression with anxiety  Resolved Problems:    * No resolved hospital problems. *      Plan:   1) COPD Exacerbation - Had been completing a steroid taper as outpatient. likely 2/2 anxiety/air hunger type sx. Low likelihood of bacterial contribution, will stop doxycycline. Patient motivated to get better to help assist w care of 2 grand-children. Cont to taper prednsone 3/25. 2) Anxiety / Air Hunger - will trial benzo. Discussed intermittent use and if no response would need to seek further care if at home. Attending Physician Statement  I have reviewed the chart and seen the patient with the resident(s). I personally reviewed images, EKG's and similar tests, if present. I personally reviewed and performed key elements of the history and exam.  I have reviewed and confirmed student and/or resident history and exam with changes as indicated above. I agree with the assessment, plan and orders as documented by the resident. Please refer to the resident and/or student note for additional information.       Cinthia Us MD

## 2023-03-24 NOTE — PLAN OF CARE
Patient's chart updated to reflect:      . - HF care plan, HF education points and HF discharge instructions.  -Orders: 2 gram sodium diet, daily weights, I/O.  -PCP and cardiology follow up appointments to be scheduled within 7 days of hospital discharge. -CHF education session will be provided to the patient prior to hospital discharge.     Beni Sandoval RN RN, BSN  Heart Failure Navigator

## 2023-03-24 NOTE — PROGRESS NOTES
Physical Therapy    Patient received and chart reviewed for PT evaluation. Pt declines PT at this time, states she wants to use bedpan and eat breakfast. Pt educated on benefits of mobility, pt declined. Will follow up as appropriate.      Kady Del Valle, PT, DPT  [id number]

## 2023-03-24 NOTE — PROGRESS NOTES
[facility] Inpatient   Resident Progress Note    S:  Hospital day: 1   Brief Synopsis: Abraham Alaniz is a 58 y.o. female with a PMH of HFrEF (EF-40-45%), COPD on 4L NC baseline and DVT who presented to ED for Shortness of Breath. Patient reports becoming anxious over the past 2 nights and feeling short of breath. During these episodes she has noted normal oxygen saturation on her pulse ox. Reports symptoms sometimes improve on their own or with deep breathing or with breathing treatments. Patient was recently discharged from [facility] after COPD exacerbation. On that admission, patient was initially on IV  Rocephin and steroids for 5 days then transitioned to PO steroid taper (17 days) and cefdinir for 3 days. Since being discharged, patient reports completing antibiotic course and still being on steroid taper. She was unable to follow-up with pulmonology outpatient. She denied any lower extremity swelling, chest pain, abdominal pain, nausea or vomiting. While in the ED patient was noted to be at baseline for oxygen requirement (4L NC) while ambulating saturating at 90% but reported feeling SOB. ED course remarkable for: Leukocytosis (Likely due to prolonged steroid use), Influenza and COVID negative and CXR demonstrated chronic changes from obstructive disease. She was admitted after failure of outpatient therapy for COPA. Overnight/interim:   No overnight events. This AM patient has normal oxygen saturation on baseline home oxygen. She states that she begins to feel short of breath and will begin to feel very anxious due to this. She is eating and drinking well. She denies increased sputum production, chest pain or lower extremity edema.            Cont meds:    sodium chloride       Scheduled meds:    ipratropium-albuterol  1 vial Inhalation 4x daily    ascorbic acid  1,000 mg Oral Daily    atorvastatin  40 mg Oral Nightly    folic acid  1 mg Oral Daily    furosemide  20 mg Oral Daily    guaiFENesin 400 mg Oral TID    melatonin  5 mg Oral Nightly    metoprolol succinate  12.5 mg Oral Nightly    pantoprazole  40 mg Oral Nightly    potassium chloride  20 mEq Oral Daily    Roflumilast  250 mcg Oral Daily    sertraline  25 mg Oral Daily    sodium chloride flush  5-40 mL IntraVENous 2 times per day    enoxaparin  40 mg SubCUTAneous Daily    predniSONE  40 mg Oral Daily    budesonide  0.25 mg Nebulization BID    And    arformoterol tartrate  15 mcg Nebulization BID     PRN meds: ALPRAZolam, docusate sodium, fluticasone, sodium chloride flush, sodium chloride, ondansetron **OR** ondansetron, polyethylene glycol, acetaminophen **OR** acetaminophen     I reviewed the patient's past medical and surgical history, Medications and Allergies. O:  /80   Pulse 86   Temp 97.3 °F (36.3 °C) (Temporal)   Resp 20   Ht 5' 2\" (1.575 m)   Wt 114 lb (51.7 kg)   SpO2 96%   BMI 20.85 kg/m²   24 hour I&O: No intake/output data recorded. I/O this shift:  In: -   Out: 300 [Urine:300]        Physical Exam  Vitals reviewed. Constitutional:       General: She is not in acute distress. Appearance: She is underweight. She is not ill-appearing, toxic-appearing or diaphoretic. Interventions: Nasal cannula in place. Cardiovascular:      Rate and Rhythm: Normal rate and regular rhythm. Heart sounds: Normal heart sounds. Pulmonary:      Effort: Accessory muscle usage present. No respiratory distress or retractions. Breath sounds: Decreased air movement present. Wheezing present. Abdominal:      General: Bowel sounds are normal.      Palpations: Abdomen is soft. Tenderness: There is no abdominal tenderness.    Musculoskeletal:      Right lower leg: Normal.      Left lower leg: Normal.               Labs:  Na/K/Cl/CO2:  146/4.0/101/37 (03/24 6481)  BUN/Cr/glu/ALT/AST/amyl/lip:  20/0.3/--/--/--/--/-- (03/24 1597)  WBC/Hgb/Hct/Plts:  17.2/11.5/36.8/237 (03/24 6836)  estimated creatinine clearance is 154

## 2023-03-24 NOTE — DISCHARGE INSTRUCTIONS
whether you need another dose. My Goal for Self-management of Heart Failure Includes 5 steps :    1. Notice a change in symptoms ( weight gain, short of breath, leg swelling, decreased activity level, bloating. ...)    2. Evaluate the change: (use the Heart Failure Zones )     3. Decide to take action: decide what your options are, such as: (call your doctor for an extra visit, take a prescribed medication, such as your water pill if your doctor has given you directions to do so, [address])    4. Come up with a strategy:  (now you call the doctor for advice / appointment. This is where you take action!!! Do not wait, catch the symptom early and treat it before it worsens. 5. Evaluate the response: The next day, check your Heart Failure Zones: are you in the GREEN ZONE (safe zone)? Worsening symptoms of YELLOW ZONE? Or have you moved to the RED ZONE and need to call 911 or go to the Emergency Room for evaluation? Call your doctor's office to update them on your symptoms of heart failure.

## 2023-03-25 LAB
ANION GAP SERPL CALCULATED.3IONS-SCNC: 6 MMOL/L (ref 7–16)
BASOPHILS # BLD: 0.01 E9/L (ref 0–0.2)
BASOPHILS NFR BLD: 0.1 % (ref 0–2)
BUN SERPL-MCNC: 20 MG/DL (ref 6–23)
CALCIUM SERPL-MCNC: 8.8 MG/DL (ref 8.6–10.2)
CHLORIDE SERPL-SCNC: 100 MMOL/L (ref 98–107)
CO2 SERPL-SCNC: 40 MMOL/L (ref 22–29)
CREAT SERPL-MCNC: 0.4 MG/DL (ref 0.5–1)
EOSINOPHIL # BLD: 0.09 E9/L (ref 0.05–0.5)
EOSINOPHIL NFR BLD: 0.6 % (ref 0–6)
ERYTHROCYTE [DISTWIDTH] IN BLOOD BY AUTOMATED COUNT: 13.4 FL (ref 11.5–15)
GLUCOSE SERPL-MCNC: 78 MG/DL (ref 74–99)
HCT VFR BLD AUTO: 37.8 % (ref 34–48)
HGB BLD-MCNC: 11.4 G/DL (ref 11.5–15.5)
IMM GRANULOCYTES # BLD: 0.07 E9/L
IMM GRANULOCYTES NFR BLD: 0.5 % (ref 0–5)
LYMPHOCYTES # BLD: 2.69 E9/L (ref 1.5–4)
LYMPHOCYTES NFR BLD: 18.6 % (ref 20–42)
MCH RBC QN AUTO: 30.9 PG (ref 26–35)
MCHC RBC AUTO-ENTMCNC: 30.2 % (ref 32–34.5)
MCV RBC AUTO: 102.4 FL (ref 80–99.9)
MONOCYTES # BLD: 1.06 E9/L (ref 0.1–0.95)
MONOCYTES NFR BLD: 7.3 % (ref 2–12)
NEUTROPHILS # BLD: 10.51 E9/L (ref 1.8–7.3)
NEUTS SEG NFR BLD: 72.9 % (ref 43–80)
PLATELET # BLD AUTO: 230 E9/L (ref 130–450)
PMV BLD AUTO: 9.7 FL (ref 7–12)
POTASSIUM SERPL-SCNC: 3.7 MMOL/L (ref 3.5–5)
RBC # BLD AUTO: 3.69 E12/L (ref 3.5–5.5)
SODIUM SERPL-SCNC: 146 MMOL/L (ref 132–146)
WBC # BLD: 14.4 E9/L (ref 4.5–11.5)

## 2023-03-25 PROCEDURE — 6370000000 HC RX 637 (ALT 250 FOR IP): Performed by: STUDENT IN AN ORGANIZED HEALTH CARE EDUCATION/TRAINING PROGRAM

## 2023-03-25 PROCEDURE — 6370000000 HC RX 637 (ALT 250 FOR IP)

## 2023-03-25 PROCEDURE — 99232 SBSQ HOSP IP/OBS MODERATE 35: CPT | Performed by: FAMILY MEDICINE

## 2023-03-25 PROCEDURE — 94640 AIRWAY INHALATION TREATMENT: CPT

## 2023-03-25 PROCEDURE — 36415 COLL VENOUS BLD VENIPUNCTURE: CPT

## 2023-03-25 PROCEDURE — 2700000000 HC OXYGEN THERAPY PER DAY

## 2023-03-25 PROCEDURE — 80048 BASIC METABOLIC PNL TOTAL CA: CPT

## 2023-03-25 PROCEDURE — 1200000000 HC SEMI PRIVATE

## 2023-03-25 PROCEDURE — 85025 COMPLETE CBC W/AUTO DIFF WBC: CPT

## 2023-03-25 PROCEDURE — 2580000003 HC RX 258: Performed by: STUDENT IN AN ORGANIZED HEALTH CARE EDUCATION/TRAINING PROGRAM

## 2023-03-25 PROCEDURE — 6360000002 HC RX W HCPCS: Performed by: STUDENT IN AN ORGANIZED HEALTH CARE EDUCATION/TRAINING PROGRAM

## 2023-03-25 RX ADMIN — FLUTICASONE PROPIONATE 2 SPRAY: 50 SPRAY, METERED NASAL at 12:08

## 2023-03-25 RX ADMIN — ATORVASTATIN CALCIUM 40 MG: 40 TABLET, FILM COATED ORAL at 21:17

## 2023-03-25 RX ADMIN — Medication 5 MG: at 21:17

## 2023-03-25 RX ADMIN — FUROSEMIDE 20 MG: 40 TABLET ORAL at 12:07

## 2023-03-25 RX ADMIN — ALPRAZOLAM 0.25 MG: 0.25 TABLET ORAL at 10:01

## 2023-03-25 RX ADMIN — ALPRAZOLAM 0.25 MG: 0.25 TABLET ORAL at 17:28

## 2023-03-25 RX ADMIN — GUAIFENESIN 400 MG: 400 TABLET ORAL at 17:19

## 2023-03-25 RX ADMIN — PANTOPRAZOLE SODIUM 40 MG: 40 TABLET, DELAYED RELEASE ORAL at 21:17

## 2023-03-25 RX ADMIN — Medication 10 ML: at 12:14

## 2023-03-25 RX ADMIN — METOPROLOL SUCCINATE 12.5 MG: 25 TABLET, EXTENDED RELEASE ORAL at 21:17

## 2023-03-25 RX ADMIN — ARFORMOTEROL TARTRATE 15 MCG: 15 SOLUTION RESPIRATORY (INHALATION) at 21:23

## 2023-03-25 RX ADMIN — BUDESONIDE 250 MCG: 0.5 SUSPENSION RESPIRATORY (INHALATION) at 21:23

## 2023-03-25 RX ADMIN — BUDESONIDE 250 MCG: 0.5 SUSPENSION RESPIRATORY (INHALATION) at 10:21

## 2023-03-25 RX ADMIN — IPRATROPIUM BROMIDE AND ALBUTEROL SULFATE 3 ML: .5; 2.5 SOLUTION RESPIRATORY (INHALATION) at 10:22

## 2023-03-25 RX ADMIN — Medication 1000 MG: at 12:06

## 2023-03-25 RX ADMIN — IPRATROPIUM BROMIDE AND ALBUTEROL SULFATE 3 ML: .5; 2.5 SOLUTION RESPIRATORY (INHALATION) at 21:22

## 2023-03-25 RX ADMIN — PREDNISONE 40 MG: 20 TABLET ORAL at 12:06

## 2023-03-25 RX ADMIN — POTASSIUM CHLORIDE 20 MEQ: 1500 TABLET, EXTENDED RELEASE ORAL at 12:06

## 2023-03-25 RX ADMIN — GUAIFENESIN 400 MG: 400 TABLET ORAL at 12:07

## 2023-03-25 RX ADMIN — ARFORMOTEROL TARTRATE 15 MCG: 15 SOLUTION RESPIRATORY (INHALATION) at 10:21

## 2023-03-25 RX ADMIN — ROFLUMILAST 250 MCG: 500 TABLET ORAL at 12:06

## 2023-03-25 RX ADMIN — ENOXAPARIN SODIUM 40 MG: 100 INJECTION SUBCUTANEOUS at 12:07

## 2023-03-25 RX ADMIN — IPRATROPIUM BROMIDE AND ALBUTEROL SULFATE 3 ML: .5; 2.5 SOLUTION RESPIRATORY (INHALATION) at 17:09

## 2023-03-25 RX ADMIN — Medication 10 ML: at 21:21

## 2023-03-25 RX ADMIN — FOLIC ACID 1 MG: 1 TABLET ORAL at 12:07

## 2023-03-25 RX ADMIN — GUAIFENESIN 400 MG: 400 TABLET ORAL at 21:29

## 2023-03-25 RX ADMIN — SERTRALINE 25 MG: 50 TABLET, FILM COATED ORAL at 12:07

## 2023-03-25 RX ADMIN — IPRATROPIUM BROMIDE AND ALBUTEROL SULFATE 3 ML: .5; 2.5 SOLUTION RESPIRATORY (INHALATION) at 13:29

## 2023-03-25 ASSESSMENT — PAIN SCALES - GENERAL: PAINLEVEL_OUTOF10: 0

## 2023-03-25 NOTE — PROGRESS NOTES
Comprehensive Nutrition Assessment    Type and Reason for Visit:  Initial, Positive Nutrition Screen    Nutrition Recommendations/Plan:   Continue current diet  Start Ensure BID to promote oral intake  Will monitor     Malnutrition Assessment:  Malnutrition Status: At risk for malnutrition (Comment) (03/25/23 1325)    Context:  Chronic Illness     Findings of the 6 clinical characteristics of malnutrition:  Energy Intake:  Mild decrease in energy intake (Comment) (poor intake the past couple of says 2/2 SOB)  Weight Loss:  Unable to assess (d/t hx of wt flux likely r/t CHF hx ; said wt flux between 116-130#)     Body Fat Loss:  No significant body fat loss (difficult to assess)     Muscle Mass Loss:  No significant muscle mass loss (difficult to assess)    Fluid Accumulation:  No significant fluid accumulation     Strength:  Not Performed    Nutrition Assessment:    pt adm d/t SOB/COPD exacerbation; pt w/ chronic resp failure; PMhx of CHF, COPD, diverticulitis; difficult to assess pt as pt w/ nursing staff however able to get updated BS wt- pt says she normally flux between 130-116#; will start ONS to promote oral intake and monitor. Nutrition Related Findings:    I/O WNL; A&OX4; poor dentition; abd WNL; no edema Wound Type: None       Current Nutrition Intake & Therapies:    Average Meal Intake: Unable to assess (no intakes recorded yet this adm)  Average Supplements Intake: None Ordered  ADULT DIET; Regular; Low Sodium (2 gm)  ADULT ORAL NUTRITION SUPPLEMENT; Breakfast, Lunch; Standard High Calorie/High Protein Oral Supplement    Anthropometric Measures:  Height: 5' 2\" (157.5 cm)  Ideal Body Weight (IBW): 110 lbs (50 kg)    Admission Body Weight: 118 lb 6.4 oz (53.7 kg) (3/25-BS)  Current Body Weight: 118 lb 6.4 oz (53.7 kg) (3/25-BS), 107.6 % IBW.     Current BMI (kg/m2): 21.7  Usual Body Weight: 127 lb (57.6 kg) (1/20/23-SS ; note hx of CHF)  % Weight Change (Calculated): -6.8  Weight Adjustment For: No Adjustment                 BMI Categories: Normal Weight (BMI 18.5-24. 9)    Estimated Daily Nutrient Needs:  Energy Requirements Based On: Formula  Weight Used for Energy Requirements: Current  Energy (kcal/day): 6554-7263  Weight Used for Protein Requirements: Current  Protein (g/day): 1.2-1.4g/kgxCBW=65-75g  Method Used for Fluid Requirements: 1 ml/kcal  Fluid (ml/day): 1752-7120    Nutrition Diagnosis:   Inadequate oral intake related to impaired respiratory function as evidenced by poor intake prior to admission    Nutrition Interventions:   Food and/or Nutrient Delivery: Continue Current Diet, Start Oral Nutrition Supplement (Ensure BID)  Nutrition Education/Counseling: Education not indicated  Coordination of Nutrition Care: Continue to monitor while inpatient       Goals:     Goals: PO intake 50% or greater, by next RD assessment       Nutrition Monitoring and Evaluation:   Behavioral-Environmental Outcomes: None Identified  Food/Nutrient Intake Outcomes: Food and Nutrient Intake, Supplement Intake  Physical Signs/Symptoms Outcomes: Biochemical Data, Nutrition Focused Physical Findings, Skin, Weight, Chewing or Swallowing, GI Status, Fluid Status or Edema    Discharge Planning:     Too soon to determine     Sydni Pimentel RD  Contact: [phone]

## 2023-03-25 NOTE — PROGRESS NOTES
[facility] Inpatient   Resident Progress Note    S:  Hospital day: 2   Brief Synopsis: Blanco Martins is a 58 y.o. female with a PMH of HFrEF (EF-40-45%), COPD on 4L NC baseline and DVT who presented to ED for Shortness of Breath. Patient reports becoming anxious over the past 2 nights and feeling short of breath. During these episodes she has noted normal oxygen saturation on her pulse ox. Reports symptoms sometimes improve on their own or with deep breathing or with breathing treatments. Patient was recently discharged from [facility] after COPD exacerbation. On that admission, patient was initially on IV  Rocephin and steroids for 5 days then transitioned to PO steroid taper (17 days) and cefdinir for 3 days. Since being discharged, patient reports completing antibiotic course and still being on steroid taper. She was unable to follow-up with pulmonology outpatient. She denied any lower extremity swelling, chest pain, abdominal pain, nausea or vomiting. While in the ED patient was noted to be at baseline for oxygen requirement (4L NC) while ambulating saturating at 90% but reported feeling SOB. ED course remarkable for: Leukocytosis (Likely due to prolonged steroid use), Influenza and COVID negative and CXR demonstrated chronic changes from obstructive disease. She was admitted after failure of outpatient therapy for COPA. Overnight/interim:   No overnight events. Patient was seen and examined at bedside. Patient is on 4 L of oxygen and is satting 99% this morning. Patient does not have any complaint except for becoming short of breath with very minimal exertion. She is refusing PT and OT at this time. Patient has been accepted to [facility].       Cont meds:    sodium chloride       Scheduled meds:    ipratropium-albuterol  1 vial Inhalation 4x daily    ascorbic acid  1,000 mg Oral Daily    atorvastatin  40 mg Oral Nightly    folic acid  1 mg Oral Daily    furosemide  20 mg Oral Daily guaiFENesin  400 mg Oral TID    melatonin  5 mg Oral Nightly    metoprolol succinate  12.5 mg Oral Nightly    pantoprazole  40 mg Oral Nightly    potassium chloride  20 mEq Oral Daily    Roflumilast  250 mcg Oral Daily    sertraline  25 mg Oral Daily    sodium chloride flush  5-40 mL IntraVENous 2 times per day    enoxaparin  40 mg SubCUTAneous Daily    predniSONE  40 mg Oral Daily    budesonide  0.25 mg Nebulization BID    And    arformoterol tartrate  15 mcg Nebulization BID     PRN meds: ALPRAZolam, docusate sodium, fluticasone, sodium chloride flush, sodium chloride, ondansetron **OR** ondansetron, polyethylene glycol, acetaminophen **OR** acetaminophen     I reviewed the patient's past medical and surgical history, Medications and Allergies. O:  /66   Pulse 69   Temp (!) 96.6 °F (35.9 °C) (Temporal)   Resp 18   Ht 5' 2\" (1.575 m)   Wt 114 lb (51.7 kg)   SpO2 99%   BMI 20.85 kg/m²   24 hour I&O: I/O last 3 completed shifts:  In: -   Out: 300 [Urine:300]  No intake/output data recorded. Physical Exam  Vitals reviewed. Constitutional:       General: She is not in acute distress. Appearance: She is underweight. She is not ill-appearing, toxic-appearing or diaphoretic. Interventions: Nasal cannula in place. Cardiovascular:      Rate and Rhythm: Normal rate and regular rhythm. Heart sounds: Normal heart sounds. Pulmonary:      Effort: Accessory muscle usage present. No respiratory distress or retractions. Breath sounds: Decreased air movement present. Wheezing present. Abdominal:      General: Bowel sounds are normal.      Palpations: Abdomen is soft. Tenderness: There is no abdominal tenderness.    Musculoskeletal:      Right lower leg: Normal.      Left lower leg: Normal.               Labs:  Na/K/Cl/CO2:  146/4.0/101/37 (03/24 6340)  BUN/Cr/glu/ALT/AST/amyl/lip:  20/0.3/--/--/--/--/-- (03/24 6317)  WBC/Hgb/Hct/Plts:  17.2/11.5/36.8/237 (03/24 2265)  estimated

## 2023-03-25 NOTE — PLAN OF CARE
PT C/O THAT SHE ASKED FOR A BREATHING TREATMENT 2 HOURS AGO PT WAS JUST TRANSFERRED HERE FROM . THIS WRITER CALLED RESPIRATORY THERAPIST TO INFORM HER THAT PT IS REQUESTING A BREATHING TREATMENT. PT REFUSING TO TAKE MEDS ,REFUSED TO ALLOW THIS PT TO CHECK HER SKIN, ETC.PT STATES\" I CANT DO ANYTHING NOW I CANT BREATHE I NEED MY BREATHING TREATMENT FIRST. PULSE % ON 4L NASAL CANNULA.

## 2023-03-25 NOTE — PROGRESS NOTES
[address] [facility]  Family Medicine Attending    TENNILLE VALDIVIA Course : 58 y.o. female with a PMH of HFrEF (EF-40-45%), COPD on 4L NC baseline and DVT who presented to ED for Shortness of Breath. Patient reports becoming anxious over the past 2 nights and feeling short of breath. She reports she has chronically had wax/waning periods of \"air hunger\" type sx associated with anxiety. S: No f/c/ns. No cp. Patient very anxious upon arrival at bedside for rounds. She has not received her nebulizer treatment, and is now pursed lip breathing. Her O2 saturation is 99 % on her home 4 L of Oxygen. Respiratory has been called for nebs. O: VS- Blood pressure (!) 146/67, pulse 86, temperature 97.2 °F (36.2 °C), temperature source Temporal, resp. rate 18, height 5' 2\" (1.575 m), weight 114 lb (51.7 kg), SpO2 100 %, not currently breastfeeding. Exam is as noted by resident with the following changes, additions or corrections:  NAD, Chronically ill appearing. AAOx3,very pleasant  EOMI Anicteric No droop, NC on nares. RRR   Mild increase work of breathing. Doesn't speak in complete sentences. Poor airflow thru-out. Expiratory wheeze w/ prolonged insp/exp phase. CXR:   Impression   COPD. There is no evidence of failure or pneumonia. Impressions:   Principal Problem:    COPD exacerbation ([address]  Active Problems:    Heart failure ([address]    Chronic hypoxemic respiratory failure (HCC)    Depression with anxiety    Anxiety  Resolved Problems:    * No resolved hospital problems. *      Plan:   1) COPD Exacerbation - Had been completing a steroid taper as outpatient. likely 2/2 anxiety/air hunger type sx. Low likelihood of bacterial contribution, stopped doxycycline. Patient motivated to get better to help assist w care of 2 grand-children. Cont to taper prednsone 3/25. 2) Anxiety / Air Hunger - trial benzo. Discussed intermittent use and if no response would need to seek further care if at home.     Dispo:  hope for discharge home tomorrow. Attending Physician Statement  I have reviewed the chart and seen the patient with the resident(s). I personally reviewed images, EKG's and similar tests, if present. I personally reviewed and performed key elements of the history and exam.  I have reviewed and confirmed student and/or resident history and exam with changes as indicated above. I agree with the assessment, plan and orders as documented by the resident. Please refer to the resident and/or student note for additional information.       Lalita Ramos MD

## 2023-03-25 NOTE — PLAN OF CARE
THIS WRITER APPROACHED PT ABOUT TAKING A WALK IN THE [place] TO CHECK A WALKING PULSE OX the patient STATES\" I CANT WALK WITHOUT A WALKER I HADCOVID AND I DONT FEEL COMFORTABLE I WAS IN REHAB AND I NEED TO WAKE UP SOME MORE. THIS WRITER WILL ORDER A WALKER.

## 2023-03-25 NOTE — PLAN OF CARE
Problem: Safety - Adult  Goal: Free from fall injury  3/25/2023 0415 by Clau Dixon RN  Outcome: Progressing  3/24/2023 1954 by Kathleen Allen RN  Outcome: Progressing     Problem: Chronic Conditions and Co-morbidities  Goal: Patient's chronic conditions and co-morbidity symptoms are monitored and maintained or improved  3/25/2023 0415 by Clau Dixon RN  Outcome: Progressing  3/24/2023 1954 by Kathleen Allen RN  Outcome: Progressing     Problem: Discharge Planning  Goal: Discharge to home or other facility with appropriate resources  3/25/2023 0415 by Clau Dixon RN  Outcome: Progressing  3/24/2023 1954 by Kathleen Allen RN  Outcome: Progressing     Problem: Skin/Tissue Integrity  Goal: Absence of new skin breakdown  Description: 1. Monitor for areas of redness and/or skin breakdown  2. Assess vascular access sites hourly  3. Every 4-6 hours minimum:  Change oxygen saturation probe site  4. Every 4-6 hours:  If on nasal continuous positive airway pressure, respiratory therapy assess nares and determine need for appliance change or resting period.   3/25/2023 0415 by Clau Dixon RN  Outcome: Progressing  3/24/2023 1954 by Kathleen Allen RN  Outcome: Progressing     Problem: Cardiovascular - Adult  Goal: Maintains optimal cardiac output and hemodynamic stability  3/25/2023 0415 by Clau Dixon RN  Outcome: Progressing  3/24/2023 1954 by Kathleen Allen RN  Outcome: Progressing     Problem: Metabolic/Fluid and Electrolytes - Adult  Goal: Hemodynamic stability and optimal renal function maintained  3/25/2023 0415 by Clau Dixon RN  Outcome: Progressing  3/24/2023 1954 by Kathleen Allen RN  Outcome: Progressing

## 2023-03-25 NOTE — PLAN OF CARE
THIS WRITER APPROACHED PT ABOUT WALKING IN THE [place] WITHOUT 02 TO CHECK HER PULSE OX. PT STATED SHE DOES NOT FEEL COMFORTABLE BECAUSE SHE NEEDS HER OXYGEN ON AT ALL TIMES. BUT THIS WRITER EXPLAINED THE THE DOCTOR WOULD LIKE A WALKING PULSE OX ON HER.  SHE STATED SHE WOULD TRY AFTER LUNCH

## 2023-03-26 VITALS
WEIGHT: 118.4 LBS | HEIGHT: 62 IN | HEART RATE: 66 BPM | TEMPERATURE: 98.3 F | OXYGEN SATURATION: 99 % | BODY MASS INDEX: 21.79 KG/M2 | SYSTOLIC BLOOD PRESSURE: 103 MMHG | DIASTOLIC BLOOD PRESSURE: 62 MMHG | RESPIRATION RATE: 18 BRPM

## 2023-03-26 PROBLEM — J44.1 COPD EXACERBATION (HCC): Status: RESOLVED | Noted: 2022-02-11 | Resolved: 2023-03-26

## 2023-03-26 LAB
ANION GAP SERPL CALCULATED.3IONS-SCNC: 8 MMOL/L (ref 7–16)
BACTERIA SPEC RESP CULT: NORMAL
BASOPHILS # BLD: 0.01 E9/L (ref 0–0.2)
BASOPHILS NFR BLD: 0.1 % (ref 0–2)
BUN SERPL-MCNC: 19 MG/DL (ref 6–23)
CALCIUM SERPL-MCNC: 8.9 MG/DL (ref 8.6–10.2)
CHLORIDE SERPL-SCNC: 98 MMOL/L (ref 98–107)
CO2 SERPL-SCNC: 40 MMOL/L (ref 22–29)
CREAT SERPL-MCNC: 0.4 MG/DL (ref 0.5–1)
EOSINOPHIL # BLD: 0.1 E9/L (ref 0.05–0.5)
EOSINOPHIL NFR BLD: 0.8 % (ref 0–6)
ERYTHROCYTE [DISTWIDTH] IN BLOOD BY AUTOMATED COUNT: 13.1 FL (ref 11.5–15)
GLUCOSE SERPL-MCNC: 87 MG/DL (ref 74–99)
HCT VFR BLD AUTO: 37.8 % (ref 34–48)
HGB BLD-MCNC: 11.5 G/DL (ref 11.5–15.5)
IMM GRANULOCYTES # BLD: 0.05 E9/L
IMM GRANULOCYTES NFR BLD: 0.4 % (ref 0–5)
LYMPHOCYTES # BLD: 2.45 E9/L (ref 1.5–4)
LYMPHOCYTES NFR BLD: 20 % (ref 20–42)
MCH RBC QN AUTO: 30.8 PG (ref 26–35)
MCHC RBC AUTO-ENTMCNC: 30.4 % (ref 32–34.5)
MCV RBC AUTO: 101.3 FL (ref 80–99.9)
MONOCYTES # BLD: 0.83 E9/L (ref 0.1–0.95)
MONOCYTES NFR BLD: 6.8 % (ref 2–12)
NEUTROPHILS # BLD: 8.81 E9/L (ref 1.8–7.3)
NEUTS SEG NFR BLD: 71.9 % (ref 43–80)
PLATELET # BLD AUTO: 229 E9/L (ref 130–450)
PMV BLD AUTO: 9.9 FL (ref 7–12)
POTASSIUM SERPL-SCNC: 3.8 MMOL/L (ref 3.5–5)
RBC # BLD AUTO: 3.73 E12/L (ref 3.5–5.5)
SMEAR, RESPIRATORY: NORMAL
SODIUM SERPL-SCNC: 146 MMOL/L (ref 132–146)
WBC # BLD: 12.3 E9/L (ref 4.5–11.5)

## 2023-03-26 PROCEDURE — 6370000000 HC RX 637 (ALT 250 FOR IP)

## 2023-03-26 PROCEDURE — 6360000002 HC RX W HCPCS: Performed by: STUDENT IN AN ORGANIZED HEALTH CARE EDUCATION/TRAINING PROGRAM

## 2023-03-26 PROCEDURE — 99238 HOSP IP/OBS DSCHRG MGMT 30/<: CPT | Performed by: FAMILY MEDICINE

## 2023-03-26 PROCEDURE — 2700000000 HC OXYGEN THERAPY PER DAY

## 2023-03-26 PROCEDURE — 36415 COLL VENOUS BLD VENIPUNCTURE: CPT

## 2023-03-26 PROCEDURE — 6370000000 HC RX 637 (ALT 250 FOR IP): Performed by: STUDENT IN AN ORGANIZED HEALTH CARE EDUCATION/TRAINING PROGRAM

## 2023-03-26 PROCEDURE — 80048 BASIC METABOLIC PNL TOTAL CA: CPT

## 2023-03-26 PROCEDURE — 85025 COMPLETE CBC W/AUTO DIFF WBC: CPT

## 2023-03-26 PROCEDURE — 2580000003 HC RX 258: Performed by: STUDENT IN AN ORGANIZED HEALTH CARE EDUCATION/TRAINING PROGRAM

## 2023-03-26 PROCEDURE — 94640 AIRWAY INHALATION TREATMENT: CPT

## 2023-03-26 RX ORDER — IPRATROPIUM BROMIDE AND ALBUTEROL SULFATE 2.5; .5 MG/3ML; MG/3ML
1 SOLUTION RESPIRATORY (INHALATION) EVERY 4 HOURS PRN
Qty: 360 ML | Refills: 3 | Status: SHIPPED | OUTPATIENT
Start: 2023-03-26

## 2023-03-26 RX ORDER — PREDNISONE 20 MG/1
10 TABLET ORAL DAILY
Qty: 2 TABLET | Refills: 0 | Status: SHIPPED | OUTPATIENT
Start: 2023-03-30 | End: 2023-04-03

## 2023-03-26 RX ORDER — ALPRAZOLAM 0.25 MG/1
0.25 TABLET ORAL 3 TIMES DAILY PRN
Qty: 90 TABLET | Refills: 3 | Status: SHIPPED | OUTPATIENT
Start: 2023-03-26 | End: 2023-07-24

## 2023-03-26 RX ORDER — PREDNISONE 20 MG/1
20 TABLET ORAL DAILY
Qty: 3 TABLET | Refills: 0 | Status: SHIPPED | OUTPATIENT
Start: 2023-03-26 | End: 2023-03-29

## 2023-03-26 RX ADMIN — FOLIC ACID 1 MG: 1 TABLET ORAL at 08:48

## 2023-03-26 RX ADMIN — ARFORMOTEROL TARTRATE 15 MCG: 15 SOLUTION RESPIRATORY (INHALATION) at 09:54

## 2023-03-26 RX ADMIN — ALPRAZOLAM 0.25 MG: 0.25 TABLET ORAL at 00:44

## 2023-03-26 RX ADMIN — Medication 1000 MG: at 08:48

## 2023-03-26 RX ADMIN — POTASSIUM CHLORIDE 20 MEQ: 1500 TABLET, EXTENDED RELEASE ORAL at 08:48

## 2023-03-26 RX ADMIN — IPRATROPIUM BROMIDE AND ALBUTEROL SULFATE 3 ML: .5; 2.5 SOLUTION RESPIRATORY (INHALATION) at 09:54

## 2023-03-26 RX ADMIN — PREDNISONE 40 MG: 20 TABLET ORAL at 08:49

## 2023-03-26 RX ADMIN — GUAIFENESIN 400 MG: 400 TABLET ORAL at 08:48

## 2023-03-26 RX ADMIN — ENOXAPARIN SODIUM 40 MG: 100 INJECTION SUBCUTANEOUS at 08:47

## 2023-03-26 RX ADMIN — Medication 10 ML: at 08:52

## 2023-03-26 RX ADMIN — FUROSEMIDE 20 MG: 40 TABLET ORAL at 08:48

## 2023-03-26 RX ADMIN — ROFLUMILAST 250 MCG: 500 TABLET ORAL at 08:49

## 2023-03-26 RX ADMIN — IPRATROPIUM BROMIDE AND ALBUTEROL SULFATE 3 ML: .5; 2.5 SOLUTION RESPIRATORY (INHALATION) at 13:28

## 2023-03-26 RX ADMIN — BUDESONIDE 250 MCG: 0.5 SUSPENSION RESPIRATORY (INHALATION) at 09:54

## 2023-03-26 RX ADMIN — SERTRALINE 25 MG: 50 TABLET, FILM COATED ORAL at 08:47

## 2023-03-26 ASSESSMENT — PAIN SCALES - GENERAL: PAINLEVEL_OUTOF10: 0

## 2023-03-26 NOTE — PROGRESS NOTES
Patient phoned the facility she is at [facility] attempting to pickup her medications that were called in. Patient is unable to get the Xanax. Pharmacy states that the physicians number is not correct. Attempted to perfect serve Dr. Hilda Salcedo to have a call placed to the pharmacy. Dr. Joseph Nicolas is taking calls for Dr. Hilda Salcedo. A secured message was sent to Dr. Joseph Nicolas via Perfect serve asking if she would be able to call [facility] so that patient will be able to get her prescription for Xanax today.

## 2023-03-26 NOTE — PROGRESS NOTES
Doctor called and stated that the pharmacy continues to ring busy. This nurse called and it is ringing busy. This nurse called the pt and the patient attempted and it also rang busy.

## 2023-03-26 NOTE — PROGRESS NOTES
0356)  estimated creatinine clearance is 115 mL/min (A) (based on SCr of 0.4 mg/dL (L)). Other pertinent labs as noted below    Radiology:  XR CHEST (2 VW)   Final Result   COPD. There is no evidence of failure or pneumonia. Resident Assessment and Plan       COPD Excerebration   -Likely due to severe COPD and some component of anxiety   -Recently hospitalized for similar symptoms. Was discharged on steroid taper and completed cefdinir antibiotic course. -Denies episodes of oxygen desaturation or increased sputum   -Status post Solu-Medrol 60 mg, doxycycline and DuoNebs x3 in ED.  -Baseline 4 L nasal cannula. Currently at baseline  -COVID and influenza negative. -CXR: COPD. No evidence of failure or pneumonia.  -Continue prednisone 40 mg daily. Will need a taper on discharge  -Discontinued Doxycycline - At this time, it is unlikely patient is in exacerbation. Continue to monitor   -No need to consult pulm at this time. Patient NEEDS to follow-up with them as outpatient. She needs to discuss her options (BiPAP as outpatient vs options for lung transplant). -Continue mucinex  -Continue home breathing treatment Dulera, Daliresp and incruse  -Xanax TID prn, patient has been asking and receiving medication. She reports significant relief of her anxiety. Hx of Severe COPD  -As above       CHF  -Echocardiogram January 2023 showed ejection fraction 40-45 percent.  -We will continue home dose Lasix, potassium supplement and metoprolol  -Strict I's and O's  -Continue daily weights  -Inpatient consult to CHF nurse      Hyperlipidemia  -Continue atorvastatin     Depression/anxiety  -Continue home dose Zoloft  -Xanax TID prn added for this admission. It is likely the anxiety patient is describing is due, in part, to air-hunger she experiences secondary to her severe COPD. She can be discharged home on Xanax 0.25mg once daily.   So far patient has responded very well to Xanax and reports significant reduction in her anxiety level. PT/OT evaluation: Not indicated at this time   DVT prophylaxis:Lovenox   GI prophylaxis: Protonix   Disposition:Med surg. Patient has been accepted to [facility], but placement cannot happen until Wednesday 3/29.   Diet: Low-sodium       Electronically signed by Naian Leonard MD on 3/26/2023 at 7:50 AM  Attending physician: Dr. Duarte Hunter

## 2023-03-26 NOTE — PLAN OF CARE
Problem: Safety - Adult  Goal: Free from fall injury  3/26/2023 1128 by Mikhail Langston RN  Outcome: Completed  3/26/2023 1115 by Mikhail Langston RN  Outcome: Progressing     Problem: Discharge Planning  Goal: Discharge to home or other facility with appropriate resources  3/26/2023 1128 by Mikhail Langston RN  Outcome: Completed  3/26/2023 1115 by Mikhail Langston RN  Outcome: Progressing     Problem: Cardiovascular - Adult  Goal: Maintains optimal cardiac output and hemodynamic stability  3/26/2023 1128 by Mikhail Langston RN  Outcome: Completed  3/26/2023 1115 by Mikhail Langston RN  Outcome: Progressing     Problem: Metabolic/Fluid and Electrolytes - Adult  Goal: Hemodynamic stability and optimal renal function maintained  3/26/2023 1128 by Mikhail Langston RN  Outcome: Completed  3/26/2023 1115 by Mikhail Langston RN  Outcome: Progressing     Problem: Pain  Goal: Verbalizes/displays adequate comfort level or baseline comfort level  3/26/2023 1128 by Mikhail Langston RN  Outcome: Completed  3/26/2023 1115 by Mikhail Langston RN  Outcome: Progressing

## 2023-03-26 NOTE — CARE COORDINATION
Brynn with [facility] notified of discharge today. Angela 78 orders on chart.
CM note. Chart reviewed and case discussed during IDR. Pt recently d/c from this facility on 3/11 to home. Readmitted with COPD. Zeferino and pt/ot kathy ordered. Met with pt to discuss d/c planning. Pt is currently on oxygen at 4 L which is at her baseline. She is from home with her daughter. Discussed [facility], she is adamant that she will not go to a [facility]. She is open to [facility] if she will not have any out of pocket costs. Pt does not have any preference of [address] Left VM with Massimo Cruz from [facility] with referral information. CM/SW to follow. 1450:  spoke with Massimo Cruz from [facility]. They are able to accept. Next start of care is Wednesday 3/29. Will need [facility] orders prior to d/c for SN, CPA, Med compliance, pt/ot. Syed Lazo [phone].
Discharge orders noted. Patient to discharge to home with [facility], start of care to be Wednesday 3/29. Home care orders received for transition of care. Attempted to call [facility] to notify of receipt of discharge orders. Unable to get through to anyone calling multiple times. Unable to leave VM to notify of discharge today. AVS updated with contact information for [address] [facility].      Timbo Rebolledo RN.  Todd Ward Otoniel [phone]
([facility] [H34.55]    IF APPLICABLE: The Patient and/or patient representative Alexsander Guevara and her family were provided with a choice of provider and agrees with the discharge plan. Freedom of choice list with basic dialogue that supports the patient's individualized plan of care/goals and shares the quality data associated with the providers was provided to:     Patient Representative Name:       The Patient and/or Patient Representative Agree with the Discharge Plan?       40  Janice Andrews  Case Management Department  Ph: [phone] Fax: [phone]

## 2023-03-26 NOTE — PROGRESS NOTES
prednisone 40 mg. Will send home with 20 mg prednisone x 3 days, then 10 mg prednisone x 3 days. 2) Anxiety / Air Hunger - trial benzo. Discussed intermittent use and if no response would need to seek further care if at home. Dispo:  discharge home today     Attending Physician Statement  I have reviewed the chart and seen the patient with the resident(s). I personally reviewed images, EKG's and similar tests, if present. I personally reviewed and performed key elements of the history and exam.  I have reviewed and confirmed student and/or resident history and exam with changes as indicated above. I agree with the assessment, plan and orders as documented by the resident. Please refer to the resident and/or student note for additional information.       Abida Lindsey MD

## 2023-03-26 NOTE — PLAN OF CARE
Problem: Safety - Adult  Goal: Free from fall injury  Outcome: Progressing     Problem: Discharge Planning  Goal: Discharge to home or other facility with appropriate resources  Outcome: Progressing     Problem: Cardiovascular - Adult  Goal: Maintains optimal cardiac output and hemodynamic stability  Outcome: Progressing     Problem: Metabolic/Fluid and Electrolytes - Adult  Goal: Hemodynamic stability and optimal renal function maintained  Outcome: Progressing     Problem: Pain  Goal: Verbalizes/displays adequate comfort level or baseline comfort level  Outcome: Progressing

## 2023-03-27 ENCOUNTER — CARE COORDINATION (OUTPATIENT)
Dept: CASE MANAGEMENT | Age: 63
End: 2023-03-27

## 2023-03-27 NOTE — DISCHARGE SUMMARY
Discharge Summary    Moo Gambino  :  1960  MRN:  [id number]    Admit date:  3/23/2023  Discharge date:  3/26/2023    Admitting Physician:  Daxa Bruno MD    Discharge Diagnoses:    Patient Active Problem List   Diagnosis    Tobacco use disorder    Seasonal allergic rhinitis    Chronic bronchitis ([address]    Chronic hypoxemic respiratory failure ([address]    Chronic obstructive pulmonary disease ([address]    Acute respiratory failure with hypercapnia (HCC)    Acute on chronic respiratory failure with hypoxia and hypercapnia (HCC)    Acute diverticulitis with abscess    Acute cholecystitis    Acute on chronic diastolic CHF (congestive heart failure) ([address]    Sepsis ([address]    Pulmonary fibrosis (HCC)    Hypoxemia    Heart failure ([address]    Abnormal nuclear stress test    Depression with anxiety    Anxiety       Admission Condition:  good    Discharged Condition:  good    Hospital Course:   Moo Gambino is a 58 y.o. female with a PMH of HFrEF (EF-40-45%), COPD on 4L NC baseline and DVT who presented to ED for Shortness of Breath. Patient reports becoming anxious over the past 2 nights and feeling short of breath. During these episodes she has noted normal oxygen saturation on her pulse ox. Reports symptoms sometimes improve on their own or with deep breathing or with breathing treatments. Patient was recently discharged from [facility] after COPD exacerbation. On that admission, patient was initially on IV  Rocephin and steroids for 5 days then transitioned to PO steroid taper (17 days) and cefdinir for 3 days. Since being discharged, patient reports completing antibiotic course and still being on steroid taper. She was unable to follow-up with pulmonology outpatient. She denied any lower extremity swelling, chest pain, abdominal pain, nausea or vomiting. While in the ED patient was noted to be at baseline for oxygen requirement (4L NC) while ambulating saturating at 90% but reported feeling SOB.  ED course remarkable for: 81 MG chewable tablet  Take 1 tablet by mouth daily     atorvastatin 40 MG tablet  Commonly known as: LIPITOR  Take 1 tablet by mouth nightly     docusate sodium 100 MG capsule  Commonly known as: COLACE     fluticasone 50 MCG/ACT nasal spray  Commonly known as: FLONASE     folic acid 1 MG tablet  Commonly known as: FOLVITE  Take 1 tablet by mouth daily     furosemide 20 MG tablet  Commonly known as: LASIX  Take 1 tablet by mouth daily     guaiFENesin 400 MG tablet  Take 1 tablet by mouth in the morning, at noon, and at bedtime     Incruse Ellipta 62.5 MCG/ACT inhaler  Generic drug: umeclidinium bromide     ipratropium-albuterol 0.5-2.5 (3) MG/3ML Soln nebulizer solution  Commonly known as: DUONEB  Inhale 3 mLs into the lungs every 4 hours as needed for Shortness of Breath     melatonin 5 MG Tbdp disintegrating tablet  Take 1 tablet by mouth nightly     metoprolol succinate 25 MG extended release tablet  Commonly known as: TOPROL XL  Take 0.5 tablets by mouth nightly     mometasone-formoterol 100-5 MCG/ACT inhaler  Commonly known as: Dulera  Inhale 2 puffs into the lungs 2 times daily Rinse mouth after using.      OXYGEN     pantoprazole 40 MG tablet  Commonly known as: PROTONIX     potassium chloride 20 MEQ extended release tablet  Commonly known as: KLOR-CON M  take 1 tablet by mouth once daily     Roflumilast 250 MCG tablet  Commonly known as: Daliresp  Take 1 tablet by mouth daily     sertraline 25 MG tablet  Commonly known as: ZOLOFT  Take 1 tablet by mouth daily     sodium chloride 0.65 % nasal spray  Commonly known as: OCEAN, BABY AYR  2 sprays by Nasal route every 4 hours as needed for Congestion (congestion, nasal pain)     vitamin D 50 MCG (2000 UT) Tabs tablet  Commonly known as: CHOLECALCIFEROL  Take 1 tablet by mouth daily               Where to Get Your Medications        These medications were sent to [place], [address] [facility]

## 2023-03-27 NOTE — CARE COORDINATION
[facility] Care Transitions Initial Follow Up Call    Call within 2 business days of discharge: Yes    Patient: Wilma Cornell Patient : 1960   MRN: [id number]  Reason for Admission: COPD EXACERBATION   Discharge Date: 3/26/23 RARS: Readmission Risk Score: 34.3      Last Discharge  Street       Date Complaint Diagnosis Description Type Department Provider    3/23/23 Shortness of Breath COPD exacerbation ([facility] . .. ED to Hosp-Admission (Discharged) (ADMITTED) ORQUIDEA Zavala MD; Neva Canales. .. First attempt to reach the patient for initial Care Transition call post hospital discharge. Message left with CTN's contact information requesting return phone call. Spoke with Krystal at [facility], she stated they have orders, however, they also attempted to reach [facility] without success.     Follow Up  Future Appointments   Date Time Provider Lupe Arnett   4/3/2023 10:00 AM [facility] ROOM Sharon Borrero 1277 CHF Khris Akers RN

## 2023-03-28 ENCOUNTER — CARE COORDINATION (OUTPATIENT)
Dept: CASE MANAGEMENT | Age: 63
End: 2023-03-28

## 2023-03-28 NOTE — CARE COORDINATION
[facility] Care Transitions Initial Follow Up Call    Call within 2 business days of discharge: Yes    Patient: Doris Encarnacion Patient : 1960   MRN: [id number]  Reason for Admission: COPD exacerbation   Discharge Date: 3/26/23 RARS: Readmission Risk Score: 34.3    Last Discharge  Street       Date Complaint Diagnosis Description Type Department Provider    3/23/23 Shortness of Breath COPD exacerbation ([address] . .. ED to Hosp-Admission (Discharged) (ADMITTED) ORQUIDEA RivasWE Manuel Kwok MD; Zoey Phelps. .. Second and final attempt to reach the patient for initial Care Transition call post hospital discharge. Message left with CTN's contact information requesting return phone call. Will route a message to Dr. Hayden Browne office requesting that an attempt be made to contact the patient to schedule TCM visit within 7 days of discharge, discharge date 3/26/23. Excluded from Care Transition calls until 23 (one month) due to being unable to reach after two consecutive admissions.      Follow Up  Future Appointments   Date Time Provider Lupe Arnett   3/30/2023  2:00 PM [facility] CHF ROOM 1 [facility] Cherelle Matos   3/31/2023 10:30 AM SKYE Miller - CNP ACC Pulm [facility]   4/3/2023 10:00 AM [facility] CHF ROOM 1 [facility] Rylan Torres RN

## 2023-03-30 ENCOUNTER — TELEPHONE (OUTPATIENT)
Dept: OTHER | Age: 63
End: 2023-03-30

## 2023-03-30 ENCOUNTER — HOSPITAL ENCOUNTER (OUTPATIENT)
Dept: OTHER | Age: 63
Setting detail: THERAPIES SERIES
Discharge: HOME | End: 2023-03-30
Payer: COMMERCIAL

## 2023-03-30 VITALS
WEIGHT: 121 LBS | OXYGEN SATURATION: 100 % | RESPIRATION RATE: 18 BRPM | BODY MASS INDEX: 22.13 KG/M2 | SYSTOLIC BLOOD PRESSURE: 116 MMHG | DIASTOLIC BLOOD PRESSURE: 61 MMHG | HEART RATE: 107 BPM

## 2023-03-30 LAB
ANION GAP SERPL CALCULATED.3IONS-SCNC: 4 MMOL/L (ref 7–16)
BNP BLD-MCNC: 149 PG/ML (ref 0–125)
BUN SERPL-MCNC: 19 MG/DL (ref 6–23)
CALCIUM SERPL-MCNC: 9.2 MG/DL (ref 8.6–10.2)
CHLORIDE SERPL-SCNC: 93 MMOL/L (ref 98–107)
CO2 SERPL-SCNC: 44 MMOL/L (ref 22–29)
CREAT SERPL-MCNC: 0.5 MG/DL (ref 0.5–1)
GLUCOSE SERPL-MCNC: 115 MG/DL (ref 74–99)
POTASSIUM SERPL-SCNC: 3.7 MMOL/L (ref 3.5–5)
SODIUM SERPL-SCNC: 141 MMOL/L (ref 132–146)

## 2023-03-30 PROCEDURE — 36415 COLL VENOUS BLD VENIPUNCTURE: CPT

## 2023-03-30 PROCEDURE — 99214 OFFICE O/P EST MOD 30 MIN: CPT

## 2023-03-30 PROCEDURE — 83880 ASSAY OF NATRIURETIC PEPTIDE: CPT

## 2023-03-30 PROCEDURE — 80048 BASIC METABOLIC PNL TOTAL CA: CPT

## 2023-03-30 NOTE — PROGRESS NOTES
Congestive Heart Failure [address] [facility] [place]   1960    Referring Provider: Dr. Maeve Stringer   Primary Care Physician: Dr. Velasquez Proper   Cardiologist: Dr. Mar APRNICK-CNP   Nephrologist: JUANITA      History of Present Illness:  Jasmina Gates is a 58 y.o. female with a history of  HFmrEF , most recent EF 40-45% TTE. 01/4/2023    Patient Story:  She does  have dyspnea with exertion, shortness of breath, or decline in overall functional capacity (unchanged) Chronic wearing 4-5L of (N/C). She does not have orthopnea, PND, nocturnal cough or hemoptysis. She does not have abdominal distention or bloating, early satiety, anorexia/change in appetite. She does has a good urinary response to oral diuretic. She does have lower extremity edema. She denies lightheadedness, dizziness. She denies palpitations, syncope or near syncope. Pt was recently started on metoprolol succinate 12.5mg nightly and states she longer has palpitations. She does not complain of chest pain, pressure, discomfort. Allergies   Allergen Reactions    Penicillin V Hives and Other (See Comments)     11/07/2005 UNKNOWN, PLEASE VERIFY, 11/07/2005 UNKNOWN, PLEASE VERIFY     No outpatient medications have been marked as taking for the 3/30/23 encounter [facility] Encounter) with [facility] CHF ROOM 1. Current Outpatient Medications on File Prior to Encounter   Medication Sig Dispense Refill    ALPRAZolam (XANAX) 0.25 MG tablet Take 1 tablet by mouth 3 times daily as needed for Anxiety (As needed for anxiety) for up to 120 days.  Max Daily Amount: 0.75 mg 90 tablet 3    ipratropium-albuterol (DUONEB) 0.5-2.5 (3) MG/3ML SOLN nebulizer solution Inhale 3 mLs into the lungs every 4 hours as needed for Shortness of Breath 360 mL 3    predniSONE (DELTASONE) 20 MG tablet Take 0.5 tablets by mouth daily for 4 days 2 tablet 0    guaiFENesin 400 MG tablet Take 1 tablet by mouth in the morning, at noon, and at

## 2023-03-30 NOTE — TELEPHONE ENCOUNTER
5:01 PM  Spoke with patient's daughter Kem Murray. Patient does not have a CPAP. Abnormal labs faxed to patient's cardiologist as well Dr. Jazmín Sherwood.     Electronically signed by Sanjeev Heck RN on 3/30/2023 at 5:02 PM 88

## 2023-03-30 NOTE — TELEPHONE ENCOUNTER
4:55 PM   Lab called to report critical CO2 level from today's CHF clinic appointment. Patient with COPD and sees Dr. Yumiko Lux tomorrow for initial pulmonology consultation. Labs from today's visit faxed to physician for review. Electronically signed by Jeff Horta RN on 3/30/2023 at 4:56 PM      Attempted to call patient re: CPAP usage however no answer.

## 2023-03-31 ENCOUNTER — OFFICE VISIT (OUTPATIENT)
Dept: PULMONOLOGY | Age: 63
End: 2023-03-31
Payer: COMMERCIAL

## 2023-03-31 DIAGNOSIS — J96.12 CHRONIC RESPIRATORY FAILURE WITH HYPOXIA AND HYPERCAPNIA (HCC): ICD-10-CM

## 2023-03-31 DIAGNOSIS — Z87.891 HISTORY OF TOBACCO USE IN PAST YEAR: ICD-10-CM

## 2023-03-31 DIAGNOSIS — J44.9 CHRONIC OBSTRUCTIVE PULMONARY DISEASE, UNSPECIFIED COPD TYPE (HCC): Primary | ICD-10-CM

## 2023-03-31 DIAGNOSIS — R93.89 ABNORMAL CT OF THE CHEST: ICD-10-CM

## 2023-03-31 DIAGNOSIS — J84.10 PULMONARY FIBROSIS (HCC): ICD-10-CM

## 2023-03-31 DIAGNOSIS — J96.11 CHRONIC RESPIRATORY FAILURE WITH HYPOXIA AND HYPERCAPNIA (HCC): ICD-10-CM

## 2023-03-31 LAB
EXPIRATORY TIME: 7.81 SEC
FEF 25-75% %PRED-PRE: 7 L/SEC
FEF 25-75% PRED: 2.05 L/SEC
FEF 25-75%-PRE: 0.16 L/SEC
FEV1 %PRED-PRE: 16 %
FEV1 PRED: 2.26 L
FEV1/FVC %PRED-PRE: 72 %
FEV1/FVC PRED: 79 %
FEV1/FVC: 58 %
FEV1: 0.37 L
FVC %PRED-PRE: 22 %
FVC PRED: 2.86 L
FVC: 0.64 L
PEF %PRED-PRE: NORMAL
PEF PRED: NORMAL
PEF-PRE: NORMAL

## 2023-03-31 PROCEDURE — 94010 BREATHING CAPACITY TEST: CPT | Performed by: NURSE PRACTITIONER

## 2023-03-31 PROCEDURE — 99215 OFFICE O/P EST HI 40 MIN: CPT | Performed by: NURSE PRACTITIONER

## 2023-03-31 RX ORDER — ROFLUMILAST 500 UG/1
500 TABLET ORAL DAILY
Qty: 30 TABLET | Refills: 6 | Status: SHIPPED | OUTPATIENT
Start: 2023-03-31

## 2023-03-31 RX ORDER — GUAIFENESIN 600 MG/1
600 TABLET, EXTENDED RELEASE ORAL 2 TIMES DAILY
Qty: 30 TABLET | Refills: 6 | Status: SHIPPED | OUTPATIENT
Start: 2023-03-31

## 2023-03-31 RX ORDER — BUDESONIDE, GLYCOPYRROLATE, AND FORMOTEROL FUMARATE 160; 9; 4.8 UG/1; UG/1; UG/1
2 AEROSOL, METERED RESPIRATORY (INHALATION) 2 TIMES DAILY
Qty: 1 EACH | Refills: 6 | Status: SHIPPED | OUTPATIENT
Start: 2023-03-31

## 2023-03-31 RX ORDER — FLUTICASONE PROPIONATE 50 MCG
2 SPRAY, SUSPENSION (ML) NASAL DAILY PRN
Qty: 1 EACH | Refills: 6 | Status: SHIPPED | OUTPATIENT
Start: 2023-03-31

## 2023-03-31 RX ORDER — ALBUTEROL SULFATE 90 UG/1
2 AEROSOL, METERED RESPIRATORY (INHALATION) EVERY 6 HOURS PRN
Qty: 3 EACH | Refills: 3 | Status: SHIPPED | OUTPATIENT
Start: 2023-03-31 | End: 2023-06-29

## 2023-03-31 ASSESSMENT — PULMONARY FUNCTION TESTS
FVC: 0.64
FVC_PREDICTED: 2.86
FEV1_PREDICTED: 2.26
FEV1: 0.37
FEV1_PERCENT_PREDICTED_PRE: 16
FEV1/FVC_PREDICTED: 79
FVC_PERCENT_PREDICTED_PRE: 22
FEV1/FVC_PERCENT_PREDICTED_PRE: 72
FEV1/FVC: 58

## 2023-03-31 NOTE — LETTER
March 31, 2023       Rachelle YOB: 1960   [address] [facility] [address] Date of Visit:  3/31/2023       To Whom It May Concern: It is my medical opinion that Jennifer Jordan requires a disability parking placard for the following reasons:  She cannot walk without assistance from another person or the use of an assistance device (cane, crutch, prosthetic device, wheelchair, etc.). Duration of need: permanent    If you have any questions or concerns, please don't hesitate to call.     Sincerely,        Wale Melvin, APRN - CNP

## 2023-03-31 NOTE — PROGRESS NOTES
New patient to see provider in office; Dtr with pt. Lengthy discussion about med management and need for Trilolgy NIV device to help with her respiratory failure and repeated hospital admissions. Confusion with meds and which neb or inhalers to use daily and how often. Reviewed in detail and office gave samples of Breztri inhaler that will be what she uses twice daily along with Duoneb aerosols Q4hrs prn. Pt also to begin Daliresp pills daily; office to work on Prior Auth for pt. Plan for follow up in office in 1 month and office to arrange with [facility] NIV device. Parking letter given. Office to arrange for Bronch procedure with dr Yara Espinal and will call with details. AVS printed and reviewed with pt and dtr.
left ventricular wall thickness. Grossly normal right ventricular size and function. No significant valvular abnormalities. ALLERGIES:  Allergies   Allergen Reactions    Penicillin V Hives and Other (See Comments)     2005 UNKNOWN, PLEASE VERIFY, 2005 UNKNOWN, PLEASE VERIFY     SOCIAL HISTORY:   Social History     Tobacco Use    Smoking status: Former     Packs/day: 0.50     Years: 41.00     Pack years: 20.50     Types: Cigarettes     Quit date: 2021     Years since quittin.8    Smokeless tobacco: Never   Vaping Use    Vaping Use: Never used   Substance Use Topics    Alcohol use: Not Currently     Comment: social. none now. no coffee/caffeine    Drug use: Not Currently     Types: Marijuana [place])     Comment: occasional .  FEW YEARS AGO FOR PAIN USED. MEDS:   Current Outpatient Medications   Medication Sig Dispense Refill    ALPRAZolam (XANAX) 0.25 MG tablet Take 1 tablet by mouth 3 times daily as needed for Anxiety (As needed for anxiety) for up to 120 days. Max Daily Amount: 0.75 mg 90 tablet 3    ipratropium-albuterol (DUONEB) 0.5-2.5 (3) MG/3ML SOLN nebulizer solution Inhale 3 mLs into the lungs every 4 hours as needed for Shortness of Breath 360 mL 3    predniSONE (DELTASONE) 20 MG tablet Take 0.5 tablets by mouth daily for 4 days 2 tablet 0    guaiFENesin 400 MG tablet Take 1 tablet by mouth in the morning, at noon, and at bedtime 56 tablet 0    folic acid (FOLVITE) 1 MG tablet Take 1 tablet by mouth daily 30 tablet 3    mometasone-formoterol (DULERA) 100-5 MCG/ACT inhaler Inhale 2 puffs into the lungs 2 times daily Rinse mouth after using.  13 g 1    Roflumilast (DALIRESP) 250 MCG tablet Take 1 tablet by mouth daily 30 tablet 1    potassium chloride (KLOR-CON M) 20 MEQ extended release tablet take 1 tablet by mouth once daily 30 tablet 0    fluticasone (FLONASE) 50 MCG/ACT nasal spray 2 sprays by Nasal route daily as needed for Rhinitis      umeclidinium bromide (INCRUSE ELLIPTA)

## 2023-04-05 ENCOUNTER — TELEPHONE (OUTPATIENT)
Dept: PULMONOLOGY | Age: 63
End: 2023-04-05

## 2023-04-05 NOTE — TELEPHONE ENCOUNTER
Call returned to Dtr Candace after VM requesting assistance with pharmacy needs for medication. Left VM advising Dtr to call back to office with additional details; [facility] pharmacist called and determined Daliresp medication requires PA. Office Rn to work on getting PA.

## 2023-04-06 RX ORDER — POTASSIUM CHLORIDE 20 MEQ/1
TABLET, EXTENDED RELEASE ORAL
Qty: 30 TABLET | Refills: 0 | Status: SHIPPED | OUTPATIENT
Start: 2023-04-06

## 2023-04-06 NOTE — TELEPHONE ENCOUNTER
Last Appointment:  7/19/2021  Future Appointments  4/14/2023  10:30 AM   [facility] CHF ROOM 1            [facility]  4/27/2023  11:00 AM   SKYE Juarez* ACC Pulm            HP

## 2023-04-07 ENCOUNTER — TELEPHONE (OUTPATIENT)
Dept: PULMONOLOGY | Age: 63
End: 2023-04-07

## 2023-04-07 NOTE — TELEPHONE ENCOUNTER
Call to pt to advised fax has been sent to [facility] with the Prior Auth for the new Daliresp script; VM left for pt to call if any questions.

## 2023-04-14 ENCOUNTER — HOSPITAL ENCOUNTER (OUTPATIENT)
Dept: OTHER | Age: 63
Setting detail: THERAPIES SERIES
Discharge: HOME | End: 2023-04-14
Payer: COMMERCIAL

## 2023-04-20 ENCOUNTER — HOSPITAL ENCOUNTER (OUTPATIENT)
Dept: OTHER | Age: 63
Setting detail: THERAPIES SERIES
Discharge: HOME | End: 2023-04-20
Payer: COMMERCIAL

## 2023-04-20 VITALS
RESPIRATION RATE: 22 BRPM | OXYGEN SATURATION: 98 % | SYSTOLIC BLOOD PRESSURE: 117 MMHG | DIASTOLIC BLOOD PRESSURE: 70 MMHG | BODY MASS INDEX: 22.94 KG/M2 | WEIGHT: 125.4 LBS | HEART RATE: 95 BPM

## 2023-04-20 LAB
ANION GAP SERPL CALCULATED.3IONS-SCNC: 7 MMOL/L (ref 7–16)
BNP BLD-MCNC: 133 PG/ML (ref 0–125)
BUN SERPL-MCNC: 8 MG/DL (ref 6–23)
CALCIUM SERPL-MCNC: 9.5 MG/DL (ref 8.6–10.2)
CHLORIDE SERPL-SCNC: 100 MMOL/L (ref 98–107)
CO2 SERPL-SCNC: 36 MMOL/L (ref 22–29)
CREAT SERPL-MCNC: 0.3 MG/DL (ref 0.5–1)
GLUCOSE SERPL-MCNC: 92 MG/DL (ref 74–99)
POTASSIUM SERPL-SCNC: 3.6 MMOL/L (ref 3.5–5)
SODIUM SERPL-SCNC: 143 MMOL/L (ref 132–146)

## 2023-04-20 PROCEDURE — 36415 COLL VENOUS BLD VENIPUNCTURE: CPT

## 2023-04-20 PROCEDURE — 83880 ASSAY OF NATRIURETIC PEPTIDE: CPT

## 2023-04-20 PROCEDURE — 80048 BASIC METABOLIC PNL TOTAL CA: CPT

## 2023-04-20 PROCEDURE — 99214 OFFICE O/P EST MOD 30 MIN: CPT

## 2023-04-20 NOTE — PROGRESS NOTES
Congestive Heart Failure [address] [facility],[address] ZAY Dorsey   1960    Referring Provider: Dr. Dominick Renteria   Primary Care Physician: Dr. Aishwarya Latham   Cardiologist: Dr. Purnima Ortiz APRN-CNP   Nephrologist: JUANITA    History of Present Illness:  Momo Garcia is a 58 y.o. female with a history of  HFmrEF , most recent EF 40-45% TTE. 01/4/2023    Patient Story:  She does have dyspnea with exertion, shortness of breath, or decline in overall functional capacity (unchanged) Chronic wearing 4-5L of (N/C). She does not have orthopnea, PND, nocturnal cough or hemoptysis. She does not have abdominal distention or bloating, early satiety, anorexia/change in appetite. She does have a good urinary response to oral diuretic. She does not have lower extremity edema. She denies lightheadedness, dizziness. She denies palpitations, syncope or near syncope. She does not complain of chest pain, pressure, discomfort. Allergies   Allergen Reactions    Penicillin V Hives and Other (See Comments)     11/07/2005 UNKNOWN, PLEASE VERIFY, 11/07/2005 UNKNOWN, PLEASE VERIFY     No outpatient medications have been marked as taking for the 4/20/23 encounter [facility] HOSPITAL Encounter) with [facility] CHF ROOM 1.      Current Outpatient Medications on File Prior to Encounter   Medication Sig Dispense Refill    potassium chloride (KLOR-CON M) 20 MEQ extended release tablet take 1 tablet by mouth once daily 30 tablet 0    Roflumilast (DALIRESP) 500 MCG tablet Take 1 tablet by mouth daily 30 tablet 6    albuterol sulfate HFA (PROVENTIL HFA) 108 (90 Base) MCG/ACT inhaler Inhale 2 puffs into the lungs every 6 hours as needed for Wheezing 3 each 3    Budeson-Glycopyrrol-Formoterol (BREZTRI AEROSPHERE) 160-9-4.8 MCG/ACT AERO Inhale 2 puffs into the lungs in the morning and at bedtime 1 each 6    guaiFENesin (MUCINEX) 600 MG extended release tablet Take 1 tablet by mouth 2 times daily 30 tablet 6    fluticasone (FLONASE)

## 2023-04-25 ENCOUNTER — OFFICE VISIT (OUTPATIENT)
Dept: FAMILY MEDICINE CLINIC | Age: 63
End: 2023-04-25
Payer: COMMERCIAL

## 2023-04-25 VITALS
OXYGEN SATURATION: 98 % | SYSTOLIC BLOOD PRESSURE: 105 MMHG | TEMPERATURE: 97.2 F | RESPIRATION RATE: 22 BRPM | WEIGHT: 126 LBS | HEART RATE: 87 BPM | BODY MASS INDEX: 23.19 KG/M2 | DIASTOLIC BLOOD PRESSURE: 67 MMHG | HEIGHT: 62 IN

## 2023-04-25 DIAGNOSIS — Z12.11 ENCOUNTER FOR SCREENING FOR MALIGNANT NEOPLASM OF COLON: Primary | ICD-10-CM

## 2023-04-25 DIAGNOSIS — J84.10 PULMONARY FIBROSIS (HCC): ICD-10-CM

## 2023-04-25 DIAGNOSIS — R19.7 DIARRHEA, UNSPECIFIED TYPE: ICD-10-CM

## 2023-04-25 DIAGNOSIS — J44.1 COPD EXACERBATION (HCC): ICD-10-CM

## 2023-04-25 PROCEDURE — 99213 OFFICE O/P EST LOW 20 MIN: CPT | Performed by: FAMILY MEDICINE

## 2023-04-25 RX ORDER — STANNOUS FLUORIDE 1.4 MG/G
1 PASTE, DENTIFRICE DENTAL DAILY
Qty: 414 G | Refills: 1 | Status: SHIPPED | OUTPATIENT
Start: 2023-04-25

## 2023-04-25 RX ORDER — ATORVASTATIN CALCIUM 40 MG/1
40 TABLET, FILM COATED ORAL NIGHTLY
Qty: 30 TABLET | Refills: 3 | Status: SHIPPED | OUTPATIENT
Start: 2023-04-25

## 2023-04-25 RX ORDER — CHOLECALCIFEROL (VITAMIN D3) 50 MCG
2000 TABLET ORAL DAILY
Qty: 30 TABLET | Refills: 0 | Status: SHIPPED | OUTPATIENT
Start: 2023-04-25

## 2023-04-25 RX ORDER — PANTOPRAZOLE SODIUM 40 MG/1
40 TABLET, DELAYED RELEASE ORAL NIGHTLY
Qty: 30 TABLET | Refills: 3 | Status: SHIPPED | OUTPATIENT
Start: 2023-04-25

## 2023-04-25 RX ORDER — FOLIC ACID 1 MG/1
1 TABLET ORAL DAILY
Qty: 30 TABLET | Refills: 3 | Status: SHIPPED | OUTPATIENT
Start: 2023-04-25

## 2023-04-25 RX ORDER — FUROSEMIDE 20 MG/1
20 TABLET ORAL DAILY
Qty: 60 TABLET | Refills: 3 | Status: SHIPPED | OUTPATIENT
Start: 2023-04-25

## 2023-04-25 SDOH — ECONOMIC STABILITY: FOOD INSECURITY: WITHIN THE PAST 12 MONTHS, YOU WORRIED THAT YOUR FOOD WOULD RUN OUT BEFORE YOU GOT MONEY TO BUY MORE.: NEVER TRUE

## 2023-04-25 SDOH — ECONOMIC STABILITY: FOOD INSECURITY: WITHIN THE PAST 12 MONTHS, THE FOOD YOU BOUGHT JUST DIDN'T LAST AND YOU DIDN'T HAVE MONEY TO GET MORE.: NEVER TRUE

## 2023-04-25 SDOH — ECONOMIC STABILITY: HOUSING INSECURITY
IN THE LAST 12 MONTHS, WAS THERE A TIME WHEN YOU DID NOT HAVE A STEADY PLACE TO SLEEP OR SLEPT IN A SHELTER (INCLUDING NOW)?: NO

## 2023-04-25 SDOH — ECONOMIC STABILITY: INCOME INSECURITY: HOW HARD IS IT FOR YOU TO PAY FOR THE VERY BASICS LIKE FOOD, HOUSING, MEDICAL CARE, AND HEATING?: NOT HARD AT ALL

## 2023-04-25 ASSESSMENT — PATIENT HEALTH QUESTIONNAIRE - PHQ9
SUM OF ALL RESPONSES TO PHQ QUESTIONS 1-9: 0
2. FEELING DOWN, DEPRESSED OR HOPELESS: 0
1. LITTLE INTEREST OR PLEASURE IN DOING THINGS: 0
SUM OF ALL RESPONSES TO PHQ9 QUESTIONS 1 & 2: 0
SUM OF ALL RESPONSES TO PHQ QUESTIONS 1-9: 0

## 2023-04-25 ASSESSMENT — LIFESTYLE VARIABLES: HOW OFTEN DO YOU HAVE A DRINK CONTAINING ALCOHOL: NEVER

## 2023-04-25 NOTE — PROGRESS NOTES
[address] [facility]  Department of Family Medicine  Family Medicine Residency Program      Patient:  Anusha Carballo 61 y.o. female          Chief complaint:   Chief Complaint   Patient presents with    Check-Up         History of Present Illness   The patient is a 61 y.o. female with a PMH of COPD with frequent recurrent hospitalizations, chronic hypoxic respiratory failure, pulmonary fibrosis, history of tobacco use, abnormal CT chest, history of DVT 2018, CHF, GERD and anxiety. who presents to the clinic for the following medical concerns:    COPD: On chronic 4L O2. Feels SOB chronically. Was recommended to see [facility] clinic for her breathing and consider lung transplant, but she declined. Saw pulm and is feeling better. Using xanax for SOB/anxiety and it has decreased the amount of times she has been in hospital as well as had to call the ambulance due to breathing. Only uses 1/2 a 0.25mg tablet PRN. Loose stool: Has been for months. Thinks worsened when she started taking daliresp. Not watery but has multiple stools per day consistency of mud. No melena or hematochezia. Hasn't been taking stool softener. Health maintenance:  Check cologuard, will attempt to avoid anesthesia for now given severe respiratory illness.     Past Medical History:      Diagnosis Date    Abscess     colon    Acute on chronic diastolic CHF (congestive heart failure) ([facility] 01/04/2023    COPD (chronic obstructive pulmonary disease) ([facility]     Depression with anxiety 03/23/2023    Diverticulitis     Provoked DVT 3/2018     3 months Eliquis for DVT due to PICC line    Seasonal allergic rhinitis     Smoker 11/30/2019    5-6 per day    Tobacco use disorder      : 1 ppd since age 25    Weakness of both legs        Past Surgical History:        Procedure Laterality Date    CARDIOVASCULAR STRESS TEST N/A 01/06/2023    lexiscan stress test    OTHER SURGICAL HISTORY      tube to drain diverticulitis X 2       Allergies:

## 2023-04-25 NOTE — PROGRESS NOTES
[address]   PRE-ADMISSION TESTING GENERAL INSTRUCTIONS  [facility] Phone Number: [phone]      GENERAL INSTRUCTIONS:  [x] Antibacterial Soap shower Night before and/or AM of Surgery  [] CHG wipe instruction sheet and wipes given. []Hibiclens shower the night before and the morning of surgery. Do not use Hibiclens on your face or head. [x] Nothing by mouth after midnight, including gum, candy, mints, or water. Only a sip of water the medications we instruct you to take. [x] You may brush your teeth, gargle, but do NOT swallow water. [] No smoking, chewing tobacco, illegal drugs, or alcohol within 24 hours of your surgery. [x] Jewelry, valuables or body piercing's should not be brought to the hospital. All body and/or tongue piercing's must be removed prior to arriving to hospital.  ALL hair pins must be removed. [x] Do not wear makeup, lotions, powders, deodorant. Nail polish as directed by the nurse. [x] Arrange transportation with a responsible adult  to and from the hospital.  Arrange for someone to be with you for the remainder of the day and for 24 hours after your procedure due to having had anesthesia. Who will be your  for transportation? _DAUGHTER_________________       Who will be staying with you for 24 hrs after your procedure?__DAUGHTER_____  [] Only 2 ADULTS are permitted to accompany you. [x] Bring insurance card and photo ID.  [] Transfusion Bracelet ([place]): Please bring with you to hospital, day of surgery  [] Urine pregnancy test will be done in pre-op. Bring urine specimen day of surgery. Any small container is acceptable. [] Bring copy of living will or healthcare power of  papers to be placed in your electronic record.     PARKING INSTRUCTIONS:   [x] Arrival Date and Time:_4/27 @ 1100________________  [x]Your surgery time is subject to change, we will notify you the day before surgery, in the afternoon, or on Friday

## 2023-04-25 NOTE — PROGRESS NOTES
[address]  Precepting Note    Subjective: Follow up   COPD and pulmonary fibrosis, follows with Pulm  Xanax helping with anxiety around feeling she can't breath  Loose stool, on Daliresp    ROS otherwise negative     Past medical, surgical, family and social history were reviewed, non-contributory, and unchanged unless otherwise stated. Objective:    /67   Pulse 87   Temp 97.2 °F (36.2 °C) (Temporal)   Resp 22   Ht 5' 2\" (1.575 m)   Wt 126 lb (57.2 kg)   SpO2 98%   BMI 23.05 kg/m²     Exam is as noted by resident with the following changes, additions or corrections:    General:  NAD; alert & oriented x 3   Heart:  RRR, no murmurs, gallops, or rubs. Lungs:  CTA bilaterally, no wheeze, rales or rhonchi. 4 L baseline  Abd: bowel sounds present, nontender, nondistended, no masses  Extrem:  No clubbing, cyanosis, or edema    Assessment/Plan:  COPD- Keep follow up with Pulm  Loose stool- Add fiber  Chronic medications refilled   Cologuard      Attending Physician Statement  I have reviewed the chart, including any radiology or labs. I have discussed the case, including pertinent history and exam findings with the resident. I agree with the assessment, plan and orders as documented by the resident. Please refer to the resident note for additional information.       Electronically signed by Camron Slaughter MD on 4/25/2023 at 12:07 PM

## 2023-04-27 ENCOUNTER — APPOINTMENT (OUTPATIENT)
Dept: GENERAL RADIOLOGY | Age: 63
DRG: 191 | End: 2023-04-27
Payer: COMMERCIAL

## 2023-04-27 ENCOUNTER — ANESTHESIA (OUTPATIENT)
Dept: ENDOSCOPY | Age: 63
End: 2023-04-27
Payer: COMMERCIAL

## 2023-04-27 ENCOUNTER — HOSPITAL ENCOUNTER (INPATIENT)
Age: 63
LOS: 1 days | Discharge: HOME | DRG: 191 | End: 2023-04-29
Attending: EMERGENCY MEDICINE | Admitting: FAMILY MEDICINE
Payer: COMMERCIAL

## 2023-04-27 ENCOUNTER — ANESTHESIA (OUTPATIENT)
Dept: ANESTHESIOLOGY | Age: 63
End: 2023-04-27

## 2023-04-27 ENCOUNTER — ANESTHESIA EVENT (OUTPATIENT)
Dept: ANESTHESIOLOGY | Age: 63
End: 2023-04-27

## 2023-04-27 ENCOUNTER — ANESTHESIA EVENT (OUTPATIENT)
Dept: ENDOSCOPY | Age: 63
End: 2023-04-27
Payer: COMMERCIAL

## 2023-04-27 ENCOUNTER — HOSPITAL ENCOUNTER (OUTPATIENT)
Age: 63
Setting detail: OUTPATIENT SURGERY
Discharge: HOME | DRG: 191 | End: 2023-04-27
Attending: INTERNAL MEDICINE | Admitting: INTERNAL MEDICINE
Payer: COMMERCIAL

## 2023-04-27 VITALS
OXYGEN SATURATION: 96 % | BODY MASS INDEX: 23.19 KG/M2 | DIASTOLIC BLOOD PRESSURE: 89 MMHG | TEMPERATURE: 97.2 F | HEART RATE: 96 BPM | SYSTOLIC BLOOD PRESSURE: 114 MMHG | WEIGHT: 126 LBS | HEIGHT: 62 IN | RESPIRATION RATE: 20 BRPM

## 2023-04-27 DIAGNOSIS — J44.1 COPD EXACERBATION (HCC): Primary | ICD-10-CM

## 2023-04-27 DIAGNOSIS — J96.01 ACUTE RESPIRATORY FAILURE WITH HYPOXIA (HCC): ICD-10-CM

## 2023-04-27 DIAGNOSIS — J94.8 PLEURAL SCARRING: ICD-10-CM

## 2023-04-27 LAB
ALBUMIN SERPL-MCNC: 3.5 G/DL (ref 3.5–5.2)
ALP SERPL-CCNC: 90 U/L (ref 35–104)
ALT SERPL-CCNC: 18 U/L (ref 0–32)
ANION GAP SERPL CALCULATED.3IONS-SCNC: 7 MMOL/L (ref 7–16)
ANISOCYTOSIS: ABNORMAL
AST SERPL-CCNC: 23 U/L (ref 0–31)
B.E.: 5.9 MMOL/L (ref -3–3)
BASOPHILS # BLD: 0.04 E9/L (ref 0–0.2)
BASOPHILS # BLD: 0.22 E9/L (ref 0–0.2)
BASOPHILS NFR BLD: 0.4 % (ref 0–2)
BASOPHILS NFR BLD: 0.9 % (ref 0–2)
BILIRUB SERPL-MCNC: 0.6 MG/DL (ref 0–1.2)
BUN SERPL-MCNC: 9 MG/DL (ref 6–23)
CALCIUM SERPL-MCNC: 9.7 MG/DL (ref 8.6–10.2)
CHLORIDE SERPL-SCNC: 99 MMOL/L (ref 98–107)
CO2 SERPL-SCNC: 36 MMOL/L (ref 22–29)
COHB: 1.6 % (ref 0–1.5)
CREAT SERPL-MCNC: 0.4 MG/DL (ref 0.5–1)
CRITICAL: ABNORMAL
DATE ANALYZED: ABNORMAL
DATE OF COLLECTION: ABNORMAL
EOSINOPHIL # BLD: 0 E9/L (ref 0.05–0.5)
EOSINOPHIL # BLD: 0.07 E9/L (ref 0.05–0.5)
EOSINOPHIL NFR BLD: 0.1 % (ref 0–6)
EOSINOPHIL NFR BLD: 0.8 % (ref 0–6)
ERYTHROCYTE [DISTWIDTH] IN BLOOD BY AUTOMATED COUNT: 14.3 FL (ref 11.5–15)
ERYTHROCYTE [DISTWIDTH] IN BLOOD BY AUTOMATED COUNT: 14.4 FL (ref 11.5–15)
GLUCOSE SERPL-MCNC: 118 MG/DL (ref 74–99)
HCO3: 33.1 MMOL/L (ref 22–26)
HCT VFR BLD AUTO: 37.1 % (ref 34–48)
HCT VFR BLD AUTO: 41.5 % (ref 34–48)
HGB BLD-MCNC: 11.9 G/DL (ref 11.5–15.5)
HGB BLD-MCNC: 13.1 G/DL (ref 11.5–15.5)
HHB: 2.5 % (ref 0–5)
IMM GRANULOCYTES # BLD: 0.03 E9/L
IMM GRANULOCYTES NFR BLD: 0.3 % (ref 0–5)
INR BLD: 1
LAB: ABNORMAL
LYMPHOCYTES # BLD: 1 E9/L (ref 1.5–4)
LYMPHOCYTES # BLD: 1.73 E9/L (ref 1.5–4)
LYMPHOCYTES NFR BLD: 18.6 % (ref 20–42)
LYMPHOCYTES NFR BLD: 4.2 % (ref 20–42)
Lab: ABNORMAL
MAGNESIUM SERPL-MCNC: 1.7 MG/DL (ref 1.6–2.6)
MCH RBC QN AUTO: 31.1 PG (ref 26–35)
MCH RBC QN AUTO: 31.7 PG (ref 26–35)
MCHC RBC AUTO-ENTMCNC: 31.6 % (ref 32–34.5)
MCHC RBC AUTO-ENTMCNC: 32.1 % (ref 32–34.5)
MCV RBC AUTO: 98.6 FL (ref 80–99.9)
MCV RBC AUTO: 98.9 FL (ref 80–99.9)
METHB: 0.4 % (ref 0–1.5)
MODE: ABNORMAL
MONOCYTES # BLD: 0.72 E9/L (ref 0.1–0.95)
MONOCYTES # BLD: 0.75 E9/L (ref 0.1–0.95)
MONOCYTES NFR BLD: 2.5 % (ref 2–12)
MONOCYTES NFR BLD: 7.7 % (ref 2–12)
NEUTROPHILS # BLD: 22.91 E9/L (ref 1.8–7.3)
NEUTROPHILS # BLD: 6.73 E9/L (ref 1.8–7.3)
NEUTS SEG NFR BLD: 72.2 % (ref 43–80)
NEUTS SEG NFR BLD: 92.4 % (ref 43–80)
O2 CONTENT: 18.4 ML/DL
O2 SATURATION: 97.4 % (ref 92–98.5)
O2HB: 95.5 % (ref 94–97)
OPERATOR ID: 914
OVALOCYTES: ABNORMAL
PATIENT TEMP: 37 C
PCO2: 59.7 MMHG (ref 35–45)
PH BLOOD GAS: 7.36 (ref 7.35–7.45)
PLATELET # BLD AUTO: 237 E9/L (ref 130–450)
PLATELET # BLD AUTO: 281 E9/L (ref 130–450)
PMV BLD AUTO: 9.4 FL (ref 7–12)
PMV BLD AUTO: 9.9 FL (ref 7–12)
PO2: 100 MMHG (ref 75–100)
POIKILOCYTES: ABNORMAL
POTASSIUM SERPL-SCNC: 3.9 MMOL/L (ref 3.5–5)
PROT SERPL-MCNC: 6.9 G/DL (ref 6.4–8.3)
PROTHROMBIN TIME: 10.9 SEC (ref 9.3–12.4)
RBC # BLD AUTO: 3.75 E12/L (ref 3.5–5.5)
RBC # BLD AUTO: 4.21 E12/L (ref 3.5–5.5)
SODIUM SERPL-SCNC: 142 MMOL/L (ref 132–146)
SOURCE, BLOOD GAS: ABNORMAL
THB: 13.6 G/DL (ref 11.5–16.5)
TIME ANALYZED: 2249
TROPONIN, HIGH SENSITIVITY: 14 NG/L (ref 0–9)
WBC # BLD: 24.9 E9/L (ref 4.5–11.5)
WBC # BLD: 9.3 E9/L (ref 4.5–11.5)

## 2023-04-27 PROCEDURE — 87015 SPECIMEN INFECT AGNT CONCNTJ: CPT

## 2023-04-27 PROCEDURE — 3700000001 HC ADD 15 MINUTES (ANESTHESIA): Performed by: INTERNAL MEDICINE

## 2023-04-27 PROCEDURE — 87205 SMEAR GRAM STAIN: CPT

## 2023-04-27 PROCEDURE — 87070 CULTURE OTHR SPECIMN AEROBIC: CPT

## 2023-04-27 PROCEDURE — 31622 DX BRONCHOSCOPE/WASH: CPT | Performed by: INTERNAL MEDICINE

## 2023-04-27 PROCEDURE — 85025 COMPLETE CBC W/AUTO DIFF WBC: CPT

## 2023-04-27 PROCEDURE — 2500000003 HC RX 250 WO HCPCS: Performed by: INTERNAL MEDICINE

## 2023-04-27 PROCEDURE — 2700000000 HC OXYGEN THERAPY PER DAY

## 2023-04-27 PROCEDURE — 96374 THER/PROPH/DIAG INJ IV PUSH: CPT

## 2023-04-27 PROCEDURE — 89051 BODY FLUID CELL COUNT: CPT

## 2023-04-27 PROCEDURE — 2580000003 HC RX 258: Performed by: NURSE ANESTHETIST, CERTIFIED REGISTERED

## 2023-04-27 PROCEDURE — 2500000003 HC RX 250 WO HCPCS: Performed by: NURSE ANESTHETIST, CERTIFIED REGISTERED

## 2023-04-27 PROCEDURE — 7100000000 HC PACU RECOVERY - FIRST 15 MIN: Performed by: INTERNAL MEDICINE

## 2023-04-27 PROCEDURE — 7100000001 HC PACU RECOVERY - ADDTL 15 MIN: Performed by: INTERNAL MEDICINE

## 2023-04-27 PROCEDURE — 94640 AIRWAY INHALATION TREATMENT: CPT

## 2023-04-27 PROCEDURE — 2709999900 HC NON-CHARGEABLE SUPPLY: Performed by: INTERNAL MEDICINE

## 2023-04-27 PROCEDURE — 84484 ASSAY OF TROPONIN QUANT: CPT

## 2023-04-27 PROCEDURE — 96361 HYDRATE IV INFUSION ADD-ON: CPT

## 2023-04-27 PROCEDURE — 93005 ELECTROCARDIOGRAM TRACING: CPT | Performed by: NURSE PRACTITIONER

## 2023-04-27 PROCEDURE — 87206 SMEAR FLUORESCENT/ACID STAI: CPT

## 2023-04-27 PROCEDURE — 87102 FUNGUS ISOLATION CULTURE: CPT

## 2023-04-27 PROCEDURE — 87116 MYCOBACTERIA CULTURE: CPT

## 2023-04-27 PROCEDURE — 6360000002 HC RX W HCPCS: Performed by: NURSE ANESTHETIST, CERTIFIED REGISTERED

## 2023-04-27 PROCEDURE — 85610 PROTHROMBIN TIME: CPT

## 2023-04-27 PROCEDURE — 3700000000 HC ANESTHESIA ATTENDED CARE: Performed by: INTERNAL MEDICINE

## 2023-04-27 PROCEDURE — 6370000000 HC RX 637 (ALT 250 FOR IP): Performed by: NURSE PRACTITIONER

## 2023-04-27 PROCEDURE — 80053 COMPREHEN METABOLIC PANEL: CPT

## 2023-04-27 PROCEDURE — 83735 ASSAY OF MAGNESIUM: CPT

## 2023-04-27 PROCEDURE — 2580000003 HC RX 258

## 2023-04-27 PROCEDURE — 7100000010 HC PHASE II RECOVERY - FIRST 15 MIN: Performed by: INTERNAL MEDICINE

## 2023-04-27 PROCEDURE — 88112 CYTOPATH CELL ENHANCE TECH: CPT

## 2023-04-27 PROCEDURE — 6370000000 HC RX 637 (ALT 250 FOR IP): Performed by: ANESTHESIOLOGY

## 2023-04-27 PROCEDURE — 2580000003 HC RX 258: Performed by: INTERNAL MEDICINE

## 2023-04-27 PROCEDURE — 7100000011 HC PHASE II RECOVERY - ADDTL 15 MIN: Performed by: INTERNAL MEDICINE

## 2023-04-27 PROCEDURE — 88312 SPECIAL STAINS GROUP 1: CPT

## 2023-04-27 PROCEDURE — 3609010800 HC BRONCHOSCOPY ALVEOLAR LAVAGE: Performed by: INTERNAL MEDICINE

## 2023-04-27 PROCEDURE — 36415 COLL VENOUS BLD VENIPUNCTURE: CPT

## 2023-04-27 PROCEDURE — 3609027000 HC BRONCHOSCOPY: Performed by: INTERNAL MEDICINE

## 2023-04-27 PROCEDURE — 6360000002 HC RX W HCPCS: Performed by: NURSE PRACTITIONER

## 2023-04-27 PROCEDURE — 71045 X-RAY EXAM CHEST 1 VIEW: CPT

## 2023-04-27 PROCEDURE — 6370000000 HC RX 637 (ALT 250 FOR IP): Performed by: NURSE ANESTHETIST, CERTIFIED REGISTERED

## 2023-04-27 PROCEDURE — 0BJ08ZZ INSPECTION OF TRACHEOBRONCHIAL TREE, VIA NATURAL OR ARTIFICIAL OPENING ENDOSCOPIC: ICD-10-PCS | Performed by: INTERNAL MEDICINE

## 2023-04-27 PROCEDURE — 82805 BLOOD GASES W/O2 SATURATION: CPT

## 2023-04-27 PROCEDURE — 87106 FUNGI IDENTIFICATION YEAST: CPT

## 2023-04-27 PROCEDURE — 99285 EMERGENCY DEPT VISIT HI MDM: CPT

## 2023-04-27 RX ORDER — MEPERIDINE HYDROCHLORIDE 25 MG/ML
12.5 INJECTION INTRAMUSCULAR; INTRAVENOUS; SUBCUTANEOUS EVERY 5 MIN PRN
Status: DISCONTINUED | OUTPATIENT
Start: 2023-04-27 | End: 2023-04-27 | Stop reason: HOSPADM

## 2023-04-27 RX ORDER — IPRATROPIUM BROMIDE AND ALBUTEROL SULFATE 2.5; .5 MG/3ML; MG/3ML
1 SOLUTION RESPIRATORY (INHALATION) ONCE
Status: COMPLETED | OUTPATIENT
Start: 2023-04-27 | End: 2023-04-27

## 2023-04-27 RX ORDER — SODIUM CHLORIDE 0.9 % (FLUSH) 0.9 %
5-40 SYRINGE (ML) INJECTION EVERY 12 HOURS SCHEDULED
Status: DISCONTINUED | OUTPATIENT
Start: 2023-04-27 | End: 2023-04-27 | Stop reason: HOSPADM

## 2023-04-27 RX ORDER — SODIUM CHLORIDE 0.9 % (FLUSH) 0.9 %
5-40 SYRINGE (ML) INJECTION PRN
Status: DISCONTINUED | OUTPATIENT
Start: 2023-04-27 | End: 2023-04-27 | Stop reason: HOSPADM

## 2023-04-27 RX ORDER — ALBUTEROL SULFATE 90 UG/1
AEROSOL, METERED RESPIRATORY (INHALATION) PRN
Status: DISCONTINUED | OUTPATIENT
Start: 2023-04-27 | End: 2023-04-27 | Stop reason: SDUPTHER

## 2023-04-27 RX ORDER — LIDOCAINE HYDROCHLORIDE 20 MG/ML
INJECTION, SOLUTION EPIDURAL; INFILTRATION; INTRACAUDAL; PERINEURAL PRN
Status: DISCONTINUED | OUTPATIENT
Start: 2023-04-27 | End: 2023-04-27 | Stop reason: ALTCHOICE

## 2023-04-27 RX ORDER — HYDROMORPHONE HYDROCHLORIDE 1 MG/ML
0.5 INJECTION, SOLUTION INTRAMUSCULAR; INTRAVENOUS; SUBCUTANEOUS EVERY 5 MIN PRN
Status: DISCONTINUED | OUTPATIENT
Start: 2023-04-27 | End: 2023-04-27 | Stop reason: HOSPADM

## 2023-04-27 RX ORDER — IPRATROPIUM BROMIDE AND ALBUTEROL SULFATE 2.5; .5 MG/3ML; MG/3ML
3 SOLUTION RESPIRATORY (INHALATION) ONCE
Status: COMPLETED | OUTPATIENT
Start: 2023-04-27 | End: 2023-04-27

## 2023-04-27 RX ORDER — LIDOCAINE HYDROCHLORIDE 20 MG/ML
INJECTION, SOLUTION INFILTRATION; PERINEURAL PRN
Status: DISCONTINUED | OUTPATIENT
Start: 2023-04-27 | End: 2023-04-27 | Stop reason: SDUPTHER

## 2023-04-27 RX ORDER — SODIUM CHLORIDE 9 MG/ML
INJECTION, SOLUTION INTRAVENOUS PRN
Status: DISCONTINUED | OUTPATIENT
Start: 2023-04-27 | End: 2023-04-27 | Stop reason: HOSPADM

## 2023-04-27 RX ORDER — PROPOFOL 10 MG/ML
INJECTION, EMULSION INTRAVENOUS PRN
Status: DISCONTINUED | OUTPATIENT
Start: 2023-04-27 | End: 2023-04-27 | Stop reason: SDUPTHER

## 2023-04-27 RX ORDER — METHYLPREDNISOLONE SODIUM SUCCINATE 125 MG/2ML
125 INJECTION, POWDER, LYOPHILIZED, FOR SOLUTION INTRAMUSCULAR; INTRAVENOUS ONCE
Status: COMPLETED | OUTPATIENT
Start: 2023-04-27 | End: 2023-04-27

## 2023-04-27 RX ORDER — DEXAMETHASONE SODIUM PHOSPHATE 10 MG/ML
INJECTION, EMULSION INTRAMUSCULAR; INTRAVENOUS PRN
Status: DISCONTINUED | OUTPATIENT
Start: 2023-04-27 | End: 2023-04-27 | Stop reason: SDUPTHER

## 2023-04-27 RX ORDER — 0.9 % SODIUM CHLORIDE 0.9 %
1000 INTRAVENOUS SOLUTION INTRAVENOUS ONCE
Status: COMPLETED | OUTPATIENT
Start: 2023-04-27 | End: 2023-04-27

## 2023-04-27 RX ORDER — SODIUM CHLORIDE 9 MG/ML
INJECTION, SOLUTION INTRAVENOUS CONTINUOUS PRN
Status: DISCONTINUED | OUTPATIENT
Start: 2023-04-27 | End: 2023-04-27 | Stop reason: SDUPTHER

## 2023-04-27 RX ORDER — ONDANSETRON 2 MG/ML
INJECTION INTRAMUSCULAR; INTRAVENOUS PRN
Status: DISCONTINUED | OUTPATIENT
Start: 2023-04-27 | End: 2023-04-27 | Stop reason: SDUPTHER

## 2023-04-27 RX ADMIN — IPRATROPIUM BROMIDE AND ALBUTEROL SULFATE 3 AMPULE: 2.5; .5 SOLUTION RESPIRATORY (INHALATION) at 23:15

## 2023-04-27 RX ADMIN — LIDOCAINE HYDROCHLORIDE 50 MG: 20 INJECTION, SOLUTION INFILTRATION; PERINEURAL at 12:40

## 2023-04-27 RX ADMIN — ALBUTEROL SULFATE 2 PUFF: 108 AEROSOL, METERED RESPIRATORY (INHALATION) at 12:59

## 2023-04-27 RX ADMIN — SODIUM CHLORIDE: 9 INJECTION, SOLUTION INTRAVENOUS at 12:26

## 2023-04-27 RX ADMIN — DEXAMETHASONE SODIUM PHOSPHATE 4 MG: 10 INJECTION, EMULSION INTRAMUSCULAR; INTRAVENOUS at 12:40

## 2023-04-27 RX ADMIN — PROPOFOL 50 MG: 10 INJECTION, EMULSION INTRAVENOUS at 12:40

## 2023-04-27 RX ADMIN — METHYLPREDNISOLONE SODIUM SUCCINATE 125 MG: 125 INJECTION, POWDER, FOR SOLUTION INTRAMUSCULAR; INTRAVENOUS at 22:36

## 2023-04-27 RX ADMIN — SODIUM CHLORIDE 1000 ML: 9 INJECTION, SOLUTION INTRAVENOUS at 22:48

## 2023-04-27 RX ADMIN — SODIUM CHLORIDE: 9 INJECTION, SOLUTION INTRAVENOUS at 11:15

## 2023-04-27 RX ADMIN — IPRATROPIUM BROMIDE AND ALBUTEROL SULFATE 1 AMPULE: .5; 2.5 SOLUTION RESPIRATORY (INHALATION) at 13:29

## 2023-04-27 RX ADMIN — ONDANSETRON HYDROCHLORIDE 4 MG: 2 SOLUTION INTRAMUSCULAR; INTRAVENOUS at 12:40

## 2023-04-27 ASSESSMENT — PAIN DESCRIPTION - LOCATION: LOCATION: HEAD;CHEST

## 2023-04-27 ASSESSMENT — COPD QUESTIONNAIRES: CAT_SEVERITY: MODERATE

## 2023-04-27 ASSESSMENT — PAIN SCALES - GENERAL: PAINLEVEL_OUTOF10: 10

## 2023-04-27 ASSESSMENT — PAIN - FUNCTIONAL ASSESSMENT
PAIN_FUNCTIONAL_ASSESSMENT: 0-10
PAIN_FUNCTIONAL_ASSESSMENT: 0-10

## 2023-04-27 ASSESSMENT — ENCOUNTER SYMPTOMS: SHORTNESS OF BREATH: 1

## 2023-04-27 ASSESSMENT — LIFESTYLE VARIABLES: SMOKING_STATUS: 1

## 2023-04-27 ASSESSMENT — PAIN DESCRIPTION - PAIN TYPE: TYPE: ACUTE PAIN

## 2023-04-27 NOTE — H&P
Department of Internal Medicine  Pulmonary Medicine  Outpatient Procedure H&P    Procedure:  bronchoscopy with BAL    Indication: abnormal CT imaging    Referring  Physician:        Brief history: patient with abnormal ct imaging of bilateral upper lobes    AllergiesPenicillin v     Allergies noted: Yes     Vital signs reviewed:Yes    HEENT:normal  Cardiac:normal  Chest:normal  Abdomen:normal  Exts: normal  Neuro:normal    ASA:ASA 2 - Patient with mild systemic disease with no functional limitations    Sedation planned:conscious sedation    Patient in acceptable condition for procedure:Yes        Sybil Andrade DO

## 2023-04-27 NOTE — PROGRESS NOTES
Patient discharged on her baseline 4L nasal canula. Educated patient on using her inhalers at home and that she received a breathing treatment here.

## 2023-04-27 NOTE — DISCHARGE INSTRUCTIONS
[address] INSTRUCTIONS    PROCEDURE COMPLETED:    You or your loved one has had a bronchoscopy with our without biospys. A bronchoscopy is the visual examination of the lungs and air passages, called the bronchial tubes. The exam is performed with a bronchoscope, an flexable instrument with a lighted tip and camera. A bronchoscope is also used to obtain tissue and secretion/fluid samples. This is a procedure helps diagnose lung related diseases and conditions, such as, cancer or an infection. Usually, you don't have to stay in the hospital, but an overnight stay may be necessary in some cases. You may be drowsy or lightheaded after receiving sedation or anesthesia. A responsible person should be with you for the next 24 hours. Please follow the instructions checked below:    DISCHARGE CARE INSTRUCTIONS:    GENERAL CARE:   [x] Immediately after the procedure, spit out any saliva until your throat is no longer numb. [x] If you are a smoker, Quit - its the best preventable health problem. [x] Expect a hoarse voice, sore neck and throat for a few days after the bronchoscopy. DIET INSTRUCTIONS:  [x] Start with light diet and progress to your normal diet as you feel like eating. If you experience nausea or repeated episodes of vomiting which persist beyond 6 hours, notify your doctor. [] Other     ACTIVITY INSTRUCTIONS:  [x] Rest today. Increase activity as tolerated. [x] If you take inhalers/puffers resume them immediately. [x] No heavy lifting or strenuous activity. [x] No driving for for at least 6 hours. [x] You may shower or bathe      [x] You may resume your normal activity tomorrow  [] Other         MEDICATION INSTRUCTIONS:  If you had to stop medicines before the procedure, ask your doctor when you can start again.  Medicines often stopped include:   Anti-inflammatory drugs (eg, aspirin,

## 2023-04-27 NOTE — ANESTHESIA POSTPROCEDURE EVALUATION
Department of Anesthesiology  Postprocedure Note    Patient: Tanner Davis  MRN: [id number]  YOB: 1960  Date of evaluation: 4/27/2023      Procedure Summary     Date: 04/27/23 Room / Location: [facility]3] 03 / [facility]    Anesthesia Start:  Anesthesia Stop:     Procedures:       BRONCHOSCOPY ALVEOLAR LAVAGE      BRONCHOSCOPY BIOPSY BRONCHUS Diagnosis:       Pleural scarring      (Pleural scarring [J94.8])    Surgeons: Katheryn Hickey DO Responsible Provider:     Anesthesia Type: MAC ASA Status: 3          Anesthesia Type: No value filed.     Ken Phase I: Ken Score: 10    Ken Phase II:        Anesthesia Post Evaluation    Patient location during evaluation: PACU  Patient participation: complete - patient participated  Level of consciousness: awake  Pain score: 3  Airway patency: patent  Nausea & Vomiting: no nausea and no vomiting  Complications: no  Cardiovascular status: blood pressure returned to baseline  Respiratory status: acceptable  Hydration status: euvolemic

## 2023-04-27 NOTE — ANESTHESIA PRE PROCEDURE
Department of Anesthesiology  Preprocedure Note       Name:  Emily Green   Age:  61 y.o.  :  1960                                          MRN:  [id number]         Date:  2023      Surgeon: Lionel Mcdonald): Jay Power DO    Procedure: Procedure(s):  BRONCHOSCOPY ALVEOLAR LAVAGE  BRONCHOSCOPY BIOPSY BRONCHUS    Medications prior to admission:   Prior to Admission medications    Medication Sig Start Date End Date Taking?  Authorizing Provider   Psyllium (METAMUCIL PREMIUM BLEND) 52.63 % POWD Take 1 Dose by mouth daily 23   Ayaz Barahona MD   folic acid (FOLVITE) 1 MG tablet Take 1 tablet by mouth daily 23   Ayaz Barahona MD   pantoprazole (PROTONIX) 40 MG tablet Take 1 tablet by mouth nightly 23   Ayaz Barahona MD   furosemide (LASIX) 20 MG tablet Take 1 tablet by mouth daily 23   Ayaz Barahona MD   vitamin D (CHOLECALCIFEROL) 50 MCG (2000 UT) TABS tablet Take 1 tablet by mouth daily 23   Ayaz Barahona MD   ascorbic acid (VITAMIN C) 1000 MG tablet Take 1 tablet by mouth daily 23   Ayaz Barahona MD   atorvastatin (LIPITOR) 40 MG tablet Take 1 tablet by mouth nightly 23   Ayaz Barahona MD   potassium chloride (KLOR-CON M) 20 MEQ extended release tablet take 1 tablet by mouth once daily 23   Stevan Leija MD   Roflumilast (DALIRESP) 500 MCG tablet Take 1 tablet by mouth daily 3/31/23   Alesia Deepthi, APRN - CNP   albuterol sulfate HFA (PROVENTIL HFA) 108 (90 Base) MCG/ACT inhaler Inhale 2 puffs into the lungs every 6 hours as needed for Wheezing 3/31/23 6/29/23  Alesia Deepthi APRN - CNP   Budeson-Glycopyrrol-Formoterol (BREZTRI AEROSPHERE) 160-9-4.8 MCG/ACT AERO Inhale 2 puffs into the lungs in the morning and at bedtime 3/31/23   Alesia Deepthi APRN - CNP   guaiFENesin (MUCINEX) 600 MG extended release tablet Take 1 tablet by mouth 2 times daily 3/31/23   Alesia Deepthi, APRN - CNP   fluticasone (FLONASE) 50 MCG/ACT nasal spray 2 sprays
05/20/2021 06:38 AM    [id number] 43.6 05/20/2021 06:38 AM        Type & Screen (If Applicable):  No results found for: LABABO, LABRH    Drug/Infectious Status (If Applicable):  No results found for: HIV, HEPCAB    COVID-19 Screening (If Applicable):   Lab Results   Component Value Date/Time    COVID19 Not Detected 03/23/2023 01:08 PM    COVID19 Not Detected 03/07/2023 12:45 PM           Anesthesia Evaluation  Patient summary reviewed no history of anesthetic complications:   Airway: Mallampati: II     Neck ROM: full     Dental: normal exam         Pulmonary:   (+) COPD:  decreased breath sounds current smoker                           Cardiovascular:    (+) CHF:,         Rhythm: regular  Rate: normal           Beta Blocker:  Dose within 24 Hrs         Neuro/Psych:   (+) psychiatric history:depression/anxiety             GI/Hepatic/Renal:             Endo/Other:              Pt had no PAT visit       Abdominal:             Vascular:   + DVT, . Other Findings:           Anesthesia Plan      MAC     ASA 3       Induction: intravenous. Anesthetic plan and risks discussed with patient. Plan discussed with CRNA.                     Autumn Luke MD   4/27/2023

## 2023-04-28 ENCOUNTER — APPOINTMENT (OUTPATIENT)
Dept: GENERAL RADIOLOGY | Age: 63
DRG: 191 | End: 2023-04-28
Payer: COMMERCIAL

## 2023-04-28 PROBLEM — J44.1 COPD EXACERBATION (HCC): Status: ACTIVE | Noted: 2023-04-28

## 2023-04-28 PROBLEM — J44.1 COPD EXACERBATION (HCC): Status: RESOLVED | Noted: 2023-04-28 | Resolved: 2023-04-28

## 2023-04-28 LAB
APPEARANCE FLUID: NORMAL
BASO FLUID: 0 %
CELL COUNT FLUID TYPE: NORMAL
COLOR FLUID: COLORLESS
EKG ATRIAL RATE: 132 BPM
EKG P AXIS: 85 DEGREES
EKG P-R INTERVAL: 142 MS
EKG Q-T INTERVAL: 296 MS
EKG QRS DURATION: 66 MS
EKG QTC CALCULATION (BAZETT): 438 MS
EKG R AXIS: 84 DEGREES
EKG T AXIS: 82 DEGREES
EKG VENTRICULAR RATE: 132 BPM
EOSINOPHIL FLUID: 0 %
GRAM STAIN ORDERABLE: NORMAL
LYMPHOCYTES, BODY FLUID: 9 %
MONOCYTE, FLUID: 0 %
NEUTROPHIL, FLUID: 91 %
NUCLEATED CELLS FLUID: 257 /UL
RBC FLUID: <2000 /UL
TROPONIN, HIGH SENSITIVITY: 14 NG/L (ref 0–9)

## 2023-04-28 PROCEDURE — 97165 OT EVAL LOW COMPLEX 30 MIN: CPT

## 2023-04-28 PROCEDURE — 2580000003 HC RX 258: Performed by: FAMILY MEDICINE

## 2023-04-28 PROCEDURE — 94640 AIRWAY INHALATION TREATMENT: CPT

## 2023-04-28 PROCEDURE — 97530 THERAPEUTIC ACTIVITIES: CPT

## 2023-04-28 PROCEDURE — 97535 SELF CARE MNGMENT TRAINING: CPT

## 2023-04-28 PROCEDURE — 2060000000 HC ICU INTERMEDIATE R&B

## 2023-04-28 PROCEDURE — 6370000000 HC RX 637 (ALT 250 FOR IP): Performed by: FAMILY MEDICINE

## 2023-04-28 PROCEDURE — 97161 PT EVAL LOW COMPLEX 20 MIN: CPT

## 2023-04-28 PROCEDURE — 93010 ELECTROCARDIOGRAM REPORT: CPT | Performed by: INTERNAL MEDICINE

## 2023-04-28 PROCEDURE — 6360000002 HC RX W HCPCS: Performed by: FAMILY MEDICINE

## 2023-04-28 PROCEDURE — 94664 DEMO&/EVAL PT USE INHALER: CPT

## 2023-04-28 PROCEDURE — 71046 X-RAY EXAM CHEST 2 VIEWS: CPT

## 2023-04-28 RX ORDER — ACETAMINOPHEN 325 MG/1
650 TABLET ORAL EVERY 6 HOURS PRN
Status: DISCONTINUED | OUTPATIENT
Start: 2023-04-28 | End: 2023-04-29 | Stop reason: HOSPADM

## 2023-04-28 RX ORDER — ACETAMINOPHEN 650 MG/1
650 SUPPOSITORY RECTAL EVERY 6 HOURS PRN
Status: DISCONTINUED | OUTPATIENT
Start: 2023-04-28 | End: 2023-04-29 | Stop reason: HOSPADM

## 2023-04-28 RX ORDER — ATORVASTATIN CALCIUM 40 MG/1
40 TABLET, FILM COATED ORAL NIGHTLY
Status: DISCONTINUED | OUTPATIENT
Start: 2023-04-28 | End: 2023-04-29 | Stop reason: HOSPADM

## 2023-04-28 RX ORDER — BUDESONIDE 0.5 MG/2ML
0.5 INHALANT ORAL 2 TIMES DAILY
Status: DISCONTINUED | OUTPATIENT
Start: 2023-04-28 | End: 2023-04-29 | Stop reason: HOSPADM

## 2023-04-28 RX ORDER — SODIUM CHLORIDE 9 MG/ML
INJECTION, SOLUTION INTRAVENOUS PRN
Status: DISCONTINUED | OUTPATIENT
Start: 2023-04-28 | End: 2023-04-29 | Stop reason: HOSPADM

## 2023-04-28 RX ORDER — SERTRALINE HYDROCHLORIDE 25 MG/1
25 TABLET, FILM COATED ORAL DAILY
Status: DISCONTINUED | OUTPATIENT
Start: 2023-04-28 | End: 2023-04-29 | Stop reason: HOSPADM

## 2023-04-28 RX ORDER — POLYETHYLENE GLYCOL 3350 17 G/17G
17 POWDER, FOR SOLUTION ORAL DAILY PRN
Status: DISCONTINUED | OUTPATIENT
Start: 2023-04-28 | End: 2023-04-29 | Stop reason: HOSPADM

## 2023-04-28 RX ORDER — GUAIFENESIN 400 MG/1
400 TABLET ORAL 3 TIMES DAILY
Status: DISCONTINUED | OUTPATIENT
Start: 2023-04-28 | End: 2023-04-29 | Stop reason: HOSPADM

## 2023-04-28 RX ORDER — ALPRAZOLAM 0.25 MG/1
0.25 TABLET ORAL 3 TIMES DAILY PRN
Status: DISCONTINUED | OUTPATIENT
Start: 2023-04-28 | End: 2023-04-29 | Stop reason: HOSPADM

## 2023-04-28 RX ORDER — METOPROLOL SUCCINATE 25 MG/1
12.5 TABLET, EXTENDED RELEASE ORAL NIGHTLY
Status: DISCONTINUED | OUTPATIENT
Start: 2023-04-28 | End: 2023-04-29 | Stop reason: HOSPADM

## 2023-04-28 RX ORDER — ASPIRIN 81 MG/1
81 TABLET, CHEWABLE ORAL DAILY
Status: DISCONTINUED | OUTPATIENT
Start: 2023-04-28 | End: 2023-04-29 | Stop reason: HOSPADM

## 2023-04-28 RX ORDER — ONDANSETRON 2 MG/ML
4 INJECTION INTRAMUSCULAR; INTRAVENOUS EVERY 6 HOURS PRN
Status: DISCONTINUED | OUTPATIENT
Start: 2023-04-28 | End: 2023-04-29 | Stop reason: HOSPADM

## 2023-04-28 RX ORDER — SODIUM CHLORIDE 0.9 % (FLUSH) 0.9 %
5-40 SYRINGE (ML) INJECTION PRN
Status: DISCONTINUED | OUTPATIENT
Start: 2023-04-28 | End: 2023-04-29 | Stop reason: HOSPADM

## 2023-04-28 RX ORDER — ROFLUMILAST 500 UG/1
500 TABLET ORAL DAILY
Status: DISCONTINUED | OUTPATIENT
Start: 2023-04-28 | End: 2023-04-29 | Stop reason: HOSPADM

## 2023-04-28 RX ORDER — FOLIC ACID 1 MG/1
1 TABLET ORAL DAILY
Status: DISCONTINUED | OUTPATIENT
Start: 2023-04-28 | End: 2023-04-29 | Stop reason: HOSPADM

## 2023-04-28 RX ORDER — ONDANSETRON 4 MG/1
4 TABLET, ORALLY DISINTEGRATING ORAL EVERY 8 HOURS PRN
Status: DISCONTINUED | OUTPATIENT
Start: 2023-04-28 | End: 2023-04-29 | Stop reason: HOSPADM

## 2023-04-28 RX ORDER — SODIUM CHLORIDE 0.9 % (FLUSH) 0.9 %
5-40 SYRINGE (ML) INJECTION EVERY 12 HOURS SCHEDULED
Status: DISCONTINUED | OUTPATIENT
Start: 2023-04-28 | End: 2023-04-29 | Stop reason: HOSPADM

## 2023-04-28 RX ORDER — FUROSEMIDE 20 MG/1
20 TABLET ORAL DAILY
Status: DISCONTINUED | OUTPATIENT
Start: 2023-04-28 | End: 2023-04-29 | Stop reason: HOSPADM

## 2023-04-28 RX ORDER — PANTOPRAZOLE SODIUM 40 MG/1
40 TABLET, DELAYED RELEASE ORAL NIGHTLY
Status: DISCONTINUED | OUTPATIENT
Start: 2023-04-28 | End: 2023-04-29 | Stop reason: HOSPADM

## 2023-04-28 RX ORDER — ARFORMOTEROL TARTRATE 15 UG/2ML
15 SOLUTION RESPIRATORY (INHALATION) 2 TIMES DAILY
Status: DISCONTINUED | OUTPATIENT
Start: 2023-04-28 | End: 2023-04-29 | Stop reason: HOSPADM

## 2023-04-28 RX ORDER — POTASSIUM CHLORIDE 20 MEQ/1
20 TABLET, EXTENDED RELEASE ORAL DAILY
Status: DISCONTINUED | OUTPATIENT
Start: 2023-04-28 | End: 2023-04-29 | Stop reason: HOSPADM

## 2023-04-28 RX ORDER — ASPIRIN 81 MG/1
81 TABLET, CHEWABLE ORAL DAILY
Qty: 30 TABLET | Refills: 3 | Status: SHIPPED | OUTPATIENT
Start: 2023-04-28

## 2023-04-28 RX ORDER — ENOXAPARIN SODIUM 100 MG/ML
40 INJECTION SUBCUTANEOUS DAILY
Status: DISCONTINUED | OUTPATIENT
Start: 2023-04-28 | End: 2023-04-29 | Stop reason: HOSPADM

## 2023-04-28 RX ORDER — GUAIFENESIN 600 MG/1
600 TABLET, EXTENDED RELEASE ORAL 2 TIMES DAILY
Status: DISCONTINUED | OUTPATIENT
Start: 2023-04-28 | End: 2023-04-28 | Stop reason: CLARIF

## 2023-04-28 RX ORDER — DOCUSATE SODIUM 100 MG/1
100 CAPSULE, LIQUID FILLED ORAL 2 TIMES DAILY PRN
Status: DISCONTINUED | OUTPATIENT
Start: 2023-04-28 | End: 2023-04-29 | Stop reason: HOSPADM

## 2023-04-28 RX ADMIN — BUDESONIDE 500 MCG: 0.5 INHALANT RESPIRATORY (INHALATION) at 20:39

## 2023-04-28 RX ADMIN — ROFLUMILAST 500 MCG: 500 TABLET ORAL at 11:43

## 2023-04-28 RX ADMIN — ATORVASTATIN CALCIUM 40 MG: 40 TABLET, FILM COATED ORAL at 20:27

## 2023-04-28 RX ADMIN — ARFORMOTEROL TARTRATE 15 MCG: 15 SOLUTION RESPIRATORY (INHALATION) at 09:11

## 2023-04-28 RX ADMIN — SODIUM CHLORIDE, PRESERVATIVE FREE 10 ML: 5 INJECTION INTRAVENOUS at 20:28

## 2023-04-28 RX ADMIN — FUROSEMIDE 20 MG: 20 TABLET ORAL at 10:35

## 2023-04-28 RX ADMIN — METOPROLOL SUCCINATE 12.5 MG: 25 TABLET, EXTENDED RELEASE ORAL at 20:27

## 2023-04-28 RX ADMIN — GUAIFENESIN 400 MG: 400 TABLET ORAL at 10:35

## 2023-04-28 RX ADMIN — SERTRALINE 25 MG: 50 TABLET, FILM COATED ORAL at 10:34

## 2023-04-28 RX ADMIN — ENOXAPARIN SODIUM 40 MG: 100 INJECTION SUBCUTANEOUS at 10:38

## 2023-04-28 RX ADMIN — BUDESONIDE 500 MCG: 0.5 INHALANT RESPIRATORY (INHALATION) at 09:11

## 2023-04-28 RX ADMIN — PANTOPRAZOLE SODIUM 40 MG: 40 TABLET, DELAYED RELEASE ORAL at 20:27

## 2023-04-28 RX ADMIN — GUAIFENESIN 400 MG: 400 TABLET ORAL at 17:56

## 2023-04-28 RX ADMIN — FOLIC ACID 1 MG: 1 TABLET ORAL at 10:35

## 2023-04-28 RX ADMIN — SODIUM CHLORIDE, PRESERVATIVE FREE 10 ML: 5 INJECTION INTRAVENOUS at 10:39

## 2023-04-28 RX ADMIN — POTASSIUM CHLORIDE 20 MEQ: 1500 TABLET, EXTENDED RELEASE ORAL at 10:35

## 2023-04-28 RX ADMIN — ALPRAZOLAM 0.25 MG: 0.25 TABLET ORAL at 16:17

## 2023-04-28 RX ADMIN — ALPRAZOLAM 0.25 MG: 0.25 TABLET ORAL at 23:47

## 2023-04-28 RX ADMIN — ACETAMINOPHEN 650 MG: 325 TABLET ORAL at 16:17

## 2023-04-28 RX ADMIN — GUAIFENESIN 400 MG: 400 TABLET ORAL at 20:27

## 2023-04-28 RX ADMIN — ARFORMOTEROL TARTRATE 15 MCG: 15 SOLUTION RESPIRATORY (INHALATION) at 20:39

## 2023-04-28 RX ADMIN — ASPIRIN 81 MG CHEWABLE TABLET 81 MG: 81 TABLET CHEWABLE at 10:38

## 2023-04-28 RX ADMIN — ACETAMINOPHEN 650 MG: 325 TABLET ORAL at 06:56

## 2023-04-28 ASSESSMENT — PAIN DESCRIPTION - LOCATION
LOCATION: HEAD
LOCATION: HEAD

## 2023-04-28 ASSESSMENT — PAIN SCALES - GENERAL
PAINLEVEL_OUTOF10: 0
PAINLEVEL_OUTOF10: 9
PAINLEVEL_OUTOF10: 0
PAINLEVEL_OUTOF10: 9

## 2023-04-28 ASSESSMENT — PAIN DESCRIPTION - PAIN TYPE: TYPE: ACUTE PAIN

## 2023-04-28 ASSESSMENT — PAIN DESCRIPTION - ONSET: ONSET: ON-GOING

## 2023-04-28 ASSESSMENT — PAIN DESCRIPTION - FREQUENCY: FREQUENCY: CONTINUOUS

## 2023-04-28 ASSESSMENT — PAIN - FUNCTIONAL ASSESSMENT: PAIN_FUNCTIONAL_ASSESSMENT: ACTIVITIES ARE NOT PREVENTED

## 2023-04-28 ASSESSMENT — PAIN DESCRIPTION - DESCRIPTORS
DESCRIPTORS: ACHING;DISCOMFORT
DESCRIPTORS: ACHING;DISCOMFORT

## 2023-04-28 NOTE — ED PROVIDER NOTES
No acute disease. RECOMMENDATION:   Careful clinical correlation and follow up recommended. No results found. No results found. Procedures:      ------------------------- NURSING NOTES AND VITALS REVIEWED ---------------------------   The nursing notes within the ED encounter and vital signs as below have been reviewed by myself and ED attending. /72   Pulse 95   Temp 98.1 °F (36.7 °C) (Temporal)   Resp 23   Ht 5' 2\" (1.575 m)   Wt 126 lb (57.2 kg)   SpO2 97%   BMI 23.05 kg/m²   Oxygen Saturation Interpretation: Improved after treatment    The patients available past medical records and past encounters were reviewed by myself and ED attending        ------------------------------ ED COURSE/MEDICAL DECISION MAKING----------------------    Medical Decision Making/Differential Diagnosis:    CC/HPI Summary, Pertinent Physical Exam Findings, Social Determinants of health, Records Reviewed, DDx, testing done/not done, ED Course, Reassessment, disposition considerations/shared decision making with patient, consults, disposition:      Medical Decision Making/ED COURSE:    Chronic Conditions affecting care:    has a past medical history of Abscess, Acute on chronic diastolic CHF (congestive heart failure) ([facility] (01/04/2023), COPD (chronic obstructive pulmonary disease) ([facility] Depression with anxiety (03/23/2023), Diverticulitis, Provoked DVT 3/2018, Seasonal allergic rhinitis, Smoker (11/30/2019), Tobacco use disorder, and Weakness of both legs. Myself and ED attending interpreted findings during patient's stay. Vital signs /72   Pulse 95   Temp 98.1 °F (36.7 °C) (Temporal)   Resp 23   Ht 5' 2\" (1.575 m)   Wt 126 lb (57.2 kg)   SpO2 97%   BMI 23.05 kg/m²   Patient was placed on the cardiac monitor.  Rhythm -sinus tachycardia  While in the ED patient was tachycardic and hypoxic but otherwise hemodynamically stable, afebrile, nontoxic-appearing, in no respiratory

## 2023-04-28 NOTE — ED NOTES
Department of Emergency Medicine  FIRST PROVIDER TRIAGE NOTE             Independent MLP           4/27/23  10:19 PM EDT    Date of Encounter: 4/27/23   MRN: [id number]      HPI: Jocelyne Tucker is a 61 y.o. female who presents to the ED for Shortness of Breath (Post Bronch) and Headache       ROS: Negative for vomiting or diarrhea. PE: Gen Appearance/Constitutional: alert  Pulm: abnormal breath sounds auscultated     Initial Plan of Care: All treatment areas with department are currently occupied. Plan to order/Initiate the following while awaiting opening in ED: labs, EKG, and imaging studies.   Initiate Treatment-Testing, Proceed toTreatment Area When Bed Available for ED Attending/MLP to Continue Care    Electronically signed by SKYE Serrano CNP   DD: 4/27/23       SKYE Serrano CNP  04/27/23 [id number]

## 2023-04-28 NOTE — CARE COORDINATION
Spoke with patient, lives with daughter from home, uses walker, on 4L o2 at home, lives with Daughter Edmundo Post, she can provide 24 hour care, daughter able to provide transport at discharge. Patient stated she has a nebulizer but no c-pap or bi-pap. PCP is Dr. Brandon Sahni. Discussed home care with both the patient and daughter, they declined and stated they will discuss this after discharge. Patient and daughter plan to discharge home, daughter Edmundo Post to provide transport. PT and OT ordered. Case Management Assessment  Initial Evaluation    Date/Time of Evaluation: 4/28/2023 11:23 AM  Assessment Completed by: Kelsi Banda    If patient is discharged prior to next notation, then this note serves as note for discharge by case management. Patient Name: Ayaka January                   YOB: 1960  Diagnosis: COPD exacerbation ([facility] [J44.1]  Acute respiratory failure with hypoxia ([facility] [J96.01]                   Date / Time: 4/27/2023 10:19 PM    Patient Admission Status: Inpatient   Readmission Risk (Low < 19, Mod (19-27), High > 27): Readmission Risk Score: 33.1    Current PCP: Marla Sahni MD  PCP verified by CM? Yes    Chart Reviewed: Yes      History Provided by: Child/Family  Patient Orientation: Alert and Oriented    Patient Cognition: Alert    Hospitalization in the last 30 days (Readmission):  No    If yes, Readmission Assessment in  Navigator will be completed.     Advance Directives:      Code Status: Full Code   Patient's Primary Decision Maker is: Legal Next of Kin    Primary Decision MakerBeabintajustin Medina - Child - [phone]    Secondary Decision Maker: shannan basilio - Child - [phone]    Discharge Planning:    Patient lives with: Children Type of Home: House  Primary Care Giver: Family  Patient Support Systems include: Children   Current Financial resources:    Current community resources:    Current services prior to admission: Oxygen Therapy            Current DME:

## 2023-04-28 NOTE — H&P
[address] [facility]  Department of Family Medicine  Family Medicine Residency Program  History and Physical    Patient:  Adeline Freedman 61 y.o. female MRN: [id number]     Date of Service: 4/28/2023      Chief complaint:   Chief Complaint   Patient presents with    Shortness of Breath     Post Bronch    Headache        History of Present Illness   The patient is a 61 y.o. female with pmh of CHF, COPD chronically on 4L O2, Pulmonary Fibrosis, Diverticulosis, Depression and Anxiety who presented to [facility] for worsening SOB. Patient was seen by her pulmonologist ; Dr. Kristopher Krishna today and did undergo Bronchoscopy with BAL. Per review and history obtained, patient had an episode of hypoxia s/p the procedure and was monitored in the PACU, and given breathing treatments at the time. After she returned to her baseline oxygen levels, she was discharged home on her baseline O2. Patient states that after she went home, she started experiencing worsening SOB and was very anxious. Of note, patient was offered follow up with [facility] for consideration of lung transplant but she refused. She does use ativan for anxiety at home. States she tried to take her \"regular\" meds at home, but when her symptoms did not improve, and due to her recent procedure and fear that something may have gone wrong, she presented back to the ER for further evaluation    She denies any worsening wheezing, sputum production, fevers, chills n/v, chest pain etc. Patient also denies any other sick contacts at this time.     Code status : Full code    ED Course:   Patient initially requiring 8 L O2, tachypnea and tachycardic    Pertinent Labs:  ABG with PCO2 59.7 , PO2 100 , HCO3 33.1  CBC with WBC 24.9  CMP stable  Troponin 14,14    Pertinent Imaging:  CXR portable without any acute processes  EKG sinus tachycardia    Pertinent Medications/Fluids:  1 L NS bolus, duonebs 3 ampules, 125 mg solumedrol    Past Medical History:

## 2023-04-28 NOTE — PLAN OF CARE
Patient's chart updated to reflect:      . - HF care plan, HF education points and HF discharge instructions.  -Orders: 2 gram sodium diet, daily weights, I/O.  -PCP and/or Cardiologist appointment to be scheduled within 7 days of hospital discharge.  -History of HF, not primary admission Dx. Patient admitted for treatment of IMPRESSION  1. COPD exacerbation ([facility]    2.  Acute respiratory failure with hypoxia      James Chaudhry RN RN, BSN  Heart Failure Navigator

## 2023-04-28 NOTE — ACP (ADVANCE CARE PLANNING)
Advance Care Planning   Healthcare Decision Maker:    Primary Decision Maker: Tabatha Fernandez Child - [phone]    Secondary Decision Maker: shannan basilio - Child - [phone]    Click here to complete Healthcare Decision Makers including selection of the Healthcare Decision Maker Relationship (ie \"Primary\").

## 2023-04-28 NOTE — DISCHARGE INSTRUCTIONS
=====================================  [facility], [address]  =====================================    Take your medications as directed in this summary    Future scheduled appointments are listed below or recommended appointments are mentioned above. Future Appointments   Date Time Provider Lupe Arnett   5/3/2023  2:00 PM MD Rosy Peterson [facility]   5/5/2023  1:00 PM Nelda Oliver, APRN - CNP [facility]   5/24/2023  9:30 AM Pearl Dodson MD [facility]   6/22/2023  1:00 PM [facility] CHF ROOM 1 [facility]   6/29/2023 11:20 AM MD Rosy Peterson [facility]       Call  [phone] to confirm or schedule your appointment with Dr. Tyler Iqbal ,  as soon as possible and/or if there are any appointment changes or other issues. It is important that you follow up with Dr. Tyler Iqbal  for better monitoring of the reason of your hospitalization. Follow up with the specialists that saw you during your stay as well. If you have any questions call your PCP at [phone]     Once discharged from [facility], you can : Activity : As tolerated  Stairs : As tolerated  Exercise : As tolerated  Lifting : As tolerated   Sexual activity : Yes  Driving : With seat belt on. NO driving on narcotic pain medication if prescribed   Medications : Always take your medications as prescribed  Wound Care: none needed  Diet : You are asked to make an attempt to improve diet and exercise patterns to aid in medical management of your medical condition/problem. Call [facility] with any further questions. Return to Emergency Department with any worsening of your condition and/or fever greater than 101 degrees, new weakness, shortness of breath or chest pain.

## 2023-04-29 VITALS
RESPIRATION RATE: 20 BRPM | OXYGEN SATURATION: 98 % | HEART RATE: 78 BPM | DIASTOLIC BLOOD PRESSURE: 65 MMHG | SYSTOLIC BLOOD PRESSURE: 114 MMHG | HEIGHT: 62 IN | TEMPERATURE: 98.6 F | BODY MASS INDEX: 23.19 KG/M2 | WEIGHT: 126 LBS

## 2023-04-29 LAB
ANION GAP SERPL CALCULATED.3IONS-SCNC: 5 MMOL/L (ref 7–16)
BACTERIA SPEC RESP CULT: NORMAL
BASOPHILS # BLD: 0.03 E9/L (ref 0–0.2)
BASOPHILS NFR BLD: 0.2 % (ref 0–2)
BUN SERPL-MCNC: 17 MG/DL (ref 6–23)
CALCIUM SERPL-MCNC: 9.2 MG/DL (ref 8.6–10.2)
CHLORIDE SERPL-SCNC: 103 MMOL/L (ref 98–107)
CO2 SERPL-SCNC: 36 MMOL/L (ref 22–29)
CREAT SERPL-MCNC: 0.4 MG/DL (ref 0.5–1)
EOSINOPHIL # BLD: 0.02 E9/L (ref 0.05–0.5)
EOSINOPHIL NFR BLD: 0.1 % (ref 0–6)
ERYTHROCYTE [DISTWIDTH] IN BLOOD BY AUTOMATED COUNT: 14.3 FL (ref 11.5–15)
GLUCOSE SERPL-MCNC: 86 MG/DL (ref 74–99)
HCT VFR BLD AUTO: 36.4 % (ref 34–48)
HGB BLD-MCNC: 11.3 G/DL (ref 11.5–15.5)
IMM GRANULOCYTES # BLD: 0.06 E9/L
IMM GRANULOCYTES NFR BLD: 0.4 % (ref 0–5)
LYMPHOCYTES # BLD: 1.95 E9/L (ref 1.5–4)
LYMPHOCYTES NFR BLD: 12.9 % (ref 20–42)
MAGNESIUM SERPL-MCNC: 2 MG/DL (ref 1.6–2.6)
MCH RBC QN AUTO: 30.9 PG (ref 26–35)
MCHC RBC AUTO-ENTMCNC: 31 % (ref 32–34.5)
MCV RBC AUTO: 99.5 FL (ref 80–99.9)
MONOCYTES # BLD: 1.23 E9/L (ref 0.1–0.95)
MONOCYTES NFR BLD: 8.1 % (ref 2–12)
NEUTROPHILS # BLD: 11.88 E9/L (ref 1.8–7.3)
NEUTS SEG NFR BLD: 78.3 % (ref 43–80)
PLATELET # BLD AUTO: 228 E9/L (ref 130–450)
PMV BLD AUTO: 9.8 FL (ref 7–12)
POTASSIUM SERPL-SCNC: 3.4 MMOL/L (ref 3.5–5)
RBC # BLD AUTO: 3.66 E12/L (ref 3.5–5.5)
SMEAR, RESPIRATORY: NORMAL
SODIUM SERPL-SCNC: 144 MMOL/L (ref 132–146)
WBC # BLD: 15.2 E9/L (ref 4.5–11.5)

## 2023-04-29 PROCEDURE — 85025 COMPLETE CBC W/AUTO DIFF WBC: CPT

## 2023-04-29 PROCEDURE — 6370000000 HC RX 637 (ALT 250 FOR IP): Performed by: FAMILY MEDICINE

## 2023-04-29 PROCEDURE — 94640 AIRWAY INHALATION TREATMENT: CPT

## 2023-04-29 PROCEDURE — 2700000000 HC OXYGEN THERAPY PER DAY

## 2023-04-29 PROCEDURE — 36415 COLL VENOUS BLD VENIPUNCTURE: CPT

## 2023-04-29 PROCEDURE — 83735 ASSAY OF MAGNESIUM: CPT

## 2023-04-29 PROCEDURE — 80048 BASIC METABOLIC PNL TOTAL CA: CPT

## 2023-04-29 RX ORDER — POTASSIUM CHLORIDE 20 MEQ/1
20 TABLET, EXTENDED RELEASE ORAL ONCE
Status: COMPLETED | OUTPATIENT
Start: 2023-04-29 | End: 2023-04-29

## 2023-04-29 RX ADMIN — ROFLUMILAST 500 MCG: 500 TABLET ORAL at 09:58

## 2023-04-29 RX ADMIN — FUROSEMIDE 20 MG: 20 TABLET ORAL at 10:35

## 2023-04-29 RX ADMIN — ARFORMOTEROL TARTRATE 15 MCG: 15 SOLUTION RESPIRATORY (INHALATION) at 08:28

## 2023-04-29 RX ADMIN — FOLIC ACID 1 MG: 1 TABLET ORAL at 09:57

## 2023-04-29 RX ADMIN — ASPIRIN 81 MG CHEWABLE TABLET 81 MG: 81 TABLET CHEWABLE at 09:52

## 2023-04-29 RX ADMIN — ALPRAZOLAM 0.25 MG: 0.25 TABLET ORAL at 09:52

## 2023-04-29 RX ADMIN — BUDESONIDE 500 MCG: 0.5 INHALANT RESPIRATORY (INHALATION) at 08:28

## 2023-04-29 RX ADMIN — GUAIFENESIN 400 MG: 400 TABLET ORAL at 09:57

## 2023-04-29 RX ADMIN — POTASSIUM CHLORIDE 20 MEQ: 1500 TABLET, EXTENDED RELEASE ORAL at 09:52

## 2023-04-29 RX ADMIN — SERTRALINE 25 MG: 50 TABLET, FILM COATED ORAL at 09:58

## 2023-04-29 RX ADMIN — POTASSIUM CHLORIDE 20 MEQ: 1500 TABLET, EXTENDED RELEASE ORAL at 09:53

## 2023-04-29 ASSESSMENT — PAIN SCALES - GENERAL: PAINLEVEL_OUTOF10: 0

## 2023-04-29 NOTE — CARE COORDINATION
Social Work Discharge Planning:  Discharge order noted. SW spoke with nursing, patient has no needs.  Electronically signed by EPI Farias on 4/29/2023 at 10:46 AM

## 2023-04-29 NOTE — PROGRESS NOTES
[address] [facility]  Family Medicine Attending    S: 61 y.o. female with PMHs of HFrEF, COPD with chronic respiratory failure on home O2, depression and anxiety, diverticulitis, provoked DVT (PICC line) s/p treatment on DOAC, allergies, tobacco use, who presents with dyspnea after bronchoscopy yesterday. Had been evaluated in PACU, given nebs, initially improved. Then, discharged home, felt dyspnea return, and presented to ED. Given treatment with steroids, nebs. Symptoms returned to baseline, back on 4L NC at this time, and symptoms have resolved. Today, sleeping, awakens to voice, as no symptoms at this time. O: VS- Blood pressure 134/75, pulse 91, temperature 97.6 °F (36.4 °C), temperature source Oral, resp. rate 20, height 5' 2\" (1.575 m), weight 126 lb (57.2 kg), SpO2 99 %, not currently breastfeeding. Exam is as noted by resident with the following changes, additions or corrections:  Gen NAD, sleeping but awakens easily, no dyspnea at rest  CV RRR  Resp decreased, coarse, unlabored at rest  Ext no edema     Impressions:   Principal Problem (Resolved):    COPD exacerbation ([address]  Active Problems:    * No active hospital problems. *      Plan:   Follow clinically. Symptoms have resolved for now. Supportive care. Continue home O2. Recheck CXR. If symptoms remain at baseline, plan to discharge today. Attending Physician Statement  I have reviewed the chart and seen the patient with the resident(s). I personally reviewed images, EKG's and similar tests, if present. I personally reviewed and performed key elements of the history and exam.  I have reviewed and confirmed student and/or resident history and exam with changes as indicated above. I agree with the assessment, plan and orders as documented by the resident. Please refer to the resident progress note today and admission history and physical  for additional information.       Prerna Albrecht DO
[address] [facility]  Family Medicine Attending    S: 61 y.o. female with PMHs of HFrEF, COPD with chronic respiratory failure on home O2, depression and anxiety, diverticulitis, provoked DVT (PICC line) s/p treatment on DOAC, allergies, tobacco use, who presents with dyspnea after bronchoscopy yesterday. Had been evaluated in PACU, given nebs, initially improved. Then, discharged home, felt dyspnea return, and presented to ED. Given treatment with steroids, nebs. Symptoms returned to baseline, back on 4L NC at this time, and symptoms have resolved. Today, feeling better, back to baseline. Had been anxious before, now stabilized on medications. Has been having frequent loose stools for several months, since COVID. No recent changes. Reports an approximate 20-30 pound weight loss      O: VS- Blood pressure 133/76, pulse 70, temperature 98.6 °F (37 °C), temperature source Oral, resp. rate 19, height 5' 2\" (1.575 m), weight 126 lb (57.2 kg), SpO2 98 %, not currently breastfeeding. Exam is as noted by resident with the following changes, additions or corrections:  Gen NAD, no dyspnea at rest, O2 by NC   CV RRR  Resp decreased, coarse, unlabored at rest  Ext no edema     Impressions:   Principal Problem (Resolved):    COPD exacerbation ([address]  Active Problems:    * No active hospital problems. *      Plan:   Follow clinically. Symptoms have resolved for now. Supportive care. Continue home O2. Recheck CXR stable. If symptoms remain at baseline, plan to discharge today. Will need urgent outpatient follow up to discuss BMs and further workup. Patient expressed understanding. Would recommend colonoscopy. Additionally, if BMs are liquid, would consider C diff testing if not previously done. Currently BMs are semi-solid with intermittent liquid, she states. Attending Physician Statement  I have reviewed the chart and seen the patient with the resident(s).   I personally reviewed images,
[address]      Date:2023                                                  Patient Name: Chloé Dowell  MRN: [id number]  : 1960  Room: [address] [place] [address]    Evaluating OT: JORDYN Cao, OTR/L  # [id number]    Referring Provider:  Adwoa Christensen MD  Specific Provider Orders:  Eloisa Akbar and Treat\"  23    Diagnosis: COPD exacerbation ([facility] [J44.1]  Acute respiratory failure with hypoxia ([facility] [J96.01]    Pt was admitted w/ SOB, Anxiety and HA shortly after Outpatient Bronchoscopy earlier same day    Pertinent Medical History:  Pt has a past medical history of Abscess, Acute on chronic diastolic CHF (congestive heart failure) ([facility] COPD (chronic obstructive pulmonary disease) ([facility] Depression with anxiety, Diverticulitis, Provoked DVT 3/2018, Seasonal allergic rhinitis, Smoker, Tobacco use disorder, and Weakness of both legs. ,  has a past surgical history that includes cardiovascular stress test (N/A, 2023); other surgical history; bronchoscopy (N/A, 2023); and bronchoscopy (2023).     Surgeries this admission: None;  Bronchoscopy 23     Precautions:  Fall Risk  Moderate Anxiety  4L O2 - continuous    Assessment of current deficits   [x] Functional mobility  [x]ADLs  [x] Strength               [x]Cognition   [x] Functional transfers   [x] IADLs         [x] Safety Awareness   [x]Endurance   [] Fine Coordination              [x] Balance     [] Vision/perception   []Sensation    []Gross Motor Coordination  [] ROM  [] Delirium                  [] Motor Control       OT PLAN OF CARE   OT POC based on physician orders, patient diagnosis and results of clinical assessment    Frequency/Duration 1-3 days/wk for 2 weeks PRN   Specific OT Treatment to include:   * Instruction/training on adapted ADL techniques and AE recommendations to increase functional
CLINICAL PHARMACY NOTE: MEDS TO BEDS    Total # of Prescriptions Filled: 1   The following medications were delivered to the patient:  Aspirin 81 chewable     Additional Documentation:
Physical Therapy  Physical Therapy Initial Assessment     Name: Aiyana Wooten  : 1960  MRN: [id number]      Date of Service: 2023    Evaluating PT:  Valentino Hidden PT, DPT [id number]    Room #:  4304/4897-W  Diagnosis:  COPD exacerbation ([facility] [J44.1]  Acute respiratory failure with hypoxia ([facility] [J96.01]  PMHx/PSHx:    Past Medical History:   Diagnosis Date    Abscess     colon    Acute on chronic diastolic CHF (congestive heart failure) ([facility] 2023    COPD (chronic obstructive pulmonary disease) ([facility]     Depression with anxiety 2023    Diverticulitis     Provoked DVT 3/2018     3 months Eliquis for DVT due to PICC line    Seasonal allergic rhinitis     Smoker 2019    5-6 per day    Tobacco use disorder      : 1 ppd since age 25    Weakness of both legs       Procedure/Surgery:  none this admission  Precautions:  Falls, O2, bed alarm  Equipment Needs:  none, pt has [employer]    SUBJECTIVE:    Pt lives with daughter in a 2 story home with 2 stairs to enter and 1 rail. Bed is on 1st floor and bath is on 1t floor. Pt ambulated with [employer] PTA. OBJECTIVE:   Initial Evaluation  Date: 23 Treatment Short Term/ Long Term   Goals   AM-PAC 6 Clicks 82/85     Was pt agreeable to Eval/treatment? yes     Does pt have pain? No c/o pain     Bed Mobility  Rolling: SBA  Supine to sit: SBA  Sit to supine: SBA  Scooting: SBA  Rolling: Independent   Supine to sit: Independent . Sit to supine: Independent   Scooting: Independent    Transfers Sit to stand: SBA  Stand to sit: SBA  Stand pivot: SBA with [employer]  Sit to stand: Independent   Stand to sit: Independent   Stand pivot:  Mod I with [employer]   Ambulation    20 feet with [employer] CGA  >100 feet with [employer] Mod I   Stair negotiation: ascended and descended  NT  2 steps with 1 rail Mod I   ROM BUE:  Defer to OT note  BLE:  WFL     Strength BUE:  Defer to OT note  BLE:  3/5  WFL   Balance Sitting EOB:  SBA  Dynamic Standing:  CGA with [employer]  Sitting EOB:  Independent  Dynamic
tenderness or deformity, full & symmetric excursion  Lung: Clear to auscultation bilaterally,  respirations unlabored. No rales/wheezing/rubs. Saturating well on 4L NC O2  Heart: RRR, S1 and S2 normal, no murmur, rub or gallop. no bruits (carotid/femoral/abd), pedal pulses 2+,  no edema  Abdomen: Soft, non-tender, bowel sounds active all four quadrants, no masses, no organomegaly  Genital and rectal exam: deferred  Extremities:  Extremities normal, atraumatic, no cyanosis or edema. Pulses equal bilaterally  Skin: Skin color, texture, turgor normal, no rashes or lesions  Musculokeletal: No joint swelling, no muscle tenderness. ROM normal in all joints of extremities. Lymph nodes: no lymph node enlargement apprciated  Neurologic:   Alert&Oriented. Labs:  Na/K/Cl/CO2:  144/3.4/103/36 (04/29 1647)  BUN/Cr/glu/ALT/AST/amyl/lip:  17/0.4/--/--/--/--/-- (04/29 5559)  WBC/Hgb/Hct/Plts:  15.2/11.3/36.4/228 (04/29 0523)  estimated creatinine clearance is 114 mL/min (A) (based on SCr of 0.4 mg/dL (L)). Other pertinent labs as noted below    New Imaging:  XR CHEST (2 VW)   Final Result   No acute cardiopulmonary process superimposed to chronic changes in lung   parenchyma as above described. XR CHEST PORTABLE   Final Result   No acute disease. RECOMMENDATION:   Careful clinical correlation and follow up recommended. A/P:  Principal Problem (Resolved):    COPD exacerbation ([facility]  Active Problems:    * No active hospital problems.  *    COPD exacerbation  S/p bronchoscopy, patient chronically on 4 L  No wheezing or increased sputum production, but patient with increased O2 requirements and recent episode of hypoxia  No sick contacts  Received 3 ampules duonebs in the ER , 125 mg solumedrol , 1 L NS  Initially on 8 L --> baseline 4 L  CXR with no acute process  Continue breathing treatments  CXR WNL  Obtain ambulatory pulse ox  Home meds include albuterol, daliresp , breztri, mucinex, duonebs prn,

## 2023-04-30 LAB
ACID FAST STN SPEC QL: NORMAL
LOEFFLER MB STN SPEC: NORMAL

## 2023-05-01 ENCOUNTER — CARE COORDINATION (OUTPATIENT)
Dept: CASE MANAGEMENT | Age: 63
End: 2023-05-01

## 2023-05-01 NOTE — CARE COORDINATION
[facility] Care Transitions Initial Follow Up Call    Call within 2 business days of discharge: Yes    Patient: Galilea Arnold Patient : 1960   MRN: [id number]  Reason for Admission: COPD exac  Discharge Date: 23 RARS: Readmission Risk Score: 34.5      Last Discharge  Street       Date Complaint Diagnosis Description Type Department Provider    23 Shortness of Breath; Headache COPD exacerbation ([address] . .. ED to Hosp-Admission (Discharged) (ADMITTED) [facility]; Yong Cueva . .. 23  Pleural scarring Admission (Discharged) [address] [facility],    First attempt to reach the patient for initial Care Transition call post hospital discharge. Message left with CTN's contact information requesting return phone call.       Follow Up  Future Appointments   Date Time Provider Lupe Arnett   5/3/2023  2:00 PM MD Heike Tadeo [facility]   2023  1:00 PM Maci Reyes APRN - CNP [facility]   2023  9:30 AM Alfreda Raza MD [facility]   2023  1:00 PM [facility]OM 1 [facility]   2023 11:20 AM MD Heike Tadeo [facility]     Shannon Coley RN

## 2023-05-02 ENCOUNTER — CARE COORDINATION (OUTPATIENT)
Dept: CASE MANAGEMENT | Age: 63
End: 2023-05-02

## 2023-05-02 RX ORDER — POTASSIUM CHLORIDE 20 MEQ/1
TABLET, EXTENDED RELEASE ORAL
Qty: 30 TABLET | Refills: 0 | Status: SHIPPED | OUTPATIENT
Start: 2023-05-02

## 2023-05-02 NOTE — TELEPHONE ENCOUNTER
Last Appointment:  7/19/2021  Future Appointments  5/3/2023   2:00 PM    Ismael Nageotte, MD [facility]  5/5/2023   1:00 PM    SKYE Ryan* [address]  5/24/2023  9:30 AM    Theresa Major MD     [facility]  6/22/2023  1:00 PM    [facility] CHF ROOM 1            [facility]  6/29/2023  11:20 AM   Ismael Nageotte, MD [facility]

## 2023-05-02 NOTE — CARE COORDINATION
[facility] Care Transitions Initial Follow Up Call    Call within 2 business days of discharge: Yes    Patient: Jonnathan Stovall Patient : 1960   MRN: [id number]  Reason for Admission: copd exac   Discharge Date: 23 RARS: Readmission Risk Score: 34.5      Last Discharge  Street       Date Complaint Diagnosis Description Type Department Provider    23 Shortness of Breath; Headache COPD exacerbation ([address] . .. ED to Hosp-Admission (Discharged) (ADMITTED) [facility] 4S PICU Tessa Palacios MD; [place] Block . .. 23  Pleural scarring Admission (Discharged) [facility]O Odalys Ontiveros, DO     Second and final attempt to reach the patient for initial Care Transition call post hospital discharge. Message left with CTN's contact information requesting return phone call. Will exclude from Care Transition calls for another month (until 23) due to being unable to reach after three consecutive admissions. Per chart review the patient is not  active on [facility], unable to reach letter pended and CTN will route to Trisha Watson, , to mail to the patients listed address.      Future Appointments   Date Time Provider Lupe Arnett   5/3/2023  2:00 PM MD Jeremy Garcia [facility]   2023  1:00 PM SKYE Greer - CNP [facility]   2023  9:30 AM Mark Lopez MD [facility]   2023  1:00 PM [facility] CHF ROOM 1 [facility] CHF [facility] Juan Carlos   2023 11:20 AM MD Jeremy Garcia [facility]     Anurag Wheeler, RN

## 2023-05-08 NOTE — OP NOTE
Operative Note      Patient: Dania Ramos  YOB: 1960  MRN: [id number]    Date of Procedure: 4/27/2023    Pre-Op Diagnosis Codes:     * Pleural scarring [J94.8]    Post-Op Diagnosis: Same       Procedure(s):  BRONCHOSCOPY ALVEOLAR LAVAGE  BRONCHOSCOPY DIAGNOSTIC OR CELL 8 Ashtyn Cuevas ONLY    Surgeon(s): Iva Delgado DO    Assistant:   * No surgical staff found *    Anesthesia: Monitor Anesthesia Care    Estimated Blood Loss (mL): none    Complications: None    Specimens:   ID Type Source Tests Collected by Time Destination   1 : right middle lobe BAL Respiratory BAL- Bronch. Lavage CELL COUNT WITH DIFFERENTIAL, BODY FLUID, CULTURE, FUNGUS, GRAM STAIN (Canceled), CULTURE WITH SMEAR, ACID FAST BACILLIUS, CULTURE, RESPIRATORY, SMEAR FUNGUS (Canceled) Lorena Sosa DO 4/27/2023 1245    A : right middle lobe BAL Respiratory BAL- Bronch. Lavage CYTOLOGY, NON-GYN, PNEUMOCYSTIS STAIN Iva Delgado DO 4/27/2023 1248        Implants:  * No implants in log *      Drains: * No LDAs found *    Findings: 1. No endobronchial lesions  2. Minimal secretions present      Detailed Description of Procedure:   Informed consent was obtained. The oropharynx was anesthetized with 3 sprays of lidocaine. The patient was sedated with propofol by the anesthesia team (see anesthesia note). The vocal cords were anesthetized with 4ml of lidocaine. There was normal abduction and adduction of the vocal cords. Next the scope was advanced to the level of the diane, the right upper lobe, middle lobe, and lower lobe were examined with erythema noted, no endobronchial lesions were present. The left upper and lower lobes were examined with no endobronchial lesions present. Next BAL was completed of the right middle lobe with instillation of 120ml of saline with return of 30ml. The scope was then withdrawn. The patient tolerated the procedure well.      Electronically signed by Iva Delgado DO on 5/7/2023 at 10:07 PM

## 2023-05-09 LAB
ACID FAST STN SPEC QL: NORMAL
MYCOBACTERIUM SPEC CULT: NORMAL

## 2023-05-10 LAB
FUNGUS SPEC CULT: ABNORMAL
FUNGUS SPEC CULT: ABNORMAL
LOEFFLER MB STN SPEC: ABNORMAL
ORGANISM: ABNORMAL

## 2023-05-16 LAB
ACID FAST STN SPEC QL: NORMAL
MYCOBACTERIUM SPEC CULT: NORMAL

## 2023-05-23 LAB
ACID FAST STN SPEC QL: NORMAL
MYCOBACTERIUM SPEC CULT: NORMAL

## 2023-05-24 ENCOUNTER — OFFICE VISIT (OUTPATIENT)
Dept: CARDIOLOGY CLINIC | Age: 63
End: 2023-05-24

## 2023-05-24 VITALS
SYSTOLIC BLOOD PRESSURE: 112 MMHG | BODY MASS INDEX: 21.51 KG/M2 | WEIGHT: 126 LBS | HEIGHT: 64 IN | OXYGEN SATURATION: 97 % | HEART RATE: 88 BPM | RESPIRATION RATE: 18 BRPM | DIASTOLIC BLOOD PRESSURE: 70 MMHG

## 2023-05-24 DIAGNOSIS — F17.200 TOBACCO USE DISORDER: ICD-10-CM

## 2023-05-24 DIAGNOSIS — I50.22 CHRONIC SYSTOLIC CONGESTIVE HEART FAILURE (HCC): Primary | ICD-10-CM

## 2023-05-24 DIAGNOSIS — R09.02 HYPOXEMIA: ICD-10-CM

## 2023-05-24 DIAGNOSIS — J30.2 SEASONAL ALLERGIC RHINITIS, UNSPECIFIED TRIGGER: ICD-10-CM

## 2023-05-24 DIAGNOSIS — F41.8 DEPRESSION WITH ANXIETY: ICD-10-CM

## 2023-05-24 DIAGNOSIS — R94.39 ABNORMAL STRESS TEST: Primary | ICD-10-CM

## 2023-05-24 DIAGNOSIS — Z01.818 PRE-OP TESTING: ICD-10-CM

## 2023-05-24 DIAGNOSIS — R07.9 CHEST PAIN, UNSPECIFIED TYPE: ICD-10-CM

## 2023-05-24 DIAGNOSIS — J42 CHRONIC BRONCHITIS, UNSPECIFIED CHRONIC BRONCHITIS TYPE (HCC): ICD-10-CM

## 2023-05-24 DIAGNOSIS — E78.5 DYSLIPIDEMIA: ICD-10-CM

## 2023-05-24 DIAGNOSIS — R94.39 ABNORMAL STRESS TEST: ICD-10-CM

## 2023-05-24 DIAGNOSIS — J41.8 MIXED SIMPLE AND MUCOPURULENT CHRONIC BRONCHITIS (HCC): ICD-10-CM

## 2023-05-24 DIAGNOSIS — F41.9 ANXIETY: ICD-10-CM

## 2023-05-24 DIAGNOSIS — J96.11 CHRONIC HYPOXEMIC RESPIRATORY FAILURE (HCC): ICD-10-CM

## 2023-05-24 PROCEDURE — 85610 PROTHROMBIN TIME: CPT | Performed by: INTERNAL MEDICINE

## 2023-05-24 NOTE — PROGRESS NOTES
Out PATIENT New CARDIOLOGY CONSULT    Name: Muna Del Toro    Age: 61 y.o. Date of Admission: No admission date for patient encounter. Date of Service: 5/26/2023    Reason for Consultation:   Chief Complaint   Patient presents with    Congestive Heart Failure     Patient complains of being SOB. Referring Physician: No admitting provider for patient encounter. History of Present Illness: 29-year-old female with history of heart failure with depressed systolic function, history of elevated liver enzymes, multiple liver cysts, history of COVID-19 infection, recurrent diverticulitis, COPD on home oxygen, tobacco abuse, prior DVT and hyperlipidemia who was hospitalized recently for heart failure symptoms and found to have mildly depressed systolic function and abnormal stress test.  He was supposed to have invasive coronary angiogram but this was deferred because of ongoing COVID-19 infection. She now presents for follow-up visit and report ready to proceed with cardiac catheterization.         Past Medical History:  Past Medical History:   Diagnosis Date    Abscess     colon    Acute on chronic diastolic CHF (congestive heart failure) ([facility] 01/04/2023    COPD (chronic obstructive pulmonary disease) ([facility]     Depression with anxiety 03/23/2023    Diverticulitis     Provoked DVT 3/2018     3 months Eliquis for DVT due to PICC line    Seasonal allergic rhinitis     Smoker 11/30/2019    5-6 per day    Tobacco use disorder      : 1 ppd since age 25    Weakness of both legs    Congestive heart failure with depressed systolic function and motion abnormalities/hypokinesis   Abnormal stress test with intermediate risk scan   History of  transaminitis with hyperbilirubinemia, multiple liver cysts, negative MRCP for biliary obstruction, Abnormal GB appearance with negative ultrasound   COVID-19 infection with respiratory failure  Recurrent diverticulitis with prior or recent abscess drainage  Chronic

## 2023-05-25 ENCOUNTER — TELEPHONE (OUTPATIENT)
Dept: CARDIOLOGY CLINIC | Age: 63
End: 2023-05-25

## 2023-05-25 NOTE — TELEPHONE ENCOUNTER
Patient needs prior auth for Heart Cath    Patient     6/8/2023 9:30  Dx:  Abnormal stress test R94.39 [ICD-10-CM  HMO: Joey Patel [id number]  CPT: 68569

## 2023-05-25 NOTE — TELEPHONE ENCOUNTER
Clinicals pending authorization. Case # [id number]. We need Dr. Joan Hunt to finish office visit note to send for clinicals.

## 2023-05-30 LAB
ACID FAST STN SPEC QL: NORMAL
FUNGUS SPEC CULT: ABNORMAL
LOEFFLER MB STN SPEC: ABNORMAL
MYCOBACTERIUM SPEC CULT: NORMAL
ORGANISM: ABNORMAL

## 2023-06-06 LAB
ACID FAST STN SPEC QL: NORMAL
MYCOBACTERIUM SPEC CULT: NORMAL

## 2023-06-07 ENCOUNTER — TELEPHONE (OUTPATIENT)
Dept: FAMILY MEDICINE CLINIC | Age: 63
End: 2023-06-07

## 2023-06-07 NOTE — TELEPHONE ENCOUNTER
Stacie Ballard called in and she is stating that she has been having very frequent and very soft bowel movements. She states that she was told to buy something over the counter but it is not working. She states that she even did not eat for a couple of days and as soon as she started eating she began having the bowel movements again. She is asking what she should do from here.     Please advise    Thank you

## 2023-06-09 RX ORDER — POTASSIUM CHLORIDE 20 MEQ/1
TABLET, EXTENDED RELEASE ORAL
Qty: 30 TABLET | Refills: 0 | Status: SHIPPED | OUTPATIENT
Start: 2023-06-09

## 2023-06-09 NOTE — TELEPHONE ENCOUNTER
Last Appointment:  7/19/2021  Future Appointments  6/22/2023  1:00 PM    [facility] CHF ROOM 1            [facility] CHF            [facility]  6/29/2023  11:20 AM   MD Conner Roland [facility]  7/6/2023   9:30 AM    [facility] CVL 01                YZ CARMENZA Jules

## 2023-06-14 PROBLEM — J44.1 COPD WITH ACUTE EXACERBATION (HCC): Status: ACTIVE | Noted: 2023-06-14

## 2023-06-14 PROBLEM — I50.32 CHRONIC HEART FAILURE WITH PRESERVED EJECTION FRACTION (HCC): Status: ACTIVE | Noted: 2023-06-14

## 2023-06-14 PROBLEM — J96.02 ACUTE RESPIRATORY FAILURE WITH HYPOXIA AND HYPERCARBIA (HCC): Status: RESOLVED | Noted: 2021-05-20 | Resolved: 2023-06-14

## 2023-06-14 PROBLEM — I25.10 CORONARY ARTERY DISEASE INVOLVING NATIVE HEART WITHOUT ANGINA PECTORIS: Status: ACTIVE | Noted: 2023-06-14

## 2023-06-14 PROBLEM — J96.01 ACUTE RESPIRATORY FAILURE WITH HYPOXIA AND HYPERCARBIA (HCC): Status: RESOLVED | Noted: 2021-05-20 | Resolved: 2023-06-14

## 2023-06-14 PROBLEM — J96.01 ACUTE RESPIRATORY FAILURE WITH HYPOXIA AND HYPERCARBIA (HCC): Status: ACTIVE | Noted: 2021-05-20

## 2023-06-14 PROBLEM — R06.03 RESPIRATORY DISTRESS: Status: ACTIVE | Noted: 2023-06-14

## 2023-06-15 PROBLEM — I50.32 CHRONIC HEART FAILURE WITH PRESERVED EJECTION FRACTION (HCC): Status: RESOLVED | Noted: 2023-06-14 | Resolved: 2023-06-15

## 2023-06-15 PROBLEM — J96.21 ACUTE ON CHRONIC RESPIRATORY FAILURE WITH HYPOXIA AND HYPERCAPNIA (HCC): Status: RESOLVED | Noted: 2022-02-10 | Resolved: 2023-06-15

## 2023-06-15 PROBLEM — J44.1 COPD EXACERBATION (HCC): Status: RESOLVED | Noted: 2023-04-28 | Resolved: 2023-06-15

## 2023-06-15 PROBLEM — J96.22 ACUTE ON CHRONIC RESPIRATORY FAILURE WITH HYPOXIA AND HYPERCAPNIA (HCC): Status: RESOLVED | Noted: 2022-02-10 | Resolved: 2023-06-15

## 2023-06-15 PROBLEM — J44.1 COPD WITH ACUTE EXACERBATION (HCC): Status: RESOLVED | Noted: 2023-06-14 | Resolved: 2023-06-15

## 2023-06-15 PROBLEM — R06.03 RESPIRATORY DISTRESS: Status: RESOLVED | Noted: 2023-06-14 | Resolved: 2023-06-15

## 2023-06-19 ENCOUNTER — CARE COORDINATION (OUTPATIENT)
Dept: CASE MANAGEMENT | Age: 63
End: 2023-06-19

## 2023-06-19 NOTE — CARE COORDINATION
Care Transitions Outreach Attempt    Call within 2 business days of discharge: Yes     2nd attempt to reach the patient for initial Care Transition call post hospital discharge. Message left with CTN's contact information requesting return phone call. No further outreaches will be attempted. If no return call by the end of today, CTN will  sign off and resolve CT episode    Pt has a scheduled HFU appt today with her pcp. Per chart review the patient is not  active on [facility], unable to reach letter pended and CTN will route to Lori Jeffries, , to mail to the patients listed address. Patient: Iris Solitario Patient : 1960 MRN: [id number]    Last Discharge  Street       Date Complaint Diagnosis Description Type Department Provider    23 Shortness of Breath COPD with acute exacerbation ([facility] . .. ED to Hosp-Admission (Discharged) (ADMITTED) [facility], DO; Cali Nunez... Was this an external facility discharge?  No     Noted following upcoming appointments from discharge chart review:   [facility] follow up appointment(s):   Future Appointments   Date Time Provider Lupe Arnett   2023  3:00 PM MD Frederic Huston [facility]   2023  1:00 PM [facility] CHF ROOM 1 [facility]   2023 11:20 AM MD Conner Arriola [facility]   2023  9:30 AM [facility] CVL 01 [facility]     [facility] follow up appointment(s): n/a

## 2023-06-22 ENCOUNTER — HOSPITAL ENCOUNTER (OUTPATIENT)
Dept: OTHER | Age: 63
Setting detail: THERAPIES SERIES
Discharge: HOME | End: 2023-06-22

## 2023-06-22 NOTE — PROGRESS NOTES
1:26 PM 6/22/2023  [facility] 1960    Patient's daughter called and left voicemail re: cancelling a CHF clinic appointment. Called Kinza Ring back re: rescheduling appointment however pt's daughter did not answer. Will await a callback.

## 2023-06-29 ENCOUNTER — OFFICE VISIT (OUTPATIENT)
Dept: FAMILY MEDICINE CLINIC | Age: 63
End: 2023-06-29
Payer: COMMERCIAL

## 2023-06-29 VITALS
SYSTOLIC BLOOD PRESSURE: 87 MMHG | BODY MASS INDEX: 22.08 KG/M2 | HEIGHT: 62 IN | OXYGEN SATURATION: 100 % | RESPIRATION RATE: 16 BRPM | TEMPERATURE: 97.9 F | WEIGHT: 120 LBS | DIASTOLIC BLOOD PRESSURE: 56 MMHG | HEART RATE: 99 BPM

## 2023-06-29 DIAGNOSIS — J44.9 CHRONIC OBSTRUCTIVE PULMONARY DISEASE, UNSPECIFIED COPD TYPE (HCC): ICD-10-CM

## 2023-06-29 PROCEDURE — 99213 OFFICE O/P EST LOW 20 MIN: CPT | Performed by: FAMILY MEDICINE

## 2023-06-29 RX ORDER — ALPRAZOLAM 0.25 MG/1
0.25 TABLET ORAL 3 TIMES DAILY PRN
Qty: 90 TABLET | Refills: 2 | Status: SHIPPED | OUTPATIENT
Start: 2023-06-29 | End: 2023-09-27

## 2023-06-29 RX ORDER — CHOLECALCIFEROL (VITAMIN D3) 50 MCG
2000 TABLET ORAL DAILY
Qty: 30 TABLET | Refills: 0 | Status: SHIPPED | OUTPATIENT
Start: 2023-06-29

## 2023-06-29 RX ORDER — BUDESONIDE, GLYCOPYRROLATE, AND FORMOTEROL FUMARATE 160; 9; 4.8 UG/1; UG/1; UG/1
2 AEROSOL, METERED RESPIRATORY (INHALATION) 2 TIMES DAILY
Qty: 1 EACH | Refills: 6 | Status: SHIPPED | OUTPATIENT
Start: 2023-06-29

## 2023-06-29 RX ORDER — ROFLUMILAST 250 UG/1
250 TABLET ORAL DAILY
COMMUNITY
Start: 2023-06-14 | End: 2023-06-29 | Stop reason: CLARIF

## 2023-06-29 ASSESSMENT — ENCOUNTER SYMPTOMS
SHORTNESS OF BREATH: 0
WHEEZING: 0
COUGH: 0

## 2023-06-30 ENCOUNTER — TELEPHONE (OUTPATIENT)
Dept: FAMILY MEDICINE CLINIC | Age: 63
End: 2023-06-30

## 2023-07-05 ENCOUNTER — TELEPHONE (OUTPATIENT)
Dept: CARDIAC CATH/INVASIVE PROCEDURES | Age: 63
End: 2023-07-05

## 2023-07-06 ENCOUNTER — HOSPITAL ENCOUNTER (OUTPATIENT)
Dept: CARDIAC CATH/INVASIVE PROCEDURES | Age: 63
Discharge: HOME | End: 2023-07-06

## 2023-07-10 RX ORDER — POTASSIUM CHLORIDE 20 MEQ/1
TABLET, EXTENDED RELEASE ORAL
Qty: 30 TABLET | Refills: 1 | Status: SHIPPED
Start: 2023-07-10 | End: 2023-07-12

## 2023-07-11 ENCOUNTER — HOSPITAL ENCOUNTER (OUTPATIENT)
Age: 63
Setting detail: OBSERVATION
Discharge: HOME | End: 2023-07-14
Attending: EMERGENCY MEDICINE | Admitting: FAMILY MEDICINE
Payer: COMMERCIAL

## 2023-07-11 ENCOUNTER — TELEPHONE (OUTPATIENT)
Dept: OTHER | Facility: CLINIC | Age: 63
End: 2023-07-11

## 2023-07-11 ENCOUNTER — APPOINTMENT (OUTPATIENT)
Dept: GENERAL RADIOLOGY | Age: 63
End: 2023-07-11
Payer: COMMERCIAL

## 2023-07-11 DIAGNOSIS — J96.12 CHRONIC RESPIRATORY FAILURE WITH HYPERCAPNIA (HCC): ICD-10-CM

## 2023-07-11 DIAGNOSIS — J44.1 COPD EXACERBATION (HCC): Primary | ICD-10-CM

## 2023-07-11 LAB
ANION GAP SERPL CALCULATED.3IONS-SCNC: 3 MMOL/L (ref 7–16)
B.E.: 13.9 MMOL/L (ref -3–3)
BASOPHILS # BLD: 0.03 E9/L (ref 0–0.2)
BASOPHILS NFR BLD: 0.3 % (ref 0–2)
BUN SERPL-MCNC: 11 MG/DL (ref 6–23)
CALCIUM SERPL-MCNC: 10.3 MG/DL (ref 8.6–10.2)
CHLORIDE SERPL-SCNC: 97 MMOL/L (ref 98–107)
CO2 SERPL-SCNC: 45 MMOL/L (ref 22–29)
COHB: 1 % (ref 0–1.5)
CREAT SERPL-MCNC: 0.4 MG/DL (ref 0.5–1)
CRITICAL: ABNORMAL
DATE ANALYZED: ABNORMAL
DATE OF COLLECTION: ABNORMAL
EOSINOPHIL # BLD: 0.15 E9/L (ref 0.05–0.5)
EOSINOPHIL NFR BLD: 1.6 % (ref 0–6)
ERYTHROCYTE [DISTWIDTH] IN BLOOD BY AUTOMATED COUNT: 12.6 FL (ref 11.5–15)
GLUCOSE SERPL-MCNC: 139 MG/DL (ref 74–99)
HCO3: 42.7 MMOL/L (ref 22–26)
HCT VFR BLD AUTO: 41.2 % (ref 34–48)
HGB BLD-MCNC: 12.4 G/DL (ref 11.5–15.5)
HHB: 2 % (ref 0–5)
IMM GRANULOCYTES # BLD: 0.02 E9/L
IMM GRANULOCYTES NFR BLD: 0.2 % (ref 0–5)
LAB: ABNORMAL
LYMPHOCYTES # BLD: 1.77 E9/L (ref 1.5–4)
LYMPHOCYTES NFR BLD: 19.1 % (ref 20–42)
Lab: ABNORMAL
MCH RBC QN AUTO: 30.2 PG (ref 26–35)
MCHC RBC AUTO-ENTMCNC: 30.1 % (ref 32–34.5)
MCV RBC AUTO: 100.2 FL (ref 80–99.9)
METHB: 0.2 % (ref 0–1.5)
MODE: ABNORMAL
MONOCYTES # BLD: 0.78 E9/L (ref 0.1–0.95)
MONOCYTES NFR BLD: 8.4 % (ref 2–12)
NEUTROPHILS # BLD: 6.54 E9/L (ref 1.8–7.3)
NEUTS SEG NFR BLD: 70.4 % (ref 43–80)
O2 CONTENT: 17.5 ML/DL
O2 SATURATION: 98 % (ref 92–98.5)
O2HB: 96.8 % (ref 94–97)
OPERATOR ID: 187
PATIENT TEMP: 37 C
PCO2: 76.8 MMHG (ref 35–45)
PH BLOOD GAS: 7.36 (ref 7.35–7.45)
PLATELET # BLD AUTO: 326 E9/L (ref 130–450)
PMV BLD AUTO: 9.7 FL (ref 7–12)
PO2: 116.2 MMHG (ref 75–100)
POTASSIUM SERPL-SCNC: 3.5 MMOL/L (ref 3.5–5)
RBC # BLD AUTO: 4.11 E12/L (ref 3.5–5.5)
SARS-COV-2 RDRP RESP QL NAA+PROBE: NOT DETECTED
SODIUM SERPL-SCNC: 145 MMOL/L (ref 132–146)
SOURCE, BLOOD GAS: ABNORMAL
THB: 12.7 G/DL (ref 11.5–16.5)
TIME ANALYZED: 2212
TROPONIN, HIGH SENSITIVITY: 13 NG/L (ref 0–9)
WBC # BLD: 9.3 E9/L (ref 4.5–11.5)

## 2023-07-11 PROCEDURE — 85025 COMPLETE CBC W/AUTO DIFF WBC: CPT

## 2023-07-11 PROCEDURE — 96374 THER/PROPH/DIAG INJ IV PUSH: CPT

## 2023-07-11 PROCEDURE — 80048 BASIC METABOLIC PNL TOTAL CA: CPT

## 2023-07-11 PROCEDURE — 93005 ELECTROCARDIOGRAM TRACING: CPT | Performed by: EMERGENCY MEDICINE

## 2023-07-11 PROCEDURE — 71045 X-RAY EXAM CHEST 1 VIEW: CPT

## 2023-07-11 PROCEDURE — 99285 EMERGENCY DEPT VISIT HI MDM: CPT

## 2023-07-11 PROCEDURE — 94640 AIRWAY INHALATION TREATMENT: CPT

## 2023-07-11 PROCEDURE — 84484 ASSAY OF TROPONIN QUANT: CPT

## 2023-07-11 PROCEDURE — 2580000003 HC RX 258: Performed by: EMERGENCY MEDICINE

## 2023-07-11 PROCEDURE — 6360000002 HC RX W HCPCS: Performed by: EMERGENCY MEDICINE

## 2023-07-11 PROCEDURE — 87635 SARS-COV-2 COVID-19 AMP PRB: CPT

## 2023-07-11 PROCEDURE — 6370000000 HC RX 637 (ALT 250 FOR IP): Performed by: EMERGENCY MEDICINE

## 2023-07-11 PROCEDURE — 96375 TX/PRO/DX INJ NEW DRUG ADDON: CPT

## 2023-07-11 PROCEDURE — 82805 BLOOD GASES W/O2 SATURATION: CPT

## 2023-07-11 RX ORDER — IPRATROPIUM BROMIDE AND ALBUTEROL SULFATE 2.5; .5 MG/3ML; MG/3ML
3 SOLUTION RESPIRATORY (INHALATION) ONCE
Status: COMPLETED | OUTPATIENT
Start: 2023-07-11 | End: 2023-07-11

## 2023-07-11 RX ADMIN — IPRATROPIUM BROMIDE AND ALBUTEROL SULFATE 3 DOSE: 2.5; .5 SOLUTION RESPIRATORY (INHALATION) at 22:40

## 2023-07-11 RX ADMIN — METHYLPREDNISOLONE SODIUM SUCCINATE 125 MG: 125 INJECTION, POWDER, LYOPHILIZED, FOR SOLUTION INTRAMUSCULAR; INTRAVENOUS at 23:08

## 2023-07-11 ASSESSMENT — PAIN DESCRIPTION - ORIENTATION: ORIENTATION: RIGHT;LEFT

## 2023-07-11 ASSESSMENT — PAIN DESCRIPTION - LOCATION: LOCATION: GENERALIZED

## 2023-07-11 ASSESSMENT — PAIN SCALES - GENERAL: PAINLEVEL_OUTOF10: 5

## 2023-07-11 ASSESSMENT — PAIN DESCRIPTION - DESCRIPTORS: DESCRIPTORS: ACHING

## 2023-07-11 ASSESSMENT — PAIN - FUNCTIONAL ASSESSMENT: PAIN_FUNCTIONAL_ASSESSMENT: 0-10

## 2023-07-12 PROBLEM — R06.02 SHORTNESS OF BREATH: Status: ACTIVE | Noted: 2023-07-12

## 2023-07-12 PROBLEM — J44.1 COPD EXACERBATION (HCC): Status: ACTIVE | Noted: 2023-07-12

## 2023-07-12 LAB
ALBUMIN SERPL-MCNC: 3.6 G/DL (ref 3.5–5.2)
ALP SERPL-CCNC: 76 U/L (ref 35–104)
ALT SERPL-CCNC: 14 U/L (ref 0–32)
ANION GAP SERPL CALCULATED.3IONS-SCNC: 3 MMOL/L (ref 7–16)
AST SERPL-CCNC: 17 U/L (ref 0–31)
B PARAP IS1001 DNA NPH QL NAA+NON-PROBE: NOT DETECTED
B PERT.PT PRMT NPH QL NAA+NON-PROBE: NOT DETECTED
B.E.: 17.3 MMOL/L (ref -3–3)
B.E.: 17.3 MMOL/L (ref -3–3)
BILIRUB SERPL-MCNC: 0.3 MG/DL (ref 0–1.2)
BNP BLD-MCNC: 110 PG/ML (ref 0–125)
BUN SERPL-MCNC: 12 MG/DL (ref 6–23)
C PNEUM DNA NPH QL NAA+NON-PROBE: NOT DETECTED
CALCIUM SERPL-MCNC: 10 MG/DL (ref 8.6–10.2)
CHLORIDE SERPL-SCNC: 96 MMOL/L (ref 98–107)
CO2 SERPL-SCNC: 46 MMOL/L (ref 22–29)
COHB: 0.6 % (ref 0–1.5)
COHB: 0.8 % (ref 0–1.5)
COMMENT: ABNORMAL
CREAT SERPL-MCNC: 0.3 MG/DL (ref 0.5–1)
CRITICAL: ABNORMAL
CRITICAL: ABNORMAL
DATE ANALYZED: ABNORMAL
DATE ANALYZED: ABNORMAL
DATE OF COLLECTION: ABNORMAL
DATE OF COLLECTION: ABNORMAL
EKG ATRIAL RATE: 98 BPM
EKG P AXIS: 80 DEGREES
EKG P-R INTERVAL: 168 MS
EKG Q-T INTERVAL: 364 MS
EKG QRS DURATION: 84 MS
EKG QTC CALCULATION (BAZETT): 464 MS
EKG R AXIS: 74 DEGREES
EKG T AXIS: 77 DEGREES
EKG VENTRICULAR RATE: 98 BPM
FLUAV RNA NPH QL NAA+NON-PROBE: NOT DETECTED
FLUBV RNA NPH QL NAA+NON-PROBE: NOT DETECTED
GLUCOSE SERPL-MCNC: 149 MG/DL (ref 74–99)
HADV DNA NPH QL NAA+NON-PROBE: NOT DETECTED
HCO3: 42.9 MMOL/L (ref 22–26)
HCO3: 46.7 MMOL/L (ref 22–26)
HCOV 229E RNA NPH QL NAA+NON-PROBE: NOT DETECTED
HCOV HKU1 RNA NPH QL NAA+NON-PROBE: NOT DETECTED
HCOV NL63 RNA NPH QL NAA+NON-PROBE: NOT DETECTED
HCOV OC43 RNA NPH QL NAA+NON-PROBE: NOT DETECTED
HHB: 1 % (ref 0–5)
HHB: 1.9 % (ref 0–5)
HMPV RNA NPH QL NAA+NON-PROBE: NOT DETECTED
HPIV1 RNA NPH QL NAA+NON-PROBE: NOT DETECTED
HPIV2 RNA NPH QL NAA+NON-PROBE: NOT DETECTED
HPIV3 RNA NPH QL NAA+NON-PROBE: NOT DETECTED
HPIV4 RNA NPH QL NAA+NON-PROBE: NOT DETECTED
LAB: ABNORMAL
M PNEUMO DNA NPH QL NAA+NON-PROBE: NOT DETECTED
METHB: 0.2 % (ref 0–1.5)
METHB: 0.3 % (ref 0–1.5)
MODE: ABNORMAL
MODE: ABNORMAL
O2 CONTENT: 16.9 ML/DL
O2 CONTENT: 17.6 ML/DL
O2 SATURATION: 98.1 % (ref 92–98.5)
O2 SATURATION: 99 % (ref 92–98.5)
O2HB: 97 % (ref 94–97)
O2HB: 98.2 % (ref 94–97)
OPERATOR ID: 187
OPERATOR ID: 421
PATIENT TEMP: 37 C
PATIENT TEMP: 37 C
PCO2: 55 MMHG (ref 35–45)
PCO2: 83.1 MMHG (ref 35–45)
PH BLOOD GAS: 7.37 (ref 7.35–7.45)
PH BLOOD GAS: 7.51 (ref 7.35–7.45)
PO2: 114.1 MMHG (ref 75–100)
PO2: 158.9 MMHG (ref 75–100)
POTASSIUM SERPL-SCNC: 3.7 MMOL/L (ref 3.5–5)
PROCALCITONIN: 0.04 NG/ML (ref 0–0.08)
PROT SERPL-MCNC: 6 G/DL (ref 6.4–8.3)
RSV RNA NPH QL NAA+NON-PROBE: NOT DETECTED
RV+EV RNA NPH QL NAA+NON-PROBE: NOT DETECTED
SARS-COV-2 RNA NPH QL NAA+NON-PROBE: NOT DETECTED
SODIUM SERPL-SCNC: 145 MMOL/L (ref 132–146)
SOURCE, BLOOD GAS: ABNORMAL
SOURCE, BLOOD GAS: ABNORMAL
THB: 12 G/DL (ref 11.5–16.5)
THB: 12.8 G/DL (ref 11.5–16.5)
TIME ANALYZED: 1538
TIME ANALYZED: 835
TROPONIN, HIGH SENSITIVITY: 11 NG/L (ref 0–9)

## 2023-07-12 PROCEDURE — 6370000000 HC RX 637 (ALT 250 FOR IP): Performed by: STUDENT IN AN ORGANIZED HEALTH CARE EDUCATION/TRAINING PROGRAM

## 2023-07-12 PROCEDURE — 6360000002 HC RX W HCPCS: Performed by: INTERNAL MEDICINE

## 2023-07-12 PROCEDURE — 6360000002 HC RX W HCPCS: Performed by: FAMILY MEDICINE

## 2023-07-12 PROCEDURE — 2580000003 HC RX 258

## 2023-07-12 PROCEDURE — G0378 HOSPITAL OBSERVATION PER HR: HCPCS

## 2023-07-12 PROCEDURE — 0202U NFCT DS 22 TRGT SARS-COV-2: CPT

## 2023-07-12 PROCEDURE — 94660 CPAP INITIATION&MGMT: CPT

## 2023-07-12 PROCEDURE — 93010 ELECTROCARDIOGRAM REPORT: CPT | Performed by: INTERNAL MEDICINE

## 2023-07-12 PROCEDURE — 99222 1ST HOSP IP/OBS MODERATE 55: CPT | Performed by: INTERNAL MEDICINE

## 2023-07-12 PROCEDURE — 36415 COLL VENOUS BLD VENIPUNCTURE: CPT

## 2023-07-12 PROCEDURE — 96376 TX/PRO/DX INJ SAME DRUG ADON: CPT

## 2023-07-12 PROCEDURE — 80053 COMPREHEN METABOLIC PANEL: CPT

## 2023-07-12 PROCEDURE — 96365 THER/PROPH/DIAG IV INF INIT: CPT

## 2023-07-12 PROCEDURE — 6360000002 HC RX W HCPCS

## 2023-07-12 PROCEDURE — 82805 BLOOD GASES W/O2 SATURATION: CPT

## 2023-07-12 PROCEDURE — 84145 PROCALCITONIN (PCT): CPT

## 2023-07-12 PROCEDURE — 2580000003 HC RX 258: Performed by: INTERNAL MEDICINE

## 2023-07-12 PROCEDURE — 99222 1ST HOSP IP/OBS MODERATE 55: CPT | Performed by: FAMILY MEDICINE

## 2023-07-12 PROCEDURE — 83880 ASSAY OF NATRIURETIC PEPTIDE: CPT

## 2023-07-12 PROCEDURE — 94640 AIRWAY INHALATION TREATMENT: CPT

## 2023-07-12 PROCEDURE — 6370000000 HC RX 637 (ALT 250 FOR IP)

## 2023-07-12 RX ORDER — ARFORMOTEROL TARTRATE 15 UG/2ML
15 SOLUTION RESPIRATORY (INHALATION) 2 TIMES DAILY
Status: DISCONTINUED | OUTPATIENT
Start: 2023-07-12 | End: 2023-07-14 | Stop reason: HOSPADM

## 2023-07-12 RX ORDER — METOPROLOL SUCCINATE 25 MG/1
12.5 TABLET, EXTENDED RELEASE ORAL NIGHTLY
Status: DISCONTINUED | OUTPATIENT
Start: 2023-07-12 | End: 2023-07-14 | Stop reason: HOSPADM

## 2023-07-12 RX ORDER — ONDANSETRON 4 MG/1
4 TABLET, ORALLY DISINTEGRATING ORAL EVERY 8 HOURS PRN
Status: DISCONTINUED | OUTPATIENT
Start: 2023-07-12 | End: 2023-07-14 | Stop reason: HOSPADM

## 2023-07-12 RX ORDER — ATORVASTATIN CALCIUM 40 MG/1
40 TABLET, FILM COATED ORAL NIGHTLY
Status: DISCONTINUED | OUTPATIENT
Start: 2023-07-12 | End: 2023-07-14 | Stop reason: HOSPADM

## 2023-07-12 RX ORDER — ONDANSETRON 2 MG/ML
4 INJECTION INTRAMUSCULAR; INTRAVENOUS EVERY 6 HOURS PRN
Status: DISCONTINUED | OUTPATIENT
Start: 2023-07-12 | End: 2023-07-14 | Stop reason: HOSPADM

## 2023-07-12 RX ORDER — ALBUTEROL SULFATE 2.5 MG/3ML
2.5 SOLUTION RESPIRATORY (INHALATION) EVERY 6 HOURS PRN
Status: DISCONTINUED | OUTPATIENT
Start: 2023-07-12 | End: 2023-07-14 | Stop reason: HOSPADM

## 2023-07-12 RX ORDER — SODIUM CHLORIDE 0.9 % (FLUSH) 0.9 %
5-40 SYRINGE (ML) INJECTION PRN
Status: DISCONTINUED | OUTPATIENT
Start: 2023-07-12 | End: 2023-07-14 | Stop reason: HOSPADM

## 2023-07-12 RX ORDER — ACETAMINOPHEN 650 MG/1
650 SUPPOSITORY RECTAL EVERY 6 HOURS PRN
Status: DISCONTINUED | OUTPATIENT
Start: 2023-07-12 | End: 2023-07-14 | Stop reason: HOSPADM

## 2023-07-12 RX ORDER — BUDESONIDE 0.5 MG/2ML
0.5 INHALANT ORAL 2 TIMES DAILY
Status: DISCONTINUED | OUTPATIENT
Start: 2023-07-12 | End: 2023-07-14 | Stop reason: HOSPADM

## 2023-07-12 RX ORDER — POLYETHYLENE GLYCOL 3350 17 G/17G
17 POWDER, FOR SOLUTION ORAL DAILY PRN
Status: DISCONTINUED | OUTPATIENT
Start: 2023-07-12 | End: 2023-07-14 | Stop reason: HOSPADM

## 2023-07-12 RX ORDER — FOLIC ACID 1 MG/1
1 TABLET ORAL DAILY
Status: DISCONTINUED | OUTPATIENT
Start: 2023-07-12 | End: 2023-07-14 | Stop reason: HOSPADM

## 2023-07-12 RX ORDER — SODIUM CHLORIDE 9 MG/ML
INJECTION, SOLUTION INTRAVENOUS PRN
Status: DISCONTINUED | OUTPATIENT
Start: 2023-07-12 | End: 2023-07-14 | Stop reason: HOSPADM

## 2023-07-12 RX ORDER — POTASSIUM CHLORIDE 20 MEQ/1
20 TABLET, EXTENDED RELEASE ORAL DAILY
COMMUNITY

## 2023-07-12 RX ORDER — IPRATROPIUM BROMIDE AND ALBUTEROL SULFATE 2.5; .5 MG/3ML; MG/3ML
1 SOLUTION RESPIRATORY (INHALATION) EVERY 4 HOURS PRN
Status: DISCONTINUED | OUTPATIENT
Start: 2023-07-12 | End: 2023-07-14 | Stop reason: HOSPADM

## 2023-07-12 RX ORDER — ALPRAZOLAM 0.25 MG/1
0.25 TABLET ORAL 3 TIMES DAILY PRN
Status: DISCONTINUED | OUTPATIENT
Start: 2023-07-12 | End: 2023-07-14 | Stop reason: HOSPADM

## 2023-07-12 RX ORDER — ACETAMINOPHEN 325 MG/1
650 TABLET ORAL EVERY 6 HOURS PRN
Status: DISCONTINUED | OUTPATIENT
Start: 2023-07-12 | End: 2023-07-14 | Stop reason: HOSPADM

## 2023-07-12 RX ORDER — ENOXAPARIN SODIUM 100 MG/ML
40 INJECTION SUBCUTANEOUS DAILY
Status: DISCONTINUED | OUTPATIENT
Start: 2023-07-12 | End: 2023-07-14 | Stop reason: HOSPADM

## 2023-07-12 RX ORDER — POTASSIUM CHLORIDE 20 MEQ/1
20 TABLET, EXTENDED RELEASE ORAL DAILY
Status: DISCONTINUED | OUTPATIENT
Start: 2023-07-12 | End: 2023-07-14 | Stop reason: HOSPADM

## 2023-07-12 RX ORDER — ALBUTEROL SULFATE 90 UG/1
2 AEROSOL, METERED RESPIRATORY (INHALATION) EVERY 6 HOURS PRN
COMMUNITY

## 2023-07-12 RX ORDER — MECOBALAMIN 5000 MCG
5 TABLET,DISINTEGRATING ORAL NIGHTLY PRN
Status: DISCONTINUED | OUTPATIENT
Start: 2023-07-12 | End: 2023-07-14 | Stop reason: HOSPADM

## 2023-07-12 RX ORDER — SODIUM CHLORIDE 0.9 % (FLUSH) 0.9 %
5-40 SYRINGE (ML) INJECTION EVERY 12 HOURS SCHEDULED
Status: DISCONTINUED | OUTPATIENT
Start: 2023-07-12 | End: 2023-07-14 | Stop reason: HOSPADM

## 2023-07-12 RX ORDER — PANTOPRAZOLE SODIUM 40 MG/1
40 TABLET, DELAYED RELEASE ORAL NIGHTLY
Status: DISCONTINUED | OUTPATIENT
Start: 2023-07-12 | End: 2023-07-14 | Stop reason: HOSPADM

## 2023-07-12 RX ORDER — ASPIRIN 81 MG/1
81 TABLET, CHEWABLE ORAL DAILY
Status: DISCONTINUED | OUTPATIENT
Start: 2023-07-12 | End: 2023-07-14 | Stop reason: HOSPADM

## 2023-07-12 RX ORDER — FUROSEMIDE 40 MG/1
20 TABLET ORAL DAILY
Status: DISCONTINUED | OUTPATIENT
Start: 2023-07-12 | End: 2023-07-14 | Stop reason: HOSPADM

## 2023-07-12 RX ADMIN — ARFORMOTEROL TARTRATE 15 MCG: 15 SOLUTION RESPIRATORY (INHALATION) at 20:14

## 2023-07-12 RX ADMIN — POTASSIUM CHLORIDE 20 MEQ: 1500 TABLET, EXTENDED RELEASE ORAL at 10:05

## 2023-07-12 RX ADMIN — FUROSEMIDE 20 MG: 40 TABLET ORAL at 10:06

## 2023-07-12 RX ADMIN — METHYLPREDNISOLONE SODIUM SUCCINATE 60 MG: 125 INJECTION, POWDER, LYOPHILIZED, FOR SOLUTION INTRAMUSCULAR; INTRAVENOUS at 21:50

## 2023-07-12 RX ADMIN — ARFORMOTEROL TARTRATE 15 MCG: 15 SOLUTION RESPIRATORY (INHALATION) at 09:05

## 2023-07-12 RX ADMIN — ALPRAZOLAM 0.25 MG: 0.25 TABLET ORAL at 21:54

## 2023-07-12 RX ADMIN — BUDESONIDE 500 MCG: 0.5 SUSPENSION RESPIRATORY (INHALATION) at 20:14

## 2023-07-12 RX ADMIN — IPRATROPIUM BROMIDE 0.5 MG: 0.5 SOLUTION RESPIRATORY (INHALATION) at 12:09

## 2023-07-12 RX ADMIN — ATORVASTATIN CALCIUM 40 MG: 40 TABLET, FILM COATED ORAL at 21:54

## 2023-07-12 RX ADMIN — SACUBITRIL AND VALSARTAN 1 TABLET: 24; 26 TABLET, FILM COATED ORAL at 21:53

## 2023-07-12 RX ADMIN — ASPIRIN 81 MG CHEWABLE TABLET 81 MG: 81 TABLET CHEWABLE at 10:06

## 2023-07-12 RX ADMIN — METOPROLOL SUCCINATE 12.5 MG: 25 TABLET, EXTENDED RELEASE ORAL at 21:53

## 2023-07-12 RX ADMIN — BUDESONIDE 500 MCG: 0.5 SUSPENSION RESPIRATORY (INHALATION) at 09:05

## 2023-07-12 RX ADMIN — METHYLPREDNISOLONE SODIUM SUCCINATE 60 MG: 125 INJECTION, POWDER, LYOPHILIZED, FOR SOLUTION INTRAMUSCULAR; INTRAVENOUS at 10:06

## 2023-07-12 RX ADMIN — IPRATROPIUM BROMIDE 0.5 MG: 0.5 SOLUTION RESPIRATORY (INHALATION) at 09:06

## 2023-07-12 RX ADMIN — Medication 5 MG: at 21:53

## 2023-07-12 RX ADMIN — IPRATROPIUM BROMIDE 0.5 MG: 0.5 SOLUTION RESPIRATORY (INHALATION) at 17:20

## 2023-07-12 RX ADMIN — SALINE NASAL SPRAY 1 SPRAY: 1.5 SOLUTION NASAL at 21:50

## 2023-07-12 RX ADMIN — AZITHROMYCIN MONOHYDRATE 250 MG: 500 INJECTION, POWDER, LYOPHILIZED, FOR SOLUTION INTRAVENOUS at 21:56

## 2023-07-12 RX ADMIN — PANTOPRAZOLE SODIUM 40 MG: 40 TABLET, DELAYED RELEASE ORAL at 21:54

## 2023-07-12 RX ADMIN — IPRATROPIUM BROMIDE 0.5 MG: 0.5 SOLUTION RESPIRATORY (INHALATION) at 20:14

## 2023-07-12 RX ADMIN — ALPRAZOLAM 0.25 MG: 0.25 TABLET ORAL at 10:06

## 2023-07-12 RX ADMIN — FOLIC ACID 1 MG: 1 TABLET ORAL at 10:06

## 2023-07-12 ASSESSMENT — PAIN SCALES - GENERAL
PAINLEVEL_OUTOF10: 0
PAINLEVEL_OUTOF10: 0

## 2023-07-12 NOTE — ED NOTES
Dr Rajwinder Woodall notified of patients CO2 level from [facility] and lab work with recommendation for follow up [facility].       Piper Parra RN  07/12/23 2448

## 2023-07-12 NOTE — ED NOTES
Dr Windy Navarro covering for Dr Hussain Gomez notified of patients CO2.      Ramone Ribeiro RN  07/12/23 9675

## 2023-07-12 NOTE — ED NOTES
Dr Felipa Stark notified of patients CO2 level and gave recommendation to place patient on bipap. Patient A &Ox 4 at this time and states she uses a CPAP at home, during the day, for a couple hours and has not used it in a couple days.       Emelia Vegas RN  07/12/23 0477 No

## 2023-07-12 NOTE — TELEPHONE ENCOUNTER
Writer contacted Dr Jordyn Farah to inform of 30 day readmission risk. Writer's attempt to contact Dr Jordyn Farah was unsuccessful.      Call Back: If you need to call back to inform of disposition you can contact me at [phone]

## 2023-07-12 NOTE — ED NOTES
Patient taken off BiPAP per RT. Placed on 4 l/min O2. Tolerating well.       Zachary Camarillo, RN  07/12/23 9975

## 2023-07-12 NOTE — PLAN OF CARE
Assessed patient at bedside. ABG is improved from admission. Patient is off Bipap and on her baseline home 4L NC. States her SOB is improved from admission. Continue patient on nasal cannula. Consider repeat ABG if clinical status worsens but at this time not required. Patient stated only complaint was dry nasal passages. Nasal spray prescribed.     Sheba Betts DO  07/12/23  7:57 PM

## 2023-07-12 NOTE — H&P
[facility] Resident Inpatient  History and Physical      CC: Shortness of Breath (SOB pt reports PMH of COPD/emphysema. PT baseline home 02 4L NC family adjusted to 6L NC for comfort. PT reports SOB all day today. PT reports COPD exacerbation with warming of temperature.) and Ear Fullness (Pt reports x2-3days. PT denies fevers/chills. PT reports nausea tolerating PO intake but reduced appetite. PT denies diarrhea or vomiting.)  . HPI: History obtained from patient. Patient is oriented to time, place, person. Arlen Brittle is a 61 y.o. female with a PMH of end-stage COPD on 4 L NC at home, DVT, CHF, CAD who Presented to the emergency department for concerns of  worsening shortness of breath. Ongoing for the past few days. Symptoms not improving with home breathing treatments. States her grandson has been sick. Denies fever, chills, vomiting, chest pain, abdominal pain, lightheadedness/dizziness. Has been having some nausea and diarrhea. Also endorses poor appetite but no difficulty with urination. Currently following with [facility] pulmonology, but was referred to [facility] for evaluation by lung transplant team.  Has not followed up with them yet. was supposed to have left heart cath per cardiology due to abnormal stress back in June. However, was canceled due to hospitalization. She states that she was supposed to undergo the procedure this month, but did not due to diarrhea. Denies alcohol and other drug use. Smoker 20-pack-year quit 2 years ago. To be full code at this time         ED Course: The patient remained hemodynamically stable. Labs were insignificant. troponins 13,11. ABG showed normal pH, hypercapnia (chronic), and elevated bicarb (chronic)  CXR wnl   EKG: normal sinus rhythm, right atrial enlargement, unchanged from previous tracings. Patient was given duoneb and solumedrol.   She did have improvement after receiving treatment, however she was not comfortable going home per

## 2023-07-13 ENCOUNTER — HOSPITAL ENCOUNTER (OUTPATIENT)
Dept: OTHER | Age: 63
Setting detail: THERAPIES SERIES
Discharge: HOME | End: 2023-07-13

## 2023-07-13 LAB
ALBUMIN SERPL-MCNC: 3.3 G/DL (ref 3.5–5.2)
ALP SERPL-CCNC: 60 U/L (ref 35–104)
ALT SERPL-CCNC: 12 U/L (ref 0–32)
ANION GAP SERPL CALCULATED.3IONS-SCNC: 12 MMOL/L (ref 7–16)
AST SERPL-CCNC: 16 U/L (ref 0–31)
BASOPHILS # BLD: 0 E9/L (ref 0–0.2)
BASOPHILS NFR BLD: 0 % (ref 0–2)
BILIRUB SERPL-MCNC: 0.2 MG/DL (ref 0–1.2)
BUN SERPL-MCNC: 16 MG/DL (ref 6–23)
CALCIUM SERPL-MCNC: 9.7 MG/DL (ref 8.6–10.2)
CHLORIDE SERPL-SCNC: 94 MMOL/L (ref 98–107)
CO2 SERPL-SCNC: 39 MMOL/L (ref 22–29)
CREAT SERPL-MCNC: 0.3 MG/DL (ref 0.5–1)
EOSINOPHIL # BLD: 0 E9/L (ref 0.05–0.5)
EOSINOPHIL NFR BLD: 0 % (ref 0–6)
ERYTHROCYTE [DISTWIDTH] IN BLOOD BY AUTOMATED COUNT: 12.7 FL (ref 11.5–15)
GLUCOSE SERPL-MCNC: 134 MG/DL (ref 74–99)
HCT VFR BLD AUTO: 33.3 % (ref 34–48)
HGB BLD-MCNC: 10.7 G/DL (ref 11.5–15.5)
IMM GRANULOCYTES # BLD: 0.05 E9/L
IMM GRANULOCYTES NFR BLD: 0.7 % (ref 0–5)
LYMPHOCYTES # BLD: 0.8 E9/L (ref 1.5–4)
LYMPHOCYTES NFR BLD: 11.1 % (ref 20–42)
MCH RBC QN AUTO: 30.9 PG (ref 26–35)
MCHC RBC AUTO-ENTMCNC: 32.1 % (ref 32–34.5)
MCV RBC AUTO: 96.2 FL (ref 80–99.9)
MONOCYTES # BLD: 0.28 E9/L (ref 0.1–0.95)
MONOCYTES NFR BLD: 3.9 % (ref 2–12)
NEUTROPHILS # BLD: 6.09 E9/L (ref 1.8–7.3)
NEUTS SEG NFR BLD: 84.3 % (ref 43–80)
PLATELET # BLD AUTO: 254 E9/L (ref 130–450)
PMV BLD AUTO: 10 FL (ref 7–12)
POTASSIUM SERPL-SCNC: 3.9 MMOL/L (ref 3.5–5)
PROT SERPL-MCNC: 5.5 G/DL (ref 6.4–8.3)
RBC # BLD AUTO: 3.46 E12/L (ref 3.5–5.5)
SODIUM SERPL-SCNC: 145 MMOL/L (ref 132–146)
WBC # BLD: 7.2 E9/L (ref 4.5–11.5)

## 2023-07-13 PROCEDURE — 2580000003 HC RX 258

## 2023-07-13 PROCEDURE — 2580000003 HC RX 258: Performed by: STUDENT IN AN ORGANIZED HEALTH CARE EDUCATION/TRAINING PROGRAM

## 2023-07-13 PROCEDURE — 94660 CPAP INITIATION&MGMT: CPT

## 2023-07-13 PROCEDURE — G0378 HOSPITAL OBSERVATION PER HR: HCPCS

## 2023-07-13 PROCEDURE — 6370000000 HC RX 637 (ALT 250 FOR IP): Performed by: STUDENT IN AN ORGANIZED HEALTH CARE EDUCATION/TRAINING PROGRAM

## 2023-07-13 PROCEDURE — 2580000003 HC RX 258: Performed by: INTERNAL MEDICINE

## 2023-07-13 PROCEDURE — 6360000002 HC RX W HCPCS: Performed by: FAMILY MEDICINE

## 2023-07-13 PROCEDURE — 36415 COLL VENOUS BLD VENIPUNCTURE: CPT

## 2023-07-13 PROCEDURE — 6360000002 HC RX W HCPCS: Performed by: INTERNAL MEDICINE

## 2023-07-13 PROCEDURE — 99233 SBSQ HOSP IP/OBS HIGH 50: CPT | Performed by: INTERNAL MEDICINE

## 2023-07-13 PROCEDURE — 6360000002 HC RX W HCPCS: Performed by: STUDENT IN AN ORGANIZED HEALTH CARE EDUCATION/TRAINING PROGRAM

## 2023-07-13 PROCEDURE — 2700000000 HC OXYGEN THERAPY PER DAY

## 2023-07-13 PROCEDURE — 96366 THER/PROPH/DIAG IV INF ADDON: CPT

## 2023-07-13 PROCEDURE — 6360000002 HC RX W HCPCS

## 2023-07-13 PROCEDURE — 99232 SBSQ HOSP IP/OBS MODERATE 35: CPT | Performed by: FAMILY MEDICINE

## 2023-07-13 PROCEDURE — 96372 THER/PROPH/DIAG INJ SC/IM: CPT

## 2023-07-13 PROCEDURE — 94640 AIRWAY INHALATION TREATMENT: CPT

## 2023-07-13 PROCEDURE — 80053 COMPREHEN METABOLIC PANEL: CPT

## 2023-07-13 PROCEDURE — 85025 COMPLETE CBC W/AUTO DIFF WBC: CPT

## 2023-07-13 PROCEDURE — 96376 TX/PRO/DX INJ SAME DRUG ADON: CPT

## 2023-07-13 RX ORDER — PREDNISONE 20 MG/1
20 TABLET ORAL DAILY
Status: DISCONTINUED | OUTPATIENT
Start: 2023-07-14 | End: 2023-07-14 | Stop reason: HOSPADM

## 2023-07-13 RX ADMIN — ATORVASTATIN CALCIUM 40 MG: 40 TABLET, FILM COATED ORAL at 20:11

## 2023-07-13 RX ADMIN — ARFORMOTEROL TARTRATE 15 MCG: 15 SOLUTION RESPIRATORY (INHALATION) at 09:02

## 2023-07-13 RX ADMIN — SODIUM CHLORIDE, PRESERVATIVE FREE 10 ML: 5 INJECTION INTRAVENOUS at 09:27

## 2023-07-13 RX ADMIN — BUDESONIDE 500 MCG: 0.5 SUSPENSION RESPIRATORY (INHALATION) at 09:02

## 2023-07-13 RX ADMIN — POTASSIUM CHLORIDE 20 MEQ: 1500 TABLET, EXTENDED RELEASE ORAL at 09:21

## 2023-07-13 RX ADMIN — IPRATROPIUM BROMIDE 0.5 MG: 0.5 SOLUTION RESPIRATORY (INHALATION) at 12:23

## 2023-07-13 RX ADMIN — METHYLPREDNISOLONE SODIUM SUCCINATE 60 MG: 125 INJECTION, POWDER, LYOPHILIZED, FOR SOLUTION INTRAMUSCULAR; INTRAVENOUS at 09:26

## 2023-07-13 RX ADMIN — FOLIC ACID 1 MG: 1 TABLET ORAL at 09:20

## 2023-07-13 RX ADMIN — ASPIRIN 81 MG CHEWABLE TABLET 81 MG: 81 TABLET CHEWABLE at 09:20

## 2023-07-13 RX ADMIN — BUDESONIDE 500 MCG: 0.5 SUSPENSION RESPIRATORY (INHALATION) at 22:06

## 2023-07-13 RX ADMIN — IPRATROPIUM BROMIDE 0.5 MG: 0.5 SOLUTION RESPIRATORY (INHALATION) at 09:02

## 2023-07-13 RX ADMIN — SODIUM CHLORIDE, PRESERVATIVE FREE 10 ML: 5 INJECTION INTRAVENOUS at 20:11

## 2023-07-13 RX ADMIN — IPRATROPIUM BROMIDE 0.5 MG: 0.5 SOLUTION RESPIRATORY (INHALATION) at 17:04

## 2023-07-13 RX ADMIN — AZITHROMYCIN MONOHYDRATE 250 MG: 500 INJECTION, POWDER, LYOPHILIZED, FOR SOLUTION INTRAVENOUS at 18:59

## 2023-07-13 RX ADMIN — FUROSEMIDE 20 MG: 40 TABLET ORAL at 09:21

## 2023-07-13 RX ADMIN — IPRATROPIUM BROMIDE 0.5 MG: 0.5 SOLUTION RESPIRATORY (INHALATION) at 22:04

## 2023-07-13 RX ADMIN — PANTOPRAZOLE SODIUM 40 MG: 40 TABLET, DELAYED RELEASE ORAL at 20:11

## 2023-07-13 RX ADMIN — ALPRAZOLAM 0.25 MG: 0.25 TABLET ORAL at 09:35

## 2023-07-13 RX ADMIN — ENOXAPARIN SODIUM 40 MG: 100 INJECTION SUBCUTANEOUS at 09:18

## 2023-07-13 RX ADMIN — ARFORMOTEROL TARTRATE 15 MCG: 15 SOLUTION RESPIRATORY (INHALATION) at 22:05

## 2023-07-13 ASSESSMENT — PAIN SCALES - GENERAL
PAINLEVEL_OUTOF10: 0

## 2023-07-13 NOTE — CARE COORDINATION
Chart reviewed and case reviewed in IDR. Met with the patient at the bedside to discuss transition of care planning. Patient live with her daughter Agapito Peck [phone], in a 2 story home with a first floor set up. Patient has a tub shower, walker, BSC, and shower bench. Patient also has 4L O2, a bipap, and a nebulizer from [facility] at home. Patient has a history of rehab at [facility] and [facility]. She stated she gets her medications at [facility] on [address] in [place]. Discussed HHC at discharge for nursing to assess patient and help with mediations as the patient stated she has a hard time understanding her mediations after discharging from the hospital.  After discussing [facility] [employer], [employer]. and possible costs associated with [facility] [employer] [facility], [facility]. after discharge, patient declined at this time. Patient stated she was just approve for disability for the [facility] [address] to help with her her bills at home and does not have extra money for home care at this time. Referral made to the [address] [place] [address] on Aging to see if the patient will qualify for any community assistance. Transition of care plan is to home when medically stable. Will continue to follow,.     Mony Mcgowan RN.  P:  [phone]      Case Management Assessment  Initial Evaluation    Date/Time of Evaluation: 7/13/2023 2:52 PM  Assessment Completed by: Mony Mcgowan RN    If patient is discharged prior to next notation, then this note serves as note for discharge by case management. Patient Name: Brady Rinne                   YOB: 1960  Diagnosis: Shortness of breath [R06.02]  COPD exacerbation ([address]) [J44.1]  Chronic respiratory failure with hypercapnia ([address]) [J96.12]                   Date / Time: 7/11/2023  8:57 PM    Patient Admission Status: Observation   Readmission Risk (Low < 19, Mod (19-27), High > 27): Readmission Risk Score: 27.3    Current PCP: Medina Harkins MD  PCP verified by CM?  Yes (Pop Del Castillo MD)    Chart Reviewed:

## 2023-07-13 NOTE — PLAN OF CARE
Problem: Chronic Conditions and Co-morbidities  Goal: Patient's chronic conditions and co-morbidity symptoms are monitored and maintained or improved  7/13/2023 0424 by Author Arabella RN  Outcome: Progressing  7/12/2023 2247 by Winston Jim RN  Outcome: Progressing     Problem: Discharge Planning  Goal: Discharge to home or other facility with appropriate resources  7/13/2023 0424 by Author Arabella RN  Outcome: Progressing  7/12/2023 2247 by Winston Jim RN  Outcome: Progressing

## 2023-07-13 NOTE — CARE COORDINATION
Readmission Assessment  Number of Days since last admission?: 8-30 days  Previous Disposition: Home with Family  Who is being Interviewed: Patient  What was the patient's/caregiver's perception as to why they think they needed to return back to the hospital?: Not enough help at home, Other (Comment) (hard time breathing)  Did you visit your Primary Care Physician after you left the hospital, before you returned this time?: Yes  Did you see a specialist, such as Cardiac, Pulmonary, Orthopedic Physician, etc. after you left the hospital?: Yes  Who advised the patient to return to the hospital?: Self-referral  Does the patient report anything that got in the way of taking their medications?: Yes  What reasons did they give?: Did not understand instructions  In our efforts to provide the best possible care to you and others like you, can you think of anything that we could have done to help you after you left the hospital the first time, so that you might not have needed to return so soon?: Improved written discharge instructions, Teaching during hospitalization regarding your illness, Teach back instructions regarding management of illness, Discharge instructions that are concise, clear, and non contradictory, Education on how to continue taking medications upon discharge

## 2023-07-14 VITALS
SYSTOLIC BLOOD PRESSURE: 93 MMHG | WEIGHT: 126 LBS | HEIGHT: 62 IN | TEMPERATURE: 97.8 F | DIASTOLIC BLOOD PRESSURE: 55 MMHG | OXYGEN SATURATION: 98 % | HEART RATE: 85 BPM | BODY MASS INDEX: 23.19 KG/M2 | RESPIRATION RATE: 18 BRPM

## 2023-07-14 PROBLEM — R06.02 SHORTNESS OF BREATH: Status: RESOLVED | Noted: 2023-07-12 | Resolved: 2023-07-14

## 2023-07-14 PROBLEM — J44.1 COPD EXACERBATION (HCC): Status: RESOLVED | Noted: 2023-07-12 | Resolved: 2023-07-14

## 2023-07-14 LAB
ALBUMIN SERPL-MCNC: 3.1 G/DL (ref 3.5–5.2)
ALP SERPL-CCNC: 55 U/L (ref 35–104)
ALT SERPL-CCNC: 14 U/L (ref 0–32)
ANION GAP SERPL CALCULATED.3IONS-SCNC: 6 MMOL/L (ref 7–16)
AST SERPL-CCNC: 17 U/L (ref 0–31)
BASOPHILS # BLD: 0 E9/L (ref 0–0.2)
BASOPHILS NFR BLD: 0 % (ref 0–2)
BILIRUB SERPL-MCNC: 0.2 MG/DL (ref 0–1.2)
BUN SERPL-MCNC: 20 MG/DL (ref 6–23)
CALCIUM SERPL-MCNC: 9.5 MG/DL (ref 8.6–10.2)
CHLORIDE SERPL-SCNC: 97 MMOL/L (ref 98–107)
CO2 SERPL-SCNC: 38 MMOL/L (ref 22–29)
CREAT SERPL-MCNC: 0.5 MG/DL (ref 0.5–1)
EOSINOPHIL # BLD: 0.01 E9/L (ref 0.05–0.5)
EOSINOPHIL NFR BLD: 0.1 % (ref 0–6)
ERYTHROCYTE [DISTWIDTH] IN BLOOD BY AUTOMATED COUNT: 12.6 FL (ref 11.5–15)
GLUCOSE SERPL-MCNC: 95 MG/DL (ref 74–99)
HCT VFR BLD AUTO: 32.8 % (ref 34–48)
HGB BLD-MCNC: 10.7 G/DL (ref 11.5–15.5)
IMM GRANULOCYTES # BLD: 0.03 E9/L
IMM GRANULOCYTES NFR BLD: 0.4 % (ref 0–5)
LYMPHOCYTES # BLD: 1.71 E9/L (ref 1.5–4)
LYMPHOCYTES NFR BLD: 20.7 % (ref 20–42)
MAGNESIUM SERPL-MCNC: 1.7 MG/DL (ref 1.6–2.6)
MCH RBC QN AUTO: 30.7 PG (ref 26–35)
MCHC RBC AUTO-ENTMCNC: 32.6 % (ref 32–34.5)
MCV RBC AUTO: 94.3 FL (ref 80–99.9)
MONOCYTES # BLD: 0.84 E9/L (ref 0.1–0.95)
MONOCYTES NFR BLD: 10.1 % (ref 2–12)
NEUTROPHILS # BLD: 5.69 E9/L (ref 1.8–7.3)
NEUTS SEG NFR BLD: 68.7 % (ref 43–80)
PLATELET # BLD AUTO: 240 E9/L (ref 130–450)
PMV BLD AUTO: 9.8 FL (ref 7–12)
POTASSIUM SERPL-SCNC: 3.5 MMOL/L (ref 3.5–5)
PROT SERPL-MCNC: 5.3 G/DL (ref 6.4–8.3)
RBC # BLD AUTO: 3.48 E12/L (ref 3.5–5.5)
SODIUM SERPL-SCNC: 141 MMOL/L (ref 132–146)
WBC # BLD: 8.3 E9/L (ref 4.5–11.5)

## 2023-07-14 PROCEDURE — 6370000000 HC RX 637 (ALT 250 FOR IP): Performed by: INTERNAL MEDICINE

## 2023-07-14 PROCEDURE — 96367 TX/PROPH/DG ADDL SEQ IV INF: CPT

## 2023-07-14 PROCEDURE — 96366 THER/PROPH/DIAG IV INF ADDON: CPT

## 2023-07-14 PROCEDURE — G0378 HOSPITAL OBSERVATION PER HR: HCPCS

## 2023-07-14 PROCEDURE — 99232 SBSQ HOSP IP/OBS MODERATE 35: CPT | Performed by: INTERNAL MEDICINE

## 2023-07-14 PROCEDURE — 6370000000 HC RX 637 (ALT 250 FOR IP): Performed by: STUDENT IN AN ORGANIZED HEALTH CARE EDUCATION/TRAINING PROGRAM

## 2023-07-14 PROCEDURE — 6360000002 HC RX W HCPCS: Performed by: FAMILY MEDICINE

## 2023-07-14 PROCEDURE — 83735 ASSAY OF MAGNESIUM: CPT

## 2023-07-14 PROCEDURE — APPSS60 APP SPLIT SHARED TIME 46-60 MINUTES: Performed by: NURSE PRACTITIONER

## 2023-07-14 PROCEDURE — 99254 IP/OBS CNSLTJ NEW/EST MOD 60: CPT | Performed by: INTERNAL MEDICINE

## 2023-07-14 PROCEDURE — 6360000002 HC RX W HCPCS: Performed by: STUDENT IN AN ORGANIZED HEALTH CARE EDUCATION/TRAINING PROGRAM

## 2023-07-14 PROCEDURE — 94660 CPAP INITIATION&MGMT: CPT

## 2023-07-14 PROCEDURE — 36415 COLL VENOUS BLD VENIPUNCTURE: CPT

## 2023-07-14 PROCEDURE — 96372 THER/PROPH/DIAG INJ SC/IM: CPT

## 2023-07-14 PROCEDURE — 2700000000 HC OXYGEN THERAPY PER DAY

## 2023-07-14 PROCEDURE — 99239 HOSP IP/OBS DSCHRG MGMT >30: CPT | Performed by: FAMILY MEDICINE

## 2023-07-14 PROCEDURE — 6360000002 HC RX W HCPCS

## 2023-07-14 PROCEDURE — 2580000003 HC RX 258: Performed by: STUDENT IN AN ORGANIZED HEALTH CARE EDUCATION/TRAINING PROGRAM

## 2023-07-14 PROCEDURE — 80053 COMPREHEN METABOLIC PANEL: CPT

## 2023-07-14 PROCEDURE — 94640 AIRWAY INHALATION TREATMENT: CPT

## 2023-07-14 PROCEDURE — 85025 COMPLETE CBC W/AUTO DIFF WBC: CPT

## 2023-07-14 RX ORDER — AZITHROMYCIN 250 MG/1
250 TABLET, FILM COATED ORAL SEE ADMIN INSTRUCTIONS
Qty: 6 TABLET | Refills: 0 | Status: SHIPPED | OUTPATIENT
Start: 2023-07-14 | End: 2023-07-19

## 2023-07-14 RX ORDER — MAGNESIUM SULFATE IN WATER 40 MG/ML
2000 INJECTION, SOLUTION INTRAVENOUS ONCE
Status: COMPLETED | OUTPATIENT
Start: 2023-07-14 | End: 2023-07-14

## 2023-07-14 RX ORDER — PREDNISONE 20 MG/1
TABLET ORAL
Qty: 3 TABLET | Refills: 0 | Status: SHIPPED | OUTPATIENT
Start: 2023-07-15 | End: 2023-07-14 | Stop reason: SDUPTHER

## 2023-07-14 RX ORDER — PREDNISONE 20 MG/1
TABLET ORAL
Qty: 3 TABLET | Refills: 0 | Status: SHIPPED | OUTPATIENT
Start: 2023-07-15 | End: 2023-07-19

## 2023-07-14 RX ORDER — AZITHROMYCIN 250 MG/1
250 TABLET, FILM COATED ORAL SEE ADMIN INSTRUCTIONS
Qty: 6 TABLET | Refills: 0 | Status: SHIPPED | OUTPATIENT
Start: 2023-07-14 | End: 2023-07-14 | Stop reason: SDUPTHER

## 2023-07-14 RX ADMIN — MAGNESIUM SULFATE HEPTAHYDRATE 2000 MG: 40 INJECTION, SOLUTION INTRAVENOUS at 10:16

## 2023-07-14 RX ADMIN — FOLIC ACID 1 MG: 1 TABLET ORAL at 08:10

## 2023-07-14 RX ADMIN — SACUBITRIL AND VALSARTAN 1 TABLET: 24; 26 TABLET, FILM COATED ORAL at 08:10

## 2023-07-14 RX ADMIN — PREDNISONE 20 MG: 20 TABLET ORAL at 08:10

## 2023-07-14 RX ADMIN — BUDESONIDE 500 MCG: 0.5 SUSPENSION RESPIRATORY (INHALATION) at 09:09

## 2023-07-14 RX ADMIN — SODIUM CHLORIDE, PRESERVATIVE FREE 10 ML: 5 INJECTION INTRAVENOUS at 08:11

## 2023-07-14 RX ADMIN — ALPRAZOLAM 0.25 MG: 0.25 TABLET ORAL at 08:09

## 2023-07-14 RX ADMIN — ASPIRIN 81 MG CHEWABLE TABLET 81 MG: 81 TABLET CHEWABLE at 08:10

## 2023-07-14 RX ADMIN — ARFORMOTEROL TARTRATE 15 MCG: 15 SOLUTION RESPIRATORY (INHALATION) at 09:09

## 2023-07-14 RX ADMIN — IPRATROPIUM BROMIDE 0.5 MG: 0.5 SOLUTION RESPIRATORY (INHALATION) at 12:10

## 2023-07-14 RX ADMIN — ALPRAZOLAM 0.25 MG: 0.25 TABLET ORAL at 00:11

## 2023-07-14 RX ADMIN — ENOXAPARIN SODIUM 40 MG: 100 INJECTION SUBCUTANEOUS at 08:10

## 2023-07-14 RX ADMIN — POTASSIUM CHLORIDE 20 MEQ: 1500 TABLET, EXTENDED RELEASE ORAL at 08:10

## 2023-07-14 RX ADMIN — IPRATROPIUM BROMIDE 0.5 MG: 0.5 SOLUTION RESPIRATORY (INHALATION) at 15:41

## 2023-07-14 RX ADMIN — SALINE NASAL SPRAY 1 SPRAY: 1.5 SOLUTION NASAL at 08:11

## 2023-07-14 RX ADMIN — FUROSEMIDE 20 MG: 40 TABLET ORAL at 08:10

## 2023-07-14 RX ADMIN — ALPRAZOLAM 0.25 MG: 0.25 TABLET ORAL at 17:25

## 2023-07-14 RX ADMIN — IPRATROPIUM BROMIDE 0.5 MG: 0.5 SOLUTION RESPIRATORY (INHALATION) at 09:09

## 2023-07-14 ASSESSMENT — PAIN SCALES - GENERAL: PAINLEVEL_OUTOF10: 0

## 2023-07-14 NOTE — CARE COORDINATION
Chart reviewed and case reviewed in IDR and with ERIBERTO Lopez. Outpatient heart cath scheduled for 7/20 at noon. H&P and order faxed to [phone] and fax confirmation received. Spoke with the family medicine resident Dr Penny Campbell re: transition of care. Patient can be discharged to home today after cardiology sees her on a prednisone taper and z-pac. Patient lives with her daughter and she can provide transportation for the patient when medically stable to discharge. Will continue to follow.      Annalisa Pack RN.  Philippe Mcleod Closs  [phone]

## 2023-07-14 NOTE — DISCHARGE INSTRUCTIONS
Call [facility] with any further questions. Return to Emergency Department with any worsening of your condition and/or fever greater than 101 degrees, new weakness, shortness of breath or chest pain.

## 2023-07-14 NOTE — CONSULTS
Inpatient Cardiology Consultation      Reason for Consult:  Heart Catheterization    Consulting Physician: Dr Escamilla July    Requesting Physician:  Dr Jaya Wright    Date of Consultation: 7/14/2023    HISTORY OF PRESENT ILLNESS: 60 yo  female known to Dr Hilario Chandler. Scheduled for [facility] 7/20/2023. PMH; Chronic HFmEF, abnormal stress 1/5/2023, + COVID-19 in 1/2023, HLD, chronic hypoxic respiratory failure on chronic O2, COPD, tobacco use, recurrent diverticulitis with prior abscess drainage, anxiety, and debility    7/11/2023 SOB, nausea, poor PO intake. Family increased patient's O2 via NC from 4 liters--> 6 liters for comfort. Also complained of fullness in her ears R>L. Reports family member with current viral illness. Troponin 13>11, p-, Bun/Cr 11/0.4, pro-calcitonin 0.04, Albumin 3.3, respiratory panel including COVID-19 NEGATIVE  CXR no pneumonia  Improvement as Duon-Neb  Upon arrival to [facility] /63  ST< afebrile, and O2 saturation 96% on 6 liters NC  Solu-Medrol IV, Duoneb given in ED  7/12/2023 Pulmonary consult>Acute on chronic hypercarbic aspiratory failure--utilize BiPAP as needed patient with significant hypercarbia on admission. Chronic hypoxemic respiratory failure. Very severe COPD. Severe dyspnea on exertion secondary to severe lung disease and chronic deconditioning. 7/13/2023 per Pulmonary recommendations cardiology was consulted. Had been previously scheduled for [facility] but it was cancelled 2/2 diarrhea. 7/14/2023 Hgb 10.7, WBC 8.3, Plt 240    States she has developed bilateral pedal edema since admission. Denies any CP, dizziness, pressure or palpitations. Please note: past medical records were reviewed per electronic medical record (EMR) - see detailed reports under Past Medical/ Surgical History. Past Medical/Surgical History:    Chronic HFmEF  1/2023 TTE Dr Eve Mcintosh: EF 40-45%.  Wall motion not well seen, appears to be hypokinesis of the mid inferoseptal
gallops  Pulses: Equal bilaterally  Abdomen: Soft nontender bowel sounds present  Lymphatic: No palpable adenopathy  Musculoskeletal: Gait steady  Extremities: No clubbing, cyanosis, edema. Skin: No rashes or lesions  Neurological/Psychiatric: Neurologically intact, no focal deficits. Affect appropriate      Assessment:     Acute on chronic hypercarbic aspiratory failure--utilize BiPAP as needed patient with significant hypercarbia on admission  Chronic hypoxemic respiratory failure  Very severe COPD  Severe dyspnea on exertion secondary to severe lung disease and chronic deconditioning  Frequent hospitalizations  HFrEF 40-45%  Anxiety  Hyperlipidemia  GERD      Plan:    1. Continue supplemental oxygen as needed baseline 4 L  Continue BiPAP. ABG was significant improvement. Okay to hold BiPAP for now and place overnight.   Pulmicort and Brovana  Continue Solu-Medrol 60 mg twice a day  Start azithromycin      [address] [facility]  Office: [phone]  Fax: [phone]

## 2023-07-14 NOTE — CARE COORDINATION
If out patient heart cath needed,  this can be done Thursday 7/20 at 12 noon per Tong Salinas in scheduling. Patient would need to arrive at 10 am.  Order and H&P need to faxed prior to procedure to [phone]. Patient has been placed on the schedule and they will call her the day before the procedure. ERIBERTO Hylton updated.

## 2023-07-14 NOTE — DISCHARGE SUMMARY
Discharge Summary    Chandana Acosta  :  1960  MRN:  [id number]    Admit date:  2023  Discharge date:  2023    Admitting Physician:  Estrella Nguyen MD      Admission Condition:  poor    Discharged Condition:  good    Discharge Diagnoses:   Patient Active Problem List   Diagnosis    Tobacco use disorder    Seasonal allergic rhinitis    Chronic bronchitis ([address])    Acute and chronic respiratory failure, unspecified whether with hypoxia or hypercapnia ([address])    Chronic hypoxemic respiratory failure (HCC)    Chronic obstructive pulmonary disease ([address])    Acute diverticulitis with abscess    Acute cholecystitis    Acute on chronic diastolic CHF (congestive heart failure) ([address])    Sepsis ([address])    Pulmonary fibrosis (HCC)    Hypoxemia    Heart failure (HCC)    Abnormal nuclear stress test    Depression with anxiety    Anxiety    Coronary artery disease involving native heart without angina pectoris       Hospital Course:     Chandana Acosta is a 61 y.o. female PMH of COPD on 4 liters NC Oxygen who presented to the ED with complaints of dyspnea for the past two days. No other symptoms. Denies being ill recently, but grandson was ill with a cough. She attributes her symptoms to change in weather. In the ED, she was found to in a respiratory acidosis state with metabolic compensation and given solumedrol plus Duonebs. These measures helped minimally so she was placed on BiPAP. BiPAP did reduce her CO2 appropriately. Patient admitted for COPD exacerbation. No infectious work up was done because it was unlikely to be due to that. Patient was started on Solumedrol 60 mg BID, BiPAP nightly, and breathing treatments. She did well with these measures. While admitted, her pulmonologist was consulted. Pulmonology agreed with the aforementioned plan, but added Azithromycin to her treatment regimen along with a prednisone taper.  Upon reviewing her chart, the patient was scheduled for a heart cath outpatient; however, she

## 2023-07-17 ENCOUNTER — TELEPHONE (OUTPATIENT)
Dept: PULMONOLOGY | Age: 63
End: 2023-07-17

## 2023-07-17 DIAGNOSIS — R06.02 SHORTNESS OF BREATH: Primary | ICD-10-CM

## 2023-07-17 NOTE — TELEPHONE ENCOUNTER
Patient's daughter called and needed help with several issues. The daughter stated that the patient told her that she needs pulmonary clearance before having her cardiac catheter and would need to get her into the office this week. Upon investigating the situation, Dr. Leyla Baker in his hospital consult on 7/14/23  to continue to pursue planned scheduled outpatient cardiac catheterization on Thursday. Patient's daughter was also requesting a refill on the patient's daliresp. This request was placed through [employer]. Patient's daughter also stated that the patient's blood pressure is being taken and if the systolic BP is below 934 they have been holding the Entresto and the Lasix.

## 2023-07-18 ENCOUNTER — OFFICE VISIT (OUTPATIENT)
Dept: FAMILY MEDICINE CLINIC | Age: 63
End: 2023-07-18
Payer: COMMERCIAL

## 2023-07-18 VITALS
OXYGEN SATURATION: 98 % | HEIGHT: 62 IN | HEART RATE: 97 BPM | BODY MASS INDEX: 23.19 KG/M2 | SYSTOLIC BLOOD PRESSURE: 127 MMHG | DIASTOLIC BLOOD PRESSURE: 80 MMHG | RESPIRATION RATE: 18 BRPM | WEIGHT: 126 LBS

## 2023-07-18 DIAGNOSIS — Z09 HOSPITAL DISCHARGE FOLLOW-UP: Primary | ICD-10-CM

## 2023-07-18 PROCEDURE — 99213 OFFICE O/P EST LOW 20 MIN: CPT

## 2023-07-18 PROCEDURE — 1111F DSCHRG MED/CURRENT MED MERGE: CPT

## 2023-07-18 NOTE — PROGRESS NOTES
Post-Discharge Transitional Care  Follow Up      Joseline Rodriguez   YOB: 1960    Date of Office Visit:  7/18/2023  Date of Hospital Admission: 7/11/23  Date of Hospital Discharge: 7/14/23  Risk of hospital readmission (high >=14%. Medium >=10%) :Readmission Risk Score: 27.3      Care management risk score Rising risk (score 2-5) and Complex Care (Scores >=6): No Risk Score On File     Non face to face  following discharge, date last encounter closed (first attempt may have been earlier): *No documented post hospital discharge outreach found in the last 14 days    Call initiated 2 business days of discharge: *No response recorded in the last 14 days    ASSESSMENT/PLAN:   Hospital discharge follow-up  -     NC DISCHARGE MEDS RECONCILED W/ CURRENT OUTPATIENT MED LIST    Vital signs stable, no crackles lung exam, no leg edema. Take Lasix as previously instructed  Follow up with cardiology for cardiac cath and follow up with Pulmonology afterwards. Continue taking prednisone and antibiotic  Follow up with PCP if no improvement in next week. Medical Decision Making: straightforward  No follow-ups on file. Subjective:   HPI:  Follow up of Hospital problems/diagnosis(es): CHF, Copd exacerbation    Inpatient course: Discharge summary reviewed- see chart. Interval history/Current status:     Patient has hx of COPD on 4L  (is increased to 6L today due to difficulty breathing) HFrEF EF 40-45%, CAD, DVT presenting for f/u after recent hospitalization for COPD exaccerbation   Patient reports having increased difficulty breathing, has increased home O2,. Patient feels chest tightness, no cough. Patient has been using more nebulizer today. States she gets herself worked up but is feeling better now. Currently taking Z mine and prednisone. Still has 1 more day of medications    Has not taken her Lasix due to blood pressure being below 970 systolic after 1 time Lasix.   Indications from

## 2023-07-19 ENCOUNTER — TELEPHONE (OUTPATIENT)
Dept: CARDIAC CATH/INVASIVE PROCEDURES | Age: 63
End: 2023-07-19

## 2023-07-20 ENCOUNTER — HOSPITAL ENCOUNTER (OUTPATIENT)
Dept: CARDIAC CATH/INVASIVE PROCEDURES | Age: 63
Discharge: HOME | End: 2023-07-20
Payer: COMMERCIAL

## 2023-07-20 VITALS
HEART RATE: 78 BPM | SYSTOLIC BLOOD PRESSURE: 120 MMHG | DIASTOLIC BLOOD PRESSURE: 80 MMHG | OXYGEN SATURATION: 100 % | WEIGHT: 110 LBS | BODY MASS INDEX: 20.24 KG/M2 | TEMPERATURE: 97.5 F | RESPIRATION RATE: 18 BRPM | HEIGHT: 62 IN

## 2023-07-20 DIAGNOSIS — Z01.818 PRE-OP TESTING: ICD-10-CM

## 2023-07-20 DIAGNOSIS — R94.39 ABNORMAL STRESS TEST: ICD-10-CM

## 2023-07-20 DIAGNOSIS — R07.9 CHEST PAIN, UNSPECIFIED TYPE: ICD-10-CM

## 2023-07-20 LAB
ABO + RH BLD: NORMAL
ANION GAP SERPL CALCULATED.3IONS-SCNC: 6 MMOL/L (ref 7–16)
ARM BAND NUMBER: NORMAL
BASOPHILS # BLD: 0.02 K/UL (ref 0–0.2)
BASOPHILS NFR BLD: 0 % (ref 0–2)
BLOOD BANK SAMPLE EXPIRATION: NORMAL
BLOOD GROUP ANTIBODIES SERPL: NEGATIVE
BUN SERPL-MCNC: 16 MG/DL (ref 6–23)
CALCIUM SERPL-MCNC: 9.6 MG/DL (ref 8.6–10.2)
CHLORIDE SERPL-SCNC: 98 MMOL/L (ref 98–107)
CO2 SERPL-SCNC: 42 MMOL/L (ref 22–29)
CREAT SERPL-MCNC: 0.4 MG/DL (ref 0.5–1)
EOSINOPHIL # BLD: 0.12 K/UL (ref 0.05–0.5)
EOSINOPHILS RELATIVE PERCENT: 1 % (ref 0–6)
ERYTHROCYTE [DISTWIDTH] IN BLOOD BY AUTOMATED COUNT: 13.2 % (ref 11.5–15)
GFR SERPL CREATININE-BSD FRML MDRD: >60 ML/MIN/1.73M2
GLUCOSE SERPL-MCNC: 86 MG/DL (ref 74–99)
HCT VFR BLD AUTO: 35.5 % (ref 34–48)
HGB BLD-MCNC: 10.8 G/DL (ref 11.5–15.5)
IMM GRANULOCYTES # BLD AUTO: 0.04 K/UL (ref 0–0.58)
IMM GRANULOCYTES NFR BLD: 0 % (ref 0–5)
LYMPHOCYTES NFR BLD: 2.8 K/UL (ref 1.5–4)
LYMPHOCYTES RELATIVE PERCENT: 28 % (ref 20–42)
MCH RBC QN AUTO: 30.6 PG (ref 26–35)
MCHC RBC AUTO-ENTMCNC: 30.4 G/DL (ref 32–34.5)
MCV RBC AUTO: 100.6 FL (ref 80–99.9)
MONOCYTES NFR BLD: 1.14 K/UL (ref 0.1–0.95)
MONOCYTES NFR BLD: 11 % (ref 2–12)
NEUTROPHILS NFR BLD: 59 % (ref 43–80)
NEUTS SEG NFR BLD: 5.95 K/UL (ref 1.8–7.3)
PLATELET # BLD AUTO: 250 K/UL (ref 130–450)
PMV BLD AUTO: 10.1 FL (ref 7–12)
POTASSIUM SERPL-SCNC: 3.7 MMOL/L (ref 3.5–5)
RBC # BLD AUTO: 3.53 M/UL (ref 3.5–5.5)
SODIUM SERPL-SCNC: 146 MMOL/L (ref 132–146)
WBC OTHER # BLD: 10.1 K/UL (ref 4.5–11.5)

## 2023-07-20 PROCEDURE — 80048 BASIC METABOLIC PNL TOTAL CA: CPT

## 2023-07-20 PROCEDURE — 86901 BLOOD TYPING SEROLOGIC RH(D): CPT

## 2023-07-20 PROCEDURE — 2709999900 HC NON-CHARGEABLE SUPPLY

## 2023-07-20 PROCEDURE — 6360000002 HC RX W HCPCS

## 2023-07-20 PROCEDURE — 2500000003 HC RX 250 WO HCPCS

## 2023-07-20 PROCEDURE — 86850 RBC ANTIBODY SCREEN: CPT

## 2023-07-20 PROCEDURE — 85027 COMPLETE CBC AUTOMATED: CPT

## 2023-07-20 PROCEDURE — 93454 CORONARY ARTERY ANGIO S&I: CPT

## 2023-07-20 PROCEDURE — 86900 BLOOD TYPING SEROLOGIC ABO: CPT

## 2023-07-20 PROCEDURE — C1894 INTRO/SHEATH, NON-LASER: HCPCS

## 2023-07-20 PROCEDURE — C1769 GUIDE WIRE: HCPCS

## 2023-07-20 RX ORDER — SODIUM CHLORIDE 0.9 % (FLUSH) 0.9 %
5-40 SYRINGE (ML) INJECTION EVERY 12 HOURS SCHEDULED
Status: DISCONTINUED | OUTPATIENT
Start: 2023-07-20 | End: 2023-07-21 | Stop reason: HOSPADM

## 2023-07-20 RX ORDER — SODIUM CHLORIDE 9 MG/ML
INJECTION, SOLUTION INTRAVENOUS PRN
Status: DISCONTINUED | OUTPATIENT
Start: 2023-07-20 | End: 2023-07-21 | Stop reason: HOSPADM

## 2023-07-20 RX ORDER — ACETAMINOPHEN 325 MG/1
650 TABLET ORAL EVERY 4 HOURS PRN
Status: DISCONTINUED | OUTPATIENT
Start: 2023-07-20 | End: 2023-07-21 | Stop reason: HOSPADM

## 2023-07-20 RX ORDER — SODIUM CHLORIDE 0.9 % (FLUSH) 0.9 %
5-40 SYRINGE (ML) INJECTION PRN
Status: DISCONTINUED | OUTPATIENT
Start: 2023-07-20 | End: 2023-07-21 | Stop reason: HOSPADM

## 2023-07-20 NOTE — DISCHARGE INSTRUCTIONS
Call to patient to discuss below message from Dr. Mayer.   Patient did not answer.   RN left voicemail with callback number.     Lab orders placed.    POST-CATH  RADIAL DISCHARGE INSTRUCTIONS:    Please follow instructions closely for prevention of complications. INSTRUCTIONS FOR CARE OF CATHETERIZATION SITE: RADIAL (WRIST) APPROACH:    DO NOT BEND wrist for 48 hours  DO NOT PUSH with affected wrist for 48 hrs. DO NOT submerge wrist in water for 3 days  NO blood pressure, IV or blood work in affected arm for 3- 5 days    KEEP SPLINT (ARMBOARD) ON UNTIL TOMORROW MORNING  Remove dressing from wrist tomorrow morning. Gently cleanse, pat dry, apply band aid for 24 hours. Call your physician if you notice:      *Increase in pain at catheterization site      *Increase in swelling at catheterization site      *Redness or drainage at the catheterization site      *Numbness, tingling or cramping in the catheterization arm at rest or with activity    CALL 911 if you have active bleeding from your catheterization site. DO NOT DRIVE YOURSELF TO THE HOSPITAL    Drink 3-4 extra glasses of water today    No driving, or working for 48 hrs    Continue low fat diet.

## 2023-07-20 NOTE — PROCEDURES
[address], [address] [place] [address]                            CARDIAC CATHETERIZATION    PATIENT NAME: Perry Godoy                       :        1960  MED REC NO:   [id number]                            ROOM:  ACCOUNT NO:   [de-identified]                           ADMIT DATE: 2023  PROVIDER:     Prashanth Thomas MD    DATE OF PROCEDURE:  2023    PROCEDURES PERFORMED:  1. Coronary angiography. 2.  Conscious sedation using Versed and fentanyl. Indication #4, score of 7. INDICATION FOR PROCEDURE:  The patient is a 59-year-old lady with  shortness of breath on exertion, had abnormal stress test.  The patient  was brought to the cath lab for further evaluation. DESCRIPTION OF PROCEDURE:  After the appropriate informed consent, the  right wrist area was prepped and draped in the usual sterile fashion. A  time-out was called. The right wrist area was locally anesthetized with  2 mL of 2% lidocaine. A 21-gauge needle was used to access the right  radial artery. 6-Finnish JL3.5 and 5-Finnish 3DRC catheters were used for  left and right coronary angiography in multiple projections. We tried  JR4 for right coronary angiography unsuccessfully. ANGIOGRAPHIC FINDINGS:  Vertical heart. The left main coronary artery is relatively short and mid size without  any disease. It bifurcates into left anterior descending artery and  left circumflex artery. The left anterior descending artery is a long vessel extends down to the  apex. It is tortuous in the distal segment. It gives off a large  diagonal branch in the LAD and its branches have no CAD. The left circumflex artery is a large vessel that gives off a large OM  branch. The left circumflex artery and its branches have no CAD. The right coronary artery has anterior takeoff. It is a large, dominant  circulation without any CAD.     At the end of the

## 2023-07-28 RX ORDER — IPRATROPIUM BROMIDE AND ALBUTEROL SULFATE 2.5; .5 MG/3ML; MG/3ML
SOLUTION RESPIRATORY (INHALATION)
Qty: 360 ML | Refills: 3 | Status: SHIPPED | OUTPATIENT
Start: 2023-07-28

## 2023-07-28 NOTE — TELEPHONE ENCOUNTER
Last Appointment:  Visit date not found  Future Appointments  8/10/2023  2:45 PM    [facility] CHF ROOM 2            [facility]             [facility]  8/17/2023  10:40 AM   MD Annabelle Ballesteros [facility]

## 2023-08-02 RX ORDER — PANTOPRAZOLE SODIUM 40 MG/1
40 TABLET, DELAYED RELEASE ORAL NIGHTLY
Qty: 30 TABLET | Refills: 3 | Status: SHIPPED | OUTPATIENT
Start: 2023-08-02

## 2023-08-02 RX ORDER — ATORVASTATIN CALCIUM 40 MG/1
40 TABLET, FILM COATED ORAL NIGHTLY
Qty: 30 TABLET | Refills: 3 | Status: SHIPPED | OUTPATIENT
Start: 2023-08-02

## 2023-08-02 RX ORDER — CHOLECALCIFEROL (VITAMIN D3) 125 MCG
CAPSULE ORAL
Qty: 30 TABLET | Refills: 0 | Status: SHIPPED | OUTPATIENT
Start: 2023-08-02

## 2023-08-02 RX ORDER — FOLIC ACID 1 MG/1
TABLET ORAL
Qty: 30 TABLET | Refills: 3 | Status: SHIPPED | OUTPATIENT
Start: 2023-08-02

## 2023-08-02 NOTE — TELEPHONE ENCOUNTER
Last Appointment:  4/25/2023  Future Appointments  8/10/2023  2:45 PM    [facility] CHF ROOM 2            [facility] CHF [facility]  8/17/2023  10:40 AM   MD [facility]

## 2023-08-02 NOTE — TELEPHONE ENCOUNTER
Last Appointment:  6/29/2023  Future Appointments  8/10/2023  2:45 PM    [facility] ROOM 2            [facility]            [facility]  8/17/2023  10:40 AM   MD Josesito Jacobson [facility]

## 2023-08-07 DIAGNOSIS — I50.22 CHRONIC SYSTOLIC CONGESTIVE HEART FAILURE (HCC): ICD-10-CM

## 2023-08-07 DIAGNOSIS — J96.12 CHRONIC RESPIRATORY FAILURE WITH HYPOXIA AND HYPERCAPNIA (HCC): ICD-10-CM

## 2023-08-07 DIAGNOSIS — R94.39 ABNORMAL STRESS TEST: ICD-10-CM

## 2023-08-07 DIAGNOSIS — J84.10 PULMONARY FIBROSIS (HCC): ICD-10-CM

## 2023-08-07 DIAGNOSIS — J96.11 CHRONIC RESPIRATORY FAILURE WITH HYPOXIA AND HYPERCAPNIA (HCC): ICD-10-CM

## 2023-08-07 DIAGNOSIS — J44.9 CHRONIC OBSTRUCTIVE PULMONARY DISEASE, UNSPECIFIED COPD TYPE (HCC): ICD-10-CM

## 2023-08-07 RX ORDER — ROFLUMILAST 500 UG/1
500 TABLET ORAL DAILY
Qty: 30 TABLET | Refills: 6 | Status: SHIPPED | OUTPATIENT
Start: 2023-08-07

## 2023-08-10 ENCOUNTER — HOSPITAL ENCOUNTER (OUTPATIENT)
Dept: OTHER | Age: 63
Setting detail: THERAPIES SERIES
Discharge: HOME | End: 2023-08-10

## 2023-08-10 NOTE — PROGRESS NOTES
Spoke with Senaida Closs re: patient unable to attend her CHF clinic visit d/t diarrhea.  Pt rescheduled for:     Future Appointments   Date Time Provider [address]   8/17/2023  8:15 AM [facility]OM [facility]   8/17/2023 10:40 AM MD Diana Easley [facility]

## 2023-08-17 ENCOUNTER — HOSPITAL ENCOUNTER (OUTPATIENT)
Dept: OTHER | Age: 63
Setting detail: THERAPIES SERIES
Discharge: HOME | End: 2023-08-17

## 2023-08-31 ENCOUNTER — HOSPITAL ENCOUNTER (OUTPATIENT)
Age: 63
Setting detail: OBSERVATION
Discharge: HOME | End: 2023-09-03
Attending: EMERGENCY MEDICINE | Admitting: FAMILY MEDICINE
Payer: COMMERCIAL

## 2023-08-31 ENCOUNTER — APPOINTMENT (OUTPATIENT)
Dept: GENERAL RADIOLOGY | Age: 63
End: 2023-08-31
Payer: COMMERCIAL

## 2023-08-31 ENCOUNTER — HOSPITAL ENCOUNTER (OUTPATIENT)
Dept: OTHER | Age: 63
Setting detail: THERAPIES SERIES
Discharge: HOME | End: 2023-08-31
Payer: COMMERCIAL

## 2023-08-31 ENCOUNTER — OFFICE VISIT (OUTPATIENT)
Dept: PULMONOLOGY | Age: 63
End: 2023-08-31
Payer: COMMERCIAL

## 2023-08-31 ENCOUNTER — TELEPHONE (OUTPATIENT)
Dept: FAMILY MEDICINE CLINIC | Age: 63
End: 2023-08-31

## 2023-08-31 VITALS
RESPIRATION RATE: 26 BRPM | HEART RATE: 107 BPM | SYSTOLIC BLOOD PRESSURE: 126 MMHG | OXYGEN SATURATION: 100 % | DIASTOLIC BLOOD PRESSURE: 74 MMHG | BODY MASS INDEX: 21.53 KG/M2 | TEMPERATURE: 98.2 F | HEIGHT: 62 IN | WEIGHT: 117 LBS

## 2023-08-31 VITALS
RESPIRATION RATE: 20 BRPM | HEART RATE: 110 BPM | DIASTOLIC BLOOD PRESSURE: 68 MMHG | OXYGEN SATURATION: 96 % | SYSTOLIC BLOOD PRESSURE: 100 MMHG

## 2023-08-31 DIAGNOSIS — R07.9 CHEST PAIN, UNSPECIFIED TYPE: Primary | ICD-10-CM

## 2023-08-31 DIAGNOSIS — J44.1 COPD WITH ACUTE EXACERBATION (HCC): ICD-10-CM

## 2023-08-31 DIAGNOSIS — J44.9 STAGE 4 VERY SEVERE COPD BY GOLD CLASSIFICATION (HCC): Primary | ICD-10-CM

## 2023-08-31 DIAGNOSIS — J96.11 CHRONIC RESPIRATORY FAILURE WITH HYPOXIA AND HYPERCAPNIA (HCC): ICD-10-CM

## 2023-08-31 DIAGNOSIS — J96.12 CHRONIC RESPIRATORY FAILURE WITH HYPOXIA AND HYPERCAPNIA (HCC): ICD-10-CM

## 2023-08-31 DIAGNOSIS — R53.81 PHYSICAL DECONDITIONING: ICD-10-CM

## 2023-08-31 DIAGNOSIS — R06.09 DYSPNEA ON EXERTION: ICD-10-CM

## 2023-08-31 DIAGNOSIS — J44.9 CHRONIC OBSTRUCTIVE PULMONARY DISEASE, UNSPECIFIED COPD TYPE (HCC): ICD-10-CM

## 2023-08-31 DIAGNOSIS — R51.9 HEADACHE, UNSPECIFIED HEADACHE TYPE: ICD-10-CM

## 2023-08-31 LAB
ANION GAP SERPL CALCULATED.3IONS-SCNC: 8 MMOL/L (ref 7–16)
BNP SERPL-MCNC: 56 PG/ML (ref 0–450)
BUN SERPL-MCNC: 10 MG/DL (ref 6–23)
CALCIUM SERPL-MCNC: 10.3 MG/DL (ref 8.6–10.2)
CHLORIDE SERPL-SCNC: 94 MMOL/L (ref 98–107)
CO2 SERPL-SCNC: 39 MMOL/L (ref 22–29)
CREAT SERPL-MCNC: 0.4 MG/DL (ref 0.5–1)
GFR SERPL CREATININE-BSD FRML MDRD: >60 ML/MIN/1.73M2
GLUCOSE SERPL-MCNC: 120 MG/DL (ref 74–99)
POTASSIUM SERPL-SCNC: 3.4 MMOL/L (ref 3.5–5)
SODIUM SERPL-SCNC: 141 MMOL/L (ref 132–146)

## 2023-08-31 PROCEDURE — 6360000002 HC RX W HCPCS: Performed by: EMERGENCY MEDICINE

## 2023-08-31 PROCEDURE — 80048 BASIC METABOLIC PNL TOTAL CA: CPT

## 2023-08-31 PROCEDURE — 80053 COMPREHEN METABOLIC PANEL: CPT

## 2023-08-31 PROCEDURE — 2580000003 HC RX 258: Performed by: EMERGENCY MEDICINE

## 2023-08-31 PROCEDURE — 99214 OFFICE O/P EST MOD 30 MIN: CPT | Performed by: NURSE PRACTITIONER

## 2023-08-31 PROCEDURE — 99406 BEHAV CHNG SMOKING 3-10 MIN: CPT | Performed by: NURSE PRACTITIONER

## 2023-08-31 PROCEDURE — 99214 OFFICE O/P EST MOD 30 MIN: CPT

## 2023-08-31 PROCEDURE — 99285 EMERGENCY DEPT VISIT HI MDM: CPT

## 2023-08-31 PROCEDURE — 93005 ELECTROCARDIOGRAM TRACING: CPT | Performed by: EMERGENCY MEDICINE

## 2023-08-31 PROCEDURE — 83880 ASSAY OF NATRIURETIC PEPTIDE: CPT

## 2023-08-31 PROCEDURE — 85025 COMPLETE CBC W/AUTO DIFF WBC: CPT

## 2023-08-31 PROCEDURE — 84484 ASSAY OF TROPONIN QUANT: CPT

## 2023-08-31 PROCEDURE — 36415 COLL VENOUS BLD VENIPUNCTURE: CPT

## 2023-08-31 PROCEDURE — 96374 THER/PROPH/DIAG INJ IV PUSH: CPT

## 2023-08-31 RX ORDER — IPRATROPIUM BROMIDE AND ALBUTEROL SULFATE 2.5; .5 MG/3ML; MG/3ML
1 SOLUTION RESPIRATORY (INHALATION)
Status: COMPLETED | OUTPATIENT
Start: 2023-08-31 | End: 2023-09-01

## 2023-08-31 RX ORDER — BUDESONIDE, GLYCOPYRROLATE, AND FORMOTEROL FUMARATE 160; 9; 4.8 UG/1; UG/1; UG/1
2 AEROSOL, METERED RESPIRATORY (INHALATION) 2 TIMES DAILY
Qty: 1 EACH | Refills: 6 | Status: SHIPPED | OUTPATIENT
Start: 2023-08-31

## 2023-08-31 RX ADMIN — WATER 125 MG: 1 INJECTION INTRAMUSCULAR; INTRAVENOUS; SUBCUTANEOUS at 23:52

## 2023-08-31 ASSESSMENT — LIFESTYLE VARIABLES
HOW MANY STANDARD DRINKS CONTAINING ALCOHOL DO YOU HAVE ON A TYPICAL DAY: PATIENT DOES NOT DRINK
HOW OFTEN DO YOU HAVE A DRINK CONTAINING ALCOHOL: NEVER

## 2023-08-31 NOTE — PROGRESS NOTES
Patient to follow up with provider in 2 months. Patient will be referred to Palliative care.
should. HFrEF 40-45%, entresto, metoprolol and furosemide, does follow with CHF clinic  History of tobacco use, has been abstinent from tobacco for 1 month. We did discuss that she would need to be 6 months tobacco free for evaluation for lung transplant  Alex ruiz, Son recently moved in with 2 cockatiels. Edwina Jim does not go in the room where they are kept. Anxiety, Sertraline 25 mg daily and Alprazolam 0.25 mg po TID as needed  Chronic rhinitis, Flonase  HLD, atorvastatin  GERD, pantoprazole      Plan:     Palliative Care consult  Discuss with Dr. Dameon Ku to rinse mouth after each use  Continue Daliresp  Use BIPAP nightly and with naps  Refer to pulmonary rehab    Duoneb Q 4hrs as needed  Mucinex po as needed  P.r.n. albuterol  Advised on proper inhaler technique, and adherence to prescribed inhalers    Nodule OR Screen - 50-80, smoked ? 20 pack years, smoke or quit within 15 yrs. Will need LDCT chest March 2024    Aspiration / GERD precautions  Head end of bed elevation. Maintain active and healthy lifestyle with weight reduction. COVID-19 precautions  Recommend yearly Influenza and appropriate pneumonia vaccinations  Recommend pulmonary rehabilitation  Lung transplant evaluation vs lung reduction surgery if agreeable    I spent 5 minutes counseling patient regarding smoking and the risk of Lung cancer and COPD and Respiratory failure. Patient has been abstinent from tobacco for about 3 months.        Family History   Problem Relation Age of Onset    Colon Cancer None     Other Father     Coronary Art Dis Father         mother    Hypertension Father     Diabetes Unknown relative         older age; pat grandmother    Other Daughter         son; ex-    Breast Cancer None     Stroke Mother         Follow up: 11 weeks    Obie Abdullahi, APRN-CNP  Pulmonary [facility]yce Holiday  Dr. Claudette Jeronimo, Dr. Mali Stroud,

## 2023-08-31 NOTE — TELEPHONE ENCOUNTER
Wendi Roach called in and is stating that Fiona's urine is very dark. She state that she has not been drink very much and she did complain that her abdomen is hurting. She is asking if you could put orders in to check her urine and she will take her to the lab to give the sample.     Please advise    Thank you

## 2023-08-31 NOTE — PROGRESS NOTES
metoprolol succinate (TOPROL XL) 25 MG extended release tablet, Take 0.5 tablets by mouth nightly, Disp: 30 tablet, Rfl: 3    OXYGEN, Inhale 4 L into the lungs continuous, Disp: , Rfl:        GUIDELINE DIRECTED MEDICAL THERAPY for HFrEF/HFmrEF:  ARNI/ACE I/ARB: (1a indication)  Entresto 24/26 mg BID  Hydralazine/Nitrates (1a in symptomatic AA population, 2b if unable to tolerate ACE/ARB/ARNI)  No  Beta blocker: (1a indication)  Metoprolol Succinate (Toprol)  Aldosterone antagonist: (1a indication)  No  SGLT2i: (1a indication)  No    If Channel inhibitor (2a indication if HR >70 on maximally tolerated beta blocker)  No  Cardiac glycoside (2b indication for symptomatic HFrEF)  No  Soluble Guanylate Cyclase ([address] [place] [address]) Stimulator (2b indication LVEF <45% with recent [address], IV diuretics or elevated BNP)  No  Potassium binders (2b indication for hyperkalemia while taking RAASi)  No        RISK SCORES:    [x] Palliative care consulted    Predictive Model Details          17%  Factor Value    End of Life Care Index Model 19% Last Albumin Gavi 3.1 g/dL     13% Has Respiratory Failure, Insufficiency, or Arrest Yes     12% Age 60 y/o     9% Has Nonhypertensive Congestive Heart Failure Yes     8% Has Bhavani-, Endo-, or Myocarditis, or Cardiomyopathy (Not from TB or STD) Yes     6% Had Nausea or Vomiting Yes     6% Has COPD or Bronchiectasis Yes     5% Has Fluid or Electrolyte Disorder Yes     5% Prescribed Antiasthmatic Yes     4% Has Screening or History of Mental Health or Substance Abuse Codes Yes     4% Has Other Liver Disease Yes     3% Has Cardiac Conduction Disorder Yes     3% Prescribed Laxative Yes     2% Prescribed Diuretic Yes     2% Has Coronary Atherosclerosis or Other Heart Disease Yes     1% Last RDW Gavi 13.2 %             HEART FAILURE FOCUSED PHYSICAL EXAMINATION:    Vitals:    08/31/23 1400   BP: 100/68   Pulse: (!) 110   Resp: 20   SpO2: 96%        Assessment  Charting Type: Shift assessment  Neurological  Level of

## 2023-09-01 ENCOUNTER — APPOINTMENT (OUTPATIENT)
Dept: CT IMAGING | Age: 63
End: 2023-09-01
Attending: EMERGENCY MEDICINE
Payer: COMMERCIAL

## 2023-09-01 ENCOUNTER — APPOINTMENT (OUTPATIENT)
Dept: GENERAL RADIOLOGY | Age: 63
End: 2023-09-01
Payer: COMMERCIAL

## 2023-09-01 PROBLEM — J44.1 COPD WITH ACUTE EXACERBATION (HCC): Status: ACTIVE | Noted: 2023-09-01

## 2023-09-01 LAB
ALBUMIN SERPL-MCNC: 4.1 G/DL (ref 3.5–5.2)
ALP SERPL-CCNC: 95 U/L (ref 35–104)
ALT SERPL-CCNC: 17 U/L (ref 0–32)
ANION GAP SERPL CALCULATED.3IONS-SCNC: 6 MMOL/L (ref 7–16)
AST SERPL-CCNC: 27 U/L (ref 0–31)
B PARAP IS1001 DNA NPH QL NAA+NON-PROBE: NOT DETECTED
B PERT DNA SPEC QL NAA+PROBE: NOT DETECTED
B.E.: 13.9 MMOL/L (ref -3–3)
BASOPHILS # BLD: 0.03 K/UL (ref 0–0.2)
BASOPHILS NFR BLD: 0 % (ref 0–2)
BILIRUB SERPL-MCNC: 0.4 MG/DL (ref 0–1.2)
BNP SERPL-MCNC: 110 PG/ML (ref 0–125)
BUN SERPL-MCNC: 9 MG/DL (ref 6–23)
C PNEUM DNA NPH QL NAA+NON-PROBE: NOT DETECTED
CALCIUM SERPL-MCNC: 10.5 MG/DL (ref 8.6–10.2)
CHLORIDE SERPL-SCNC: 92 MMOL/L (ref 98–107)
CO2 SERPL-SCNC: 43 MMOL/L (ref 22–29)
COHB: 1 % (ref 0–1.5)
CREAT SERPL-MCNC: 0.4 MG/DL (ref 0.5–1)
CRITICAL: ABNORMAL
D DIMER: <200 NG/ML DDU (ref 0–232)
DATE ANALYZED: ABNORMAL
DATE OF COLLECTION: ABNORMAL
EOSINOPHIL # BLD: 0.13 K/UL (ref 0.05–0.5)
EOSINOPHILS RELATIVE PERCENT: 1 % (ref 0–6)
ERYTHROCYTE [DISTWIDTH] IN BLOOD BY AUTOMATED COUNT: 12.6 % (ref 11.5–15)
FLUAV RNA NPH QL NAA+NON-PROBE: NOT DETECTED
FLUBV RNA NPH QL NAA+NON-PROBE: NOT DETECTED
GFR SERPL CREATININE-BSD FRML MDRD: >60 ML/MIN/1.73M2
GLUCOSE SERPL-MCNC: 119 MG/DL (ref 74–99)
HADV DNA NPH QL NAA+NON-PROBE: NOT DETECTED
HCO3: 42.2 MMOL/L (ref 22–26)
HCOV 229E RNA NPH QL NAA+NON-PROBE: NOT DETECTED
HCOV HKU1 RNA NPH QL NAA+NON-PROBE: NOT DETECTED
HCOV NL63 RNA NPH QL NAA+NON-PROBE: NOT DETECTED
HCOV OC43 RNA NPH QL NAA+NON-PROBE: NOT DETECTED
HCT VFR BLD AUTO: 40.1 % (ref 34–48)
HGB BLD-MCNC: 12.7 G/DL (ref 11.5–15.5)
HHB: 1.5 % (ref 0–5)
HMPV RNA NPH QL NAA+NON-PROBE: NOT DETECTED
HPIV1 RNA NPH QL NAA+NON-PROBE: NOT DETECTED
HPIV2 RNA NPH QL NAA+NON-PROBE: NOT DETECTED
HPIV3 RNA NPH QL NAA+NON-PROBE: NOT DETECTED
HPIV4 RNA NPH QL NAA+NON-PROBE: NOT DETECTED
IMM GRANULOCYTES # BLD AUTO: 0.05 K/UL (ref 0–0.58)
IMM GRANULOCYTES NFR BLD: 0 % (ref 0–5)
LAB: ABNORMAL
LYMPHOCYTES NFR BLD: 1.92 K/UL (ref 1.5–4)
LYMPHOCYTES RELATIVE PERCENT: 15 % (ref 20–42)
Lab: ABNORMAL
M PNEUMO DNA NPH QL NAA+NON-PROBE: NOT DETECTED
MCH RBC QN AUTO: 31.3 PG (ref 26–35)
MCHC RBC AUTO-ENTMCNC: 31.7 G/DL (ref 32–34.5)
MCV RBC AUTO: 98.8 FL (ref 80–99.9)
METHB: 0.4 % (ref 0–1.5)
MODE: ABNORMAL
MONOCYTES NFR BLD: 0.77 K/UL (ref 0.1–0.95)
MONOCYTES NFR BLD: 6 % (ref 2–12)
NEUTROPHILS NFR BLD: 77 % (ref 43–80)
NEUTS SEG NFR BLD: 9.89 K/UL (ref 1.8–7.3)
O2 CONTENT: 17.7 ML/DL
O2 SATURATION: 98.5 % (ref 92–98.5)
O2HB: 97.1 % (ref 94–97)
OPERATOR ID: 8217
PATIENT TEMP: 37 C
PCO2: 72.3 MMHG (ref 35–45)
PH BLOOD GAS: 7.38 (ref 7.35–7.45)
PLATELET # BLD AUTO: 287 K/UL (ref 130–450)
PMV BLD AUTO: 9.3 FL (ref 7–12)
PO2: 127.3 MMHG (ref 75–100)
POTASSIUM SERPL-SCNC: 3.5 MMOL/L (ref 3.5–5)
PROT SERPL-MCNC: 7 G/DL (ref 6.4–8.3)
RBC # BLD AUTO: 4.06 M/UL (ref 3.5–5.5)
RSV RNA NPH QL NAA+NON-PROBE: NOT DETECTED
RV+EV RNA NPH QL NAA+NON-PROBE: NOT DETECTED
SARS-COV-2 RDRP RESP QL NAA+PROBE: NOT DETECTED
SARS-COV-2 RNA NPH QL NAA+NON-PROBE: NOT DETECTED
SODIUM SERPL-SCNC: 141 MMOL/L (ref 132–146)
SOURCE, BLOOD GAS: ABNORMAL
SPECIMEN DESCRIPTION: NORMAL
SPECIMEN DESCRIPTION: NORMAL
THB: 12.8 G/DL (ref 11.5–16.5)
TIME ANALYZED: 941
TROPONIN I SERPL HS-MCNC: 16 NG/L (ref 0–9)
TROPONIN I SERPL HS-MCNC: 18 NG/L (ref 0–9)
WBC OTHER # BLD: 12.8 K/UL (ref 4.5–11.5)

## 2023-09-01 PROCEDURE — 94660 CPAP INITIATION&MGMT: CPT

## 2023-09-01 PROCEDURE — 87635 SARS-COV-2 COVID-19 AMP PRB: CPT

## 2023-09-01 PROCEDURE — 84484 ASSAY OF TROPONIN QUANT: CPT

## 2023-09-01 PROCEDURE — 82805 BLOOD GASES W/O2 SATURATION: CPT

## 2023-09-01 PROCEDURE — 2580000003 HC RX 258

## 2023-09-01 PROCEDURE — 85379 FIBRIN DEGRADATION QUANT: CPT

## 2023-09-01 PROCEDURE — 94640 AIRWAY INHALATION TREATMENT: CPT

## 2023-09-01 PROCEDURE — 6360000002 HC RX W HCPCS

## 2023-09-01 PROCEDURE — 6370000000 HC RX 637 (ALT 250 FOR IP)

## 2023-09-01 PROCEDURE — G0378 HOSPITAL OBSERVATION PER HR: HCPCS

## 2023-09-01 PROCEDURE — 6370000000 HC RX 637 (ALT 250 FOR IP): Performed by: EMERGENCY MEDICINE

## 2023-09-01 PROCEDURE — 2700000000 HC OXYGEN THERAPY PER DAY

## 2023-09-01 PROCEDURE — 96372 THER/PROPH/DIAG INJ SC/IM: CPT

## 2023-09-01 PROCEDURE — 0202U NFCT DS 22 TRGT SARS-COV-2: CPT

## 2023-09-01 PROCEDURE — 71045 X-RAY EXAM CHEST 1 VIEW: CPT

## 2023-09-01 PROCEDURE — 70450 CT HEAD/BRAIN W/O DYE: CPT

## 2023-09-01 PROCEDURE — 94664 DEMO&/EVAL PT USE INHALER: CPT

## 2023-09-01 RX ORDER — ATORVASTATIN CALCIUM 40 MG/1
40 TABLET, FILM COATED ORAL NIGHTLY
Status: DISCONTINUED | OUTPATIENT
Start: 2023-09-01 | End: 2023-09-03 | Stop reason: HOSPADM

## 2023-09-01 RX ORDER — SODIUM CHLORIDE 9 MG/ML
INJECTION, SOLUTION INTRAVENOUS PRN
Status: DISCONTINUED | OUTPATIENT
Start: 2023-09-01 | End: 2023-09-03 | Stop reason: HOSPADM

## 2023-09-01 RX ORDER — SODIUM CHLORIDE 0.9 % (FLUSH) 0.9 %
5-40 SYRINGE (ML) INJECTION EVERY 12 HOURS SCHEDULED
Status: DISCONTINUED | OUTPATIENT
Start: 2023-09-01 | End: 2023-09-03 | Stop reason: HOSPADM

## 2023-09-01 RX ORDER — CHOLECALCIFEROL (VITAMIN D3) 50 MCG
2000 TABLET ORAL DAILY
Status: DISCONTINUED | OUTPATIENT
Start: 2023-09-01 | End: 2023-09-03 | Stop reason: HOSPADM

## 2023-09-01 RX ORDER — ACETAMINOPHEN 325 MG/1
650 TABLET ORAL EVERY 6 HOURS PRN
Status: DISCONTINUED | OUTPATIENT
Start: 2023-09-01 | End: 2023-09-03 | Stop reason: HOSPADM

## 2023-09-01 RX ORDER — IPRATROPIUM BROMIDE AND ALBUTEROL SULFATE 2.5; .5 MG/3ML; MG/3ML
1 SOLUTION RESPIRATORY (INHALATION)
Status: DISCONTINUED | OUTPATIENT
Start: 2023-09-01 | End: 2023-09-03 | Stop reason: HOSPADM

## 2023-09-01 RX ORDER — SODIUM CHLORIDE 0.9 % (FLUSH) 0.9 %
5-40 SYRINGE (ML) INJECTION PRN
Status: DISCONTINUED | OUTPATIENT
Start: 2023-09-01 | End: 2023-09-03 | Stop reason: HOSPADM

## 2023-09-01 RX ORDER — FUROSEMIDE 20 MG/1
20 TABLET ORAL DAILY
Status: DISCONTINUED | OUTPATIENT
Start: 2023-09-01 | End: 2023-09-03 | Stop reason: HOSPADM

## 2023-09-01 RX ORDER — ROFLUMILAST 500 UG/1
500 TABLET ORAL DAILY
Status: DISCONTINUED | OUTPATIENT
Start: 2023-09-01 | End: 2023-09-03 | Stop reason: HOSPADM

## 2023-09-01 RX ORDER — ARFORMOTEROL TARTRATE 15 UG/2ML
15 SOLUTION RESPIRATORY (INHALATION)
Status: DISCONTINUED | OUTPATIENT
Start: 2023-09-01 | End: 2023-09-03 | Stop reason: HOSPADM

## 2023-09-01 RX ORDER — ASPIRIN 81 MG/1
81 TABLET, CHEWABLE ORAL DAILY
Status: DISCONTINUED | OUTPATIENT
Start: 2023-09-01 | End: 2023-09-03 | Stop reason: HOSPADM

## 2023-09-01 RX ORDER — ONDANSETRON 4 MG/1
4 TABLET, ORALLY DISINTEGRATING ORAL EVERY 8 HOURS PRN
Status: DISCONTINUED | OUTPATIENT
Start: 2023-09-01 | End: 2023-09-03 | Stop reason: HOSPADM

## 2023-09-01 RX ORDER — BUDESONIDE 0.5 MG/2ML
0.5 INHALANT ORAL
Status: DISCONTINUED | OUTPATIENT
Start: 2023-09-01 | End: 2023-09-03 | Stop reason: HOSPADM

## 2023-09-01 RX ORDER — NICOTINE 21 MG/24HR
1 PATCH, TRANSDERMAL 24 HOURS TRANSDERMAL DAILY
Status: DISCONTINUED | OUTPATIENT
Start: 2023-09-01 | End: 2023-09-03 | Stop reason: HOSPADM

## 2023-09-01 RX ORDER — PREDNISONE 20 MG/1
40 TABLET ORAL
Status: DISCONTINUED | OUTPATIENT
Start: 2023-09-01 | End: 2023-09-03 | Stop reason: HOSPADM

## 2023-09-01 RX ORDER — IPRATROPIUM BROMIDE AND ALBUTEROL SULFATE 2.5; .5 MG/3ML; MG/3ML
1 SOLUTION RESPIRATORY (INHALATION)
Status: DISPENSED | OUTPATIENT
Start: 2023-09-01 | End: 2023-09-01

## 2023-09-01 RX ORDER — POLYETHYLENE GLYCOL 3350 17 G/17G
17 POWDER, FOR SOLUTION ORAL DAILY PRN
Status: DISCONTINUED | OUTPATIENT
Start: 2023-09-01 | End: 2023-09-03 | Stop reason: HOSPADM

## 2023-09-01 RX ORDER — ENOXAPARIN SODIUM 100 MG/ML
40 INJECTION SUBCUTANEOUS DAILY
Status: DISCONTINUED | OUTPATIENT
Start: 2023-09-01 | End: 2023-09-03 | Stop reason: HOSPADM

## 2023-09-01 RX ORDER — DOCUSATE SODIUM 100 MG/1
100 CAPSULE, LIQUID FILLED ORAL DAILY PRN
Status: DISCONTINUED | OUTPATIENT
Start: 2023-09-01 | End: 2023-09-03 | Stop reason: HOSPADM

## 2023-09-01 RX ORDER — FLUTICASONE PROPIONATE 50 MCG
2 SPRAY, SUSPENSION (ML) NASAL DAILY PRN
Status: DISCONTINUED | OUTPATIENT
Start: 2023-09-01 | End: 2023-09-03 | Stop reason: HOSPADM

## 2023-09-01 RX ORDER — FOLIC ACID 1 MG/1
1000 TABLET ORAL DAILY
Status: DISCONTINUED | OUTPATIENT
Start: 2023-09-01 | End: 2023-09-03 | Stop reason: HOSPADM

## 2023-09-01 RX ORDER — ONDANSETRON 2 MG/ML
4 INJECTION INTRAMUSCULAR; INTRAVENOUS EVERY 6 HOURS PRN
Status: DISCONTINUED | OUTPATIENT
Start: 2023-09-01 | End: 2023-09-03 | Stop reason: HOSPADM

## 2023-09-01 RX ORDER — ALPRAZOLAM 0.25 MG/1
0.25 TABLET ORAL 3 TIMES DAILY PRN
Status: DISCONTINUED | OUTPATIENT
Start: 2023-09-01 | End: 2023-09-03 | Stop reason: HOSPADM

## 2023-09-01 RX ORDER — METOPROLOL SUCCINATE 25 MG/1
12.5 TABLET, EXTENDED RELEASE ORAL NIGHTLY
Status: DISCONTINUED | OUTPATIENT
Start: 2023-09-01 | End: 2023-09-03 | Stop reason: HOSPADM

## 2023-09-01 RX ORDER — POTASSIUM CHLORIDE 20 MEQ/1
20 TABLET, EXTENDED RELEASE ORAL DAILY
Status: DISCONTINUED | OUTPATIENT
Start: 2023-09-01 | End: 2023-09-03 | Stop reason: HOSPADM

## 2023-09-01 RX ORDER — ACETAMINOPHEN 650 MG/1
650 SUPPOSITORY RECTAL EVERY 6 HOURS PRN
Status: DISCONTINUED | OUTPATIENT
Start: 2023-09-01 | End: 2023-09-03 | Stop reason: HOSPADM

## 2023-09-01 RX ORDER — PANTOPRAZOLE SODIUM 40 MG/1
40 TABLET, DELAYED RELEASE ORAL NIGHTLY
Status: DISCONTINUED | OUTPATIENT
Start: 2023-09-01 | End: 2023-09-03 | Stop reason: HOSPADM

## 2023-09-01 RX ADMIN — ARFORMOTEROL TARTRATE 15 MCG: 15 SOLUTION RESPIRATORY (INHALATION) at 09:38

## 2023-09-01 RX ADMIN — SACUBITRIL AND VALSARTAN 1 TABLET: 24; 26 TABLET, FILM COATED ORAL at 20:10

## 2023-09-01 RX ADMIN — SACUBITRIL AND VALSARTAN 1 TABLET: 24; 26 TABLET, FILM COATED ORAL at 09:07

## 2023-09-01 RX ADMIN — BUDESONIDE 500 MCG: 0.5 SUSPENSION RESPIRATORY (INHALATION) at 09:38

## 2023-09-01 RX ADMIN — IPRATROPIUM BROMIDE AND ALBUTEROL SULFATE 1 DOSE: .5; 2.5 SOLUTION RESPIRATORY (INHALATION) at 16:31

## 2023-09-01 RX ADMIN — ATORVASTATIN CALCIUM 40 MG: 40 TABLET, FILM COATED ORAL at 20:10

## 2023-09-01 RX ADMIN — IPRATROPIUM BROMIDE AND ALBUTEROL SULFATE 1 DOSE: .5; 2.5 SOLUTION RESPIRATORY (INHALATION) at 09:38

## 2023-09-01 RX ADMIN — POTASSIUM CHLORIDE 20 MEQ: 1500 TABLET, EXTENDED RELEASE ORAL at 09:07

## 2023-09-01 RX ADMIN — FUROSEMIDE 20 MG: 20 TABLET ORAL at 09:06

## 2023-09-01 RX ADMIN — IPRATROPIUM BROMIDE AND ALBUTEROL SULFATE 1 DOSE: .5; 3 SOLUTION RESPIRATORY (INHALATION) at 00:20

## 2023-09-01 RX ADMIN — METOPROLOL SUCCINATE 12.5 MG: 25 TABLET, EXTENDED RELEASE ORAL at 20:10

## 2023-09-01 RX ADMIN — ALPRAZOLAM 0.25 MG: 0.25 TABLET ORAL at 09:18

## 2023-09-01 RX ADMIN — IPRATROPIUM BROMIDE AND ALBUTEROL SULFATE 1 DOSE: .5; 3 SOLUTION RESPIRATORY (INHALATION) at 00:33

## 2023-09-01 RX ADMIN — PANTOPRAZOLE SODIUM 40 MG: 40 TABLET, DELAYED RELEASE ORAL at 20:10

## 2023-09-01 RX ADMIN — ROFLUMILAST 500 MCG: 500 TABLET ORAL at 10:16

## 2023-09-01 RX ADMIN — ARFORMOTEROL TARTRATE 15 MCG: 15 SOLUTION RESPIRATORY (INHALATION) at 21:22

## 2023-09-01 RX ADMIN — IPRATROPIUM BROMIDE AND ALBUTEROL SULFATE 1 DOSE: .5; 3 SOLUTION RESPIRATORY (INHALATION) at 00:39

## 2023-09-01 RX ADMIN — ALPRAZOLAM 0.25 MG: 0.25 TABLET ORAL at 20:14

## 2023-09-01 RX ADMIN — Medication 10 ML: at 09:19

## 2023-09-01 RX ADMIN — PREDNISONE 40 MG: 20 TABLET ORAL at 18:54

## 2023-09-01 RX ADMIN — ASPIRIN 81 MG 81 MG: 81 TABLET ORAL at 09:18

## 2023-09-01 RX ADMIN — IPRATROPIUM BROMIDE AND ALBUTEROL SULFATE 1 DOSE: .5; 2.5 SOLUTION RESPIRATORY (INHALATION) at 21:22

## 2023-09-01 RX ADMIN — IPRATROPIUM BROMIDE AND ALBUTEROL SULFATE 1 DOSE: .5; 2.5 SOLUTION RESPIRATORY (INHALATION) at 12:56

## 2023-09-01 RX ADMIN — IPRATROPIUM BROMIDE AND ALBUTEROL SULFATE 1 DOSE: .5; 3 SOLUTION RESPIRATORY (INHALATION) at 06:36

## 2023-09-01 RX ADMIN — BUDESONIDE 500 MCG: 0.5 SUSPENSION RESPIRATORY (INHALATION) at 21:22

## 2023-09-01 RX ADMIN — Medication 10 ML: at 22:04

## 2023-09-01 RX ADMIN — ENOXAPARIN SODIUM 40 MG: 100 INJECTION SUBCUTANEOUS at 09:06

## 2023-09-01 RX ADMIN — Medication 2000 UNITS: at 09:07

## 2023-09-01 RX ADMIN — IPRATROPIUM BROMIDE AND ALBUTEROL SULFATE 1 DOSE: .5; 3 SOLUTION RESPIRATORY (INHALATION) at 06:30

## 2023-09-01 RX ADMIN — FOLIC ACID 1000 MCG: 1 TABLET ORAL at 09:07

## 2023-09-01 ASSESSMENT — PAIN - FUNCTIONAL ASSESSMENT
PAIN_FUNCTIONAL_ASSESSMENT: NONE - DENIES PAIN

## 2023-09-01 ASSESSMENT — ENCOUNTER SYMPTOMS
COLOR CHANGE: 0
CHEST TIGHTNESS: 1
TROUBLE SWALLOWING: 0
SHORTNESS OF BREATH: 1
VOMITING: 0
RHINORRHEA: 0
NAUSEA: 1
ABDOMINAL PAIN: 0
PHOTOPHOBIA: 0

## 2023-09-01 NOTE — CARE COORDINATION
Next of Kin    Primary Decision Maker: Ya Leon - Child - [phone]    Secondary Decision Maker: shannan basilio - Child - [phone]    Discharge Planning:    Patient lives with:   Type of Home:    Primary Care Giver: Family  Patient Support Systems include: Spouse/Significant Other   Current Financial resources:    Current community resources:    Current services prior to admission:              Current DME:              Type of Home Care services:       ADLS  Prior functional level: Independent in ADLs/IADLs  Current functional level: Needs help with mobility bathing and shoppin    PT AM-PAC:   /24  OT AM-PAC:   /24    Family can provide assistance at DC: Yes  Would you like Case Management to discuss the discharge plan with any other family members/significant others, and if so, who?  No  Plans to Return to Present Housing: Unknown at present  Other Identified Issues/Barriers to RETURNING to current housing: unknown at this time  Potential Assistance needed at discharge:  pt is requesting [address] [place] [address]             Potential DME:  none  Patient expects to discharge to:  home  Plan for transportation at discharge:  daughter    Financial    Payor: Bg Villarreal / Plan: Bg Villarreal NAP CHOICE POS II / Product Type: *No Product type* /     Does insurance require precert for SNF: Yes    Potential assistance Purchasing Medications:    Meds-to-Beds request: Yes       [facility], [address] [place] [address] [phone] Demarcus Mcfarland [phone]  [facility] [address] [address]  Phone: [phone] Fax: [phone], [address] [address] [phone]  [address] [place] [address] Drive [address]  Phone: [phone] Fax: [phone]      Notes:    Factors facilitating achievement of predicted outcomes: Family support    Barriers to discharge: Upper extremity weakness    Additional Case Management Notes: see notes    The Plan for Transition of Care is

## 2023-09-01 NOTE — PLAN OF CARE
62 y/o female with a PMHx of CHF, COPD with multiple exacerbations, Depression, anxiety, diverticulitis who presented with SOB and Chest pain. In the ED, labs revealed CO2 of 43, Troponins 18 > 16, Pro-. Patient received 5 rounds of Duoneb and Solumedrol  she is admitted for a mild COPD exacerbation. Patient seen and examined at bedside this morning. Was taking her nebulizer treatment. Patient in NAD, Patient states her breathing is much improved . Vitals were stable with BP: 112/77, Pulse: 98, RR: 12. Temp: 98.7. Physical exam revealed mild expiratory wheezing, increased inspiratory and expiratory phases, S1 and S2 wnl, no tenderness to palpation of abdomen, no edema to BLE, strength and sensation intact Bilaterally. Please see H&P for assessment and plan.

## 2023-09-01 NOTE — H&P
Hemoglobin 12.7 11.5 - 15.5 g/dL    Hematocrit 40.1 34.0 - 48.0 %    MCV 98.8 80.0 - 99.9 fL    MCH 31.3 26.0 - 35.0 pg    MCHC 31.7 (L) 32.0 - 34.5 g/dL    RDW 12.6 11.5 - 15.0 %    Platelets 937 332 - 391 k/uL    MPV 9.3 7.0 - 12.0 fL    Neutrophils % 77 43.0 - 80.0 %    Lymphocytes % 15 (L) 20.0 - 42.0 %    Monocytes % 6 2.0 - 12.0 %    Eosinophils % 1 0 - 6 %    Basophils % 0 0.0 - 2.0 %    Immature Granulocytes 0 0.0 - 5.0 %    Neutrophils Absolute 9.89 (H) 1.80 - 7.30 k/uL    Lymphocytes Absolute 1.92 1.50 - 4.00 k/uL    Monocytes Absolute 0.77 0.10 - 0.95 k/uL    Eosinophils Absolute 0.13 0.05 - 0.50 k/uL    Basophils Absolute 0.03 0.00 - 0.20 k/uL    Absolute Immature Granulocyte 0.05 0.00 - 0.58 k/uL   Comprehensive Metabolic Panel   Result Value Ref Range    Glucose 119 (H) 74 - 99 mg/dL    BUN 9 6 - 23 mg/dL    Creatinine 0.4 (L) 0.50 - 1.00 mg/dL    Est, Glom Filt Rate >60 >60 mL/min/1.73m2    Calcium 10.5 (H) 8.6 - 10.2 mg/dL    Sodium 141 132 - 146 mmol/L    Potassium 3.5 3.5 - 5.0 mmol/L    Chloride 92 (L) 98 - 107 mmol/L    CO2 43 (HH) 22 - 29 mmol/L    Anion Gap 6 (L) 7 - 16 mmol/L    Alkaline Phosphatase 95 35 - 104 U/L    ALT 17 0 - 32 U/L    AST 27 0 - 31 U/L    Total Bilirubin 0.4 0.0 - 1.2 mg/dL    Total Protein 7.0 6.4 - 8.3 g/dL    Albumin 4.1 3.5 - 5.2 g/dL   Troponin   Result Value Ref Range    Troponin, High Sensitivity 18 (H) 0 - 9 ng/L   Brain Natriuretic Peptide   Result Value Ref Range    Pro- 0 - 125 pg/mL   D-Dimer, Quantitative   Result Value Ref Range    D-Dimer, Quant <200 0 - 232 ng/mL DDU   Troponin   Result Value Ref Range    Troponin, High Sensitivity 16 (H) 0 - 9 ng/L       Imaging:  CT HEAD WO CONTRAST   Final Result   No acute intracranial abnormality. RECOMMENDATIONS:   Careful clinical correlation and follow up recommended. XR CHEST PORTABLE   Final Result   1. No acute disease. 2. COPD.       RECOMMENDATION:   Careful clinical correlation and follow

## 2023-09-01 NOTE — ED NOTES
Nurse to nurse report given to RN on 8200. All questions answered.  Patient placed in transport      [place], [address] [place] [address]  09/01/23 8781

## 2023-09-01 NOTE — ED PROVIDER NOTES
HPI:  8/31/23, Time: 11:23 PM EDT         Agnes Tejada is a 61 y.o. female history of heart failure history of COPD history of coronary disease history of depression history of tobacco use presenting to the ED for shortness of breath, beginning several days ago. The complaint has been persistent, moderate in severity, and worsened by nothing. Patient reporting shortness of breath has been going for the past day. Patient also reporting heaviness in her chest.  Patient reporting no fever chills she does report cough she does report headache for the last several days that is diffuse. Patient reporting no photophobia. Patient reporting no fall or injury. She reports no leg pain or swelling. Patient reports similar symptoms with her COPD.    ROS:   Pertinent positives and negatives are stated within HPI, all other systems reviewed and are negative.  --------------------------------------------- PAST HISTORY ---------------------------------------------  Past Medical History:  has a past medical history of Abscess, Acute on chronic diastolic CHF (congestive heart failure) ([address]), COPD (chronic obstructive pulmonary disease) ([address]), Coronary artery disease involving native heart without angina pectoris, Depression with anxiety, Diverticulitis, Provoked DVT 3/2018, Seasonal allergic rhinitis, Smoker, Tobacco use disorder, and Weakness of both legs. Past Surgical History:  has a past surgical history that includes cardiovascular stress test (N/A, 01/06/2023); other surgical history; bronchoscopy (N/A, 04/27/2023); bronchoscopy (04/27/2023); and Lung biopsy. Social History:  reports that she has been smoking cigarettes. She has a 20.50 pack-year smoking history. She has never used smokeless tobacco. She reports that she does not currently use alcohol. She reports that she does not currently use drugs after having used the following drugs: Marijuana Marletta Hoes).     Family History: family history includes Breast Cancer in

## 2023-09-02 PROBLEM — G47.33 OSA ON CPAP: Status: ACTIVE | Noted: 2023-09-02

## 2023-09-02 PROBLEM — J44.1 COPD WITH ACUTE EXACERBATION (HCC): Status: RESOLVED | Noted: 2023-09-01 | Resolved: 2023-09-02

## 2023-09-02 PROBLEM — J96.01 ACUTE RESPIRATORY FAILURE WITH HYPOXIA AND HYPERCAPNIA (HCC): Status: RESOLVED | Noted: 2021-05-20 | Resolved: 2023-09-02

## 2023-09-02 PROBLEM — F41.9 ANXIETY: Status: RESOLVED | Noted: 2023-03-24 | Resolved: 2023-09-02

## 2023-09-02 PROBLEM — J96.02 ACUTE RESPIRATORY FAILURE WITH HYPOXIA AND HYPERCAPNIA (HCC): Status: RESOLVED | Noted: 2021-05-20 | Resolved: 2023-09-02

## 2023-09-02 PROBLEM — Z99.89 OSA ON CPAP: Status: ACTIVE | Noted: 2023-09-02

## 2023-09-02 LAB
ALBUMIN SERPL-MCNC: 3.4 G/DL (ref 3.5–5.2)
ALP SERPL-CCNC: 77 U/L (ref 35–104)
ALT SERPL-CCNC: 15 U/L (ref 0–32)
ANION GAP SERPL CALCULATED.3IONS-SCNC: 8 MMOL/L (ref 7–16)
AST SERPL-CCNC: 25 U/L (ref 0–31)
BACTERIA URNS QL MICRO: ABNORMAL
BASOPHILS # BLD: 0.01 K/UL (ref 0–0.2)
BASOPHILS NFR BLD: 0 % (ref 0–2)
BILIRUB SERPL-MCNC: 0.2 MG/DL (ref 0–1.2)
BILIRUB UR QL STRIP: NEGATIVE
BUN SERPL-MCNC: 15 MG/DL (ref 6–23)
CALCIUM SERPL-MCNC: 9.6 MG/DL (ref 8.6–10.2)
CHLORIDE SERPL-SCNC: 97 MMOL/L (ref 98–107)
CLARITY UR: CLEAR
CO2 SERPL-SCNC: 39 MMOL/L (ref 22–29)
COLOR UR: YELLOW
CREAT SERPL-MCNC: 0.3 MG/DL (ref 0.5–1)
EOSINOPHIL # BLD: 0 K/UL (ref 0.05–0.5)
EOSINOPHILS RELATIVE PERCENT: 0 % (ref 0–6)
ERYTHROCYTE [DISTWIDTH] IN BLOOD BY AUTOMATED COUNT: 12.5 % (ref 11.5–15)
GFR SERPL CREATININE-BSD FRML MDRD: >60 ML/MIN/1.73M2
GLUCOSE SERPL-MCNC: 122 MG/DL (ref 74–99)
GLUCOSE UR STRIP-MCNC: NEGATIVE MG/DL
HCT VFR BLD AUTO: 35 % (ref 34–48)
HGB BLD-MCNC: 11.1 G/DL (ref 11.5–15.5)
HGB UR QL STRIP.AUTO: ABNORMAL
IMM GRANULOCYTES # BLD AUTO: 0.06 K/UL (ref 0–0.58)
IMM GRANULOCYTES NFR BLD: 1 % (ref 0–5)
KETONES UR STRIP-MCNC: NEGATIVE MG/DL
LEUKOCYTE ESTERASE UR QL STRIP: ABNORMAL
LYMPHOCYTES NFR BLD: 0.73 K/UL (ref 1.5–4)
LYMPHOCYTES RELATIVE PERCENT: 8 % (ref 20–42)
MCH RBC QN AUTO: 31.1 PG (ref 26–35)
MCHC RBC AUTO-ENTMCNC: 31.7 G/DL (ref 32–34.5)
MCV RBC AUTO: 98 FL (ref 80–99.9)
MONOCYTES NFR BLD: 0.62 K/UL (ref 0.1–0.95)
MONOCYTES NFR BLD: 7 % (ref 2–12)
NEUTROPHILS NFR BLD: 84 % (ref 43–80)
NEUTS SEG NFR BLD: 7.32 K/UL (ref 1.8–7.3)
NITRITE UR QL STRIP: NEGATIVE
PH UR STRIP: 6 [PH] (ref 5–9)
PLATELET # BLD AUTO: 274 K/UL (ref 130–450)
PMV BLD AUTO: 9.7 FL (ref 7–12)
POTASSIUM SERPL-SCNC: 3.6 MMOL/L (ref 3.5–5)
PROT SERPL-MCNC: 5.9 G/DL (ref 6.4–8.3)
PROT UR STRIP-MCNC: NEGATIVE MG/DL
RBC # BLD AUTO: 3.57 M/UL (ref 3.5–5.5)
RBC #/AREA URNS HPF: ABNORMAL /HPF
SODIUM SERPL-SCNC: 144 MMOL/L (ref 132–146)
SP GR UR STRIP: <1.005 (ref 1–1.03)
UROBILINOGEN UR STRIP-ACNC: 0.2 EU/DL (ref 0–1)
WBC #/AREA URNS HPF: ABNORMAL /HPF
WBC OTHER # BLD: 8.7 K/UL (ref 4.5–11.5)

## 2023-09-02 PROCEDURE — 6360000002 HC RX W HCPCS

## 2023-09-02 PROCEDURE — G0378 HOSPITAL OBSERVATION PER HR: HCPCS

## 2023-09-02 PROCEDURE — 6370000000 HC RX 637 (ALT 250 FOR IP)

## 2023-09-02 PROCEDURE — 85025 COMPLETE CBC W/AUTO DIFF WBC: CPT

## 2023-09-02 PROCEDURE — 80053 COMPREHEN METABOLIC PANEL: CPT

## 2023-09-02 PROCEDURE — 87086 URINE CULTURE/COLONY COUNT: CPT

## 2023-09-02 PROCEDURE — 36415 COLL VENOUS BLD VENIPUNCTURE: CPT

## 2023-09-02 PROCEDURE — 81001 URINALYSIS AUTO W/SCOPE: CPT

## 2023-09-02 PROCEDURE — 94640 AIRWAY INHALATION TREATMENT: CPT

## 2023-09-02 PROCEDURE — 2580000003 HC RX 258

## 2023-09-02 PROCEDURE — 94660 CPAP INITIATION&MGMT: CPT

## 2023-09-02 PROCEDURE — 96372 THER/PROPH/DIAG INJ SC/IM: CPT

## 2023-09-02 PROCEDURE — 99232 SBSQ HOSP IP/OBS MODERATE 35: CPT | Performed by: FAMILY MEDICINE

## 2023-09-02 PROCEDURE — 2700000000 HC OXYGEN THERAPY PER DAY

## 2023-09-02 RX ORDER — PREDNISONE 20 MG/1
40 TABLET ORAL
Qty: 6 TABLET | Refills: 0 | Status: SHIPPED | OUTPATIENT
Start: 2023-09-03 | End: 2023-09-03 | Stop reason: SDUPTHER

## 2023-09-02 RX ADMIN — ARFORMOTEROL TARTRATE 15 MCG: 15 SOLUTION RESPIRATORY (INHALATION) at 20:37

## 2023-09-02 RX ADMIN — PREDNISONE 40 MG: 20 TABLET ORAL at 12:18

## 2023-09-02 RX ADMIN — FUROSEMIDE 20 MG: 20 TABLET ORAL at 08:17

## 2023-09-02 RX ADMIN — BUDESONIDE 500 MCG: 0.5 SUSPENSION RESPIRATORY (INHALATION) at 20:37

## 2023-09-02 RX ADMIN — FLUTICASONE PROPIONATE 2 SPRAY: 50 SPRAY, METERED NASAL at 08:17

## 2023-09-02 RX ADMIN — IPRATROPIUM BROMIDE AND ALBUTEROL SULFATE 1 DOSE: .5; 2.5 SOLUTION RESPIRATORY (INHALATION) at 09:01

## 2023-09-02 RX ADMIN — Medication 2000 UNITS: at 08:17

## 2023-09-02 RX ADMIN — ARFORMOTEROL TARTRATE 15 MCG: 15 SOLUTION RESPIRATORY (INHALATION) at 09:02

## 2023-09-02 RX ADMIN — POTASSIUM CHLORIDE 20 MEQ: 1500 TABLET, EXTENDED RELEASE ORAL at 08:17

## 2023-09-02 RX ADMIN — ROFLUMILAST 500 MCG: 500 TABLET ORAL at 08:17

## 2023-09-02 RX ADMIN — SACUBITRIL AND VALSARTAN 1 TABLET: 24; 26 TABLET, FILM COATED ORAL at 19:39

## 2023-09-02 RX ADMIN — ALPRAZOLAM 0.25 MG: 0.25 TABLET ORAL at 08:17

## 2023-09-02 RX ADMIN — IPRATROPIUM BROMIDE AND ALBUTEROL SULFATE 1 DOSE: .5; 2.5 SOLUTION RESPIRATORY (INHALATION) at 20:38

## 2023-09-02 RX ADMIN — SACUBITRIL AND VALSARTAN 1 TABLET: 24; 26 TABLET, FILM COATED ORAL at 08:17

## 2023-09-02 RX ADMIN — FOLIC ACID 1000 MCG: 1 TABLET ORAL at 08:17

## 2023-09-02 RX ADMIN — ATORVASTATIN CALCIUM 40 MG: 40 TABLET, FILM COATED ORAL at 19:39

## 2023-09-02 RX ADMIN — PANTOPRAZOLE SODIUM 40 MG: 40 TABLET, DELAYED RELEASE ORAL at 19:39

## 2023-09-02 RX ADMIN — Medication 10 ML: at 19:39

## 2023-09-02 RX ADMIN — BUDESONIDE 500 MCG: 0.5 SUSPENSION RESPIRATORY (INHALATION) at 09:03

## 2023-09-02 RX ADMIN — ENOXAPARIN SODIUM 40 MG: 100 INJECTION SUBCUTANEOUS at 08:16

## 2023-09-02 RX ADMIN — ASPIRIN 81 MG 81 MG: 81 TABLET ORAL at 08:18

## 2023-09-02 RX ADMIN — IPRATROPIUM BROMIDE AND ALBUTEROL SULFATE 1 DOSE: .5; 2.5 SOLUTION RESPIRATORY (INHALATION) at 12:58

## 2023-09-02 RX ADMIN — ALPRAZOLAM 0.25 MG: 0.25 TABLET ORAL at 19:39

## 2023-09-02 RX ADMIN — Medication 10 ML: at 08:18

## 2023-09-02 RX ADMIN — IPRATROPIUM BROMIDE AND ALBUTEROL SULFATE 1 DOSE: .5; 2.5 SOLUTION RESPIRATORY (INHALATION) at 17:00

## 2023-09-02 NOTE — DISCHARGE INSTRUCTIONS
=====================================  [facility], [address]  =====================================    Take your medications as directed in this summary    Future scheduled appointments are listed below or recommended appointments are mentioned above. Future Appointments   Date Time Provider 4600  [facility]   9/12/2023 10:00 AM MD Allan Vincent [facility]   10/26/2023 11:20 AM MD Allan Reyes [facility]   11/2/2023 11:30 [facility]   11/2/2023  1:15 PM [facility] ROOM 2 [facility]       Call to confirm or schedule your appointment with Dr. Farooq Marie,  as soon as possible and/or if there are any appointment changes or other issues. It is important that you follow up with Dr. Farooq Marie for better monitoring of the reason of your hospitalization. Follow up with the specialists that saw you during your stay as well. If you have any questions call your PCP at [phone]. Once discharged from [address] [place] [address], you can :     Return to work : Yes, you may return to work  Activity : As tolerated  Stairs : As tolerated  Exercise : As tolerated  Lifting : As tolerated   Sexual activity : Yes  Driving : With seat belt on. NO driving on narcotic pain medication if prescribed   Medications : Always take your medications as prescribed  Wound Care: none needed  Diet : You are asked to make an attempt to improve diet and exercise patterns to aid in medical management of your medical condition/problem. Call [facility] with any further questions. Return to Emergency Department with any worsening of your condition and/or fever greater than 101 degrees, new weakness, shortness of breath or chest pain.

## 2023-09-03 VITALS
WEIGHT: 117 LBS | HEIGHT: 62 IN | RESPIRATION RATE: 18 BRPM | BODY MASS INDEX: 21.53 KG/M2 | DIASTOLIC BLOOD PRESSURE: 113 MMHG | OXYGEN SATURATION: 98 % | HEART RATE: 100 BPM | TEMPERATURE: 98.2 F | SYSTOLIC BLOOD PRESSURE: 138 MMHG

## 2023-09-03 LAB
ALBUMIN SERPL-MCNC: 3.4 G/DL (ref 3.5–5.2)
ALP SERPL-CCNC: 70 U/L (ref 35–104)
ALT SERPL-CCNC: 16 U/L (ref 0–32)
ANION GAP SERPL CALCULATED.3IONS-SCNC: 9 MMOL/L (ref 7–16)
AST SERPL-CCNC: 21 U/L (ref 0–31)
BASOPHILS # BLD: 0.01 K/UL (ref 0–0.2)
BASOPHILS NFR BLD: 0 % (ref 0–2)
BILIRUB SERPL-MCNC: 0.2 MG/DL (ref 0–1.2)
BUN SERPL-MCNC: 18 MG/DL (ref 6–23)
CALCIUM SERPL-MCNC: 9.7 MG/DL (ref 8.6–10.2)
CHLORIDE SERPL-SCNC: 98 MMOL/L (ref 98–107)
CO2 SERPL-SCNC: 38 MMOL/L (ref 22–29)
CREAT SERPL-MCNC: 0.4 MG/DL (ref 0.5–1)
EKG ATRIAL RATE: 116 BPM
EKG P AXIS: 80 DEGREES
EKG P-R INTERVAL: 140 MS
EKG Q-T INTERVAL: 308 MS
EKG QRS DURATION: 68 MS
EKG QTC CALCULATION (BAZETT): 428 MS
EKG R AXIS: 74 DEGREES
EKG T AXIS: 74 DEGREES
EKG VENTRICULAR RATE: 116 BPM
EOSINOPHIL # BLD: 0 K/UL (ref 0.05–0.5)
EOSINOPHILS RELATIVE PERCENT: 0 % (ref 0–6)
ERYTHROCYTE [DISTWIDTH] IN BLOOD BY AUTOMATED COUNT: 12.6 % (ref 11.5–15)
GFR SERPL CREATININE-BSD FRML MDRD: >60 ML/MIN/1.73M2
GLUCOSE SERPL-MCNC: 99 MG/DL (ref 74–99)
HCT VFR BLD AUTO: 35.8 % (ref 34–48)
HGB BLD-MCNC: 11.5 G/DL (ref 11.5–15.5)
IMM GRANULOCYTES # BLD AUTO: 0.04 K/UL (ref 0–0.58)
IMM GRANULOCYTES NFR BLD: 0 % (ref 0–5)
LYMPHOCYTES NFR BLD: 1.56 K/UL (ref 1.5–4)
LYMPHOCYTES RELATIVE PERCENT: 16 % (ref 20–42)
MAGNESIUM SERPL-MCNC: 2.1 MG/DL (ref 1.6–2.6)
MCH RBC QN AUTO: 31.3 PG (ref 26–35)
MCHC RBC AUTO-ENTMCNC: 32.1 G/DL (ref 32–34.5)
MCV RBC AUTO: 97.3 FL (ref 80–99.9)
MICROORGANISM SPEC CULT: ABNORMAL
MONOCYTES NFR BLD: 0.8 K/UL (ref 0.1–0.95)
MONOCYTES NFR BLD: 8 % (ref 2–12)
NEUTROPHILS NFR BLD: 76 % (ref 43–80)
NEUTS SEG NFR BLD: 7.51 K/UL (ref 1.8–7.3)
PLATELET # BLD AUTO: 270 K/UL (ref 130–450)
PMV BLD AUTO: 9.4 FL (ref 7–12)
POTASSIUM SERPL-SCNC: 3.3 MMOL/L (ref 3.5–5)
PROT SERPL-MCNC: 5.7 G/DL (ref 6.4–8.3)
RBC # BLD AUTO: 3.68 M/UL (ref 3.5–5.5)
SODIUM SERPL-SCNC: 145 MMOL/L (ref 132–146)
SPECIMEN DESCRIPTION: ABNORMAL
WBC OTHER # BLD: 9.9 K/UL (ref 4.5–11.5)

## 2023-09-03 PROCEDURE — 94640 AIRWAY INHALATION TREATMENT: CPT

## 2023-09-03 PROCEDURE — 96372 THER/PROPH/DIAG INJ SC/IM: CPT

## 2023-09-03 PROCEDURE — 6370000000 HC RX 637 (ALT 250 FOR IP)

## 2023-09-03 PROCEDURE — 6360000002 HC RX W HCPCS

## 2023-09-03 PROCEDURE — 93010 ELECTROCARDIOGRAM REPORT: CPT | Performed by: INTERNAL MEDICINE

## 2023-09-03 PROCEDURE — 36415 COLL VENOUS BLD VENIPUNCTURE: CPT

## 2023-09-03 PROCEDURE — G0378 HOSPITAL OBSERVATION PER HR: HCPCS

## 2023-09-03 PROCEDURE — 85025 COMPLETE CBC W/AUTO DIFF WBC: CPT

## 2023-09-03 PROCEDURE — 94660 CPAP INITIATION&MGMT: CPT

## 2023-09-03 PROCEDURE — 99239 HOSP IP/OBS DSCHRG MGMT >30: CPT | Performed by: FAMILY MEDICINE

## 2023-09-03 PROCEDURE — 83735 ASSAY OF MAGNESIUM: CPT

## 2023-09-03 PROCEDURE — 80053 COMPREHEN METABOLIC PANEL: CPT

## 2023-09-03 PROCEDURE — 2700000000 HC OXYGEN THERAPY PER DAY

## 2023-09-03 RX ORDER — CEFDINIR 300 MG/1
300 CAPSULE ORAL EVERY 12 HOURS SCHEDULED
Status: DISCONTINUED | OUTPATIENT
Start: 2023-09-03 | End: 2023-09-03 | Stop reason: HOSPADM

## 2023-09-03 RX ORDER — CEFDINIR 300 MG/1
300 CAPSULE ORAL EVERY 12 HOURS SCHEDULED
Qty: 10 CAPSULE | Refills: 0 | Status: SHIPPED | OUTPATIENT
Start: 2023-09-03 | End: 2023-09-03 | Stop reason: SDUPTHER

## 2023-09-03 RX ORDER — CEFDINIR 300 MG/1
300 CAPSULE ORAL EVERY 12 HOURS SCHEDULED
Qty: 10 CAPSULE | Refills: 0 | Status: SHIPPED | OUTPATIENT
Start: 2023-09-03 | End: 2023-09-08

## 2023-09-03 RX ORDER — PREDNISONE 20 MG/1
40 TABLET ORAL
Qty: 6 TABLET | Refills: 0 | Status: SHIPPED | OUTPATIENT
Start: 2023-09-03 | End: 2023-09-06

## 2023-09-03 RX ADMIN — FLUTICASONE PROPIONATE 2 SPRAY: 50 SPRAY, METERED NASAL at 09:44

## 2023-09-03 RX ADMIN — PREDNISONE 40 MG: 20 TABLET ORAL at 12:40

## 2023-09-03 RX ADMIN — ROFLUMILAST 500 MCG: 500 TABLET ORAL at 09:43

## 2023-09-03 RX ADMIN — ARFORMOTEROL TARTRATE 15 MCG: 15 SOLUTION RESPIRATORY (INHALATION) at 09:53

## 2023-09-03 RX ADMIN — IPRATROPIUM BROMIDE AND ALBUTEROL SULFATE 1 DOSE: .5; 2.5 SOLUTION RESPIRATORY (INHALATION) at 12:31

## 2023-09-03 RX ADMIN — FOLIC ACID 1000 MCG: 1 TABLET ORAL at 09:43

## 2023-09-03 RX ADMIN — ENOXAPARIN SODIUM 40 MG: 100 INJECTION SUBCUTANEOUS at 09:49

## 2023-09-03 RX ADMIN — POTASSIUM CHLORIDE 20 MEQ: 1500 TABLET, EXTENDED RELEASE ORAL at 09:43

## 2023-09-03 RX ADMIN — ALPRAZOLAM 0.25 MG: 0.25 TABLET ORAL at 09:43

## 2023-09-03 RX ADMIN — CEFDINIR 300 MG: 300 CAPSULE ORAL at 12:40

## 2023-09-03 RX ADMIN — ASPIRIN 81 MG 81 MG: 81 TABLET ORAL at 09:43

## 2023-09-03 RX ADMIN — SACUBITRIL AND VALSARTAN 1 TABLET: 24; 26 TABLET, FILM COATED ORAL at 09:43

## 2023-09-03 RX ADMIN — IPRATROPIUM BROMIDE AND ALBUTEROL SULFATE 1 DOSE: .5; 2.5 SOLUTION RESPIRATORY (INHALATION) at 09:53

## 2023-09-03 RX ADMIN — FUROSEMIDE 20 MG: 20 TABLET ORAL at 09:43

## 2023-09-03 RX ADMIN — BUDESONIDE 500 MCG: 0.5 SUSPENSION RESPIRATORY (INHALATION) at 09:53

## 2023-09-03 RX ADMIN — Medication 2000 UNITS: at 09:43

## 2023-09-03 NOTE — DISCHARGE SUMMARY
rate and regular rhythm. Pulmonary:      Effort: Pulmonary effort is normal.      Breath sounds: No wheezing or rales. Comments: Diminished breath sounds  On 4L NC  Abdominal:      General: There is no distension. Palpations: Abdomen is soft. Tenderness: There is no abdominal tenderness. There is no guarding. Musculoskeletal:      Right lower leg: No edema. Left lower leg: No edema. Neurological:      General: No focal deficit present. Mental Status: She is alert and oriented to person, place, and time. Psychiatric:         Mood and Affect: Mood normal.         Behavior: Behavior normal.     Disposition:   home    Future Appointments   Date Time Provider [address]   9/12/2023 10:00 AM MD Tom Hodge [facility]   10/26/2023 11:20 AM MD Tom Israel [facility]   11/2/2023 11:30 AM [facility] - C[facility]   11/2/2023  1:15 PM [facility] ROOM 2 [facility]       More than 30 minutes was spent in preparation of this patient's discharge including, but not limited to, examination, preparation of documents, prescription preparation, counseling and coordination.     SignedJenny Freud Jacque Councilman, MD  9/3/2023, 12:12 PM

## 2023-09-03 NOTE — PLAN OF CARE
Problem: Chronic Conditions and Co-morbidities  Goal: Patient's chronic conditions and co-morbidity symptoms are monitored and maintained or improved  Outcome: Adequate for Discharge     Problem: Skin/Tissue Integrity  Goal: Absence of new skin breakdown  Description: 1. Monitor for areas of redness and/or skin breakdown  2. Assess vascular access sites hourly  3. Every 4-6 hours minimum:  Change oxygen saturation probe site  4. Every 4-6 hours:  If on nasal continuous positive airway pressure, respiratory therapy assess nares and determine need for appliance change or resting period.   Outcome: Adequate for Discharge     Problem: ABCDS Injury Assessment  Goal: Absence of physical injury  Outcome: Adequate for Discharge     Problem: Safety - Adult  Goal: Free from fall injury  Outcome: Adequate for Discharge

## 2023-09-05 ENCOUNTER — TELEPHONE (OUTPATIENT)
Dept: PALLATIVE CARE | Age: 63
End: 2023-09-05

## 2023-09-05 NOTE — TELEPHONE ENCOUNTER
Called and spoke with Nick Noland regarding referral for Palliative Care. Explained Palliative service and location of clinic. Nir set at [facility] as this is closer location for patient on 9/27/23. Support provided through active listening.

## 2023-09-10 ENCOUNTER — APPOINTMENT (OUTPATIENT)
Dept: CT IMAGING | Age: 63
DRG: 659 | End: 2023-09-10
Payer: COMMERCIAL

## 2023-09-10 LAB
ALBUMIN SERPL-MCNC: 3.8 G/DL (ref 3.5–5.2)
ALP SERPL-CCNC: 80 U/L (ref 35–104)
ALT SERPL-CCNC: 27 U/L (ref 0–32)
ANION GAP SERPL CALCULATED.3IONS-SCNC: 5 MMOL/L (ref 7–16)
AST SERPL-CCNC: 19 U/L (ref 0–31)
BILIRUB SERPL-MCNC: 0.3 MG/DL (ref 0–1.2)
BUN SERPL-MCNC: 11 MG/DL (ref 6–23)
CALCIUM SERPL-MCNC: 9.1 MG/DL (ref 8.6–10.2)
CHLORIDE SERPL-SCNC: 96 MMOL/L (ref 98–107)
CO2 SERPL-SCNC: 42 MMOL/L (ref 22–29)
CREAT SERPL-MCNC: 0.5 MG/DL (ref 0.5–1)
GFR SERPL CREATININE-BSD FRML MDRD: >60 ML/MIN/1.73M2
GLUCOSE SERPL-MCNC: 135 MG/DL (ref 74–99)
LACTATE BLDV-SCNC: 2.9 MMOL/L (ref 0.5–2.2)
LIPASE SERPL-CCNC: 15 U/L (ref 13–60)
POTASSIUM SERPL-SCNC: 4 MMOL/L (ref 3.5–5)
PROT SERPL-MCNC: 6.3 G/DL (ref 6.4–8.3)
SODIUM SERPL-SCNC: 143 MMOL/L (ref 132–146)

## 2023-09-10 PROCEDURE — 96361 HYDRATE IV INFUSION ADD-ON: CPT

## 2023-09-10 PROCEDURE — 83605 ASSAY OF LACTIC ACID: CPT

## 2023-09-10 PROCEDURE — 74177 CT ABD & PELVIS W/CONTRAST: CPT

## 2023-09-10 PROCEDURE — 85025 COMPLETE CBC W/AUTO DIFF WBC: CPT

## 2023-09-10 PROCEDURE — 87077 CULTURE AEROBIC IDENTIFY: CPT

## 2023-09-10 PROCEDURE — 83690 ASSAY OF LIPASE: CPT

## 2023-09-10 PROCEDURE — 80053 COMPREHEN METABOLIC PANEL: CPT

## 2023-09-10 PROCEDURE — 81015 MICROSCOPIC EXAM OF URINE: CPT

## 2023-09-10 PROCEDURE — 93005 ELECTROCARDIOGRAM TRACING: CPT | Performed by: EMERGENCY MEDICINE

## 2023-09-10 PROCEDURE — 99285 EMERGENCY DEPT VISIT HI MDM: CPT

## 2023-09-10 PROCEDURE — 2580000003 HC RX 258: Performed by: EMERGENCY MEDICINE

## 2023-09-10 RX ORDER — 0.9 % SODIUM CHLORIDE 0.9 %
1000 INTRAVENOUS SOLUTION INTRAVENOUS ONCE
Status: COMPLETED | OUTPATIENT
Start: 2023-09-10 | End: 2023-09-10

## 2023-09-10 RX ADMIN — SODIUM CHLORIDE 1000 ML: 9 INJECTION, SOLUTION INTRAVENOUS at 22:45

## 2023-09-10 ASSESSMENT — PAIN DESCRIPTION - LOCATION: LOCATION: ABDOMEN

## 2023-09-10 ASSESSMENT — PAIN - FUNCTIONAL ASSESSMENT: PAIN_FUNCTIONAL_ASSESSMENT: 0-10

## 2023-09-10 ASSESSMENT — PAIN SCALES - GENERAL: PAINLEVEL_OUTOF10: 10

## 2023-09-10 ASSESSMENT — PAIN DESCRIPTION - ORIENTATION: ORIENTATION: RIGHT

## 2023-09-11 ENCOUNTER — APPOINTMENT (OUTPATIENT)
Dept: GENERAL RADIOLOGY | Age: 63
DRG: 659 | End: 2023-09-11
Payer: COMMERCIAL

## 2023-09-11 ENCOUNTER — HOSPITAL ENCOUNTER (INPATIENT)
Age: 63
LOS: 8 days | Discharge: HOME | DRG: 659 | End: 2023-09-19
Attending: EMERGENCY MEDICINE | Admitting: FAMILY MEDICINE
Payer: COMMERCIAL

## 2023-09-11 DIAGNOSIS — N20.0 KIDNEY STONE: ICD-10-CM

## 2023-09-11 DIAGNOSIS — R94.39 ABNORMAL STRESS TEST: ICD-10-CM

## 2023-09-11 DIAGNOSIS — J96.02 ACUTE RESPIRATORY FAILURE WITH HYPERCAPNIA (HCC): ICD-10-CM

## 2023-09-11 DIAGNOSIS — R10.31 RIGHT LOWER QUADRANT ABDOMINAL PAIN: Primary | ICD-10-CM

## 2023-09-11 DIAGNOSIS — N20.0 KIDNEY STONES: ICD-10-CM

## 2023-09-11 DIAGNOSIS — I50.22 CHRONIC SYSTOLIC CONGESTIVE HEART FAILURE (HCC): ICD-10-CM

## 2023-09-11 PROBLEM — J96.92 RESPIRATORY FAILURE WITH HYPERCAPNIA (HCC): Status: ACTIVE | Noted: 2023-09-11

## 2023-09-11 LAB
AADO2: ABNORMAL MMHG
AADO2: ABNORMAL MMHG
B PARAP IS1001 DNA NPH QL NAA+NON-PROBE: NOT DETECTED
B PERT DNA SPEC QL NAA+PROBE: NOT DETECTED
B.E.: 6.6 MMOL/L (ref -3–3)
B.E.: 7.3 MMOL/L (ref -3–3)
BACTERIA URNS QL MICRO: ABNORMAL
BASOPHILS # BLD: 0.03 K/UL (ref 0–0.2)
BASOPHILS NFR BLD: 0 % (ref 0–2)
BILIRUB UR QL STRIP: NEGATIVE
BNP SERPL-MCNC: 199 PG/ML (ref 0–125)
C PNEUM DNA NPH QL NAA+NON-PROBE: NOT DETECTED
CASTS #/AREA URNS LPF: ABNORMAL /LPF
CHOLEST SERPL-MCNC: 125 MG/DL
CLARITY UR: ABNORMAL
COHB: 0.3 % (ref 0–1.5)
COHB: 0.3 % (ref 0–1.5)
COLOR UR: YELLOW
CRITICAL: ABNORMAL
CRITICAL: ABNORMAL
CRP SERPL HS-MCNC: 18 MG/L (ref 0–5)
DATE ANALYZED: ABNORMAL
DATE ANALYZED: ABNORMAL
DATE OF COLLECTION: ABNORMAL
DATE OF COLLECTION: ABNORMAL
EOSINOPHIL # BLD: 0.15 K/UL (ref 0.05–0.5)
EOSINOPHILS RELATIVE PERCENT: 1 % (ref 0–6)
ERYTHROCYTE [DISTWIDTH] IN BLOOD BY AUTOMATED COUNT: 12.5 % (ref 11.5–15)
ERYTHROCYTE [SEDIMENTATION RATE] IN BLOOD BY WESTERGREN METHOD: 17 MM/HR (ref 0–20)
FIO2: 40 %
FIO2: 40 %
FLUAV RNA NPH QL NAA+NON-PROBE: NOT DETECTED
FLUBV RNA NPH QL NAA+NON-PROBE: NOT DETECTED
GLUCOSE UR STRIP-MCNC: NEGATIVE MG/DL
HADV DNA NPH QL NAA+NON-PROBE: NOT DETECTED
HCO3: 35.1 MMOL/L (ref 22–26)
HCO3: 36.4 MMOL/L (ref 22–26)
HCOV 229E RNA NPH QL NAA+NON-PROBE: NOT DETECTED
HCOV HKU1 RNA NPH QL NAA+NON-PROBE: NOT DETECTED
HCOV NL63 RNA NPH QL NAA+NON-PROBE: NOT DETECTED
HCOV OC43 RNA NPH QL NAA+NON-PROBE: NOT DETECTED
HCT VFR BLD AUTO: 30 % (ref 37–54)
HCT VFR BLD AUTO: 40.8 % (ref 34–48)
HDLC SERPL-MCNC: 52 MG/DL
HGB BLD-MCNC: 12.6 G/DL (ref 11.5–15.5)
HGB UR QL STRIP.AUTO: ABNORMAL
HHB: 0.7 % (ref 0–5)
HHB: 0.8 % (ref 0–5)
HMPV RNA NPH QL NAA+NON-PROBE: NOT DETECTED
HPIV1 RNA NPH QL NAA+NON-PROBE: NOT DETECTED
HPIV2 RNA NPH QL NAA+NON-PROBE: NOT DETECTED
HPIV3 RNA NPH QL NAA+NON-PROBE: NOT DETECTED
HPIV4 RNA NPH QL NAA+NON-PROBE: NOT DETECTED
IMM GRANULOCYTES # BLD AUTO: 0.1 K/UL (ref 0–0.58)
IMM GRANULOCYTES NFR BLD: 1 % (ref 0–5)
KETONES UR STRIP-MCNC: NEGATIVE MG/DL
LAB: ABNORMAL
LAB: ABNORMAL
LACTATE BLDV-SCNC: 1.3 MMOL/L (ref 0.5–1.9)
LACTATE BLDV-SCNC: 1.7 MMOL/L (ref 0.5–1.9)
LDLC SERPL CALC-MCNC: 58 MG/DL
LEUKOCYTE ESTERASE UR QL STRIP: ABNORMAL
LYMPHOCYTES NFR BLD: 2.07 K/UL (ref 1.5–4)
LYMPHOCYTES RELATIVE PERCENT: 11 % (ref 20–42)
Lab: ABNORMAL
Lab: ABNORMAL
M PNEUMO DNA NPH QL NAA+NON-PROBE: NOT DETECTED
MCH RBC QN AUTO: 31 PG (ref 26–35)
MCHC RBC AUTO-ENTMCNC: 30.9 G/DL (ref 32–34.5)
MCV RBC AUTO: 100.5 FL (ref 80–99.9)
METHB: 0.4 % (ref 0–1.5)
METHB: 0.5 % (ref 0–1.5)
MODE: ABNORMAL
MODE: ABNORMAL
MONOCYTES NFR BLD: 1.8 K/UL (ref 0.1–0.95)
MONOCYTES NFR BLD: 9 % (ref 2–12)
NEUTROPHILS NFR BLD: 79 % (ref 43–80)
NEUTS SEG NFR BLD: 15.33 K/UL (ref 1.8–7.3)
NITRITE UR QL STRIP: NEGATIVE
O2 CONTENT: 16.3 ML/DL
O2 CONTENT: 18.6 ML/DL
O2 SATURATION: 99.2 % (ref 92–98.5)
O2 SATURATION: 99.3 % (ref 92–98.5)
O2HB: 98.5 % (ref 94–97)
O2HB: 98.5 % (ref 94–97)
OPERATOR ID: ABNORMAL
OPERATOR ID: ABNORMAL
PATIENT TEMP: 37 C
PATIENT TEMP: 37 C
PCO2: 67.4 MMHG (ref 35–45)
PCO2: 81.5 MMHG (ref 35–45)
PEEP/CPAP: 6 CMH2O
PEEP/CPAP: 6 CMH2O
PFO2: 5.55 MMHG/%
PFO2: ABNORMAL MMHG/%
PH BLOOD GAS: 7.27 (ref 7.35–7.45)
PH BLOOD GAS: 7.33 (ref 7.35–7.45)
PH UR STRIP: 5.5 [PH] (ref 5–9)
PIP: 14 CMH2O
PIP: 15 CMH2O
PLATELET CONFIRMATION: NORMAL
PLATELET, FLUORESCENCE: 282 K/UL (ref 130–450)
PMV BLD AUTO: 9.9 FL (ref 7–12)
PO2: 222.2 MMHG (ref 75–100)
PO2: 297 MMHG (ref 75–100)
POC HCO3: 35.1 MMOL/L (ref 22–26)
POC HEMOGLOBIN (CALC): 10 G/DL (ref 12.5–15.5)
POC O2 SATURATION: 98.9 % (ref 92–98.5)
POC PCO2: 51.2 MM HG (ref 35–45)
POC PH: 7.44 (ref 7.35–7.45)
POC PO2: 124.9 MM HG (ref 60–80)
POSITIVE BASE EXCESS, ART: 9.7 MMOL/L (ref 0–3)
PROCALCITONIN SERPL-MCNC: 0.22 NG/ML (ref 0–0.08)
PROT UR STRIP-MCNC: 30 MG/DL
RBC # BLD AUTO: 4.06 M/UL (ref 3.5–5.5)
RBC # BLD: ABNORMAL 10*6/UL
RBC #/AREA URNS HPF: ABNORMAL /HPF
RSV RNA NPH QL NAA+NON-PROBE: NOT DETECTED
RV+EV RNA NPH QL NAA+NON-PROBE: NOT DETECTED
SARS-COV-2 RNA NPH QL NAA+NON-PROBE: NOT DETECTED
SOURCE, BLOOD GAS: ABNORMAL
SOURCE, BLOOD GAS: ABNORMAL
SP GR UR STRIP: >1.03 (ref 1–1.03)
SPECIMEN DESCRIPTION: NORMAL
THB: 11.2 G/DL (ref 11.5–16.5)
THB: 13.1 G/DL (ref 11.5–16.5)
TIME ANALYZED: 133
TIME ANALYZED: 422
TRIGL SERPL-MCNC: 73 MG/DL
UROBILINOGEN UR STRIP-ACNC: 0.2 EU/DL (ref 0–1)
VLDLC SERPL CALC-MCNC: 15 MG/DL
WBC #/AREA URNS HPF: ABNORMAL /HPF
WBC OTHER # BLD: 19.5 K/UL (ref 4.5–11.5)

## 2023-09-11 PROCEDURE — 6360000004 HC RX CONTRAST MEDICATION: Performed by: RADIOLOGY

## 2023-09-11 PROCEDURE — 80061 LIPID PANEL: CPT

## 2023-09-11 PROCEDURE — 2580000003 HC RX 258

## 2023-09-11 PROCEDURE — 6360000002 HC RX W HCPCS

## 2023-09-11 PROCEDURE — 0202U NFCT DS 22 TRGT SARS-COV-2: CPT

## 2023-09-11 PROCEDURE — 96374 THER/PROPH/DIAG INJ IV PUSH: CPT

## 2023-09-11 PROCEDURE — 6370000000 HC RX 637 (ALT 250 FOR IP)

## 2023-09-11 PROCEDURE — 87086 URINE CULTURE/COLONY COUNT: CPT

## 2023-09-11 PROCEDURE — 86140 C-REACTIVE PROTEIN: CPT

## 2023-09-11 PROCEDURE — 99222 1ST HOSP IP/OBS MODERATE 55: CPT | Performed by: SURGERY

## 2023-09-11 PROCEDURE — 6360000002 HC RX W HCPCS: Performed by: EMERGENCY MEDICINE

## 2023-09-11 PROCEDURE — 83605 ASSAY OF LACTIC ACID: CPT

## 2023-09-11 PROCEDURE — 2700000000 HC OXYGEN THERAPY PER DAY

## 2023-09-11 PROCEDURE — 85652 RBC SED RATE AUTOMATED: CPT

## 2023-09-11 PROCEDURE — 99222 1ST HOSP IP/OBS MODERATE 55: CPT | Performed by: FAMILY MEDICINE

## 2023-09-11 PROCEDURE — 94660 CPAP INITIATION&MGMT: CPT

## 2023-09-11 PROCEDURE — 82803 BLOOD GASES ANY COMBINATION: CPT

## 2023-09-11 PROCEDURE — 85014 HEMATOCRIT: CPT

## 2023-09-11 PROCEDURE — 2580000003 HC RX 258: Performed by: EMERGENCY MEDICINE

## 2023-09-11 PROCEDURE — 94640 AIRWAY INHALATION TREATMENT: CPT

## 2023-09-11 PROCEDURE — 5A09357 ASSISTANCE WITH RESPIRATORY VENTILATION, LESS THAN 24 CONSECUTIVE HOURS, CONTINUOUS POSITIVE AIRWAY PRESSURE: ICD-10-PCS | Performed by: FAMILY MEDICINE

## 2023-09-11 PROCEDURE — 36600 WITHDRAWAL OF ARTERIAL BLOOD: CPT

## 2023-09-11 PROCEDURE — 96375 TX/PRO/DX INJ NEW DRUG ADDON: CPT

## 2023-09-11 PROCEDURE — 82805 BLOOD GASES W/O2 SATURATION: CPT

## 2023-09-11 PROCEDURE — 83880 ASSAY OF NATRIURETIC PEPTIDE: CPT

## 2023-09-11 PROCEDURE — 6370000000 HC RX 637 (ALT 250 FOR IP): Performed by: EMERGENCY MEDICINE

## 2023-09-11 PROCEDURE — 87040 BLOOD CULTURE FOR BACTERIA: CPT

## 2023-09-11 PROCEDURE — 2060000000 HC ICU INTERMEDIATE R&B

## 2023-09-11 PROCEDURE — 71045 X-RAY EXAM CHEST 1 VIEW: CPT

## 2023-09-11 PROCEDURE — 84145 PROCALCITONIN (PCT): CPT

## 2023-09-11 RX ORDER — ALPRAZOLAM 0.25 MG/1
0.25 TABLET ORAL 3 TIMES DAILY PRN
Status: DISCONTINUED | OUTPATIENT
Start: 2023-09-11 | End: 2023-09-19 | Stop reason: HOSPADM

## 2023-09-11 RX ORDER — POLYETHYLENE GLYCOL 3350 17 G/17G
17 POWDER, FOR SOLUTION ORAL DAILY PRN
Status: DISCONTINUED | OUTPATIENT
Start: 2023-09-11 | End: 2023-09-19 | Stop reason: HOSPADM

## 2023-09-11 RX ORDER — BUDESONIDE 0.5 MG/2ML
0.5 INHALANT ORAL
Status: DISCONTINUED | OUTPATIENT
Start: 2023-09-11 | End: 2023-09-19 | Stop reason: HOSPADM

## 2023-09-11 RX ORDER — METRONIDAZOLE 500 MG/100ML
500 INJECTION, SOLUTION INTRAVENOUS EVERY 8 HOURS
Status: DISCONTINUED | OUTPATIENT
Start: 2023-09-11 | End: 2023-09-14

## 2023-09-11 RX ORDER — PANTOPRAZOLE SODIUM 40 MG/1
40 TABLET, DELAYED RELEASE ORAL NIGHTLY
Status: DISCONTINUED | OUTPATIENT
Start: 2023-09-11 | End: 2023-09-19 | Stop reason: HOSPADM

## 2023-09-11 RX ORDER — ASPIRIN 81 MG/1
81 TABLET, CHEWABLE ORAL DAILY
Status: DISCONTINUED | OUTPATIENT
Start: 2023-09-11 | End: 2023-09-19 | Stop reason: HOSPADM

## 2023-09-11 RX ORDER — METOPROLOL SUCCINATE 25 MG/1
12.5 TABLET, EXTENDED RELEASE ORAL NIGHTLY
Status: DISCONTINUED | OUTPATIENT
Start: 2023-09-11 | End: 2023-09-19 | Stop reason: HOSPADM

## 2023-09-11 RX ORDER — ONDANSETRON 2 MG/ML
4 INJECTION INTRAMUSCULAR; INTRAVENOUS EVERY 6 HOURS PRN
Status: DISCONTINUED | OUTPATIENT
Start: 2023-09-11 | End: 2023-09-14 | Stop reason: SDUPTHER

## 2023-09-11 RX ORDER — FOLIC ACID 1 MG/1
1000 TABLET ORAL DAILY
Status: DISCONTINUED | OUTPATIENT
Start: 2023-09-11 | End: 2023-09-19 | Stop reason: HOSPADM

## 2023-09-11 RX ORDER — SODIUM CHLORIDE 9 MG/ML
INJECTION, SOLUTION INTRAVENOUS PRN
Status: DISCONTINUED | OUTPATIENT
Start: 2023-09-11 | End: 2023-09-19 | Stop reason: HOSPADM

## 2023-09-11 RX ORDER — NICOTINE 21 MG/24HR
1 PATCH, TRANSDERMAL 24 HOURS TRANSDERMAL DAILY
Status: DISCONTINUED | OUTPATIENT
Start: 2023-09-11 | End: 2023-09-19 | Stop reason: HOSPADM

## 2023-09-11 RX ORDER — ACETAMINOPHEN 325 MG/1
650 TABLET ORAL EVERY 6 HOURS PRN
Status: DISCONTINUED | OUTPATIENT
Start: 2023-09-11 | End: 2023-09-19 | Stop reason: HOSPADM

## 2023-09-11 RX ORDER — SODIUM CHLORIDE 0.9 % (FLUSH) 0.9 %
5-40 SYRINGE (ML) INJECTION EVERY 12 HOURS SCHEDULED
Status: DISCONTINUED | OUTPATIENT
Start: 2023-09-11 | End: 2023-09-19 | Stop reason: HOSPADM

## 2023-09-11 RX ORDER — ONDANSETRON 2 MG/ML
4 INJECTION INTRAMUSCULAR; INTRAVENOUS EVERY 6 HOURS PRN
Status: DISCONTINUED | OUTPATIENT
Start: 2023-09-11 | End: 2023-09-19 | Stop reason: HOSPADM

## 2023-09-11 RX ORDER — ENOXAPARIN SODIUM 100 MG/ML
40 INJECTION SUBCUTANEOUS DAILY
Status: DISCONTINUED | OUTPATIENT
Start: 2023-09-11 | End: 2023-09-19 | Stop reason: HOSPADM

## 2023-09-11 RX ORDER — ACETAMINOPHEN 650 MG/1
650 SUPPOSITORY RECTAL EVERY 6 HOURS PRN
Status: DISCONTINUED | OUTPATIENT
Start: 2023-09-11 | End: 2023-09-19 | Stop reason: HOSPADM

## 2023-09-11 RX ORDER — KETOROLAC TROMETHAMINE 30 MG/ML
15 INJECTION, SOLUTION INTRAMUSCULAR; INTRAVENOUS ONCE
Status: COMPLETED | OUTPATIENT
Start: 2023-09-11 | End: 2023-09-11

## 2023-09-11 RX ORDER — IPRATROPIUM BROMIDE AND ALBUTEROL SULFATE 2.5; .5 MG/3ML; MG/3ML
1 SOLUTION RESPIRATORY (INHALATION)
Status: COMPLETED | OUTPATIENT
Start: 2023-09-11 | End: 2023-09-11

## 2023-09-11 RX ORDER — ONDANSETRON 4 MG/1
4 TABLET, ORALLY DISINTEGRATING ORAL EVERY 8 HOURS PRN
Status: DISCONTINUED | OUTPATIENT
Start: 2023-09-11 | End: 2023-09-19 | Stop reason: HOSPADM

## 2023-09-11 RX ORDER — ARFORMOTEROL TARTRATE 15 UG/2ML
15 SOLUTION RESPIRATORY (INHALATION)
Status: DISCONTINUED | OUTPATIENT
Start: 2023-09-11 | End: 2023-09-19 | Stop reason: HOSPADM

## 2023-09-11 RX ORDER — IPRATROPIUM BROMIDE AND ALBUTEROL SULFATE 2.5; .5 MG/3ML; MG/3ML
1 SOLUTION RESPIRATORY (INHALATION)
Status: DISCONTINUED | OUTPATIENT
Start: 2023-09-11 | End: 2023-09-19 | Stop reason: HOSPADM

## 2023-09-11 RX ORDER — 0.9 % SODIUM CHLORIDE 0.9 %
500 INTRAVENOUS SOLUTION INTRAVENOUS ONCE
Status: COMPLETED | OUTPATIENT
Start: 2023-09-11 | End: 2023-09-11

## 2023-09-11 RX ORDER — FUROSEMIDE 20 MG/1
20 TABLET ORAL DAILY
Status: DISCONTINUED | OUTPATIENT
Start: 2023-09-11 | End: 2023-09-19 | Stop reason: HOSPADM

## 2023-09-11 RX ORDER — ROFLUMILAST 500 UG/1
500 TABLET ORAL DAILY
Status: DISCONTINUED | OUTPATIENT
Start: 2023-09-11 | End: 2023-09-19 | Stop reason: HOSPADM

## 2023-09-11 RX ORDER — ATORVASTATIN CALCIUM 40 MG/1
40 TABLET, FILM COATED ORAL NIGHTLY
Status: DISCONTINUED | OUTPATIENT
Start: 2023-09-11 | End: 2023-09-19 | Stop reason: HOSPADM

## 2023-09-11 RX ORDER — SODIUM CHLORIDE 0.9 % (FLUSH) 0.9 %
5-40 SYRINGE (ML) INJECTION PRN
Status: DISCONTINUED | OUTPATIENT
Start: 2023-09-11 | End: 2023-09-19 | Stop reason: HOSPADM

## 2023-09-11 RX ADMIN — SACUBITRIL AND VALSARTAN 1 TABLET: 24; 26 TABLET, FILM COATED ORAL at 11:02

## 2023-09-11 RX ADMIN — IPRATROPIUM BROMIDE AND ALBUTEROL SULFATE 1 DOSE: 2.5; .5 SOLUTION RESPIRATORY (INHALATION) at 01:02

## 2023-09-11 RX ADMIN — ENOXAPARIN SODIUM 40 MG: 100 INJECTION SUBCUTANEOUS at 11:15

## 2023-09-11 RX ADMIN — ASPIRIN 81 MG: 81 TABLET, CHEWABLE ORAL at 11:02

## 2023-09-11 RX ADMIN — WATER 2000 MG: 1 INJECTION INTRAMUSCULAR; INTRAVENOUS; SUBCUTANEOUS at 23:35

## 2023-09-11 RX ADMIN — WATER 1000 MG: 1 INJECTION INTRAMUSCULAR; INTRAVENOUS; SUBCUTANEOUS at 01:40

## 2023-09-11 RX ADMIN — FUROSEMIDE 20 MG: 20 TABLET ORAL at 11:02

## 2023-09-11 RX ADMIN — ATORVASTATIN CALCIUM 40 MG: 40 TABLET, FILM COATED ORAL at 20:54

## 2023-09-11 RX ADMIN — IPRATROPIUM BROMIDE AND ALBUTEROL SULFATE 1 DOSE: .5; 2.5 SOLUTION RESPIRATORY (INHALATION) at 16:07

## 2023-09-11 RX ADMIN — PANTOPRAZOLE SODIUM 40 MG: 40 TABLET, DELAYED RELEASE ORAL at 20:54

## 2023-09-11 RX ADMIN — METRONIDAZOLE 500 MG: 500 INJECTION, SOLUTION INTRAVENOUS at 11:14

## 2023-09-11 RX ADMIN — IPRATROPIUM BROMIDE AND ALBUTEROL SULFATE 1 DOSE: 2.5; .5 SOLUTION RESPIRATORY (INHALATION) at 01:04

## 2023-09-11 RX ADMIN — IPRATROPIUM BROMIDE AND ALBUTEROL SULFATE 1 DOSE: 2.5; .5 SOLUTION RESPIRATORY (INHALATION) at 01:05

## 2023-09-11 RX ADMIN — BUDESONIDE INHALATION 500 MCG: 0.5 SUSPENSION RESPIRATORY (INHALATION) at 09:00

## 2023-09-11 RX ADMIN — BUDESONIDE INHALATION 500 MCG: 0.5 SUSPENSION RESPIRATORY (INHALATION) at 21:45

## 2023-09-11 RX ADMIN — ONDANSETRON 4 MG: 2 INJECTION INTRAMUSCULAR; INTRAVENOUS at 01:42

## 2023-09-11 RX ADMIN — Medication 10 ML: at 21:43

## 2023-09-11 RX ADMIN — IPRATROPIUM BROMIDE AND ALBUTEROL SULFATE 1 DOSE: .5; 2.5 SOLUTION RESPIRATORY (INHALATION) at 08:59

## 2023-09-11 RX ADMIN — SACUBITRIL AND VALSARTAN 1 TABLET: 24; 26 TABLET, FILM COATED ORAL at 20:53

## 2023-09-11 RX ADMIN — ALPRAZOLAM 0.25 MG: 0.25 TABLET ORAL at 20:53

## 2023-09-11 RX ADMIN — ARFORMOTEROL TARTRATE 15 MCG: 15 SOLUTION RESPIRATORY (INHALATION) at 21:45

## 2023-09-11 RX ADMIN — Medication 10 ML: at 11:14

## 2023-09-11 RX ADMIN — SODIUM CHLORIDE 500 ML: 9 INJECTION, SOLUTION INTRAVENOUS at 16:33

## 2023-09-11 RX ADMIN — IPRATROPIUM BROMIDE AND ALBUTEROL SULFATE 1 DOSE: .5; 2.5 SOLUTION RESPIRATORY (INHALATION) at 21:45

## 2023-09-11 RX ADMIN — KETOROLAC TROMETHAMINE 15 MG: 30 INJECTION, SOLUTION INTRAMUSCULAR; INTRAVENOUS at 01:42

## 2023-09-11 RX ADMIN — ARFORMOTEROL TARTRATE 15 MCG: 15 SOLUTION RESPIRATORY (INHALATION) at 09:00

## 2023-09-11 RX ADMIN — IPRATROPIUM BROMIDE AND ALBUTEROL SULFATE 1 DOSE: .5; 2.5 SOLUTION RESPIRATORY (INHALATION) at 13:14

## 2023-09-11 RX ADMIN — IOPAMIDOL 75 ML: 755 INJECTION, SOLUTION INTRAVENOUS at 00:04

## 2023-09-11 RX ADMIN — FOLIC ACID 1000 MCG: 1 TABLET ORAL at 11:02

## 2023-09-11 RX ADMIN — METRONIDAZOLE 500 MG: 500 INJECTION, SOLUTION INTRAVENOUS at 18:43

## 2023-09-11 ASSESSMENT — ENCOUNTER SYMPTOMS
NAUSEA: 1
RECTAL PAIN: 0
SHORTNESS OF BREATH: 1
ABDOMINAL PAIN: 1
DIARRHEA: 0
CONSTIPATION: 0

## 2023-09-11 NOTE — ED NOTES
ABG obtained and ED respiratory called to verify results. Patient good to come off BiPAP per respiratory.       Dayne Gamboa RN  09/11/23 1534

## 2023-09-11 NOTE — ED NOTES
Dr. Benavides Daily notified of critical lab     Mercedes Pearson, [address]  09/10/23 1832

## 2023-09-11 NOTE — ED NOTES
Nurse to nurse given to RN taking over patient care in room 7412-A.       Juan Manuel Javier RN  09/11/23 0249

## 2023-09-11 NOTE — CARE COORDINATION
Social Work /Transition of Care:    Pt presents to the ED secondary to abdominal pain and nausea from [address] [place] [address]. Pt is admitted inpatient with respiratory failure with hypercapnia. Pt has consults for general surgery, pulmonology, and urology. SW met with pt who was awake and alert, on nasal cannula. SW also spoke to pt's daughter, Marquez Brink, over the phone. Pt was on bipap earlier. Pt was a little confused and per daughter, this is normal.  Pt and daughter live in a 2 story home with a main floor set up. Pt has a walker, bsc, nebulizer, and home oxygen ([employer], 4L). Pt has hx at [facility]. Pt's PCP is Dr Emelia Alegria and she uses [facility] on [address] to fill her prescriptions. Pt and daughter report plan will be for pt to return home upon discharge and pt's daughter will be able to assist pt at home as needed. Pt has orders for PT/OT kathy. ALIZA/ERIBERTO to follow for discharge needs.

## 2023-09-11 NOTE — ED PROVIDER NOTES
HPI:  9/10/23, Time: 10:16 PM EDT         Quyen Rousseau is a 61 y.o. female history of heart failure history of COPD history of coronary disease history of diverticulitis history of depression history of DVT presenting to the ED for abdominal pain, beginning since this afternoon ago. The complaint has been persistent, moderate in severity, and worsened by nothing. Patient reporting right lower abdominal pain that started this afternoon. Patient reporting nausea but no vomiting she reports no fever no chills she reports no urinary symptoms she reports no flank pain. Patient reporting no new chest pain she reports she always has shortness of breath due to her underlying history of CHF and COPD she is on oxygen at home. Patient reporting no productive cough. Patient reporting no black or tarry stools or hematemesis. Patient reports no history of appendectomy or cholecystectomy. ROS:   Pertinent positives and negatives are stated within HPI, all other systems reviewed and are negative.  --------------------------------------------- PAST HISTORY ---------------------------------------------  Past Medical History:  has a past medical history of Abscess, Acute on chronic diastolic CHF (congestive heart failure) ([address]), COPD (chronic obstructive pulmonary disease) ([address]), Coronary artery disease involving native heart without angina pectoris, Depression with anxiety, Diverticulitis, Provoked DVT 3/2018, Seasonal allergic rhinitis, Smoker, Tobacco use disorder, and Weakness of both legs. Past Surgical History:  has a past surgical history that includes cardiovascular stress test (N/A, 01/06/2023); other surgical history; bronchoscopy (N/A, 04/27/2023); bronchoscopy (04/27/2023); and Lung biopsy. Social History:  reports that she has been smoking cigarettes. She has a 20.50 pack-year smoking history. She has never used smokeless tobacco. She reports that she does not currently use alcohol.  She reports that she

## 2023-09-11 NOTE — PLAN OF CARE
Problem: Discharge Planning  Goal: Discharge to home or other facility with appropriate resources  Outcome: Progressing     Problem: Safety - Adult  Goal: Free from fall injury  Outcome: Progressing     Problem: Chronic Conditions and Co-morbidities  Goal: Patient's chronic conditions and co-morbidity symptoms are monitored and maintained or improved  Outcome: Progressing     Problem: Pain  Goal: Verbalizes/displays adequate comfort level or baseline comfort level  Outcome: Progressing     Problem: Nutrition Deficit:  Goal: Optimize nutritional status  Outcome: Progressing

## 2023-09-12 ENCOUNTER — ANESTHESIA (OUTPATIENT)
Dept: OPERATING ROOM | Age: 63
End: 2023-09-12
Payer: COMMERCIAL

## 2023-09-12 ENCOUNTER — ANESTHESIA EVENT (OUTPATIENT)
Dept: OPERATING ROOM | Age: 63
End: 2023-09-12
Payer: COMMERCIAL

## 2023-09-12 ENCOUNTER — APPOINTMENT (OUTPATIENT)
Dept: GENERAL RADIOLOGY | Age: 63
DRG: 659 | End: 2023-09-12
Payer: COMMERCIAL

## 2023-09-12 LAB
ALBUMIN SERPL-MCNC: 2.9 G/DL (ref 3.5–5.2)
ALP SERPL-CCNC: 67 U/L (ref 35–104)
ALT SERPL-CCNC: 30 U/L (ref 0–32)
ANION GAP SERPL CALCULATED.3IONS-SCNC: 4 MMOL/L (ref 7–16)
ANION GAP SERPL CALCULATED.3IONS-SCNC: 5 MMOL/L (ref 7–16)
AST SERPL-CCNC: 27 U/L (ref 0–31)
BASOPHILS # BLD: 0.02 K/UL (ref 0–0.2)
BASOPHILS NFR BLD: 0 % (ref 0–2)
BILIRUB SERPL-MCNC: 0.3 MG/DL (ref 0–1.2)
BUN SERPL-MCNC: 11 MG/DL (ref 6–23)
BUN SERPL-MCNC: 9 MG/DL (ref 6–23)
CALCIUM SERPL-MCNC: 8.7 MG/DL (ref 8.6–10.2)
CALCIUM SERPL-MCNC: 8.9 MG/DL (ref 8.6–10.2)
CHLORIDE SERPL-SCNC: 100 MMOL/L (ref 98–107)
CHLORIDE SERPL-SCNC: 99 MMOL/L (ref 98–107)
CO2 SERPL-SCNC: 37 MMOL/L (ref 22–29)
CO2 SERPL-SCNC: 37 MMOL/L (ref 22–29)
CREAT SERPL-MCNC: 0.4 MG/DL (ref 0.5–1)
CREAT SERPL-MCNC: 0.4 MG/DL (ref 0.5–1)
EKG ATRIAL RATE: 118 BPM
EKG P AXIS: 83 DEGREES
EKG P-R INTERVAL: 136 MS
EKG Q-T INTERVAL: 306 MS
EKG QRS DURATION: 72 MS
EKG QTC CALCULATION (BAZETT): 428 MS
EKG R AXIS: 83 DEGREES
EKG T AXIS: 82 DEGREES
EKG VENTRICULAR RATE: 118 BPM
EOSINOPHIL # BLD: 0.06 K/UL (ref 0.05–0.5)
EOSINOPHILS RELATIVE PERCENT: 1 % (ref 0–6)
ERYTHROCYTE [DISTWIDTH] IN BLOOD BY AUTOMATED COUNT: 12.6 % (ref 11.5–15)
GFR SERPL CREATININE-BSD FRML MDRD: >60 ML/MIN/1.73M2
GFR SERPL CREATININE-BSD FRML MDRD: >60 ML/MIN/1.73M2
GLUCOSE SERPL-MCNC: 95 MG/DL (ref 74–99)
GLUCOSE SERPL-MCNC: 96 MG/DL (ref 74–99)
HCT VFR BLD AUTO: 36.1 % (ref 34–48)
HGB BLD-MCNC: 11.2 G/DL (ref 11.5–15.5)
IMM GRANULOCYTES # BLD AUTO: 0.04 K/UL (ref 0–0.58)
IMM GRANULOCYTES NFR BLD: 0 % (ref 0–5)
LYMPHOCYTES NFR BLD: 0.48 K/UL (ref 1.5–4)
LYMPHOCYTES RELATIVE PERCENT: 5 % (ref 20–42)
MAGNESIUM SERPL-MCNC: 1.7 MG/DL (ref 1.6–2.6)
MCH RBC QN AUTO: 31.3 PG (ref 26–35)
MCHC RBC AUTO-ENTMCNC: 31 G/DL (ref 32–34.5)
MCV RBC AUTO: 100.8 FL (ref 80–99.9)
MONOCYTES NFR BLD: 0.39 K/UL (ref 0.1–0.95)
MONOCYTES NFR BLD: 4 % (ref 2–12)
NEUTROPHILS NFR BLD: 90 % (ref 43–80)
NEUTS SEG NFR BLD: 8.74 K/UL (ref 1.8–7.3)
PLATELET # BLD AUTO: 184 K/UL (ref 130–450)
PMV BLD AUTO: 9.5 FL (ref 7–12)
POTASSIUM SERPL-SCNC: 3.2 MMOL/L (ref 3.5–5)
POTASSIUM SERPL-SCNC: 3.6 MMOL/L (ref 3.5–5)
PROT SERPL-MCNC: 5.4 G/DL (ref 6.4–8.3)
RBC # BLD AUTO: 3.58 M/UL (ref 3.5–5.5)
RBC # BLD: ABNORMAL 10*6/UL
SODIUM SERPL-SCNC: 140 MMOL/L (ref 132–146)
SODIUM SERPL-SCNC: 142 MMOL/L (ref 132–146)
WBC OTHER # BLD: 9.7 K/UL (ref 4.5–11.5)

## 2023-09-12 PROCEDURE — 36415 COLL VENOUS BLD VENIPUNCTURE: CPT

## 2023-09-12 PROCEDURE — 2580000003 HC RX 258: Performed by: NURSE ANESTHETIST, CERTIFIED REGISTERED

## 2023-09-12 PROCEDURE — 3700000001 HC ADD 15 MINUTES (ANESTHESIA): Performed by: UROLOGY

## 2023-09-12 PROCEDURE — 94640 AIRWAY INHALATION TREATMENT: CPT

## 2023-09-12 PROCEDURE — 85025 COMPLETE CBC W/AUTO DIFF WBC: CPT

## 2023-09-12 PROCEDURE — 94660 CPAP INITIATION&MGMT: CPT

## 2023-09-12 PROCEDURE — 6370000000 HC RX 637 (ALT 250 FOR IP): Performed by: UROLOGY

## 2023-09-12 PROCEDURE — 3600000014 HC SURGERY LEVEL 4 ADDTL 15MIN: Performed by: UROLOGY

## 2023-09-12 PROCEDURE — 6360000002 HC RX W HCPCS

## 2023-09-12 PROCEDURE — 0T768DZ DILATION OF RIGHT URETER WITH INTRALUMINAL DEVICE, VIA NATURAL OR ARTIFICIAL OPENING ENDOSCOPIC: ICD-10-PCS | Performed by: UROLOGY

## 2023-09-12 PROCEDURE — BT1D1ZZ FLUOROSCOPY OF RIGHT KIDNEY, URETER AND BLADDER USING LOW OSMOLAR CONTRAST: ICD-10-PCS | Performed by: UROLOGY

## 2023-09-12 PROCEDURE — 87086 URINE CULTURE/COLONY COUNT: CPT

## 2023-09-12 PROCEDURE — 2709999900 HC NON-CHARGEABLE SUPPLY: Performed by: UROLOGY

## 2023-09-12 PROCEDURE — 6370000000 HC RX 637 (ALT 250 FOR IP)

## 2023-09-12 PROCEDURE — 7100000000 HC PACU RECOVERY - FIRST 15 MIN: Performed by: UROLOGY

## 2023-09-12 PROCEDURE — 2700000000 HC OXYGEN THERAPY PER DAY

## 2023-09-12 PROCEDURE — 2580000003 HC RX 258: Performed by: UROLOGY

## 2023-09-12 PROCEDURE — C2617 STENT, NON-COR, TEM W/O DEL: HCPCS | Performed by: UROLOGY

## 2023-09-12 PROCEDURE — 6360000002 HC RX W HCPCS: Performed by: NURSE ANESTHETIST, CERTIFIED REGISTERED

## 2023-09-12 PROCEDURE — 6360000002 HC RX W HCPCS: Performed by: UROLOGY

## 2023-09-12 PROCEDURE — 2060000000 HC ICU INTERMEDIATE R&B

## 2023-09-12 PROCEDURE — 99232 SBSQ HOSP IP/OBS MODERATE 35: CPT | Performed by: FAMILY MEDICINE

## 2023-09-12 PROCEDURE — 93010 ELECTROCARDIOGRAM REPORT: CPT | Performed by: INTERNAL MEDICINE

## 2023-09-12 PROCEDURE — 3600000004 HC SURGERY LEVEL 4 BASE: Performed by: UROLOGY

## 2023-09-12 PROCEDURE — 74420 UROGRAPHY RTRGR +-KUB: CPT

## 2023-09-12 PROCEDURE — 7100000001 HC PACU RECOVERY - ADDTL 15 MIN: Performed by: UROLOGY

## 2023-09-12 PROCEDURE — 3700000000 HC ANESTHESIA ATTENDED CARE: Performed by: UROLOGY

## 2023-09-12 PROCEDURE — 83735 ASSAY OF MAGNESIUM: CPT

## 2023-09-12 PROCEDURE — 80053 COMPREHEN METABOLIC PANEL: CPT

## 2023-09-12 PROCEDURE — C1758 CATHETER, URETERAL: HCPCS | Performed by: UROLOGY

## 2023-09-12 PROCEDURE — C1894 INTRO/SHEATH, NON-LASER: HCPCS | Performed by: UROLOGY

## 2023-09-12 PROCEDURE — 2720000010 HC SURG SUPPLY STERILE: Performed by: UROLOGY

## 2023-09-12 PROCEDURE — 80048 BASIC METABOLIC PNL TOTAL CA: CPT

## 2023-09-12 PROCEDURE — 0TC68ZZ EXTIRPATION OF MATTER FROM RIGHT URETER, VIA NATURAL OR ARTIFICIAL OPENING ENDOSCOPIC: ICD-10-PCS | Performed by: UROLOGY

## 2023-09-12 PROCEDURE — C1769 GUIDE WIRE: HCPCS | Performed by: UROLOGY

## 2023-09-12 DEVICE — URETERAL STENT
Type: IMPLANTABLE DEVICE | Site: URETER | Status: FUNCTIONAL
Brand: PERCUFLEX™

## 2023-09-12 RX ORDER — PHENYLEPHRINE HCL IN 0.9% NACL 1 MG/10 ML
SYRINGE (ML) INTRAVENOUS PRN
Status: DISCONTINUED | OUTPATIENT
Start: 2023-09-12 | End: 2023-09-12 | Stop reason: SDUPTHER

## 2023-09-12 RX ORDER — SODIUM CHLORIDE 0.9 % (FLUSH) 0.9 %
5-40 SYRINGE (ML) INJECTION EVERY 12 HOURS SCHEDULED
Status: DISCONTINUED | OUTPATIENT
Start: 2023-09-12 | End: 2023-09-12 | Stop reason: HOSPADM

## 2023-09-12 RX ORDER — HYDROMORPHONE HYDROCHLORIDE 1 MG/ML
0.25 INJECTION, SOLUTION INTRAMUSCULAR; INTRAVENOUS; SUBCUTANEOUS EVERY 5 MIN PRN
Status: DISCONTINUED | OUTPATIENT
Start: 2023-09-12 | End: 2023-09-12 | Stop reason: HOSPADM

## 2023-09-12 RX ORDER — FENTANYL CITRATE 50 UG/ML
INJECTION, SOLUTION INTRAMUSCULAR; INTRAVENOUS PRN
Status: DISCONTINUED | OUTPATIENT
Start: 2023-09-12 | End: 2023-09-12 | Stop reason: SDUPTHER

## 2023-09-12 RX ORDER — POTASSIUM CHLORIDE 20 MEQ/1
20 TABLET, EXTENDED RELEASE ORAL ONCE
Status: COMPLETED | OUTPATIENT
Start: 2023-09-12 | End: 2023-09-12

## 2023-09-12 RX ORDER — SODIUM CHLORIDE, SODIUM LACTATE, POTASSIUM CHLORIDE, CALCIUM CHLORIDE 600; 310; 30; 20 MG/100ML; MG/100ML; MG/100ML; MG/100ML
INJECTION, SOLUTION INTRAVENOUS CONTINUOUS
Status: DISCONTINUED | OUTPATIENT
Start: 2023-09-12 | End: 2023-09-17

## 2023-09-12 RX ORDER — SODIUM CHLORIDE 0.9 % (FLUSH) 0.9 %
5-40 SYRINGE (ML) INJECTION PRN
Status: DISCONTINUED | OUTPATIENT
Start: 2023-09-12 | End: 2023-09-12 | Stop reason: HOSPADM

## 2023-09-12 RX ORDER — PROPOFOL 10 MG/ML
INJECTION, EMULSION INTRAVENOUS CONTINUOUS PRN
Status: DISCONTINUED | OUTPATIENT
Start: 2023-09-12 | End: 2023-09-12 | Stop reason: SDUPTHER

## 2023-09-12 RX ORDER — SODIUM CHLORIDE 9 MG/ML
INJECTION, SOLUTION INTRAVENOUS PRN
Status: DISCONTINUED | OUTPATIENT
Start: 2023-09-12 | End: 2023-09-12 | Stop reason: HOSPADM

## 2023-09-12 RX ORDER — MIDAZOLAM HYDROCHLORIDE 1 MG/ML
INJECTION INTRAMUSCULAR; INTRAVENOUS PRN
Status: DISCONTINUED | OUTPATIENT
Start: 2023-09-12 | End: 2023-09-12 | Stop reason: SDUPTHER

## 2023-09-12 RX ORDER — ONDANSETRON 2 MG/ML
4 INJECTION INTRAMUSCULAR; INTRAVENOUS
Status: DISCONTINUED | OUTPATIENT
Start: 2023-09-12 | End: 2023-09-12 | Stop reason: HOSPADM

## 2023-09-12 RX ORDER — HYDROMORPHONE HYDROCHLORIDE 1 MG/ML
0.5 INJECTION, SOLUTION INTRAMUSCULAR; INTRAVENOUS; SUBCUTANEOUS EVERY 5 MIN PRN
Status: DISCONTINUED | OUTPATIENT
Start: 2023-09-12 | End: 2023-09-12 | Stop reason: HOSPADM

## 2023-09-12 RX ORDER — SODIUM CHLORIDE, SODIUM LACTATE, POTASSIUM CHLORIDE, CALCIUM CHLORIDE 600; 310; 30; 20 MG/100ML; MG/100ML; MG/100ML; MG/100ML
INJECTION, SOLUTION INTRAVENOUS CONTINUOUS PRN
Status: DISCONTINUED | OUTPATIENT
Start: 2023-09-12 | End: 2023-09-12 | Stop reason: SDUPTHER

## 2023-09-12 RX ADMIN — Medication 50 MCG: at 09:43

## 2023-09-12 RX ADMIN — SODIUM CHLORIDE, POTASSIUM CHLORIDE, SODIUM LACTATE AND CALCIUM CHLORIDE: 600; 310; 30; 20 INJECTION, SOLUTION INTRAVENOUS at 11:29

## 2023-09-12 RX ADMIN — IPRATROPIUM BROMIDE AND ALBUTEROL SULFATE 1 DOSE: .5; 2.5 SOLUTION RESPIRATORY (INHALATION) at 12:49

## 2023-09-12 RX ADMIN — FENTANYL CITRATE 25 MCG: 50 INJECTION, SOLUTION INTRAMUSCULAR; INTRAVENOUS at 09:39

## 2023-09-12 RX ADMIN — WATER 2000 MG: 1 INJECTION INTRAMUSCULAR; INTRAVENOUS; SUBCUTANEOUS at 23:57

## 2023-09-12 RX ADMIN — ONDANSETRON 4 MG: 4 TABLET, ORALLY DISINTEGRATING ORAL at 20:00

## 2023-09-12 RX ADMIN — BUDESONIDE INHALATION 500 MCG: 0.5 SUSPENSION RESPIRATORY (INHALATION) at 05:48

## 2023-09-12 RX ADMIN — ALPRAZOLAM 0.25 MG: 0.25 TABLET ORAL at 05:40

## 2023-09-12 RX ADMIN — SACUBITRIL AND VALSARTAN 1 TABLET: 24; 26 TABLET, FILM COATED ORAL at 20:01

## 2023-09-12 RX ADMIN — POTASSIUM CHLORIDE 20 MEQ: 1500 TABLET, EXTENDED RELEASE ORAL at 12:04

## 2023-09-12 RX ADMIN — ROFLUMILAST 500 MCG: 500 TABLET ORAL at 12:12

## 2023-09-12 RX ADMIN — SACUBITRIL AND VALSARTAN 1 TABLET: 24; 26 TABLET, FILM COATED ORAL at 12:04

## 2023-09-12 RX ADMIN — METRONIDAZOLE 500 MG: 500 INJECTION, SOLUTION INTRAVENOUS at 20:05

## 2023-09-12 RX ADMIN — Medication 50 MCG: at 09:42

## 2023-09-12 RX ADMIN — ASPIRIN 81 MG: 81 TABLET, CHEWABLE ORAL at 12:04

## 2023-09-12 RX ADMIN — MIDAZOLAM 2 MG: 1 INJECTION INTRAMUSCULAR; INTRAVENOUS at 09:33

## 2023-09-12 RX ADMIN — IPRATROPIUM BROMIDE AND ALBUTEROL SULFATE 1 DOSE: .5; 2.5 SOLUTION RESPIRATORY (INHALATION) at 20:01

## 2023-09-12 RX ADMIN — IPRATROPIUM BROMIDE AND ALBUTEROL SULFATE 1 DOSE: .5; 2.5 SOLUTION RESPIRATORY (INHALATION) at 16:30

## 2023-09-12 RX ADMIN — FENTANYL CITRATE 25 MCG: 50 INJECTION, SOLUTION INTRAMUSCULAR; INTRAVENOUS at 09:56

## 2023-09-12 RX ADMIN — BUDESONIDE INHALATION 500 MCG: 0.5 SUSPENSION RESPIRATORY (INHALATION) at 20:01

## 2023-09-12 RX ADMIN — METRONIDAZOLE 500 MG: 500 INJECTION, SOLUTION INTRAVENOUS at 02:06

## 2023-09-12 RX ADMIN — ATORVASTATIN CALCIUM 40 MG: 40 TABLET, FILM COATED ORAL at 20:00

## 2023-09-12 RX ADMIN — PANTOPRAZOLE SODIUM 40 MG: 40 TABLET, DELAYED RELEASE ORAL at 20:00

## 2023-09-12 RX ADMIN — FOLIC ACID 1000 MCG: 1 TABLET ORAL at 12:05

## 2023-09-12 RX ADMIN — IPRATROPIUM BROMIDE AND ALBUTEROL SULFATE 1 DOSE: .5; 2.5 SOLUTION RESPIRATORY (INHALATION) at 05:49

## 2023-09-12 RX ADMIN — ARFORMOTEROL TARTRATE 15 MCG: 15 SOLUTION RESPIRATORY (INHALATION) at 20:00

## 2023-09-12 RX ADMIN — FENTANYL CITRATE 25 MCG: 50 INJECTION, SOLUTION INTRAMUSCULAR; INTRAVENOUS at 09:35

## 2023-09-12 RX ADMIN — PROPOFOL 75 MCG/KG/MIN: 10 INJECTION, EMULSION INTRAVENOUS at 09:35

## 2023-09-12 RX ADMIN — Medication 10 ML: at 12:05

## 2023-09-12 RX ADMIN — FENTANYL CITRATE 25 MCG: 50 INJECTION, SOLUTION INTRAMUSCULAR; INTRAVENOUS at 10:07

## 2023-09-12 RX ADMIN — ALPRAZOLAM 0.25 MG: 0.25 TABLET ORAL at 17:28

## 2023-09-12 RX ADMIN — METRONIDAZOLE 500 MG: 500 INJECTION, SOLUTION INTRAVENOUS at 12:08

## 2023-09-12 RX ADMIN — SODIUM CHLORIDE, POTASSIUM CHLORIDE, SODIUM LACTATE AND CALCIUM CHLORIDE: 600; 310; 30; 20 INJECTION, SOLUTION INTRAVENOUS at 09:30

## 2023-09-12 RX ADMIN — METOPROLOL SUCCINATE 12.5 MG: 25 TABLET, FILM COATED, EXTENDED RELEASE ORAL at 20:00

## 2023-09-12 RX ADMIN — Medication 50 MCG: at 09:51

## 2023-09-12 RX ADMIN — ARFORMOTEROL TARTRATE 15 MCG: 15 SOLUTION RESPIRATORY (INHALATION) at 05:48

## 2023-09-12 ASSESSMENT — PAIN SCALES - GENERAL
PAINLEVEL_OUTOF10: 0
PAINLEVEL_OUTOF10: 0

## 2023-09-12 ASSESSMENT — LIFESTYLE VARIABLES: SMOKING_STATUS: 1

## 2023-09-12 NOTE — CARE COORDINATION
9/12/23 Update CM Note. Pt admitted 9/12/23 Dx Respiratory failure with hypercapnia and right lower quadrant abdominal pain/hydronephrosis . S/P  ureteroscopy and stone lithotripsy this am. Discharge plan to return home with daughter, daughter will provide transportation. Baseline O2 4L/NC provided by [facility]. NO preference to [address] if needed.   Dawood DIAZN RN-BC  [phone]

## 2023-09-12 NOTE — PLAN OF CARE
Problem: Discharge Planning  Goal: Discharge to home or other facility with appropriate resources  9/11/2023 2201 by Whit Das RN  Outcome: Progressing  9/11/2023 1333 by Angel Grossman RN  Outcome: Progressing     Problem: Safety - Adult  Goal: Free from fall injury  9/11/2023 2201 by Whit Das RN  Outcome: Progressing  9/11/2023 1333 by Angel Grossman RN  Outcome: Progressing     Problem: Chronic Conditions and Co-morbidities  Goal: Patient's chronic conditions and co-morbidity symptoms are monitored and maintained or improved  9/11/2023 2201 by Whit Das RN  Outcome: Progressing  9/11/2023 1333 by Angel Grossman RN  Outcome: Progressing     Problem: Pain  Goal: Verbalizes/displays adequate comfort level or baseline comfort level  9/11/2023 2201 by Whit Das RN  Outcome: Progressing  9/11/2023 1333 by Angel Grossman RN  Outcome: Progressing     Problem: Nutrition Deficit:  Goal: Optimize nutritional status  9/11/2023 2201 by Whit Das RN  Outcome: Progressing  9/11/2023 1333 by Angel Grossman RN  Outcome: Progressing

## 2023-09-12 NOTE — BRIEF OP NOTE
Brief Postoperative Note      Patient: Altagracia Perez  YOB: 1960  MRN: [id number]    Date of Procedure: 9/12/2023    Pre-Op Diagnosis Codes:     * Kidney stones [N20.0]    Post-Op Diagnosis: Same       Procedure(s):  CYSTOSCOPY RIGHT RETROGRADE PYELOGRAM, RIGHT URETEROSCOPY,  LASER LITHOTRIPSY, RIGHT URETERAL STONE, INSERTION RIGHT STENT    Surgeon(s):  Judy Salinas MD    Assistant:      Anesthesia: Monitor Anesthesia Care    Estimated Blood Loss (mL): Minimal    Complications: None    Specimens:   ID Type Source Tests Collected by Time Destination   1 : URINE FOR CULTURE Urine Urine, Cystoscopic CULTURE, URINE Judy Salinas MD 9/12/2023 3096        Implants:  Implant Name Type Inv.  Item Serial No.  Lot No. LRB No. Used Action   Maria C Parisiider [facility] PERCFLX FIRM DUROMETER DBL Mateo Garcia [id number]  Pinky Prime 6FR [facility] DUR[facility] DBL [facility]OLOG[facility]- [id number] Right 1 Implanted         Drains:   Ureteral Drain/Stent 09/12/23 Right Ureter (Active)       Findings:       Electronically signed by Judy Salinas MD on 9/12/2023 at 10:15 AM

## 2023-09-12 NOTE — ANESTHESIA POSTPROCEDURE EVALUATION
Department of Anesthesiology  Postprocedure Note    Patient: Laurie Page  MRN: [id number]  YOB: 1960  Date of evaluation: 9/12/2023      Procedure Summary     Date: 09/12/23 Room / Location: [facility] OR 11 / [facility]    Anesthesia Start: 4942 Anesthesia Stop: 1025    Procedure: CYSTOSCOPY RIGHT RETROGRADE PYELOGRAM, RIGHT URETEROSCOPY,  LASER LITHOTRIPSY, RIGHT URETERAL STONE, INSERTION RIGHT STENT (Right: Bladder) Diagnosis:       Kidney stones      (Kidney stones [N20.0])    Surgeons: Estrellita Chavez MD Responsible Provider: Pratima Lynch MD    Anesthesia Type: MAC ASA Status: 3          Anesthesia Type: No value filed.     Ken Phase I: Ken Score: 9    Ken Phase II:        Anesthesia Post Evaluation    Patient location during evaluation: PACU  Patient participation: complete - patient participated  Level of consciousness: awake  Pain score: 0  Airway patency: patent  Nausea & Vomiting: no nausea  Complications: no  Cardiovascular status: hemodynamically stable  Respiratory status: acceptable  Hydration status: stable  Multimodal analgesia pain management approach

## 2023-09-12 NOTE — ANESTHESIA PRE PROCEDURE
04/13/2022 02:20 PM    LABGLOM >60 09/12/2023 05:16 AM    GLUCOSE 95 09/12/2023 05:16 AM    PROT 5.4 09/12/2023 05:16 AM    CALCIUM 8.7 09/12/2023 05:16 AM    BILITOT 0.3 09/12/2023 05:16 AM    ALKPHOS 67 09/12/2023 05:16 AM    AST 27 09/12/2023 05:16 AM    ALT 30 09/12/2023 05:16 AM       POC Tests:   Recent Labs     09/11/23  1118   POCHCT 30*       Coags:   Lab Results   Component Value Date/Time    PROTIME 10.9 04/27/2023 11:21 AM    INR 1.0 04/27/2023 11:21 AM    APTT 31.9 10/28/2019 05:59 AM       HCG (If Applicable): No results found for: \"PREGTESTUR\", \"PREGSERUM\", \"HCG\", \"HCGQUANT\"     ABGs:   Lab Results   Component Value Date/Time    PO2ART 162.1 05/20/2021 06:38 AM    AZI3KOS 94.5 05/20/2021 06:38 AM    ZDY9LZD 43.6 05/20/2021 06:38 AM        Type & Screen (If Applicable):  No results found for: \"LABABO\", \"LABRH\"    Drug/Infectious Status (If Applicable):  No results found for: \"HIV\", \"HEPCAB\"    COVID-19 Screening (If Applicable):   Lab Results   Component Value Date/Time    COVID19 Not Detected 09/11/2023 06:31 AM           Anesthesia Evaluation  Patient summary reviewed no history of anesthetic complications:   Airway: Mallampati: II     Neck ROM: full     Dental: normal exam         Pulmonary:   (+) COPD:  sleep apnea:  decreased breath sounds current smoker          Patient did not smoke on day of surgery. Cardiovascular:    (+) CAD:, CHF:,         Rhythm: regular  Rate: normal           Beta Blocker:  Dose within 24 Hrs         Neuro/Psych:   (+) psychiatric history:depression/anxiety             GI/Hepatic/Renal:             Endo/Other:              Pt had no PAT visit       Abdominal:             Vascular:   + DVT, . Other Findings:             Anesthesia Plan      MAC     ASA 3       Induction: intravenous. Anesthetic plan and risks discussed with patient. Plan discussed with MARELY Aguilar MD   9/12/2023

## 2023-09-13 LAB
ALBUMIN SERPL-MCNC: 2.2 G/DL (ref 3.5–5.2)
ALP SERPL-CCNC: 60 U/L (ref 35–104)
ALT SERPL-CCNC: 128 U/L (ref 0–32)
ANION GAP SERPL CALCULATED.3IONS-SCNC: 12 MMOL/L (ref 7–16)
AST SERPL-CCNC: 147 U/L (ref 0–31)
BASOPHILS # BLD: 0.02 K/UL (ref 0–0.2)
BASOPHILS NFR BLD: 0 % (ref 0–2)
BILIRUB SERPL-MCNC: 0.4 MG/DL (ref 0–1.2)
BUN SERPL-MCNC: 8 MG/DL (ref 6–23)
CALCIUM SERPL-MCNC: 8.8 MG/DL (ref 8.6–10.2)
CHLORIDE SERPL-SCNC: 100 MMOL/L (ref 98–107)
CO2 SERPL-SCNC: 33 MMOL/L (ref 22–29)
CREAT SERPL-MCNC: 0.5 MG/DL (ref 0.5–1)
EOSINOPHIL # BLD: 0.15 K/UL (ref 0.05–0.5)
EOSINOPHILS RELATIVE PERCENT: 2 % (ref 0–6)
ERYTHROCYTE [DISTWIDTH] IN BLOOD BY AUTOMATED COUNT: 12.5 % (ref 11.5–15)
GFR SERPL CREATININE-BSD FRML MDRD: >60 ML/MIN/1.73M2
GLUCOSE SERPL-MCNC: 95 MG/DL (ref 74–99)
HCT VFR BLD AUTO: 31 % (ref 34–48)
HCT VFR BLD AUTO: 31.2 % (ref 34–48)
HGB BLD-MCNC: 10.1 G/DL (ref 11.5–15.5)
HGB BLD-MCNC: 9.9 G/DL (ref 11.5–15.5)
IMM GRANULOCYTES # BLD AUTO: 0.04 K/UL (ref 0–0.58)
IMM GRANULOCYTES NFR BLD: 0 % (ref 0–5)
LYMPHOCYTES NFR BLD: 0.36 K/UL (ref 1.5–4)
LYMPHOCYTES RELATIVE PERCENT: 4 % (ref 20–42)
MAGNESIUM SERPL-MCNC: 1.8 MG/DL (ref 1.6–2.6)
MCH RBC QN AUTO: 31.6 PG (ref 26–35)
MCHC RBC AUTO-ENTMCNC: 31.9 G/DL (ref 32–34.5)
MCV RBC AUTO: 99 FL (ref 80–99.9)
MICROORGANISM SPEC CULT: ABNORMAL
MICROORGANISM SPEC CULT: NO GROWTH
MONOCYTES NFR BLD: 0.53 K/UL (ref 0.1–0.95)
MONOCYTES NFR BLD: 5 % (ref 2–12)
NEUTROPHILS NFR BLD: 89 % (ref 43–80)
NEUTS SEG NFR BLD: 9.12 K/UL (ref 1.8–7.3)
PLATELET # BLD AUTO: 164 K/UL (ref 130–450)
PMV BLD AUTO: 9.6 FL (ref 7–12)
POTASSIUM SERPL-SCNC: 3.5 MMOL/L (ref 3.5–5)
PROT SERPL-MCNC: 4.6 G/DL (ref 6.4–8.3)
RBC # BLD AUTO: 3.13 M/UL (ref 3.5–5.5)
RBC # BLD: ABNORMAL 10*6/UL
SODIUM SERPL-SCNC: 145 MMOL/L (ref 132–146)
SPECIMEN DESCRIPTION: ABNORMAL
SPECIMEN DESCRIPTION: NORMAL
WBC OTHER # BLD: 10.2 K/UL (ref 4.5–11.5)

## 2023-09-13 PROCEDURE — 6360000002 HC RX W HCPCS: Performed by: UROLOGY

## 2023-09-13 PROCEDURE — 97165 OT EVAL LOW COMPLEX 30 MIN: CPT

## 2023-09-13 PROCEDURE — 6370000000 HC RX 637 (ALT 250 FOR IP): Performed by: UROLOGY

## 2023-09-13 PROCEDURE — 2580000003 HC RX 258: Performed by: UROLOGY

## 2023-09-13 PROCEDURE — 2580000003 HC RX 258

## 2023-09-13 PROCEDURE — 85014 HEMATOCRIT: CPT

## 2023-09-13 PROCEDURE — 99232 SBSQ HOSP IP/OBS MODERATE 35: CPT | Performed by: FAMILY MEDICINE

## 2023-09-13 PROCEDURE — 2060000000 HC ICU INTERMEDIATE R&B

## 2023-09-13 PROCEDURE — 99233 SBSQ HOSP IP/OBS HIGH 50: CPT | Performed by: INTERNAL MEDICINE

## 2023-09-13 PROCEDURE — 85018 HEMOGLOBIN: CPT

## 2023-09-13 PROCEDURE — 97530 THERAPEUTIC ACTIVITIES: CPT

## 2023-09-13 PROCEDURE — 80053 COMPREHEN METABOLIC PANEL: CPT

## 2023-09-13 PROCEDURE — 97161 PT EVAL LOW COMPLEX 20 MIN: CPT

## 2023-09-13 PROCEDURE — 97535 SELF CARE MNGMENT TRAINING: CPT

## 2023-09-13 PROCEDURE — 94640 AIRWAY INHALATION TREATMENT: CPT

## 2023-09-13 PROCEDURE — 94660 CPAP INITIATION&MGMT: CPT

## 2023-09-13 PROCEDURE — 85025 COMPLETE CBC W/AUTO DIFF WBC: CPT

## 2023-09-13 PROCEDURE — 2700000000 HC OXYGEN THERAPY PER DAY

## 2023-09-13 PROCEDURE — 36415 COLL VENOUS BLD VENIPUNCTURE: CPT

## 2023-09-13 PROCEDURE — 83735 ASSAY OF MAGNESIUM: CPT

## 2023-09-13 RX ORDER — SODIUM CHLORIDE 9 MG/ML
INJECTION, SOLUTION INTRAVENOUS ONCE
Status: COMPLETED | OUTPATIENT
Start: 2023-09-13 | End: 2023-09-13

## 2023-09-13 RX ADMIN — Medication 10 ML: at 19:54

## 2023-09-13 RX ADMIN — ATORVASTATIN CALCIUM 40 MG: 40 TABLET, FILM COATED ORAL at 19:54

## 2023-09-13 RX ADMIN — IPRATROPIUM BROMIDE AND ALBUTEROL SULFATE 1 DOSE: .5; 2.5 SOLUTION RESPIRATORY (INHALATION) at 16:52

## 2023-09-13 RX ADMIN — ASPIRIN 81 MG: 81 TABLET, CHEWABLE ORAL at 09:07

## 2023-09-13 RX ADMIN — ARFORMOTEROL TARTRATE 15 MCG: 15 SOLUTION RESPIRATORY (INHALATION) at 20:05

## 2023-09-13 RX ADMIN — BUDESONIDE INHALATION 500 MCG: 0.5 SUSPENSION RESPIRATORY (INHALATION) at 09:15

## 2023-09-13 RX ADMIN — Medication 10 ML: at 00:10

## 2023-09-13 RX ADMIN — METOPROLOL SUCCINATE 12.5 MG: 25 TABLET, FILM COATED, EXTENDED RELEASE ORAL at 19:53

## 2023-09-13 RX ADMIN — METRONIDAZOLE 500 MG: 500 INJECTION, SOLUTION INTRAVENOUS at 05:03

## 2023-09-13 RX ADMIN — SACUBITRIL AND VALSARTAN 1 TABLET: 24; 26 TABLET, FILM COATED ORAL at 09:07

## 2023-09-13 RX ADMIN — PANTOPRAZOLE SODIUM 40 MG: 40 TABLET, DELAYED RELEASE ORAL at 19:54

## 2023-09-13 RX ADMIN — METRONIDAZOLE 500 MG: 500 INJECTION, SOLUTION INTRAVENOUS at 13:06

## 2023-09-13 RX ADMIN — ROFLUMILAST 500 MCG: 500 TABLET ORAL at 09:07

## 2023-09-13 RX ADMIN — ALPRAZOLAM 0.25 MG: 0.25 TABLET ORAL at 18:37

## 2023-09-13 RX ADMIN — ENOXAPARIN SODIUM 40 MG: 100 INJECTION SUBCUTANEOUS at 09:08

## 2023-09-13 RX ADMIN — BUDESONIDE INHALATION 500 MCG: 0.5 SUSPENSION RESPIRATORY (INHALATION) at 20:05

## 2023-09-13 RX ADMIN — IPRATROPIUM BROMIDE AND ALBUTEROL SULFATE 1 DOSE: .5; 2.5 SOLUTION RESPIRATORY (INHALATION) at 09:15

## 2023-09-13 RX ADMIN — FOLIC ACID 1000 MCG: 1 TABLET ORAL at 09:07

## 2023-09-13 RX ADMIN — METRONIDAZOLE 500 MG: 500 INJECTION, SOLUTION INTRAVENOUS at 19:53

## 2023-09-13 RX ADMIN — ONDANSETRON 4 MG: 2 INJECTION INTRAMUSCULAR; INTRAVENOUS at 18:37

## 2023-09-13 RX ADMIN — ARFORMOTEROL TARTRATE 15 MCG: 15 SOLUTION RESPIRATORY (INHALATION) at 09:15

## 2023-09-13 RX ADMIN — Medication 10 ML: at 09:08

## 2023-09-13 RX ADMIN — SODIUM CHLORIDE: 9 INJECTION, SOLUTION INTRAVENOUS at 10:37

## 2023-09-13 RX ADMIN — IPRATROPIUM BROMIDE AND ALBUTEROL SULFATE 1 DOSE: .5; 2.5 SOLUTION RESPIRATORY (INHALATION) at 20:05

## 2023-09-13 RX ADMIN — WATER 2000 MG: 1 INJECTION INTRAMUSCULAR; INTRAVENOUS; SUBCUTANEOUS at 22:34

## 2023-09-13 RX ADMIN — SACUBITRIL AND VALSARTAN 1 TABLET: 24; 26 TABLET, FILM COATED ORAL at 19:54

## 2023-09-13 RX ADMIN — IPRATROPIUM BROMIDE AND ALBUTEROL SULFATE 1 DOSE: .5; 2.5 SOLUTION RESPIRATORY (INHALATION) at 13:19

## 2023-09-13 ASSESSMENT — PAIN SCALES - GENERAL
PAINLEVEL_OUTOF10: 0

## 2023-09-13 NOTE — DISCHARGE INSTRUCTIONS
A ureteral stent was inserted during your recent procedure. Unlike a heart \"stent\" which is metal, short, and permanent, this ureteral stent plastic, and temporary. It spans from your kidney, down the ureter, and into your bladder. This will need to be removed, so it is very important that you follow-up with your doctor. IMPORTANT - This ureteral stent will likely cause you to experience frequent urination, urgency to urinate, back/flank pain with urination, and/or blood in the urine. These things are very normal.  Taking the pain medications and/or anti-inflammatories will help to manage this discomfort if present. If you have any questions or concerns you can contact [facility] office at [phone]. Ureteral Stent Placement: What to Expect at [address]     A ureteral (say \"you-REE-ter-ul\") stent is a thin, hollow tube that is placed in the ureter to help urine pass from the kidney into the bladder. Ureters are the tubes that connect the kidneys to the bladder. You may have a small amount of blood in your urine for 1 to 3 days after the procedure. While the stent is in place, you may have to urinate more often, feel a sudden need to urinate, or feel like you can't completely empty your bladder. You may feel some pain when you urinate or do strenuous activity. You also may notice a small amount of blood in your urine after strenuous activities. These side effects usually don't prevent people from doing their normal daily activities. You may have a thin string coming out of your urethra. Your urethra is the tube that carries urine from your bladder to outside your body. This string is attached to the stent. Try not to pull on the string. It will be used to pull out the stent when you no longer need it. After the procedure, urine may flow better from your kidneys to your bladder. A ureteral stent may be left in place for several days or for as long as several months.  Your doctor will

## 2023-09-13 NOTE — OP NOTE
[address], [address] [place] [address]                                OPERATIVE REPORT    PATIENT NAME: Brennen Carranza                       :        1960  MED REC NO:   [id number]                            ROOM:       7412  ACCOUNT NO:   [de-identified]                           ADMIT DATE: 2023  PROVIDER:     Claire Tesfaye MD    DATE OF PROCEDURE:  2023    PREOPERATIVE DIAGNOSIS:  Right ureteral calculus. POSTOPERATIVE DIAGNOSIS:  Right ureteral calculus. OPERATION  Cystopanendoscopy, retrograde pyelogram, ureteral dilatation,  ureteroscopy, laser lithotripsy, stent placement. SURGEON:  Claire Tesfaye MD    ANESTHESIA:  LMAC, operation under sedation. ESTIMATED BLOOD LOSS:  Less than 50. DESCRIPTION OF PROCEDURE:  With the patient in the lithotomy position  under satisfactory sedation, the genitalia were prepped and draped in a  sterile manner. A 21-Arabic panendoscope passed into the bladder. Inspection revealed the trigone to be symmetrical.  The ureteral  orifices of normal configuration and location. The bladder was 1 to 2+  trabeculated. The mucosal pattern was normal throughout. Following  inspection, retrograde on her left side proved to be unremarkable. On  the right side, there was a stone in the upper portion of the distal  ureter. There was hydronephrosis above this. Following this, I was  able to place a wire up to the kidney. An 8/10 dilator was used to  dilate the ureteral orifice following which the ureteroscope was back  fed over the wire. This was advanced through the bladder and ureter  until a yellow irregular calculus was encountered. This was treated  with a 242 micron laser fiber with excellent fragmentation. Following  complete fragmentation of the stone, a 26 cm, 6-Arabic double-J stent  was placed, one end curled in the renal pelvis, the other in the  bladder.

## 2023-09-13 NOTE — CARE COORDINATION
9/13/23 Update CM Note. Pt admitted 9/12/23 Dx Respiratory failure with hypercapnia and right lower quadrant abdominal pain/hydronephrosis . S/P  ureteroscopy and stone lithotripsy 9/12/23. PT15/OT15. Pt agreeable to home PT. Referral to [facility], pt accepted-notified pt concerned with cost, insurance to be checked for copay/cost.  Discharge plan to return home with daughter, daughter will provide transportation. Baseline O2 4L/NC provided by Simone Dumont.   Yudith DIAZN RN-BC  [phone]

## 2023-09-14 LAB
ALBUMIN SERPL-MCNC: 2.5 G/DL (ref 3.5–5.2)
ALP SERPL-CCNC: 78 U/L (ref 35–104)
ALT SERPL-CCNC: 164 U/L (ref 0–32)
ANION GAP SERPL CALCULATED.3IONS-SCNC: 10 MMOL/L (ref 7–16)
ANION GAP SERPL CALCULATED.3IONS-SCNC: 11 MMOL/L (ref 7–16)
ANION GAP SERPL CALCULATED.3IONS-SCNC: 3 MMOL/L (ref 7–16)
AST SERPL-CCNC: 193 U/L (ref 0–31)
BASOPHILS # BLD: 0 K/UL (ref 0–0.2)
BASOPHILS NFR BLD: 0 % (ref 0–2)
BILIRUB SERPL-MCNC: 0.4 MG/DL (ref 0–1.2)
BUN SERPL-MCNC: 7 MG/DL (ref 6–23)
BUN SERPL-MCNC: 7 MG/DL (ref 6–23)
BUN SERPL-MCNC: 8 MG/DL (ref 6–23)
CALCIUM SERPL-MCNC: 8.4 MG/DL (ref 8.6–10.2)
CHLORIDE SERPL-SCNC: 93 MMOL/L (ref 98–107)
CHLORIDE SERPL-SCNC: 95 MMOL/L (ref 98–107)
CHLORIDE SERPL-SCNC: 97 MMOL/L (ref 98–107)
CO2 SERPL-SCNC: 32 MMOL/L (ref 22–29)
CO2 SERPL-SCNC: 34 MMOL/L (ref 22–29)
CO2 SERPL-SCNC: 40 MMOL/L (ref 22–29)
CREAT SERPL-MCNC: 0.3 MG/DL (ref 0.5–1)
CREAT SERPL-MCNC: 0.4 MG/DL (ref 0.5–1)
CREAT SERPL-MCNC: 0.4 MG/DL (ref 0.5–1)
EOSINOPHIL # BLD: 0.24 K/UL (ref 0.05–0.5)
EOSINOPHILS RELATIVE PERCENT: 3 % (ref 0–6)
ERYTHROCYTE [DISTWIDTH] IN BLOOD BY AUTOMATED COUNT: 12.5 % (ref 11.5–15)
GFR SERPL CREATININE-BSD FRML MDRD: >60 ML/MIN/1.73M2
GLUCOSE SERPL-MCNC: 127 MG/DL (ref 74–99)
GLUCOSE SERPL-MCNC: 84 MG/DL (ref 74–99)
GLUCOSE SERPL-MCNC: 93 MG/DL (ref 74–99)
HCT VFR BLD AUTO: 33 % (ref 34–48)
HGB BLD-MCNC: 10.4 G/DL (ref 11.5–15.5)
LYMPHOCYTES NFR BLD: 0.4 K/UL (ref 1.5–4)
LYMPHOCYTES RELATIVE PERCENT: 4 % (ref 20–42)
MAGNESIUM SERPL-MCNC: 1.5 MG/DL (ref 1.6–2.6)
MCH RBC QN AUTO: 30.9 PG (ref 26–35)
MCHC RBC AUTO-ENTMCNC: 31.5 G/DL (ref 32–34.5)
MCV RBC AUTO: 97.9 FL (ref 80–99.9)
MICROORGANISM SPEC CULT: ABNORMAL
MONOCYTES NFR BLD: 0.47 K/UL (ref 0.1–0.95)
MONOCYTES NFR BLD: 5 % (ref 2–12)
NEUTROPHILS NFR BLD: 88 % (ref 43–80)
NEUTS SEG NFR BLD: 7.99 K/UL (ref 1.8–7.3)
PLATELET # BLD AUTO: 147 K/UL (ref 130–450)
PMV BLD AUTO: 9.9 FL (ref 7–12)
POTASSIUM SERPL-SCNC: 3.1 MMOL/L (ref 3.5–5)
POTASSIUM SERPL-SCNC: 3.1 MMOL/L (ref 3.5–5)
POTASSIUM SERPL-SCNC: 3.3 MMOL/L (ref 3.5–5)
PROT SERPL-MCNC: 4.9 G/DL (ref 6.4–8.3)
RBC # BLD AUTO: 3.37 M/UL (ref 3.5–5.5)
RBC # BLD: NORMAL 10*6/UL
SODIUM SERPL-SCNC: 136 MMOL/L (ref 132–146)
SODIUM SERPL-SCNC: 138 MMOL/L (ref 132–146)
SODIUM SERPL-SCNC: 141 MMOL/L (ref 132–146)
SPECIMEN DESCRIPTION: ABNORMAL
WBC OTHER # BLD: 9.1 K/UL (ref 4.5–11.5)

## 2023-09-14 PROCEDURE — 85025 COMPLETE CBC W/AUTO DIFF WBC: CPT

## 2023-09-14 PROCEDURE — 80048 BASIC METABOLIC PNL TOTAL CA: CPT

## 2023-09-14 PROCEDURE — 99233 SBSQ HOSP IP/OBS HIGH 50: CPT | Performed by: INTERNAL MEDICINE

## 2023-09-14 PROCEDURE — A4216 STERILE WATER/SALINE, 10 ML: HCPCS | Performed by: INTERNAL MEDICINE

## 2023-09-14 PROCEDURE — 94660 CPAP INITIATION&MGMT: CPT

## 2023-09-14 PROCEDURE — 2580000003 HC RX 258: Performed by: UROLOGY

## 2023-09-14 PROCEDURE — 6360000002 HC RX W HCPCS: Performed by: UROLOGY

## 2023-09-14 PROCEDURE — 94640 AIRWAY INHALATION TREATMENT: CPT

## 2023-09-14 PROCEDURE — 80053 COMPREHEN METABOLIC PANEL: CPT

## 2023-09-14 PROCEDURE — 2060000000 HC ICU INTERMEDIATE R&B

## 2023-09-14 PROCEDURE — 6360000002 HC RX W HCPCS: Performed by: STUDENT IN AN ORGANIZED HEALTH CARE EDUCATION/TRAINING PROGRAM

## 2023-09-14 PROCEDURE — 6360000002 HC RX W HCPCS: Performed by: INTERNAL MEDICINE

## 2023-09-14 PROCEDURE — 99232 SBSQ HOSP IP/OBS MODERATE 35: CPT | Performed by: FAMILY MEDICINE

## 2023-09-14 PROCEDURE — 6370000000 HC RX 637 (ALT 250 FOR IP): Performed by: UROLOGY

## 2023-09-14 PROCEDURE — 2700000000 HC OXYGEN THERAPY PER DAY

## 2023-09-14 PROCEDURE — 83735 ASSAY OF MAGNESIUM: CPT

## 2023-09-14 PROCEDURE — 6370000000 HC RX 637 (ALT 250 FOR IP): Performed by: STUDENT IN AN ORGANIZED HEALTH CARE EDUCATION/TRAINING PROGRAM

## 2023-09-14 PROCEDURE — 36415 COLL VENOUS BLD VENIPUNCTURE: CPT

## 2023-09-14 PROCEDURE — 2580000003 HC RX 258: Performed by: INTERNAL MEDICINE

## 2023-09-14 PROCEDURE — 6370000000 HC RX 637 (ALT 250 FOR IP)

## 2023-09-14 RX ORDER — POTASSIUM CHLORIDE 20 MEQ/1
40 TABLET, EXTENDED RELEASE ORAL ONCE
Status: COMPLETED | OUTPATIENT
Start: 2023-09-14 | End: 2023-09-14

## 2023-09-14 RX ORDER — MAGNESIUM SULFATE IN WATER 40 MG/ML
2000 INJECTION, SOLUTION INTRAVENOUS ONCE
Status: COMPLETED | OUTPATIENT
Start: 2023-09-14 | End: 2023-09-14

## 2023-09-14 RX ADMIN — SACUBITRIL AND VALSARTAN 1 TABLET: 24; 26 TABLET, FILM COATED ORAL at 08:55

## 2023-09-14 RX ADMIN — METRONIDAZOLE 500 MG: 500 INJECTION, SOLUTION INTRAVENOUS at 12:54

## 2023-09-14 RX ADMIN — FOLIC ACID 1000 MCG: 1 TABLET ORAL at 08:56

## 2023-09-14 RX ADMIN — ATORVASTATIN CALCIUM 40 MG: 40 TABLET, FILM COATED ORAL at 20:19

## 2023-09-14 RX ADMIN — ASPIRIN 81 MG: 81 TABLET, CHEWABLE ORAL at 08:55

## 2023-09-14 RX ADMIN — BUDESONIDE INHALATION 500 MCG: 0.5 SUSPENSION RESPIRATORY (INHALATION) at 06:00

## 2023-09-14 RX ADMIN — CEFEPIME 2000 MG: 2 INJECTION, POWDER, FOR SOLUTION INTRAVENOUS at 16:23

## 2023-09-14 RX ADMIN — METOPROLOL SUCCINATE 12.5 MG: 25 TABLET, FILM COATED, EXTENDED RELEASE ORAL at 20:19

## 2023-09-14 RX ADMIN — IPRATROPIUM BROMIDE AND ALBUTEROL SULFATE 1 DOSE: .5; 2.5 SOLUTION RESPIRATORY (INHALATION) at 20:46

## 2023-09-14 RX ADMIN — PANTOPRAZOLE SODIUM 40 MG: 40 TABLET, DELAYED RELEASE ORAL at 20:19

## 2023-09-14 RX ADMIN — METRONIDAZOLE 500 MG: 500 INJECTION, SOLUTION INTRAVENOUS at 03:23

## 2023-09-14 RX ADMIN — POTASSIUM CHLORIDE 40 MEQ: 1500 TABLET, EXTENDED RELEASE ORAL at 18:32

## 2023-09-14 RX ADMIN — ROFLUMILAST 500 MCG: 500 TABLET ORAL at 08:56

## 2023-09-14 RX ADMIN — ARFORMOTEROL TARTRATE 15 MCG: 15 SOLUTION RESPIRATORY (INHALATION) at 06:00

## 2023-09-14 RX ADMIN — IPRATROPIUM BROMIDE AND ALBUTEROL SULFATE 1 DOSE: .5; 2.5 SOLUTION RESPIRATORY (INHALATION) at 15:44

## 2023-09-14 RX ADMIN — BUDESONIDE INHALATION 500 MCG: 0.5 SUSPENSION RESPIRATORY (INHALATION) at 20:46

## 2023-09-14 RX ADMIN — POTASSIUM CHLORIDE 40 MEQ: 1500 TABLET, EXTENDED RELEASE ORAL at 11:26

## 2023-09-14 RX ADMIN — MAGNESIUM SULFATE HEPTAHYDRATE 2000 MG: 40 INJECTION, SOLUTION INTRAVENOUS at 11:26

## 2023-09-14 RX ADMIN — ALPRAZOLAM 0.25 MG: 0.25 TABLET ORAL at 20:19

## 2023-09-14 RX ADMIN — Medication 10 ML: at 08:56

## 2023-09-14 RX ADMIN — ALPRAZOLAM 0.25 MG: 0.25 TABLET ORAL at 11:29

## 2023-09-14 RX ADMIN — ARFORMOTEROL TARTRATE 15 MCG: 15 SOLUTION RESPIRATORY (INHALATION) at 20:46

## 2023-09-14 RX ADMIN — IPRATROPIUM BROMIDE AND ALBUTEROL SULFATE 1 DOSE: .5; 2.5 SOLUTION RESPIRATORY (INHALATION) at 06:01

## 2023-09-14 RX ADMIN — IPRATROPIUM BROMIDE AND ALBUTEROL SULFATE 1 DOSE: .5; 2.5 SOLUTION RESPIRATORY (INHALATION) at 12:35

## 2023-09-14 RX ADMIN — ONDANSETRON 4 MG: 2 INJECTION INTRAMUSCULAR; INTRAVENOUS at 06:48

## 2023-09-14 RX ADMIN — DAPTOMYCIN 320 MG: 500 INJECTION, POWDER, LYOPHILIZED, FOR SOLUTION INTRAVENOUS at 17:50

## 2023-09-14 RX ADMIN — SACUBITRIL AND VALSARTAN 1 TABLET: 24; 26 TABLET, FILM COATED ORAL at 20:19

## 2023-09-14 RX ADMIN — Medication 10 ML: at 20:21

## 2023-09-14 RX ADMIN — ENOXAPARIN SODIUM 40 MG: 100 INJECTION SUBCUTANEOUS at 08:56

## 2023-09-14 RX ADMIN — ONDANSETRON 4 MG: 2 INJECTION INTRAMUSCULAR; INTRAVENOUS at 18:32

## 2023-09-14 ASSESSMENT — PAIN SCALES - GENERAL
PAINLEVEL_OUTOF10: 0
PAINLEVEL_OUTOF10: 0

## 2023-09-14 NOTE — PLAN OF CARE
Problem: Discharge Planning  Goal: Discharge to home or other facility with appropriate resources  9/13/2023 2303 by Eugene Conway RN  Outcome: Progressing  9/13/2023 1823 by Heena Dewitt RN  Outcome: Progressing     Problem: Safety - Adult  Goal: Free from fall injury  9/13/2023 2303 by Eugene Conway RN  Outcome: Progressing  9/13/2023 1823 by Heena Dewitt RN  Outcome: Progressing     Problem: Chronic Conditions and Co-morbidities  Goal: Patient's chronic conditions and co-morbidity symptoms are monitored and maintained or improved  9/13/2023 1823 by Heena Dewitt RN  Outcome: Progressing     Problem: Pain  Goal: Verbalizes/displays adequate comfort level or baseline comfort level  9/13/2023 2303 by Eugene Conway RN  Outcome: Progressing  9/13/2023 1823 by Heena Dewitt RN  Outcome: Progressing     Problem: Nutrition Deficit:  Goal: Optimize nutritional status  9/13/2023 1823 by Heena Dewitt RN  Outcome: Progressing

## 2023-09-14 NOTE — CARE COORDINATION
9/14/23 Update CM Note. Pt admitted 9/12/23 Dx Respiratory failure with hypercapnia and right lower quadrant abdominal pain/hydronephrosis . S/P  ureteroscopy and stone lithotripsy 9/12/23. PT15/OT15. Pt agreeable to home PT. Referral to [facility], pt accepted-notified pt concerned with cost, insurance to be checked for copay/cost.  Discharge plan to return home with daughter, daughter will provide transportation. Baseline O2 4L/NC provided by Asya Martines. Spoke with daughter Missy Metzger, in agreement with DC plan.   Estefany Gatess BSN RN-BC  [phone]

## 2023-09-15 ENCOUNTER — APPOINTMENT (OUTPATIENT)
Dept: ULTRASOUND IMAGING | Age: 63
DRG: 659 | End: 2023-09-15
Payer: COMMERCIAL

## 2023-09-15 PROBLEM — N20.1 URETERAL STONE: Status: ACTIVE | Noted: 2023-09-15

## 2023-09-15 PROBLEM — N13.2 HYDRONEPHROSIS WITH URINARY OBSTRUCTION DUE TO URETERAL CALCULUS: Status: ACTIVE | Noted: 2023-09-15

## 2023-09-15 LAB
ALBUMIN SERPL-MCNC: 2.3 G/DL (ref 3.5–5.2)
ALP SERPL-CCNC: 92 U/L (ref 35–104)
ALT SERPL-CCNC: 171 U/L (ref 0–32)
ANION GAP SERPL CALCULATED.3IONS-SCNC: 4 MMOL/L (ref 7–16)
AST SERPL-CCNC: 227 U/L (ref 0–31)
BASOPHILS # BLD: 0.02 K/UL (ref 0–0.2)
BASOPHILS NFR BLD: 0 % (ref 0–2)
BILIRUB SERPL-MCNC: 0.5 MG/DL (ref 0–1.2)
BUN SERPL-MCNC: 8 MG/DL (ref 6–23)
CALCIUM SERPL-MCNC: 8 MG/DL (ref 8.6–10.2)
CHLORIDE SERPL-SCNC: 94 MMOL/L (ref 98–107)
CK SERPL-CCNC: 39 U/L (ref 20–180)
CO2 SERPL-SCNC: 35 MMOL/L (ref 22–29)
CREAT SERPL-MCNC: 0.4 MG/DL (ref 0.5–1)
EOSINOPHIL # BLD: 0.17 K/UL (ref 0.05–0.5)
EOSINOPHILS RELATIVE PERCENT: 2 % (ref 0–6)
ERYTHROCYTE [DISTWIDTH] IN BLOOD BY AUTOMATED COUNT: 12.4 % (ref 11.5–15)
GFR SERPL CREATININE-BSD FRML MDRD: >60 ML/MIN/1.73M2
GLUCOSE SERPL-MCNC: 91 MG/DL (ref 74–99)
HCT VFR BLD AUTO: 31.2 % (ref 34–48)
HGB BLD-MCNC: 10 G/DL (ref 11.5–15.5)
IMM GRANULOCYTES # BLD AUTO: 0.06 K/UL (ref 0–0.58)
IMM GRANULOCYTES NFR BLD: 1 % (ref 0–5)
LYMPHOCYTES NFR BLD: 0.32 K/UL (ref 1.5–4)
LYMPHOCYTES RELATIVE PERCENT: 4 % (ref 20–42)
MCH RBC QN AUTO: 31.3 PG (ref 26–35)
MCHC RBC AUTO-ENTMCNC: 32.1 G/DL (ref 32–34.5)
MCV RBC AUTO: 97.8 FL (ref 80–99.9)
MONOCYTES NFR BLD: 0.78 K/UL (ref 0.1–0.95)
MONOCYTES NFR BLD: 10 % (ref 2–12)
NEUTROPHILS NFR BLD: 84 % (ref 43–80)
NEUTS SEG NFR BLD: 6.8 K/UL (ref 1.8–7.3)
PLATELET # BLD AUTO: 143 K/UL (ref 130–450)
PMV BLD AUTO: 10.2 FL (ref 7–12)
POTASSIUM SERPL-SCNC: 4 MMOL/L (ref 3.5–5)
PROT SERPL-MCNC: 4.4 G/DL (ref 6.4–8.3)
RBC # BLD AUTO: 3.19 M/UL (ref 3.5–5.5)
SODIUM SERPL-SCNC: 133 MMOL/L (ref 132–146)
WBC OTHER # BLD: 8.2 K/UL (ref 4.5–11.5)

## 2023-09-15 PROCEDURE — 94660 CPAP INITIATION&MGMT: CPT

## 2023-09-15 PROCEDURE — 6370000000 HC RX 637 (ALT 250 FOR IP): Performed by: UROLOGY

## 2023-09-15 PROCEDURE — 6360000002 HC RX W HCPCS: Performed by: INTERNAL MEDICINE

## 2023-09-15 PROCEDURE — 36415 COLL VENOUS BLD VENIPUNCTURE: CPT

## 2023-09-15 PROCEDURE — 2700000000 HC OXYGEN THERAPY PER DAY

## 2023-09-15 PROCEDURE — 2580000003 HC RX 258: Performed by: INTERNAL MEDICINE

## 2023-09-15 PROCEDURE — 6360000002 HC RX W HCPCS: Performed by: UROLOGY

## 2023-09-15 PROCEDURE — 99233 SBSQ HOSP IP/OBS HIGH 50: CPT | Performed by: INTERNAL MEDICINE

## 2023-09-15 PROCEDURE — 85025 COMPLETE CBC W/AUTO DIFF WBC: CPT

## 2023-09-15 PROCEDURE — 82550 ASSAY OF CK (CPK): CPT

## 2023-09-15 PROCEDURE — 76705 ECHO EXAM OF ABDOMEN: CPT

## 2023-09-15 PROCEDURE — A4216 STERILE WATER/SALINE, 10 ML: HCPCS | Performed by: INTERNAL MEDICINE

## 2023-09-15 PROCEDURE — 2060000000 HC ICU INTERMEDIATE R&B

## 2023-09-15 PROCEDURE — 2580000003 HC RX 258: Performed by: UROLOGY

## 2023-09-15 PROCEDURE — 80053 COMPREHEN METABOLIC PANEL: CPT

## 2023-09-15 PROCEDURE — 99231 SBSQ HOSP IP/OBS SF/LOW 25: CPT | Performed by: FAMILY MEDICINE

## 2023-09-15 PROCEDURE — 94640 AIRWAY INHALATION TREATMENT: CPT

## 2023-09-15 RX ADMIN — IPRATROPIUM BROMIDE AND ALBUTEROL SULFATE 1 DOSE: .5; 2.5 SOLUTION RESPIRATORY (INHALATION) at 09:31

## 2023-09-15 RX ADMIN — PANTOPRAZOLE SODIUM 40 MG: 40 TABLET, DELAYED RELEASE ORAL at 21:12

## 2023-09-15 RX ADMIN — CEFEPIME 2000 MG: 2 INJECTION, POWDER, FOR SOLUTION INTRAVENOUS at 03:33

## 2023-09-15 RX ADMIN — FOLIC ACID 1000 MCG: 1 TABLET ORAL at 09:14

## 2023-09-15 RX ADMIN — ARFORMOTEROL TARTRATE 15 MCG: 15 SOLUTION RESPIRATORY (INHALATION) at 05:23

## 2023-09-15 RX ADMIN — IPRATROPIUM BROMIDE AND ALBUTEROL SULFATE 1 DOSE: .5; 2.5 SOLUTION RESPIRATORY (INHALATION) at 21:21

## 2023-09-15 RX ADMIN — SODIUM CHLORIDE, POTASSIUM CHLORIDE, SODIUM LACTATE AND CALCIUM CHLORIDE: 600; 310; 30; 20 INJECTION, SOLUTION INTRAVENOUS at 05:08

## 2023-09-15 RX ADMIN — ASPIRIN 81 MG: 81 TABLET, CHEWABLE ORAL at 09:14

## 2023-09-15 RX ADMIN — BUDESONIDE INHALATION 500 MCG: 0.5 SUSPENSION RESPIRATORY (INHALATION) at 05:23

## 2023-09-15 RX ADMIN — BUDESONIDE INHALATION 500 MCG: 0.5 SUSPENSION RESPIRATORY (INHALATION) at 21:21

## 2023-09-15 RX ADMIN — CEFEPIME 2000 MG: 2 INJECTION, POWDER, FOR SOLUTION INTRAVENOUS at 16:42

## 2023-09-15 RX ADMIN — Medication 10 ML: at 09:15

## 2023-09-15 RX ADMIN — IPRATROPIUM BROMIDE AND ALBUTEROL SULFATE 1 DOSE: .5; 2.5 SOLUTION RESPIRATORY (INHALATION) at 16:03

## 2023-09-15 RX ADMIN — DAPTOMYCIN 320 MG: 500 INJECTION, POWDER, LYOPHILIZED, FOR SOLUTION INTRAVENOUS at 16:35

## 2023-09-15 RX ADMIN — BUDESONIDE INHALATION 500 MCG: 0.5 SUSPENSION RESPIRATORY (INHALATION) at 09:31

## 2023-09-15 RX ADMIN — SACUBITRIL AND VALSARTAN 1 TABLET: 24; 26 TABLET, FILM COATED ORAL at 09:14

## 2023-09-15 RX ADMIN — SACUBITRIL AND VALSARTAN 1 TABLET: 24; 26 TABLET, FILM COATED ORAL at 21:12

## 2023-09-15 RX ADMIN — ALPRAZOLAM 0.25 MG: 0.25 TABLET ORAL at 17:16

## 2023-09-15 RX ADMIN — ATORVASTATIN CALCIUM 40 MG: 40 TABLET, FILM COATED ORAL at 21:12

## 2023-09-15 RX ADMIN — ARFORMOTEROL TARTRATE 15 MCG: 15 SOLUTION RESPIRATORY (INHALATION) at 21:21

## 2023-09-15 RX ADMIN — ROFLUMILAST 500 MCG: 500 TABLET ORAL at 09:14

## 2023-09-15 RX ADMIN — ENOXAPARIN SODIUM 40 MG: 100 INJECTION SUBCUTANEOUS at 09:14

## 2023-09-15 RX ADMIN — IPRATROPIUM BROMIDE AND ALBUTEROL SULFATE 1 DOSE: .5; 2.5 SOLUTION RESPIRATORY (INHALATION) at 05:24

## 2023-09-15 RX ADMIN — ALPRAZOLAM 0.25 MG: 0.25 TABLET ORAL at 05:09

## 2023-09-15 RX ADMIN — ARFORMOTEROL TARTRATE 15 MCG: 15 SOLUTION RESPIRATORY (INHALATION) at 09:31

## 2023-09-15 RX ADMIN — IPRATROPIUM BROMIDE AND ALBUTEROL SULFATE 1 DOSE: .5; 2.5 SOLUTION RESPIRATORY (INHALATION) at 12:24

## 2023-09-15 ASSESSMENT — PAIN SCALES - GENERAL
PAINLEVEL_OUTOF10: 0

## 2023-09-15 NOTE — PLAN OF CARE
Problem: Discharge Planning  Goal: Discharge to home or other facility with appropriate resources  Outcome: Progressing  Flowsheets (Taken 9/14/2023 2000)  Discharge to home or other facility with appropriate resources: Identify barriers to discharge with patient and caregiver     Problem: Safety - Adult  Goal: Free from fall injury  Outcome: Progressing  Flowsheets (Taken 9/14/2023 2154)  Free From Fall Injury: Instruct family/caregiver on patient safety     Problem: Chronic Conditions and Co-morbidities  Goal: Patient's chronic conditions and co-morbidity symptoms are monitored and maintained or improved  Outcome: Progressing  Flowsheets (Taken 9/14/2023 2000)  Care Plan - Patient's Chronic Conditions and Co-Morbidity Symptoms are Monitored and Maintained or Improved: Monitor and assess patient's chronic conditions and comorbid symptoms for stability, deterioration, or improvement     Problem: Pain  Goal: Verbalizes/displays adequate comfort level or baseline comfort level  Outcome: Progressing  Flowsheets (Taken 9/14/2023 2015)  Verbalizes/displays adequate comfort level or baseline comfort level: Encourage patient to monitor pain and request assistance     Problem: Nutrition Deficit:  Goal: Optimize nutritional status  Outcome: Progressing

## 2023-09-15 NOTE — CONSULTS
[address], [address] [place] [address]                                  CONSULTATION    PATIENT NAME: Quin Flynn                       :        1960  MED REC NO:   [id number]                            ROOM:       13  ACCOUNT NO:   [de-identified]                           ADMIT DATE: 2023  PROVIDER:     Nikole Gong MD    CONSULT DATE:  2023    She is currently in the emergency room with a chief complaint of right  flank pain. HISTORY OF PRESENT ILLNESS:  This is a 57-year-old female who does not  have a history of previous urinary calculi, presented to the emergency  room with severe right-sided abdominal pain, nausea, and was found on  CAT scan to have an obstructing stone in her right ureter. The patient  denies any hematuria. She denies any history of voiding difficulty or  urinary incontinence. PAST MEDICAL HISTORY:  Significant for COPD, coronary artery disease,  history of diverticulitis, depression, and DVT. PAST SURGICAL HISTORY:  Includes cardiovascular stress tests,  bronchoscopy, and lung biopsy. SOCIAL HISTORY:  The patient is a cigarette smoker. FAMILY HISTORY:  Significant for breast cancer, coronary artery disease,  and diabetes. ALLERGIES:  PENICILLIN. PHYSICAL EXAMINATION:  GENERAL:  The patient is alert, oriented. She is lying flat on the cart  with nasal oxygen in place. She is comfortable at this time. ABDOMEN:  Soft and nondistended. There is no tenderness currently. IMPRESSION:  Right ureteral stone with hydronephrosis. PLAN:  The patient is to be admitted and observed. She tentatively will  be scheduled for ureteroscopy pending changes in her symptomatology.         Nikole Gong MD    D: 2023 10:27:27       T: 2023 10:31:22     [email or web address]  Job#: [id number]     Doc#: [id number]    CC:
Chart reviewed  Patient is not suicidal, homicidal, psychotic or acutely manic for emergency inpatient level of psych consult. For ongoing anxiety, please refer to out patient MH treatment/counseling at the time of discharge  Current treatment of anxiety is highly effective as per M.D. note and being continued.   Psychiatry will sign off
Depression with anxiety 03/23/2023    Diverticulitis     Provoked DVT 3/2018     3 months Eliquis for DVT due to PICC line    Seasonal allergic rhinitis     Smoker 11/30/2019    5-6 per day    Tobacco use disorder      : 1 ppd since age 25    Weakness of both legs        Past Surgical History:   Procedure Laterality Date    BRONCHOSCOPY N/A 04/27/2023    BRONCHOSCOPY ALVEOLAR LAVAGE performed by Neyda Mixon DO at [facility]  04/27/2023    BRONCHOSCOPY DIAGNOSTIC OR CELL [address] ONLY performed by Nyeda Mixon DO at [address] TEST N/A 01/06/2023    lexiscan stress test    LUNG BIOPSY      OTHER SURGICAL HISTORY      tube to drain diverticulitis X 2       Medications Prior to Admission:    Prior to Admission medications    Medication Sig Start Date End Date Taking?  Authorizing Provider   Budeson-Glycopyrrol-Formoterol (BREZTRI AEROSPHERE) 160-9-4.8 MCG/ACT AERO Inhale 2 puffs into the lungs in the morning and at bedtime 8/31/23   Marino Christianson APRN - CNP   Roflumilast (DALIRESP) 500 MCG tablet Take 1 tablet by mouth daily 8/7/23   Denisse Santana MD   sacubitril-valsartan (ENTRESTO) 24-26 MG per tablet Take 1 tablet by mouth 2 times daily 8/7/23   Denisse Santana MD   atorvastatin (LIPITOR) 40 MG tablet take 1 tablet by mouth nightly 8/2/23   Joan Summers MD   folic acid (FOLVITE) 1 MG tablet take 1 tablet by mouth once daily 8/2/23   Joan Summers MD   pantoprazole (PROTONIX) 40 MG tablet take 1 tablet by mouth nightly 8/2/23   Joan Summers MD   Cholecalciferol (VITAMIN D3) 50 MCG (2000 UT) TABS take 1 tablet by mouth once daily 8/2/23   Joan Summers MD   ipratropium 0.5 mg-albuterol 2.5 mg (DUONEB) 0.5-2.5 (3) MG/3ML SOLN nebulizer solution inhale contents of 1 vial ( 3 milliliters ) in nebulizer by mouth and INTO THE LUNGS every 4 hours 7/28/23   Denisse Santana MD   albuterol sulfate HFA (PROVENTIL;VENTOLIN;PROAIR) 108 (90 Base)
quadrants with light palpation). non-distended, normal bowel sounds, no organomegaly   Extremities: No cyanosis, No clubbing No edema   Musculoskeletal: Nno joint swelling, deformity or tenderness   Neurologic: Reflexes normal and symmetric, No CN deficits. CBC:   Lab Results   Component Value Date/Time    WBC 19.5 09/10/2023 10:30 PM    RBC 4.06 09/10/2023 10:30 PM    HGB 12.6 09/10/2023 10:30 PM    HCT 40.8 09/10/2023 10:30 PM    .5 09/10/2023 10:30 PM    MCH 31.0 09/10/2023 10:30 PM    MCHC 30.9 09/10/2023 10:30 PM    RDW 12.5 09/10/2023 10:30 PM     09/03/2023 06:15 AM    MPV 9.9 09/10/2023 10:30 PM     BMP:    Lab Results   Component Value Date/Time     09/10/2023 10:30 PM    K 4.0 09/10/2023 10:30 PM    K 3.5 07/14/2023 06:19 AM    CL 96 09/10/2023 10:30 PM    CO2 42 09/10/2023 10:30 PM    BUN 11 09/10/2023 10:30 PM    LABALBU 3.8 09/10/2023 10:30 PM    CREATININE 0.5 09/10/2023 10:30 PM    CALCIUM 9.1 09/10/2023 10:30 PM    GFRAA >60 04/13/2022 02:20 PM    LABGLOM >60 09/10/2023 10:30 PM    GLUCOSE 135 09/10/2023 10:30 PM       CLINICAL ASSESMENT:    The patient is a 61 y.o. female with a past medical history of COPD (on 4 liters nasal canula home), HLD, CAD, CHFrEF, diverticulosis, depression, and Anxiety who presents to the ED with complaints of RLQ pain. Acute Respiratory Failure with Hypercapnia  RLQ Abdominal Pain 2/2 6 mm calculus in the distal right ureter  Numerous lesions on both kidneys  History of CHFpEF  JUDE Nightly CPAP continued   Coronary Artery Disease  Chronic Tobacco Abuse  Anxiety, Continue home Xanax     PLAN:    Currently on baseline oxygen. Continue breathing treatment  Continue Daliresp  Antibiotic per urology and other consult services. No requirement of steroid currently due to lack of wheezing. Follow urology recommendation  Pain control with as needed pain medication  Borderline low blood pressure, cautious use of GDMT and diuretics.   Continue
of the sigmoid colon is again seen. Differential includes colitis, diverticulitis, and neoplasm. Consider   correlation with colonoscopy.             IMPRESSION:   Complicated UTI  ( Enterococcus VRE and Pseudomonas )   Leukocytosis   S/P cystoscopy and stent insertion ( 9/12)         RECOMMENDATIONS:       Stop rocephin and flagyl   Cefepime 2 grams IV q 12 hrs   Daptomycin 6 mg / kg IV q 24 hrs ( PCN allergy )   Contact isolation       Thank you Dr Hema Wisdom for the consult

## 2023-09-15 NOTE — CARE COORDINATION
Reviewed chart, psych consulted for anxiety, await recommendations. Pt declined therapy, last therapy 9/13 and only walked a few side steps. Pt has 4L O2 through [employer] at home. Daughter to transport. [facility] following, will need hhc orders, cpa, med compliance, and PT/OT. Darcie Cm, MSW, LSW

## 2023-09-16 LAB
ALBUMIN SERPL-MCNC: 2.2 G/DL (ref 3.5–5.2)
ALP SERPL-CCNC: 125 U/L (ref 35–104)
ALT SERPL-CCNC: 243 U/L (ref 0–32)
ANION GAP SERPL CALCULATED.3IONS-SCNC: 9 MMOL/L (ref 7–16)
AST SERPL-CCNC: 394 U/L (ref 0–31)
BASOPHILS # BLD: 0 K/UL (ref 0–0.2)
BASOPHILS NFR BLD: 0 % (ref 0–2)
BILIRUB SERPL-MCNC: 0.4 MG/DL (ref 0–1.2)
BUN SERPL-MCNC: 11 MG/DL (ref 6–23)
CALCIUM SERPL-MCNC: 8.4 MG/DL (ref 8.6–10.2)
CHLORIDE SERPL-SCNC: 100 MMOL/L (ref 98–107)
CO2 SERPL-SCNC: 36 MMOL/L (ref 22–29)
CREAT SERPL-MCNC: 0.4 MG/DL (ref 0.5–1)
EOSINOPHIL # BLD: 0.33 K/UL (ref 0.05–0.5)
EOSINOPHILS RELATIVE PERCENT: 4 % (ref 0–6)
ERYTHROCYTE [DISTWIDTH] IN BLOOD BY AUTOMATED COUNT: 12.5 % (ref 11.5–15)
GFR SERPL CREATININE-BSD FRML MDRD: >60 ML/MIN/1.73M2
GLUCOSE SERPL-MCNC: 86 MG/DL (ref 74–99)
HCT VFR BLD AUTO: 29.2 % (ref 34–48)
HGB BLD-MCNC: 9.4 G/DL (ref 11.5–15.5)
LYMPHOCYTES NFR BLD: 0.73 K/UL (ref 1.5–4)
LYMPHOCYTES RELATIVE PERCENT: 10 % (ref 20–42)
MAGNESIUM SERPL-MCNC: 1.8 MG/DL (ref 1.6–2.6)
MCH RBC QN AUTO: 31.4 PG (ref 26–35)
MCHC RBC AUTO-ENTMCNC: 32.2 G/DL (ref 32–34.5)
MCV RBC AUTO: 97.7 FL (ref 80–99.9)
MICROORGANISM SPEC CULT: NORMAL
MICROORGANISM SPEC CULT: NORMAL
MONOCYTES NFR BLD: 0.4 K/UL (ref 0.1–0.95)
MONOCYTES NFR BLD: 5 % (ref 2–12)
MYELOCYTES ABSOLUTE COUNT: 0.07 K/UL
MYELOCYTES: 1 %
NEUTROPHILS NFR BLD: 80 % (ref 43–80)
NEUTS SEG NFR BLD: 6.08 K/UL (ref 1.8–7.3)
PLATELET # BLD AUTO: 161 K/UL (ref 130–450)
PMV BLD AUTO: 9.5 FL (ref 7–12)
POTASSIUM SERPL-SCNC: 3.6 MMOL/L (ref 3.5–5)
PROT SERPL-MCNC: 4.3 G/DL (ref 6.4–8.3)
RBC # BLD AUTO: 2.99 M/UL (ref 3.5–5.5)
RBC # BLD: NORMAL 10*6/UL
SERVICE CMNT-IMP: NORMAL
SERVICE CMNT-IMP: NORMAL
SODIUM SERPL-SCNC: 145 MMOL/L (ref 132–146)
SPECIMEN DESCRIPTION: NORMAL
SPECIMEN DESCRIPTION: NORMAL
WBC OTHER # BLD: 7.6 K/UL (ref 4.5–11.5)

## 2023-09-16 PROCEDURE — A4216 STERILE WATER/SALINE, 10 ML: HCPCS | Performed by: INTERNAL MEDICINE

## 2023-09-16 PROCEDURE — 6370000000 HC RX 637 (ALT 250 FOR IP): Performed by: UROLOGY

## 2023-09-16 PROCEDURE — 94640 AIRWAY INHALATION TREATMENT: CPT

## 2023-09-16 PROCEDURE — 6360000002 HC RX W HCPCS: Performed by: UROLOGY

## 2023-09-16 PROCEDURE — 80053 COMPREHEN METABOLIC PANEL: CPT

## 2023-09-16 PROCEDURE — 94660 CPAP INITIATION&MGMT: CPT

## 2023-09-16 PROCEDURE — 85025 COMPLETE CBC W/AUTO DIFF WBC: CPT

## 2023-09-16 PROCEDURE — 2700000000 HC OXYGEN THERAPY PER DAY

## 2023-09-16 PROCEDURE — 6370000000 HC RX 637 (ALT 250 FOR IP): Performed by: INTERNAL MEDICINE

## 2023-09-16 PROCEDURE — 83735 ASSAY OF MAGNESIUM: CPT

## 2023-09-16 PROCEDURE — 2580000003 HC RX 258: Performed by: INTERNAL MEDICINE

## 2023-09-16 PROCEDURE — 36415 COLL VENOUS BLD VENIPUNCTURE: CPT

## 2023-09-16 PROCEDURE — 99232 SBSQ HOSP IP/OBS MODERATE 35: CPT | Performed by: FAMILY MEDICINE

## 2023-09-16 PROCEDURE — 6360000002 HC RX W HCPCS: Performed by: INTERNAL MEDICINE

## 2023-09-16 PROCEDURE — 2060000000 HC ICU INTERMEDIATE R&B

## 2023-09-16 PROCEDURE — 2580000003 HC RX 258: Performed by: UROLOGY

## 2023-09-16 RX ORDER — LEVOFLOXACIN 500 MG/1
500 TABLET, FILM COATED ORAL DAILY
Status: DISCONTINUED | OUTPATIENT
Start: 2023-09-16 | End: 2023-09-19 | Stop reason: HOSPADM

## 2023-09-16 RX ADMIN — CEFEPIME 2000 MG: 2 INJECTION, POWDER, FOR SOLUTION INTRAVENOUS at 05:24

## 2023-09-16 RX ADMIN — BUDESONIDE INHALATION 500 MCG: 0.5 SUSPENSION RESPIRATORY (INHALATION) at 19:36

## 2023-09-16 RX ADMIN — BUDESONIDE INHALATION 500 MCG: 0.5 SUSPENSION RESPIRATORY (INHALATION) at 08:01

## 2023-09-16 RX ADMIN — ARFORMOTEROL TARTRATE 15 MCG: 15 SOLUTION RESPIRATORY (INHALATION) at 19:36

## 2023-09-16 RX ADMIN — IPRATROPIUM BROMIDE AND ALBUTEROL SULFATE 1 DOSE: .5; 2.5 SOLUTION RESPIRATORY (INHALATION) at 11:53

## 2023-09-16 RX ADMIN — LEVOFLOXACIN 500 MG: 500 TABLET, FILM COATED ORAL at 15:41

## 2023-09-16 RX ADMIN — Medication 10 ML: at 05:28

## 2023-09-16 RX ADMIN — FOLIC ACID 1000 MCG: 1 TABLET ORAL at 09:18

## 2023-09-16 RX ADMIN — IPRATROPIUM BROMIDE AND ALBUTEROL SULFATE 1 DOSE: .5; 2.5 SOLUTION RESPIRATORY (INHALATION) at 19:36

## 2023-09-16 RX ADMIN — ALPRAZOLAM 0.25 MG: 0.25 TABLET ORAL at 15:41

## 2023-09-16 RX ADMIN — ATORVASTATIN CALCIUM 40 MG: 40 TABLET, FILM COATED ORAL at 20:11

## 2023-09-16 RX ADMIN — IPRATROPIUM BROMIDE AND ALBUTEROL SULFATE 1 DOSE: .5; 2.5 SOLUTION RESPIRATORY (INHALATION) at 08:01

## 2023-09-16 RX ADMIN — ALPRAZOLAM 0.25 MG: 0.25 TABLET ORAL at 05:27

## 2023-09-16 RX ADMIN — ROFLUMILAST 500 MCG: 500 TABLET ORAL at 09:18

## 2023-09-16 RX ADMIN — ASPIRIN 81 MG: 81 TABLET, CHEWABLE ORAL at 09:18

## 2023-09-16 RX ADMIN — DAPTOMYCIN 300 MG: 500 INJECTION, POWDER, LYOPHILIZED, FOR SOLUTION INTRAVENOUS at 16:47

## 2023-09-16 RX ADMIN — SACUBITRIL AND VALSARTAN 1 TABLET: 24; 26 TABLET, FILM COATED ORAL at 20:11

## 2023-09-16 RX ADMIN — ARFORMOTEROL TARTRATE 15 MCG: 15 SOLUTION RESPIRATORY (INHALATION) at 08:01

## 2023-09-16 RX ADMIN — SACUBITRIL AND VALSARTAN 1 TABLET: 24; 26 TABLET, FILM COATED ORAL at 09:18

## 2023-09-16 RX ADMIN — PANTOPRAZOLE SODIUM 40 MG: 40 TABLET, DELAYED RELEASE ORAL at 20:12

## 2023-09-16 RX ADMIN — ENOXAPARIN SODIUM 40 MG: 100 INJECTION SUBCUTANEOUS at 09:18

## 2023-09-16 RX ADMIN — IPRATROPIUM BROMIDE AND ALBUTEROL SULFATE 1 DOSE: .5; 2.5 SOLUTION RESPIRATORY (INHALATION) at 15:55

## 2023-09-16 RX ADMIN — Medication 10 ML: at 09:18

## 2023-09-16 ASSESSMENT — PAIN SCALES - GENERAL
PAINLEVEL_OUTOF10: 0

## 2023-09-16 NOTE — RT PROTOCOL NOTE
Date: 9/16/2023    Time: 7:42 PM    Patient Placed On BIPAP/CPAP/ Non-Invasive Ventilation? No      If no must comment. Patient refused BiPAP tonight due to feeling nauseas and irritable.             Rodrick Bradley RCP

## 2023-09-17 ENCOUNTER — APPOINTMENT (OUTPATIENT)
Dept: GENERAL RADIOLOGY | Age: 63
DRG: 659 | End: 2023-09-17
Payer: COMMERCIAL

## 2023-09-17 LAB
ALBUMIN SERPL-MCNC: 2.5 G/DL (ref 3.5–5.2)
ALP SERPL-CCNC: 158 U/L (ref 35–104)
ALT SERPL-CCNC: 307 U/L (ref 0–32)
ANION GAP SERPL CALCULATED.3IONS-SCNC: 2 MMOL/L (ref 7–16)
AST SERPL-CCNC: 428 U/L (ref 0–31)
B.E.: 14.4 MMOL/L (ref -3–3)
B.E.: 15 MMOL/L (ref -3–3)
BILIRUB SERPL-MCNC: 0.4 MG/DL (ref 0–1.2)
BUN SERPL-MCNC: 8 MG/DL (ref 6–23)
CALCIUM SERPL-MCNC: 8.4 MG/DL (ref 8.6–10.2)
CHLORIDE SERPL-SCNC: 95 MMOL/L (ref 98–107)
CO2 SERPL-SCNC: 45 MMOL/L (ref 22–29)
COHB: 0.3 % (ref 0–1.5)
COHB: 0.3 % (ref 0–1.5)
CREAT SERPL-MCNC: 0.3 MG/DL (ref 0.5–1)
CRITICAL: ABNORMAL
CRITICAL: ABNORMAL
DATE ANALYZED: ABNORMAL
DATE ANALYZED: ABNORMAL
DATE OF COLLECTION: ABNORMAL
DATE OF COLLECTION: ABNORMAL
GFR SERPL CREATININE-BSD FRML MDRD: >60 ML/MIN/1.73M2
GLUCOSE SERPL-MCNC: 95 MG/DL (ref 74–99)
HCO3: 40.5 MMOL/L (ref 22–26)
HCO3: 42.3 MMOL/L (ref 22–26)
HHB: 1.6 % (ref 0–5)
HHB: 2 % (ref 0–5)
LAB: ABNORMAL
MAGNESIUM SERPL-MCNC: 1.7 MG/DL (ref 1.6–2.6)
METHB: 0.2 % (ref 0–1.5)
METHB: 0.5 % (ref 0–1.5)
MODE: ABNORMAL
MODE: ABNORMAL
O2 CONTENT: 14.7 ML/DL
O2 CONTENT: 14.9 ML/DL
O2 SATURATION: 98 % (ref 92–98.5)
O2 SATURATION: 98.4 % (ref 92–98.5)
O2HB: 97.5 % (ref 94–97)
O2HB: 97.6 % (ref 94–97)
OPERATOR ID: 2067
OPERATOR ID: ABNORMAL
PATIENT TEMP: 37 C
PATIENT TEMP: 37 C
PCO2: 59 MMHG (ref 35–45)
PCO2: 68.2 MMHG (ref 35–45)
PH BLOOD GAS: 7.41 (ref 7.35–7.45)
PH BLOOD GAS: 7.45 (ref 7.35–7.45)
PO2: 111.5 MMHG (ref 75–100)
PO2: 129 MMHG (ref 75–100)
POTASSIUM SERPL-SCNC: 3.3 MMOL/L (ref 3.5–5)
PROT SERPL-MCNC: 4.5 G/DL (ref 6.4–8.3)
SODIUM SERPL-SCNC: 142 MMOL/L (ref 132–146)
SOURCE, BLOOD GAS: ABNORMAL
SOURCE, BLOOD GAS: ABNORMAL
THB: 10.6 G/DL (ref 11.5–16.5)
THB: 10.7 G/DL (ref 11.5–16.5)
TIME ANALYZED: 1037
TIME ANALYZED: 1705

## 2023-09-17 PROCEDURE — 80053 COMPREHEN METABOLIC PANEL: CPT

## 2023-09-17 PROCEDURE — 6370000000 HC RX 637 (ALT 250 FOR IP): Performed by: UROLOGY

## 2023-09-17 PROCEDURE — 71045 X-RAY EXAM CHEST 1 VIEW: CPT

## 2023-09-17 PROCEDURE — 2060000000 HC ICU INTERMEDIATE R&B

## 2023-09-17 PROCEDURE — 6360000002 HC RX W HCPCS: Performed by: UROLOGY

## 2023-09-17 PROCEDURE — A4216 STERILE WATER/SALINE, 10 ML: HCPCS | Performed by: INTERNAL MEDICINE

## 2023-09-17 PROCEDURE — 85025 COMPLETE CBC W/AUTO DIFF WBC: CPT

## 2023-09-17 PROCEDURE — 82805 BLOOD GASES W/O2 SATURATION: CPT

## 2023-09-17 PROCEDURE — 94640 AIRWAY INHALATION TREATMENT: CPT

## 2023-09-17 PROCEDURE — 83735 ASSAY OF MAGNESIUM: CPT

## 2023-09-17 PROCEDURE — 99232 SBSQ HOSP IP/OBS MODERATE 35: CPT | Performed by: FAMILY MEDICINE

## 2023-09-17 PROCEDURE — 2700000000 HC OXYGEN THERAPY PER DAY

## 2023-09-17 PROCEDURE — 2580000003 HC RX 258: Performed by: UROLOGY

## 2023-09-17 PROCEDURE — 2580000003 HC RX 258: Performed by: INTERNAL MEDICINE

## 2023-09-17 PROCEDURE — 36600 WITHDRAWAL OF ARTERIAL BLOOD: CPT

## 2023-09-17 PROCEDURE — 94660 CPAP INITIATION&MGMT: CPT

## 2023-09-17 PROCEDURE — 6360000002 HC RX W HCPCS: Performed by: INTERNAL MEDICINE

## 2023-09-17 PROCEDURE — 6370000000 HC RX 637 (ALT 250 FOR IP): Performed by: INTERNAL MEDICINE

## 2023-09-17 PROCEDURE — 6370000000 HC RX 637 (ALT 250 FOR IP)

## 2023-09-17 PROCEDURE — 36415 COLL VENOUS BLD VENIPUNCTURE: CPT

## 2023-09-17 RX ORDER — POTASSIUM CHLORIDE 20 MEQ/1
40 TABLET, EXTENDED RELEASE ORAL DAILY
Status: DISCONTINUED | OUTPATIENT
Start: 2023-09-17 | End: 2023-09-19 | Stop reason: HOSPADM

## 2023-09-17 RX ADMIN — ENOXAPARIN SODIUM 40 MG: 100 INJECTION SUBCUTANEOUS at 10:42

## 2023-09-17 RX ADMIN — ALPRAZOLAM 0.25 MG: 0.25 TABLET ORAL at 10:42

## 2023-09-17 RX ADMIN — SACUBITRIL AND VALSARTAN 1 TABLET: 24; 26 TABLET, FILM COATED ORAL at 10:42

## 2023-09-17 RX ADMIN — ROFLUMILAST 500 MCG: 500 TABLET ORAL at 10:43

## 2023-09-17 RX ADMIN — BUDESONIDE INHALATION 500 MCG: 0.5 SUSPENSION RESPIRATORY (INHALATION) at 21:00

## 2023-09-17 RX ADMIN — SACUBITRIL AND VALSARTAN 1 TABLET: 24; 26 TABLET, FILM COATED ORAL at 20:56

## 2023-09-17 RX ADMIN — ALPRAZOLAM 0.25 MG: 0.25 TABLET ORAL at 00:21

## 2023-09-17 RX ADMIN — FOLIC ACID 1000 MCG: 1 TABLET ORAL at 10:42

## 2023-09-17 RX ADMIN — Medication 10 ML: at 10:43

## 2023-09-17 RX ADMIN — ARFORMOTEROL TARTRATE 15 MCG: 15 SOLUTION RESPIRATORY (INHALATION) at 10:04

## 2023-09-17 RX ADMIN — ARFORMOTEROL TARTRATE 15 MCG: 15 SOLUTION RESPIRATORY (INHALATION) at 20:59

## 2023-09-17 RX ADMIN — POTASSIUM CHLORIDE 40 MEQ: 1500 TABLET, EXTENDED RELEASE ORAL at 10:42

## 2023-09-17 RX ADMIN — IPRATROPIUM BROMIDE AND ALBUTEROL SULFATE 1 DOSE: .5; 2.5 SOLUTION RESPIRATORY (INHALATION) at 17:40

## 2023-09-17 RX ADMIN — ALPRAZOLAM 0.25 MG: 0.25 TABLET ORAL at 23:37

## 2023-09-17 RX ADMIN — PANTOPRAZOLE SODIUM 40 MG: 40 TABLET, DELAYED RELEASE ORAL at 20:54

## 2023-09-17 RX ADMIN — IPRATROPIUM BROMIDE AND ALBUTEROL SULFATE 1 DOSE: .5; 2.5 SOLUTION RESPIRATORY (INHALATION) at 13:03

## 2023-09-17 RX ADMIN — ASPIRIN 81 MG: 81 TABLET, CHEWABLE ORAL at 10:42

## 2023-09-17 RX ADMIN — IPRATROPIUM BROMIDE AND ALBUTEROL SULFATE 1 DOSE: .5; 2.5 SOLUTION RESPIRATORY (INHALATION) at 10:04

## 2023-09-17 RX ADMIN — IPRATROPIUM BROMIDE AND ALBUTEROL SULFATE 1 DOSE: .5; 2.5 SOLUTION RESPIRATORY (INHALATION) at 21:00

## 2023-09-17 RX ADMIN — ACETAMINOPHEN 650 MG: 325 TABLET ORAL at 16:20

## 2023-09-17 RX ADMIN — DAPTOMYCIN 300 MG: 500 INJECTION, POWDER, LYOPHILIZED, FOR SOLUTION INTRAVENOUS at 16:07

## 2023-09-17 RX ADMIN — LEVOFLOXACIN 500 MG: 500 TABLET, FILM COATED ORAL at 10:43

## 2023-09-17 RX ADMIN — Medication 10 ML: at 02:27

## 2023-09-17 RX ADMIN — BUDESONIDE INHALATION 500 MCG: 0.5 SUSPENSION RESPIRATORY (INHALATION) at 10:04

## 2023-09-17 RX ADMIN — Medication 10 ML: at 20:54

## 2023-09-17 ASSESSMENT — PAIN DESCRIPTION - DESCRIPTORS: DESCRIPTORS: ACHING;THROBBING;DISCOMFORT

## 2023-09-17 ASSESSMENT — PAIN SCALES - GENERAL: PAINLEVEL_OUTOF10: 9

## 2023-09-17 ASSESSMENT — PAIN DESCRIPTION - LOCATION: LOCATION: HEAD

## 2023-09-17 NOTE — PLAN OF CARE
Problem: Safety - Adult  Goal: Free from fall injury  Outcome: Progressing     Problem: Chronic Conditions and Co-morbidities  Goal: Patient's chronic conditions and co-morbidity symptoms are monitored and maintained or improved  Outcome: Progressing     Problem: Pain  Goal: Verbalizes/displays adequate comfort level or baseline comfort level  Outcome: Progressing     Problem: Nutrition Deficit:  Goal: Optimize nutritional status  Outcome: Progressing

## 2023-09-17 NOTE — PLAN OF CARE
1258- Patient's ABG showed pCO2 78. Spoke with nursing staff and patient will be placed on BiPAP.  Will check venous blood gas in 1 hour to monitor     1937- repeat ABG showed improvement in pCO2, asked nursing to reach out to [facility] for further recs

## 2023-09-18 LAB
ALBUMIN SERPL-MCNC: 2.2 G/DL (ref 3.5–5.2)
ALP SERPL-CCNC: 171 U/L (ref 35–104)
ALT SERPL-CCNC: 302 U/L (ref 0–32)
ANION GAP SERPL CALCULATED.3IONS-SCNC: 8 MMOL/L (ref 7–16)
AST SERPL-CCNC: 333 U/L (ref 0–31)
BASOPHILS # BLD: 0 K/UL (ref 0–0.2)
BASOPHILS NFR BLD: 0 % (ref 0–2)
BILIRUB SERPL-MCNC: 0.3 MG/DL (ref 0–1.2)
BUN SERPL-MCNC: 7 MG/DL (ref 6–23)
CALCIUM SERPL-MCNC: 8.3 MG/DL (ref 8.6–10.2)
CHLORIDE SERPL-SCNC: 96 MMOL/L (ref 98–107)
CO2 SERPL-SCNC: 40 MMOL/L (ref 22–29)
CREAT SERPL-MCNC: 0.3 MG/DL (ref 0.5–1)
EOSINOPHIL # BLD: 0.25 K/UL (ref 0.05–0.5)
EOSINOPHILS RELATIVE PERCENT: 4 % (ref 0–6)
ERYTHROCYTE [DISTWIDTH] IN BLOOD BY AUTOMATED COUNT: 12.8 % (ref 11.5–15)
GFR SERPL CREATININE-BSD FRML MDRD: >60 ML/MIN/1.73M2
GLUCOSE SERPL-MCNC: 92 MG/DL (ref 74–99)
HCT VFR BLD AUTO: 30.4 % (ref 34–48)
HGB BLD-MCNC: 9.5 G/DL (ref 11.5–15.5)
LYMPHOCYTES NFR BLD: 2.21 K/UL (ref 1.5–4)
LYMPHOCYTES RELATIVE PERCENT: 31 % (ref 20–42)
MCH RBC QN AUTO: 31.3 PG (ref 26–35)
MCHC RBC AUTO-ENTMCNC: 31.3 G/DL (ref 32–34.5)
MCV RBC AUTO: 100 FL (ref 80–99.9)
MONOCYTES NFR BLD: 0.32 K/UL (ref 0.1–0.95)
MONOCYTES NFR BLD: 4 % (ref 2–12)
NEUTROPHILS NFR BLD: 61 % (ref 43–80)
NEUTS SEG NFR BLD: 4.42 K/UL (ref 1.8–7.3)
PLATELET # BLD AUTO: 165 K/UL (ref 130–450)
PMV BLD AUTO: 10.3 FL (ref 7–12)
POTASSIUM SERPL-SCNC: 3.8 MMOL/L (ref 3.5–5)
PROT SERPL-MCNC: 4.5 G/DL (ref 6.4–8.3)
RBC # BLD AUTO: 3.04 M/UL (ref 3.5–5.5)
RBC # BLD: ABNORMAL 10*6/UL
SODIUM SERPL-SCNC: 144 MMOL/L (ref 132–146)
WBC OTHER # BLD: 7.2 K/UL (ref 4.5–11.5)

## 2023-09-18 PROCEDURE — 99232 SBSQ HOSP IP/OBS MODERATE 35: CPT | Performed by: FAMILY MEDICINE

## 2023-09-18 PROCEDURE — 80074 ACUTE HEPATITIS PANEL: CPT

## 2023-09-18 PROCEDURE — 2580000003 HC RX 258: Performed by: INTERNAL MEDICINE

## 2023-09-18 PROCEDURE — 94660 CPAP INITIATION&MGMT: CPT

## 2023-09-18 PROCEDURE — 2060000000 HC ICU INTERMEDIATE R&B

## 2023-09-18 PROCEDURE — 94640 AIRWAY INHALATION TREATMENT: CPT

## 2023-09-18 PROCEDURE — 2700000000 HC OXYGEN THERAPY PER DAY

## 2023-09-18 PROCEDURE — A4216 STERILE WATER/SALINE, 10 ML: HCPCS | Performed by: INTERNAL MEDICINE

## 2023-09-18 PROCEDURE — 2580000003 HC RX 258: Performed by: UROLOGY

## 2023-09-18 PROCEDURE — 80053 COMPREHEN METABOLIC PANEL: CPT

## 2023-09-18 PROCEDURE — 85025 COMPLETE CBC W/AUTO DIFF WBC: CPT

## 2023-09-18 PROCEDURE — 6360000002 HC RX W HCPCS: Performed by: UROLOGY

## 2023-09-18 PROCEDURE — 6370000000 HC RX 637 (ALT 250 FOR IP): Performed by: UROLOGY

## 2023-09-18 PROCEDURE — 36415 COLL VENOUS BLD VENIPUNCTURE: CPT

## 2023-09-18 PROCEDURE — 6360000002 HC RX W HCPCS: Performed by: INTERNAL MEDICINE

## 2023-09-18 PROCEDURE — 6370000000 HC RX 637 (ALT 250 FOR IP)

## 2023-09-18 PROCEDURE — 6370000000 HC RX 637 (ALT 250 FOR IP): Performed by: INTERNAL MEDICINE

## 2023-09-18 RX ORDER — POTASSIUM CHLORIDE 20 MEQ/1
20 TABLET, EXTENDED RELEASE ORAL DAILY
Qty: 30 TABLET | Refills: 1 | Status: SHIPPED | OUTPATIENT
Start: 2023-09-18

## 2023-09-18 RX ORDER — METOPROLOL SUCCINATE 25 MG/1
12.5 TABLET, EXTENDED RELEASE ORAL
Qty: 45 TABLET | Refills: 3 | Status: SHIPPED
Start: 2023-09-18 | End: 2023-09-19 | Stop reason: HOSPADM

## 2023-09-18 RX ADMIN — BUDESONIDE INHALATION 500 MCG: 0.5 SUSPENSION RESPIRATORY (INHALATION) at 20:52

## 2023-09-18 RX ADMIN — PANTOPRAZOLE SODIUM 40 MG: 40 TABLET, DELAYED RELEASE ORAL at 20:45

## 2023-09-18 RX ADMIN — SACUBITRIL AND VALSARTAN 1 TABLET: 24; 26 TABLET, FILM COATED ORAL at 11:38

## 2023-09-18 RX ADMIN — DAPTOMYCIN 300 MG: 500 INJECTION, POWDER, LYOPHILIZED, FOR SOLUTION INTRAVENOUS at 16:42

## 2023-09-18 RX ADMIN — ASPIRIN 81 MG: 81 TABLET, CHEWABLE ORAL at 11:39

## 2023-09-18 RX ADMIN — IPRATROPIUM BROMIDE AND ALBUTEROL SULFATE 1 DOSE: .5; 2.5 SOLUTION RESPIRATORY (INHALATION) at 09:25

## 2023-09-18 RX ADMIN — SACUBITRIL AND VALSARTAN 1 TABLET: 24; 26 TABLET, FILM COATED ORAL at 20:45

## 2023-09-18 RX ADMIN — ARFORMOTEROL TARTRATE 15 MCG: 15 SOLUTION RESPIRATORY (INHALATION) at 20:52

## 2023-09-18 RX ADMIN — IPRATROPIUM BROMIDE AND ALBUTEROL SULFATE 1 DOSE: .5; 2.5 SOLUTION RESPIRATORY (INHALATION) at 20:52

## 2023-09-18 RX ADMIN — IPRATROPIUM BROMIDE AND ALBUTEROL SULFATE 1 DOSE: .5; 2.5 SOLUTION RESPIRATORY (INHALATION) at 16:36

## 2023-09-18 RX ADMIN — FOLIC ACID 1000 MCG: 1 TABLET ORAL at 11:38

## 2023-09-18 RX ADMIN — ROFLUMILAST 500 MCG: 500 TABLET ORAL at 11:38

## 2023-09-18 RX ADMIN — METOPROLOL SUCCINATE 12.5 MG: 25 TABLET, FILM COATED, EXTENDED RELEASE ORAL at 20:45

## 2023-09-18 RX ADMIN — ENOXAPARIN SODIUM 40 MG: 100 INJECTION SUBCUTANEOUS at 11:38

## 2023-09-18 RX ADMIN — ARFORMOTEROL TARTRATE 15 MCG: 15 SOLUTION RESPIRATORY (INHALATION) at 09:25

## 2023-09-18 RX ADMIN — Medication 10 ML: at 11:40

## 2023-09-18 RX ADMIN — POTASSIUM CHLORIDE 40 MEQ: 1500 TABLET, EXTENDED RELEASE ORAL at 11:38

## 2023-09-18 RX ADMIN — ALPRAZOLAM 0.25 MG: 0.25 TABLET ORAL at 11:38

## 2023-09-18 RX ADMIN — BUDESONIDE INHALATION 500 MCG: 0.5 SUSPENSION RESPIRATORY (INHALATION) at 09:25

## 2023-09-18 RX ADMIN — LEVOFLOXACIN 500 MG: 500 TABLET, FILM COATED ORAL at 11:38

## 2023-09-18 RX ADMIN — IPRATROPIUM BROMIDE AND ALBUTEROL SULFATE 1 DOSE: .5; 2.5 SOLUTION RESPIRATORY (INHALATION) at 12:26

## 2023-09-18 RX ADMIN — Medication 10 ML: at 21:00

## 2023-09-18 NOTE — TELEPHONE ENCOUNTER
Last Appointment:  6/29/2023  Future Appointments  9/22/2023  3:40 PM    DO Conner Clement [facility]  9/27/2023  2:00 PM     PALLIATIVE PROVIDER      PALLIATIV        [facility]  10/26/2023 11:20 AM   Bennie Economy, MD Mandy Primrose [facility]  11/2/2023  11:30 AM   SKYE Brooks* ACC Pulm            [facility]  11/2/2023  1:15 PM    [facility] CHF ROOM 2            [facility] CHF            Nicole Álvarez

## 2023-09-18 NOTE — PLAN OF CARE
Problem: Discharge Planning  Goal: Discharge to home or other facility with appropriate resources  Outcome: Progressing     Problem: Safety - Adult  Goal: Free from fall injury  9/18/2023 0031 by Sin Amador RN  Outcome: Progressing     Problem: Chronic Conditions and Co-morbidities  Goal: Patient's chronic conditions and co-morbidity symptoms are monitored and maintained or improved  9/18/2023 0031 by Sin Amador RN  Outcome: Progressing     Problem: Pain  Goal: Verbalizes/displays adequate comfort level or baseline comfort level  9/18/2023 0031 by Sin Amador RN  Outcome: Progressing     Problem: Nutrition Deficit:  Goal: Optimize nutritional status  9/18/2023 0031 by Sin Amador RN  Outcome: Progressing

## 2023-09-18 NOTE — CARE COORDINATION
Reviewed chart, met with pt who states she wants to go home at discharge, explained that last therapy she only walked a couple of feet, then when therapy sees her in the am, she has to participate. Pt agreeable. [facility] following for [facility], will need [facility] orders cpa, med compliance, and PT/OT. Per pt daughter will transport home and she has someone with her all the time. Pt had lithotripsy and stent placed, on IV dapto q24 . Dai Lee, MSW, LSW

## 2023-09-19 VITALS
DIASTOLIC BLOOD PRESSURE: 66 MMHG | SYSTOLIC BLOOD PRESSURE: 113 MMHG | HEART RATE: 89 BPM | HEIGHT: 62 IN | WEIGHT: 117 LBS | BODY MASS INDEX: 21.53 KG/M2 | RESPIRATION RATE: 16 BRPM | TEMPERATURE: 97.9 F | OXYGEN SATURATION: 100 %

## 2023-09-19 LAB
ALBUMIN SERPL-MCNC: 2.4 G/DL (ref 3.5–5.2)
ALP SERPL-CCNC: 169 U/L (ref 35–104)
ALT SERPL-CCNC: 292 U/L (ref 0–32)
ANION GAP SERPL CALCULATED.3IONS-SCNC: 7 MMOL/L (ref 7–16)
AST SERPL-CCNC: 203 U/L (ref 0–31)
BASOPHILS # BLD: 0 K/UL (ref 0–0.2)
BASOPHILS NFR BLD: 0 % (ref 0–2)
BILIRUB SERPL-MCNC: 0.3 MG/DL (ref 0–1.2)
BUN SERPL-MCNC: 6 MG/DL (ref 6–23)
CALCIUM SERPL-MCNC: 8.8 MG/DL (ref 8.6–10.2)
CHLORIDE SERPL-SCNC: 97 MMOL/L (ref 98–107)
CO2 SERPL-SCNC: 40 MMOL/L (ref 22–29)
CREAT SERPL-MCNC: 0.4 MG/DL (ref 0.5–1)
EOSINOPHIL # BLD: 0.37 K/UL (ref 0.05–0.5)
EOSINOPHILS RELATIVE PERCENT: 4 % (ref 0–6)
ERYTHROCYTE [DISTWIDTH] IN BLOOD BY AUTOMATED COUNT: 13 % (ref 11.5–15)
GFR SERPL CREATININE-BSD FRML MDRD: >60 ML/MIN/1.73M2
GLUCOSE SERPL-MCNC: 101 MG/DL (ref 74–99)
HAV IGM SERPL QL IA: NONREACTIVE
HBV CORE IGM SERPL QL IA: NONREACTIVE
HBV SURFACE AG SERPL QL IA: NONREACTIVE
HCT VFR BLD AUTO: 32.4 % (ref 34–48)
HCV AB SERPL QL IA: NONREACTIVE
HGB BLD-MCNC: 10.2 G/DL (ref 11.5–15.5)
LYMPHOCYTES NFR BLD: 4.69 K/UL (ref 1.5–4)
LYMPHOCYTES RELATIVE PERCENT: 51 % (ref 20–42)
MCH RBC QN AUTO: 31.4 PG (ref 26–35)
MCHC RBC AUTO-ENTMCNC: 31.5 G/DL (ref 32–34.5)
MCV RBC AUTO: 99.7 FL (ref 80–99.9)
MONOCYTES NFR BLD: 0.74 K/UL (ref 0.1–0.95)
MONOCYTES NFR BLD: 8 % (ref 2–12)
NEUTROPHILS NFR BLD: 37 % (ref 43–80)
NEUTS SEG NFR BLD: 3.4 K/UL (ref 1.8–7.3)
PLATELET # BLD AUTO: 200 K/UL (ref 130–450)
PMV BLD AUTO: 10.4 FL (ref 7–12)
POTASSIUM SERPL-SCNC: 3.9 MMOL/L (ref 3.5–5)
PROT SERPL-MCNC: 4.7 G/DL (ref 6.4–8.3)
RBC # BLD AUTO: 3.25 M/UL (ref 3.5–5.5)
RBC # BLD: ABNORMAL 10*6/UL
RBC # BLD: ABNORMAL 10*6/UL
SODIUM SERPL-SCNC: 144 MMOL/L (ref 132–146)
WBC OTHER # BLD: 9.2 K/UL (ref 4.5–11.5)

## 2023-09-19 PROCEDURE — 2580000003 HC RX 258: Performed by: UROLOGY

## 2023-09-19 PROCEDURE — 2580000003 HC RX 258: Performed by: INTERNAL MEDICINE

## 2023-09-19 PROCEDURE — 94640 AIRWAY INHALATION TREATMENT: CPT

## 2023-09-19 PROCEDURE — 6370000000 HC RX 637 (ALT 250 FOR IP): Performed by: UROLOGY

## 2023-09-19 PROCEDURE — 99232 SBSQ HOSP IP/OBS MODERATE 35: CPT | Performed by: FAMILY MEDICINE

## 2023-09-19 PROCEDURE — 6360000002 HC RX W HCPCS: Performed by: UROLOGY

## 2023-09-19 PROCEDURE — 6370000000 HC RX 637 (ALT 250 FOR IP)

## 2023-09-19 PROCEDURE — 2700000000 HC OXYGEN THERAPY PER DAY

## 2023-09-19 PROCEDURE — 97530 THERAPEUTIC ACTIVITIES: CPT

## 2023-09-19 PROCEDURE — 6370000000 HC RX 637 (ALT 250 FOR IP): Performed by: INTERNAL MEDICINE

## 2023-09-19 PROCEDURE — 36415 COLL VENOUS BLD VENIPUNCTURE: CPT

## 2023-09-19 PROCEDURE — 80053 COMPREHEN METABOLIC PANEL: CPT

## 2023-09-19 PROCEDURE — 6360000002 HC RX W HCPCS: Performed by: INTERNAL MEDICINE

## 2023-09-19 PROCEDURE — 97535 SELF CARE MNGMENT TRAINING: CPT

## 2023-09-19 PROCEDURE — 94660 CPAP INITIATION&MGMT: CPT

## 2023-09-19 PROCEDURE — 85025 COMPLETE CBC W/AUTO DIFF WBC: CPT

## 2023-09-19 PROCEDURE — A4216 STERILE WATER/SALINE, 10 ML: HCPCS | Performed by: INTERNAL MEDICINE

## 2023-09-19 RX ORDER — LEVOFLOXACIN 500 MG/1
500 TABLET, FILM COATED ORAL DAILY
Qty: 5 TABLET | Refills: 0 | Status: SHIPPED | OUTPATIENT
Start: 2023-09-19 | End: 2023-09-24

## 2023-09-19 RX ORDER — POLYVINYL ALCOHOL 14 MG/ML
1 SOLUTION/ DROPS OPHTHALMIC PRN
Status: DISCONTINUED | OUTPATIENT
Start: 2023-09-19 | End: 2023-09-19 | Stop reason: HOSPADM

## 2023-09-19 RX ORDER — POLYVINYL ALCOHOL 14 MG/ML
1 SOLUTION/ DROPS OPHTHALMIC PRN
Status: CANCELLED | OUTPATIENT
Start: 2023-09-19

## 2023-09-19 RX ORDER — LINEZOLID 600 MG/1
600 TABLET, FILM COATED ORAL 2 TIMES DAILY
Qty: 10 TABLET | Refills: 0 | Status: SHIPPED | OUTPATIENT
Start: 2023-09-19 | End: 2023-09-24

## 2023-09-19 RX ORDER — METOPROLOL SUCCINATE 25 MG/1
12.5 TABLET, EXTENDED RELEASE ORAL NIGHTLY
Qty: 30 TABLET | Refills: 3 | Status: CANCELLED | OUTPATIENT
Start: 2023-09-19

## 2023-09-19 RX ORDER — POLYVINYL ALCOHOL 14 MG/ML
1 SOLUTION/ DROPS OPHTHALMIC PRN
Qty: 15 ML | Refills: 2 | Status: SHIPPED | OUTPATIENT
Start: 2023-09-19 | End: 2023-10-19

## 2023-09-19 RX ADMIN — ROFLUMILAST 500 MCG: 500 TABLET ORAL at 12:42

## 2023-09-19 RX ADMIN — ARFORMOTEROL TARTRATE 15 MCG: 15 SOLUTION RESPIRATORY (INHALATION) at 05:42

## 2023-09-19 RX ADMIN — IPRATROPIUM BROMIDE AND ALBUTEROL SULFATE 1 DOSE: .5; 2.5 SOLUTION RESPIRATORY (INHALATION) at 16:23

## 2023-09-19 RX ADMIN — IPRATROPIUM BROMIDE AND ALBUTEROL SULFATE 1 DOSE: .5; 2.5 SOLUTION RESPIRATORY (INHALATION) at 05:42

## 2023-09-19 RX ADMIN — Medication 10 ML: at 12:40

## 2023-09-19 RX ADMIN — ASPIRIN 81 MG: 81 TABLET, CHEWABLE ORAL at 12:39

## 2023-09-19 RX ADMIN — POTASSIUM CHLORIDE 40 MEQ: 1500 TABLET, EXTENDED RELEASE ORAL at 12:39

## 2023-09-19 RX ADMIN — FOLIC ACID 1000 MCG: 1 TABLET ORAL at 12:39

## 2023-09-19 RX ADMIN — ENOXAPARIN SODIUM 40 MG: 100 INJECTION SUBCUTANEOUS at 12:39

## 2023-09-19 RX ADMIN — DAPTOMYCIN 300 MG: 500 INJECTION, POWDER, LYOPHILIZED, FOR SOLUTION INTRAVENOUS at 15:44

## 2023-09-19 RX ADMIN — ALPRAZOLAM 0.25 MG: 0.25 TABLET ORAL at 12:39

## 2023-09-19 RX ADMIN — BUDESONIDE INHALATION 500 MCG: 0.5 SUSPENSION RESPIRATORY (INHALATION) at 05:41

## 2023-09-19 RX ADMIN — SACUBITRIL AND VALSARTAN 1 TABLET: 24; 26 TABLET, FILM COATED ORAL at 12:40

## 2023-09-19 RX ADMIN — IPRATROPIUM BROMIDE AND ALBUTEROL SULFATE 1 DOSE: .5; 2.5 SOLUTION RESPIRATORY (INHALATION) at 12:48

## 2023-09-19 RX ADMIN — LEVOFLOXACIN 500 MG: 500 TABLET, FILM COATED ORAL at 12:42

## 2023-09-19 ASSESSMENT — PAIN SCALES - GENERAL: PAINLEVEL_OUTOF10: 0

## 2023-09-19 NOTE — CARE COORDINATION
Attempted to use Covermymeds, unable to use d/t not having insurance card, and different names of prescription coverage. Called [phone] express scripts for authorization, spoke with Mackenzie Conte, after 20 minute phone call, pt can go on linezolid, which is generic form of zyvox which per Mackenzie Conte does not need precert. [facility] aid, and spoke with pharmacist, they are saying they need a precert. Unable to get precert, looked up [facility], [facility] $24.78. spoke with daughter and explained how to download [employer] [facility]. Adelaida [place], MSW, LSW

## 2023-09-19 NOTE — CARE COORDINATION
Reviewed chart, therapy attempts to see pt, pt becomes hypertensive, pt unable to fully participate with therapy. Pt wants to return home, called daughter Wendi Roach who is in agreement that pt is to come home at discharge. Wendi Roach also expressed concern that pt's new cardiac meds are causing the hypotension, left message for residents about concern and to address if they think necessary. [facility] following, will need [facility] orders for cpa, med compliance and PT/OT. Daughter to transport at discharge. 4pm discharge order noted, per ID pt to go with zyvox, which was escribed to [place] [facility] on [address]. [facility] aid, will need prior auth, completed Covermymeds, await auth. Jolie Huber, MSW, LSW

## 2023-09-19 NOTE — DISCHARGE SUMMARY
[facility]   9/27/2023  2:00 PM  PALLIATIVE PROVIDER  PALLIATIV [facility]   10/26/2023 11:20 AM Kalyani Jensen MD [facility] [place] [facility]   11/2/2023 11:30 AM SKYE Koch - CNP ACC Pulm [facility]   11/2/2023  1:15 PM [facility]OM [facility]       More than 30 minutes was spent in preparation of this patient's discharge including, but not limited to, examination, preparation of documents, prescription preparation, counseling and coordination.     Signed:  Regina Valverde MD  9/19/2023, 6:29 PM

## 2023-09-19 NOTE — PLAN OF CARE
Problem: Discharge Planning  Goal: Discharge to home or other facility with appropriate resources  Outcome: Completed  Flowsheets (Taken 9/19/2023 0845)  Discharge to home or other facility with appropriate resources: Identify barriers to discharge with patient and caregiver     Problem: Safety - Adult  Goal: Free from fall injury  Outcome: Completed     Problem: Chronic Conditions and Co-morbidities  Goal: Patient's chronic conditions and co-morbidity symptoms are monitored and maintained or improved  Outcome: Completed  Flowsheets (Taken 9/19/2023 0826)  Care Plan - Patient's Chronic Conditions and Co-Morbidity Symptoms are Monitored and Maintained or Improved: Monitor and assess patient's chronic conditions and comorbid symptoms for stability, deterioration, or improvement     Problem: Pain  Goal: Verbalizes/displays adequate comfort level or baseline comfort level  Outcome: Completed     Problem: Nutrition Deficit:  Goal: Optimize nutritional status  Outcome: Completed

## 2023-09-20 ENCOUNTER — CARE COORDINATION (OUTPATIENT)
Dept: CASE MANAGEMENT | Age: 63
End: 2023-09-20

## 2023-09-20 NOTE — CARE COORDINATION
[address] [facility] Care Transitions Initial Follow Up Call    Call within 2 business days of discharge: Yes    Patient: Brennen Carranza Patient : 1960   MRN: [id number]  Reason for Admission: Respiratory failure with hypercapnia  Discharge Date: 23 RARS: Readmission Risk Score: 31      Last Discharge [facility] [facility]       Date Complaint Diagnosis Description Type Department Provider    23 Abdominal Pain Right lower quadrant abdominal pain . .. ED to Hosp-Admission (Discharged) (ADMITTED) Natalio Roldan MD; Tasha Barone... Attempted to contact patient today 23 for initial DONATO/hospital discharge follow up. Left message on home/mobile number requesting a return call back to CTN and provided contact information.      Letitia Browne APRN

## 2023-09-21 ENCOUNTER — CARE COORDINATION (OUTPATIENT)
Dept: CASE MANAGEMENT | Age: 63
End: 2023-09-21

## 2023-09-21 DIAGNOSIS — J96.22 ACUTE ON CHRONIC RESPIRATORY FAILURE WITH HYPERCAPNIA (HCC): Primary | ICD-10-CM

## 2023-09-21 LAB
BASOPHILS # BLD: 0 K/UL (ref 0–0.2)
BASOPHILS NFR BLD: 0 % (ref 0–2)
BLASTS ABSOLUTE COUNT: 0 K/UL
BLASTS: 0 %
EOSINOPHIL # BLD: 0.14 K/UL (ref 0.05–0.5)
EOSINOPHILS RELATIVE PERCENT: 2 % (ref 0–6)
ERYTHROCYTE [DISTWIDTH] IN BLOOD BY AUTOMATED COUNT: 12.8 % (ref 11.5–15)
HCT VFR BLD AUTO: 30.5 % (ref 34–48)
HGB BLD-MCNC: 9.7 G/DL (ref 11.5–15.5)
LYMPHOCYTES NFR BLD: 1.08 K/UL (ref 1.5–4)
LYMPHOCYTES RELATIVE PERCENT: 13 % (ref 20–42)
MCH RBC QN AUTO: 31.4 PG (ref 26–35)
MCHC RBC AUTO-ENTMCNC: 31.8 G/DL (ref 32–34.5)
MCV RBC AUTO: 98.7 FL (ref 80–99.9)
METAMYELOCYTES ABSOLUTE COUNT: 0.07 K/UL (ref 0–0.12)
METAMYELOCYTES: 1 % (ref 0–1)
MONOCYTES NFR BLD: 0.66 K/UL (ref 0.1–0.95)
MONOCYTES NFR BLD: 8 % (ref 2–12)
NEUTROPHILS NFR BLD: 76 % (ref 43–80)
NEUTS SEG NFR BLD: 6.26 K/UL (ref 1.8–7.3)
PLATELET # BLD AUTO: 148 K/UL (ref 130–450)
PMV BLD AUTO: 9.8 FL (ref 7–12)
RBC # BLD AUTO: 3.09 M/UL (ref 3.5–5.5)
RBC # BLD: ABNORMAL 10*6/UL
RBC # BLD: ABNORMAL 10*6/UL
WBC OTHER # BLD: 8.2 K/UL (ref 4.5–11.5)

## 2023-09-21 NOTE — CARE COORDINATION
concerns at this time. Were discharge instructions available to patient? Yes. Reviewed appropriate site of care based on symptoms and resources available to patient including: PCP  Specialist  Home health  When to call 911  Condition related references. The family agrees to contact the PCP office for questions related to their healthcare. Advance Care Planning:   Does patient have an Advance Directive: reviewed and current. Medication reconciliation was performed with family, who verbalizes understanding of administration of home medications. Medications reviewed, 1111F entered: NA (non-PHP)    Was patient discharged with a pulse oximeter? no    Non-face-to-face services provided:  Scheduled appointment with PCP-CTN confirmed with patient DTR Andrea Pratt) that patient is scheduled for HFU appt with Dr. Servando Valenzuela (PCP) tomorrow 9/22/23 at 3:40 pm.   Scheduled appointment with Specialist-Confirmed with Andrea Pratt that she will call [facility] to schedule f/u appt. Obtained and reviewed discharge summary and/or continuity of care documents  Education of patient/family/caregiver/guardian to support self-management-Discuss COPD/CHF zone tools and knowing when to call doctor or seek medical attention.      Offered patient enrollment in the Remote Patient Monitoring (RPM) program for in-home monitoring: NA.    Care Transitions 24 Hour Call    Do you have a copy of your discharge instructions?: Yes  Do you have all of your prescriptions and are they filled?: Yes  Have you been contacted by a [facility] Pharmacist?: No  Have you scheduled your follow up appointment?: Yes  How are you going to get to your appointment?: Car - family or friend to transport  Do you have support at home?: Child  Do you feel like you have everything you need to keep you well at home?: Yes  Are you an active caregiver in your home?: No  Care Transitions Interventions    Registered Dietician: Declined                Disease Specific Clinic: Declined

## 2023-09-26 ENCOUNTER — TELEPHONE (OUTPATIENT)
Dept: FAMILY MEDICINE CLINIC | Age: 63
End: 2023-09-26

## 2023-09-26 ENCOUNTER — TELEPHONE (OUTPATIENT)
Dept: PALLATIVE CARE | Age: 63
End: 2023-09-26

## 2023-09-26 NOTE — TELEPHONE ENCOUNTER
Out going call to Mollybud Willson to remind her of her New Patient appt with Palliative Care. She stated she wished to canceel at this time due to other ongoing health issues. Encouraged Molly Willson to call in the future if she were to need our services.

## 2023-09-26 NOTE — TELEPHONE ENCOUNTER
Received another call from patient's daughter with [facility] . Called [facility], was advised to fill out electronic form to have electricity turned back on. Electronic form filled out, was advised that electricity would be turned on first thing tomorrow morning. Advised patient's daughter that should her O2 run out, to come to hospital immediately.

## 2023-09-26 NOTE — TELEPHONE ENCOUNTER
Received call from Ya Leon on behalf of Rojelio Snyder regarding letter faxed to [facility] to have electricity turned back on. Patient's daughter stated that letter was supposed to be faxed today and the electric company had not received it yet. Chart review shows no letter documented or media scanned into chart from today. Will advise patient's PCP to re-evaluate tomorrow.

## 2023-09-28 ENCOUNTER — APPOINTMENT (OUTPATIENT)
Dept: CT IMAGING | Age: 63
DRG: 871 | End: 2023-09-28
Payer: COMMERCIAL

## 2023-09-28 ENCOUNTER — CARE COORDINATION (OUTPATIENT)
Dept: CASE MANAGEMENT | Age: 63
End: 2023-09-28

## 2023-09-28 ENCOUNTER — APPOINTMENT (OUTPATIENT)
Dept: GENERAL RADIOLOGY | Age: 63
DRG: 871 | End: 2023-09-28
Payer: COMMERCIAL

## 2023-09-28 ENCOUNTER — TELEPHONE (OUTPATIENT)
Dept: OTHER | Facility: CLINIC | Age: 63
End: 2023-09-28

## 2023-09-28 ENCOUNTER — HOSPITAL ENCOUNTER (INPATIENT)
Age: 63
LOS: 2 days | Discharge: ANOTHER HOSPITAL | DRG: 871 | End: 2023-10-01
Attending: STUDENT IN AN ORGANIZED HEALTH CARE EDUCATION/TRAINING PROGRAM | Admitting: INTERNAL MEDICINE
Payer: COMMERCIAL

## 2023-09-28 DIAGNOSIS — R10.31 RIGHT LOWER QUADRANT ABDOMINAL PAIN: Primary | ICD-10-CM

## 2023-09-28 DIAGNOSIS — R19.7 DIARRHEA, UNSPECIFIED TYPE: ICD-10-CM

## 2023-09-28 DIAGNOSIS — K52.9 COLITIS: ICD-10-CM

## 2023-09-28 LAB
ALBUMIN SERPL-MCNC: 3.1 G/DL (ref 3.5–5.2)
ALP SERPL-CCNC: 136 U/L (ref 35–104)
ALT SERPL-CCNC: 80 U/L (ref 0–32)
ANION GAP SERPL CALCULATED.3IONS-SCNC: 8 MMOL/L (ref 7–16)
AST SERPL-CCNC: 65 U/L (ref 0–31)
BASOPHILS # BLD: 0.21 K/UL (ref 0–0.2)
BASOPHILS NFR BLD: 2 % (ref 0–2)
BILIRUB SERPL-MCNC: 0.4 MG/DL (ref 0–1.2)
BUN SERPL-MCNC: 12 MG/DL (ref 6–23)
CALCIUM SERPL-MCNC: 9.9 MG/DL (ref 8.6–10.2)
CHLORIDE SERPL-SCNC: 95 MMOL/L (ref 98–107)
CO2 SERPL-SCNC: 37 MMOL/L (ref 22–29)
CREAT SERPL-MCNC: 0.4 MG/DL (ref 0.5–1)
EOSINOPHIL # BLD: 1.47 K/UL (ref 0.05–0.5)
EOSINOPHILS RELATIVE PERCENT: 12 % (ref 0–6)
ERYTHROCYTE [DISTWIDTH] IN BLOOD BY AUTOMATED COUNT: 13.1 % (ref 11.5–15)
GFR SERPL CREATININE-BSD FRML MDRD: >60 ML/MIN/1.73M2
GLUCOSE SERPL-MCNC: 125 MG/DL (ref 74–99)
HCT VFR BLD AUTO: 37.8 % (ref 34–48)
HGB BLD-MCNC: 12 G/DL (ref 11.5–15.5)
LACTATE BLDV-SCNC: 2.3 MMOL/L (ref 0.5–2.2)
LYMPHOCYTES NFR BLD: 4.31 K/UL (ref 1.5–4)
LYMPHOCYTES RELATIVE PERCENT: 36 % (ref 20–42)
MCH RBC QN AUTO: 31.4 PG (ref 26–35)
MCHC RBC AUTO-ENTMCNC: 31.7 G/DL (ref 32–34.5)
MCV RBC AUTO: 99 FL (ref 80–99.9)
MONOCYTES NFR BLD: 0.74 K/UL (ref 0.1–0.95)
MONOCYTES NFR BLD: 6 % (ref 2–12)
NEUTROPHILS NFR BLD: 44 % (ref 43–80)
NEUTS SEG NFR BLD: 5.37 K/UL (ref 1.8–7.3)
PLATELET CONFIRMATION: NORMAL
PLATELET, FLUORESCENCE: 412 K/UL (ref 130–450)
PMV BLD AUTO: 10 FL (ref 7–12)
POTASSIUM SERPL-SCNC: 4.1 MMOL/L (ref 3.5–5)
PROT SERPL-MCNC: 6.7 G/DL (ref 6.4–8.3)
RBC # BLD AUTO: 3.82 M/UL (ref 3.5–5.5)
RBC # BLD: ABNORMAL 10*6/UL
RBC # BLD: ABNORMAL 10*6/UL
SARS-COV-2 RDRP RESP QL NAA+PROBE: NOT DETECTED
SODIUM SERPL-SCNC: 140 MMOL/L (ref 132–146)
SPECIMEN DESCRIPTION: NORMAL
TROPONIN I SERPL HS-MCNC: 15 NG/L (ref 0–9)
WBC OTHER # BLD: 12.1 K/UL (ref 4.5–11.5)

## 2023-09-28 PROCEDURE — 85025 COMPLETE CBC W/AUTO DIFF WBC: CPT

## 2023-09-28 PROCEDURE — 74177 CT ABD & PELVIS W/CONTRAST: CPT

## 2023-09-28 PROCEDURE — 80053 COMPREHEN METABOLIC PANEL: CPT

## 2023-09-28 PROCEDURE — 94640 AIRWAY INHALATION TREATMENT: CPT

## 2023-09-28 PROCEDURE — 83605 ASSAY OF LACTIC ACID: CPT

## 2023-09-28 PROCEDURE — 87635 SARS-COV-2 COVID-19 AMP PRB: CPT

## 2023-09-28 PROCEDURE — 96375 TX/PRO/DX INJ NEW DRUG ADDON: CPT

## 2023-09-28 PROCEDURE — 84484 ASSAY OF TROPONIN QUANT: CPT

## 2023-09-28 PROCEDURE — 71045 X-RAY EXAM CHEST 1 VIEW: CPT

## 2023-09-28 PROCEDURE — 96374 THER/PROPH/DIAG INJ IV PUSH: CPT

## 2023-09-28 PROCEDURE — 6360000004 HC RX CONTRAST MEDICATION: Performed by: RADIOLOGY

## 2023-09-28 PROCEDURE — 6370000000 HC RX 637 (ALT 250 FOR IP)

## 2023-09-28 PROCEDURE — 93005 ELECTROCARDIOGRAM TRACING: CPT

## 2023-09-28 PROCEDURE — 87040 BLOOD CULTURE FOR BACTERIA: CPT

## 2023-09-28 PROCEDURE — 2580000003 HC RX 258

## 2023-09-28 PROCEDURE — 99285 EMERGENCY DEPT VISIT HI MDM: CPT

## 2023-09-28 RX ORDER — IPRATROPIUM BROMIDE AND ALBUTEROL SULFATE 2.5; .5 MG/3ML; MG/3ML
1 SOLUTION RESPIRATORY (INHALATION) ONCE
Status: COMPLETED | OUTPATIENT
Start: 2023-09-28 | End: 2023-09-28

## 2023-09-28 RX ORDER — 0.9 % SODIUM CHLORIDE 0.9 %
30 INTRAVENOUS SOLUTION INTRAVENOUS ONCE
Status: COMPLETED | OUTPATIENT
Start: 2023-09-28 | End: 2023-09-29

## 2023-09-28 RX ADMIN — SODIUM CHLORIDE 1593 ML: 9 INJECTION, SOLUTION INTRAVENOUS at 23:36

## 2023-09-28 RX ADMIN — IOPAMIDOL 75 ML: 755 INJECTION, SOLUTION INTRAVENOUS at 23:27

## 2023-09-28 RX ADMIN — IPRATROPIUM BROMIDE AND ALBUTEROL SULFATE 1 DOSE: 2.5; .5 SOLUTION RESPIRATORY (INHALATION) at 23:11

## 2023-09-28 ASSESSMENT — PAIN DESCRIPTION - LOCATION: LOCATION: PELVIS

## 2023-09-28 ASSESSMENT — PAIN SCALES - GENERAL: PAINLEVEL_OUTOF10: 10

## 2023-09-28 ASSESSMENT — PAIN - FUNCTIONAL ASSESSMENT: PAIN_FUNCTIONAL_ASSESSMENT: 0-10

## 2023-09-28 NOTE — CARE COORDINATION
[facility] Care Transitions Follow Up Call    Patient: Arlen Brittle  Patient : 1960   MRN: [id number]  Reason for Admission: Respiratory failure with hypercapnia  Discharge Date: 23 RARS: Readmission Risk Score: 31    Attempted to contact patient today 23 for hospital discharge (non-PHP) follow up sub call. Left message on home/mobile number requesting a return call back to CTN and provided contact information. Noted HFU appt scheduled on 23 with PCP was canceled. CTN will continue to follow for Care Transition.      Antonio Pagan, APRN

## 2023-09-29 PROBLEM — K52.9 COLITIS: Status: ACTIVE | Noted: 2023-09-29

## 2023-09-29 LAB
25(OH)D3 SERPL-MCNC: 58.6 NG/ML (ref 30–100)
ALBUMIN SERPL-MCNC: 2.8 G/DL (ref 3.5–5.2)
ALP SERPL-CCNC: 121 U/L (ref 35–104)
ALT SERPL-CCNC: 67 U/L (ref 0–32)
AMMONIA PLAS-SCNC: 20 UMOL/L (ref 11–51)
ANION GAP SERPL CALCULATED.3IONS-SCNC: 6 MMOL/L (ref 7–16)
AST SERPL-CCNC: 50 U/L (ref 0–31)
BASOPHILS # BLD: 0 K/UL (ref 0–0.2)
BASOPHILS NFR BLD: 0 % (ref 0–2)
BILIRUB SERPL-MCNC: 0.4 MG/DL (ref 0–1.2)
BILIRUB UR QL STRIP: ABNORMAL
BUN SERPL-MCNC: 11 MG/DL (ref 6–23)
CALCIUM SERPL-MCNC: 8.9 MG/DL (ref 8.6–10.2)
CHLORIDE SERPL-SCNC: 102 MMOL/L (ref 98–107)
CHOLEST SERPL-MCNC: 109 MG/DL
CLARITY UR: ABNORMAL
CO2 SERPL-SCNC: 33 MMOL/L (ref 22–29)
COLOR UR: ABNORMAL
CREAT SERPL-MCNC: 0.3 MG/DL (ref 0.5–1)
CRP SERPL HS-MCNC: 10 MG/L (ref 0–5)
CRYSTALS URNS MICRO: ABNORMAL /HPF
DATE, STOOL #1: NORMAL
EKG ATRIAL RATE: 113 BPM
EKG ATRIAL RATE: 119 BPM
EKG P AXIS: 83 DEGREES
EKG P AXIS: 86 DEGREES
EKG P-R INTERVAL: 142 MS
EKG P-R INTERVAL: 160 MS
EKG Q-T INTERVAL: 314 MS
EKG Q-T INTERVAL: 330 MS
EKG QRS DURATION: 70 MS
EKG QRS DURATION: 74 MS
EKG QTC CALCULATION (BAZETT): 441 MS
EKG QTC CALCULATION (BAZETT): 452 MS
EKG R AXIS: 85 DEGREES
EKG R AXIS: 87 DEGREES
EKG T AXIS: 80 DEGREES
EKG T AXIS: 82 DEGREES
EKG VENTRICULAR RATE: 113 BPM
EKG VENTRICULAR RATE: 119 BPM
EOSINOPHIL # BLD: 0 K/UL (ref 0.05–0.5)
EOSINOPHILS RELATIVE PERCENT: 0 % (ref 0–6)
ERYTHROCYTE [DISTWIDTH] IN BLOOD BY AUTOMATED COUNT: 13.2 % (ref 11.5–15)
ERYTHROCYTE [SEDIMENTATION RATE] IN BLOOD BY WESTERGREN METHOD: 93 MM/HR (ref 0–20)
FERRITIN SERPL-MCNC: 354 NG/ML
FOLATE SERPL-MCNC: >20 NG/ML (ref 4.8–24.2)
GFR SERPL CREATININE-BSD FRML MDRD: >60 ML/MIN/1.73M2
GLUCOSE SERPL-MCNC: 121 MG/DL (ref 74–99)
GLUCOSE UR STRIP-MCNC: NEGATIVE MG/DL
HCT VFR BLD AUTO: 32.8 % (ref 34–48)
HDLC SERPL-MCNC: 48 MG/DL
HEMOCCULT SP1 STL QL: NEGATIVE
HGB BLD-MCNC: 10.2 G/DL (ref 11.5–15.5)
HGB UR QL STRIP.AUTO: ABNORMAL
INR PPP: 0.9
IRON SATN MFR SERPL: 20 % (ref 15–50)
IRON SERPL-MCNC: 42 UG/DL (ref 37–145)
KETONES UR STRIP-MCNC: NEGATIVE MG/DL
LACTATE BLDV-SCNC: 1.9 MMOL/L (ref 0.5–2.2)
LDLC SERPL CALC-MCNC: 48 MG/DL
LEUKOCYTE ESTERASE UR QL STRIP: ABNORMAL
LYMPHOCYTES NFR BLD: 0.53 K/UL (ref 1.5–4)
LYMPHOCYTES RELATIVE PERCENT: 4 % (ref 20–42)
MCH RBC QN AUTO: 31.2 PG (ref 26–35)
MCHC RBC AUTO-ENTMCNC: 31.1 G/DL (ref 32–34.5)
MCV RBC AUTO: 100.3 FL (ref 80–99.9)
MONOCYTES NFR BLD: 0.13 K/UL (ref 0.1–0.95)
MONOCYTES NFR BLD: 1 % (ref 2–12)
NEUTROPHILS NFR BLD: 96 % (ref 43–80)
NEUTS SEG NFR BLD: 14.54 K/UL (ref 1.8–7.3)
NITRITE UR QL STRIP: NEGATIVE
NUCLEATED RED BLOOD CELLS: 1 PER 100 WBC
PH UR STRIP: 5.5 [PH] (ref 5–9)
PLATELET # BLD AUTO: 357 K/UL (ref 130–450)
PMV BLD AUTO: 9.8 FL (ref 7–12)
POTASSIUM SERPL-SCNC: 4.6 MMOL/L (ref 3.5–5)
PROCALCITONIN SERPL-MCNC: 4.92 NG/ML (ref 0–0.08)
PROT SERPL-MCNC: 6 G/DL (ref 6.4–8.3)
PROT UR STRIP-MCNC: 100 MG/DL
PROTHROMBIN TIME: 10.6 SEC (ref 9.3–12.4)
RBC # BLD AUTO: 3.27 M/UL (ref 3.5–5.5)
RBC # BLD: NORMAL 10*6/UL
RBC #/AREA URNS HPF: ABNORMAL /HPF
SODIUM SERPL-SCNC: 141 MMOL/L (ref 132–146)
SP GR UR STRIP: 1.01 (ref 1–1.03)
T4 FREE SERPL-MCNC: 1.5 NG/DL (ref 0.9–1.7)
TIBC SERPL-MCNC: 211 UG/DL (ref 250–450)
TIME, STOOL #1: NORMAL
TRIGL SERPL-MCNC: 66 MG/DL
TROPONIN I SERPL HS-MCNC: 12 NG/L (ref 0–9)
TSH SERPL DL<=0.05 MIU/L-ACNC: 0.16 UIU/ML (ref 0.27–4.2)
UROBILINOGEN UR STRIP-ACNC: 1 EU/DL (ref 0–1)
VIT B12 SERPL-MCNC: 579 PG/ML (ref 211–946)
VLDLC SERPL CALC-MCNC: 13 MG/DL
WBC #/AREA URNS HPF: ABNORMAL /HPF
WBC OTHER # BLD: 15.2 K/UL (ref 4.5–11.5)

## 2023-09-29 PROCEDURE — 6370000000 HC RX 637 (ALT 250 FOR IP): Performed by: SPECIALIST

## 2023-09-29 PROCEDURE — 2580000003 HC RX 258: Performed by: INTERNAL MEDICINE

## 2023-09-29 PROCEDURE — 93010 ELECTROCARDIOGRAM REPORT: CPT | Performed by: INTERNAL MEDICINE

## 2023-09-29 PROCEDURE — 6360000002 HC RX W HCPCS

## 2023-09-29 PROCEDURE — 87324 CLOSTRIDIUM AG IA: CPT

## 2023-09-29 PROCEDURE — 87045 FECES CULTURE AEROBIC BACT: CPT

## 2023-09-29 PROCEDURE — 80061 LIPID PANEL: CPT

## 2023-09-29 PROCEDURE — 6370000000 HC RX 637 (ALT 250 FOR IP): Performed by: NURSE PRACTITIONER

## 2023-09-29 PROCEDURE — 83540 ASSAY OF IRON: CPT

## 2023-09-29 PROCEDURE — 6360000002 HC RX W HCPCS: Performed by: STUDENT IN AN ORGANIZED HEALTH CARE EDUCATION/TRAINING PROGRAM

## 2023-09-29 PROCEDURE — 82705 FATS/LIPIDS FECES QUAL: CPT

## 2023-09-29 PROCEDURE — 87449 NOS EACH ORGANISM AG IA: CPT

## 2023-09-29 PROCEDURE — 6370000000 HC RX 637 (ALT 250 FOR IP)

## 2023-09-29 PROCEDURE — 87086 URINE CULTURE/COLONY COUNT: CPT

## 2023-09-29 PROCEDURE — 85652 RBC SED RATE AUTOMATED: CPT

## 2023-09-29 PROCEDURE — 87329 GIARDIA AG IA: CPT

## 2023-09-29 PROCEDURE — 83550 IRON BINDING TEST: CPT

## 2023-09-29 PROCEDURE — 87427 SHIGA-LIKE TOXIN AG IA: CPT

## 2023-09-29 PROCEDURE — 84145 PROCALCITONIN (PCT): CPT

## 2023-09-29 PROCEDURE — 85025 COMPLETE CBC W/AUTO DIFF WBC: CPT

## 2023-09-29 PROCEDURE — 84484 ASSAY OF TROPONIN QUANT: CPT

## 2023-09-29 PROCEDURE — 81001 URINALYSIS AUTO W/SCOPE: CPT

## 2023-09-29 PROCEDURE — P9047 ALBUMIN (HUMAN), 25%, 50ML: HCPCS

## 2023-09-29 PROCEDURE — 82746 ASSAY OF FOLIC ACID SERUM: CPT

## 2023-09-29 PROCEDURE — 82728 ASSAY OF FERRITIN: CPT

## 2023-09-29 PROCEDURE — 85610 PROTHROMBIN TIME: CPT

## 2023-09-29 PROCEDURE — 82140 ASSAY OF AMMONIA: CPT

## 2023-09-29 PROCEDURE — 82607 VITAMIN B-12: CPT

## 2023-09-29 PROCEDURE — 86140 C-REACTIVE PROTEIN: CPT

## 2023-09-29 PROCEDURE — 84439 ASSAY OF FREE THYROXINE: CPT

## 2023-09-29 PROCEDURE — 94640 AIRWAY INHALATION TREATMENT: CPT

## 2023-09-29 PROCEDURE — 2060000000 HC ICU INTERMEDIATE R&B

## 2023-09-29 PROCEDURE — 36415 COLL VENOUS BLD VENIPUNCTURE: CPT

## 2023-09-29 PROCEDURE — 2700000000 HC OXYGEN THERAPY PER DAY

## 2023-09-29 PROCEDURE — 93005 ELECTROCARDIOGRAM TRACING: CPT

## 2023-09-29 PROCEDURE — 82306 VITAMIN D 25 HYDROXY: CPT

## 2023-09-29 PROCEDURE — 80053 COMPREHEN METABOLIC PANEL: CPT

## 2023-09-29 PROCEDURE — 83605 ASSAY OF LACTIC ACID: CPT

## 2023-09-29 PROCEDURE — 2580000003 HC RX 258: Performed by: STUDENT IN AN ORGANIZED HEALTH CARE EDUCATION/TRAINING PROGRAM

## 2023-09-29 PROCEDURE — 84443 ASSAY THYROID STIM HORMONE: CPT

## 2023-09-29 PROCEDURE — 87046 STOOL CULTR AEROBIC BACT EA: CPT

## 2023-09-29 PROCEDURE — 82270 OCCULT BLOOD FECES: CPT

## 2023-09-29 RX ORDER — ALBUTEROL SULFATE 2.5 MG/3ML
2.5 SOLUTION RESPIRATORY (INHALATION) EVERY 4 HOURS PRN
Status: DISCONTINUED | OUTPATIENT
Start: 2023-09-29 | End: 2023-10-01 | Stop reason: HOSPADM

## 2023-09-29 RX ORDER — SODIUM CHLORIDE 9 MG/ML
INJECTION, SOLUTION INTRAVENOUS CONTINUOUS
Status: DISCONTINUED | OUTPATIENT
Start: 2023-09-29 | End: 2023-09-30

## 2023-09-29 RX ORDER — ARFORMOTEROL TARTRATE 15 UG/2ML
15 SOLUTION RESPIRATORY (INHALATION)
Status: DISCONTINUED | OUTPATIENT
Start: 2023-09-29 | End: 2023-10-01 | Stop reason: HOSPADM

## 2023-09-29 RX ORDER — POLYVINYL ALCOHOL 14 MG/ML
1 SOLUTION/ DROPS OPHTHALMIC PRN
Status: DISCONTINUED | OUTPATIENT
Start: 2023-09-29 | End: 2023-10-01 | Stop reason: HOSPADM

## 2023-09-29 RX ORDER — PANTOPRAZOLE SODIUM 40 MG/1
40 TABLET, DELAYED RELEASE ORAL NIGHTLY
Status: DISCONTINUED | OUTPATIENT
Start: 2023-09-29 | End: 2023-10-01 | Stop reason: HOSPADM

## 2023-09-29 RX ORDER — DOCUSATE SODIUM 100 MG/1
100 CAPSULE, LIQUID FILLED ORAL DAILY PRN
Status: DISCONTINUED | OUTPATIENT
Start: 2023-09-29 | End: 2023-10-01 | Stop reason: HOSPADM

## 2023-09-29 RX ORDER — LEVOFLOXACIN 500 MG/1
500 TABLET, FILM COATED ORAL DAILY
Status: DISCONTINUED | OUTPATIENT
Start: 2023-09-29 | End: 2023-10-01 | Stop reason: HOSPADM

## 2023-09-29 RX ORDER — ALPRAZOLAM 0.25 MG/1
0.25 TABLET ORAL 3 TIMES DAILY PRN
Status: DISCONTINUED | OUTPATIENT
Start: 2023-09-29 | End: 2023-10-01 | Stop reason: HOSPADM

## 2023-09-29 RX ORDER — FOLIC ACID 1 MG/1
1000 TABLET ORAL DAILY
Status: DISCONTINUED | OUTPATIENT
Start: 2023-09-29 | End: 2023-10-01 | Stop reason: HOSPADM

## 2023-09-29 RX ORDER — METRONIDAZOLE 500 MG/100ML
500 INJECTION, SOLUTION INTRAVENOUS EVERY 8 HOURS
Status: DISCONTINUED | OUTPATIENT
Start: 2023-09-29 | End: 2023-10-01 | Stop reason: HOSPADM

## 2023-09-29 RX ORDER — LORAZEPAM 2 MG/ML
0.5 INJECTION INTRAMUSCULAR ONCE
Status: COMPLETED | OUTPATIENT
Start: 2023-09-29 | End: 2023-09-29

## 2023-09-29 RX ORDER — 0.9 % SODIUM CHLORIDE 0.9 %
1000 INTRAVENOUS SOLUTION INTRAVENOUS ONCE
Status: COMPLETED | OUTPATIENT
Start: 2023-09-29 | End: 2023-09-29

## 2023-09-29 RX ORDER — FLUTICASONE PROPIONATE 50 MCG
2 SPRAY, SUSPENSION (ML) NASAL DAILY PRN
Status: DISCONTINUED | OUTPATIENT
Start: 2023-09-29 | End: 2023-10-01 | Stop reason: HOSPADM

## 2023-09-29 RX ORDER — BUDESONIDE 0.5 MG/2ML
500 INHALANT ORAL
Status: DISCONTINUED | OUTPATIENT
Start: 2023-09-29 | End: 2023-10-01 | Stop reason: HOSPADM

## 2023-09-29 RX ORDER — ROFLUMILAST 500 UG/1
500 TABLET ORAL DAILY
Status: DISCONTINUED | OUTPATIENT
Start: 2023-09-29 | End: 2023-10-01 | Stop reason: HOSPADM

## 2023-09-29 RX ORDER — VITAMIN B COMPLEX
2000 TABLET ORAL DAILY
Status: DISCONTINUED | OUTPATIENT
Start: 2023-09-29 | End: 2023-10-01 | Stop reason: HOSPADM

## 2023-09-29 RX ORDER — IPRATROPIUM BROMIDE AND ALBUTEROL SULFATE 2.5; .5 MG/3ML; MG/3ML
1 SOLUTION RESPIRATORY (INHALATION)
Status: DISCONTINUED | OUTPATIENT
Start: 2023-09-29 | End: 2023-09-30

## 2023-09-29 RX ORDER — ASPIRIN 81 MG/1
81 TABLET, CHEWABLE ORAL DAILY
Status: DISCONTINUED | OUTPATIENT
Start: 2023-09-29 | End: 2023-10-01 | Stop reason: HOSPADM

## 2023-09-29 RX ORDER — ALBUMIN (HUMAN) 12.5 G/50ML
50 SOLUTION INTRAVENOUS ONCE
Status: COMPLETED | OUTPATIENT
Start: 2023-09-29 | End: 2023-09-29

## 2023-09-29 RX ORDER — ACETAMINOPHEN 325 MG/1
650 TABLET ORAL EVERY 6 HOURS PRN
Status: DISCONTINUED | OUTPATIENT
Start: 2023-09-29 | End: 2023-10-01 | Stop reason: HOSPADM

## 2023-09-29 RX ORDER — ATORVASTATIN CALCIUM 40 MG/1
40 TABLET, FILM COATED ORAL NIGHTLY
Status: DISCONTINUED | OUTPATIENT
Start: 2023-09-29 | End: 2023-10-01 | Stop reason: HOSPADM

## 2023-09-29 RX ORDER — ENOXAPARIN SODIUM 100 MG/ML
40 INJECTION SUBCUTANEOUS DAILY
Status: DISCONTINUED | OUTPATIENT
Start: 2023-09-29 | End: 2023-10-01 | Stop reason: HOSPADM

## 2023-09-29 RX ORDER — ALPRAZOLAM 0.25 MG/1
0.25 TABLET ORAL 3 TIMES DAILY PRN
COMMUNITY

## 2023-09-29 RX ORDER — ONDANSETRON 2 MG/ML
4 INJECTION INTRAMUSCULAR; INTRAVENOUS ONCE
Status: COMPLETED | OUTPATIENT
Start: 2023-09-29 | End: 2023-09-29

## 2023-09-29 RX ADMIN — METRONIDAZOLE 500 MG: 500 INJECTION, SOLUTION INTRAVENOUS at 23:57

## 2023-09-29 RX ADMIN — ASPIRIN 81 MG CHEWABLE TABLET 81 MG: 81 TABLET CHEWABLE at 09:26

## 2023-09-29 RX ADMIN — ALBUMIN (HUMAN) 50 G: 0.25 INJECTION, SOLUTION INTRAVENOUS at 11:58

## 2023-09-29 RX ADMIN — METHYLPREDNISOLONE SODIUM SUCCINATE 60 MG: 125 INJECTION, POWDER, FOR SOLUTION INTRAMUSCULAR; INTRAVENOUS at 02:25

## 2023-09-29 RX ADMIN — LEVOFLOXACIN 500 MG: 500 TABLET, FILM COATED ORAL at 09:26

## 2023-09-29 RX ADMIN — PANTOPRAZOLE SODIUM 40 MG: 40 TABLET, DELAYED RELEASE ORAL at 21:19

## 2023-09-29 RX ADMIN — LORAZEPAM 0.5 MG: 2 INJECTION INTRAMUSCULAR at 03:49

## 2023-09-29 RX ADMIN — IPRATROPIUM BROMIDE AND ALBUTEROL SULFATE 1 DOSE: 2.5; .5 SOLUTION RESPIRATORY (INHALATION) at 21:34

## 2023-09-29 RX ADMIN — FIDAXOMICIN 200 MG: 200 TABLET, FILM COATED ORAL at 10:25

## 2023-09-29 RX ADMIN — ONDANSETRON 4 MG: 2 INJECTION INTRAMUSCULAR; INTRAVENOUS at 03:49

## 2023-09-29 RX ADMIN — BUDESONIDE 500 MCG: 0.5 SUSPENSION RESPIRATORY (INHALATION) at 18:05

## 2023-09-29 RX ADMIN — MICONAZOLE NITRATE: 2 OINTMENT TOPICAL at 21:14

## 2023-09-29 RX ADMIN — SODIUM CHLORIDE 1000 ML: 9 INJECTION, SOLUTION INTRAVENOUS at 02:20

## 2023-09-29 RX ADMIN — BUDESONIDE 500 MCG: 0.5 SUSPENSION RESPIRATORY (INHALATION) at 10:15

## 2023-09-29 RX ADMIN — ACETAMINOPHEN 650 MG: 325 TABLET ORAL at 21:14

## 2023-09-29 RX ADMIN — ATORVASTATIN CALCIUM 40 MG: 40 TABLET, FILM COATED ORAL at 21:14

## 2023-09-29 RX ADMIN — FOLIC ACID 1000 MCG: 1 TABLET ORAL at 09:26

## 2023-09-29 RX ADMIN — ENOXAPARIN SODIUM 40 MG: 100 INJECTION SUBCUTANEOUS at 09:26

## 2023-09-29 RX ADMIN — METRONIDAZOLE 500 MG: 500 INJECTION, SOLUTION INTRAVENOUS at 08:14

## 2023-09-29 RX ADMIN — Medication 2000 UNITS: at 09:26

## 2023-09-29 RX ADMIN — SODIUM CHLORIDE: 9 INJECTION, SOLUTION INTRAVENOUS at 19:27

## 2023-09-29 RX ADMIN — ARFORMOTEROL TARTRATE 15 MCG: 15 SOLUTION RESPIRATORY (INHALATION) at 10:15

## 2023-09-29 RX ADMIN — METRONIDAZOLE 500 MG: 500 INJECTION, SOLUTION INTRAVENOUS at 17:10

## 2023-09-29 RX ADMIN — SODIUM CHLORIDE: 9 INJECTION, SOLUTION INTRAVENOUS at 09:26

## 2023-09-29 RX ADMIN — ALPRAZOLAM 0.25 MG: 0.25 TABLET ORAL at 21:14

## 2023-09-29 RX ADMIN — ARFORMOTEROL TARTRATE 15 MCG: 15 SOLUTION RESPIRATORY (INHALATION) at 18:05

## 2023-09-29 RX ADMIN — ROFLUMILAST 500 MCG: 500 TABLET ORAL at 10:25

## 2023-09-29 ASSESSMENT — PAIN DESCRIPTION - DESCRIPTORS
DESCRIPTORS: ACHING
DESCRIPTORS: THROBBING
DESCRIPTORS: ACHING

## 2023-09-29 ASSESSMENT — ENCOUNTER SYMPTOMS
SHORTNESS OF BREATH: 0
VOMITING: 0
COUGH: 0
RHINORRHEA: 0
TROUBLE SWALLOWING: 0
WHEEZING: 0
ABDOMINAL PAIN: 1
NAUSEA: 1
DIARRHEA: 1
BACK PAIN: 0
VOICE CHANGE: 0
SORE THROAT: 0
EYE REDNESS: 0
PHOTOPHOBIA: 0

## 2023-09-29 ASSESSMENT — PAIN SCALES - GENERAL
PAINLEVEL_OUTOF10: 5
PAINLEVEL_OUTOF10: 8
PAINLEVEL_OUTOF10: 4
PAINLEVEL_OUTOF10: 0

## 2023-09-29 ASSESSMENT — PAIN DESCRIPTION - DIRECTION
RADIATING_TOWARDS: DENIES
RADIATING_TOWARDS: BACKSIDE

## 2023-09-29 ASSESSMENT — PAIN DESCRIPTION - FREQUENCY
FREQUENCY: CONTINUOUS
FREQUENCY: CONTINUOUS

## 2023-09-29 ASSESSMENT — PAIN DESCRIPTION - ORIENTATION
ORIENTATION: RIGHT
ORIENTATION: ANTERIOR

## 2023-09-29 ASSESSMENT — PAIN DESCRIPTION - ONSET: ONSET: ON-GOING

## 2023-09-29 ASSESSMENT — PAIN - FUNCTIONAL ASSESSMENT: PAIN_FUNCTIONAL_ASSESSMENT: ACTIVITIES ARE NOT PREVENTED

## 2023-09-29 ASSESSMENT — PAIN DESCRIPTION - PAIN TYPE: TYPE: ACUTE PAIN

## 2023-09-29 ASSESSMENT — PAIN DESCRIPTION - LOCATION
LOCATION: HEAD
LOCATION: ABDOMEN;PELVIS
LOCATION: HEAD

## 2023-09-29 NOTE — TELEPHONE ENCOUNTER
Writer contacted Dr. Jany Cardenas to inform of 30 day readmission risk. Dr. Jany Cardenas informed writer there is no decision on disposition at this time.       Call Back: If you need to call back to inform of disposition you can contact me at [phone]

## 2023-09-29 NOTE — H&P
[address] [facility]                    [address] [facility] [address]                              HISTORY AND PHYSICAL    PATIENT NAME: Perry Godoy                       :        1960  MED REC NO:   [id number]                            ROOM:       [id number]  ACCOUNT NO:   [de-identified]                           ADMIT DATE: 2023  PROVIDER:     Emmie Sales DO    CHIEF COMPLAINT AND HISTORY OF CHIEF COMPLAINT:  This is a pleasant  59-year-old white female who was admitted to [facility]. The  patient presented to the hospital through the emergency room on  2023. The patient presented to the hospital here with what  appeared to be right lower quadrant pain. The patient does have a  history of previous kidney stones and has been followed up. The patient  had UTI, required IV antibiotic therapy and followup with Infectious  Disease. The patient was told by the daughter if she had pain, to go to  the emergency room. The patient states that she had the pain going on  for approximately past 3 weeks, associated with lower quadrant pain  along with associated diarrhea and decreased oral intake. The patient  was admitted to this hospital through the emergency room. She was  recently hospitalized at [facility] in 2023. The patient  at that time was admitted for shortness of breath. She was treated at  the time with acute exacerbation of COPD and subsequently discharged  home. Also associated with this had been an episode where she was  admitted here for kidney stones. The patient had increased amount of  symptomatology, at which time she presented to the hospital at [facility]. On presentation here, it was noted at that time that her  white count was slightly elevated. Imaging studies at that time  suggested to have urinary tract infection.   CT of the abdomen and pelvis  did show what appeared to be sigmoid wall thickening with

## 2023-09-29 NOTE — CARE COORDINATION
Case Management Assessment  Initial Evaluation    Date/Time of Evaluation: 9/29/2023 1:39 PM  Assessment Completed by: EPI Santana    If patient is discharged prior to next notation, then this note serves as note for discharge by case management. Patient Name: Sander Smith                   YOB: 1960  Diagnosis: Colitis [K52.9]                   Date / Time: 9/28/2023  9:52 PM    Patient Admission Status: Inpatient   Readmission Risk (Low < 19, Mod (19-27), High > 27): Readmission Risk Score: 36.8    Current PCP: Ernie Alaniz MD  PCP verified by CM?  Yes    Chart Reviewed: Yes      History Provided by: Patient  Patient Orientation: Alert and Oriented, Person, Place, Situation, Self    Patient Cognition: Alert    Hospitalization in the last 30 days (Readmission):  Yes    Readmission Assessment  Number of Days since last admission?: 8-30 days  Previous Disposition: Home with Home Health  Who is being Interviewed: Patient  What was the patient's/caregiver's perception as to why they think they needed to return back to the hospital?: Other (Comment) (having stomach pain)  Did you visit your Primary Care Physician after you left the hospital, before you returned this time?: No  Why weren't you able to visit your PCP?: Did not want to go (Comment) (had appointment scheduled for 9/27 but didn't go, didn't feel good)  Did you see a specialist, such as Cardiac, Pulmonary, Orthopedic Physician, etc. after you left the hospital?: No  Who advised the patient to return to the hospital?: Self-referral  Does the patient report anything that got in the way of taking their medications?: No  In our efforts to provide the best possible care to you and others like you, can you think of anything that we could have done to help you after you left the hospital the first time, so that you might not have needed to return so soon?: Other (Comment) (no suggestions)      Advance Directives:      Code

## 2023-09-29 NOTE — ED PROVIDER NOTES
oropharyngeal erythema. Eyes:      General: No scleral icterus. Right eye: No discharge. Left eye: No discharge. Conjunctiva/sclera: Conjunctivae normal.   Cardiovascular:      Rate and Rhythm: Regular rhythm. Tachycardia present. Pulses: Normal pulses. Heart sounds: No friction rub. No gallop. Pulmonary:      Effort: Pulmonary effort is normal. No respiratory distress. Breath sounds: No stridor. No rhonchi or rales. Comments: Nasal cannula  Abdominal:      General: There is no distension. Palpations: Abdomen is soft. Tenderness: There is abdominal tenderness. There is no right CVA tenderness, left CVA tenderness, guarding or rebound. Musculoskeletal:         General: No tenderness or deformity. Cervical back: Normal range of motion and neck supple. No rigidity or tenderness. Right lower leg: No edema. Left lower leg: No edema. Skin:     General: Skin is warm. Capillary Refill: Capillary refill takes less than 2 seconds. Coloration: Skin is not jaundiced. Findings: No erythema or rash. Neurological:      Mental Status: She is alert and oriented to person, place, and time. Sensory: No sensory deficit. Motor: No weakness. Psychiatric:         Mood and Affect: Mood normal.         Behavior: Behavior normal.          Procedures     CT ABDOMEN PELVIS W IV CONTRAST Additional Contrast? None   Final Result   Sigmoid wall thickening not centered on a diverticulum, which could suggest   focal colitis. Clinical correlation recommended. Interval placement of right ureteral stent. Mild periureteral stranding,   possibly postprocedural.  Recommend clinical and laboratory correlation to   exclude superimposed infection. Multiple hepatic cysts. XR CHEST PORTABLE   Final Result   No acute process.            --------------------------------------------- PAST HISTORY

## 2023-09-29 NOTE — ED NOTES
Pt report to RN that will be taking over pt care at this time.       Janneth Kevin RN  09/29/23 [id number]

## 2023-09-29 NOTE — ED NOTES
Dr Pietro Rod, infectious disease, at the bedside.       [facility] CHERYL, DOMINGO  09/29/23 [id number]

## 2023-09-30 ENCOUNTER — APPOINTMENT (OUTPATIENT)
Dept: GENERAL RADIOLOGY | Age: 63
DRG: 871 | End: 2023-09-30
Payer: COMMERCIAL

## 2023-09-30 ENCOUNTER — APPOINTMENT (OUTPATIENT)
Dept: CT IMAGING | Age: 63
DRG: 871 | End: 2023-09-30
Payer: COMMERCIAL

## 2023-09-30 LAB
ABO + RH BLD: NORMAL
ALBUMIN SERPL-MCNC: 3.2 G/DL (ref 3.5–5.2)
ALP SERPL-CCNC: 88 U/L (ref 35–104)
ALT SERPL-CCNC: 117 U/L (ref 0–32)
ANION GAP SERPL CALCULATED.3IONS-SCNC: 7 MMOL/L (ref 7–16)
ARM BAND NUMBER: NORMAL
AST SERPL-CCNC: 212 U/L (ref 0–31)
B.E.: 11.6 MMOL/L (ref -3–3)
BASOPHILS # BLD: 0.08 K/UL (ref 0–0.2)
BASOPHILS NFR BLD: 1 % (ref 0–2)
BILIRUB SERPL-MCNC: 0.4 MG/DL (ref 0–1.2)
BLOOD BANK SAMPLE EXPIRATION: NORMAL
BLOOD GROUP ANTIBODIES SERPL: NEGATIVE
BNP SERPL-MCNC: 3089 PG/ML (ref 0–450)
BUN SERPL-MCNC: 10 MG/DL (ref 6–23)
C DIFF GDH + TOXINS A+B STL QL IA.RAPID: NEGATIVE
CALCIUM SERPL-MCNC: 8.9 MG/DL (ref 8.6–10.2)
CHLORIDE SERPL-SCNC: 103 MMOL/L (ref 98–107)
CO2 SERPL-SCNC: 32 MMOL/L (ref 22–29)
COHB: 0.2 % (ref 0–1.5)
CREAT SERPL-MCNC: 0.3 MG/DL (ref 0.5–1)
CRITICAL: ABNORMAL
DATE ANALYZED: ABNORMAL
DATE OF COLLECTION: ABNORMAL
EOSINOPHIL # BLD: 1.46 K/UL (ref 0.05–0.5)
EOSINOPHILS RELATIVE PERCENT: 17 % (ref 0–6)
ERYTHROCYTE [DISTWIDTH] IN BLOOD BY AUTOMATED COUNT: 13.3 % (ref 11.5–15)
GFR SERPL CREATININE-BSD FRML MDRD: >60 ML/MIN/1.73M2
GLUCOSE SERPL-MCNC: 80 MG/DL (ref 74–99)
HCO3: 38 MMOL/L (ref 22–26)
HCT VFR BLD AUTO: 27.9 % (ref 34–48)
HCT VFR BLD AUTO: 28.5 % (ref 34–48)
HGB BLD-MCNC: 8.5 G/DL (ref 11.5–15.5)
HGB BLD-MCNC: 8.9 G/DL (ref 11.5–15.5)
HHB: 0.4 % (ref 0–5)
INR PPP: 1.3
LAB: ABNORMAL
LIPASE SERPL-CCNC: 14 U/L (ref 13–60)
LYMPHOCYTES NFR BLD: 0.23 K/UL (ref 1.5–4)
LYMPHOCYTES RELATIVE PERCENT: 3 % (ref 20–42)
Lab: 1708
MAGNESIUM SERPL-MCNC: 1.6 MG/DL (ref 1.6–2.6)
MCH RBC QN AUTO: 31 PG (ref 26–35)
MCHC RBC AUTO-ENTMCNC: 30.5 G/DL (ref 32–34.5)
MCV RBC AUTO: 101.8 FL (ref 80–99.9)
METHB: 0 % (ref 0–1.5)
MICROORGANISM SPEC CULT: ABNORMAL
MICROORGANISM SPEC CULT: ABNORMAL
MICROORGANISM SPEC CULT: NORMAL
MODE: ABNORMAL
MONOCYTES NFR BLD: 0.23 K/UL (ref 0.1–0.95)
MONOCYTES NFR BLD: 3 % (ref 2–12)
NEUTROPHILS NFR BLD: 77 % (ref 43–80)
NEUTS SEG NFR BLD: 6.6 K/UL (ref 1.8–7.3)
O2 CONTENT: 12 ML/DL
O2 SATURATION: 99.6 % (ref 92–98.5)
O2HB: 99.4 % (ref 94–97)
OPERATOR ID: ABNORMAL
PATIENT TEMP: 37 C
PCO2: 63.2 MMHG (ref 35–45)
PH BLOOD GAS: 7.4 (ref 7.35–7.45)
PLATELET # BLD AUTO: 218 K/UL (ref 130–450)
PMV BLD AUTO: 9.5 FL (ref 7–12)
PO2: 181.7 MMHG (ref 75–100)
POTASSIUM SERPL-SCNC: 3.6 MMOL/L (ref 3.5–5)
PROT SERPL-MCNC: 5.4 G/DL (ref 6.4–8.3)
PROTHROMBIN TIME: 15.3 SEC (ref 9.3–12.4)
RBC # BLD AUTO: 2.74 M/UL (ref 3.5–5.5)
RBC # BLD: NORMAL 10*6/UL
SODIUM SERPL-SCNC: 142 MMOL/L (ref 132–146)
SOURCE, BLOOD GAS: ABNORMAL
SPECIMEN DESCRIPTION: ABNORMAL
SPECIMEN DESCRIPTION: NORMAL
SPECIMEN DESCRIPTION: NORMAL
THB: 8.3 G/DL (ref 11.5–16.5)
TIME ANALYZED: 1713
TROPONIN I SERPL HS-MCNC: 17 NG/L (ref 0–9)
TROPONIN I SERPL HS-MCNC: 18 NG/L (ref 0–9)
WBC OTHER # BLD: 8.6 K/UL (ref 4.5–11.5)

## 2023-09-30 PROCEDURE — 86850 RBC ANTIBODY SCREEN: CPT

## 2023-09-30 PROCEDURE — 6360000002 HC RX W HCPCS

## 2023-09-30 PROCEDURE — 6360000002 HC RX W HCPCS: Performed by: INTERNAL MEDICINE

## 2023-09-30 PROCEDURE — 86900 BLOOD TYPING SEROLOGIC ABO: CPT

## 2023-09-30 PROCEDURE — 6370000000 HC RX 637 (ALT 250 FOR IP)

## 2023-09-30 PROCEDURE — 6370000000 HC RX 637 (ALT 250 FOR IP): Performed by: INTERNAL MEDICINE

## 2023-09-30 PROCEDURE — 2500000003 HC RX 250 WO HCPCS: Performed by: INTERNAL MEDICINE

## 2023-09-30 PROCEDURE — 80053 COMPREHEN METABOLIC PANEL: CPT

## 2023-09-30 PROCEDURE — 94660 CPAP INITIATION&MGMT: CPT

## 2023-09-30 PROCEDURE — 83690 ASSAY OF LIPASE: CPT

## 2023-09-30 PROCEDURE — 2060000000 HC ICU INTERMEDIATE R&B

## 2023-09-30 PROCEDURE — 71045 X-RAY EXAM CHEST 1 VIEW: CPT

## 2023-09-30 PROCEDURE — 2700000000 HC OXYGEN THERAPY PER DAY

## 2023-09-30 PROCEDURE — 6360000002 HC RX W HCPCS: Performed by: NURSE PRACTITIONER

## 2023-09-30 PROCEDURE — 82805 BLOOD GASES W/O2 SATURATION: CPT

## 2023-09-30 PROCEDURE — 84484 ASSAY OF TROPONIN QUANT: CPT

## 2023-09-30 PROCEDURE — 86901 BLOOD TYPING SEROLOGIC RH(D): CPT

## 2023-09-30 PROCEDURE — 2580000003 HC RX 258: Performed by: SPECIALIST

## 2023-09-30 PROCEDURE — P9047 ALBUMIN (HUMAN), 25%, 50ML: HCPCS | Performed by: NURSE PRACTITIONER

## 2023-09-30 PROCEDURE — 85018 HEMOGLOBIN: CPT

## 2023-09-30 PROCEDURE — 85610 PROTHROMBIN TIME: CPT

## 2023-09-30 PROCEDURE — 6360000002 HC RX W HCPCS: Performed by: SPECIALIST

## 2023-09-30 PROCEDURE — 6370000000 HC RX 637 (ALT 250 FOR IP): Performed by: HOSPITALIST

## 2023-09-30 PROCEDURE — 83735 ASSAY OF MAGNESIUM: CPT

## 2023-09-30 PROCEDURE — 99255 IP/OBS CONSLTJ NEW/EST HI 80: CPT | Performed by: INTERNAL MEDICINE

## 2023-09-30 PROCEDURE — 5A09357 ASSISTANCE WITH RESPIRATORY VENTILATION, LESS THAN 24 CONSECUTIVE HOURS, CONTINUOUS POSITIVE AIRWAY PRESSURE: ICD-10-PCS | Performed by: INTERNAL MEDICINE

## 2023-09-30 PROCEDURE — 2580000003 HC RX 258: Performed by: INTERNAL MEDICINE

## 2023-09-30 PROCEDURE — 36600 WITHDRAWAL OF ARTERIAL BLOOD: CPT

## 2023-09-30 PROCEDURE — 6370000000 HC RX 637 (ALT 250 FOR IP): Performed by: NURSE PRACTITIONER

## 2023-09-30 PROCEDURE — 6360000004 HC RX CONTRAST MEDICATION: Performed by: RADIOLOGY

## 2023-09-30 PROCEDURE — 36415 COLL VENOUS BLD VENIPUNCTURE: CPT

## 2023-09-30 PROCEDURE — 6370000000 HC RX 637 (ALT 250 FOR IP): Performed by: SPECIALIST

## 2023-09-30 PROCEDURE — 83880 ASSAY OF NATRIURETIC PEPTIDE: CPT

## 2023-09-30 PROCEDURE — 85014 HEMATOCRIT: CPT

## 2023-09-30 PROCEDURE — 85025 COMPLETE CBC W/AUTO DIFF WBC: CPT

## 2023-09-30 PROCEDURE — 74177 CT ABD & PELVIS W/CONTRAST: CPT

## 2023-09-30 PROCEDURE — 94640 AIRWAY INHALATION TREATMENT: CPT

## 2023-09-30 RX ORDER — METOPROLOL SUCCINATE 25 MG/1
12.5 TABLET, EXTENDED RELEASE ORAL DAILY
Status: DISCONTINUED | OUTPATIENT
Start: 2023-09-30 | End: 2023-10-01 | Stop reason: HOSPADM

## 2023-09-30 RX ORDER — 0.9 % SODIUM CHLORIDE 0.9 %
500 INTRAVENOUS SOLUTION INTRAVENOUS ONCE
Status: COMPLETED | OUTPATIENT
Start: 2023-09-30 | End: 2023-09-30

## 2023-09-30 RX ORDER — PROCHLORPERAZINE EDISYLATE 5 MG/ML
5 INJECTION INTRAMUSCULAR; INTRAVENOUS EVERY 6 HOURS PRN
Status: DISCONTINUED | OUTPATIENT
Start: 2023-09-30 | End: 2023-10-01 | Stop reason: HOSPADM

## 2023-09-30 RX ORDER — SACCHAROMYCES BOULARDII 250 MG
250 CAPSULE ORAL 2 TIMES DAILY
Status: DISCONTINUED | OUTPATIENT
Start: 2023-09-30 | End: 2023-09-30 | Stop reason: CLARIF

## 2023-09-30 RX ORDER — MAGNESIUM SULFATE 1 G/100ML
1000 INJECTION INTRAVENOUS PRN
Status: DISCONTINUED | OUTPATIENT
Start: 2023-09-30 | End: 2023-10-01 | Stop reason: HOSPADM

## 2023-09-30 RX ORDER — DIGOXIN 0.25 MG/ML
250 INJECTION INTRAMUSCULAR; INTRAVENOUS ONCE
Status: COMPLETED | OUTPATIENT
Start: 2023-09-30 | End: 2023-09-30

## 2023-09-30 RX ORDER — FUROSEMIDE 10 MG/ML
40 INJECTION INTRAMUSCULAR; INTRAVENOUS ONCE
Status: COMPLETED | OUTPATIENT
Start: 2023-09-30 | End: 2023-09-30

## 2023-09-30 RX ORDER — FENTANYL CITRATE 0.05 MG/ML
12.5 INJECTION, SOLUTION INTRAMUSCULAR; INTRAVENOUS ONCE
Status: COMPLETED | OUTPATIENT
Start: 2023-09-30 | End: 2023-09-30

## 2023-09-30 RX ORDER — POTASSIUM CHLORIDE 20 MEQ/1
40 TABLET, EXTENDED RELEASE ORAL PRN
Status: DISCONTINUED | OUTPATIENT
Start: 2023-09-30 | End: 2023-10-01 | Stop reason: HOSPADM

## 2023-09-30 RX ORDER — LEVALBUTEROL INHALATION SOLUTION 0.63 MG/3ML
0.63 SOLUTION RESPIRATORY (INHALATION) EVERY 6 HOURS
Status: DISCONTINUED | OUTPATIENT
Start: 2023-09-30 | End: 2023-10-01 | Stop reason: HOSPADM

## 2023-09-30 RX ORDER — ALBUMIN (HUMAN) 12.5 G/50ML
50 SOLUTION INTRAVENOUS ONCE
Status: COMPLETED | OUTPATIENT
Start: 2023-09-30 | End: 2023-10-01

## 2023-09-30 RX ORDER — POTASSIUM CHLORIDE 7.45 MG/ML
10 INJECTION INTRAVENOUS PRN
Status: DISCONTINUED | OUTPATIENT
Start: 2023-09-30 | End: 2023-10-01 | Stop reason: HOSPADM

## 2023-09-30 RX ORDER — MAGNESIUM SULFATE IN WATER 40 MG/ML
2000 INJECTION, SOLUTION INTRAVENOUS ONCE
Status: COMPLETED | OUTPATIENT
Start: 2023-09-30 | End: 2023-09-30

## 2023-09-30 RX ORDER — LACTOBACILLUS RHAMNOSUS GG 10B CELL
1 CAPSULE ORAL 2 TIMES DAILY
Status: DISCONTINUED | OUTPATIENT
Start: 2023-09-30 | End: 2023-10-01 | Stop reason: HOSPADM

## 2023-09-30 RX ORDER — METOPROLOL TARTRATE 5 MG/5ML
5 INJECTION INTRAVENOUS ONCE
Status: COMPLETED | OUTPATIENT
Start: 2023-09-30 | End: 2023-09-30

## 2023-09-30 RX ADMIN — ATORVASTATIN CALCIUM 40 MG: 40 TABLET, FILM COATED ORAL at 21:29

## 2023-09-30 RX ADMIN — LEVOFLOXACIN 500 MG: 500 TABLET, FILM COATED ORAL at 09:22

## 2023-09-30 RX ADMIN — MICONAZOLE NITRATE: 2 OINTMENT TOPICAL at 09:00

## 2023-09-30 RX ADMIN — ACETAMINOPHEN 650 MG: 325 TABLET ORAL at 03:30

## 2023-09-30 RX ADMIN — ALPRAZOLAM 0.25 MG: 0.25 TABLET ORAL at 14:31

## 2023-09-30 RX ADMIN — FENTANYL CITRATE 12.5 MCG: 0.05 INJECTION, SOLUTION INTRAMUSCULAR; INTRAVENOUS at 21:30

## 2023-09-30 RX ADMIN — ROFLUMILAST 500 MCG: 500 TABLET ORAL at 09:22

## 2023-09-30 RX ADMIN — IPRATROPIUM BROMIDE AND ALBUTEROL SULFATE 1 DOSE: 2.5; .5 SOLUTION RESPIRATORY (INHALATION) at 06:26

## 2023-09-30 RX ADMIN — VANCOMYCIN HYDROCHLORIDE 1250 MG: 10 INJECTION, POWDER, LYOPHILIZED, FOR SOLUTION INTRAVENOUS at 14:35

## 2023-09-30 RX ADMIN — IOPAMIDOL 75 ML: 755 INJECTION, SOLUTION INTRAVENOUS at 22:04

## 2023-09-30 RX ADMIN — Medication 1 CAPSULE: at 11:57

## 2023-09-30 RX ADMIN — DIGOXIN 250 MCG: 0.25 INJECTION INTRAMUSCULAR; INTRAVENOUS at 10:16

## 2023-09-30 RX ADMIN — ALPRAZOLAM 0.25 MG: 0.25 TABLET ORAL at 09:22

## 2023-09-30 RX ADMIN — SODIUM CHLORIDE 500 ML: 9 INJECTION, SOLUTION INTRAVENOUS at 21:36

## 2023-09-30 RX ADMIN — ALBUMIN (HUMAN) 50 G: 0.25 INJECTION, SOLUTION INTRAVENOUS at 10:17

## 2023-09-30 RX ADMIN — PANTOPRAZOLE SODIUM 40 MG: 40 TABLET, DELAYED RELEASE ORAL at 21:29

## 2023-09-30 RX ADMIN — BUDESONIDE 500 MCG: 0.5 SUSPENSION RESPIRATORY (INHALATION) at 06:26

## 2023-09-30 RX ADMIN — LEVALBUTEROL 0.63 MG: 0.63 SOLUTION RESPIRATORY (INHALATION) at 23:52

## 2023-09-30 RX ADMIN — ALPRAZOLAM 0.25 MG: 0.25 TABLET ORAL at 21:29

## 2023-09-30 RX ADMIN — MICONAZOLE NITRATE: 2 OINTMENT TOPICAL at 22:19

## 2023-09-30 RX ADMIN — IPRATROPIUM BROMIDE AND ALBUTEROL SULFATE 1 DOSE: 2.5; .5 SOLUTION RESPIRATORY (INHALATION) at 17:29

## 2023-09-30 RX ADMIN — IPRATROPIUM BROMIDE AND ALBUTEROL SULFATE 1 DOSE: 2.5; .5 SOLUTION RESPIRATORY (INHALATION) at 09:53

## 2023-09-30 RX ADMIN — METRONIDAZOLE 500 MG: 500 INJECTION, SOLUTION INTRAVENOUS at 23:16

## 2023-09-30 RX ADMIN — Medication 1 CAPSULE: at 21:29

## 2023-09-30 RX ADMIN — ASPIRIN 81 MG CHEWABLE TABLET 81 MG: 81 TABLET CHEWABLE at 09:22

## 2023-09-30 RX ADMIN — ENOXAPARIN SODIUM 40 MG: 100 INJECTION SUBCUTANEOUS at 09:22

## 2023-09-30 RX ADMIN — ACETAMINOPHEN 650 MG: 325 TABLET ORAL at 15:05

## 2023-09-30 RX ADMIN — ARFORMOTEROL TARTRATE 15 MCG: 15 SOLUTION RESPIRATORY (INHALATION) at 17:30

## 2023-09-30 RX ADMIN — FIDAXOMICIN 200 MG: 200 TABLET, FILM COATED ORAL at 10:58

## 2023-09-30 RX ADMIN — FOLIC ACID 1000 MCG: 1 TABLET ORAL at 09:22

## 2023-09-30 RX ADMIN — ARFORMOTEROL TARTRATE 15 MCG: 15 SOLUTION RESPIRATORY (INHALATION) at 06:26

## 2023-09-30 RX ADMIN — METRONIDAZOLE 500 MG: 500 INJECTION, SOLUTION INTRAVENOUS at 14:36

## 2023-09-30 RX ADMIN — FIDAXOMICIN 200 MG: 200 TABLET, FILM COATED ORAL at 00:03

## 2023-09-30 RX ADMIN — PROCHLORPERAZINE EDISYLATE 5 MG: 5 INJECTION INTRAMUSCULAR; INTRAVENOUS at 21:30

## 2023-09-30 RX ADMIN — IPRATROPIUM BROMIDE AND ALBUTEROL SULFATE 1 DOSE: 2.5; .5 SOLUTION RESPIRATORY (INHALATION) at 13:18

## 2023-09-30 RX ADMIN — METRONIDAZOLE 500 MG: 500 INJECTION, SOLUTION INTRAVENOUS at 05:21

## 2023-09-30 RX ADMIN — BUDESONIDE 500 MCG: 0.5 SUSPENSION RESPIRATORY (INHALATION) at 17:29

## 2023-09-30 RX ADMIN — Medication 2000 UNITS: at 09:22

## 2023-09-30 RX ADMIN — METOPROLOL SUCCINATE 12.5 MG: 25 TABLET, EXTENDED RELEASE ORAL at 13:04

## 2023-09-30 RX ADMIN — MAGNESIUM SULFATE HEPTAHYDRATE 2000 MG: 40 INJECTION, SOLUTION INTRAVENOUS at 11:01

## 2023-09-30 RX ADMIN — METOPROLOL TARTRATE 5 MG: 5 INJECTION INTRAVENOUS at 21:29

## 2023-09-30 RX ADMIN — FUROSEMIDE 40 MG: 10 INJECTION, SOLUTION INTRAMUSCULAR; INTRAVENOUS at 10:27

## 2023-09-30 RX ADMIN — SODIUM CHLORIDE: 9 INJECTION, SOLUTION INTRAVENOUS at 03:20

## 2023-09-30 ASSESSMENT — PAIN DESCRIPTION - DESCRIPTORS: DESCRIPTORS: CRAMPING

## 2023-09-30 ASSESSMENT — PAIN DESCRIPTION - ORIENTATION: ORIENTATION: ANTERIOR;MID

## 2023-09-30 ASSESSMENT — PAIN DESCRIPTION - LOCATION
LOCATION: HEAD
LOCATION: ABDOMEN
LOCATION: HEAD

## 2023-09-30 ASSESSMENT — PAIN SCALES - GENERAL
PAINLEVEL_OUTOF10: 10
PAINLEVEL_OUTOF10: 5
PAINLEVEL_OUTOF10: 4

## 2023-09-30 NOTE — DISCHARGE INSTRUCTIONS
Call Dr. López/Liu/Melani's office for an appointment later this week as scheduled for cystoscopy and stent removal.  If you are unable to make that appointment, call the listed number to reschedule when you are feeling better--[phone].

## 2023-09-30 NOTE — CONSULTS
Consult Note            Date:9/29/2023        Patient Shorty Ragland     YOB: 1960     Age:63 y.o. Inpatient consult to Infectious Diseases  Consult performed by: Liana Guzman MD  Consult ordered by: Gwendel Nageotte, APRN - CNP          Chief Complaint     Chief Complaint   Patient presents with    Pelvic Pain     Had stent placed 9/11. History Obtained From   patient, electronic medical record    History of Present Illness   David Chew is a 61 y.o. female who  has a past medical history of Abscess, Acute on chronic diastolic CHF (congestive heart failure) ([address]), COPD (chronic obstructive pulmonary disease) ([address]), Coronary artery disease involving native heart without angina pectoris, Depression with anxiety, Diverticulitis, Hydronephrosis with urinary obstruction due to ureteral calculus, Provoked DVT 3/2018, Seasonal allergic rhinitis, Smoker, Tobacco use disorder, and Weakness of both legs. PT IS KNOWN TO ID LAST SEEN BY DR Taylor ON 0/49    Complicated UTI  ( Enterococcus VRE and Pseudomonas )   S/P cystoscopy and stent insertion ( 9/12)   Transaminitis  LFT  -stable   PT WAS ON daptomycin   Oral zyvox 600 mg po q 12 hrs / Oral levaquin 500 mg po q 24 hrs for next 5 days   PT comes in with rlq abd pain diarrhea and nausea with dec appetite   Wbc12.1 cr0.4   Pt lives with daughter   Pt was seen and examined in icu     CT ABDOMEN PELVIS W IV CONTRAST Additional Contrast? None    Result Date: 9/28/2023  Sigmoid wall thickening not centered on a diverticulum, which could suggest focal colitis. Clinical correlation recommended. Interval placement of right ureteral stent. Mild periureteral stranding, possibly postprocedural.  Recommend clinical and laboratory correlation to exclude superimposed infection. Multiple hepatic cysts. XR CHEST PORTABLE    Result Date: 9/28/2023  No acute process.        Past Medical History     Past Medical History:   Diagnosis Date    Abscess
INPATIENT CARDIOLOGY CONSULT    Name: Laurie Page    Age: 61 y.o. Date of Admission: 9/28/2023  9:52 PM    Date of Service: 9/30/2023    Reason for Consultation: SVT    Referring Physician: Kasey Bailey DO    Primary Cardiologist: Dr Anuradha Dozier    History of Present Illness:   Laurie Page is a 61 y.o. female who presented on 9/28/2023 for further evaluation of abdominal pain. She is currently on BiPAP and when I asked her what brought her to the hospital, she told me that [facility] the nurse. \"  Not able to provide much history. Went into SVT this morning rates in the 160s, was given IV digoxin and has since converted to sinus rhythm. Denies chest pain or palpitations. Has some shortness of breath. History of nonischemic cardiomyopathy with a EF in the 45s when she had COVID beginning of this year, and an abnormal stress test.  Eventually had a heart catheterization in July that showed normal coronary arteries. EF has not been reassessed. No prior history of arrhythmias.     Review of Systems:   Unable to obtain    Past Medical History:  Past Medical History:   Diagnosis Date    Abscess     colon    Acute on chronic diastolic CHF (congestive heart failure) ([address]) 01/04/2023    COPD (chronic obstructive pulmonary disease) ([facility])     Coronary artery disease involving native heart without angina pectoris 6/14/2023    Depression with anxiety 03/23/2023    Diverticulitis     Hydronephrosis with urinary obstruction due to ureteral calculus 9/15/2023    Provoked DVT 3/2018     3 months Eliquis for DVT due to PICC line    Seasonal allergic rhinitis     Smoker 11/30/2019    5-6 per day    Tobacco use disorder      : 1 ppd since age 25    Weakness of both legs        Past Surgical History:  Past Surgical History:   Procedure Laterality Date    BRONCHOSCOPY N/A 04/27/2023    BRONCHOSCOPY ALVEOLAR LAVAGE performed by Keli Thibodeaux DO at [address]  04/27/2023    BRONCHOSCOPY DIAGNOSTIC OR CELL
09/29/2023 07:54 AM    MCH 31.2 09/29/2023 07:54 AM    MCHC 31.1 09/29/2023 07:54 AM    RDW 13.2 09/29/2023 07:54 AM     09/29/2023 07:54 AM    MPV 9.8 09/29/2023 07:54 AM     Lab Results   Component Value Date/Time     09/29/2023 07:54 AM    K 4.6 09/29/2023 07:54 AM    K 3.5 07/14/2023 06:19 AM     09/29/2023 07:54 AM    CO2 33 09/29/2023 07:54 AM    BUN 11 09/29/2023 07:54 AM    CREATININE 0.3 09/29/2023 07:54 AM    CALCIUM 8.9 09/29/2023 07:54 AM    PROT 6.0 09/29/2023 07:54 AM    LABALBU 2.8 09/29/2023 07:54 AM    BILITOT 0.4 09/29/2023 07:54 AM    ALKPHOS 121 09/29/2023 07:54 AM    AST 50 09/29/2023 07:54 AM    ALT 67 09/29/2023 07:54 AM     Lab Results   Component Value Date/Time    LIPASE 15 09/10/2023 10:30 PM     No results found for: \"AMYLASE\"      ASSESSMENT/PLAN:  Patient Active Problem List   Diagnosis    Tobacco use disorder    Seasonal allergic rhinitis    Chronic bronchitis (HCC)    Acute and chronic respiratory failure, unspecified whether with hypoxia or hypercapnia (HCC)    Chronic hypoxemic respiratory failure (HCC)    Chronic obstructive pulmonary disease (HCC)    Acute diverticulitis with abscess    Acute cholecystitis    Acute on chronic diastolic CHF (congestive heart failure) (HCC)    Sepsis (HCC)    Pulmonary fibrosis (HCC)    Hypoxemia    Heart failure (HCC)    Abnormal nuclear stress test    Depression with anxiety    Coronary artery disease involving native heart without angina pectoris    JUDE on CPAP    Respiratory failure with hypercapnia (HCC)    Right lower quadrant abdominal pain    Ureteral stone    Hydronephrosis with urinary obstruction due to ureteral calculus    Colitis     1. Diarrhea/abdominal pain  -Abnormal CT appearance of the sigmoid but no pain LLQ  -Benign abdominal exam  -Recent history of antibiotic treatment  -History of diverticulitis/abscess  -Obtain stool studies  -Start probiotics  -Further evaluation with nonemergent colonoscopy    2.

## 2023-10-01 ENCOUNTER — HOSPITAL ENCOUNTER (INPATIENT)
Age: 63
LOS: 1 days | DRG: 296 | End: 2023-10-02
Attending: FAMILY MEDICINE | Admitting: FAMILY MEDICINE
Payer: COMMERCIAL

## 2023-10-01 ENCOUNTER — APPOINTMENT (OUTPATIENT)
Dept: GENERAL RADIOLOGY | Age: 63
DRG: 296 | End: 2023-10-01
Attending: FAMILY MEDICINE
Payer: COMMERCIAL

## 2023-10-01 VITALS
OXYGEN SATURATION: 100 % | SYSTOLIC BLOOD PRESSURE: 81 MMHG | HEIGHT: 62 IN | TEMPERATURE: 99.6 F | DIASTOLIC BLOOD PRESSURE: 51 MMHG | WEIGHT: 117 LBS | HEART RATE: 95 BPM | BODY MASS INDEX: 21.53 KG/M2 | RESPIRATION RATE: 20 BRPM

## 2023-10-01 PROBLEM — I47.21 TORSADES DE POINTES (HCC): Status: ACTIVE | Noted: 2023-10-01

## 2023-10-01 PROBLEM — I47.10 SVT (SUPRAVENTRICULAR TACHYCARDIA): Status: ACTIVE | Noted: 2023-10-01

## 2023-10-01 PROBLEM — I95.9 HYPOTENSION: Status: ACTIVE | Noted: 2023-10-01

## 2023-10-01 PROBLEM — I95.89 HYPOTENSION DUE TO HYPOVOLEMIA: Status: ACTIVE | Noted: 2023-10-01

## 2023-10-01 PROBLEM — R19.7 DIARRHEA: Status: ACTIVE | Noted: 2023-10-01

## 2023-10-01 PROBLEM — D64.9 ANEMIA: Status: ACTIVE | Noted: 2023-10-01

## 2023-10-01 PROBLEM — E86.1 HYPOTENSION DUE TO HYPOVOLEMIA: Status: ACTIVE | Noted: 2023-10-01

## 2023-10-01 LAB
ALBUMIN SERPL-MCNC: 3.5 G/DL (ref 3.5–5.2)
ALBUMIN SERPL-MCNC: 3.8 G/DL (ref 3.5–5.2)
ALP SERPL-CCNC: 79 U/L (ref 35–104)
ALP SERPL-CCNC: 87 U/L (ref 35–104)
ALT SERPL-CCNC: 205 U/L (ref 0–32)
ALT SERPL-CCNC: 217 U/L (ref 0–32)
ANION GAP SERPL CALCULATED.3IONS-SCNC: 11 MMOL/L (ref 7–16)
ANION GAP SERPL CALCULATED.3IONS-SCNC: 2 MMOL/L (ref 7–16)
ANION GAP SERPL CALCULATED.3IONS-SCNC: 3 MMOL/L (ref 7–16)
ANION GAP SERPL CALCULATED.3IONS-SCNC: 3 MMOL/L (ref 7–16)
AST SERPL-CCNC: 339 U/L (ref 0–31)
AST SERPL-CCNC: 375 U/L (ref 0–31)
B.E.: 8.2 MMOL/L (ref -3–3)
BASOPHILS # BLD: 0.04 K/UL (ref 0–0.2)
BASOPHILS # BLD: 0.14 K/UL (ref 0–0.2)
BASOPHILS NFR BLD: 0 % (ref 0–2)
BASOPHILS NFR BLD: 2 % (ref 0–2)
BILIRUB DIRECT SERPL-MCNC: 0.5 MG/DL (ref 0–0.3)
BILIRUB INDIRECT SERPL-MCNC: 0.3 MG/DL (ref 0–1)
BILIRUB SERPL-MCNC: 0.7 MG/DL (ref 0–1.2)
BILIRUB SERPL-MCNC: 0.8 MG/DL (ref 0–1.2)
BNP SERPL-MCNC: 7148 PG/ML (ref 0–450)
BUN SERPL-MCNC: 7 MG/DL (ref 6–23)
CALCIUM SERPL-MCNC: 8.3 MG/DL (ref 8.6–10.2)
CALCIUM SERPL-MCNC: 8.3 MG/DL (ref 8.6–10.2)
CALCIUM SERPL-MCNC: 8.4 MG/DL (ref 8.6–10.2)
CALCIUM SERPL-MCNC: 8.7 MG/DL (ref 8.6–10.2)
CHLORIDE SERPL-SCNC: 101 MMOL/L (ref 98–107)
CHLORIDE SERPL-SCNC: 102 MMOL/L (ref 98–107)
CHLORIDE SERPL-SCNC: 103 MMOL/L (ref 98–107)
CHLORIDE SERPL-SCNC: 104 MMOL/L (ref 98–107)
CO2 SERPL-SCNC: 30 MMOL/L (ref 22–29)
CO2 SERPL-SCNC: 37 MMOL/L (ref 22–29)
CO2 SERPL-SCNC: 39 MMOL/L (ref 22–29)
CO2 SERPL-SCNC: 40 MMOL/L (ref 22–29)
COHB: 0.4 % (ref 0–1.5)
CREAT SERPL-MCNC: 0.3 MG/DL (ref 0.5–1)
CRITICAL: ABNORMAL
DATE ANALYZED: ABNORMAL
DATE OF COLLECTION: ABNORMAL
EOSINOPHIL # BLD: 1.11 K/UL (ref 0.05–0.5)
EOSINOPHIL # BLD: 1.59 K/UL (ref 0.05–0.5)
EOSINOPHILS RELATIVE PERCENT: 12 % (ref 0–6)
EOSINOPHILS RELATIVE PERCENT: 20 % (ref 0–6)
ERYTHROCYTE [DISTWIDTH] IN BLOOD BY AUTOMATED COUNT: 13.4 % (ref 11.5–15)
ERYTHROCYTE [DISTWIDTH] IN BLOOD BY AUTOMATED COUNT: 13.6 % (ref 11.5–15)
FAT QUALITATIVE SPLIT STOOL: NORMAL
FECAL NEUTRAL FAT: NORMAL
GFR SERPL CREATININE-BSD FRML MDRD: >60 ML/MIN/1.73M2
GLUCOSE BLD-MCNC: 151 MG/DL (ref 74–99)
GLUCOSE SERPL-MCNC: 110 MG/DL (ref 74–99)
GLUCOSE SERPL-MCNC: 111 MG/DL (ref 74–99)
GLUCOSE SERPL-MCNC: 123 MG/DL (ref 74–99)
GLUCOSE SERPL-MCNC: 141 MG/DL (ref 74–99)
HCO3: 34.6 MMOL/L (ref 22–26)
HCT VFR BLD AUTO: 23.3 % (ref 34–48)
HCT VFR BLD AUTO: 25.2 % (ref 34–48)
HGB BLD-MCNC: 7.2 G/DL (ref 11.5–15.5)
HGB BLD-MCNC: 7.7 G/DL (ref 11.5–15.5)
HHB: 1 % (ref 0–5)
IMM GRANULOCYTES # BLD AUTO: 0.06 K/UL (ref 0–0.58)
IMM GRANULOCYTES NFR BLD: 1 % (ref 0–5)
LAB: ABNORMAL
LACTATE BLDV-SCNC: 0.8 MMOL/L (ref 0.5–1.9)
LACTATE BLDV-SCNC: 1.3 MMOL/L (ref 0.5–2.2)
LYMPHOCYTES NFR BLD: 0.62 K/UL (ref 1.5–4)
LYMPHOCYTES NFR BLD: 0.83 K/UL (ref 1.5–4)
LYMPHOCYTES RELATIVE PERCENT: 11 % (ref 20–42)
LYMPHOCYTES RELATIVE PERCENT: 6 % (ref 20–42)
Lab: 2330
MAGNESIUM SERPL-MCNC: 1.7 MG/DL (ref 1.6–2.6)
MAGNESIUM SERPL-MCNC: 2.1 MG/DL (ref 1.6–2.6)
MAGNESIUM SERPL-MCNC: 2.1 MG/DL (ref 1.6–2.6)
MAGNESIUM SERPL-MCNC: 2.6 MG/DL (ref 1.6–2.6)
MCH RBC QN AUTO: 30.9 PG (ref 26–35)
MCH RBC QN AUTO: 31.4 PG (ref 26–35)
MCHC RBC AUTO-ENTMCNC: 30.6 G/DL (ref 32–34.5)
MCHC RBC AUTO-ENTMCNC: 30.9 G/DL (ref 32–34.5)
MCV RBC AUTO: 101.2 FL (ref 80–99.9)
MCV RBC AUTO: 101.7 FL (ref 80–99.9)
METHB: 0.8 % (ref 0–1.5)
MODE: ABNORMAL
MONOCYTES NFR BLD: 0.07 K/UL (ref 0.1–0.95)
MONOCYTES NFR BLD: 0.58 K/UL (ref 0.1–0.95)
MONOCYTES NFR BLD: 1 % (ref 2–12)
MONOCYTES NFR BLD: 6 % (ref 2–12)
NEUTROPHILS NFR BLD: 67 % (ref 43–80)
NEUTROPHILS NFR BLD: 75 % (ref 43–80)
NEUTS SEG NFR BLD: 5.27 K/UL (ref 1.8–7.3)
NEUTS SEG NFR BLD: 7.22 K/UL (ref 1.8–7.3)
O2 CONTENT: 12.5 ML/DL
O2 SATURATION: 99 % (ref 92–98.5)
O2HB: 97.8 % (ref 94–97)
OPERATOR ID: 8219
PATIENT TEMP: 37 C
PCO2: 60.4 MMHG (ref 35–45)
PH BLOOD GAS: 7.38 (ref 7.35–7.45)
PHOSPHATE SERPL-MCNC: 1.1 MG/DL (ref 2.5–4.5)
PHOSPHATE SERPL-MCNC: 1.2 MG/DL (ref 2.5–4.5)
PHOSPHATE SERPL-MCNC: 1.4 MG/DL (ref 2.5–4.5)
PHOSPHATE SERPL-MCNC: 1.6 MG/DL (ref 2.5–4.5)
PLATELET # BLD AUTO: 167 K/UL (ref 130–450)
PLATELET # BLD AUTO: 173 K/UL (ref 130–450)
PMV BLD AUTO: 9.4 FL (ref 7–12)
PMV BLD AUTO: 9.6 FL (ref 7–12)
PO2: 174.5 MMHG (ref 75–100)
POTASSIUM SERPL-SCNC: 2.5 MMOL/L (ref 3.5–5)
POTASSIUM SERPL-SCNC: 2.6 MMOL/L (ref 3.5–5)
POTASSIUM SERPL-SCNC: 3 MMOL/L (ref 3.5–5)
POTASSIUM SERPL-SCNC: 4.1 MMOL/L (ref 3.5–5)
PREALB SERPL-MCNC: 5 MG/DL (ref 20–40)
PROCALCITONIN SERPL-MCNC: 1.03 NG/ML (ref 0–0.08)
PROT SERPL-MCNC: 5.1 G/DL (ref 6.4–8.3)
PROT SERPL-MCNC: 5.5 G/DL (ref 6.4–8.3)
RBC # BLD AUTO: 2.29 M/UL (ref 3.5–5.5)
RBC # BLD AUTO: 2.49 M/UL (ref 3.5–5.5)
RBC # BLD: NORMAL 10*6/UL
SODIUM SERPL-SCNC: 143 MMOL/L (ref 132–146)
SODIUM SERPL-SCNC: 144 MMOL/L (ref 132–146)
SOURCE, BLOOD GAS: ABNORMAL
THB: 8.8 G/DL (ref 11.5–16.5)
TIME ANALYZED: 2340
TROPONIN I SERPL HS-MCNC: 33 NG/L (ref 0–9)
WBC OTHER # BLD: 7.9 K/UL (ref 4.5–11.5)
WBC OTHER # BLD: 9.6 K/UL (ref 4.5–11.5)

## 2023-10-01 PROCEDURE — 6360000002 HC RX W HCPCS

## 2023-10-01 PROCEDURE — 85018 HEMOGLOBIN: CPT

## 2023-10-01 PROCEDURE — 83605 ASSAY OF LACTIC ACID: CPT

## 2023-10-01 PROCEDURE — 94640 AIRWAY INHALATION TREATMENT: CPT

## 2023-10-01 PROCEDURE — 6370000000 HC RX 637 (ALT 250 FOR IP): Performed by: NURSE PRACTITIONER

## 2023-10-01 PROCEDURE — 93005 ELECTROCARDIOGRAM TRACING: CPT | Performed by: INTERNAL MEDICINE

## 2023-10-01 PROCEDURE — 85025 COMPLETE CBC W/AUTO DIFF WBC: CPT

## 2023-10-01 PROCEDURE — 2580000003 HC RX 258: Performed by: INTERNAL MEDICINE

## 2023-10-01 PROCEDURE — 99291 CRITICAL CARE FIRST HOUR: CPT | Performed by: FAMILY MEDICINE

## 2023-10-01 PROCEDURE — 82248 BILIRUBIN DIRECT: CPT

## 2023-10-01 PROCEDURE — 93005 ELECTROCARDIOGRAM TRACING: CPT

## 2023-10-01 PROCEDURE — 83880 ASSAY OF NATRIURETIC PEPTIDE: CPT

## 2023-10-01 PROCEDURE — 2580000003 HC RX 258

## 2023-10-01 PROCEDURE — 84484 ASSAY OF TROPONIN QUANT: CPT

## 2023-10-01 PROCEDURE — 2700000000 HC OXYGEN THERAPY PER DAY

## 2023-10-01 PROCEDURE — 36415 COLL VENOUS BLD VENIPUNCTURE: CPT

## 2023-10-01 PROCEDURE — 87899 AGENT NOS ASSAY W/OPTIC: CPT

## 2023-10-01 PROCEDURE — P9047 ALBUMIN (HUMAN), 25%, 50ML: HCPCS

## 2023-10-01 PROCEDURE — 6360000002 HC RX W HCPCS: Performed by: NURSE PRACTITIONER

## 2023-10-01 PROCEDURE — 83735 ASSAY OF MAGNESIUM: CPT

## 2023-10-01 PROCEDURE — 6370000000 HC RX 637 (ALT 250 FOR IP)

## 2023-10-01 PROCEDURE — 84100 ASSAY OF PHOSPHORUS: CPT

## 2023-10-01 PROCEDURE — 99255 IP/OBS CONSLTJ NEW/EST HI 80: CPT | Performed by: INTERNAL MEDICINE

## 2023-10-01 PROCEDURE — 94660 CPAP INITIATION&MGMT: CPT

## 2023-10-01 PROCEDURE — 6360000002 HC RX W HCPCS: Performed by: INTERNAL MEDICINE

## 2023-10-01 PROCEDURE — 80048 BASIC METABOLIC PNL TOTAL CA: CPT

## 2023-10-01 PROCEDURE — 99223 1ST HOSP IP/OBS HIGH 75: CPT | Performed by: INTERNAL MEDICINE

## 2023-10-01 PROCEDURE — 82805 BLOOD GASES W/O2 SATURATION: CPT

## 2023-10-01 PROCEDURE — 87081 CULTURE SCREEN ONLY: CPT

## 2023-10-01 PROCEDURE — 84145 PROCALCITONIN (PCT): CPT

## 2023-10-01 PROCEDURE — 85014 HEMATOCRIT: CPT

## 2023-10-01 PROCEDURE — 84134 ASSAY OF PREALBUMIN: CPT

## 2023-10-01 PROCEDURE — 2500000003 HC RX 250 WO HCPCS

## 2023-10-01 PROCEDURE — 99291 CRITICAL CARE FIRST HOUR: CPT | Performed by: INTERNAL MEDICINE

## 2023-10-01 PROCEDURE — 87449 NOS EACH ORGANISM AG IA: CPT

## 2023-10-01 PROCEDURE — 82962 GLUCOSE BLOOD TEST: CPT

## 2023-10-01 PROCEDURE — 6370000000 HC RX 637 (ALT 250 FOR IP): Performed by: FAMILY MEDICINE

## 2023-10-01 PROCEDURE — 2580000003 HC RX 258: Performed by: FAMILY MEDICINE

## 2023-10-01 PROCEDURE — 80074 ACUTE HEPATITIS PANEL: CPT

## 2023-10-01 PROCEDURE — 71045 X-RAY EXAM CHEST 1 VIEW: CPT

## 2023-10-01 PROCEDURE — 80053 COMPREHEN METABOLIC PANEL: CPT

## 2023-10-01 PROCEDURE — 2000000000 HC ICU R&B

## 2023-10-01 RX ORDER — CHOLECALCIFEROL (VITAMIN D3) 50 MCG
2000 TABLET ORAL DAILY
Status: DISCONTINUED | OUTPATIENT
Start: 2023-10-01 | End: 2023-10-02 | Stop reason: HOSPADM

## 2023-10-01 RX ORDER — DOBUTAMINE HYDROCHLORIDE 200 MG/100ML
2.5-1 INJECTION INTRAVENOUS CONTINUOUS
Status: DISCONTINUED | OUTPATIENT
Start: 2023-10-01 | End: 2023-10-01 | Stop reason: CLARIF

## 2023-10-01 RX ORDER — DOBUTAMINE HYDROCHLORIDE 400 MG/100ML
2.5-1 INJECTION INTRAVENOUS CONTINUOUS
Status: DISCONTINUED | OUTPATIENT
Start: 2023-10-01 | End: 2023-10-01 | Stop reason: HOSPADM

## 2023-10-01 RX ORDER — SODIUM CHLORIDE 0.9 % (FLUSH) 0.9 %
5-40 SYRINGE (ML) INJECTION EVERY 12 HOURS SCHEDULED
Status: DISCONTINUED | OUTPATIENT
Start: 2023-10-01 | End: 2023-10-02 | Stop reason: HOSPADM

## 2023-10-01 RX ORDER — ACETAMINOPHEN 650 MG/1
650 SUPPOSITORY RECTAL EVERY 6 HOURS PRN
Status: DISCONTINUED | OUTPATIENT
Start: 2023-10-01 | End: 2023-10-02 | Stop reason: HOSPADM

## 2023-10-01 RX ORDER — BUDESONIDE 0.25 MG/2ML
250 INHALANT ORAL 2 TIMES DAILY
Status: DISCONTINUED | OUTPATIENT
Start: 2023-10-01 | End: 2023-10-02 | Stop reason: HOSPADM

## 2023-10-01 RX ORDER — SODIUM CHLORIDE 9 MG/ML
INJECTION, SOLUTION INTRAVENOUS ONCE
Status: COMPLETED | OUTPATIENT
Start: 2023-10-01 | End: 2023-10-01

## 2023-10-01 RX ORDER — POTASSIUM CHLORIDE 7.45 MG/ML
10 INJECTION INTRAVENOUS
Status: DISCONTINUED | OUTPATIENT
Start: 2023-10-01 | End: 2023-10-01 | Stop reason: HOSPADM

## 2023-10-01 RX ORDER — SODIUM CHLORIDE 0.9 % (FLUSH) 0.9 %
5-40 SYRINGE (ML) INJECTION PRN
Status: DISCONTINUED | OUTPATIENT
Start: 2023-10-01 | End: 2023-10-02 | Stop reason: HOSPADM

## 2023-10-01 RX ORDER — FOLIC ACID 1 MG/1
1000 TABLET ORAL DAILY
Status: DISCONTINUED | OUTPATIENT
Start: 2023-10-01 | End: 2023-10-02 | Stop reason: HOSPADM

## 2023-10-01 RX ORDER — ALBUMIN (HUMAN) 12.5 G/50ML
SOLUTION INTRAVENOUS
Status: COMPLETED
Start: 2023-10-01 | End: 2023-10-01

## 2023-10-01 RX ORDER — FERROUS SULFATE 325(65) MG
325 TABLET ORAL 2 TIMES DAILY WITH MEALS
Status: DISCONTINUED | OUTPATIENT
Start: 2023-10-01 | End: 2023-10-02 | Stop reason: HOSPADM

## 2023-10-01 RX ORDER — ALBUMIN (HUMAN) 12.5 G/50ML
25 SOLUTION INTRAVENOUS ONCE
Status: COMPLETED | OUTPATIENT
Start: 2023-10-01 | End: 2023-10-02

## 2023-10-01 RX ORDER — MIDODRINE HYDROCHLORIDE 5 MG/1
5 TABLET ORAL
Status: DISCONTINUED | OUTPATIENT
Start: 2023-10-01 | End: 2023-10-02 | Stop reason: HOSPADM

## 2023-10-01 RX ORDER — METRONIDAZOLE 500 MG/100ML
500 INJECTION, SOLUTION INTRAVENOUS EVERY 8 HOURS
Status: DISCONTINUED | OUTPATIENT
Start: 2023-10-01 | End: 2023-10-02 | Stop reason: HOSPADM

## 2023-10-01 RX ORDER — POTASSIUM CHLORIDE 7.45 MG/ML
10 INJECTION INTRAVENOUS
Status: COMPLETED | OUTPATIENT
Start: 2023-10-01 | End: 2023-10-01

## 2023-10-01 RX ORDER — MAGNESIUM SULFATE IN WATER 40 MG/ML
2000 INJECTION, SOLUTION INTRAVENOUS ONCE
Status: DISCONTINUED | OUTPATIENT
Start: 2023-10-01 | End: 2023-10-01 | Stop reason: HOSPADM

## 2023-10-01 RX ORDER — POLYETHYLENE GLYCOL 3350 17 G/17G
17 POWDER, FOR SOLUTION ORAL DAILY PRN
Status: DISCONTINUED | OUTPATIENT
Start: 2023-10-01 | End: 2023-10-02 | Stop reason: HOSPADM

## 2023-10-01 RX ORDER — ALPRAZOLAM 0.25 MG/1
0.25 TABLET ORAL 3 TIMES DAILY PRN
Status: DISCONTINUED | OUTPATIENT
Start: 2023-10-01 | End: 2023-10-02 | Stop reason: HOSPADM

## 2023-10-01 RX ORDER — ASPIRIN 81 MG/1
81 TABLET, CHEWABLE ORAL DAILY
Status: DISCONTINUED | OUTPATIENT
Start: 2023-10-01 | End: 2023-10-02 | Stop reason: HOSPADM

## 2023-10-01 RX ORDER — POTASSIUM CHLORIDE 20 MEQ/1
40 TABLET, EXTENDED RELEASE ORAL ONCE
Status: DISCONTINUED | OUTPATIENT
Start: 2023-10-01 | End: 2023-10-01 | Stop reason: HOSPADM

## 2023-10-01 RX ORDER — HEPARIN 100 UNIT/ML
1 SYRINGE INTRAVENOUS PRN
Status: DISCONTINUED | OUTPATIENT
Start: 2023-10-01 | End: 2023-10-02 | Stop reason: HOSPADM

## 2023-10-01 RX ORDER — POTASSIUM CHLORIDE 20 MEQ/1
40 TABLET, EXTENDED RELEASE ORAL ONCE
Status: COMPLETED | OUTPATIENT
Start: 2023-10-01 | End: 2023-10-01

## 2023-10-01 RX ORDER — SODIUM CHLORIDE 9 MG/ML
INJECTION, SOLUTION INTRAVENOUS PRN
Status: DISCONTINUED | OUTPATIENT
Start: 2023-10-01 | End: 2023-10-02 | Stop reason: HOSPADM

## 2023-10-01 RX ORDER — MIDODRINE HYDROCHLORIDE 5 MG/1
10 TABLET ORAL ONCE
Status: COMPLETED | OUTPATIENT
Start: 2023-10-01 | End: 2023-10-01

## 2023-10-01 RX ORDER — ARFORMOTEROL TARTRATE 15 UG/2ML
15 SOLUTION RESPIRATORY (INHALATION)
Status: DISCONTINUED | OUTPATIENT
Start: 2023-10-01 | End: 2023-10-02 | Stop reason: HOSPADM

## 2023-10-01 RX ORDER — ROFLUMILAST 500 UG/1
500 TABLET ORAL DAILY
Status: DISCONTINUED | OUTPATIENT
Start: 2023-10-01 | End: 2023-10-02 | Stop reason: HOSPADM

## 2023-10-01 RX ORDER — LEVOFLOXACIN 500 MG/1
500 TABLET, FILM COATED ORAL DAILY
Status: DISCONTINUED | OUTPATIENT
Start: 2023-10-01 | End: 2023-10-01

## 2023-10-01 RX ORDER — ONDANSETRON 4 MG/1
4 TABLET, ORALLY DISINTEGRATING ORAL EVERY 8 HOURS PRN
Status: DISCONTINUED | OUTPATIENT
Start: 2023-10-01 | End: 2023-10-02 | Stop reason: HOSPADM

## 2023-10-01 RX ORDER — ACETAMINOPHEN 325 MG/1
650 TABLET ORAL EVERY 6 HOURS PRN
Status: DISCONTINUED | OUTPATIENT
Start: 2023-10-01 | End: 2023-10-02 | Stop reason: HOSPADM

## 2023-10-01 RX ORDER — METOPROLOL TARTRATE 5 MG/5ML
5 INJECTION INTRAVENOUS ONCE
Status: DISCONTINUED | OUTPATIENT
Start: 2023-10-01 | End: 2023-10-01 | Stop reason: HOSPADM

## 2023-10-01 RX ORDER — IPRATROPIUM BROMIDE AND ALBUTEROL SULFATE 2.5; .5 MG/3ML; MG/3ML
1 SOLUTION RESPIRATORY (INHALATION)
Status: DISCONTINUED | OUTPATIENT
Start: 2023-10-01 | End: 2023-10-02 | Stop reason: HOSPADM

## 2023-10-01 RX ORDER — ONDANSETRON 2 MG/ML
4 INJECTION INTRAMUSCULAR; INTRAVENOUS EVERY 6 HOURS PRN
Status: DISCONTINUED | OUTPATIENT
Start: 2023-10-01 | End: 2023-10-02 | Stop reason: HOSPADM

## 2023-10-01 RX ORDER — PANTOPRAZOLE SODIUM 40 MG/1
40 TABLET, DELAYED RELEASE ORAL NIGHTLY
Status: DISCONTINUED | OUTPATIENT
Start: 2023-10-01 | End: 2023-10-02 | Stop reason: HOSPADM

## 2023-10-01 RX ORDER — HEPARIN 100 UNIT/ML
1 SYRINGE INTRAVENOUS EVERY 12 HOURS SCHEDULED
Status: DISCONTINUED | OUTPATIENT
Start: 2023-10-01 | End: 2023-10-02 | Stop reason: HOSPADM

## 2023-10-01 RX ORDER — ATORVASTATIN CALCIUM 40 MG/1
40 TABLET, FILM COATED ORAL NIGHTLY
Status: DISCONTINUED | OUTPATIENT
Start: 2023-10-01 | End: 2023-10-02 | Stop reason: HOSPADM

## 2023-10-01 RX ADMIN — MIDODRINE HYDROCHLORIDE 10 MG: 5 TABLET ORAL at 01:58

## 2023-10-01 RX ADMIN — ALPRAZOLAM 0.25 MG: 0.25 TABLET ORAL at 21:31

## 2023-10-01 RX ADMIN — ALPRAZOLAM 0.25 MG: 0.25 TABLET ORAL at 08:11

## 2023-10-01 RX ADMIN — FERROUS SULFATE TAB 325 MG (65 MG ELEMENTAL FE) 325 MG: 325 (65 FE) TAB at 16:35

## 2023-10-01 RX ADMIN — ALBUMIN (HUMAN) 25 G: 0.25 INJECTION, SOLUTION INTRAVENOUS at 02:00

## 2023-10-01 RX ADMIN — FERROUS SULFATE TAB 325 MG (65 MG ELEMENTAL FE) 325 MG: 325 (65 FE) TAB at 10:11

## 2023-10-01 RX ADMIN — CEFEPIME 2000 MG: 2 INJECTION, POWDER, FOR SOLUTION INTRAVENOUS at 05:22

## 2023-10-01 RX ADMIN — MIDODRINE HYDROCHLORIDE 5 MG: 5 TABLET ORAL at 10:09

## 2023-10-01 RX ADMIN — ACETAMINOPHEN 650 MG: 325 TABLET ORAL at 00:43

## 2023-10-01 RX ADMIN — POTASSIUM CHLORIDE 10 MEQ: 7.46 INJECTION, SOLUTION INTRAVENOUS at 04:51

## 2023-10-01 RX ADMIN — MIDODRINE HYDROCHLORIDE 5 MG: 5 TABLET ORAL at 22:29

## 2023-10-01 RX ADMIN — POTASSIUM PHOSPHATE, MONOBASIC AND POTASSIUM PHOSPHATE, DIBASIC 20 MMOL: 224; 236 INJECTION, SOLUTION, CONCENTRATE INTRAVENOUS at 14:39

## 2023-10-01 RX ADMIN — MAGNESIUM SULFATE IN DEXTROSE 1000 MG: 10 INJECTION, SOLUTION INTRAVENOUS at 03:38

## 2023-10-01 RX ADMIN — MIDODRINE HYDROCHLORIDE 5 MG: 5 TABLET ORAL at 16:35

## 2023-10-01 RX ADMIN — ALBUMIN (HUMAN) 25 G: 0.25 INJECTION, SOLUTION INTRAVENOUS at 23:18

## 2023-10-01 RX ADMIN — BUDESONIDE 250 MCG: 0.25 SUSPENSION RESPIRATORY (INHALATION) at 20:32

## 2023-10-01 RX ADMIN — LEVOFLOXACIN 500 MG: 500 TABLET, FILM COATED ORAL at 10:09

## 2023-10-01 RX ADMIN — BUDESONIDE 250 MCG: 0.25 SUSPENSION RESPIRATORY (INHALATION) at 13:12

## 2023-10-01 RX ADMIN — WATER 1000 MG: 1 INJECTION INTRAMUSCULAR; INTRAVENOUS; SUBCUTANEOUS at 11:59

## 2023-10-01 RX ADMIN — SODIUM CHLORIDE: 9 INJECTION, SOLUTION INTRAVENOUS at 04:30

## 2023-10-01 RX ADMIN — SODIUM PHOSPHATE, MONOBASIC, MONOHYDRATE AND SODIUM PHOSPHATE, DIBASIC, ANHYDROUS 20 MMOL: 142; 276 INJECTION, SOLUTION INTRAVENOUS at 22:53

## 2023-10-01 RX ADMIN — SODIUM CHLORIDE: 9 INJECTION, SOLUTION INTRAVENOUS at 02:10

## 2023-10-01 RX ADMIN — ALPRAZOLAM 0.25 MG: 0.25 TABLET ORAL at 13:29

## 2023-10-01 RX ADMIN — POTASSIUM CHLORIDE 40 MEQ: 1500 TABLET, EXTENDED RELEASE ORAL at 04:52

## 2023-10-01 RX ADMIN — POTASSIUM CHLORIDE 10 MEQ: 7.46 INJECTION, SOLUTION INTRAVENOUS at 06:02

## 2023-10-01 RX ADMIN — POTASSIUM PHOSPHATE, MONOBASIC AND POTASSIUM PHOSPHATE, DIBASIC 10 MMOL: 224; 236 INJECTION, SOLUTION, CONCENTRATE INTRAVENOUS at 05:42

## 2023-10-01 RX ADMIN — ARFORMOTEROL TARTRATE 15 MCG: 15 SOLUTION RESPIRATORY (INHALATION) at 13:12

## 2023-10-01 RX ADMIN — SODIUM CHLORIDE, PRESERVATIVE FREE 10 ML: 5 INJECTION INTRAVENOUS at 10:09

## 2023-10-01 RX ADMIN — METRONIDAZOLE 500 MG: 500 INJECTION, SOLUTION INTRAVENOUS at 10:09

## 2023-10-01 RX ADMIN — POTASSIUM CHLORIDE 10 MEQ: 7.46 INJECTION, SOLUTION INTRAVENOUS at 13:15

## 2023-10-01 RX ADMIN — FOLIC ACID 1000 MCG: 1 TABLET ORAL at 10:09

## 2023-10-01 RX ADMIN — DOBUTAMINE HYDROCHLORIDE 2.5 MCG/KG/MIN: 400 INJECTION INTRAVENOUS at 04:15

## 2023-10-01 RX ADMIN — METRONIDAZOLE 500 MG: 500 INJECTION, SOLUTION INTRAVENOUS at 17:11

## 2023-10-01 RX ADMIN — IPRATROPIUM BROMIDE AND ALBUTEROL SULFATE 1 DOSE: 2.5; .5 SOLUTION RESPIRATORY (INHALATION) at 13:12

## 2023-10-01 RX ADMIN — ROFLUMILAST 500 MCG: 500 TABLET ORAL at 10:09

## 2023-10-01 RX ADMIN — IPRATROPIUM BROMIDE AND ALBUTEROL SULFATE 1 DOSE: 2.5; .5 SOLUTION RESPIRATORY (INHALATION) at 20:32

## 2023-10-01 RX ADMIN — ARFORMOTEROL TARTRATE 15 MCG: 15 SOLUTION RESPIRATORY (INHALATION) at 20:32

## 2023-10-01 RX ADMIN — POTASSIUM CHLORIDE 10 MEQ: 7.46 INJECTION, SOLUTION INTRAVENOUS at 12:03

## 2023-10-01 RX ADMIN — SODIUM CHLORIDE, PRESERVATIVE FREE 10 ML: 5 INJECTION INTRAVENOUS at 21:31

## 2023-10-01 RX ADMIN — SODIUM CHLORIDE 125 ML: 9 INJECTION, SOLUTION INTRAVENOUS at 21:52

## 2023-10-01 RX ADMIN — SODIUM CHLORIDE, PRESERVATIVE FREE 10 ML: 5 INJECTION INTRAVENOUS at 10:10

## 2023-10-01 RX ADMIN — ASPIRIN 81 MG: 81 TABLET, CHEWABLE ORAL at 10:09

## 2023-10-01 RX ADMIN — Medication 2000 UNITS: at 10:09

## 2023-10-01 RX ADMIN — MAGNESIUM SULFATE IN DEXTROSE 1000 MG: 10 INJECTION, SOLUTION INTRAVENOUS at 04:43

## 2023-10-01 RX ADMIN — IPRATROPIUM BROMIDE AND ALBUTEROL SULFATE 1 DOSE: 2.5; .5 SOLUTION RESPIRATORY (INHALATION) at 16:52

## 2023-10-01 RX ADMIN — PANTOPRAZOLE SODIUM 40 MG: 40 TABLET, DELAYED RELEASE ORAL at 21:31

## 2023-10-01 ASSESSMENT — PAIN SCALES - GENERAL: PAINLEVEL_OUTOF10: 0

## 2023-10-01 NOTE — PROGRESS NOTES
This RN accessing chart as we are anticipating patient transfer to [facility] ([facility]) MICU. This RN received call from GLENDY Fernandez Handler NP to give provider to provider sign out.  MICU resident unable to come to phone at this time, call back number taken and given to MICU resident team.

## 2023-10-01 NOTE — CODE DOCUMENTATION
RRt called due to patient being hypotensive and tachycardic. Dr Darrell Villasenor patient and ordered a 0.9 Normal Saline bolus, 25g Albumin, and Midodrine 10mg tablet. All medications scanned and given by [facility].

## 2023-10-01 NOTE — PROGRESS NOTES
4 Eyes Skin Assessment     NAME:  Joseline Rodriguez  YOB: 1960  MEDICAL RECORD NUMBER:  [id number]    The patient is being assessed for  Admission    I agree that at least one RN has performed a thorough Head to Toe Skin Assessment on the patient. ALL assessment sites listed below have been assessed. Areas assessed by both nurses:    Head, Face, Ears, Shoulders, Back, Chest, Arms, Elbows, Hands, Sacrum. Buttock, Coccyx, Ischium, Legs. Feet and Heels, and Under Medical Devices         Does the Patient have a Wound?  No noted wound(s)       Ernie Prevention initiated by RN: Yes  Wound Care Orders initiated by RN: Yes    Pressure Injury (Stage 3,4, Unstageable, DTI, NWPT, and Complex wounds) if present, place Wound referral order by RN under : No    New Ostomies, if present place, Ostomy referral order under : No     Nurse 1 eSignature: Electronically signed by Amelie Roche RN on 10/1/23 at 7:59 AM EDT    **SHARE this note so that the co-signing nurse can place an eSignature**    Nurse 2 eSignature: Electronically signed by Ada Garcia on 10/1/23 at 8:02 AM EDT

## 2023-10-01 NOTE — PLAN OF CARE
Problem: Safety - Adult  Goal: Free from fall injury  Outcome: Progressing  Flowsheets (Taken 10/1/2023 0800)  Free From Fall Injury: Instruct family/caregiver on patient safety     Problem: Chronic Conditions and Co-morbidities  Goal: Patient's chronic conditions and co-morbidity symptoms are monitored and maintained or improved  Outcome: Progressing  Flowsheets (Taken 10/1/2023 0800)  Care Plan - Patient's Chronic Conditions and Co-Morbidity Symptoms are Monitored and Maintained or Improved: Monitor and assess patient's chronic conditions and comorbid symptoms for stability, deterioration, or improvement     Problem: Discharge Planning  Goal: Discharge to home or other facility with appropriate resources  Outcome: Progressing  Flowsheets (Taken 10/1/2023 0800)  Discharge to home or other facility with appropriate resources: Identify barriers to discharge with patient and caregiver

## 2023-10-01 NOTE — CONSULTS
fluid, and urothelial thickening/enhancement of the distal ureter just proximal to the calculus. Cannot exclude injury of the collecting system or ureter given the degree of fluid. Also cannot exclude associated urinary tract infection. 2. Additional small nonobstructive renal calculi bilaterally. 3. Numerous small hypodense lesions in the bilateral kidneys, some of which attenuate above that of simple fluid. Could better characterize with renal mass protocol CT or MRI if clinically indicated. 4. Fairly significant thickening of the sigmoid colon is again seen. Differential includes colitis, diverticulitis, and neoplasm. Consider correlation with colonoscopy. EKG: normal sinus rhythm, unchanged from previous tracings. Resident's Assessment and Plan     Christian Pulse is a 61 y.o. female    Assessments:  Shock likely Septic vs Cardiogenic   +4L prior to transfer  RRT - Received 1L bolus, 25gm albumin, and 10mg midodrine with improvement in SBP to 90s  Polymorphic ventricular tachycardia likely 2/2 electrolyte abnormality  Currently SNR with normal QTc interval  Recent left heart cath negative for CAD  Paroxysmal SVT likely AVNRT  new onset 9/30/2023 on telemetry  Acute on chronic heart failure with mildly reduced EF  Most recent echo 1/23 EF 40-45%  Elevated proBNP 7K  Sepsis likely intraabdominal vs UTI  CT abd/pelvis showed nonspecific colitis, possible gastritis, mild gallbladder distention, periportal edema  ? acute cholangitis  CT abd/pelvis showed nonspecific colitis, possible gastritis, mild gallbladder distention, periportal edema  Complicated UTI   past treatment for Enterococcus VRE and Pseudomonas  Urine culture - MIXED Gram Positive Organism 50 ,000 CFU/ML  Refeeding syndrome  Hypophosphatemia, hypokalemia  Nonischemic cardiomyopathy during acute illness EF 40s  Normal coronaries by cath 1/2023  Acute Anemia r/o GI bleed   Significant drop in Hgb 12.0-7.7  Low iron sat, FOBT +  Transamnitis likely ischemic vs cholangitis  Euthyroid sick syndrome, TSH suppressed 0.16 Free T4 normal  Diarrhea/abdominal pain r/o C. Difficile, resolved  End stage COPD/chronic oxygen dependent 4L  Nephrolithiasis recent cystoscopy and stent placement 9/12/2023  GERD  HLD  Anxiety and Depression      Plan:  Continue IV flagyl q8hrs, start on rocephin 1g daily  Start midodrine 5 mg TID  Resume home PRN zanax 0.25 for anxiety  IV team for midline placement  Replete and monitor electrolytes for refeeding syndrome  Q8Hrs BMP, mag, phos  Breathing treatments  Obtain EKG, 2 D-ECHO  Chest Xray for signs of CHF  Daily HFP, CBC  HIDA scan   Follow MRSA nares, legionella/strep Ag, procal, LA  Start of iron therapy - ferrous sulphate 325 mg daily  Hold rosiflumilast can precipitate diarrhea\hold lipitor   Consult ID for evaluation, established patient  Consult cardiology for evaluation, established patient  General surgery consult for eval of RLQ pain with positive FOBT    DVT prophylaxis: protonix  GI prophylaxis: Lovenox  Disposition: Admit MICU    Brit Kohler MD, MPH PGY-2  Attending physician: Dr. Jag Valentine  Department of Pulmonary, Critical Care and Sleep Medicine  [address] [place] [address]  Department of Internal Medicine      I saw Quin Flynn is a 61 y.o. female during multidisciplinary team rounds. They were seen, examined and discussed at the bedside. The signing of this noted refects my changes and acceptance of the changes made by the clinical pharmacist and resident(s). This is confirmation that I have personally seen and examined the patient reviewed medication, labs, and images, and discussed with patients' care consultants. The key elements of the encounter were outlined by me (> 85 % time). I personally reviewed medications & laboratory data.  Adjusted of antibiotics and medications and correction are noted in the clinical

## 2023-10-01 NOTE — PROGRESS NOTES
Chart reviewed. Spoke with Pt's RN. Pt. On rocephin 1 gm. IV Q 24, Flagyl 500mg IV Q 8. Potassium replacement therapy with lab draws. Pt. May benefit from picc. RN to check with ID. . midline vs. Picc line. Pt. Has 2 functioning IV's at this time.

## 2023-10-01 NOTE — DISCHARGE SUMMARY
kidneys, some of which attenuate above that of simple fluid. Could better characterize with renal mass protocol CT or MRI if clinically indicated. 4. Fairly significant thickening of the sigmoid colon is again seen. Differential includes colitis, diverticulitis, and neoplasm. Consider correlation with colonoscopy. HOSPITAL COURSE:   Jannette Perez was initially admitted on September 29 secondary to abdominal pain found to be from sigmoid colitis. She met sepsis criteria and there was concern for recurrence of C. difficile given her frequency of stools and her history of. She was placed on symptomatic and supportive care and multiple subspecialist were asked to provide consultation. Per ID the patient was started on Dificid, Levaquin and Flagyl and cultures were ordered. Urology was asked to see due to ureteral stents in place. Per urology CT abdomen pelvis showed right ureteral stent to be in appropriate position and they will follow in the office upon eventual discharge. Following our examination the patient was found to be intermittently tachycardic and she was given negative chronotropic medications and diuresis given her decompensated volume status and the cardiovascular team was asked to follow. Per cardiology low-dose beta-blocker with recommendations for up titration as blood pressure would allow and hold Entresto due to present hypotension, electrolyte parameters were given and reassessment of limited echocardiogram with ongoing diuresis was performed. Throughout the day she developed further decompensation and worsening tachycardia that ultimately responded in a rapid response team and transferred to the intensive care unit. During the rapid response she received midodrine, fluid, albumin and the cardiovascular team was updated. All parties recommended transfer to [facility] for further care and management.   The patient was transferred to [facility] this morning after

## 2023-10-01 NOTE — H&P
Hospital Medicine History & Physical      PCP: Aura Cee. MD Farida    Date of Admission: 10/1/2023    Date of Service: Pt seen/examined on 10/1/23 and Admitted to Inpatient with expected LOS greater than two midnights due to medical therapy. Chief Complaint:  wide complex tachycardia      History Of Present Illness:    61 y.o. female with a past medical history of chronic systolic CHF, COPD on 4L O2, coronary disease, hydronephrosis with urinary obstruction secondary to stone status post right ureteral stent, tobacco abuse, depression anxiety who presented to [facility] with concern for wide-complex tachycardia from [facility].  Patient had been admitted for concern for colitis and also hyperkalemia. CT abdomen obtained 9/30/2023 showed thickening of the sigmoid colon suggestive of nonspecific colitis with possible diverticulitis versus neoplasm with possible gastritis due to taking stomach. She was seen by gastroenterology given a history of diverticulitis with previous abscess recommending nonemergent colonoscopy at some point once improved. She was placed on Levaquin and Flagyl and also Dificid by infectious disease. C. difficile returned negative. Given SVT and low potassium cardiology was following her during her hospital stay as there was also concern for atrial flutter. Also she was in acute on chronic heart failure with mildly reduced EF. Initiated on metoprolol succinate 12.5 mg twice daily Entresto was held due to hypotension. She received a dose of diuretics with Lasix. Rapid response was called on 10/1/2023 and 2:05 AM given concern for wide-complex tachycardia concerning for torsades de pointes. She also found to have drop in hemoglobin from 12-7.7 during her admission. Given hypotension with SBP in the 80s he was transferred here to [facility] ICU level of care.     Past Medical History:          Diagnosis Date    Abscess     colon    Acute on 0.3*   CALCIUM 8.9 8.7 8.4*   PHOS  --  1.2* 1.1*     Recent Labs     09/30/23  0603 10/01/23  0345 10/01/23  0539   * 375* 339*   * 217* 205*   BILIDIR  --   --  0.5*   BILITOT 0.4 0.7 0.8   ALKPHOS 88 79 87     Recent Labs     09/29/23  1029 09/30/23  2117   INR 0.9 1.3     No results for input(s): \"CKTOTAL\", \"TROPONINI\" in the last 72 hours. Urinalysis:      Lab Results   Component Value Date/Time    NITRU NEGATIVE 09/29/2023 02:11 AM    WBCUA 0 TO 5 09/29/2023 02:11 AM    BACTERIA 2+ 09/10/2023 10:15 PM    RBCUA 21 TO 50 09/29/2023 02:11 AM    BLOODU TRACE 12/16/2022 09:38 PM    SPECGRAV 1.015 09/29/2023 02:11 AM    GLUCOSEU NEGATIVE 09/29/2023 02:11 AM         ASSESSMENT:  Hypotension  Wide-complex tachycardia concerning for torsades de pointes  Severe hypokalemia  Acute on chronic heart failure with reduced ejection fraction  Colitis  Acute on chronic anemia  COPD on chronic 4 L oxygen      PLAN:  At this time given us her blood pressure despite IV fluid she would benefit from inotropic support will defer to ICU treatment team.  Continue antibiotics with concern for colitis. Cardiology EP consulted given wide-complex tachycardia. She does have low potassium she would benefit from replenishment of IV potassium and also magnesium replacement. Repeat echocardiogram.  TSH 0.16 on 9/29/23 with free T4 within normal limit 1.5. Continue monitor hemoglobin closely           Anny Barahona MD    Thank you Nikolay Ibarra MD for the opportunity to be involved in this patient's care. If you have any questions or concerns please feel free to contact me through [facility].

## 2023-10-01 NOTE — CONSULTS
Department of Internal Medicine  Infectious Diseases   Consult Note      Reason for Consult:  UTI       Requesting Physician:  Dr Loran Ganser:      Pt is known to me .  This is a 61 yrs  old patient  hx of COPD on 4 L of oxygen , CHF, CAD , diverticulosis , UTI ( VRE / Pseudomonas ) s/p cystoscopy and stent insertion ( 9/12) - [facility] with abdomen pain, diarrhea, nausea   WBC was 12- 15 K   CT scan of abdomen and pelvis - sigmoid colon wall thickening, distended gall bladder, , right mild perinephric stranding   Stool for C diff toxin neg   Pt was started on flagyl and levaquin   Pt developed SVT / Hypotension - pt was transferred to [facility] MICU for further management         Past Medical History:    Past Medical History:   Diagnosis Date    Abscess     colon    Acute on chronic diastolic CHF (congestive heart failure) ([address]) 01/04/2023    COPD (chronic obstructive pulmonary disease) (HCC)     Coronary artery disease involving native heart without angina pectoris 6/14/2023    Depression with anxiety 03/23/2023    Diverticulitis     Hydronephrosis with urinary obstruction due to ureteral calculus 9/15/2023    Provoked DVT 3/2018     3 months Eliquis for DVT due to PICC line    Seasonal allergic rhinitis     Smoker 11/30/2019    5-6 per day    Tobacco use disorder      : 1 ppd since age 25    Weakness of both legs        Past Surgical History:    Past Surgical History:   Procedure Laterality Date    BRONCHOSCOPY N/A 04/27/2023    BRONCHOSCOPY ALVEOLAR LAVAGE performed by Gerri Martinez DO at [facility]  04/27/2023    BRONCHOSCOPY DIAGNOSTIC OR CELL [address] ONLY performed by Gerri Martinez DO at [address] TEST N/A 01/06/2023    lexiscan stress test    LITHOTRIPSY Right 9/12/2023    CYSTOSCOPY RIGHT RETROGRADE PYELOGRAM, RIGHT URETEROSCOPY,  LASER LITHOTRIPSY, RIGHT URETERAL STONE, INSERTION RIGHT STENT performed by of the sigmoid colon, suggestive of a  nonspecific colitis. Additional considerations include diverticulitis and  neoplasm. All there may also be thickening of the distal stomach which  raises the possibility of gastritis. 2. Mild distension of the gallbladder, a nonspecific finding but correlate  with ultrasound if there is any concern for acute cholecystitis. 3. Periportal edema, which can be seen with aggressive fluid resuscitation,  hepatitis, etc.  4. Right ureteral stent in place. Mild perinephric stranding, nonspecific  but correlate with urinalysis. 5. Prominent periportal/gastrohepatic lymph nodes, nonspecific. 6. Small amount of ascites. 7. Skin thickening with some subcutaneous stranding anterior right pelvis,  correlate with exam for any concern of cellulitis. IMPRESSION:     Recurrent diverticulitis   Recently treated VRE / Pseudomonas UTI   Leukocytosis - improved       RECOMMENDATIONS:    1. Stop levaquin   2.  Flagyl 500 mg IV q 8 hrs , Rocephin 1 gram IV q 24 hrs       Thank you Dr Gabriella Jovel for the consult

## 2023-10-01 NOTE — SIGNIFICANT EVENT
[facility]    Subjective:    Called to bedside at 0146 for low blood pressure. Per bedside RN, patient has had borderline low BP all day and received 2 doses IV metoprolol and 1 dose oral metoprolol for SVT. Also had albumin. Nurse had called admitting team due to hypotension and was told to call RRT. Patient admitted with diarrhea, RLQ abdominal pain. Most recent hgb 8.9 at 9pm.  CT abd/pelvis done earlier this evening showed nonspecific colitis, possible gastritis, mild gallbladder distention, periportal edema, right ureteral stent with mild perinephric stranding. Most recent echo from January with EF 40-45%. EKG from 9pm showed wide QRS tachycardia. Patient denies symptoms of chest pain, SOB, lightheadedness or dizziness, or any pain. Patient alert and quick to answer questions. SBP in 80s, MAP in 60s. Given 1L bolus, 25gm albumin, and 10mg midodrine with improvement in SBP to 90s.   Defer further treatment/workup to admitting team.       metoprolol  5 mg IntraVENous Once    [COMPLETED] albumin human 25%  50 g IntraVENous Once    lactobacillus  1 capsule Oral BID    metoprolol succinate  12.5 mg Oral Daily    levalbuterol  0.63 mg Nebulization Q6H    aspirin  81 mg Oral Daily    atorvastatin  40 mg Oral Nightly    Vitamin D  2,000 Units Oral Daily    folic acid  3,324 mcg Oral Daily    pantoprazole  40 mg Oral Nightly    Roflumilast  500 mcg Oral Daily    [Held by provider] sacubitril-valsartan  0.5 tablet Oral BID    levoFLOXacin  500 mg Oral Daily    metroNIDAZOLE  500 mg IntraVENous Q8H    arformoterol tartrate  15 mcg Nebulization BID RT    budesonide  500 mcg Nebulization BID RT    enoxaparin  40 mg SubCUTAneous Daily    miconazole nitrate   Topical BID     magnesium sulfate, 1,000 mg, PRN  sodium phosphate 8.49 mmol in sodium chloride 0.9 % 250 mL IVPB, 0.16 mmol/kg, PRN   Or  sodium phosphate 16.98 mmol in sodium chloride 0.9 % 250

## 2023-10-01 NOTE — FLOWSHEET NOTE
Pt left via ambulance for [facility] belongings of clothes,slippers, cell phone and  went with pt. Report called to MICU.

## 2023-10-01 NOTE — CONSULTS
Inpatient CARDIOLOGY Consultation        Reason for Consult: Torsades, SVT    Date of Consultation: 10/1/2023    Patient previously known to Dr. Meenakshi Hall:  61year old with history of CMP, COPD, tobacco, recent ureteral stent presented to [facility] ER 9/28/2023 for evaluation of abdominal pain    Seen by Cardiology for SVT. Yesterday she became hypotensive, had Torsades; transferred to [facility]    Cardiology re consulted for SVT and Torsades. Denies CP or SOB. No orthopnea. Family at bed side    REVIEW OF SYSTEMS:   Review of rest of 12 systems negative except as mentioned in HPI. Please note: past medical records were reviewed per electronic medical record (EMR) - see detailed reports under Past Medical/ Surgical History.        Past Medical History:    Past Medical History:   Diagnosis Date    Abscess     colon    Acute on chronic diastolic CHF (congestive heart failure) ([address]) 01/04/2023    COPD (chronic obstructive pulmonary disease) (HCC)     Coronary artery disease involving native heart without angina pectoris 6/14/2023    Depression with anxiety 03/23/2023    Diverticulitis     Hydronephrosis with urinary obstruction due to ureteral calculus 9/15/2023    Provoked DVT 3/2018     3 months Eliquis for DVT due to PICC line    Seasonal allergic rhinitis     Smoker 11/30/2019    5-6 per day    Tobacco use disorder      : 1 ppd since age 25    Weakness of both legs        Past Surgical History:    Past Surgical History:   Procedure Laterality Date    BRONCHOSCOPY N/A 04/27/2023    BRONCHOSCOPY ALVEOLAR LAVAGE performed by Linda Valderrama DO at [facility]  04/27/2023    BRONCHOSCOPY DIAGNOSTIC OR CELL [address] ONLY performed by Linda Valderrama DO at [address] TEST N/A 01/06/2023    lexiscan stress test    LITHOTRIPSY Right 9/12/2023

## 2023-10-01 NOTE — CONSULTS
[address] [facility] Electrophysiology  Consultation Report  PATIENT: Cierra Leyva,[address] RECORD NUMBER: [id number]  DATE OF SERVICE:  10/1/2023  ATTENDING ELECTROPHYSIOLOGIST: Bridger Recinos MD  PRIMARY ELECTROPHYSIOLOGIST: Bridger Recinos MD  REFERRING PHYSICIAN: Abiel Muniz DO and Nikolay Hobbs MD  CHIEF COMPLAINT: Abdominal pain    HPI: This is a 61 y.o. female with a history of   Patient Active Problem List   Diagnosis    Tobacco use disorder    Seasonal allergic rhinitis    Chronic bronchitis (HCC)    Acute and chronic respiratory failure, unspecified whether with hypoxia or hypercapnia (HCC)    Chronic hypoxemic respiratory failure (HCC)    Chronic obstructive pulmonary disease (HCC)    Acute diverticulitis with abscess    Acute cholecystitis    Acute on chronic diastolic CHF (congestive heart failure) (HCC)    Sepsis (HCC)    Pulmonary fibrosis (HCC)    Hypoxemia    Heart failure (HCC)    Abnormal nuclear stress test    Depression with anxiety    Coronary artery disease involving native heart without angina pectoris    JUDE on CPAP    Respiratory failure with hypercapnia (HCC)    Right lower quadrant abdominal pain    Ureteral stone    Hydronephrosis with urinary obstruction due to ureteral calculus    Colitis    Hypotension due to hypovolemia    Diarrhea    Torsades de pointes ([facility])   who presented to the hospital complaining of abdominal pain. The patient has history of chronic HFmrEF, nonischemic cardiomyopathy, COPD, hyperlipidemia, anemia, anxiety and depression. The patient presented to the hospital on 9/11/23 due to abdominal pain and was diagnosed with renal calculi, ureteric stone and sigmoid colon thickening. She was also found to have COPD exacerbation. Per discharge summary note \"She was seen by Urology and underwent retrograde pyelogram, Rt ureteroscopy with Lithotripsy and stent placement.  General surgery was consulted for

## 2023-10-01 NOTE — PROGRESS NOTES
Cardiology consult placed through perfect serve to NP. Infectious disease consult placed through answering service, Dr. Darcie Nieves on call.

## 2023-10-02 ENCOUNTER — ANESTHESIA (OUTPATIENT)
Dept: ICU | Age: 63
DRG: 296 | End: 2023-10-02
Payer: COMMERCIAL

## 2023-10-02 ENCOUNTER — APPOINTMENT (OUTPATIENT)
Dept: GENERAL RADIOLOGY | Age: 63
DRG: 296 | End: 2023-10-02
Attending: FAMILY MEDICINE
Payer: COMMERCIAL

## 2023-10-02 ENCOUNTER — ANESTHESIA EVENT (OUTPATIENT)
Dept: ICU | Age: 63
DRG: 296 | End: 2023-10-02
Payer: COMMERCIAL

## 2023-10-02 VITALS
SYSTOLIC BLOOD PRESSURE: 98 MMHG | OXYGEN SATURATION: 99 % | HEART RATE: 127 BPM | HEIGHT: 62 IN | WEIGHT: 133 LBS | TEMPERATURE: 98 F | DIASTOLIC BLOOD PRESSURE: 68 MMHG | RESPIRATION RATE: 26 BRPM | BODY MASS INDEX: 24.48 KG/M2

## 2023-10-02 LAB
ANION GAP SERPL CALCULATED.3IONS-SCNC: 11 MMOL/L (ref 7–16)
ANION GAP SERPL CALCULATED.3IONS-SCNC: 9 MMOL/L (ref 7–16)
B.E.: 2.9 MMOL/L (ref -3–3)
B.E.: 4.2 MMOL/L (ref -3–3)
BASOPHILS # BLD: 0.02 K/UL (ref 0–0.2)
BASOPHILS NFR BLD: 0 % (ref 0–2)
BUN SERPL-MCNC: 7 MG/DL (ref 6–23)
BUN SERPL-MCNC: 7 MG/DL (ref 6–23)
CALCIUM SERPL-MCNC: 8 MG/DL (ref 8.6–10.2)
CALCIUM SERPL-MCNC: 8.2 MG/DL (ref 8.6–10.2)
CHLORIDE SERPL-SCNC: 104 MMOL/L (ref 98–107)
CHLORIDE SERPL-SCNC: 105 MMOL/L (ref 98–107)
CO2 SERPL-SCNC: 29 MMOL/L (ref 22–29)
CO2 SERPL-SCNC: 34 MMOL/L (ref 22–29)
COHB: 0.3 % (ref 0–1.5)
COHB: 0.3 % (ref 0–1.5)
CREAT SERPL-MCNC: 0.2 MG/DL (ref 0.5–1)
CREAT SERPL-MCNC: 0.3 MG/DL (ref 0.5–1)
CRITICAL: ABNORMAL
CRITICAL: ABNORMAL
DATE ANALYZED: ABNORMAL
DATE ANALYZED: ABNORMAL
DATE OF COLLECTION: ABNORMAL
DATE OF COLLECTION: ABNORMAL
EKG ATRIAL RATE: 147 BPM
EKG ATRIAL RATE: 90 BPM
EKG P AXIS: 84 DEGREES
EKG P-R INTERVAL: 152 MS
EKG Q-T INTERVAL: 278 MS
EKG Q-T INTERVAL: 370 MS
EKG QRS DURATION: 70 MS
EKG QRS DURATION: 90 MS
EKG QTC CALCULATION (BAZETT): 427 MS
EKG QTC CALCULATION (BAZETT): 452 MS
EKG R AXIS: 81 DEGREES
EKG R AXIS: 84 DEGREES
EKG T AXIS: 70 DEGREES
EKG T AXIS: 83 DEGREES
EKG VENTRICULAR RATE: 142 BPM
EKG VENTRICULAR RATE: 90 BPM
EOSINOPHIL # BLD: 0.95 K/UL (ref 0.05–0.5)
EOSINOPHILS RELATIVE PERCENT: 10 % (ref 0–6)
ERYTHROCYTE [DISTWIDTH] IN BLOOD BY AUTOMATED COUNT: 13.8 % (ref 11.5–15)
G LAMBLIA AG STL QL IA: NEGATIVE
GFR SERPL CREATININE-BSD FRML MDRD: >60 ML/MIN/1.73M2
GFR SERPL CREATININE-BSD FRML MDRD: >60 ML/MIN/1.73M2
GLUCOSE BLD-MCNC: 113 MG/DL (ref 74–99)
GLUCOSE SERPL-MCNC: 105 MG/DL (ref 74–99)
GLUCOSE SERPL-MCNC: 96 MG/DL (ref 74–99)
HAV IGM SERPL QL IA: NONREACTIVE
HBV CORE IGM SERPL QL IA: NONREACTIVE
HBV SURFACE AG SERPL QL IA: NONREACTIVE
HCO3: 36.6 MMOL/L (ref 22–26)
HCO3: 37.7 MMOL/L (ref 22–26)
HCT VFR BLD AUTO: 24.6 % (ref 34–48)
HCT VFR BLD AUTO: 25.4 % (ref 34–48)
HCV AB SERPL QL IA: NONREACTIVE
HGB BLD-MCNC: 7.3 G/DL (ref 11.5–15.5)
HGB BLD-MCNC: 7.7 G/DL (ref 11.5–15.5)
HHB: 0.7 % (ref 0–5)
HHB: 72.2 % (ref 0–5)
IMM GRANULOCYTES # BLD AUTO: 0.04 K/UL (ref 0–0.58)
IMM GRANULOCYTES NFR BLD: 0 % (ref 0–5)
L PNEUMO1 AG UR QL IA.RAPID: NEGATIVE
LAB: ABNORMAL
LAB: ABNORMAL
LYMPHOCYTES NFR BLD: 0.61 K/UL (ref 1.5–4)
LYMPHOCYTES RELATIVE PERCENT: 6 % (ref 20–42)
Lab: 620
Lab: 650
MAGNESIUM SERPL-MCNC: 1.8 MG/DL (ref 1.6–2.6)
MAGNESIUM SERPL-MCNC: 2.5 MG/DL (ref 1.6–2.6)
MCH RBC QN AUTO: 30.4 PG (ref 26–35)
MCHC RBC AUTO-ENTMCNC: 29.7 G/DL (ref 32–34.5)
MCV RBC AUTO: 102.5 FL (ref 80–99.9)
METHB: 0.7 % (ref 0–1.5)
METHB: 1.3 % (ref 0–1.5)
MICROORGANISM SPEC CULT: NORMAL
MODE: ABNORMAL
MODE: ABNORMAL
MONOCYTES NFR BLD: 0.37 K/UL (ref 0.1–0.95)
MONOCYTES NFR BLD: 4 % (ref 2–12)
NEUTROPHILS NFR BLD: 80 % (ref 43–80)
NEUTS SEG NFR BLD: 7.83 K/UL (ref 1.8–7.3)
O2 CONTENT: 14.5 ML/DL
O2 CONTENT: 3.5 ML/DL
O2 SATURATION: 26.6 % (ref 92–98.5)
O2 SATURATION: 99.3 % (ref 92–98.5)
O2HB: 26.2 % (ref 94–97)
O2HB: 98.3 % (ref 94–97)
OPERATOR ID: 2067
OPERATOR ID: 8219
PATH REV BLD -IMP: NORMAL
PATIENT TEMP: 37 C
PATIENT TEMP: 37 C
PCO2: 145.3 MMHG (ref 35–45)
PCO2: 150.4 MMHG (ref 35–45)
PH BLOOD GAS: 7.02 (ref 7.35–7.45)
PH BLOOD GAS: 7.02 (ref 7.35–7.45)
PHOSPHATE SERPL-MCNC: 2 MG/DL (ref 2.5–4.5)
PHOSPHATE SERPL-MCNC: 2.8 MG/DL (ref 2.5–4.5)
PLATELET # BLD AUTO: 184 K/UL (ref 130–450)
PMV BLD AUTO: 9.4 FL (ref 7–12)
PO2: 22.3 MMHG (ref 75–100)
PO2: 271.2 MMHG (ref 75–100)
POTASSIUM SERPL-SCNC: 3.7 MMOL/L (ref 3.5–5)
POTASSIUM SERPL-SCNC: 4.3 MMOL/L (ref 3.5–5)
POTASSIUM SERPL-SCNC: 5.52 MMOL/L (ref 3.5–5)
RBC # BLD AUTO: 2.4 M/UL (ref 3.5–5.5)
S PNEUM AG SPEC QL: NEGATIVE
SODIUM SERPL-SCNC: 144 MMOL/L (ref 132–146)
SODIUM SERPL-SCNC: 148 MMOL/L (ref 132–146)
SOURCE, BLOOD GAS: ABNORMAL
SOURCE, BLOOD GAS: ABNORMAL
SOURCE: NORMAL
SPECIMEN DESCRIPTION: NORMAL
SPECIMEN DESCRIPTION: NORMAL
SPECIMEN SOURCE: NORMAL
THB: 10 G/DL (ref 11.5–16.5)
THB: 9.3 G/DL (ref 11.5–16.5)
TIME ANALYZED: 622
TIME ANALYZED: 652
TROPONIN I SERPL HS-MCNC: 22 NG/L (ref 0–9)
WBC OTHER # BLD: 9.8 K/UL (ref 4.5–11.5)

## 2023-10-02 PROCEDURE — 02HV33Z INSERTION OF INFUSION DEVICE INTO SUPERIOR VENA CAVA, PERCUTANEOUS APPROACH: ICD-10-PCS | Performed by: INTERNAL MEDICINE

## 2023-10-02 PROCEDURE — 6360000002 HC RX W HCPCS

## 2023-10-02 PROCEDURE — 06HY33Z INSERTION OF INFUSION DEVICE INTO LOWER VEIN, PERCUTANEOUS APPROACH: ICD-10-PCS | Performed by: INTERNAL MEDICINE

## 2023-10-02 PROCEDURE — 31500 INSERT EMERGENCY AIRWAY: CPT | Performed by: NURSE ANESTHETIST, CERTIFIED REGISTERED

## 2023-10-02 PROCEDURE — 6370000000 HC RX 637 (ALT 250 FOR IP)

## 2023-10-02 PROCEDURE — 82805 BLOOD GASES W/O2 SATURATION: CPT

## 2023-10-02 PROCEDURE — 2500000003 HC RX 250 WO HCPCS

## 2023-10-02 PROCEDURE — 6360000002 HC RX W HCPCS: Performed by: NURSE ANESTHETIST, CERTIFIED REGISTERED

## 2023-10-02 PROCEDURE — 5A1935Z RESPIRATORY VENTILATION, LESS THAN 24 CONSECUTIVE HOURS: ICD-10-PCS

## 2023-10-02 PROCEDURE — 93010 ELECTROCARDIOGRAM REPORT: CPT | Performed by: INTERNAL MEDICINE

## 2023-10-02 PROCEDURE — 94640 AIRWAY INHALATION TREATMENT: CPT

## 2023-10-02 PROCEDURE — 82962 GLUCOSE BLOOD TEST: CPT

## 2023-10-02 PROCEDURE — 36556 INSERT NON-TUNNEL CV CATH: CPT

## 2023-10-02 PROCEDURE — 0BH17EZ INSERTION OF ENDOTRACHEAL AIRWAY INTO TRACHEA, VIA NATURAL OR ARTIFICIAL OPENING: ICD-10-PCS

## 2023-10-02 PROCEDURE — 85025 COMPLETE CBC W/AUTO DIFF WBC: CPT

## 2023-10-02 PROCEDURE — 83735 ASSAY OF MAGNESIUM: CPT

## 2023-10-02 PROCEDURE — 80048 BASIC METABOLIC PNL TOTAL CA: CPT

## 2023-10-02 PROCEDURE — 92950 HEART/LUNG RESUSCITATION CPR: CPT

## 2023-10-02 PROCEDURE — 84132 ASSAY OF SERUM POTASSIUM: CPT

## 2023-10-02 PROCEDURE — 36556 INSERT NON-TUNNEL CV CATH: CPT | Performed by: INTERNAL MEDICINE

## 2023-10-02 PROCEDURE — 2580000003 HC RX 258

## 2023-10-02 PROCEDURE — 71045 X-RAY EXAM CHEST 1 VIEW: CPT

## 2023-10-02 PROCEDURE — 84100 ASSAY OF PHOSPHORUS: CPT

## 2023-10-02 PROCEDURE — 31500 INSERT EMERGENCY AIRWAY: CPT

## 2023-10-02 PROCEDURE — 94002 VENT MGMT INPAT INIT DAY: CPT

## 2023-10-02 RX ORDER — MAGNESIUM SULFATE IN WATER 40 MG/ML
2000 INJECTION, SOLUTION INTRAVENOUS ONCE
Status: COMPLETED | OUTPATIENT
Start: 2023-10-02 | End: 2023-10-02

## 2023-10-02 RX ORDER — NOREPINEPHRINE BITARTRATE 0.06 MG/ML
1-100 INJECTION, SOLUTION INTRAVENOUS CONTINUOUS
Status: DISCONTINUED | OUTPATIENT
Start: 2023-10-02 | End: 2023-10-02 | Stop reason: HOSPADM

## 2023-10-02 RX ORDER — CHLORHEXIDINE GLUCONATE ORAL RINSE 1.2 MG/ML
15 SOLUTION DENTAL 2 TIMES DAILY
Status: DISCONTINUED | OUTPATIENT
Start: 2023-10-02 | End: 2023-10-02 | Stop reason: HOSPADM

## 2023-10-02 RX ORDER — SUCCINYLCHOLINE CHLORIDE 20 MG/ML
INJECTION INTRAMUSCULAR; INTRAVENOUS
Status: COMPLETED
Start: 2023-10-02 | End: 2023-10-02

## 2023-10-02 RX ORDER — POTASSIUM CHLORIDE 29.8 MG/ML
20 INJECTION INTRAVENOUS ONCE
Status: COMPLETED | OUTPATIENT
Start: 2023-10-02 | End: 2023-10-02

## 2023-10-02 RX ORDER — FENTANYL CITRATE-0.9 % NACL/PF 10 MCG/ML
25-200 PLASTIC BAG, INJECTION (ML) INTRAVENOUS CONTINUOUS
Status: DISCONTINUED | OUTPATIENT
Start: 2023-10-02 | End: 2023-10-02 | Stop reason: HOSPADM

## 2023-10-02 RX ORDER — SUCCINYLCHOLINE CHLORIDE 20 MG/ML
INJECTION INTRAMUSCULAR; INTRAVENOUS PRN
Status: DISCONTINUED | OUTPATIENT
Start: 2023-10-02 | End: 2023-10-05 | Stop reason: HOSPADM

## 2023-10-02 RX ADMIN — SUCCINYLCHOLINE CHLORIDE 100 MG: 20 INJECTION, SOLUTION INTRAMUSCULAR; INTRAVENOUS at 06:37

## 2023-10-02 RX ADMIN — SUCCINYLCHOLINE CHLORIDE 100 MG: 20 INJECTION, SOLUTION INTRAMUSCULAR; INTRAVENOUS at 06:40

## 2023-10-02 RX ADMIN — METRONIDAZOLE 500 MG: 500 INJECTION, SOLUTION INTRAVENOUS at 01:31

## 2023-10-02 RX ADMIN — POTASSIUM CHLORIDE 20 MEQ: 29.8 INJECTION, SOLUTION INTRAVENOUS at 03:31

## 2023-10-02 RX ADMIN — MAGNESIUM SULFATE HEPTAHYDRATE 2000 MG: 40 INJECTION, SOLUTION INTRAVENOUS at 03:29

## 2023-10-02 RX ADMIN — Medication 4 MCG/MIN: at 01:21

## 2023-10-02 RX ADMIN — IPRATROPIUM BROMIDE AND ALBUTEROL SULFATE 1 DOSE: 2.5; .5 SOLUTION RESPIRATORY (INHALATION) at 03:03

## 2023-10-02 RX ADMIN — POTASSIUM PHOSPHATE, MONOBASIC AND POTASSIUM PHOSPHATE, DIBASIC 15 MMOL: 224; 236 INJECTION, SOLUTION, CONCENTRATE INTRAVENOUS at 03:23

## 2023-10-02 ASSESSMENT — PAIN SCALES - GENERAL
PAINLEVEL_OUTOF10: 0
PAINLEVEL_OUTOF10: 0

## 2023-10-02 NOTE — CARE COORDINATION
Care coordination: call to Daughter Alfredo Leo  regarding questions for  home and financial hardship, left her a message with suggestion of [facility]  home as used by hospital as well.  Left my contact number for any further questions via     Electronically signed by Gary Ruiz RN on 10/2/2023 at 9:25 AM

## 2023-10-02 NOTE — DISCHARGE SUMMARY
Hospitalist Discharge Summary    Patient ID: Kathleen Venegas   Patient : 1960  Patient's PCP: Alethea Ying. Marcos Lucas MD    Admit Date: 10/1/2023   Admitting Physician: Maxi Camarillo MD    Discharge Date:  10/2/2023  Discharge Physician: Maxi Camarillo MD   Discharge Condition:  Patient   Discharge Disposition:  at [id number] on 10/2/23    History of presenting illness:  61 y.o. female with a past medical history of chronic systolic CHF, COPD on 4L O2, coronary disease, hydronephrosis with urinary obstruction secondary to stone status post right ureteral stent, tobacco abuse, depression anxiety who presented to [facility] with concern for wide-complex tachycardia from [facility].  Patient had been admitted for concern for colitis and also hyperkalemia. CT abdomen obtained 2023 showed thickening of the sigmoid colon suggestive of nonspecific colitis with possible diverticulitis versus neoplasm with possible gastritis due to taking stomach. She was seen by gastroenterology given a history of diverticulitis with previous abscess recommending nonemergent colonoscopy at some point once improved. She was placed on Levaquin and Flagyl and also Dificid by infectious disease. C. difficile returned negative. Given SVT and low potassium cardiology was following her during her hospital stay as there was also concern for atrial flutter. Also she was in acute on chronic heart failure with mildly reduced EF. Initiated on metoprolol succinate 12.5 mg twice daily Entresto was held due to hypotension. She received a dose of diuretics with Lasix. Rapid response was called on 10/1/2023 and 2:05 AM given concern for wide-complex tachycardia concerning for torsades de pointes. She also found to have drop in hemoglobin from 12-7.7 during her admission. Given hypotension with SBP in the 80s he was transferred here to [facility] ICU level of care.   CODE BLUE was Mild perinephric stranding, nonspecific but correlate with urinalysis. 5. Prominent periportal/gastrohepatic lymph nodes, nonspecific. 6. Small amount of ascites. 7. Skin thickening with some subcutaneous stranding anterior right pelvis, correlate with exam for any concern of cellulitis. XR CHEST PORTABLE    Result Date: 9/30/2023  EXAMINATION: ONE XRAY VIEW OF THE CHEST 9/30/2023 10:35 am COMPARISON: Chest x-ray dated 09/28/2023 HISTORY: ORDERING SYSTEM PROVIDED HISTORY: eval for pulm edema TECHNOLOGIST PROVIDED HISTORY: Reason for exam:->eval for pulm edema FINDINGS: Cardiac size within normal limits. Pulmonary vascularity within normal is. No focal opacification or consolidation with hyperinflation of likely chronic obstructive disease. Degenerate changes of the spine. Hyperinflated appearance with likely chronic obstructive pulmonary disease without evidence for pulmonary edema or effusion     CT ABDOMEN PELVIS W IV CONTRAST Additional Contrast? None    Result Date: 9/28/2023  EXAMINATION: CT OF THE ABDOMEN AND PELVIS WITH CONTRAST 9/28/2023 11:32 pm TECHNIQUE: CT of the abdomen and pelvis was performed with the administration of intravenous contrast. Multiplanar reformatted images are provided for review. Automated exposure control, iterative reconstruction, and/or weight based adjustment of the mA/kV was utilized to reduce the radiation dose to as low as reasonably achievable. COMPARISON: 09/10/2023 HISTORY: ORDERING SYSTEM PROVIDED HISTORY: abdominal pain, hx of stent placement for kidney stone TECHNOLOGIST PROVIDED HISTORY: Additional Contrast?->None Reason for exam:->abdominal pain, hx of stent placement for kidney stone Decision Support Exception - unselect if not a suspected or confirmed emergency medical condition->Emergency Medical Condition (MA) FINDINGS: Lower Chest:  Visualized portion of the lower chest demonstrates no acute abnormality. There is advanced emphysematous change.  Organs:

## 2023-10-02 NOTE — PLAN OF CARE
Problem: Safety - Adult  Goal: Free from fall injury  Outcome: Progressing     Problem: Chronic Conditions and Co-morbidities  Goal: Patient's chronic conditions and co-morbidity symptoms are monitored and maintained or improved  Outcome: Progressing  Flowsheets (Taken 10/1/2023 2000)  Care Plan - Patient's Chronic Conditions and Co-Morbidity Symptoms are Monitored and Maintained or Improved: Monitor and assess patient's chronic conditions and comorbid symptoms for stability, deterioration, or improvement     Problem: Discharge Planning  Goal: Discharge to home or other facility with appropriate resources  Outcome: Progressing     Problem: Skin/Tissue Integrity  Goal: Absence of new skin breakdown  Description: 1. Monitor for areas of redness and/or skin breakdown  2. Assess vascular access sites hourly  3. Every 4-6 hours minimum:  Change oxygen saturation probe site  4. Every 4-6 hours:  If on nasal continuous positive airway pressure, respiratory therapy assess nares and determine need for appliance change or resting period.   Outcome: Progressing     Problem: ABCDS Injury Assessment  Goal: Absence of physical injury  Outcome: Progressing  Flowsheets (Taken 10/2/2023 0100)  Absence of Physical Injury: Implement safety measures based on patient assessment     Problem: Respiratory - Adult  Goal: Achieves optimal ventilation and oxygenation  Outcome: Progressing     Problem: Cardiovascular - Adult  Goal: Maintains optimal cardiac output and hemodynamic stability  Outcome: Progressing  Goal: Absence of cardiac dysrhythmias or at baseline  Outcome: Progressing     Problem: Gastrointestinal - Adult  Goal: Minimal or absence of nausea and vomiting  Outcome: Progressing  Goal: Maintains or returns to baseline bowel function  Outcome: Progressing     Problem: Infection - Adult  Goal: Absence of infection at discharge  Outcome: Progressing  Goal: Absence of infection during hospitalization  Outcome: Progressing  Goal: Absence of fever/infection during anticipated neutropenic period  Outcome: Progressing     Problem: Metabolic/Fluid and Electrolytes - Adult  Goal: Electrolytes maintained within normal limits  Outcome: Progressing  Goal: Glucose maintained within prescribed range  Outcome: Progressing

## 2023-10-02 NOTE — PROGRESS NOTES
Code Note Narrator    8883 - CPR  5691 - Epi  2349 - PEA  0649 - Epi  0650 - PEA  [address] - PEA  0656 - Epi  0656 - PEA  [address] - PEA  0658 - Calcium chloride  0659 - Epi  0700 - PEA  0700 - Bicarb  0702 - PEA  0703 - Epi  0704 - Bicarb. No pulse.  0706 - Epi. No pulse  0708 - Pulse present. 0710 - Pulse lost, CPR resumed  0710 - Epi  0712 - Pulse present.   8846 - Pulse lost, CPR resumed  0714 - Epi  0715 - faint pulse, lost pulse  0717 - Epi   0718 - faint pulse, lost pulse  0721 - No pulse  0722 - Time of death

## 2023-10-02 NOTE — PROCEDURES
Central Line Insertion     Procedure: right femoral vein Triple Lumen Catheter placement. Indications: vascular access    Consent: The Daughter was counseled regarding the procedure, its indications, risks, potential complications and alternatives, and any questions were answered. Consent was obtained to proceed. Number of sticks: 1    Number of Kits used: 1    Procedure: Time Out: Immediately prior to the procedure a \"timeout\" was called to verify the correct patient and procedure. The patient was place in the trendelenburg position and the skin over the right femoral vein was prepped with betadine and draped in a sterile fashion. Local anesthesia was obtained by infiltration using 1% Lidocaine without epinephrine. With Ultrasound guidance a large bore needle was used to identify the vein, dark non pulsatile blood returned. The guide wire was then inserted through the needle with minimal resistance. 2 mm nick was made in the skin beside the guidewire. Then a dilator was inserted and removed. A triple lumen catheter was then inserted into the vessel over the guide wire using the Seldinger technique. All ports showed good, free flowing blood return and were flushed with saline solution. The catheter was then securely fastened to the skin with sutures and covered with a bio patch and sterile dressing. A post procedure X-ray was ordered and showed good line position. Complications: None   The patient tolerated the procedure well. Estimated blood loss: 5 ml. Rika Fam MD PGY-1  10/2/2023  11:19 AM      Attending: Liam Webster    [address] [place] [address]  Department of Pulmonary, Critical Care and Sleep Medicine  [address] [place] [address]  Department of Internal Medicine  Attending Procedural Note Addendum Statement    This procedure was supervised.  The critical elements of this procedure was monitored and, if needed, I was available for the needs of the procedure.      Meg Singh DO, [facility], Faraz Ramos

## 2023-10-02 NOTE — ANESTHESIA PROCEDURE NOTES
Airway  Date/Time: 10/2/2023 6:40 AM  Urgency: emergent    Airway not difficult    General Information and Staff    Patient location during procedure: ICU  Anesthesiologist: Rizwana Hughes MD  Resident/CRNA: SKYE Kaur CRNA  Performed: resident/CRNA   Performed by: SKYE Kaur CRNA  Authorized by: SKYE Kaur CRNA      Consent for Airway (if performed for an anesthetic, see related documentation for consents)  Patient identity confirmed: per hospital policy  Consent: The procedure was performed in an emergent situation. Verbal consent not obtained. Written consent not obtained. Risks and benefits: risks, benefits and alternatives were not discussed      Code status verified:yes  Indications and Patient Condition  Indications for airway management: respiratory failure  Spontaneous ventilation: present  Sedation level: deep  Preoxygenated: yes  Patient position: sniffing  MILS not maintained throughout  Mask difficulty assessment: vent by bag mask    Final Airway Details  Final airway type: endotracheal airway      Successful airway: ETT  Cuffed: yes   Successful intubation technique: video laryngoscopy  Facilitating devices/methods: intubating stylet  Endotracheal tube insertion site: oral  Blade type: CMAC D Blade.   Blade size: #4  ETT size (mm): 8.0  Cormack-Lehane Classification: grade I - full view of glottis  Placement verified by: chest auscultation and capnometry   Measured from: lips  ETT to lips (cm): 23  Number of attempts at approach: 1  Number of other approaches attempted: 0

## 2023-10-04 LAB
EKG ATRIAL RATE: 27 BPM
EKG ATRIAL RATE: 70 BPM
EKG ATRIAL RATE: 87 BPM
EKG P AXIS: 79 DEGREES
EKG P-R INTERVAL: 174 MS
EKG Q-T INTERVAL: 238 MS
EKG Q-T INTERVAL: 296 MS
EKG Q-T INTERVAL: 320 MS
EKG QRS DURATION: 156 MS
EKG QRS DURATION: 70 MS
EKG QRS DURATION: 76 MS
EKG QTC CALCULATION (BAZETT): 384 MS
EKG QTC CALCULATION (BAZETT): 385 MS
EKG QTC CALCULATION (BAZETT): 455 MS
EKG R AXIS: 82 DEGREES
EKG R AXIS: 84 DEGREES
EKG R AXIS: 84 DEGREES
EKG T AXIS: -84 DEGREES
EKG T AXIS: 81 DEGREES
EKG T AXIS: 94 DEGREES
EKG VENTRICULAR RATE: 142 BPM
EKG VENTRICULAR RATE: 157 BPM
EKG VENTRICULAR RATE: 87 BPM
MICROORGANISM SPEC CULT: NORMAL
MICROORGANISM SPEC CULT: NORMAL
SERVICE CMNT-IMP: NORMAL
SERVICE CMNT-IMP: NORMAL
SPECIMEN DESCRIPTION: NORMAL
SPECIMEN DESCRIPTION: NORMAL

## 2023-10-13 NOTE — PROGRESS NOTES
Physician Progress Note      Ethan Johnson  CSN #:                  [id number]  :                       1960  ADMIT DATE:       10/1/2023 7:13 AM  DISCH DATE:        10/2/2023 11:15 AM  RESPONDING  PROVIDER #:        Rito Bourgeois MD          QUERY TEXT:    Pt admitted [facility] with Colitis and hyperkalemia as well as treatment for C-diff   and UTI. Pt then transported here after RRT at [facility] for \"Wide complex   tachycardia\" and suspected Torsades De Pointes. Pt noted to have documentation   of possible Sepsis and Shock from possible sepsis in multiple consult notes   upon arrival to [facility]. If possible, please document in the progress notes and   discharge summary if you are evaluating and /or treating any of the following: The medical record reflects the following:  Risk Factors: Multiple potential infectious process's  Clinical Indicators: 10/01 Consult note by Dr. Lidny Chamorro -Shock likely Septic   vs Cardiogenic. +4L prior to transfer RRT - Received 1L bolus, 25gm albumin,   and 10mg midodrine with improvement in SBP to 90s. Clemmie Numbers Clemmie Numbers Sepsis likely   intraabdominal vs UTI, 10/01 Consult by Dr. Nakia Martinez - Possible Sepsis - Per   admitting.  - Hypotension - Better now; Started on Midodrine. Monitor BP. r/o   sepsis, 10/01 Consult note by Dr. Herbert Carlson -  Sigmoid colitis with sepsis,   10/01 Consult by Dr. Jai Leyva - Recurrent diverticulitis. Leukocytosis -   improved. Flagyl 500 mg IV q 8 hrs , Rocephin 1 gram IV q   WBC 12.1 and 15.2 while at [facility]  Treatment: Flagyl, Rocephin, Close patient monitoring    Thank you,  Enrique DIAZN, RN, CRCR  Clinical Documentation Improvement  [email or web address]  Options provided:  -- Sepsis, present on admission  -- Sepsis, developed following admission  -- Colitis, UTI, or Diverticulitis without Sepsis  -- Other - I will add my own diagnosis  -- Disagree - Not applicable / Not valid  -- Disagree - Clinically unable to determine / Unknown  -- Refer to

## 2024-04-26 NOTE — PROGRESS NOTES
breathing is unlabored without accessory muscle use, normal breath sounds Occupational Therapy  OT SESSION ATTEMPT     Date:3/24/2023  Patient Name: Clayton Alatorre  MRN: [id number]  : 1960  Room: [facility]     Attempted OT session this date:    [] unavailable due to other medical staff currently with pt   [] on hold per nursing staff   [] on hold per nursing staff secondary to lab / radiology results    [x] declined treatment this date due to being on bedpan and eating breakfast at same time. Therapist instructed pt on bedpan and risk of skin breakdown - facilitated discussion on removing bedpan, repositioning for feeding task. Pt adamantly declined. RN aware. [] off unit   [] Other:     Will reattempt OT eval at a later time.     JUAN RAMON BrooksR/L #[id number]

## 2025-02-20 NOTE — PROGRESS NOTES
General Surgery Progress Note  Cornelious Habermann, MD, MS    Patient's Name/Date of Birth: Moo Gambino / 1960    Date: December 31, 2022     Surgeon: Chichi Corcoran MD    Chief Complaint: abd pain    Patient Active Problem List   Diagnosis    Tobacco use disorder    Seasonal allergic rhinitis    Chronic bronchitis ([address]    Chronic respiratory failure with hypoxia ([address]    Chronic obstructive pulmonary disease ([address]    Acute respiratory failure with hypercapnia ([address]    Acute on chronic respiratory failure with hypoxia and hypercapnia ([address]    COPD exacerbation ([address]    Acute diverticulitis with abscess    Acute cholecystitis       Subjective: no pain, tolerating diet    Objective:  /78   Pulse 71   Temp 97.4 °F (36.3 °C) (Axillary)   Resp 20   Ht 5' 3\" (1.6 m)   Wt 157 lb (71.2 kg)   SpO2 98%   BMI 27.81 kg/m²   Labs:  Recent Labs     12/29/22  0507 12/30/22  0630 12/31/22  0718   WBC 12.0* 15.8* 14.5*   HGB 9.9* 10.0* 10.3*   HCT 29.7* 27.9* 25.4*     Lab Results   Component Value Date    CREATININE 0.3 (L) 12/31/2022    BUN 11 12/31/2022     12/31/2022    K 3.2 (L) 12/31/2022    CL 96 (L) 12/31/2022    CO2 40 (H) 12/31/2022     Recent Labs     12/28/22  1902   LIPASE 32         General appearance:  NAD  Head: NCAT, PERRLA, EOMI, red conjunctiva  Neck: supple, no masses  Lungs: CTAB, equal chest rise bilateral  Heart: Reg rate  Abdomen: soft, nondistended, nontender  Skin; no lesions  Gu: no cva tenderness  Extremities: extremities normal, atraumatic, no cyanosis or edema      Assessment/Plan:  Moo Gambino is a 58 y.o. female with liver cysts, hepatic insuff improving, gb wall thickening without evidence of cholecystitis or stones    No surgery planned  No evidence gb is infected or symptomatic, MRCP reviewed at length    Physician Signature: Electronically signed by Dr. Chichi Corcoran  [phone] (p)  12/31/2022  12:56 PM Manual pressure was applied to the right femoral artery sheath insertion site.

## (undated) DEVICE — DILATOR/SHEATH SET: Brand: 8/10 DILATOR/SHEATH SET

## (undated) DEVICE — GARMENT,MEDLINE,DVT,INT,CALF,MED, GEN2: Brand: MEDLINE

## (undated) DEVICE — MEDIA CONTRAST ISOVUE 300 100ML

## (undated) DEVICE — SYRINGE MED 50ML LUERLOCK TIP

## (undated) DEVICE — FLEXIVA  PULSE  AND  FLEXIVA  PULSE  TRACTIP  LASER  FIBERS  ARE  HIGH  POWER  SINGLE-USE FIBER: Brand: FLEXIVA PULSE ID

## (undated) DEVICE — GOWN,SIRUS,FABRNF,XL,20/CS: Brand: MEDLINE

## (undated) DEVICE — SINGLE USE BIOPSY VALVE MAJ-210: Brand: SINGLE USE BIOPSY VALVE (STERILE)

## (undated) DEVICE — SINGLE USE SUCTION VALVE MAJ-209: Brand: SINGLE USE SUCTION VALVE (STERILE)

## (undated) DEVICE — BRONCHOSCOPY PACK: Brand: MEDLINE INDUSTRIES, INC.

## (undated) DEVICE — Device: Brand: SINGLE USE GUIDE SHEATH KIT

## (undated) DEVICE — SOLUTION IRRIG 3000ML STRL H2O USP UROMATIC PLAS CONT

## (undated) DEVICE — CATHETER URET 5FR L70CM OPN END SGL LUMN INJ HUB FLEXIMA

## (undated) DEVICE — SOLUTION IRRIG 1000ML STRL H2O USP PLAS POUR BTL

## (undated) DEVICE — Device: Brand: MEDEX

## (undated) DEVICE — DOUBLE  SWIVEL ELBOW 15M - DOUBLE FLIP TOP CAP WITH SEAL - 22M/15F: Brand: DOUBLE  SWIVEL ELBOW 15M - DOUBLE FLIP TOP CAP WITH SEAL - 22M/15F

## (undated) DEVICE — CYSTO: Brand: MEDLINE INDUSTRIES, INC.

## (undated) DEVICE — ADAPTER URO SCP UROLOK LL

## (undated) DEVICE — SET EXTN IV L30IN TBNG DIA0.1IN PRIMING 4ML MACBOR FEM ADPT

## (undated) DEVICE — GUIDEWIRE UROLOGICAL STR STD 0.035 IN X150 CM (QTY = BOX OF 5)

## (undated) DEVICE — GLOVE ORANGE PI 7 1/2   MSG9075

## (undated) DEVICE — SOLUTION IRRIG 500ML 0.9% SOD CHLO USP POUR PLAS BTL

## (undated) DEVICE — BAG DRAINAGE CONTAINER 15 LT FLUID COLLCTN

## (undated) DEVICE — BEDSIDE KIT XXXX ENDOSCP 500 CC TBLR SPNG LO FOAM DISP